# Patient Record
Sex: MALE | Race: WHITE | HISPANIC OR LATINO | Employment: FULL TIME | URBAN - METROPOLITAN AREA
[De-identification: names, ages, dates, MRNs, and addresses within clinical notes are randomized per-mention and may not be internally consistent; named-entity substitution may affect disease eponyms.]

---

## 2018-04-23 ENCOUNTER — LAB VISIT (OUTPATIENT)
Dept: LAB | Facility: HOSPITAL | Age: 58
End: 2018-04-23
Attending: FAMILY MEDICINE
Payer: COMMERCIAL

## 2018-04-23 ENCOUNTER — OFFICE VISIT (OUTPATIENT)
Dept: FAMILY MEDICINE | Facility: CLINIC | Age: 58
End: 2018-04-23
Payer: COMMERCIAL

## 2018-04-23 VITALS
HEIGHT: 67 IN | HEART RATE: 88 BPM | SYSTOLIC BLOOD PRESSURE: 130 MMHG | DIASTOLIC BLOOD PRESSURE: 84 MMHG | WEIGHT: 235.88 LBS | BODY MASS INDEX: 37.02 KG/M2

## 2018-04-23 DIAGNOSIS — Z00.00 ANNUAL PHYSICAL EXAM: ICD-10-CM

## 2018-04-23 DIAGNOSIS — E79.0 HYPERURICEMIA: ICD-10-CM

## 2018-04-23 DIAGNOSIS — E66.01 SEVERE OBESITY (BMI 35.0-39.9) WITH COMORBIDITY: ICD-10-CM

## 2018-04-23 DIAGNOSIS — I10 ESSENTIAL HYPERTENSION: ICD-10-CM

## 2018-04-23 DIAGNOSIS — Z00.00 ANNUAL PHYSICAL EXAM: Primary | ICD-10-CM

## 2018-04-23 DIAGNOSIS — Z11.59 NEED FOR HEPATITIS C SCREENING TEST: ICD-10-CM

## 2018-04-23 DIAGNOSIS — M25.561 CHRONIC PAIN OF RIGHT KNEE: ICD-10-CM

## 2018-04-23 DIAGNOSIS — M25.50 ARTHRALGIA, UNSPECIFIED JOINT: ICD-10-CM

## 2018-04-23 DIAGNOSIS — M25.551 HIP PAIN, CHRONIC, RIGHT: ICD-10-CM

## 2018-04-23 DIAGNOSIS — R20.2 TINGLING OF SKIN: ICD-10-CM

## 2018-04-23 DIAGNOSIS — G89.29 CHRONIC PAIN OF RIGHT KNEE: ICD-10-CM

## 2018-04-23 DIAGNOSIS — G89.29 HIP PAIN, CHRONIC, RIGHT: ICD-10-CM

## 2018-04-23 DIAGNOSIS — G47.33 OSA (OBSTRUCTIVE SLEEP APNEA): ICD-10-CM

## 2018-04-23 LAB
BASOPHILS # BLD AUTO: 0.06 K/UL
BASOPHILS NFR BLD: 0.9 %
CRP SERPL-MCNC: 1.5 MG/L
DIFFERENTIAL METHOD: ABNORMAL
EOSINOPHIL # BLD AUTO: 0.1 K/UL
EOSINOPHIL NFR BLD: 1.9 %
ERYTHROCYTE [DISTWIDTH] IN BLOOD BY AUTOMATED COUNT: 13 %
ERYTHROCYTE [SEDIMENTATION RATE] IN BLOOD BY WESTERGREN METHOD: 8 MM/HR
HCT VFR BLD AUTO: 47 %
HGB BLD-MCNC: 16.9 G/DL
IMM GRANULOCYTES # BLD AUTO: 0.01 K/UL
IMM GRANULOCYTES NFR BLD AUTO: 0.2 %
LYMPHOCYTES # BLD AUTO: 2.1 K/UL
LYMPHOCYTES NFR BLD: 32.3 %
MCH RBC QN AUTO: 31.4 PG
MCHC RBC AUTO-ENTMCNC: 36 G/DL
MCV RBC AUTO: 87 FL
MONOCYTES # BLD AUTO: 0.5 K/UL
MONOCYTES NFR BLD: 8.2 %
NEUTROPHILS # BLD AUTO: 3.6 K/UL
NEUTROPHILS NFR BLD: 56.5 %
NRBC BLD-RTO: 0 /100 WBC
PLATELET # BLD AUTO: 276 K/UL
PMV BLD AUTO: 10.4 FL
RBC # BLD AUTO: 5.39 M/UL
TSH SERPL DL<=0.005 MIU/L-ACNC: 1.27 UIU/ML
URATE SERPL-MCNC: 5.6 MG/DL
WBC # BLD AUTO: 6.38 K/UL

## 2018-04-23 PROCEDURE — 86803 HEPATITIS C AB TEST: CPT

## 2018-04-23 PROCEDURE — 36415 COLL VENOUS BLD VENIPUNCTURE: CPT | Mod: PO

## 2018-04-23 PROCEDURE — 86431 RHEUMATOID FACTOR QUANT: CPT

## 2018-04-23 PROCEDURE — 84550 ASSAY OF BLOOD/URIC ACID: CPT

## 2018-04-23 PROCEDURE — 86038 ANTINUCLEAR ANTIBODIES: CPT

## 2018-04-23 PROCEDURE — 99999 PR PBB SHADOW E&M-EST. PATIENT-LVL III: CPT | Mod: PBBFAC,,, | Performed by: FAMILY MEDICINE

## 2018-04-23 PROCEDURE — 85651 RBC SED RATE NONAUTOMATED: CPT

## 2018-04-23 PROCEDURE — 82746 ASSAY OF FOLIC ACID SERUM: CPT

## 2018-04-23 PROCEDURE — 99386 PREV VISIT NEW AGE 40-64: CPT | Mod: S$GLB,,, | Performed by: FAMILY MEDICINE

## 2018-04-23 PROCEDURE — 82607 VITAMIN B-12: CPT

## 2018-04-23 PROCEDURE — 3075F SYST BP GE 130 - 139MM HG: CPT | Mod: CPTII,S$GLB,, | Performed by: FAMILY MEDICINE

## 2018-04-23 PROCEDURE — 3079F DIAST BP 80-89 MM HG: CPT | Mod: CPTII,S$GLB,, | Performed by: FAMILY MEDICINE

## 2018-04-23 PROCEDURE — 86140 C-REACTIVE PROTEIN: CPT

## 2018-04-23 PROCEDURE — 84443 ASSAY THYROID STIM HORMONE: CPT

## 2018-04-23 PROCEDURE — 85025 COMPLETE CBC W/AUTO DIFF WBC: CPT

## 2018-04-23 RX ORDER — OMEPRAZOLE 20 MG/1
40 CAPSULE, DELAYED RELEASE ORAL DAILY
Status: ON HOLD | COMMUNITY
End: 2018-12-03

## 2018-04-23 RX ORDER — AMITRIPTYLINE HYDROCHLORIDE 10 MG/1
10 TABLET, FILM COATED ORAL NIGHTLY
Qty: 90 TABLET | Refills: 0 | Status: SHIPPED | OUTPATIENT
Start: 2018-04-23 | End: 2018-07-19 | Stop reason: SDUPTHER

## 2018-04-23 RX ORDER — LOSARTAN POTASSIUM AND HYDROCHLOROTHIAZIDE 12.5; 5 MG/1; MG/1
1 TABLET ORAL DAILY
Status: ON HOLD | COMMUNITY
End: 2018-11-30 | Stop reason: HOSPADM

## 2018-04-23 RX ORDER — POTASSIUM CHLORIDE 20 MEQ/1
20 TABLET, EXTENDED RELEASE ORAL 4 TIMES DAILY
Status: ON HOLD | COMMUNITY
End: 2018-12-05 | Stop reason: HOSPADM

## 2018-04-23 RX ORDER — NIFEDIPINE 60 MG/1
30 TABLET, EXTENDED RELEASE ORAL 2 TIMES DAILY
Status: ON HOLD | COMMUNITY
End: 2018-11-30 | Stop reason: HOSPADM

## 2018-04-23 NOTE — PATIENT INSTRUCTIONS
Arthralgia    Arthralgia is the term for pain in or around the joint. It is a symptom, not a disease. This pain may involve one or more joints. In some cases, the pain moves from joint to joint.  There are many causes for joint pain. These include:  · Injury  · Osteoarthritis (wearing out of the joint surface)  · Gout (inflammation of the joint due to crystals in the joint fluid)  · Infection inside the joint    · Bursitis (inflammation of the fluid-filled sacs around the joint)  · Autoimmune disorders such as rheumatoid arthritis or lupus  · Tendonitis (inflammation of chords that attach muscle to bone)  Home care  · Rest the involved joint(s) until your symptoms improve.   · You may be prescribed pain medicine. If none is prescribed, you may use acetaminophen or ibuprofen to control pain and inflammation.  Follow-up care  Follow up with your healthcare provider or as advised.  When to seek medical advice  Contact your healthcare provider right away if any of the following occurs:  · Pain, swelling, or redness of joint increases  · Pain worsens or recurs after a period of improvement  · Pain moves to other joints  · You cannot bear weight on the affected joint   · You cannot move the affected joint  · Joint appears deformed  · New rash appears  · Fever of 100.4ºF (38ºC) or higher, or as directed by your healthcare provider  Date Last Reviewed: 3/1/2017  © 3893-8450 [employer]. [address] [place] [address], [place] [address]. All rights reserved. This information is not intended as a substitute for professional medical care. Always follow your healthcare professional's instructions.

## 2018-04-23 NOTE — PROGRESS NOTES
Subjective:       Patient ID: Roman Hodges is a 58 y.o. male.    Chief Complaint: Establish Care; Annual Exam; and Hypertension    58 years old male came to the clinic for his physical examination.  Patient blood pressure today stable.  No chest pain palpitations orthopnea or PND.  Patient reported tingling sensation all over his body.  He reports some improvement taking B complex supplement.  Patient with chronic right hip and right knee pain for the last year.  The pain is 3 out of 10 of intensity on and off aggravated with activity and better with rest.  Patient with a BMI of 36 currently trying to lose weight.  Patient with sleep apnea currently using oxygen at night.  Patient with elevated uric acid before.  He reports gout episode over the right knee.      Hypertension   Pertinent negatives include no chest pain or palpitations.     Review of Systems   Constitutional: Negative.    HENT: Negative.    Eyes: Negative.    Respiratory: Negative.    Cardiovascular: Negative.  Negative for chest pain, palpitations and leg swelling.   Gastrointestinal: Negative.    Genitourinary: Negative.    Musculoskeletal: Negative.    Skin: Negative.    Neurological: Negative.    Psychiatric/Behavioral: Negative.        Objective:      Physical Exam   Constitutional: He is oriented to person, place, and time. He appears well-developed and well-nourished. No distress.   HENT:   Head: Normocephalic and atraumatic.   Right Ear: External ear normal.   Left Ear: External ear normal.   Nose: Nose normal.   Mouth/Throat: Oropharynx is clear and moist. No oropharyngeal exudate.   Eyes: Conjunctivae and EOM are normal. Pupils are equal, round, and reactive to light. Right eye exhibits no discharge. Left eye exhibits no discharge. No scleral icterus.   Neck: Normal range of motion. Neck supple. No JVD present. No tracheal deviation present. No thyromegaly present.   Cardiovascular: Normal rate, regular rhythm, normal heart sounds and  intact distal pulses.  Exam reveals no gallop and no friction rub.    No murmur heard.  Pulmonary/Chest: Effort normal and breath sounds normal. No stridor. No respiratory distress. He has no wheezes. He has no rales. He exhibits no tenderness.   Abdominal: Soft. Bowel sounds are normal. He exhibits no distension and no mass. There is no tenderness. There is no rebound and no guarding.   Musculoskeletal: Normal range of motion. He exhibits no edema or tenderness.   Lymphadenopathy:     He has no cervical adenopathy.   Neurological: He is alert and oriented to person, place, and time. He has normal reflexes. He displays normal reflexes. No cranial nerve deficit. He exhibits normal muscle tone. Coordination normal.   Skin: Skin is warm and dry. No rash noted. He is not diaphoretic. No erythema. No pallor.   Psychiatric: He has a normal mood and affect. His behavior is normal. Judgment and thought content normal.   Nursing note and vitals reviewed.      Assessment:       1. Annual physical exam    2. Severe obesity (BMI 35.0-39.9) with comorbidity    3. Essential hypertension    4. Need for hepatitis C screening test    5. Tingling of skin    6. Arthralgia, unspecified joint    7. Hip pain, chronic, right    8. Chronic pain of right knee    9. Hyperuricemia        Plan:         Roman was seen today for establish care, annual exam and hypertension.    Diagnoses and all orders for this visit:    Annual physical exam  -     TSH; Future  -     CBC auto differential; Future  -     Sedimentation rate, manual; Future  -     C-reactive protein; Future  -     YESSY; Future  -     Rheumatoid factor; Future  -     Uric acid; Future  -     Vitamin B12; Future  -     Folate; Future    Severe obesity (BMI 35.0-39.9) with comorbidity    Essential hypertension  -     TSH; Future  -     CBC auto differential; Future    Need for hepatitis C screening test  -     Hepatitis C antibody; Future    Tingling of skin  -     Vitamin B12;  Future  -     Folate; Future  -     amitriptyline (ELAVIL) 10 MG tablet; Take 1 tablet (10 mg total) by mouth every evening.    Arthralgia, unspecified joint  -     Sedimentation rate, manual; Future  -     C-reactive protein; Future  -     YESSY; Future  -     Rheumatoid factor; Future  -     Uric acid; Future    Hip pain, chronic, right  -     X-Ray Hip 2 View Right; Future    Chronic pain of right knee  -     X-Ray Knee 1 or 2 View Right; Future    Hyperuricemia  -     Uric acid; Future    MALLIKA (obstructive sleep apnea)     Diet and physical activity to promote weight loss.

## 2018-04-24 LAB
ANA SER QL IF: NORMAL
FOLATE SERPL-MCNC: 9 NG/ML
HCV AB SERPL QL IA: NEGATIVE
RHEUMATOID FACT SERPL-ACNC: <10 IU/ML
VIT B12 SERPL-MCNC: 597 PG/ML

## 2018-05-01 ENCOUNTER — HOSPITAL ENCOUNTER (OUTPATIENT)
Dept: RADIOLOGY | Facility: HOSPITAL | Age: 58
Discharge: HOME | End: 2018-05-01
Attending: FAMILY MEDICINE
Payer: COMMERCIAL

## 2018-05-01 DIAGNOSIS — M25.561 CHRONIC PAIN OF RIGHT KNEE: ICD-10-CM

## 2018-05-01 DIAGNOSIS — G89.29 HIP PAIN, CHRONIC, RIGHT: ICD-10-CM

## 2018-05-01 DIAGNOSIS — G89.29 CHRONIC PAIN OF RIGHT KNEE: ICD-10-CM

## 2018-05-01 DIAGNOSIS — M25.551 HIP PAIN, CHRONIC, RIGHT: ICD-10-CM

## 2018-05-01 PROCEDURE — 73560 X-RAY EXAM OF KNEE 1 OR 2: CPT | Mod: 26,RT,, | Performed by: RADIOLOGY

## 2018-05-01 PROCEDURE — 73502 X-RAY EXAM HIP UNI 2-3 VIEWS: CPT | Mod: 26,RT,, | Performed by: RADIOLOGY

## 2018-05-01 PROCEDURE — 73560 X-RAY EXAM OF KNEE 1 OR 2: CPT | Mod: TC,FY,RT

## 2018-05-01 PROCEDURE — 73502 X-RAY EXAM HIP UNI 2-3 VIEWS: CPT | Mod: TC,FY,RT

## 2018-07-19 DIAGNOSIS — R20.2 TINGLING OF SKIN: ICD-10-CM

## 2018-07-19 RX ORDER — AMITRIPTYLINE HYDROCHLORIDE 10 MG/1
10 TABLET, FILM COATED ORAL NIGHTLY
Qty: 90 TABLET | Refills: 0 | Status: SHIPPED | OUTPATIENT
Start: 2018-07-19 | End: 2018-10-09

## 2018-08-15 ENCOUNTER — OFFICE VISIT (OUTPATIENT)
Dept: FAMILY MEDICINE | Facility: CLINIC | Age: 58
End: 2018-08-15
Payer: COMMERCIAL

## 2018-08-15 VITALS
WEIGHT: 235.88 LBS | SYSTOLIC BLOOD PRESSURE: 130 MMHG | HEART RATE: 80 BPM | HEIGHT: 67 IN | BODY MASS INDEX: 37.02 KG/M2 | DIASTOLIC BLOOD PRESSURE: 80 MMHG

## 2018-08-15 DIAGNOSIS — E66.01 SEVERE OBESITY (BMI 35.0-39.9) WITH COMORBIDITY: ICD-10-CM

## 2018-08-15 DIAGNOSIS — I10 ESSENTIAL HYPERTENSION: Primary | ICD-10-CM

## 2018-08-15 DIAGNOSIS — Z23 NEED FOR DIPHTHERIA-TETANUS-PERTUSSIS (TDAP) VACCINE: ICD-10-CM

## 2018-08-15 DIAGNOSIS — N52.9 ERECTILE DYSFUNCTION, UNSPECIFIED ERECTILE DYSFUNCTION TYPE: ICD-10-CM

## 2018-08-15 DIAGNOSIS — G44.221 CHRONIC TENSION-TYPE HEADACHE, INTRACTABLE: ICD-10-CM

## 2018-08-15 DIAGNOSIS — Z00.00 ANNUAL PHYSICAL EXAM: ICD-10-CM

## 2018-08-15 DIAGNOSIS — Z12.5 SCREENING FOR PROSTATE CANCER: ICD-10-CM

## 2018-08-15 DIAGNOSIS — K21.9 GASTROESOPHAGEAL REFLUX DISEASE WITHOUT ESOPHAGITIS: ICD-10-CM

## 2018-08-15 PROCEDURE — 99999 PR PBB SHADOW E&M-EST. PATIENT-LVL IV: CPT | Mod: PBBFAC,,, | Performed by: FAMILY MEDICINE

## 2018-08-15 PROCEDURE — 90471 IMMUNIZATION ADMIN: CPT | Mod: S$GLB,,, | Performed by: FAMILY MEDICINE

## 2018-08-15 PROCEDURE — 3075F SYST BP GE 130 - 139MM HG: CPT | Mod: CPTII,S$GLB,, | Performed by: FAMILY MEDICINE

## 2018-08-15 PROCEDURE — 90715 TDAP VACCINE 7 YRS/> IM: CPT | Mod: S$GLB,,, | Performed by: FAMILY MEDICINE

## 2018-08-15 PROCEDURE — 99214 OFFICE O/P EST MOD 30 MIN: CPT | Mod: 25,S$GLB,, | Performed by: FAMILY MEDICINE

## 2018-08-15 PROCEDURE — 3079F DIAST BP 80-89 MM HG: CPT | Mod: CPTII,S$GLB,, | Performed by: FAMILY MEDICINE

## 2018-08-15 RX ORDER — IBUPROFEN 600 MG/1
600 TABLET ORAL EVERY 8 HOURS PRN
Qty: 40 TABLET | Refills: 0 | Status: SHIPPED | OUTPATIENT
Start: 2018-08-15 | End: 2018-08-25

## 2018-08-15 RX ORDER — VARDENAFIL HYDROCHLORIDE 10 MG/1
10 TABLET ORAL DAILY PRN
Qty: 12 TABLET | Refills: 6 | Status: SHIPPED | OUTPATIENT
Start: 2018-08-15 | End: 2018-09-20

## 2018-08-15 NOTE — PATIENT INSTRUCTIONS
Consejos para controlar el reflujo de ácido (agruras)    Para controlar el reflujo de ácido es necesario hacer algunos cambios básicos en la alimentación y el estilo de roberto. Los siguientes consejos pueden ser suficientes para aliviar el malestar.  Vigile lo que come  · Evite los alimentos grasosos o demasiado picantes.  · Coma menos alimentos ácidos, jack cítricos y alimentos a base de tomates, ya que pueden agravar los síntomas.  · Limite el consumo de alcohol, cafeína y bebidas gaseosas. Todas estas bebidas aumentan el reflujo.  · Intente limitar el consumo de chocolate y menta. Evette puede empeorar el reflujo de ácido en algunas personas.  Vigile cuándo come  · Evite acostarse althea 3 horas después de maurisio comido.  · No coma nada antes de irse a la cama.  Mantenga la mello levantada  Mantener la mello y el pecho elevados unas 4 a 6 pulgadas, le ayudará a aliviar el reflujo cuando esté acostado. Ponga soportes debajo de la cabecera de la cama para elevarla.  Otros cambios  · Pierda el exceso de peso.  · No keanu ejercicio poco antes de acostarse.  · Evite usar ropas demasiado ajustadas.  · Limite el uso de aspirina e ibuprofeno.  · Deje de fumar.   Date Last Reviewed: 3/14/2014  © 7590-3357 [employer]. [address] [place] [address], [place] [address]. Todos los derechos reservados. Esta información no pretende sustituir la atención médica profesional. Sólo salcido médico puede diagnosticar y tratar un problema de keyla.

## 2018-08-15 NOTE — PROGRESS NOTES
Subjective:       Patient ID: Roman Hodges is a 58 y.o. male.    Chief Complaint: Annual Exam    58 years old male came to the clinic for his physical examination.  Patient with episodic headache for the last year.  Patient was not able to tolerate Topamax because of dizziness.  The headache is 3/10 of intensity on and off no alleviating or aggravating factors.  Patient wants to try a different medicine.  Patient with a BMI of 36 currently trying to lose weight.  Blood pressure today stable.  No chest pain, palpitation, orthopnea or PND.  Patient requests a medicine to help him with the erectile dysfunction.        Review of Systems   Constitutional: Negative.  Negative for activity change and unexpected weight change.   HENT: Positive for hearing loss. Negative for rhinorrhea and trouble swallowing.    Eyes: Positive for discharge. Negative for visual disturbance.   Respiratory: Positive for chest tightness. Negative for wheezing.    Cardiovascular: Positive for chest pain and palpitations.   Gastrointestinal: Negative.  Negative for blood in stool, constipation, diarrhea and vomiting.   Endocrine: Negative for polydipsia and polyuria.   Genitourinary: Negative.  Negative for difficulty urinating, hematuria and urgency.   Musculoskeletal: Positive for arthralgias. Negative for joint swelling and neck pain.   Skin: Negative.    Neurological: Negative.  Negative for weakness and headaches.   Psychiatric/Behavioral: Negative.  Negative for confusion and dysphoric mood.       Objective:      Physical Exam   Constitutional: He is oriented to person, place, and time. He appears well-developed and well-nourished. No distress.   HENT:   Head: Normocephalic and atraumatic.   Right Ear: External ear normal.   Left Ear: External ear normal.   Nose: Nose normal.   Mouth/Throat: Oropharynx is clear and moist. No oropharyngeal exudate.   Eyes: Conjunctivae and EOM are normal. Pupils are equal, round, and reactive to light.  Right eye exhibits no discharge. Left eye exhibits no discharge. No scleral icterus.   Neck: Normal range of motion. Neck supple. No JVD present. No tracheal deviation present. No thyromegaly present.   Cardiovascular: Normal rate, regular rhythm, normal heart sounds and intact distal pulses. Exam reveals no gallop and no friction rub.   No murmur heard.  Pulmonary/Chest: Effort normal and breath sounds normal. No stridor. No respiratory distress. He has no wheezes. He has no rales. He exhibits no tenderness.   Abdominal: Soft. Bowel sounds are normal. He exhibits no distension and no mass. There is no tenderness. There is no rebound and no guarding.   Musculoskeletal: Normal range of motion. He exhibits no edema or tenderness.   Lymphadenopathy:     He has no cervical adenopathy.   Neurological: He is alert and oriented to person, place, and time. He has normal reflexes. He displays normal reflexes. No cranial nerve deficit. He exhibits normal muscle tone. Coordination normal.   Skin: Skin is warm and dry. No rash noted. He is not diaphoretic. No erythema. No pallor.   Psychiatric: He has a normal mood and affect. His behavior is normal. Judgment and thought content normal.   Nursing note and vitals reviewed.      Assessment:       1. Essential hypertension    2. Severe obesity (BMI 35.0-39.9) with comorbidity    3. Gastroesophageal reflux disease without esophagitis    4. Chronic tension-type headache, intractable    5. Erectile dysfunction, unspecified erectile dysfunction type    6. Screening for prostate cancer    7. Annual physical exam    8. Need for diphtheria-tetanus-pertussis (Tdap) vaccine        Plan:         Roman was seen today for annual exam.    Diagnoses and all orders for this visit:    Essential hypertension  -     Comprehensive metabolic panel; Future  -     Lipid panel; Future    Severe obesity (BMI 35.0-39.9) with comorbidity  -     Lipid panel; Future    Gastroesophageal reflux disease without  esophagitis    Chronic tension-type headache, intractable  -     ibuprofen (ADVIL,MOTRIN) 600 MG tablet; Take 1 tablet (600 mg total) by mouth every 8 (eight) hours as needed for Pain.    Erectile dysfunction, unspecified erectile dysfunction type  -     vardenafil (LEVITRA) 10 MG tablet; Take 1 tablet (10 mg total) by mouth daily as needed for Erectile Dysfunction.    Screening for prostate cancer  -     PSA, Screening; Future    Annual physical exam    Need for diphtheria-tetanus-pertussis (Tdap) vaccine  -     Tdap Vaccine    Continue monitoring blood pressure at home, low sodium diet.  Continue with omeprazole.   Diet and physical activity to promote weight loss.

## 2018-08-17 ENCOUNTER — LAB VISIT (OUTPATIENT)
Dept: LAB | Facility: HOSPITAL | Age: 58
End: 2018-08-17
Attending: FAMILY MEDICINE
Payer: COMMERCIAL

## 2018-08-17 DIAGNOSIS — Z12.5 SCREENING FOR PROSTATE CANCER: ICD-10-CM

## 2018-08-17 DIAGNOSIS — I10 ESSENTIAL HYPERTENSION: ICD-10-CM

## 2018-08-17 DIAGNOSIS — E66.01 SEVERE OBESITY (BMI 35.0-39.9) WITH COMORBIDITY: ICD-10-CM

## 2018-08-17 LAB
ALBUMIN SERPL BCP-MCNC: 3.9 G/DL
ALP SERPL-CCNC: 101 U/L
ALT SERPL W/O P-5'-P-CCNC: 143 U/L
ANION GAP SERPL CALC-SCNC: 10 MMOL/L
AST SERPL-CCNC: 76 U/L
BILIRUB SERPL-MCNC: 1 MG/DL
BUN SERPL-MCNC: 15 MG/DL
CALCIUM SERPL-MCNC: 9.1 MG/DL
CHLORIDE SERPL-SCNC: 103 MMOL/L
CHOLEST SERPL-MCNC: 158 MG/DL
CHOLEST/HDLC SERPL: 4.8 {RATIO}
CO2 SERPL-SCNC: 26 MMOL/L
COMPLEXED PSA SERPL-MCNC: 0.48 NG/ML
CREAT SERPL-MCNC: 1.1 MG/DL
EST. GFR  (AFRICAN AMERICAN): >60 ML/MIN/1.73 M^2
EST. GFR  (NON AFRICAN AMERICAN): >60 ML/MIN/1.73 M^2
GLUCOSE SERPL-MCNC: 154 MG/DL
HDLC SERPL-MCNC: 33 MG/DL
HDLC SERPL: 20.9 %
LDLC SERPL CALC-MCNC: 86.6 MG/DL
NONHDLC SERPL-MCNC: 125 MG/DL
POTASSIUM SERPL-SCNC: 3.2 MMOL/L
PROT SERPL-MCNC: 7.7 G/DL
SODIUM SERPL-SCNC: 139 MMOL/L
TRIGL SERPL-MCNC: 192 MG/DL

## 2018-08-17 PROCEDURE — 80061 LIPID PANEL: CPT

## 2018-08-17 PROCEDURE — 84153 ASSAY OF PSA TOTAL: CPT

## 2018-08-17 PROCEDURE — 36415 COLL VENOUS BLD VENIPUNCTURE: CPT | Mod: PO

## 2018-08-17 PROCEDURE — 80053 COMPREHEN METABOLIC PANEL: CPT

## 2018-08-20 ENCOUNTER — TELEPHONE (OUTPATIENT)
Dept: FAMILY MEDICINE | Facility: CLINIC | Age: 58
End: 2018-08-20

## 2018-08-20 DIAGNOSIS — R73.01 IMPAIRED FASTING BLOOD SUGAR: ICD-10-CM

## 2018-08-20 DIAGNOSIS — R74.8 ABNORMAL LIVER ENZYMES: ICD-10-CM

## 2018-08-20 DIAGNOSIS — I10 ESSENTIAL HYPERTENSION: Primary | ICD-10-CM

## 2018-08-20 NOTE — TELEPHONE ENCOUNTER
Low potassium continue with potassium supplementation.    The elevated liver enzymes.  Ultrasound and additional blood work were ordered.    Elevated blood sugar testing to rule out diabetes was ordered.    Please notify the patient with appointments.

## 2018-08-20 NOTE — TELEPHONE ENCOUNTER
Spoke to patients wife and notify that  patient had low potassium and elevated sugar and needed to repeat labs also had ultrasound to be schedule. Patient wife will call back with dates to make the appointment . Patients wife voices understanding.

## 2018-08-24 ENCOUNTER — HOSPITAL ENCOUNTER (OUTPATIENT)
Dept: RADIOLOGY | Facility: HOSPITAL | Age: 58
Discharge: HOME | End: 2018-08-24
Attending: FAMILY MEDICINE
Payer: COMMERCIAL

## 2018-08-24 ENCOUNTER — LAB VISIT (OUTPATIENT)
Dept: LAB | Facility: HOSPITAL | Age: 58
End: 2018-08-24
Attending: FAMILY MEDICINE
Payer: COMMERCIAL

## 2018-08-24 ENCOUNTER — TELEPHONE (OUTPATIENT)
Dept: FAMILY MEDICINE | Facility: CLINIC | Age: 58
End: 2018-08-24

## 2018-08-24 DIAGNOSIS — I10 ESSENTIAL HYPERTENSION: ICD-10-CM

## 2018-08-24 DIAGNOSIS — R16.0 LIVER MASS: Primary | ICD-10-CM

## 2018-08-24 DIAGNOSIS — R74.8 ABNORMAL LIVER ENZYMES: ICD-10-CM

## 2018-08-24 DIAGNOSIS — R73.01 IMPAIRED FASTING BLOOD SUGAR: ICD-10-CM

## 2018-08-24 LAB
ALBUMIN SERPL BCP-MCNC: 4.2 G/DL
ALP SERPL-CCNC: 93 U/L
ALT SERPL W/O P-5'-P-CCNC: 170 U/L
AST SERPL-CCNC: 100 U/L
BILIRUB DIRECT SERPL-MCNC: 0.4 MG/DL
BILIRUB SERPL-MCNC: 0.9 MG/DL
ESTIMATED AVG GLUCOSE: 151 MG/DL
GLUCOSE SERPL-MCNC: 127 MG/DL
GLUCOSE SERPL-MCNC: 176 MG/DL
GLUCOSE SERPL-MCNC: 294 MG/DL
HBA1C MFR BLD HPLC: 6.9 %
PROT SERPL-MCNC: 8 G/DL

## 2018-08-24 PROCEDURE — 76700 US EXAM ABDOM COMPLETE: CPT | Mod: 26,,, | Performed by: RADIOLOGY

## 2018-08-24 PROCEDURE — 83036 HEMOGLOBIN GLYCOSYLATED A1C: CPT

## 2018-08-24 PROCEDURE — 36415 COLL VENOUS BLD VENIPUNCTURE: CPT | Mod: PO

## 2018-08-24 PROCEDURE — 80076 HEPATIC FUNCTION PANEL: CPT

## 2018-08-24 PROCEDURE — 76700 US EXAM ABDOM COMPLETE: CPT | Mod: TC

## 2018-08-24 PROCEDURE — 80074 ACUTE HEPATITIS PANEL: CPT

## 2018-08-24 PROCEDURE — 82951 GLUCOSE TOLERANCE TEST (GTT): CPT

## 2018-08-24 NOTE — TELEPHONE ENCOUNTER
Liver abnormality noted in the ultrasound.     Radiology recommends MRI for better assessment.    Please notify the patient with appointment .

## 2018-08-25 ENCOUNTER — TELEPHONE (OUTPATIENT)
Dept: FAMILY MEDICINE | Facility: CLINIC | Age: 58
End: 2018-08-25

## 2018-08-25 DIAGNOSIS — E11.65 TYPE 2 DIABETES MELLITUS WITH HYPERGLYCEMIA, WITHOUT LONG-TERM CURRENT USE OF INSULIN: Primary | ICD-10-CM

## 2018-08-25 DIAGNOSIS — R74.8 ABNORMAL LIVER ENZYMES: ICD-10-CM

## 2018-08-25 DIAGNOSIS — R16.0 LIVER MASS: ICD-10-CM

## 2018-08-25 RX ORDER — METFORMIN HYDROCHLORIDE 500 MG/1
500 TABLET ORAL 2 TIMES DAILY WITH MEALS
Qty: 180 TABLET | Refills: 3 | Status: SHIPPED | OUTPATIENT
Start: 2018-08-25 | End: 2018-08-30

## 2018-08-25 NOTE — TELEPHONE ENCOUNTER
Test was positive for diabetes.    Diabetes education was ordered.    Metformin was ordered.    Follow-up in 3 months .    Abnormal liver function.    MRI was ordered.    Liver specialist referral was done.      Please notify the patient with appointment.

## 2018-08-27 LAB
HAV IGM SERPL QL IA: NEGATIVE
HBV CORE IGM SERPL QL IA: NEGATIVE
HBV SURFACE AG SERPL QL IA: NEGATIVE
HCV AB SERPL QL IA: NEGATIVE

## 2018-08-27 NOTE — TELEPHONE ENCOUNTER
Spoke to patients wife and instructed that liver ultrasound had some abnormalities, and that md wanted for patient to do an MRI and medication was send to the pharmacy for elevated blood sugar and referral coordinator was going to call for mri appointment. Patients wife voices understanding.

## 2018-08-28 ENCOUNTER — TELEPHONE (OUTPATIENT)
Dept: TRANSPLANT | Facility: CLINIC | Age: 58
End: 2018-08-28

## 2018-08-28 NOTE — TELEPHONE ENCOUNTER
----- Message from Rut Blackmon MA sent at 8/28/2018 10:58 AM CDT -----  Contact: self/ [phone]      ----- Message -----  From: Loren Lemos  Sent: 8/28/2018  10:06 AM  To: [facility] Hepat Clinical Staff    Patient has a referral for Liver mass/ Abnormal liver enzymes from Dr. Jose Angel Angel; Please advise.

## 2018-08-28 NOTE — TELEPHONE ENCOUNTER
Spoke to wife, offered appt this week, she stated pt needs time to request off of work.  Appt accepted  9/10. Pt will view on my [facility].

## 2018-08-29 ENCOUNTER — DOCUMENTATION ONLY (OUTPATIENT)
Dept: TRANSPLANT | Facility: CLINIC | Age: 58
End: 2018-08-29

## 2018-08-29 ENCOUNTER — PATIENT MESSAGE (OUTPATIENT)
Dept: FAMILY MEDICINE | Facility: CLINIC | Age: 58
End: 2018-08-29

## 2018-08-30 ENCOUNTER — TELEPHONE (OUTPATIENT)
Dept: FAMILY MEDICINE | Facility: CLINIC | Age: 58
End: 2018-08-30

## 2018-08-30 DIAGNOSIS — E11.65 TYPE 2 DIABETES MELLITUS WITH HYPERGLYCEMIA, WITHOUT LONG-TERM CURRENT USE OF INSULIN: Primary | ICD-10-CM

## 2018-08-30 RX ORDER — GLIMEPIRIDE 1 MG/1
1 TABLET ORAL
Qty: 90 TABLET | Refills: 3 | Status: SHIPPED | OUTPATIENT
Start: 2018-08-30 | End: 2018-11-29

## 2018-08-31 NOTE — TELEPHONE ENCOUNTER
Spoke to patients wife and notify to discontinue metformin and start taking amaryl. Patients wife voices understanding.

## 2018-09-04 ENCOUNTER — PATIENT MESSAGE (OUTPATIENT)
Dept: HEPATOLOGY | Facility: CLINIC | Age: 58
End: 2018-09-04

## 2018-09-10 ENCOUNTER — HOSPITAL ENCOUNTER (OUTPATIENT)
Dept: RADIOLOGY | Facility: HOSPITAL | Age: 58
Discharge: HOME | End: 2018-09-10
Attending: FAMILY MEDICINE
Payer: COMMERCIAL

## 2018-09-10 DIAGNOSIS — R16.0 LIVER MASS: ICD-10-CM

## 2018-09-10 PROCEDURE — 74183 MRI ABD W/O CNTR FLWD CNTR: CPT | Mod: TC

## 2018-09-10 PROCEDURE — A9585 GADOBUTROL INJECTION: HCPCS | Performed by: FAMILY MEDICINE

## 2018-09-10 PROCEDURE — 74183 MRI ABD W/O CNTR FLWD CNTR: CPT | Mod: 26,,, | Performed by: RADIOLOGY

## 2018-09-10 PROCEDURE — 25500020 PHARM REV CODE 255: Performed by: FAMILY MEDICINE

## 2018-09-10 RX ORDER — GADOBUTROL 604.72 MG/ML
10 INJECTION INTRAVENOUS
Status: COMPLETED | OUTPATIENT
Start: 2018-09-10 | End: 2018-09-10

## 2018-09-10 RX ADMIN — GADOBUTROL 10 ML: 604.72 INJECTION INTRAVENOUS at 07:09

## 2018-09-11 ENCOUNTER — TELEPHONE (OUTPATIENT)
Dept: FAMILY MEDICINE | Facility: CLINIC | Age: 58
End: 2018-09-11

## 2018-09-20 ENCOUNTER — PROCEDURE VISIT (OUTPATIENT)
Dept: HEPATOLOGY | Facility: CLINIC | Age: 58
End: 2018-09-20
Attending: PHYSICIAN ASSISTANT
Payer: COMMERCIAL

## 2018-09-20 ENCOUNTER — OFFICE VISIT (OUTPATIENT)
Dept: HEPATOLOGY | Facility: CLINIC | Age: 58
End: 2018-09-20
Payer: COMMERCIAL

## 2018-09-20 ENCOUNTER — LAB VISIT (OUTPATIENT)
Dept: LAB | Facility: HOSPITAL | Age: 58
End: 2018-09-20
Payer: COMMERCIAL

## 2018-09-20 VITALS
WEIGHT: 228.81 LBS | OXYGEN SATURATION: 96 % | HEART RATE: 70 BPM | HEIGHT: 68 IN | BODY MASS INDEX: 34.68 KG/M2 | DIASTOLIC BLOOD PRESSURE: 85 MMHG | SYSTOLIC BLOOD PRESSURE: 138 MMHG

## 2018-09-20 DIAGNOSIS — R16.0 LIVER MASS: ICD-10-CM

## 2018-09-20 DIAGNOSIS — R74.8 ELEVATED LIVER ENZYMES: ICD-10-CM

## 2018-09-20 DIAGNOSIS — R16.0 LIVER MASS: Primary | ICD-10-CM

## 2018-09-20 LAB
AFP SERPL-MCNC: 4.1 NG/ML
ALBUMIN SERPL BCP-MCNC: 4.2 G/DL
ALP SERPL-CCNC: 95 U/L
ALT SERPL W/O P-5'-P-CCNC: 75 U/L
ANION GAP SERPL CALC-SCNC: 9 MMOL/L
AST SERPL-CCNC: 47 U/L
BILIRUB DIRECT SERPL-MCNC: 0.3 MG/DL
BILIRUB SERPL-MCNC: 0.6 MG/DL
BUN SERPL-MCNC: 28 MG/DL
CALCIUM SERPL-MCNC: 9.2 MG/DL
CANCER AG19-9 SERPL-ACNC: 5 U/ML
CEA SERPL-MCNC: 1.8 NG/ML
CERULOPLASMIN SERPL-MCNC: 21 MG/DL
CHLORIDE SERPL-SCNC: 106 MMOL/L
CO2 SERPL-SCNC: 24 MMOL/L
CREAT SERPL-MCNC: 1.2 MG/DL
EST. GFR  (AFRICAN AMERICAN): >60 ML/MIN/1.73 M^2
EST. GFR  (NON AFRICAN AMERICAN): >60 ML/MIN/1.73 M^2
FERRITIN SERPL-MCNC: 88 NG/ML
GLUCOSE SERPL-MCNC: 83 MG/DL
IGG SERPL-MCNC: 1416 MG/DL
INR PPP: 1
IRON SERPL-MCNC: 83 UG/DL
POTASSIUM SERPL-SCNC: 3.1 MMOL/L
PROT SERPL-MCNC: 7.9 G/DL
PROTHROMBIN TIME: 10.4 SEC
SATURATED IRON: 20 %
SODIUM SERPL-SCNC: 139 MMOL/L
TOTAL IRON BINDING CAPACITY: 419 UG/DL
TRANSFERRIN SERPL-MCNC: 283 MG/DL

## 2018-09-20 PROCEDURE — 82378 CARCINOEMBRYONIC ANTIGEN: CPT

## 2018-09-20 PROCEDURE — 82248 BILIRUBIN DIRECT: CPT

## 2018-09-20 PROCEDURE — 82105 ALPHA-FETOPROTEIN SERUM: CPT

## 2018-09-20 PROCEDURE — 3008F BODY MASS INDEX DOCD: CPT | Mod: CPTII,S$GLB,, | Performed by: PHYSICIAN ASSISTANT

## 2018-09-20 PROCEDURE — 86038 ANTINUCLEAR ANTIBODIES: CPT

## 2018-09-20 PROCEDURE — 80321 ALCOHOLS BIOMARKERS 1OR 2: CPT

## 2018-09-20 PROCEDURE — 86235 NUCLEAR ANTIGEN ANTIBODY: CPT

## 2018-09-20 PROCEDURE — 99999 PR PBB SHADOW E&M-EST. PATIENT-LVL IV: CPT | Mod: PBBFAC,,, | Performed by: PHYSICIAN ASSISTANT

## 2018-09-20 PROCEDURE — 82104 ALPHA-1-ANTITRYPSIN PHENO: CPT

## 2018-09-20 PROCEDURE — 86790 VIRUS ANTIBODY NOS: CPT

## 2018-09-20 PROCEDURE — 80053 COMPREHEN METABOLIC PANEL: CPT

## 2018-09-20 PROCEDURE — 91200 LIVER ELASTOGRAPHY: CPT | Mod: S$GLB,,, | Performed by: PHYSICIAN ASSISTANT

## 2018-09-20 PROCEDURE — 86256 FLUORESCENT ANTIBODY TITER: CPT | Mod: 91

## 2018-09-20 PROCEDURE — 82784 ASSAY IGA/IGD/IGG/IGM EACH: CPT

## 2018-09-20 PROCEDURE — 99204 OFFICE O/P NEW MOD 45 MIN: CPT | Mod: S$GLB,,, | Performed by: PHYSICIAN ASSISTANT

## 2018-09-20 PROCEDURE — 86704 HEP B CORE ANTIBODY TOTAL: CPT

## 2018-09-20 PROCEDURE — 83540 ASSAY OF IRON: CPT

## 2018-09-20 PROCEDURE — 82390 ASSAY OF CERULOPLASMIN: CPT

## 2018-09-20 PROCEDURE — 3079F DIAST BP 80-89 MM HG: CPT | Mod: CPTII,S$GLB,, | Performed by: PHYSICIAN ASSISTANT

## 2018-09-20 PROCEDURE — 3075F SYST BP GE 130 - 139MM HG: CPT | Mod: CPTII,S$GLB,, | Performed by: PHYSICIAN ASSISTANT

## 2018-09-20 PROCEDURE — 85610 PROTHROMBIN TIME: CPT

## 2018-09-20 PROCEDURE — 82728 ASSAY OF FERRITIN: CPT

## 2018-09-20 PROCEDURE — 86301 IMMUNOASSAY TUMOR CA 19-9: CPT

## 2018-09-20 NOTE — PROGRESS NOTES
HEPATOLOGY CLINIC VISIT NOTE     REFERRING PROVIDER: Dr. Jose Angel Craft*    REASON FOR VISIT: liver mass    HISTORY: This is a 58 y.o. White male here for evaluation of a liver mass, referred by PCP. Pt presented for his routine physical with PCP and was noted to have elevated liver enzymes so U/S was ordered. U/S noted 6cm right hepatic lobe mass, possible FHN or adenoma but neoplasm not excluded. He had an MRI w and w/out and again 6cm mass noted, adenoma versus HCC/malignancy not excluded.     He only has two sets of liver enzymes and both are elevated. On most recent labs, , . Tbili WNL, albumin 4.2. His PLTs are well preserved. He had a negative acute hep panel. He has not has serological eval or fibrosis staging     He reports h/o iron overload, followed by hematology. History of therapeutic phlebotomy.    PMH of HTN and DMII.     Pt denies signs of hepatic decompensation including: jaundice, dark urine, abdominal distention, hematemesis, melena, slowed mentation.    Liver staging:  No formal staging    Ref. Range 8/24/2018 08:58   Albumin Latest Ref Range: 3.5 - 5.2 g/dL 4.2   Total Bilirubin Latest Ref Range: 0.1 - 1.0 mg/dL 0.9   Bilirubin, Direct Latest Ref Range: 0.1 - 0.3 mg/dL 0.4 (H)   AST Latest Ref Range: 10 - 40 U/L 100 (H)   ALT Latest Ref Range: 10 - 44 U/L 170 (H)               Past Medical History:   Diagnosis Date    GERD (gastroesophageal reflux disease)     Hypertension        Past Surgical History:   Procedure Laterality Date    COLONOSCOPY      HERNIA REPAIR         FAMILY HISTORY: Negative for liver disease  Mother with cirrhosis of liver,   SOCIAL HISTORY:   Social History     Tobacco Use   Smoking Status Former Smoker   Smokeless Tobacco Never Used       Social History     Substance and Sexual Activity   Alcohol Use No       Social History     Substance and Sexual Activity   Drug Use No     ROS:   No fever, chills  No chest pain, dyspnea, cough  No abdominal pain,  nausea, vomiting   No headaches, visual changes  No lower extremity edema  No depression or anxiety      PHYSICAL EXAM:  Friendly White male, in no acute distress; alert and oriented to person, place and time  VITALS: reviewed  HEENT: Sclerae anicteric.   NECK: Supple  CVS: Regular rate and rhythm. No murmurs  LUNGS: Normal respiratory effort. Clear bilaterally  ABDOMEN: Flat, soft, nontender. No organomegaly or masses. No ascites or hernias.  SKIN: Warm and dry. No jaundice, No obvious rashes.   EXTREMITIES: No lower extremity edema  NEURO/PSYCH: Normal gate. Memory intact. Thought and speech pattern appropriate. Behavior normal. No depression or anxiety noted.    RECENT LABS:  Lab Results   Component Value Date    WBC 6.38 04/23/2018    HGB 16.9 04/23/2018     04/23/2018     No results found for: INR  Lab Results   Component Value Date     (H) 08/24/2018     (H) 08/24/2018    BILITOT 0.9 08/24/2018    ALBUMIN 4.2 08/24/2018    ALKPHOS 93 08/24/2018    CREATININE 1.1 08/17/2018    BUN 15 08/17/2018     08/17/2018    K 3.2 (L) 08/17/2018     RECENT IMAGING:  Details     Reading Physician Reading Date Result Priority   Jose Alegre MD 9/11/2018       Narrative     EXAMINATION:  MRI ABDOMEN W WO CONTRAST    CLINICAL HISTORY:  liver mass;  Hepatomegaly, not elsewhere classified    TECHNIQUE:  Multiplanar multisequence routine MRI abdomen obtained with the administration of 10 mL Gadavist IV contrast.    COMPARISON:  Ultrasound 08/24/2018    FINDINGS:  There is a 6.0 cm mass in the liver, medial segment of the left hepatic lobe (segment 4A).  It demonstrates heterogeneous slightly increased T2 signal.  There is heterogeneous arterial phase enhancement with washout and capsule formation.  No other liver lesions identified.  The main portal veins and hepatic veins are patent.    Gallbladder is unremarkable.  No biliary or pancreatic ductal dilatation.  No pancreatic masses.    The spleen and  right adrenal gland are unremarkable.  There is 1.4 cm left adrenal nodule with signal loss on opposed phase imaging suggestive of an adenoma.    The abdominal aorta tapers normally.  No periaortic or periportal lymphadenopathy.    There is a 4.1 cm left renal cyst.  No renal masses or hydronephrosis.      Impression       Indeterminate left hepatic lobe (segment 4A)  6 cm mass, correlating with the 08/24/2018 ultrasound, possible adenoma.  HCC/malignancy not excluded.    Left adrenal 1.4 cm nodule, suggestive of an adenoma.    Left renal 4.1 cm cyst.    Hepatomegaly.    This report was flagged in Epic as abnormal.       Details     Reading Physician Reading Date Result Priority   Jose Alegre MD 8/24/2018       Narrative     EXAMINATION:  US ABDOMEN COMPLETE    CLINICAL HISTORY:  Abnormal levels of other serum enzymes    TECHNIQUE:  Complete abdominal ultrasound (including pancreas, liver, gallbladder, common bile duct, spleen, aorta, IVC, and kidneys) was performed.    COMPARISON:  None    FINDINGS:  The visualized portions of the pancreas, aorta, and IVC are unremarkable.    Gallbladder: No measurable gallstones, gallbladder wall thickening, or focal tenderness over the gallbladder.    Biliary system: Common bile duct is within normal limits measuring 2 mm. No intrahepatic ductal dilatation.    Liver: The liver measures 16.0 cm in length.  Right lobe mass noted measuring 5.2 x 5.5 x 6.1 cm.    Kidneys: The right kidney is normal in length measuring 10.5 cm.    The left kidney is normal in length measuring 12.3 cm. Left upper pole cyst noted measuring 3.6 cm.  No hydronephrosis or renal masses.    Spleen: The spleen is unremarkable measuring 10.2 cm in length.      Impression       Indeterminate 6 cm right hepatic lobe mass, possible FNH or adenoma.  Neoplasia not excluded.  Further evaluation could be performed with abdominal MRI.    Left renal cyst.    This report was flagged in Epic as abnormal.      ASSESSMENT  58 y.o. White male with:  1. LIVER MASS  -- will have imaging reviewed at IR   -- AFP, CEA, CA 19 9    2. ELEVATED LIVER ENZYMES  -- suspect NAFLD/ROMO  -- fibroscan  -- full serological work up  -- discussed diet, exercise and weight loss    EDUCATION:  We discussed the manifestations of NAFLD. At this time there is no FDA approved therapy for NAFLD.   The patient and I discussed the importance of diet, exercise, and weight loss for management of NAFLD. We discussed a low fat, low carb/low sugar, high fiber diet, and a goal of 30 minutes of exercise 5 times per week.   Pt is aware that fatty liver can progress to steatohepatitis and possibly to cirrhosis, putting one at increased risk for liver cancer, liver failure, and death.        PLAN:  1. Labs today  2. Fibroscan  3. Imaging to be reviewed at IR  4. F/u TBD    Thank you for allowing me to participate in the care of Roman Hodges  Aleksandar Salazar PA-C

## 2018-09-20 NOTE — PROGRESS NOTES
I have personally performed a face to face diagnostic evaluation on this patient. I have reviewed and agree with today's findings and the care plan outlined by Aleksandar Salazar PA-C      My findings are as follows:  Patient presents with indeterminate liver mass. No previous history of liver disease although RFs for ROMO and elevated AST/ALT. Will arrange tumor markers and fibroscan. Will review imaging at IR conference.      he will return to Aleksandar Salazar PA-C/  for follow-up.

## 2018-09-20 NOTE — LETTER
September 21, 2018      Jose Angel Munson MD  [address] [place] [address]           [facility] - Hepatology  [address]  [place] [address]  Phone: [phone]  Fax: [phone]          Patient: Roman Hodges   MR Number: [id number]   YOB: 1960   Date of Visit: 9/20/2018       Dear Dr. Jose Angel Munson:    Thank you for referring Roman Hodges to me for evaluation. Attached you will find relevant portions of my assessment and plan of care.    If you have questions, please do not hesitate to call me. I look forward to following Roman Hodges along with you.    Sincerely,    Aleksandar Salazar PA-C    Enclosure  CC:  No Recipients    If you would like to receive this communication electronically, please contact [email or web address] or [phone] to request more information on [employer] access.    For providers and/or their staff who would like to refer a patient to [facility], please contact us through our one-stop-shop provider referral line, [facility], at [phone].    If you feel you have received this communication in error or would no longer like to receive these types of communications, please e-mail [email or web address]

## 2018-09-21 ENCOUNTER — TELEPHONE (OUTPATIENT)
Dept: HEPATOLOGY | Facility: CLINIC | Age: 58
End: 2018-09-21

## 2018-09-21 LAB
ANA SER QL IF: NORMAL
HBV CORE AB SERPL QL IA: NEGATIVE
HEPATITIS A ANTIBODY, IGG: NEGATIVE
MITOCHONDRIA AB TITR SER IF: NORMAL {TITER}

## 2018-09-21 NOTE — PROCEDURES
Fibroscan Procedure     Name: Roman Hodges  Date of Procedure : 2018   :: Aleksandar Salazar PA-C  Diagnosis: NAFLD    Probe: XL    Fibroscan readin.6 KPa    Fibrosis:F 0-1     CAP readin dB/m    Steatosis: :S3

## 2018-09-21 NOTE — TELEPHONE ENCOUNTER
Patient: Roman Hodges       MRN: [id number]      : 1960     Age: 58 y.o.  [address]  [place] [address]    Provider: DOROTA - AMBER Luna    Urgency of review: non-urgent    Patient Transplant Status: Not a candidate    Reason for presentation: Indeterminate lesion    Clinical Summary: Pt noted to have elevated liver enzymes, U/S noted 6cm right hepatic lobe mass, possible FHN or adenoma but neoplasm not excluded. He had an MRI w and w/out and again 6cm mass noted, adenoma versus HCC/malignacy not excluded.     Pt reports no known h/o liver disease, but also reports h/o iron overload, followed by hematology. History of therapeutic phlebotomy. Does have risk factors for NAFLD/ROMO, fibroscan shows no significant fibrosis.     Imaging to be reviewed: U/S and MRI    HCC Treatment History: n/a    ABO:     Platelets:   Lab Results   Component Value Date/Time     2018 02:06 PM     Creatinine:   Lab Results   Component Value Date/Time    CREATININE 1.2 2018 03:22 PM     Bilirubin:   Lab Results   Component Value Date/Time    BILITOT 0.6 2018 03:22 PM     AFP Last 3 each if available:   Lab Results   Component Value Date/Time    AFP 4.1 2018 03:22 PM       MELD: MELD-Na score: 8 at 2018  3:22 PM  MELD score: 8 at 2018  3:22 PM  Calculated from:  Serum Creatinine: 1.2 mg/dL at 2018  3:22 PM  Serum Sodium: 139 mmol/L (Rounded to 137 mmol/L) at 2018  3:22 PM  Total Bilirubin: 0.6 mg/dL (Rounded to 1 mg/dL) at 2018  3:22 PM  INR(ratio): 1 at 2018  3:22 PM  Age: 58 years    Plan:     Follow-up Provider:

## 2018-09-21 NOTE — TELEPHONE ENCOUNTER
Patient: Roman Hodges       MRN: [id number]      : 1960     Age: 58 y.o.  [address]  [place] [address]    Provider: DOROTA - AMBER Luna    Urgency of review: non-urgent    Patient Transplant Status: Not a candidate    Reason for presentation: Indeterminate lesion    Clinical Summary: Pt noted to have elevated liver enzymes, U/S noted 6cm right hepatic lobe mass, possible FHN or adenoma but neoplasm not excluded. He had an MRI w and w/out and again 6cm mass noted, adenoma versus HCC/malignacy not excluded.     Pt reports no known h/o liver disease, but also reports h/o iron overload, followed by hematology. History of therapeutic phlebotomy. Does have risk factors for NAFLD/ROMO, fibroscan shows no significant fibrosis.     Imaging to be reviewed: U/S and MRI    HCC Treatment History: n/a    ABO:     Platelets:   Lab Results   Component Value Date/Time     2018 02:06 PM     Creatinine:   Lab Results   Component Value Date/Time    CREATININE 1.2 2018 03:22 PM     Bilirubin:   Lab Results   Component Value Date/Time    BILITOT 0.6 2018 03:22 PM     AFP Last 3 each if available:   Lab Results   Component Value Date/Time    AFP 4.1 2018 03:22 PM       MELD: MELD-Na score: 8 at 2018  3:22 PM  MELD score: 8 at 2018  3:22 PM  Calculated from:  Serum Creatinine: 1.2 mg/dL at 2018  3:22 PM  Serum Sodium: 139 mmol/L (Rounded to 137 mmol/L) at 2018  3:22 PM  Total Bilirubin: 0.6 mg/dL (Rounded to 1 mg/dL) at 2018  3:22 PM  INR(ratio): 1 at 2018  3:22 PM  Age: 58 years    Plan: 6.2 cm enhancing lesion with washout and pseudocapsule on a background of hepatic steatosis which meets criteria for HCC. Lesion encompasses segments 4, 5, and 8.    Enhancement pattern concerning for malignancy. Discuss transplant option. Downstage with Y90 segmentectomy.       Follow-up Provider: AMBER Luna

## 2018-09-24 LAB — SMOOTH MUSCLE AB TITR SER IF: ABNORMAL {TITER}

## 2018-09-25 ENCOUNTER — CONFERENCE (OUTPATIENT)
Dept: TRANSPLANT | Facility: CLINIC | Age: 58
End: 2018-09-25

## 2018-09-25 ENCOUNTER — TELEPHONE (OUTPATIENT)
Dept: HEPATOLOGY | Facility: CLINIC | Age: 58
End: 2018-09-25

## 2018-09-25 LAB
A1AT PHENOTYP SERPL-IMP: NORMAL BANDS
A1AT SERPL NEPH-MCNC: 146 MG/DL

## 2018-09-25 NOTE — TELEPHONE ENCOUNTER
IR recs discussed with patient    Liver mass looks concerning for HCC, IR for y90 and downstage for transplant     Plan to consult in IR for y90    Please facilitate scheduling

## 2018-09-28 LAB — PHOSPHATIDYLETHANOL (PETH): NEGATIVE NG/ML

## 2018-10-01 ENCOUNTER — PATIENT MESSAGE (OUTPATIENT)
Dept: HEPATOLOGY | Facility: CLINIC | Age: 58
End: 2018-10-01

## 2018-10-09 ENCOUNTER — INITIAL CONSULT (OUTPATIENT)
Dept: INTERVENTIONAL RADIOLOGY/VASCULAR | Facility: CLINIC | Age: 58
End: 2018-10-09
Payer: COMMERCIAL

## 2018-10-09 VITALS
SYSTOLIC BLOOD PRESSURE: 127 MMHG | HEART RATE: 87 BPM | HEIGHT: 67 IN | BODY MASS INDEX: 34.21 KG/M2 | DIASTOLIC BLOOD PRESSURE: 80 MMHG | WEIGHT: 218 LBS

## 2018-10-09 DIAGNOSIS — C22.0 HCC (HEPATOCELLULAR CARCINOMA): Primary | ICD-10-CM

## 2018-10-09 PROCEDURE — 3008F BODY MASS INDEX DOCD: CPT | Mod: CPTII,S$GLB,, | Performed by: RADIOLOGY

## 2018-10-09 PROCEDURE — 3079F DIAST BP 80-89 MM HG: CPT | Mod: CPTII,S$GLB,, | Performed by: RADIOLOGY

## 2018-10-09 PROCEDURE — 99999 PR PBB SHADOW E&M-EST. PATIENT-LVL III: CPT | Mod: PBBFAC,,,

## 2018-10-09 PROCEDURE — 3074F SYST BP LT 130 MM HG: CPT | Mod: CPTII,S$GLB,, | Performed by: RADIOLOGY

## 2018-10-09 PROCEDURE — 99214 OFFICE O/P EST MOD 30 MIN: CPT | Mod: S$GLB,,, | Performed by: RADIOLOGY

## 2018-10-09 NOTE — LETTER
[facility] - Interventional Rad  [address]  [facility] [address]  Phone: [phone] PRE-PROCEDURE INSTRUCTIONS    Your procedure is scheduled for 10/22/2018. Please arrive by 7:00am.    You must check-in and receive a wristband before going to your procedure. Your check-in location is Laboratory then [facility].    ONLY TAKE losartan-hydrochlorothiazide the morning of your procedure with a sip of water.    **Do not eat or drink anything between midnight and the time of your procedure. This includes gum, mints, and dandy lemon drops.    **Do not smoke or drink alcoholic beverages 24 hours prior to your procedure.    **Bathe the night before AND the morning of your procedure with chlorohexidine wash such as Hibiclens. This helps to keep your skin as bacteria free as possible.    **If you wear contact lenses, dentures, hearing aids, or glasses, bring a container to put them in during the procedure and give them to a family member for safekeeping.    **If you have been diagnosed with sleep apnea please bring your CPAP machine.    **If your doctor has scheduled you for an overnight stay, bring a small overnight bag with any personal items that you may need.    **Make arrangements in advance for transportation home by a responsible adult. It is not safe to drive a vehicle during the 24 hours following the procedure.    **All [facility] facilities and properties are tobacco free. Smoking is NOT allowed.    PLEASE NOTE: The procedure schedule has many variables which affect the time of your procedure. Family members should be available if your surgery time changes.    If you have any questions about these instructions call Interventional Radiology at [phone] or [phone].

## 2018-10-09 NOTE — PROGRESS NOTES
Subjective:       Patient ID: Roman Hodges is a 58 y.o. male.    Chief Complaint: Hepatocellular Carcinoma    Patient here to discuss treatment of hepatocellular carcinoma. He reports he was having a lot of gastritis and reflux, so he went to his PCP. The PCP ordered labs and an endoscopy. The endoscopy did not reveal a cause for his reflux. His labs showed elevated liver enzymes. He was sent for an ultrasound and a mass was noted. An MRI was obtained on 9/10/2018 which confirmed a 6cm mass in his liver with enhancement, washout and pseudocapsule consistent with HCC. He denies any abdominal pain or distention. He is accompanied by his wife and son who help to translate.  offered. Patient declined.       Review of Systems   Constitutional: Positive for fatigue. Negative for activity change, appetite change, chills and fever.   HENT: Negative for congestion, drooling, ear discharge, postnasal drip and sneezing.    Eyes: Negative for pain, discharge, redness and itching.        Wears glasses; residual right eye droop from Bell's Palsy   Respiratory: Negative for cough, shortness of breath, wheezing and stridor.    Cardiovascular: Negative for chest pain, palpitations and leg swelling.   Gastrointestinal: Negative for abdominal distention, abdominal pain, constipation, diarrhea, nausea and vomiting.   Genitourinary: Negative for difficulty urinating, dysuria, frequency and urgency.   Musculoskeletal: Negative for arthralgias, back pain, gait problem, joint swelling, myalgias and neck pain.   Skin: Negative for color change, pallor and rash.   Neurological: Negative for dizziness, weakness and headaches.       Objective:      Physical Exam   Constitutional: He is oriented to person, place, and time. He appears well-developed and well-nourished. No distress.   HENT:   Head: Normocephalic and atraumatic.   Right Ear: External ear normal.   Left Ear: External ear normal.   Nose: Nose normal.   Mouth/Throat:  Oropharynx is clear and moist. No oropharyngeal exudate.   Eyes: Conjunctivae are normal. Pupils are equal, round, and reactive to light. Right eye exhibits no discharge. Left eye exhibits no discharge. No scleral icterus.   Neck: Neck supple. No JVD present. No tracheal deviation present. No thyromegaly present.   Cardiovascular: Normal rate, regular rhythm, normal heart sounds and intact distal pulses. Exam reveals no gallop and no friction rub.   No murmur heard.  Pulmonary/Chest: Effort normal and breath sounds normal. No stridor. No respiratory distress. He has no wheezes. He has no rales.   Abdominal: Soft. Bowel sounds are normal. He exhibits no distension and no mass. There is no hepatosplenomegaly. There is no tenderness. There is no rebound and no guarding.   Musculoskeletal: He exhibits no edema.   Lymphadenopathy:     He has no cervical adenopathy.   Neurological: He is alert and oriented to person, place, and time. Gait normal.   Skin: Skin is warm and dry. He is not diaphoretic. No cyanosis. Nails show no clubbing.   Psychiatric: He has a normal mood and affect.   Vitals reviewed.      Assessment:       1. HCC (hepatocellular carcinoma)        Plan:         Showed patient and family images from MRI. Pointed out lesion. Showed patient and family what 6cm looks like using a ruler. Explained recommendation of liver conference is to treat with radioembolization. Yttrium 90 Radioembolization discussed in detail with the patient including risks, benefits, potential complications, usual pre and post procedure course.  Discussed the need for initial Angiogram mapping study prior to scheduling the actual Radioembolization procedure. Utilized images from patient's MRI, the internet, and illustrations to help explain procedure. Also explained intent is not only try to control growth and spread of cancer, but also to try to downstage so that liver transplant becomes an option. Patient's son asks about surgically  removing tumor. Explained transplant offers better longevity than resection, but also offered to refer patient to surgery for a discussion about resection. Patient and family decline surgery referral, and verbalize understanding and agreement regarding radioembolization. Patient scheduled for mapping on 10/22/2018. Pre-procedure handout with clinic phone number provided.

## 2018-10-17 ENCOUNTER — PATIENT MESSAGE (OUTPATIENT)
Dept: HEPATOLOGY | Facility: CLINIC | Age: 58
End: 2018-10-17

## 2018-10-23 DIAGNOSIS — C22.0 HCC (HEPATOCELLULAR CARCINOMA): Primary | ICD-10-CM

## 2018-10-24 ENCOUNTER — HOSPITAL ENCOUNTER (OUTPATIENT)
Facility: HOSPITAL | Age: 58
Discharge: HOME | End: 2018-10-24
Attending: RADIOLOGY | Admitting: RADIOLOGY
Payer: COMMERCIAL

## 2018-10-24 ENCOUNTER — HOSPITAL ENCOUNTER (OUTPATIENT)
Dept: RADIOLOGY | Facility: HOSPITAL | Age: 58
Discharge: HOME | End: 2018-10-24
Attending: FAMILY MEDICINE | Admitting: RADIOLOGY
Payer: COMMERCIAL

## 2018-10-24 VITALS
DIASTOLIC BLOOD PRESSURE: 84 MMHG | OXYGEN SATURATION: 96 % | WEIGHT: 205 LBS | HEIGHT: 67 IN | BODY MASS INDEX: 32.18 KG/M2 | HEART RATE: 57 BPM | RESPIRATION RATE: 18 BRPM | SYSTOLIC BLOOD PRESSURE: 125 MMHG | TEMPERATURE: 98 F

## 2018-10-24 DIAGNOSIS — C22.0 HCC (HEPATOCELLULAR CARCINOMA): ICD-10-CM

## 2018-10-24 LAB — POCT GLUCOSE: 119 MG/DL (ref 70–110)

## 2018-10-24 PROCEDURE — 63600175 PHARM REV CODE 636 W HCPCS: Performed by: RADIOLOGY

## 2018-10-24 PROCEDURE — 82962 GLUCOSE BLOOD TEST: CPT

## 2018-10-24 PROCEDURE — 78201 LIVER IMAGING STATIC ONLY: CPT | Mod: TC

## 2018-10-24 PROCEDURE — 25500020 PHARM REV CODE 255: Performed by: RADIOLOGY

## 2018-10-24 PROCEDURE — 78201 LIVER IMAGING STATIC ONLY: CPT | Mod: 26,,, | Performed by: RADIOLOGY

## 2018-10-24 RX ORDER — FENTANYL CITRATE 50 UG/ML
50 INJECTION, SOLUTION INTRAMUSCULAR; INTRAVENOUS
Status: DISCONTINUED | OUTPATIENT
Start: 2018-10-24 | End: 2018-10-24 | Stop reason: HOSPADM

## 2018-10-24 RX ORDER — MIDAZOLAM HYDROCHLORIDE 1 MG/ML
1 INJECTION INTRAMUSCULAR; INTRAVENOUS
Status: DISCONTINUED | OUTPATIENT
Start: 2018-10-24 | End: 2018-10-24 | Stop reason: HOSPADM

## 2018-10-24 RX ORDER — SODIUM CHLORIDE 9 MG/ML
INJECTION, SOLUTION INTRAVENOUS CONTINUOUS
Status: DISCONTINUED | OUTPATIENT
Start: 2018-10-24 | End: 2018-10-24 | Stop reason: HOSPADM

## 2018-10-24 RX ORDER — HEPARIN SODIUM 200 [USP'U]/100ML
500 INJECTION, SOLUTION INTRAVENOUS CONTINUOUS
Status: DISCONTINUED | OUTPATIENT
Start: 2018-10-24 | End: 2018-10-24 | Stop reason: HOSPADM

## 2018-10-24 RX ORDER — MIDAZOLAM HYDROCHLORIDE 1 MG/ML
INJECTION INTRAMUSCULAR; INTRAVENOUS CODE/TRAUMA/SEDATION MEDICATION
Status: COMPLETED | OUTPATIENT
Start: 2018-10-24 | End: 2018-10-24

## 2018-10-24 RX ORDER — LIDOCAINE HYDROCHLORIDE 10 MG/ML
1 INJECTION, SOLUTION EPIDURAL; INFILTRATION; INTRACAUDAL; PERINEURAL ONCE AS NEEDED
Status: DISCONTINUED | OUTPATIENT
Start: 2018-10-24 | End: 2018-10-24 | Stop reason: HOSPADM

## 2018-10-24 RX ORDER — FENTANYL CITRATE 50 UG/ML
INJECTION, SOLUTION INTRAMUSCULAR; INTRAVENOUS CODE/TRAUMA/SEDATION MEDICATION
Status: COMPLETED | OUTPATIENT
Start: 2018-10-24 | End: 2018-10-24

## 2018-10-24 RX ADMIN — IOHEXOL 70 ML: 300 INJECTION, SOLUTION INTRAVENOUS at 04:10

## 2018-10-24 RX ADMIN — MIDAZOLAM HYDROCHLORIDE 1 MG: 1 INJECTION, SOLUTION INTRAMUSCULAR; INTRAVENOUS at 04:10

## 2018-10-24 RX ADMIN — FENTANYL CITRATE 50 MCG: 50 INJECTION, SOLUTION INTRAMUSCULAR; INTRAVENOUS at 04:10

## 2018-10-24 NOTE — SEDATION DOCUMENTATION
Exo-seal 5 fr. Closure to right groin, patient to remain flat and keep right leg straight for two hours until 6:40pm

## 2018-10-24 NOTE — H&P
Radiology History & Physical      SUBJECTIVE:     Chief Complaint: liver mass    History of Present Illness:  Roman Hodges is a 58 y.o. male with elevated liver enzymes and liver mass concerning for HCC who presents for Y90 mapping.  Past Medical History:   Diagnosis Date    Diabetes mellitus, type 2     GERD (gastroesophageal reflux disease)     Hypertension      Past Surgical History:   Procedure Laterality Date    COLONOSCOPY      HERNIA REPAIR         Home Meds:   Prior to Admission medications    Medication Sig Start Date End Date Taking? Authorizing Provider   glimepiride (AMARYL) 1 MG tablet Take 1 tablet (1 mg total) by mouth before breakfast. 8/30/18 8/30/19  Jose Angel Munson MD   losartan-hydrochlorothiazide 50-12.5 mg (HYZAAR) 50-12.5 mg per tablet Take 1 tablet by mouth once daily.    Historical Provider, MD   NIFEdipine (ADALAT CC) 60 MG TbSR Take 30 mg by mouth 2 (two) times daily.    Historical Provider, MD   omeprazole (PRILOSEC) 20 MG capsule Take 20 mg by mouth once daily.    Historical Provider, MD   potassium chloride SA (K-DUR,KLOR-CON) 20 MEQ tablet Take 20 mEq by mouth 4 (four) times daily.    Historical Provider, MD     Anticoagulants/Antiplatelets: no anticoagulation    Allergies:   Review of patient's allergies indicates:   Allergen Reactions    Flu vaccine 2011 (36 mos+)(pf)      Patient reports he developed Bell's Palsy 2 days after his last flu shot in 2008     Sedation History:  no adverse reactions    Review of Systems:   Hematological: no known coagulopathies  Respiratory: no shortness of breath  Cardiovascular: no chest pain  Gastrointestinal: no abdominal pain  Genito-Urinary: no dysuria  Musculoskeletal: negative  Neurological: no TIA or stroke symptoms         OBJECTIVE:     Vital Signs (Most Recent)       Physical Exam:  ASA: 3  Mallampati: 3    General: no acute distress  Mental Status: alert and oriented to person, place and time  HEENT: normocephalic,  atraumatic  Chest: unlabored breathing  Heart: regular heart rate  Abdomen: nondistended  Extremity: moves all extremities    Laboratory  Lab Results   Component Value Date    INR 1.0 10/24/2018       Lab Results   Component Value Date    WBC 5.86 10/24/2018    HGB 16.6 10/24/2018    HCT 48.0 10/24/2018    MCV 87 10/24/2018     10/24/2018      Lab Results   Component Value Date     (H) 10/24/2018     10/24/2018    K 3.7 10/24/2018     10/24/2018    CO2 26 10/24/2018    BUN 19 10/24/2018    CREATININE 1.2 10/24/2018    CALCIUM 9.8 10/24/2018    ALT 61 (H) 10/24/2018    AST 37 10/24/2018    ALBUMIN 4.2 10/24/2018    BILITOT 0.9 10/24/2018    BILIDIR 0.4 (H) 10/24/2018       ASSESSMENT/PLAN:     Sedation Plan: moderate  Patient will undergo Y90 mapping.    Levi Cornell MD  Radiology PGY-3  [phone]

## 2018-10-24 NOTE — DISCHARGE SUMMARY
Radiology Discharge Summary      Hospital Course: No complications    Admit Date: 10/24/2018  Discharge Date: 10/24/2018     Instructions Given to Patient: Yes  Diet: Resume prior diet  Activity: activity as tolerated and no driving for today    Description of Condition on Discharge: Stable  Vital Signs (Most Recent): Temp: 97.7 °F (36.5 °C) (10/24/18 1132)  Pulse: 62 (10/24/18 1638)  Resp: (!) 23 (10/24/18 1638)  BP: (!) 138/91 (10/24/18 1638)  SpO2: 97 % (10/24/18 1638)    Discharge Disposition: Home    Discharge Diagnosis: HCC s/p Y90 mapping. Follow up as scheduled.    Loy Ryan M.D.  Diagnostic and Interventional Radiologist  Department of Radiology  Pager: [phone]

## 2018-10-24 NOTE — DISCHARGE INSTRUCTIONS
For scheduling: Call Jenny at [phone]    For questions or concerns call: JUAN MON-FRI 8 AM- 5PM [phone]. Radiology resident on call [phone].    For immediate concerns that are not emergent, you may call our radiology clinic at: [phone]        Para programar: llame a Jenny al [phone]    Si tiene preguntas o inquietudes, llame a: ROCABNER MON-FRI 8 AM- 5PM [phone]. Residente de radiología de [place] [phone].    Para inquietudes inmediatas que no leda de emergencia, puede llamar a nuestra clínica de radiología al: [phone]

## 2018-10-24 NOTE — PROGRESS NOTES
Pt arrived to [facility], bay [facility]. Report received from WANDA Monet. Dressing to groin dry and intact. Pt transported from [place] with RN. Family at bedside.

## 2018-10-24 NOTE — PROGRESS NOTES
Pt and family given/explained discharge instructions.Pt and family verbalize understanding of D/C instructions. Pt aaox3, VSS. Pt to garage parking via wheelchair, escorted by transport and family.

## 2018-10-24 NOTE — PROCEDURES
Radiology Post-Procedure Note    Pre Op Diagnosis: HCC    Post Op Diagnosis: Same    Procedure: Y90 mapping    Procedure performed by: Loy Ryan MD    Written Informed Consent Obtained: Yes  Specimen Removed: NO  Estimated Blood Loss: Minimal    Findings:   VIa rt cfa, celiac, lha, rha and rha branches were imaged. maa administered. No complications. Exoseal to rt cfa.    Patient tolerated procedure well.    Loy Ryan M.D.  Diagnostic and Interventional Radiologist  Department of Radiology  Pager: [phone]

## 2018-10-28 ENCOUNTER — PATIENT MESSAGE (OUTPATIENT)
Dept: HEPATOLOGY | Facility: CLINIC | Age: 58
End: 2018-10-28

## 2018-11-02 ENCOUNTER — OFFICE VISIT (OUTPATIENT)
Dept: FAMILY MEDICINE | Facility: CLINIC | Age: 58
End: 2018-11-02
Payer: COMMERCIAL

## 2018-11-02 ENCOUNTER — LAB VISIT (OUTPATIENT)
Dept: LAB | Facility: HOSPITAL | Age: 58
End: 2018-11-02
Attending: FAMILY MEDICINE
Payer: COMMERCIAL

## 2018-11-02 VITALS
SYSTOLIC BLOOD PRESSURE: 120 MMHG | WEIGHT: 209.44 LBS | HEIGHT: 67 IN | BODY MASS INDEX: 32.87 KG/M2 | HEART RATE: 78 BPM | DIASTOLIC BLOOD PRESSURE: 80 MMHG

## 2018-11-02 DIAGNOSIS — R16.0 LIVER MASS: ICD-10-CM

## 2018-11-02 DIAGNOSIS — E11.65 TYPE 2 DIABETES MELLITUS WITH HYPERGLYCEMIA, WITHOUT LONG-TERM CURRENT USE OF INSULIN: ICD-10-CM

## 2018-11-02 DIAGNOSIS — Z23 NEED FOR VACCINATION AGAINST STREPTOCOCCUS PNEUMONIAE: ICD-10-CM

## 2018-11-02 DIAGNOSIS — R74.8 ABNORMAL LIVER ENZYMES: ICD-10-CM

## 2018-11-02 DIAGNOSIS — I10 ESSENTIAL HYPERTENSION: Primary | ICD-10-CM

## 2018-11-02 LAB
ALBUMIN/CREAT UR: 29.3 UG/MG
CREAT UR-MCNC: 92 MG/DL
ESTIMATED AVG GLUCOSE: 108 MG/DL
HBA1C MFR BLD HPLC: 5.4 %
MICROALBUMIN UR DL<=1MG/L-MCNC: 27 UG/ML

## 2018-11-02 PROCEDURE — 3008F BODY MASS INDEX DOCD: CPT | Mod: CPTII,S$GLB,, | Performed by: FAMILY MEDICINE

## 2018-11-02 PROCEDURE — 36415 COLL VENOUS BLD VENIPUNCTURE: CPT | Mod: PO

## 2018-11-02 PROCEDURE — 83036 HEMOGLOBIN GLYCOSYLATED A1C: CPT

## 2018-11-02 PROCEDURE — 99214 OFFICE O/P EST MOD 30 MIN: CPT | Mod: 25,S$GLB,, | Performed by: FAMILY MEDICINE

## 2018-11-02 PROCEDURE — 90471 IMMUNIZATION ADMIN: CPT | Mod: S$GLB,,, | Performed by: FAMILY MEDICINE

## 2018-11-02 PROCEDURE — 90732 PPSV23 VACC 2 YRS+ SUBQ/IM: CPT | Mod: S$GLB,,, | Performed by: FAMILY MEDICINE

## 2018-11-02 PROCEDURE — 3079F DIAST BP 80-89 MM HG: CPT | Mod: CPTII,S$GLB,, | Performed by: FAMILY MEDICINE

## 2018-11-02 PROCEDURE — 3074F SYST BP LT 130 MM HG: CPT | Mod: CPTII,S$GLB,, | Performed by: FAMILY MEDICINE

## 2018-11-02 PROCEDURE — 99999 PR PBB SHADOW E&M-EST. PATIENT-LVL III: CPT | Mod: PBBFAC,,, | Performed by: FAMILY MEDICINE

## 2018-11-02 PROCEDURE — 3044F HG A1C LEVEL LT 7.0%: CPT | Mod: CPTII,S$GLB,, | Performed by: FAMILY MEDICINE

## 2018-11-02 PROCEDURE — 82043 UR ALBUMIN QUANTITATIVE: CPT

## 2018-11-02 NOTE — PROGRESS NOTES
Subjective:       Patient ID: Roman Hodges is a 58 y.o. male.    Chief Complaint: Diabetes    58 years old male who came to the clinic for blood pressure check.  Blood pressure today stable.  No chest pain, palpitation, orthopnea or PND.  Patient was not able to tolerate  oral medicines for diabetes.  Patient with significant weight loss secondary to the recent discover liver mass.  Patient with elevated liver enzymes but still better than before.      Diabetes   He has type 2 diabetes mellitus. No MedicAlert identification noted. The initial diagnosis of diabetes was made 1 months ago. Hypoglycemia symptoms include nervousness/anxiousness. Pertinent negatives for hypoglycemia include no confusion, dizziness, headaches, hunger, mood changes, pallor, seizures, sleepiness, speech difficulty, sweats or tremors. Associated symptoms include weight loss. Pertinent negatives for diabetes include no blurred vision, no chest pain, no fatigue, no foot paresthesias, no foot ulcerations, no polydipsia, no polyphagia, no polyuria, no visual change and no weakness. Pertinent negatives for hypoglycemia complications include no blackouts, no hospitalization, no required assistance and no required glucagon injection. Symptoms are improving. Pertinent negatives for diabetic complications include no autonomic neuropathy, CVA, heart disease, nephropathy, peripheral neuropathy or retinopathy. Risk factors for coronary artery disease include hypertension, obesity, diabetes mellitus and male sex. Current diabetic treatment includes diet and oral agent (monotherapy). He is compliant with treatment all of the time. He is currently taking insulin pre-breakfast. Insulin injections are given by patient. His weight is decreasing steadily. He is following a diabetic diet. Meal planning includes avoidance of concentrated sweets. He has not had a previous visit with a dietitian. He participates in exercise every other day. There is no  compliance with monitoring of blood glucose. His home blood glucose trend is decreasing steadily. He does not see a podiatrist.Eye exam is current.     Review of Systems   Constitutional: Positive for weight loss. Negative for fatigue.   HENT: Negative.    Eyes: Negative.  Negative for blurred vision.   Respiratory: Negative.    Cardiovascular: Negative.  Negative for chest pain.   Gastrointestinal: Negative.    Endocrine: Negative for polydipsia, polyphagia and polyuria.   Genitourinary: Negative.    Musculoskeletal: Negative.    Skin: Negative.  Negative for pallor.   Neurological: Negative.  Negative for dizziness, tremors, seizures, speech difficulty, weakness and headaches.   Psychiatric/Behavioral: Negative for confusion. The patient is nervous/anxious.        Objective:      Physical Exam   Constitutional: He is oriented to person, place, and time. He appears well-developed and well-nourished. No distress.   HENT:   Head: Normocephalic and atraumatic.   Right Ear: External ear normal.   Left Ear: External ear normal.   Nose: Nose normal.   Mouth/Throat: Oropharynx is clear and moist. No oropharyngeal exudate.   Eyes: Conjunctivae and EOM are normal. Pupils are equal, round, and reactive to light. Right eye exhibits no discharge. Left eye exhibits no discharge. No scleral icterus.   Neck: Normal range of motion. Neck supple. No JVD present. No tracheal deviation present. No thyromegaly present.   Cardiovascular: Normal rate, regular rhythm, normal heart sounds and intact distal pulses. Exam reveals no gallop and no friction rub.   No murmur heard.  Pulmonary/Chest: Effort normal and breath sounds normal. No stridor. No respiratory distress. He has no wheezes. He has no rales. He exhibits no tenderness.   Abdominal: Soft. Bowel sounds are normal. He exhibits no distension and no mass. There is no tenderness. There is no rebound and no guarding.   Musculoskeletal: Normal range of motion. He exhibits no edema or  tenderness.   Feet:   Right Foot:   Protective Sensation: 10 sites tested. 10 sites sensed.   Skin Integrity: Negative for ulcer, blister, skin breakdown, erythema, warmth, callus or dry skin.   Left Foot:   Protective Sensation: 10 sites tested. 10 sites sensed.   Skin Integrity: Negative for ulcer, blister, skin breakdown, erythema, warmth, callus or dry skin.   Lymphadenopathy:     He has no cervical adenopathy.   Neurological: He is alert and oriented to person, place, and time. He has normal reflexes. He displays normal reflexes. No cranial nerve deficit. He exhibits normal muscle tone. Coordination normal.   Skin: Skin is warm and dry. No rash noted. He is not diaphoretic. No erythema. No pallor.   Psychiatric: He has a normal mood and affect. His behavior is normal. Judgment and thought content normal.   Nursing note and vitals reviewed.      Assessment:       1. Essential hypertension    2. Type 2 diabetes mellitus with hyperglycemia, without long-term current use of insulin    3. Liver mass    4. Abnormal liver enzymes    5. Need for vaccination against Streptococcus pneumoniae        Plan:         Roman was seen today for diabetes.    Diagnoses and all orders for this visit:    Essential hypertension    Type 2 diabetes mellitus with hyperglycemia, without long-term current use of insulin  -     Hemoglobin A1c; Future  -     Microalbumin/creatinine urine ratio    Liver mass    Abnormal liver enzymes    Need for vaccination against Streptococcus pneumoniae  -     Pneumococcal Polysaccharide Vaccine (23 Valent) (SQ/IM)    Continue monitoring blood pressure at home, low sodium diet.  Continue monitoring blood sugar at home,ADA diet.

## 2018-11-02 NOTE — MEDICAL/APP STUDENT
Subjective:       Patient ID: Roman Hodges is a 58 y.o. male.    Chief Complaint: Diabetes    Roman Hodges is a 58 y.o. Male presenting to clinic for 3 month followup. He has no acute complaints today.     HCC: Pt has been recently diagnosed with HCC via MRI imaging of the Liver in September. He has a planned procedure for IR embolization of the liver in the next month. Pt is keeping positive about his prognosis and is confident the embolization procedure will work.     HTN: Pt is measuring his BPs at home normally gets 120s over 80s sometimes into the 110s he is on losartan-HCTZ and nifedipine.     DM: Pt has stopped taking the Amaryl after he was feeling dizzy on the medicine. He was previously taking metformin which caused him to have diarrhea and he also stopped taking that medicine. He has been attempting dietary modification and has been exercising more which has led to a 25 pound weight loss in the past 3 months.       Review of Systems   Constitutional: Negative for chills, fatigue and fever.   Respiratory: Negative for cough, shortness of breath and wheezing.    Cardiovascular: Negative for chest pain and leg swelling.   Gastrointestinal: Negative for blood in stool, diarrhea, nausea and vomiting.   Skin: Negative for rash.   Neurological: Negative for dizziness, syncope, numbness and headaches.       Objective:      Physical Exam   Constitutional: He is oriented to person, place, and time. He appears well-developed. No distress.   HENT:   Right Ear: External ear normal.   Left Ear: External ear normal.   Mouth/Throat: Oropharynx is clear and moist.   Neck: No thyromegaly present.   Cardiovascular: Normal rate, regular rhythm and normal heart sounds.   Pulses:       Dorsalis pedis pulses are 2+ on the right side, and 2+ on the left side.        Posterior tibial pulses are 2+ on the right side, and 2+ on the left side.   Pulmonary/Chest: Effort normal and breath sounds normal. No respiratory  distress. He has no wheezes. He has no rales.   Abdominal: Soft. He exhibits no distension and no mass. There is no tenderness.   Musculoskeletal:        Right foot: There is no deformity.        Left foot: There is no deformity.   Feet:   Right Foot:   Protective Sensation: 8 sites tested. 8 sites sensed.   Skin Integrity: Negative for ulcer.   Left Foot:   Protective Sensation: 8 sites tested. 8 sites sensed.   Skin Integrity: Negative for ulcer.   Neurological: He is alert and oriented to person, place, and time.   Skin: Skin is warm and dry. Capillary refill takes less than 2 seconds. Rash noted.       Assessment:   Roman Hodges is a 58 y.o. male for clinic follow-up.     Plan:   1. HCC   - Pt to have IR embolization scheduled.   - Answered Pt questions about the procedure    2. HTN  - Controlled on current regimen    3. DM  - Not on medicine  - Pt has initiated dietary and exercise modification  - A1C to guide management    4. HLD  - Pt with previously elevated liver enzymes likely secondary to HCC  - Defer statin treatment until HCC treated and liver enzymes normalized    5. HM  - Pt declined flu shot, had a Bell's palsy last year on administration which has resolved  - Pt due for pneumococci vaccination   - Screening labs today

## 2018-11-05 DIAGNOSIS — C22.0 HCC (HEPATOCELLULAR CARCINOMA): Primary | ICD-10-CM

## 2018-11-08 DIAGNOSIS — C22.0 HCC (HEPATOCELLULAR CARCINOMA): Primary | ICD-10-CM

## 2018-11-09 ENCOUNTER — RESEARCH ENCOUNTER (OUTPATIENT)
Dept: RESEARCH | Facility: HOSPITAL | Age: 58
End: 2018-11-09

## 2018-11-09 ENCOUNTER — HOSPITAL ENCOUNTER (OUTPATIENT)
Facility: HOSPITAL | Age: 58
Discharge: HOME | End: 2018-11-09
Attending: RADIOLOGY | Admitting: RADIOLOGY
Payer: COMMERCIAL

## 2018-11-09 ENCOUNTER — HOSPITAL ENCOUNTER (OUTPATIENT)
Dept: RADIOLOGY | Facility: HOSPITAL | Age: 58
Discharge: HOME | End: 2018-11-09
Attending: FAMILY MEDICINE | Admitting: RADIOLOGY
Payer: COMMERCIAL

## 2018-11-09 ENCOUNTER — HOSPITAL ENCOUNTER (OUTPATIENT)
Dept: RADIOLOGY | Facility: HOSPITAL | Age: 58
Discharge: HOME | End: 2018-11-09
Attending: FAMILY MEDICINE
Payer: COMMERCIAL

## 2018-11-09 VITALS
RESPIRATION RATE: 18 BRPM | HEIGHT: 67 IN | DIASTOLIC BLOOD PRESSURE: 84 MMHG | TEMPERATURE: 98 F | OXYGEN SATURATION: 96 % | HEART RATE: 76 BPM | SYSTOLIC BLOOD PRESSURE: 126 MMHG | BODY MASS INDEX: 32.18 KG/M2 | WEIGHT: 205 LBS

## 2018-11-09 DIAGNOSIS — C22.0 HCC (HEPATOCELLULAR CARCINOMA): ICD-10-CM

## 2018-11-09 LAB — POCT GLUCOSE: 112 MG/DL (ref 70–110)

## 2018-11-09 PROCEDURE — 25000003 PHARM REV CODE 250: Performed by: FAMILY MEDICINE

## 2018-11-09 PROCEDURE — 63600175 PHARM REV CODE 636 W HCPCS: Performed by: FAMILY MEDICINE

## 2018-11-09 PROCEDURE — 78201 LIVER IMAGING STATIC ONLY: CPT | Mod: TC

## 2018-11-09 PROCEDURE — 63600175 PHARM REV CODE 636 W HCPCS: Performed by: RADIOLOGY

## 2018-11-09 PROCEDURE — 78201 LIVER IMAGING STATIC ONLY: CPT | Mod: 26,,, | Performed by: RADIOLOGY

## 2018-11-09 PROCEDURE — 25500020 PHARM REV CODE 255: Performed by: RADIOLOGY

## 2018-11-09 PROCEDURE — 82962 GLUCOSE BLOOD TEST: CPT

## 2018-11-09 RX ORDER — FENTANYL CITRATE 50 UG/ML
50 INJECTION, SOLUTION INTRAMUSCULAR; INTRAVENOUS
Status: DISCONTINUED | OUTPATIENT
Start: 2018-11-09 | End: 2018-11-09 | Stop reason: HOSPADM

## 2018-11-09 RX ORDER — CIPROFLOXACIN 250 MG/1
250 TABLET, FILM COATED ORAL 2 TIMES DAILY
Qty: 10 TABLET | Refills: 0 | Status: SHIPPED | OUTPATIENT
Start: 2018-11-09 | End: 2018-11-14

## 2018-11-09 RX ORDER — MIDAZOLAM HYDROCHLORIDE 1 MG/ML
INJECTION INTRAMUSCULAR; INTRAVENOUS CODE/TRAUMA/SEDATION MEDICATION
Status: COMPLETED | OUTPATIENT
Start: 2018-11-09 | End: 2018-11-09

## 2018-11-09 RX ORDER — SODIUM CHLORIDE 9 MG/ML
INJECTION, SOLUTION INTRAVENOUS CONTINUOUS
Status: DISCONTINUED | OUTPATIENT
Start: 2018-11-09 | End: 2018-11-09 | Stop reason: HOSPADM

## 2018-11-09 RX ORDER — DEXAMETHASONE SODIUM PHOSPHATE 100 MG/10ML
INJECTION INTRAMUSCULAR; INTRAVENOUS CODE/TRAUMA/SEDATION MEDICATION
Status: COMPLETED | OUTPATIENT
Start: 2018-11-09 | End: 2018-11-09

## 2018-11-09 RX ORDER — MIDAZOLAM HYDROCHLORIDE 1 MG/ML
1 INJECTION INTRAMUSCULAR; INTRAVENOUS
Status: DISCONTINUED | OUTPATIENT
Start: 2018-11-09 | End: 2018-11-09 | Stop reason: HOSPADM

## 2018-11-09 RX ORDER — FENTANYL CITRATE 50 UG/ML
INJECTION, SOLUTION INTRAMUSCULAR; INTRAVENOUS CODE/TRAUMA/SEDATION MEDICATION
Status: COMPLETED | OUTPATIENT
Start: 2018-11-09 | End: 2018-11-09

## 2018-11-09 RX ORDER — ESOMEPRAZOLE MAGNESIUM 40 MG/1
40 CAPSULE, DELAYED RELEASE ORAL DAILY
Qty: 30 CAPSULE | Refills: 0 | Status: SHIPPED | OUTPATIENT
Start: 2018-11-09 | End: 2019-01-07 | Stop reason: SDUPTHER

## 2018-11-09 RX ORDER — HEPARIN SODIUM 200 [USP'U]/100ML
500 INJECTION, SOLUTION INTRAVENOUS CONTINUOUS
Status: DISCONTINUED | OUTPATIENT
Start: 2018-11-09 | End: 2018-11-09 | Stop reason: HOSPADM

## 2018-11-09 RX ORDER — LIDOCAINE HYDROCHLORIDE 10 MG/ML
1 INJECTION, SOLUTION EPIDURAL; INFILTRATION; INTRACAUDAL; PERINEURAL ONCE AS NEEDED
Status: COMPLETED | OUTPATIENT
Start: 2018-11-09 | End: 2018-11-09

## 2018-11-09 RX ORDER — OXYCODONE AND ACETAMINOPHEN 5; 325 MG/1; MG/1
1 TABLET ORAL EVERY 4 HOURS PRN
Qty: 20 TABLET | Refills: 0 | Status: SHIPPED | OUTPATIENT
Start: 2018-11-09 | End: 2018-11-29

## 2018-11-09 RX ORDER — ONDANSETRON HYDROCHLORIDE 8 MG/1
8 TABLET, FILM COATED ORAL EVERY 8 HOURS PRN
Qty: 30 TABLET | Refills: 0 | Status: SHIPPED | OUTPATIENT
Start: 2018-11-09 | End: 2018-12-14

## 2018-11-09 RX ADMIN — LIDOCAINE HYDROCHLORIDE 10 MG: 10 INJECTION, SOLUTION EPIDURAL; INFILTRATION; INTRACAUDAL; PERINEURAL at 07:11

## 2018-11-09 RX ADMIN — MIDAZOLAM HYDROCHLORIDE 1 MG: 1 INJECTION, SOLUTION INTRAMUSCULAR; INTRAVENOUS at 08:11

## 2018-11-09 RX ADMIN — FENTANYL CITRATE 50 MCG: 50 INJECTION, SOLUTION INTRAMUSCULAR; INTRAVENOUS at 08:11

## 2018-11-09 RX ADMIN — FENTANYL CITRATE 50 MCG: 50 INJECTION, SOLUTION INTRAMUSCULAR; INTRAVENOUS at 09:11

## 2018-11-09 RX ADMIN — AMPICILLIN SODIUM AND SULBACTAM SODIUM 3 G: 2; 1 INJECTION, POWDER, FOR SOLUTION INTRAMUSCULAR; INTRAVENOUS at 07:11

## 2018-11-09 RX ADMIN — DEXAMETHASONE SODIUM PHOSPHATE 20 MG: 10 INJECTION INTRAMUSCULAR; INTRAVENOUS at 09:11

## 2018-11-09 RX ADMIN — SODIUM CHLORIDE: 9 INJECTION, SOLUTION INTRAVENOUS at 07:11

## 2018-11-09 RX ADMIN — IOHEXOL 60 ML: 300 INJECTION, SOLUTION INTRAVENOUS at 09:11

## 2018-11-09 NOTE — PROGRESS NOTES
ROLE OF TUMOR-INDUCED IMMUNE TOLERANCE IN THE PATIENT RESPONSE TO LOCOREGIONAL THERAPY: IMPLICATIONS IN ASSESSING RISK OF HEPATOCELLULAR CARCINOMA RECURRENCE FOLLOWING LIVER TRANSPLANTATION [IRB 2016.131.B] The study was explained to patient and informed consent form reviewed with the patient. All risks, benefits, and procedures were discussed. Adequate time was given for the patient to ask questions. The patient confirmed all questions had been answered and voluntarily expressed a desire to participate in the study. Patient denied involvement in any other research study and initialed on the informed consent form. Patient was provided contact information with the  for future questions or concerns. The patient was notified they could withdraw at any time. Privacy issues, including HIPAA, were discussed. Patient verbalized all questions were answered and they understood the protocol.  Patient signed the informed consent form and a signed copy was provided to the patient.

## 2018-11-09 NOTE — SEDATION DOCUMENTATION
Hemostasis achieved via right groin with use of Minzl closure device at 0900. Pt to lay flat for 2 hours 1100

## 2018-11-09 NOTE — PROCEDURES
Radiology Post Procedure Note:     Procedure: y90 administration    (s): Navi    Blood Loss: Minimal    Specimen: None    Findings:   Patient came to IR and under imaging guidance had the right groin accessed and angiography performed. After mapping the hepatic anatomy, y90 was infused via a segmental 4/6 branch of the right hepatic artery.     Right groin access site closed with Mynx Closure Device.     Full dictation to follow.     Giuseppe VAUGHAN M.D. personally reviewed and agree with the above dictated note.

## 2018-11-09 NOTE — DISCHARGE SUMMARY
Radiology Discharge Summary      Hospital Course: No complications    Admit Date: 11/9/2018  Discharge Date: 11/09/2018     Instructions Given to Patient: Yes  Diet: Resume prior diet  Activity: activity as tolerated    Description of Condition on Discharge: Stable  Vital Signs (Most Recent): Temp: 98.9 °F (37.2 °C) (11/09/18 0738)  Pulse: 74 (11/09/18 0855)  Resp: 13 (11/09/18 0855)  BP: 126/82 (11/09/18 0855)  SpO2: (!) 79 % (11/09/18 0905)    Discharge Disposition: Home    Discharge Diagnosis: HCC     Follow-up: with IR with new imaging and a clinic visit in 3 months    .IGiuseppe M.D. personally reviewed and agree with the above dictated note.

## 2018-11-09 NOTE — DISCHARGE INSTRUCTIONS
For scheduling: Call Jenny at [phone]    For questions or concerns call: JUAN MON-FRI 8 AM- 5PM [phone]. Radiology resident on call [phone].    For immediate concerns that are not emergent, you may call our radiology clinic at: [phone]

## 2018-11-09 NOTE — H&P
Radiology History & Physical      SUBJECTIVE:     Chief Complaint: HCC left hepatic lobe mass seg IV-A 6 cm.    History of Present Illness:  Roman Hodges is a 58 y.o. male who presents for y90 radio-embolization.    Past Medical History:   Diagnosis Date    Diabetes mellitus, type 2     GERD (gastroesophageal reflux disease)     Hypertension      Past Surgical History:   Procedure Laterality Date    COLONOSCOPY      EMBOLIZATION, YTTRIUM THERAPY N/A 10/24/2018    [facility]    HERNIA REPAIR         Home Meds:   Prior to Admission medications    Medication Sig Start Date End Date Taking? Authorizing Provider   glimepiride (AMARYL) 1 MG tablet Take 1 tablet (1 mg total) by mouth before breakfast. 8/30/18 8/30/19 Yes Jose Angel Munson MD   losartan-hydrochlorothiazide 50-12.5 mg (HYZAAR) 50-12.5 mg per tablet Take 1 tablet by mouth once daily.   Yes Historical Provider, MD   NIFEdipine (ADALAT CC) 60 MG TbSR Take 30 mg by mouth 2 (two) times daily.   Yes Historical Provider, MD   omeprazole (PRILOSEC) 20 MG capsule Take 20 mg by mouth once daily.   Yes Historical Provider, MD   potassium chloride SA (K-DUR,KLOR-CON) 20 MEQ tablet Take 20 mEq by mouth 4 (four) times daily.   Yes Historical Provider, MD     Anticoagulants/Antiplatelets: no anticoagulation    Allergies:   Review of patient's allergies indicates:   Allergen Reactions    Flu vaccine 2011 (36 mos+)(pf)      Patient reports he developed Bell's Palsy 2 days after his last flu shot in 2008     Sedation History:  no adverse reactions    Review of Systems:   Hematological: no known coagulopathies  Respiratory: no shortness of breath  Cardiovascular: no chest pain  Gastrointestinal: no abdominal pain  Genito-Urinary: no dysuria  Musculoskeletal: negative  Neurological: no TIA or stroke symptoms         OBJECTIVE:     Vital Signs (Most Recent)  Temp: 98.9 °F (37.2 °C) (11/09/18 0738)  Pulse: 91  (11/09/18 0738)  Resp: 18 (11/09/18 0738)  BP: (!) 153/88 (11/09/18 0738)  SpO2: 98 % (11/09/18 0738)    Physical Exam:  ASA: 3  Mallampati: 2    General: no acute distress  Mental Status: alert and oriented to person, place and time  HEENT: normocephalic, atraumatic  Chest: unlabored breathing  Heart: regular heart rate  Abdomen: nondistended  Extremity: moves all extremities    Laboratory  Lab Results   Component Value Date    INR 1.0 11/09/2018       Lab Results   Component Value Date    WBC 6.56 11/09/2018    HGB 16.3 11/09/2018    HCT 47.7 11/09/2018    MCV 87 11/09/2018     11/09/2018      Lab Results   Component Value Date     (H) 10/24/2018     10/24/2018    K 3.7 10/24/2018     10/24/2018    CO2 26 10/24/2018    BUN 19 10/24/2018    CREATININE 1.2 10/24/2018    CALCIUM 9.8 10/24/2018    ALT 61 (H) 10/24/2018    AST 37 10/24/2018    ALBUMIN 4.2 10/24/2018    BILITOT 0.9 10/24/2018    BILIDIR 0.4 (H) 10/24/2018       ASSESSMENT/PLAN:     Sedation Plan: moderate  Patient will undergo left hepatic lobe mass seg Tobias Y-90.    DAYNA BELLA  PGY-IV Radiology Resident  [facility] [facility]  Pager: [phone]

## 2018-11-10 ENCOUNTER — NURSE TRIAGE (OUTPATIENT)
Dept: ADMINISTRATIVE | Facility: CLINIC | Age: 58
End: 2018-11-10

## 2018-11-10 NOTE — TELEPHONE ENCOUNTER
"    Reason for Disposition   Mild constipation (all triage questions negative)   Over-The-Counter (OTC) medicines for mild constipation (all triage questions negative)    Additional Information   Negative: [1] Weight loss > 10 pounds (22 kg) AND [2] not dieting     Has liver cancer    Answer Assessment - Initial Assessment Questions  1. STOOL PATTERN OR FREQUENCY: "How often do you pass bowel movements (BMs)?"  (Normal range: tid to q 3 days)  "When was the last BM passed?"        Every day  2. STRAINING: "Do you have to strain to have a BM?"   no  3. RECTAL PAIN: "Does your rectum hurt when the stool comes out?" If so, ask: "Do you have hemorrhoids? How bad is the pain?"  (Scale 1-10; or mild, moderate, severe)  no  4. STOOL COMPOSITION: "Are the stools hard?"   no  5. BLOOD ON STOOLS: "Has there been any blood on the toilet tissue or on the surface of the BM?" If so, ask: "When was the last time?"   no  6. CHRONIC CONSTIPATION: "Is this a new problem for you?"  If no, ask: How long have you had this problem?" (days, weeks, months)   no  7. CHANGES IN DIET: "Have there been any recent changes in your diet?"    no  8. MEDICATIONS: "Have you been taking any new medications?"  Antibiotics-  9. LAXATIVES: "Have you been using any laxatives or enemas?"  If yes, ask "What, how often, and when was the last time?"  no  10. CAUSE: "What do you think is causing the constipation?"   unsure  11. OTHER SYMPTOMS: "Do you have any other symptoms?" (e.g., abdominal pain, fever, vomiting)     no  12. PREGNANCY: "Is there any chance you are pregnant?" "When was your last menstrual period?"  no    Protocols used:  CONSTIPATION-A-    Wife calling, pt with concerns of constipation.  Last bm yesterday am.  Had radiation yesterday.  Feels like he has to go.  Want to know if can take miralax.  Triage and care advice per protocol.  Ok to take miralax.  encouraged to call back for nay worsening or concerns.   "

## 2018-11-14 ENCOUNTER — PATIENT MESSAGE (OUTPATIENT)
Dept: HEPATOLOGY | Facility: CLINIC | Age: 58
End: 2018-11-14

## 2018-11-29 ENCOUNTER — HOSPITAL ENCOUNTER (OUTPATIENT)
Facility: HOSPITAL | Age: 58
Discharge: HOME | End: 2018-11-30
Attending: EMERGENCY MEDICINE | Admitting: HOSPITALIST
Payer: COMMERCIAL

## 2018-11-29 DIAGNOSIS — I48.91 NEW ONSET ATRIAL FIBRILLATION: ICD-10-CM

## 2018-11-29 DIAGNOSIS — R53.83 FATIGUE: ICD-10-CM

## 2018-11-29 DIAGNOSIS — R07.9 CHEST PAIN, UNSPECIFIED TYPE: ICD-10-CM

## 2018-11-29 DIAGNOSIS — R55 NEAR SYNCOPE: Primary | ICD-10-CM

## 2018-11-29 DIAGNOSIS — I48.91 ATRIAL FIBRILLATION WITH RVR: ICD-10-CM

## 2018-11-29 PROBLEM — I50.21 ACUTE SYSTOLIC HEART FAILURE: Status: ACTIVE | Noted: 2018-11-29

## 2018-11-29 PROBLEM — E11.9 TYPE 2 DIABETES MELLITUS: Status: ACTIVE | Noted: 2018-11-29

## 2018-11-29 PROBLEM — G47.33 OSA (OBSTRUCTIVE SLEEP APNEA): Chronic | Status: ACTIVE | Noted: 2018-04-23

## 2018-11-29 PROBLEM — I10 ESSENTIAL HYPERTENSION: Status: ACTIVE | Noted: 2018-11-29

## 2018-11-29 PROBLEM — E11.9 TYPE 2 DIABETES MELLITUS, WITHOUT LONG-TERM CURRENT USE OF INSULIN: Chronic | Status: ACTIVE | Noted: 2018-11-29

## 2018-11-29 PROBLEM — I10 ESSENTIAL HYPERTENSION: Chronic | Status: ACTIVE | Noted: 2018-11-29

## 2018-11-29 PROBLEM — K21.9 GERD (GASTROESOPHAGEAL REFLUX DISEASE): Status: ACTIVE | Noted: 2018-11-29

## 2018-11-29 LAB
ALBUMIN SERPL BCP-MCNC: 4.4 G/DL
ALP SERPL-CCNC: 106 U/L
ALT SERPL W/O P-5'-P-CCNC: 38 U/L
AMMONIA PLAS-SCNC: 27 UMOL/L
ANION GAP SERPL CALC-SCNC: 13 MMOL/L
AORTIC ROOT ANNULUS: 3.16 CM
AORTIC VALVE CUSP SEPERATION: 1.94 CM
APTT BLDCRRT: 32 SEC
AST SERPL-CCNC: 30 U/L
AV INDEX (PROSTH): 0.76
AV MEAN GRADIENT: 3.48 MMHG
AV PEAK GRADIENT: 6.66 MMHG
AV VALVE AREA: 2.24 CM2
BASOPHILS # BLD AUTO: 0.01 K/UL
BASOPHILS NFR BLD: 0.2 %
BILIRUB SERPL-MCNC: 1 MG/DL
BILIRUB UR QL STRIP: NEGATIVE
BNP SERPL-MCNC: 34 PG/ML
BSA FOR ECHO PROCEDURE: 2.07 M2
BUN SERPL-MCNC: 15 MG/DL
CALCIUM SERPL-MCNC: 10.1 MG/DL
CHLORIDE SERPL-SCNC: 103 MMOL/L
CK SERPL-CCNC: 88 U/L
CLARITY UR: CLEAR
CO2 SERPL-SCNC: 23 MMOL/L
COLOR UR: YELLOW
CREAT SERPL-MCNC: 1.1 MG/DL
CV ECHO LV RWT: 0.56 CM
DIFFERENTIAL METHOD: ABNORMAL
DOP CALC AO PEAK VEL: 1.29 M/S
DOP CALC AO VTI: 20.02 CM
DOP CALC LVOT AREA: 2.95 CM2
DOP CALC LVOT DIAMETER: 1.94 CM
DOP CALC LVOT STROKE VOLUME: 44.76 CM3
DOP CALCLVOT PEAK VEL VTI: 15.15 CM
ECHO LV POSTERIOR WALL: 1.08 CM (ref 0.6–1.1)
EOSINOPHIL # BLD AUTO: 0.1 K/UL
EOSINOPHIL NFR BLD: 1.2 %
ERYTHROCYTE [DISTWIDTH] IN BLOOD BY AUTOMATED COUNT: 12.8 %
EST. GFR  (AFRICAN AMERICAN): >60 ML/MIN/1.73 M^2
EST. GFR  (NON AFRICAN AMERICAN): >60 ML/MIN/1.73 M^2
FRACTIONAL SHORTENING: 35 % (ref 28–44)
GLUCOSE SERPL-MCNC: 115 MG/DL
GLUCOSE UR QL STRIP: NEGATIVE
HCT VFR BLD AUTO: 50.3 %
HGB BLD-MCNC: 17.7 G/DL
HGB UR QL STRIP: NEGATIVE
INR PPP: 1
INTERVENTRICULAR SEPTUM: 1.2 CM (ref 0.6–1.1)
IVRT: 0.08 MSEC
KETONES UR QL STRIP: ABNORMAL
LEFT ATRIUM SIZE: 3.3 CM
LEFT INTERNAL DIMENSION IN SYSTOLE: 2.48 CM (ref 2.1–4)
LEFT VENTRICLE DIASTOLIC VOLUME INDEX: 30.49 ML/M2
LEFT VENTRICLE DIASTOLIC VOLUME: 63.11 ML
LEFT VENTRICLE MASS INDEX: 69.4 G/M2
LEFT VENTRICLE SYSTOLIC VOLUME INDEX: 10.5 ML/M2
LEFT VENTRICLE SYSTOLIC VOLUME: 21.78 ML
LEFT VENTRICULAR INTERNAL DIMENSION IN DIASTOLE: 3.83 CM (ref 3.5–6)
LEFT VENTRICULAR MASS: 143.64 G
LEUKOCYTE ESTERASE UR QL STRIP: NEGATIVE
LIPASE SERPL-CCNC: 23 U/L
LYMPHOCYTES # BLD AUTO: 0.7 K/UL
LYMPHOCYTES NFR BLD: 11.8 %
MCH RBC QN AUTO: 30.2 PG
MCHC RBC AUTO-ENTMCNC: 35.2 G/DL
MCV RBC AUTO: 86 FL
MONOCYTES # BLD AUTO: 0.4 K/UL
MONOCYTES NFR BLD: 7.2 %
NEUTROPHILS # BLD AUTO: 4.5 K/UL
NEUTROPHILS NFR BLD: 79.4 %
NITRITE UR QL STRIP: NEGATIVE
PH UR STRIP: 7 [PH] (ref 5–8)
PISA TR MAX VEL: 1.1 M/S
PLATELET # BLD AUTO: 274 K/UL
PMV BLD AUTO: 10.1 FL
POCT GLUCOSE: 100 MG/DL (ref 70–110)
POCT GLUCOSE: 113 MG/DL (ref 70–110)
POCT GLUCOSE: 92 MG/DL (ref 70–110)
POTASSIUM SERPL-SCNC: 3.9 MMOL/L
PROT SERPL-MCNC: 8.7 G/DL
PROT UR QL STRIP: NEGATIVE
PROTHROMBIN TIME: 10.1 SEC
PV PEAK VELOCITY: 0.9 CM/S
RA PRESSURE: 3 MMHG
RBC # BLD AUTO: 5.87 M/UL
RIGHT VENTRICULAR END-DIASTOLIC DIMENSION: 3.25 CM
SODIUM SERPL-SCNC: 139 MMOL/L
SP GR UR STRIP: 1.01 (ref 1–1.03)
TR MAX PG: 4.84 MMHG
TROPONIN I SERPL DL<=0.01 NG/ML-MCNC: 0.01 NG/ML
TROPONIN I SERPL DL<=0.01 NG/ML-MCNC: 0.02 NG/ML
TSH SERPL DL<=0.005 MIU/L-ACNC: 0.8 UIU/ML
TV REST PULMONARY ARTERY PRESSURE: 7.84 MMHG
URN SPEC COLLECT METH UR: ABNORMAL
UROBILINOGEN UR STRIP-ACNC: NEGATIVE EU/DL
WBC # BLD AUTO: 5.68 K/UL

## 2018-11-29 PROCEDURE — 80053 COMPREHEN METABOLIC PANEL: CPT

## 2018-11-29 PROCEDURE — 85025 COMPLETE CBC W/AUTO DIFF WBC: CPT

## 2018-11-29 PROCEDURE — 99285 EMERGENCY DEPT VISIT HI MDM: CPT | Mod: 25

## 2018-11-29 PROCEDURE — 82962 GLUCOSE BLOOD TEST: CPT

## 2018-11-29 PROCEDURE — 81003 URINALYSIS AUTO W/O SCOPE: CPT

## 2018-11-29 PROCEDURE — 99900035 HC TECH TIME PER 15 MIN (STAT)

## 2018-11-29 PROCEDURE — 84484 ASSAY OF TROPONIN QUANT: CPT

## 2018-11-29 PROCEDURE — 93005 ELECTROCARDIOGRAM TRACING: CPT

## 2018-11-29 PROCEDURE — 94660 CPAP INITIATION&MGMT: CPT

## 2018-11-29 PROCEDURE — 94761 N-INVAS EAR/PLS OXIMETRY MLT: CPT

## 2018-11-29 PROCEDURE — 25000003 PHARM REV CODE 250: Performed by: EMERGENCY MEDICINE

## 2018-11-29 PROCEDURE — 36415 COLL VENOUS BLD VENIPUNCTURE: CPT

## 2018-11-29 PROCEDURE — 85730 THROMBOPLASTIN TIME PARTIAL: CPT

## 2018-11-29 PROCEDURE — 84443 ASSAY THYROID STIM HORMONE: CPT

## 2018-11-29 PROCEDURE — 85610 PROTHROMBIN TIME: CPT

## 2018-11-29 PROCEDURE — 27000190 HC CPAP FULL FACE MASK W/VALVE

## 2018-11-29 PROCEDURE — 25000003 PHARM REV CODE 250: Performed by: NURSE PRACTITIONER

## 2018-11-29 PROCEDURE — 83880 ASSAY OF NATRIURETIC PEPTIDE: CPT

## 2018-11-29 PROCEDURE — G0378 HOSPITAL OBSERVATION PER HR: HCPCS

## 2018-11-29 PROCEDURE — 83690 ASSAY OF LIPASE: CPT

## 2018-11-29 PROCEDURE — 82550 ASSAY OF CK (CPK): CPT

## 2018-11-29 PROCEDURE — 84484 ASSAY OF TROPONIN QUANT: CPT | Mod: 91

## 2018-11-29 PROCEDURE — 82140 ASSAY OF AMMONIA: CPT

## 2018-11-29 PROCEDURE — 96374 THER/PROPH/DIAG INJ IV PUSH: CPT | Mod: 59

## 2018-11-29 RX ORDER — ONDANSETRON 2 MG/ML
4 INJECTION INTRAMUSCULAR; INTRAVENOUS EVERY 8 HOURS PRN
Status: DISCONTINUED | OUTPATIENT
Start: 2018-11-29 | End: 2018-11-30 | Stop reason: HOSPADM

## 2018-11-29 RX ORDER — IBUPROFEN 200 MG
16 TABLET ORAL
Status: DISCONTINUED | OUTPATIENT
Start: 2018-11-29 | End: 2018-11-30 | Stop reason: HOSPADM

## 2018-11-29 RX ORDER — IBUPROFEN 200 MG
24 TABLET ORAL
Status: DISCONTINUED | OUTPATIENT
Start: 2018-11-29 | End: 2018-11-30 | Stop reason: HOSPADM

## 2018-11-29 RX ORDER — DILTIAZEM HYDROCHLORIDE 30 MG/1
30 TABLET, FILM COATED ORAL EVERY 6 HOURS
Status: DISCONTINUED | OUTPATIENT
Start: 2018-11-29 | End: 2018-11-30

## 2018-11-29 RX ORDER — GLUCAGON 1 MG
1 KIT INJECTION
Status: DISCONTINUED | OUTPATIENT
Start: 2018-11-29 | End: 2018-11-30 | Stop reason: HOSPADM

## 2018-11-29 RX ORDER — SODIUM CHLORIDE 0.9 % (FLUSH) 0.9 %
5 SYRINGE (ML) INJECTION
Status: DISCONTINUED | OUTPATIENT
Start: 2018-11-29 | End: 2018-11-30 | Stop reason: HOSPADM

## 2018-11-29 RX ORDER — PANTOPRAZOLE SODIUM 40 MG/1
40 TABLET, DELAYED RELEASE ORAL DAILY
Status: DISCONTINUED | OUTPATIENT
Start: 2018-11-30 | End: 2018-11-30 | Stop reason: HOSPADM

## 2018-11-29 RX ORDER — DILTIAZEM HYDROCHLORIDE 5 MG/ML
20 INJECTION INTRAVENOUS
Status: COMPLETED | OUTPATIENT
Start: 2018-11-29 | End: 2018-11-29

## 2018-11-29 RX ORDER — INSULIN ASPART 100 [IU]/ML
0-5 INJECTION, SOLUTION INTRAVENOUS; SUBCUTANEOUS
Status: DISCONTINUED | OUTPATIENT
Start: 2018-11-29 | End: 2018-11-30 | Stop reason: HOSPADM

## 2018-11-29 RX ADMIN — DILTIAZEM HYDROCHLORIDE 20 MG: 5 INJECTION INTRAVENOUS at 11:11

## 2018-11-29 RX ADMIN — DILTIAZEM HYDROCHLORIDE 30 MG: 30 TABLET, FILM COATED ORAL at 05:11

## 2018-11-29 RX ADMIN — APIXABAN 5 MG: 5 TABLET, FILM COATED ORAL at 10:11

## 2018-11-29 RX ADMIN — SODIUM CHLORIDE 1000 ML: 0.9 INJECTION, SOLUTION INTRAVENOUS at 11:11

## 2018-11-29 NOTE — PROGRESS NOTES
Patient currently does not meet inpatient level of care criteria. Appropriateness for admission to CDU discussed with ED MD.  Dr. Gonzalez will discuss with Dr. Gates.

## 2018-11-29 NOTE — NURSING
VN cued into pt's room for introduction. Wife Kati at bedside. Pt is Georgian speaking, but understands and speaks a little English. VN is able to communicate with pt in Georgian. Admission questions done. Informed pt that VN would be working closely along side floor nurse, PCT, MD, the rest of care team and making rounds throughout the shift. Fall risk and bed alarm protocol education provided. VN informed pt that he would be getting an iPad to utilize and explained features. Pt aware and agreeable. Allowed time for questions. Pt inquiring about being able to use his CPAP machine. VN notified Dr. Gates. Pt denies any pain or discomfort at the present time. NAD noted. Will cont to be available as needed.

## 2018-11-29 NOTE — SUBJECTIVE & OBJECTIVE
Past Medical History:   Diagnosis Date    Diabetes mellitus, type 2     GERD (gastroesophageal reflux disease)     Hepatocellular carcinoma     liver    Hypertension        Past Surgical History:   Procedure Laterality Date    COLONOSCOPY      EMBOLIZATION, YTTRIUM THERAPY N/A 11/9/2018    Performed by [facility] Diagnostic Provider at [facility] OR [facility]    EMBOLIZATION, YTTRIUM THERAPY N/A 10/24/2018    Performed by [facility] Diagnostic Provider at [facility] OR [facility]    HERNIA REPAIR         Review of patient's allergies indicates:   Allergen Reactions    Flu vaccine 2011 (36 mos+)(pf)      Patient reports he developed Bell's Palsy 2 days after his last flu shot in 2008       No current facility-administered medications on file prior to encounter.      Current Outpatient Medications on File Prior to Encounter   Medication Sig    esomeprazole (NEXIUM) 40 MG capsule Take 1 capsule (40 mg total) by mouth once daily.    losartan-hydrochlorothiazide 50-12.5 mg (HYZAAR) 50-12.5 mg per tablet Take 1 tablet by mouth once daily.    NIFEdipine (ADALAT CC) 60 MG TbSR Take 30 mg by mouth 2 (two) times daily.    omeprazole (PRILOSEC) 20 MG capsule Take 20 mg by mouth once daily.    ondansetron (ZOFRAN) 8 MG tablet Take 1 tablet (8 mg total) by mouth every 8 (eight) hours as needed for Nausea.    potassium chloride SA (K-DUR,KLOR-CON) 20 MEQ tablet Take 20 mEq by mouth 4 (four) times daily.    [DISCONTINUED] glimepiride (AMARYL) 1 MG tablet Take 1 tablet (1 mg total) by mouth before breakfast.    [DISCONTINUED] oxyCODONE-acetaminophen (PERCOCET) 5-325 mg per tablet Take 1 tablet by mouth every 4 (four) hours as needed for Pain.     Family History     None        Tobacco Use    Smoking status: Former Smoker    Smokeless tobacco: Never Used   Substance and Sexual Activity    Alcohol use: No    Drug use: No    Sexual activity: Not on file     Review of Systems   Constitutional: Negative for chills and fever.   Eyes: Negative  for photophobia and visual disturbance.   Respiratory: Negative for cough, chest tightness, shortness of breath and wheezing.    Cardiovascular: Negative for chest pain, palpitations and leg swelling.   Gastrointestinal: Negative for abdominal pain, diarrhea, nausea and vomiting.   Genitourinary: Negative for dysuria, flank pain and hematuria.   Musculoskeletal: Negative for back pain, myalgias and neck pain.   Skin: Negative for rash and wound.   Neurological: Positive for dizziness and light-headedness. Negative for syncope, speech difficulty and headaches.        + near syncopal event    Psychiatric/Behavioral: Negative for agitation.     Objective:     Vital Signs (Most Recent):  Temp: 97.6 °F (36.4 °C) (11/29/18 1100)  Pulse: 84 (11/29/18 1248)  Resp: 20 (11/29/18 1248)  BP: 134/86 (11/29/18 1248)  SpO2: 99 % (11/29/18 1248) Vital Signs (24h Range):  Temp:  [97.6 °F (36.4 °C)] 97.6 °F (36.4 °C)  Pulse:  [] 84  Resp:  [15-23] 20  SpO2:  [99 %-100 %] 99 %  BP: (117-135)/(81-96) 134/86     Weight: 90.3 kg (199 lb)  Body mass index is 31.17 kg/m².    Physical Exam   Constitutional: He is oriented to person, place, and time. He appears well-developed and well-nourished. No distress.   HENT:   Head: Normocephalic and atraumatic.   Eyes: Conjunctivae are normal. Pupils are equal, round, and reactive to light.   Neck: Normal range of motion. Neck supple. No JVD present.   Cardiovascular: Normal rate and intact distal pulses.   afib on telemetry  -130    Pulmonary/Chest: Effort normal and breath sounds normal. No respiratory distress. He has no wheezes.   Abdominal: Soft. Bowel sounds are normal. He exhibits no distension. There is no tenderness. There is no guarding.   Musculoskeletal: Normal range of motion. He exhibits no edema or tenderness.   Neurological: He is alert and oriented to person, place, and time. No cranial nerve deficit.   Skin: Skin is warm and dry. Capillary refill takes less than 2  seconds. No erythema.   Psychiatric: He has a normal mood and affect. His behavior is normal.         CRANIAL NERVES     CN III, IV, VI   Pupils are equal, round, and reactive to light.       Significant Labs:   BMP:   Recent Labs   Lab 11/29/18  1122   *      K 3.9      CO2 23   BUN 15   CREATININE 1.1   CALCIUM 10.1     CBC:   Recent Labs   Lab 11/29/18  1122   WBC 5.68   HGB 17.7   HCT 50.3        Troponin:   Recent Labs   Lab 11/29/18  1122   TROPONINI 0.015       Significant Imaging: I have reviewed all pertinent imaging results/findings within the past 24 hours.

## 2018-11-29 NOTE — ASSESSMENT & PLAN NOTE
A1C 5.4 (11/2018) patient reports intolerance to metformin after 50 lb weight loss, no longer on Metformin     --Diabetic diet   --Monitor blood sugar AC&HS   --hypoglycemia protocol

## 2018-11-29 NOTE — H&P
"[facility] Hospital Medicine  History & Physical    Patient Name: Roman Hodges  MRN: [id number]  Admission Date: 11/29/2018  Attending Physician: Gordon Gates MD   Primary Care Provider: Jose Angel Munson MD         Patient information was obtained from patient, spouse/SO and ER records.     Subjective:     Principal Problem:New onset atrial fibrillation    Chief Complaint:   Chief Complaint   Patient presents with    Fatigue     that began last night, + fatigue, + nausea         HPI: 59 yo male with HTN, Type 2 Diabetes Mellitus and HCC presented to [facility] with report of near syncopal event. Per patient he was in his usual state of health until early this morning. He reports waking up at 3 am with "bad" reflux. The patient reports epigastric burning sensation relieved with omeprazole. He was able to return to sleep for 1 hour after which he woke up feeling dizzy and lightheaded. He reports associated nausea, no vomiting. No chest pain, SOB or palpitations. No unilateral weakness, aphasia or gait impairment. Pt denies LOC. Initial EKG in ED shows afib RVR rate 120-130. The patient received diltiazem with improvement in rate, remains afib on telemetry. The patient denies hx of afib. Initial labs unremarkable, CXR negative, BP stable. Pt admitted to [facility] for management of new onset afib.     Past Medical History:   Diagnosis Date    Diabetes mellitus, type 2     GERD (gastroesophageal reflux disease)     Hepatocellular carcinoma     liver    Hypertension        Past Surgical History:   Procedure Laterality Date    COLONOSCOPY      EMBOLIZATION, YTTRIUM THERAPY N/A 11/9/2018    Performed by [facility] Diagnostic Provider at [facility]    EMBOLIZATION, YTTRIUM THERAPY N/A 10/24/2018    Performed by [facility] Diagnostic Provider at [facility] OR 2ND [facility]LR    HERNIA REPAIR         Review of patient's allergies indicates:   Allergen Reactions    Flu vaccine 2011 (36 " mos+)(pf)      Patient reports he developed Bell's Palsy 2 days after his last flu shot in 2008       No current facility-administered medications on file prior to encounter.      Current Outpatient Medications on File Prior to Encounter   Medication Sig    esomeprazole (NEXIUM) 40 MG capsule Take 1 capsule (40 mg total) by mouth once daily.    losartan-hydrochlorothiazide 50-12.5 mg (HYZAAR) 50-12.5 mg per tablet Take 1 tablet by mouth once daily.    NIFEdipine (ADALAT CC) 60 MG TbSR Take 30 mg by mouth 2 (two) times daily.    omeprazole (PRILOSEC) 20 MG capsule Take 20 mg by mouth once daily.    ondansetron (ZOFRAN) 8 MG tablet Take 1 tablet (8 mg total) by mouth every 8 (eight) hours as needed for Nausea.    potassium chloride SA (K-DUR,KLOR-CON) 20 MEQ tablet Take 20 mEq by mouth 4 (four) times daily.    [DISCONTINUED] glimepiride (AMARYL) 1 MG tablet Take 1 tablet (1 mg total) by mouth before breakfast.    [DISCONTINUED] oxyCODONE-acetaminophen (PERCOCET) 5-325 mg per tablet Take 1 tablet by mouth every 4 (four) hours as needed for Pain.     Family History     None        Tobacco Use    Smoking status: Former Smoker    Smokeless tobacco: Never Used   Substance and Sexual Activity    Alcohol use: No    Drug use: No    Sexual activity: Not on file     Review of Systems   Constitutional: Negative for chills and fever.   Eyes: Negative for photophobia and visual disturbance.   Respiratory: Negative for cough, chest tightness, shortness of breath and wheezing.    Cardiovascular: Negative for chest pain, palpitations and leg swelling.   Gastrointestinal: Negative for abdominal pain, diarrhea, nausea and vomiting.   Genitourinary: Negative for dysuria, flank pain and hematuria.   Musculoskeletal: Negative for back pain, myalgias and neck pain.   Skin: Negative for rash and wound.   Neurological: Positive for dizziness and light-headedness. Negative for syncope, speech difficulty and headaches.        +  near syncopal event    Psychiatric/Behavioral: Negative for agitation.     Objective:     Vital Signs (Most Recent):  Temp: 97.6 °F (36.4 °C) (11/29/18 1100)  Pulse: 84 (11/29/18 1248)  Resp: 20 (11/29/18 1248)  BP: 134/86 (11/29/18 1248)  SpO2: 99 % (11/29/18 1248) Vital Signs (24h Range):  Temp:  [97.6 °F (36.4 °C)] 97.6 °F (36.4 °C)  Pulse:  [] 84  Resp:  [15-23] 20  SpO2:  [99 %-100 %] 99 %  BP: (117-135)/(81-96) 134/86     Weight: 90.3 kg (199 lb)  Body mass index is 31.17 kg/m².    Physical Exam   Constitutional: He is oriented to person, place, and time. He appears well-developed and well-nourished. No distress.   HENT:   Head: Normocephalic and atraumatic.   Eyes: Conjunctivae are normal. Pupils are equal, round, and reactive to light.   Neck: Normal range of motion. Neck supple. No JVD present.   Cardiovascular: Normal rate and intact distal pulses.   afib on telemetry  -130    Pulmonary/Chest: Effort normal and breath sounds normal. No respiratory distress. He has no wheezes.   Abdominal: Soft. Bowel sounds are normal. He exhibits no distension. There is no tenderness. There is no guarding.   Musculoskeletal: Normal range of motion. He exhibits no edema or tenderness.   Neurological: He is alert and oriented to person, place, and time. No cranial nerve deficit.   Skin: Skin is warm and dry. Capillary refill takes less than 2 seconds. No erythema.   Psychiatric: He has a normal mood and affect. His behavior is normal.         CRANIAL NERVES     CN III, IV, VI   Pupils are equal, round, and reactive to light.       Significant Labs:   BMP:   Recent Labs   Lab 11/29/18  1122   *      K 3.9      CO2 23   BUN 15   CREATININE 1.1   CALCIUM 10.1     CBC:   Recent Labs   Lab 11/29/18  1122   WBC 5.68   HGB 17.7   HCT 50.3        Troponin:   Recent Labs   Lab 11/29/18  1122   TROPONINI 0.015       Significant Imaging: I have reviewed all pertinent imaging results/findings  within the past 24 hours.    Assessment/Plan:     * New onset atrial fibrillation    with Near syncopal event     57 yo male with HTN, T2DM and HCC presented with report of dizziness/lightheadedness and near syncopal event. Initial EKG shows afib RVR with HR 130s. The patient denies chest pain, palpitations, SOB, +nausea, no vomiting, no LOC. Pt received 20 mg IV diltiazem in ED. -130s on examination. Pt asymptomatic, BP stable. CHADS-VASc 3     --check echo   --check TSH   --continue diltiazem 30 mg q6h   --monitor telemetry        Near syncope           GERD (gastroesophageal reflux disease)    Continue PPI        Type 2 diabetes mellitus, without long-term current use of insulin    A1C 5.4 (11/2018) patient reports intolerance to metformin after 50 lb weight loss, no longer on Metformin     --Diabetic diet   --Monitor blood sugar AC&HS   --hypoglycemia protocol        Essential hypertension    -135. Pt taking losartan-hctz 50-12.5 mg po daily and Nifedipine 60 mg po daily. Hold for now        HCC (hepatocellular carcinoma)    S/p RF ablation 11/2018, LFTs within normal limits, follow outpatient        Obstructive sleep apnea on CPAP    Continue CPAP per home settings          VTE Risk Mitigation (From admission, onward)        Ordered     IP VTE HIGH RISK PATIENT  Once      11/29/18 1608     Place sequential compression device  Until discontinued      11/29/18 1608             Jolie Maya NP  Department of Hospital Medicine   [facility]

## 2018-11-29 NOTE — ED PROVIDER NOTES
"Encounter Date: 11/29/2018    SCRIBE #1 NOTE: I, Laura Garcia, am scribing for, and in the presence of,  Dr. Gonzalez. I have scribed the entire note.       History     Chief Complaint   Patient presents with    Fatigue     that began last night, + fatigue, + nausea      This is a 58 y.o. male with a PMHx of HTN, DM2, and liver cancer who presents to the Emergency Department with a cc of fatigue since this morning. The pt states he woke up and "started feeling bad." States he felt "some indigestion", and while he denies CP, he does indicate mid-epigastric pain. He took his regular dosage of Esomeprazole and checked his BP which was normal. As he continued to feel bad to the point of presyncope an hour later he checked his BP again; his second BP was 99/55.  He reports feeling somewhat short of breath with exertion, but this resolves at rest. Reports his midepigastric pain has resolved. He denies fever, congestion, or vomiting. He reports no worsening or alleviating factors. He reports no prior history of similar symptoms.     Patient speaks English well, his wife is fluent also and used for clarification. Patient declines formal  at this time.       The history is provided by the patient.     Review of patient's allergies indicates:   Allergen Reactions    Flu vaccine 2011 (36 mos+)(pf)      Patient reports he developed Bell's Palsy 2 days after his last flu shot in 2008     Past Medical History:   Diagnosis Date    Cancer     liver    Diabetes mellitus, type 2     GERD (gastroesophageal reflux disease)     Hypertension      Past Surgical History:   Procedure Laterality Date    COLONOSCOPY      EMBOLIZATION, YTTRIUM THERAPY N/A 11/9/2018    Performed by [facility] at [facility] OR 2ND FLR    EMBOLIZATION, YTTRIUM THERAPY N/A 10/24/2018    Performed by [facility] at [facility] OR 2ND FLR    HERNIA REPAIR       History reviewed. No pertinent family history.  Social History " "    Tobacco Use    Smoking status: Former Smoker    Smokeless tobacco: Never Used   Substance Use Topics    Alcohol use: No    Drug use: No     Review of Systems   Constitutional: Positive for fatigue. Negative for chills and fever.   HENT: Negative for congestion, facial swelling, trouble swallowing and voice change.    Eyes: Negative for photophobia, pain and redness.   Respiratory: Positive for shortness of breath. Negative for cough and choking.    Cardiovascular: Negative for chest pain, palpitations and leg swelling.   Gastrointestinal: Positive for abdominal pain ("Indigestion"). Negative for diarrhea, nausea and vomiting.   Genitourinary: Negative for dysuria, frequency and urgency.   Musculoskeletal: Negative for back pain, neck pain and neck stiffness.   Neurological: Negative for seizures, speech difficulty, light-headedness, numbness and headaches.        Positive for presyncope.   All other systems reviewed and are negative.      Physical Exam     Initial Vitals [11/29/18 1100]   BP Pulse Resp Temp SpO2   (!) 135/96 (!) 123 (!) 22 97.6 °F (36.4 °C) 99 %      MAP       --         Physical Exam    Nursing note and vitals reviewed.  Constitutional: He appears well-developed and well-nourished. No distress.   HENT:   Head: Normocephalic and atraumatic.   Oropharynx clear; Dry MM   Eyes: Conjunctivae and EOM are normal. Pupils are equal, round, and reactive to light.   Neck: Normal range of motion. Neck supple. No tracheal deviation present.   Cardiovascular: Normal heart sounds and intact distal pulses. An irregularly irregular rhythm present.  Tachycardia present.    Pulmonary/Chest: Breath sounds normal. No respiratory distress. He has no wheezes. He has no rhonchi. He has no rales.   Abdominal: Soft. Bowel sounds are normal. He exhibits no distension. There is no tenderness.   Musculoskeletal: Normal range of motion. He exhibits no edema or tenderness.   Neurological: He is alert and oriented to " person, place, and time. He has normal strength. No cranial nerve deficit or sensory deficit. GCS score is 15. GCS eye subscore is 4. GCS verbal subscore is 5. GCS motor subscore is 6.   Skin: Skin is warm and dry. Capillary refill takes less than 2 seconds.         ED Course   Procedures  Labs Reviewed   CBC W/ AUTO DIFFERENTIAL - Abnormal; Notable for the following components:       Result Value    Lymph # 0.7 (*)     Gran% 79.4 (*)     Lymph% 11.8 (*)     All other components within normal limits   COMPREHENSIVE METABOLIC PANEL - Abnormal; Notable for the following components:    Glucose 115 (*)     Total Protein 8.7 (*)     All other components within normal limits   URINALYSIS - Abnormal; Notable for the following components:    Ketones, UA Trace (*)     All other components within normal limits   POCT GLUCOSE - Abnormal; Notable for the following components:    POCT Glucose 113 (*)     All other components within normal limits   PROTIME-INR   APTT   B-TYPE NATRIURETIC PEPTIDE   TROPONIN I   LIPASE   AMMONIA   CK   POCT GLUCOSE MONITORING CONTINUOUS     EKG Readings: (Independently Interpreted)   Initial Reading: No STEMI. Previous EKG Date: No prior available for comparison. Rhythm: Atrial Fibrillation. Heart Rate: 138. Ectopy: No Ectopy. Conduction: LVH. ST Segments: Normal ST Segments. Axis: Normal.         X-Rays:   Independently Interpreted Readings:   Other Readings:  Imaging interpreted by radiologist and visualized by me    Imaging Results          X-Ray Chest AP Portable (Final result)  Result time 11/29/18 11:36:09    Final result by Zenia Cardenas MD (11/29/18 11:36:09)                 Impression:      No acute abnormality.      Electronically signed by: Zenia Cardenas MD  Date:    11/29/2018  Time:    11:36             Narrative:    EXAMINATION:  XR CHEST AP PORTABLE    CLINICAL HISTORY:  Fatigue;    TECHNIQUE:  Single frontal view of the chest was  performed.    COMPARISON:  None    FINDINGS:  The lungs are clear, with normal appearance of pulmonary vasculature and no pleural effusion or pneumothorax.    The cardiac silhouette is normal in size. The hilar and mediastinal contours are unremarkable.    Bones are intact.                                Medical Decision Making:   Initial Assessment:   This is a 58 y.o. male with a PMHx of HTN, DM2, and liver cancer who presents to the Emergency Department with a cc of fatigue since this morning.  Differential Diagnosis:   ACS, dissection, PNA, anemia, arrhythmia, GERD, musculoskeletal  Independently Interpreted Test(s):   I have ordered and independently interpreted X-rays - see prior notes.  I have ordered and independently interpreted EKG Reading(s) - see prior notes  Clinical Tests:   Lab Tests: Reviewed       <> Summary of Lab: Benign  ED Management:  Patient given some IV fluid and 1 dose of Cardizem which has resolved his RVR.  Still in AFib.  He reports feeling much better and vital signs otherwise stable. I discussed the case with Dr. Gates, given the near syncope with new onset of AFib with RVR.  He agrees with admission for observation at this time.  Informed patient of plan and he is comfortable with plan at this time.                      Clinical Impression:     1. Near syncope    2. Fatigue    3. New onset atrial fibrillation    4. Atrial fibrillation with RVR    5. Chest pain, unspecified type            Disposition:   Disposition: Placed in Observation  Condition: Stable       I, Dr. Brennan Gonzalez, personally performed the services described in this documentation. All medical record entries made by the scribe were at my direction and in my presence.  I have reviewed the chart and agree that the record reflects my personal performance and is accurate and complete. Brennan Gonzalez MD.  4:07 PM 12/03/2018                     Brennan Gonzalez MD  12/03/18 2105

## 2018-11-29 NOTE — LETTER
November 30, 2018         [address] [place] [address] [facility] [place] [address]  Phone: [phone]  Fax: [phone]       Patient: Roman Hodges   YOB: 1960  Date of Visit: 11/30/2018    To Whom It May Concern:    Colleen Hodges  was at [facility] on 11/29/18. He may return to work/school on 12/3/18 with no restrictions. If you have any questions or concerns, or if I can be of further assistance, please do not hesitate to contact me.    Sincerely,      Jolie Maya NP

## 2018-11-29 NOTE — ASSESSMENT & PLAN NOTE
with Near syncopal event     59 yo male with HTN, T2DM and HCC presented with report of dizziness/lightheadedness and near syncopal event. Initial EKG shows afib RVR with HR 130s. The patient denies chest pain, palpitations, SOB, +nausea, no vomiting, no LOC. Pt received 20 mg IV diltiazem in ED. -130s on examination. Pt asymptomatic, BP stable. CHADS-VASc 3     --check echo   --check TSH   --continue diltiazem 30 mg q6h   --monitor telemetry

## 2018-11-29 NOTE — HPI
"57 yo male with HTN, Type 2 Diabetes Mellitus and HCC presented to [facility] with report of near syncopal event. Per patient he was in his usual state of health until early this morning. He reports waking up at 3 am with "bad" reflux. The patient reports epigastric burning sensation relieved with omeprazole. He was able to return to sleep for 1 hour after which he woke up feeling dizzy and lightheaded. He reports associated nausea, no vomiting. No chest pain, SOB or palpitations. No unilateral weakness, aphasia or gait impairment. Pt denies LOC. Initial EKG in ED shows afib RVR rate 120-130. The patient received diltiazem with improvement in rate, remains afib on telemetry. The patient denies hx of afib. Initial labs unremarkable, CXR negative, BP stable. Pt admitted to [facility] for management of new onset afib.   "

## 2018-11-30 VITALS
OXYGEN SATURATION: 97 % | BODY MASS INDEX: 31.76 KG/M2 | HEIGHT: 67 IN | WEIGHT: 202.38 LBS | SYSTOLIC BLOOD PRESSURE: 129 MMHG | HEART RATE: 73 BPM | RESPIRATION RATE: 16 BRPM | DIASTOLIC BLOOD PRESSURE: 82 MMHG | TEMPERATURE: 98 F

## 2018-11-30 PROBLEM — R55 NEAR SYNCOPE: Status: RESOLVED | Noted: 2018-11-29 | Resolved: 2018-11-30

## 2018-11-30 LAB
POCT GLUCOSE: 89 MG/DL (ref 70–110)
TROPONIN I SERPL DL<=0.01 NG/ML-MCNC: 0.02 NG/ML

## 2018-11-30 PROCEDURE — 25000003 PHARM REV CODE 250: Performed by: NURSE PRACTITIONER

## 2018-11-30 PROCEDURE — 94761 N-INVAS EAR/PLS OXIMETRY MLT: CPT

## 2018-11-30 PROCEDURE — 99244 OFF/OP CNSLTJ NEW/EST MOD 40: CPT | Mod: ,,, | Performed by: INTERNAL MEDICINE

## 2018-11-30 PROCEDURE — G0378 HOSPITAL OBSERVATION PER HR: HCPCS

## 2018-11-30 PROCEDURE — 93005 ELECTROCARDIOGRAM TRACING: CPT

## 2018-11-30 RX ORDER — LISINOPRIL 2.5 MG/1
2.5 TABLET ORAL DAILY
Status: DISCONTINUED | OUTPATIENT
Start: 2018-11-30 | End: 2018-11-30 | Stop reason: HOSPADM

## 2018-11-30 RX ORDER — METOPROLOL SUCCINATE 25 MG/1
25 TABLET, EXTENDED RELEASE ORAL DAILY
Qty: 30 TABLET | Refills: 0 | Status: SHIPPED | OUTPATIENT
Start: 2018-12-01 | End: 2018-11-30

## 2018-11-30 RX ORDER — METOPROLOL SUCCINATE 25 MG/1
25 TABLET, EXTENDED RELEASE ORAL DAILY
Status: DISCONTINUED | OUTPATIENT
Start: 2018-11-30 | End: 2018-11-30 | Stop reason: HOSPADM

## 2018-11-30 RX ORDER — LISINOPRIL 2.5 MG/1
2.5 TABLET ORAL DAILY
Qty: 30 TABLET | Refills: 0 | Status: ON HOLD | OUTPATIENT
Start: 2018-12-01 | End: 2018-12-03

## 2018-11-30 RX ORDER — METOPROLOL SUCCINATE 25 MG/1
25 TABLET, EXTENDED RELEASE ORAL DAILY
Qty: 30 TABLET | Refills: 0 | Status: SHIPPED | OUTPATIENT
Start: 2018-12-01 | End: 2018-12-31 | Stop reason: SDUPTHER

## 2018-11-30 RX ORDER — LISINOPRIL 2.5 MG/1
2.5 TABLET ORAL DAILY
Qty: 30 TABLET | Refills: 0 | Status: SHIPPED | OUTPATIENT
Start: 2018-12-01 | End: 2018-11-30

## 2018-11-30 RX ADMIN — LISINOPRIL 2.5 MG: 2.5 TABLET ORAL at 12:11

## 2018-11-30 RX ADMIN — PANTOPRAZOLE SODIUM 40 MG: 40 TABLET, DELAYED RELEASE ORAL at 10:11

## 2018-11-30 RX ADMIN — APIXABAN 5 MG: 5 TABLET, FILM COATED ORAL at 10:11

## 2018-11-30 RX ADMIN — DILTIAZEM HYDROCHLORIDE 30 MG: 30 TABLET, FILM COATED ORAL at 12:11

## 2018-11-30 RX ADMIN — METOPROLOL SUCCINATE 25 MG: 25 TABLET, EXTENDED RELEASE ORAL at 12:11

## 2018-11-30 NOTE — ASSESSMENT & PLAN NOTE
LVEF 30% with WMA, no chest pain  Feel he will need ischemic workup with stress test vs LHC which can be done as outpatient    Start GDMT with BB, ACEI  No ADHF on exam  Follow up in cards clinic with Dr Johnson

## 2018-11-30 NOTE — CONSULTS
[facility]  Cardiology  Consult Note    Patient Name: Roman Hodges  MRN: [id number]  Admission Date: 11/29/2018  Hospital Length of Stay: 0 days  Code Status: Full Code   Attending Provider: Gordon Gates MD   Consulting Provider: Anuel Bauman NP  Primary Care Physician: Jose Angel Munson MD  Principal Problem:New onset atrial fibrillation    Patient information was obtained from patient, past medical records and ER records.     Inpatient consult to [facility]iology[facility]  Consult performed by: Anuel Bauman NP  Consult ordered by: Gordon Gates MD        Subjective:     Chief Complaint:  Dizziness      HPI:   Roman Hodges is a 57 yo male with HTN, Type 2 Diabetes Mellitus and HCC. Patient presented to the ED with report of dizziness/lightheadedness. He reports waking up at 3 am with reflux relieved with omeprazole. He was able to return to sleep for 1 hour after which he woke up feeling dizzy and lightheaded. He reports associated nausea. Denies ever having chest pain, SOB or palpitations.  EKG in ED shows afib RVR rate 138 bpm. The patient received diltiazem with improvement in rate and has since converted to NSR. Repeat EKG is pending. Labs and imaging unremarkable. CHADSVASC 3 points. Eliquis has been initiated. LVEF 30% with akinetic: mid anteroseptal, apical anterior, apical septal, apical lateral and apex and hypokinetic: mid inferoseptal and apical inferior walls.       Past Medical History:   Diagnosis Date    Diabetes mellitus, type 2     GERD (gastroesophageal reflux disease)     Hepatocellular carcinoma     liver    Hypertension        Past Surgical History:   Procedure Laterality Date    COLONOSCOPY      EMBOLIZATION, YTTRIUM THERAPY N/A 11/9/2018    Performed by [facility] at [facility] OR 2ND FLR    EMBOLIZATION, YTTRIUM THERAPY N/A 10/24/2018    Performed by [facility] Provider at [facility] OR 2ND FLR    HERNIA REPAIR         Review of  patient's allergies indicates:   Allergen Reactions    Flu vaccine 2011 (36 mos+)(pf)      Patient reports he developed Bell's Palsy 2 days after his last flu shot in 2008       No current facility-administered medications on file prior to encounter.      Current Outpatient Medications on File Prior to Encounter   Medication Sig    esomeprazole (NEXIUM) 40 MG capsule Take 1 capsule (40 mg total) by mouth once daily.    losartan-hydrochlorothiazide 50-12.5 mg (HYZAAR) 50-12.5 mg per tablet Take 1 tablet by mouth once daily.    NIFEdipine (ADALAT CC) 60 MG TbSR Take 30 mg by mouth 2 (two) times daily.    omeprazole (PRILOSEC) 20 MG capsule Take 20 mg by mouth once daily.    ondansetron (ZOFRAN) 8 MG tablet Take 1 tablet (8 mg total) by mouth every 8 (eight) hours as needed for Nausea.    potassium chloride SA (K-DUR,KLOR-CON) 20 MEQ tablet Take 20 mEq by mouth 4 (four) times daily.     Family History     None        Tobacco Use    Smoking status: Former Smoker    Smokeless tobacco: Never Used   Substance and Sexual Activity    Alcohol use: No    Drug use: No    Sexual activity: Not on file     Review of Systems   Constitution: Negative for diaphoresis, weakness, malaise/fatigue, weight gain and weight loss.   HENT: Negative.    Eyes: Negative.    Cardiovascular: Negative for chest pain, dyspnea on exertion, irregular heartbeat, leg swelling, near-syncope, orthopnea, palpitations, paroxysmal nocturnal dyspnea and syncope.   Respiratory: Negative for cough, shortness of breath and wheezing.    Endocrine: Negative.    Hematologic/Lymphatic: Negative.    Skin: Negative.    Musculoskeletal: Negative.    Gastrointestinal: Positive for heartburn.   Genitourinary: Negative.    Neurological: Positive for light-headedness.   Psychiatric/Behavioral: Negative.    Allergic/Immunologic: Negative.      Objective:     Vital Signs (Most Recent):  Temp: 98.4 °F (36.9 °C) (11/30/18 1145)  Pulse: 70 (11/30/18 1145)  Resp: 16  (11/30/18 1145)  BP: 129/82 (11/30/18 1145)  SpO2: 96 % (11/30/18 1145) Vital Signs (24h Range):  Temp:  [96.4 °F (35.8 °C)-98.6 °F (37 °C)] 98.4 °F (36.9 °C)  Pulse:  [57-84] 70  Resp:  [16-21] 16  SpO2:  [95 %-99 %] 96 %  BP: (111-134)/(76-89) 129/82     Weight: 91.8 kg (202 lb 6.1 oz)  Body mass index is 31.7 kg/m².    SpO2: 96 %  O2 Device (Oxygen Therapy): room air    No intake or output data in the 24 hours ending 11/30/18 1201    Lines/Drains/Airways     Peripheral Intravenous Line                 Peripheral IV - Single Lumen 11/29/18 1115 Right Antecubital 1 day                Physical Exam   Constitutional: He is oriented to person, place, and time. He appears well-developed and well-nourished. No distress.   HENT:   Head: Normocephalic and atraumatic.   Eyes: Right eye exhibits no discharge. Left eye exhibits no discharge.   Neck: No JVD present.   Cardiovascular: Normal rate, regular rhythm and normal heart sounds. Exam reveals no gallop and no friction rub.   No murmur heard.  Pulmonary/Chest: Effort normal and breath sounds normal.   Abdominal: Soft. Bowel sounds are normal.   Musculoskeletal: He exhibits no edema.   Neurological: He is alert and oriented to person, place, and time.   Skin: Skin is warm and dry. He is not diaphoretic.   Psychiatric: He has a normal mood and affect. His behavior is normal. Judgment and thought content normal.       Significant Labs:   BMP:   Recent Labs   Lab 11/29/18  1122   *      K 3.9      CO2 23   BUN 15   CREATININE 1.1   CALCIUM 10.1   , CMP   Recent Labs   Lab 11/29/18  1122      K 3.9      CO2 23   *   BUN 15   CREATININE 1.1   CALCIUM 10.1   PROT 8.7*   ALBUMIN 4.4   BILITOT 1.0   ALKPHOS 106   AST 30   ALT 38   ANIONGAP 13   ESTGFRAFRICA >60   EGFRNONAA >60   , CBC   Recent Labs   Lab 11/29/18  1122   WBC 5.68   HGB 17.7   HCT 50.3      , INR   Recent Labs   Lab 11/29/18  1122   INR 1.0   , Lipid Panel No results  for input(s): CHOL, HDL, LDLCALC, TRIG, CHOLHDL in the last 48 hours., Troponin   Recent Labs   Lab 11/29/18  1122 11/29/18  1703 11/29/18  2343   TROPONINI 0.015 0.016 0.016    and All pertinent lab results from the last 24 hours have been reviewed.    Significant Imaging: Echocardiogram:   Transthoracic echo (TTE) complete (Cupid Only):   Results for orders placed or performed during the hospital encounter of 11/29/18   Transthoracic echo (TTE) complete (Cupid Only)   Result Value Ref Range    AV mean gradient 3.48 mmHg    Ao peak saw 1.29 m/s    Ao VTI 20.02 cm    IVRT 0.08 msec    IVS 1.20 (A) 0.6 - 1.1 cm    LA size 3.30 cm    LVIDD 3.83 3.5 - 6.0 cm    LVIDS 2.48 2.1 - 4.0 cm    LVOT diameter 1.94 cm    LVOT peak VTI 15.15 cm    PW 1.08 0.6 - 1.1 cm    RVDD 3.25 cm    TR Max Saw 1.10 m/s    Ao root annulus 3.16 cm    AORTIC VALVE CUSP SEPERATION 1.94 cm    PV PEAK VELOCITY 0.90 cm/s    LV Diastolic Volume 63.11 mL    LV Systolic Volume 21.78 mL    FS 35 %    LV mass 143.64 g    Left Ventricle Relative Wall Thickness 0.56 cm    AV valve area 2.24 cm2    AV index (prosthetic) 0.76     LVOT area 2.95 cm2    LVOT stroke volume 44.76 cm3    AV peak gradient 6.66 mmHg    Triscuspid Valve Regurgitation Peak Gradient 4.84 mmHg    BSA 2.07 m2    LV Systolic Volume Index 10.5 mL/m2    LV Diastolic Volume Index 30.49 mL/m2    LV Mass Index 69.4 g/m2    Right Atrial Pressure (from IVC) 3 mmHg    TV rest pulmonary artery pressure 7.84 mmHg     Assessment and Plan:     * New onset atrial fibrillation    Presented with AF  bpm  Converted to SR with Cardizem  DC cardizem given depressed LVEF   CHADSVASC 3 points    Need input from heme/onc regarding OAC vs Lovenox   Patient has an appointment with Dr Sargent scheduled for Monday          Acute systolic heart failure    LVEF 30% with WMA, no chest pain  Feel he will need ischemic workup with stress test vs LHC which can be done as outpatient    Start GDMT with BB,  ACEI  No ADHF on exam  Follow up in cards clinic with Dr Johnson      Essential hypertension    BP at goal  DC Nifedipine, start BB and ACEI      Obstructive sleep apnea on CPAP    Continue CPAP Q HS         VTE Risk Mitigation (From admission, onward)        Ordered     apixaban tablet 5 mg  2 times daily      11/29/18 1713     IP VTE HIGH RISK PATIENT  Once      11/29/18 1608     Place sequential compression device  Until discontinued      11/29/18 1608          Thank you for your consult. I will follow-up with patient. Please contact us if you have any additional questions.    Anuel Bauman, JOE  Cardiology   [facility]

## 2018-11-30 NOTE — ASSESSMENT & PLAN NOTE
with Near syncopal event   Acute systolic HF     59 yo male with HTN, T2DM and HCC presented with report of dizziness/lightheadedness and near syncopal event. Initial EKG shows afib RVR with HR 130s. Pt received 20 mg IV diltiazem in ED with conversion to NSR on telemetry overnight. Pt denies chest pain, SOB and palpitations. BP stable. CHADS-VASc 3-Apixaban 5 mg po BID started. Echo shows LVEF 30% with no wma. Pt evaluated by cardiology with recommendation to start BB, ACE. Recommend outpatient f/u for stress and  LHC.

## 2018-11-30 NOTE — ASSESSMENT & PLAN NOTE
Presented with AF  bpm  Converted to SR with Cardizem  CHADSVASC 3 points    Need input from heme/onc regarding OAC vs Lovenox   Patient has an appointment with Dr Sargent scheduled for Monday

## 2018-11-30 NOTE — DISCHARGE SUMMARY
"[facility] Hospital Medicine  Discharge Summary      Patient Name: Roman Hodges  MRN: [id number]  Admission Date: 11/29/2018  Hospital Length of Stay: 0 days  Discharge Date and Time:  11/30/2018 2:18 PM  Attending Physician: Gordon Gates MD   Discharging Provider: Jolie Maya NP  Primary Care Provider: Jose Angel Munson MD      HPI:   57 yo male with HTN, Type 2 Diabetes Mellitus and HCC presented to [facility] with report of near syncopal event. Per patient he was in his usual state of health until early this morning. He reports waking up at 3 am with "bad" reflux. The patient reports epigastric burning sensation relieved with omeprazole. He was able to return to sleep for 1 hour after which he woke up feeling dizzy and lightheaded. He reports associated nausea, no vomiting. No chest pain, SOB or palpitations. No unilateral weakness, aphasia or gait impairment. Pt denies LOC. Initial EKG in ED shows afib RVR rate 120-130. The patient received diltiazem with improvement in rate, remains afib on telemetry. The patient denies hx of afib. Initial labs unremarkable, CXR negative, BP stable. Pt admitted to [facility] for management of new onset afib.     * No surgery found *      Hospital Course:   Pt converted to NSR with diltiazem. Echo shows acute systolic HF with LVEF 30%, no wall motion abnormalities. No evidence of decompensated HF on exam. Pt VSS. No chest pain, SOB or palpitations. Dizziness/lightheadedness resolved. He was evaluated by cardiology- recommend Toprol XL 25 mg daily and lisinopril  2.5 mg daily. CHADSVASC 3 points- patient started on Apixaban 5 mg po BID. Per heme/onc patient ok to continue OAC as LFTs and platelets within normal limits. Recommend outpatient follow up for stress test and LHC. Pt has scheduled to see Dr. Sargent on Monday, 12/3/18. Pt medically optimized for discharge home at thi time. POC discussed with patient and wife.  "     Consults:   Consults (From admission, onward)        Status Ordering Provider     Inpatient consult to [facility]  Once     Provider:  (Not yet assigned)    Completed SURAJ MILLS          No new Assessment & Plan notes have been filed under this hospital service since the last note was generated.  Service: Hospital Medicine    Final Active Diagnoses:    Diagnosis Date Noted POA    PRINCIPAL PROBLEM:  New onset atrial fibrillation [I48.91] 11/29/2018 Yes    Essential hypertension [I10] 11/29/2018 Yes     Chronic    Type 2 diabetes mellitus, without long-term current use of insulin [E11.9] 11/29/2018 Yes     Chronic    GERD (gastroesophageal reflux disease) [K21.9] 11/29/2018 Yes    Acute systolic heart failure [I50.21] 11/29/2018 Yes    HCC (hepatocellular carcinoma) [C22.0] 10/24/2018 Yes    Obstructive sleep apnea on CPAP [G47.33, Z99.89] 04/23/2018 Not Applicable     Chronic      Problems Resolved During this Admission:    Diagnosis Date Noted Date Resolved POA    Near syncope [R55] 11/29/2018 11/30/2018 Yes       Discharged Condition: good    Disposition: Home or Self Care    Follow Up:    Patient Instructions:      Diet Cardiac     Activity as tolerated       Significant Diagnostic Studies: Labs:   BMP:   Recent Labs   Lab 11/29/18  1122   *      K 3.9      CO2 23   BUN 15   CREATININE 1.1   CALCIUM 10.1    and CBC   Recent Labs   Lab 11/29/18  1122   WBC 5.68   HGB 17.7   HCT 50.3          Pending Diagnostic Studies:     None         Medications:  Reconciled Home Medications:      Medication List      START taking these medications    apixaban 5 mg Tab  Commonly known as:  ELIQUIS  Take 1 tablet (5 mg total) by mouth 2 (two) times daily.     lisinopril 2.5 MG tablet  Commonly known as:  PRINIVIL,ZESTRIL  Take 1 tablet (2.5 mg total) by mouth once daily.  Start taking on:  12/1/2018     metoprolol succinate 25 MG 24 hr tablet  Commonly known as:  TOPROL-XL  Take  1 tablet (25 mg total) by mouth once daily.  Start taking on:  12/1/2018        CONTINUE taking these medications    esomeprazole 40 MG capsule  Commonly known as:  NEXIUM  Take 1 capsule (40 mg total) by mouth once daily.     omeprazole 20 MG capsule  Commonly known as:  PRILOSEC  Take 20 mg by mouth once daily.     ondansetron 8 MG tablet  Commonly known as:  ZOFRAN  Take 1 tablet (8 mg total) by mouth every 8 (eight) hours as needed for Nausea.     potassium chloride SA 20 MEQ tablet  Commonly known as:  K-DUR,KLOR-CON  Take 20 mEq by mouth 4 (four) times daily.        STOP taking these medications    losartan-hydrochlorothiazide 50-12.5 mg 50-12.5 mg per tablet  Commonly known as:  HYZAAR     NIFEdipine 60 MG Tbsr  Commonly known as:  ADALAT CC            Indwelling Lines/Drains at time of discharge:   Lines/Drains/Airways          None          Time spent on the discharge of patient: 30 minutes  Patient was seen and examined on the date of discharge and determined to be suitable for discharge.         Jolie Maya NP  Department of Hospital Medicine  [facility]

## 2018-11-30 NOTE — ASSESSMENT & PLAN NOTE
BP stable  --discontinue losartan-hctz and procardia   --continue Metoprolol XL 25 mg po daily and lisinopril 2.5 mg po daily

## 2018-11-30 NOTE — PLAN OF CARE
Re:  Roman Hodges, WORK EXCUSE    Date: 11/30/2018           [facility]       [place] MD, [facility]       MD Joanna Calzada PA-C Renee Melancon, PA-C Kristin Stein, PA-C Arekeva Selmon, NP Brittany Chatman NP-C   [address] [place] [address]  Office: [phone]  Fax: [phone]     To whom it may concern:    Mr. Roman Hodges has been hospitalized at the [facility] since 11/29/2018.  Please excuse the patient from duties.  Patient may return on 12/3/18.  No restrictions.     Please contact me if you have any questions.          Jolie Maya NP

## 2018-11-30 NOTE — PLAN OF CARE
Discharge order noted,  No DME or HH noted.    Discharge rounds on patient. Discussed followup appointments, blue discharge folder, discharge nurse will go over home medications and reasons for medications and final discharge instructions. All patient/caregiver questions answered. Patient verbalized understanding.  Future Appointments   Date Time Provider Department Center   12/3/2018  8:40 AM Lang Sargent MD [facility]   12/26/2018  9:20 AM Aleksandar Salazar PA-C [facility] [address]   3/1/2019  3:20 PM Jose Angel Munson MD [facility] MED [place]            11/30/18 1401   Final Note   Assessment Type Final Discharge Note   Anticipated Discharge Disposition Home   What phone number can be called within the next 1-3 days to see how you are doing after discharge? [phone]   Hospital Follow Up  Appt(s) scheduled? Yes   Discharge plans and expectations educations in teach back method with documentation complete? Yes   Right Care Referral Info   Post Acute Recommendation No Care     Audrey Bermudez RN-BC  Transitional Navigator  [phone]

## 2018-11-30 NOTE — PROGRESS NOTES
Pt. on CPAP with documented settings. Pt appears to be in no distress and will continue to monitor

## 2018-11-30 NOTE — ASSESSMENT & PLAN NOTE
A1C 5.4 (11/2018) patient reports intolerance to metformin after 50 lb weight loss, no longer on Metformin     --continue dietary compliance

## 2018-11-30 NOTE — NURSING
VN note: VN cued into pt's room for introduction. VN informed pt and wife that VN would be working closely along side bedside nurse, PCT, and the rest of care team and making rounds throughout the shift.VN also provided educated on atrial fibrillation and eliquis. Patient and wife verbalized understanding and teach-back method utilized.. Allowed time for questions. VN will continue to be available to patient and intervene prn.

## 2018-11-30 NOTE — PLAN OF CARE
TN met with pt and pt's wife, pt lives at home with wife and daughter, pt independent with ADLs, no HH or DME noted. Pt admitted with Afib with RVR, Pt already has follow up appointment with Dr. Sargent Monday and would prefer to keep that appointment, pt stated he just seen his PCP and will follow up with him as needed on d/c.    Discharge brochure and blue discharge folder given to pt. TN updated contact information on whiteboard.       11/30/18 1248   Discharge Assessment   Assessment Type Discharge Planning Assessment   Confirmed/corrected address and phone number on facesheet? Yes   Assessment information obtained from? Patient;Caregiver;Medical Record   Prior to hospitilization cognitive status: Alert/Oriented   Prior to hospitalization functional status: Independent   Current cognitive status: Alert/Oriented   Current Functional Status: Independent   Lives With spouse;child(pierce), adult   Able to Return to Prior Arrangements yes   Is patient able to care for self after discharge? Yes   Who are your caregiver(s) and their phone number(s)? Kati Hodges [phone]   Patient's perception of discharge disposition home or selfcare   Readmission Within The Last 30 Days no previous admission in last 30 days   Patient currently being followed by outpatient case management? No   Patient currently receives any other outside agency services? No   Equipment Currently Used at Home none   Do you have any problems affording any of your prescribed medications? No   Is the patient taking medications as prescribed? yes   Does the patient have transportation home? Yes   Transportation Available family or friend will provide;car   Does the patient receive services at the [facility]? No   Discharge Plan A Home;Home with family   Patient/Family In Agreement With Plan yes     Audrey Bermudez RN-BC  Transitional Navigator  [phone]

## 2018-11-30 NOTE — NURSING
VN note: Patient discharge to home. Wife at bedside. Wife preferred discharge printed in English. VN printed discharge in English and [place]er. VN reviewed discharge medications with them. Side effects also reviewed. Teach-back method utilized. Prescriptions delivered at bedside. VN also educated them on atrial fib overview, signs and symptoms, and when to seek medical attention. They verbalized understanding. Refer to clinical references for further education. Time allotted for questions. All questions answered. Transport requested.

## 2018-11-30 NOTE — HOSPITAL COURSE
Pt converted to NSR with diltiazem. Echo shows acute systolic HF with LVEF 30%, no wall motion abnormalities. No evidence of decompensated HF on exam. Pt VSS. No chest pain, SOB or palpitations. Dizziness/lightheadedness resolved. He was evaluated by cardiology- recommend Toprol XL 25 mg daily and lisinopril  2.5 mg daily. CHADSVASC 3 points- patient started on Apixaban 5 mg po BID. Per heme/onc patient ok to continue OAC as LFTs and platelets within normal limits. Recommend outpatient follow up for stress test and LHC. Pt has scheduled to see Dr. Sargent on Monday, 12/3/18. Pt medically optimized for discharge home at thi time. POC discussed with patient and wife.

## 2018-11-30 NOTE — NURSING
IV and tele box removed. Education reinforced from VN. Medications delivered to bedside. Return to work note requested. No acute distress noted. Will continue to monitor.

## 2018-11-30 NOTE — PLAN OF CARE
Problem: Patient Care Overview  Goal: Plan of Care Review   11/30/18 0221   Coping/Psychosocial   Plan Of Care Reviewed With patient;spouse   Pt reported no pain.  Wife and pt are very pleasnt.  Pt  reported that afib felt like bubbles in chest. BP at 2100 119/77 held diltiazem.  BS was 100, no coverage needed.       Tele: NSR,  HR 70 80,  No alarms.     Bed in lowest position, wheels locked, non skid socks, ID band worn, personal items and call bell with in reach, bed alarm set.

## 2018-11-30 NOTE — PLAN OF CARE
Problem: Patient Care Overview  Goal: Plan of Care Review  Outcome: Ongoing (interventions implemented as appropriate)   11/29/18 8399   Coping/Psychosocial   Plan Of Care Reviewed With patient;spouse;family   Patient is awake, alert, and orient. Patient is on tele, no ectopy noted. Patient is monitoring glucose ac/hs and covered per sliding scale. Patient is bed alarm is on, bed in the lowest position and call light within reach.

## 2018-12-01 ENCOUNTER — NURSE TRIAGE (OUTPATIENT)
Dept: ADMINISTRATIVE | Facility: CLINIC | Age: 58
End: 2018-12-01

## 2018-12-01 NOTE — TELEPHONE ENCOUNTER
"    Reason for Disposition   [1] Pain lasts > 10 minutes AND [2] age > 50    Answer Assessment - Initial Assessment Questions  1. LOCATION: "Where does it hurt?"      Entire upper abdomen  2. RADIATION: "Does the pain shoot anywhere else?" (e.g., chest, back)      no  3. ONSET: "When did the pain begin?" (e.g., minutes, hours or days ago)       Started yesterday  4. SUDDEN: "Gradual or sudden onset?"        5. PATTERN "Does the pain come and go, or is it constant?"     - If constant: "Is it getting better, staying the same, or worsening?"       (Note: Constant means the pain never goes away completely; most serious pain is constant and it progresses)      - If intermittent: "How long does it last?" "Do you have pain now?"      (Note: Intermittent means the pain goes away completely between bouts)      Constant since this morning  6. SEVERITY: "How bad is the pain?"  (e.g., Scale 1-10; mild, moderate, or severe)     - MILD (1-3): doesn't interfere with normal activities, abdomen soft and not tender to touch      - MODERATE (4-7): interferes with normal activities or awakens from sleep, tender to touch      - SEVERE (8-10): excruciating pain, doubled over, unable to do any normal activities        5/10  7. RECURRENT SYMPTOM: "Have you ever had this type of abdominal pain before?" If so, ask: "When was the last time?" and "What happened that time?"       Has not had this type of pain before  8. AGGRAVATING FACTORS: "Does anything seem to cause this pain?" (e.g., foods, stress, alcohol)      Does not want to eat; started after eating yesterday before leaving hospital  9. CARDIAC SYMPTOMS: "Do you have any of the following symptoms: chest pain, difficulty breathing, sweating, nausea?"      no  10. OTHER SYMPTOMS: "Do you have any other symptoms?" (e.g., fever, vomiting, diarrhea)        no  11. PREGNANCY: "Is there any chance you are pregnant?" "When was your last menstrual period?"        n/a    Protocols used: ST " ABDOMINAL PAIN - UPPER-A-AH

## 2018-12-03 ENCOUNTER — HOSPITAL ENCOUNTER (INPATIENT)
Facility: HOSPITAL | Age: 58
LOS: 2 days | Discharge: HOME | DRG: 286 | End: 2018-12-05
Attending: EMERGENCY MEDICINE | Admitting: INTERNAL MEDICINE
Payer: COMMERCIAL

## 2018-12-03 ENCOUNTER — OFFICE VISIT (OUTPATIENT)
Dept: CARDIOLOGY | Facility: CLINIC | Age: 58
End: 2018-12-03
Payer: COMMERCIAL

## 2018-12-03 VITALS
HEIGHT: 67 IN | SYSTOLIC BLOOD PRESSURE: 166 MMHG | HEART RATE: 69 BPM | DIASTOLIC BLOOD PRESSURE: 101 MMHG | BODY MASS INDEX: 31.64 KG/M2

## 2018-12-03 DIAGNOSIS — I10 ESSENTIAL HYPERTENSION: ICD-10-CM

## 2018-12-03 DIAGNOSIS — E66.01 SEVERE OBESITY (BMI 35.0-39.9) WITH COMORBIDITY: ICD-10-CM

## 2018-12-03 DIAGNOSIS — G47.33 OBSTRUCTIVE SLEEP APNEA ON CPAP: ICD-10-CM

## 2018-12-03 DIAGNOSIS — I10 ESSENTIAL HYPERTENSION: Primary | Chronic | ICD-10-CM

## 2018-12-03 DIAGNOSIS — I48.91 NEW ONSET ATRIAL FIBRILLATION: ICD-10-CM

## 2018-12-03 DIAGNOSIS — R10.13 EPIGASTRIC PAIN: Primary | ICD-10-CM

## 2018-12-03 DIAGNOSIS — G47.33 OBSTRUCTIVE SLEEP APNEA ON CPAP: Chronic | ICD-10-CM

## 2018-12-03 DIAGNOSIS — I50.21 ACUTE SYSTOLIC HEART FAILURE: ICD-10-CM

## 2018-12-03 DIAGNOSIS — C22.0 HCC (HEPATOCELLULAR CARCINOMA): ICD-10-CM

## 2018-12-03 DIAGNOSIS — I48.91 AF (ATRIAL FIBRILLATION): ICD-10-CM

## 2018-12-03 DIAGNOSIS — K21.9 GASTROESOPHAGEAL REFLUX DISEASE, ESOPHAGITIS PRESENCE NOT SPECIFIED: ICD-10-CM

## 2018-12-03 DIAGNOSIS — I48.0 PAROXYSMAL ATRIAL FIBRILLATION: ICD-10-CM

## 2018-12-03 DIAGNOSIS — E11.65 TYPE 2 DIABETES MELLITUS WITH HYPERGLYCEMIA, WITHOUT LONG-TERM CURRENT USE OF INSULIN: ICD-10-CM

## 2018-12-03 LAB
ABO + RH BLD: NORMAL
ALBUMIN SERPL BCP-MCNC: 3.9 G/DL
ALP SERPL-CCNC: 87 U/L
ALT SERPL W/O P-5'-P-CCNC: 28 U/L
ANION GAP SERPL CALC-SCNC: 9 MMOL/L
AST SERPL-CCNC: 25 U/L
BASOPHILS # BLD AUTO: 0.02 K/UL
BASOPHILS NFR BLD: 0.3 %
BILIRUB SERPL-MCNC: 1 MG/DL
BILIRUB UR QL STRIP: NEGATIVE
BLD GP AB SCN CELLS X3 SERPL QL: NORMAL
BUN SERPL-MCNC: 12 MG/DL
CALCIUM SERPL-MCNC: 9.5 MG/DL
CHLORIDE SERPL-SCNC: 105 MMOL/L
CHOLEST SERPL-MCNC: 142 MG/DL
CHOLEST/HDLC SERPL: 4.6 {RATIO}
CLARITY UR: CLEAR
CO2 SERPL-SCNC: 24 MMOL/L
COLOR UR: YELLOW
CREAT SERPL-MCNC: 1.2 MG/DL
DIFFERENTIAL METHOD: ABNORMAL
EOSINOPHIL # BLD AUTO: 0.1 K/UL
EOSINOPHIL NFR BLD: 1 %
ERYTHROCYTE [DISTWIDTH] IN BLOOD BY AUTOMATED COUNT: 12.7 %
EST. GFR  (AFRICAN AMERICAN): >60 ML/MIN/1.73 M^2
EST. GFR  (NON AFRICAN AMERICAN): >60 ML/MIN/1.73 M^2
GLUCOSE SERPL-MCNC: 111 MG/DL
GLUCOSE UR QL STRIP: NEGATIVE
HCT VFR BLD AUTO: 44 %
HDLC SERPL-MCNC: 31 MG/DL
HDLC SERPL: 21.8 %
HGB BLD-MCNC: 15.6 G/DL
HGB UR QL STRIP: NEGATIVE
KETONES UR QL STRIP: NEGATIVE
LACTATE SERPL-SCNC: 1 MMOL/L
LDLC SERPL CALC-MCNC: 92.6 MG/DL
LEUKOCYTE ESTERASE UR QL STRIP: NEGATIVE
LIPASE SERPL-CCNC: 15 U/L
LYMPHOCYTES # BLD AUTO: 1 K/UL
LYMPHOCYTES NFR BLD: 13.7 %
MCH RBC QN AUTO: 30.6 PG
MCHC RBC AUTO-ENTMCNC: 35.5 G/DL
MCV RBC AUTO: 86 FL
MONOCYTES # BLD AUTO: 0.1 K/UL
MONOCYTES NFR BLD: 1.3 %
NEUTROPHILS # BLD AUTO: 5.9 K/UL
NEUTROPHILS NFR BLD: 83.7 %
NITRITE UR QL STRIP: NEGATIVE
NONHDLC SERPL-MCNC: 111 MG/DL
PH UR STRIP: 6 [PH] (ref 5–8)
PLATELET # BLD AUTO: 239 K/UL
PMV BLD AUTO: 9.8 FL
POCT GLUCOSE: 115 MG/DL (ref 70–110)
POCT GLUCOSE: 83 MG/DL (ref 70–110)
POTASSIUM SERPL-SCNC: 3.5 MMOL/L
PROT SERPL-MCNC: 7.8 G/DL
PROT UR QL STRIP: NEGATIVE
RBC # BLD AUTO: 5.1 M/UL
SODIUM SERPL-SCNC: 138 MMOL/L
SP GR UR STRIP: 1.01 (ref 1–1.03)
TRIGL SERPL-MCNC: 92 MG/DL
TROPONIN I SERPL DL<=0.01 NG/ML-MCNC: 0.02 NG/ML
TROPONIN I SERPL DL<=0.01 NG/ML-MCNC: 0.03 NG/ML
URN SPEC COLLECT METH UR: NORMAL
UROBILINOGEN UR STRIP-ACNC: NEGATIVE EU/DL
WBC # BLD AUTO: 7.07 K/UL

## 2018-12-03 PROCEDURE — 25000003 PHARM REV CODE 250: Performed by: EMERGENCY MEDICINE

## 2018-12-03 PROCEDURE — 27000190 HC CPAP FULL FACE MASK W/VALVE

## 2018-12-03 PROCEDURE — 3080F DIAST BP >= 90 MM HG: CPT | Mod: CPTII,S$GLB,, | Performed by: INTERNAL MEDICINE

## 2018-12-03 PROCEDURE — 83690 ASSAY OF LIPASE: CPT

## 2018-12-03 PROCEDURE — 86850 RBC ANTIBODY SCREEN: CPT

## 2018-12-03 PROCEDURE — 94660 CPAP INITIATION&MGMT: CPT

## 2018-12-03 PROCEDURE — 93010 ELECTROCARDIOGRAM REPORT: CPT | Mod: S$GLB,,, | Performed by: INTERNAL MEDICINE

## 2018-12-03 PROCEDURE — 99285 EMERGENCY DEPT VISIT HI MDM: CPT

## 2018-12-03 PROCEDURE — 11000001 HC ACUTE MED/SURG PRIVATE ROOM

## 2018-12-03 PROCEDURE — 99205 OFFICE O/P NEW HI 60 MIN: CPT | Mod: S$GLB,,, | Performed by: INTERNAL MEDICINE

## 2018-12-03 PROCEDURE — 36415 COLL VENOUS BLD VENIPUNCTURE: CPT

## 2018-12-03 PROCEDURE — 93005 ELECTROCARDIOGRAM TRACING: CPT | Mod: S$GLB,,, | Performed by: INTERNAL MEDICINE

## 2018-12-03 PROCEDURE — 99999 PR PBB SHADOW E&M-EST. PATIENT-LVL III: CPT | Mod: PBBFAC,,, | Performed by: INTERNAL MEDICINE

## 2018-12-03 PROCEDURE — 94761 N-INVAS EAR/PLS OXIMETRY MLT: CPT

## 2018-12-03 PROCEDURE — 3008F BODY MASS INDEX DOCD: CPT | Mod: CPTII,S$GLB,, | Performed by: INTERNAL MEDICINE

## 2018-12-03 PROCEDURE — 80053 COMPREHEN METABOLIC PANEL: CPT

## 2018-12-03 PROCEDURE — 3077F SYST BP >= 140 MM HG: CPT | Mod: CPTII,S$GLB,, | Performed by: INTERNAL MEDICINE

## 2018-12-03 PROCEDURE — 80061 LIPID PANEL: CPT

## 2018-12-03 PROCEDURE — 25000003 PHARM REV CODE 250: Performed by: NURSE PRACTITIONER

## 2018-12-03 PROCEDURE — 83605 ASSAY OF LACTIC ACID: CPT

## 2018-12-03 PROCEDURE — 99219 PR INITIAL OBSERVATION CARE,LEVL II: CPT | Mod: ,,, | Performed by: INTERNAL MEDICINE

## 2018-12-03 PROCEDURE — 85025 COMPLETE CBC W/AUTO DIFF WBC: CPT

## 2018-12-03 PROCEDURE — 81003 URINALYSIS AUTO W/O SCOPE: CPT

## 2018-12-03 PROCEDURE — 63600175 PHARM REV CODE 636 W HCPCS: Performed by: EMERGENCY MEDICINE

## 2018-12-03 PROCEDURE — 84484 ASSAY OF TROPONIN QUANT: CPT | Mod: 91

## 2018-12-03 RX ORDER — ASPIRIN 81 MG/1
81 TABLET ORAL DAILY
Status: DISCONTINUED | OUTPATIENT
Start: 2018-12-03 | End: 2018-12-05 | Stop reason: HOSPADM

## 2018-12-03 RX ORDER — ONDANSETRON 2 MG/ML
4 INJECTION INTRAMUSCULAR; INTRAVENOUS EVERY 8 HOURS PRN
Status: DISCONTINUED | OUTPATIENT
Start: 2018-12-03 | End: 2018-12-05 | Stop reason: HOSPADM

## 2018-12-03 RX ORDER — ASPIRIN 325 MG
325 TABLET ORAL
Status: COMPLETED | OUTPATIENT
Start: 2018-12-03 | End: 2018-12-03

## 2018-12-03 RX ORDER — MORPHINE SULFATE 4 MG/ML
4 INJECTION, SOLUTION INTRAMUSCULAR; INTRAVENOUS EVERY 4 HOURS PRN
Status: DISCONTINUED | OUTPATIENT
Start: 2018-12-03 | End: 2018-12-05 | Stop reason: HOSPADM

## 2018-12-03 RX ORDER — LOSARTAN POTASSIUM 25 MG/1
25 TABLET ORAL DAILY
Status: DISCONTINUED | OUTPATIENT
Start: 2018-12-04 | End: 2018-12-05

## 2018-12-03 RX ORDER — MORPHINE SULFATE 2 MG/ML
2 INJECTION, SOLUTION INTRAMUSCULAR; INTRAVENOUS
Status: DISCONTINUED | OUTPATIENT
Start: 2018-12-03 | End: 2018-12-05 | Stop reason: HOSPADM

## 2018-12-03 RX ORDER — PANTOPRAZOLE SODIUM 40 MG/1
40 TABLET, DELAYED RELEASE ORAL DAILY
Status: DISCONTINUED | OUTPATIENT
Start: 2018-12-03 | End: 2018-12-05 | Stop reason: HOSPADM

## 2018-12-03 RX ORDER — ACETAMINOPHEN 325 MG/1
650 TABLET ORAL EVERY 4 HOURS PRN
Status: DISCONTINUED | OUTPATIENT
Start: 2018-12-03 | End: 2018-12-05 | Stop reason: HOSPADM

## 2018-12-03 RX ORDER — IBUPROFEN 200 MG
16 TABLET ORAL
Status: DISCONTINUED | OUTPATIENT
Start: 2018-12-03 | End: 2018-12-05 | Stop reason: HOSPADM

## 2018-12-03 RX ORDER — METOPROLOL SUCCINATE 25 MG/1
25 TABLET, EXTENDED RELEASE ORAL DAILY
Status: DISCONTINUED | OUTPATIENT
Start: 2018-12-03 | End: 2018-12-05 | Stop reason: HOSPADM

## 2018-12-03 RX ORDER — INSULIN ASPART 100 [IU]/ML
1-10 INJECTION, SOLUTION INTRAVENOUS; SUBCUTANEOUS
Status: DISCONTINUED | OUTPATIENT
Start: 2018-12-03 | End: 2018-12-05 | Stop reason: HOSPADM

## 2018-12-03 RX ORDER — NITROGLYCERIN 0.4 MG/1
0.4 TABLET SUBLINGUAL
Status: COMPLETED | OUTPATIENT
Start: 2018-12-03 | End: 2018-12-03

## 2018-12-03 RX ORDER — GLUCAGON 1 MG
1 KIT INJECTION
Status: DISCONTINUED | OUTPATIENT
Start: 2018-12-03 | End: 2018-12-05 | Stop reason: HOSPADM

## 2018-12-03 RX ORDER — SODIUM CHLORIDE 0.9 % (FLUSH) 0.9 %
3 SYRINGE (ML) INJECTION
Status: DISCONTINUED | OUTPATIENT
Start: 2018-12-03 | End: 2018-12-05 | Stop reason: HOSPADM

## 2018-12-03 RX ORDER — IBUPROFEN 200 MG
24 TABLET ORAL
Status: DISCONTINUED | OUTPATIENT
Start: 2018-12-03 | End: 2018-12-05 | Stop reason: HOSPADM

## 2018-12-03 RX ORDER — ONDANSETRON 4 MG/1
8 TABLET, ORALLY DISINTEGRATING ORAL EVERY 6 HOURS PRN
Status: DISCONTINUED | OUTPATIENT
Start: 2018-12-03 | End: 2018-12-05 | Stop reason: HOSPADM

## 2018-12-03 RX ORDER — LISINOPRIL 5 MG/1
5 TABLET ORAL DAILY
Status: DISCONTINUED | OUTPATIENT
Start: 2018-12-03 | End: 2018-12-03

## 2018-12-03 RX ADMIN — ASPIRIN 325 MG ORAL TABLET 325 MG: 325 PILL ORAL at 10:12

## 2018-12-03 RX ADMIN — NITROGLYCERIN 0.4 MG: 0.4 TABLET, ORALLY DISINTEGRATING SUBLINGUAL at 10:12

## 2018-12-03 RX ADMIN — PANTOPRAZOLE SODIUM 40 MG: 40 TABLET, DELAYED RELEASE ORAL at 02:12

## 2018-12-03 NOTE — SUBJECTIVE & OBJECTIVE
Past Medical History:   Diagnosis Date    Cardiomyopathy     Diabetes mellitus, type 2     GERD (gastroesophageal reflux disease)     Hepatocellular carcinoma     liver    Hyperlipidemia     Hypertension        Past Surgical History:   Procedure Laterality Date    COLONOSCOPY      EMBOLIZATION, YTTRIUM THERAPY N/A 11/9/2018    Performed by [facility] at [facility] OR 2ND FLR    EMBOLIZATION, YTTRIUM THERAPY N/A 10/24/2018    Performed by [facility] Provider at [facility] OR 2ND FLR    HERNIA REPAIR         Review of patient's allergies indicates:   Allergen Reactions    Flu vaccine 2011 (36 mos+)(pf)      Patient reports he developed Bell's Palsy 2 days after his last flu shot in 2008       No current facility-administered medications on file prior to encounter.      Current Outpatient Medications on File Prior to Encounter   Medication Sig    apixaban (ELIQUIS) 5 mg Tab Take 1 tablet (5 mg total) by mouth 2 (two) times daily.    lisinopril (PRINIVIL,ZESTRIL) 2.5 MG tablet Take 1 tablet (2.5 mg total) by mouth once daily.    metoprolol succinate (TOPROL-XL) 25 MG 24 hr tablet Take 1 tablet (25 mg total) by mouth once daily.    esomeprazole (NEXIUM) 40 MG capsule Take 1 capsule (40 mg total) by mouth once daily.    omeprazole (PRILOSEC) 20 MG capsule Take 40 mg by mouth once daily.     ondansetron (ZOFRAN) 8 MG tablet Take 1 tablet (8 mg total) by mouth every 8 (eight) hours as needed for Nausea.    potassium chloride SA (K-DUR,KLOR-CON) 20 MEQ tablet Take 20 mEq by mouth 4 (four) times daily.     Family History     None        Tobacco Use    Smoking status: Former Smoker    Smokeless tobacco: Never Used   Substance and Sexual Activity    Alcohol use: No    Drug use: No    Sexual activity: Not on file     Review of Systems   Constitution: Negative for diaphoresis, weakness and malaise/fatigue.   HENT: Negative.    Eyes: Negative.    Cardiovascular: Negative.  Negative for chest pain,  dyspnea on exertion, irregular heartbeat, leg swelling, near-syncope, orthopnea, palpitations, paroxysmal nocturnal dyspnea and syncope.   Respiratory: Negative.  Negative for cough and shortness of breath.    Endocrine: Negative.    Hematologic/Lymphatic: Negative.    Skin: Negative.    Musculoskeletal: Negative.    Gastrointestinal: Positive for abdominal pain, diarrhea and heartburn.   Genitourinary: Negative.    Neurological: Negative.    Psychiatric/Behavioral: Negative.    Allergic/Immunologic: Negative.      Objective:     Vital Signs (Most Recent):  Temp: 99.3 °F (37.4 °C) (12/03/18 0951)  Pulse: 77 (12/03/18 1120)  Resp: (!) 24 (12/03/18 1120)  BP: (!) 140/89 (12/03/18 1120)  SpO2: 97 % (12/03/18 1120) Vital Signs (24h Range):  Temp:  [99.3 °F (37.4 °C)] 99.3 °F (37.4 °C)  Pulse:  [58-77] 77  Resp:  [20-31] 24  SpO2:  [97 %-99 %] 97 %  BP: (140-175)/() 140/89     Weight: 87.5 kg (193 lb)  Body mass index is 30.23 kg/m².    SpO2: 97 %  O2 Device (Oxygen Therapy): room air    No intake or output data in the 24 hours ending 12/03/18 1255    Lines/Drains/Airways     Peripheral Intravenous Line                 Peripheral IV - Single Lumen 12/03/18 0951 Right Antecubital less than 1 day                Physical Exam   Constitutional: He is oriented to person, place, and time. He appears well-developed and well-nourished. No distress.   HENT:   Head: Normocephalic and atraumatic.   Eyes: Right eye exhibits no discharge. Left eye exhibits no discharge.   Neck: No JVD present.   Cardiovascular: Normal rate, regular rhythm and normal heart sounds. Exam reveals no gallop and no friction rub.   No murmur heard.  Pulmonary/Chest: Effort normal and breath sounds normal.   Abdominal: Soft. Bowel sounds are normal.   Musculoskeletal: He exhibits no edema.   Neurological: He is alert and oriented to person, place, and time.   Skin: Skin is warm and dry. He is not diaphoretic.   Psychiatric: He has a normal mood and  affect. His behavior is normal. Judgment and thought content normal.       Significant Labs:   BMP:   Recent Labs   Lab 12/01/18  1617 12/03/18  1037    111*    138   K 3.8 3.5    105   CO2 26 24   BUN 13 12   CREATININE 1.2 1.2   CALCIUM 9.5 9.5   , CMP   Recent Labs   Lab 12/01/18  1617 12/03/18  1037    138   K 3.8 3.5    105   CO2 26 24    111*   BUN 13 12   CREATININE 1.2 1.2   CALCIUM 9.5 9.5   PROT 8.0 7.8   ALBUMIN 3.9 3.9   BILITOT 0.7 1.0   ALKPHOS 88 87   AST 25 25   ALT 31 28   ANIONGAP 8 9   ESTGFRAFRICA >60 >60   EGFRNONAA >60 >60   , CBC   Recent Labs   Lab 12/01/18  1617 12/03/18  1037   WBC 8.15 7.07   HGB 15.9 15.6   HCT 45.7 44.0    239   , INR   Recent Labs   Lab 12/01/18  1617   INR 1.0   , Lipid Panel   Recent Labs   Lab 12/03/18  1037   CHOL 142   HDL 31*   LDLCALC 92.6   TRIG 92   CHOLHDL 21.8   , Troponin   Recent Labs   Lab 12/03/18  1037   TROPONINI 0.027*  0.027*    and All pertinent lab results from the last 24 hours have been reviewed.    Significant Imaging: Echocardiogram:   Transthoracic echo (TTE) complete (Cupid Only):   Results for orders placed or performed during the hospital encounter of 11/29/18   Transthoracic echo (TTE) complete (Cupid Only)   Result Value Ref Range    AV mean gradient 3.48 mmHg    Ao peak saw 1.29 m/s    Ao VTI 20.02 cm    IVRT 0.08 msec    IVS 1.20 (A) 0.6 - 1.1 cm    LA size 3.30 cm    LVIDD 3.83 3.5 - 6.0 cm    LVIDS 2.48 2.1 - 4.0 cm    LVOT diameter 1.94 cm    LVOT peak VTI 15.15 cm    PW 1.08 0.6 - 1.1 cm    RVDD 3.25 cm    TR Max Saw 1.10 m/s    Ao root annulus 3.16 cm    AORTIC VALVE CUSP SEPERATION 1.94 cm    PV PEAK VELOCITY 0.90 cm/s    LV Diastolic Volume 63.11 mL    LV Systolic Volume 21.78 mL    FS 35 %    LV mass 143.64 g    Left Ventricle Relative Wall Thickness 0.56 cm    AV valve area 2.24 cm2    AV index (prosthetic) 0.76     LVOT area 2.95 cm2    LVOT stroke volume 44.76 cm3    AV peak  gradient 6.66 mmHg    Triscuspid Valve Regurgitation Peak Gradient 4.84 mmHg    BSA 2.07 m2    LV Systolic Volume Index 10.5 mL/m2    LV Diastolic Volume Index 30.49 mL/m2    LV Mass Index 69.4 g/m2    Right Atrial Pressure (from IVC) 3 mmHg    TV rest pulmonary artery pressure 7.84 mmHg

## 2018-12-03 NOTE — HPI
Roman Hodges is a 57 yo male with HTN, Type 2 Diabetes Mellitus, PAF, HFrEF and HCC. Patient was recently admitted with AF RVR. Denies ever having chest pain, SOB or palpitations. Patient was evaluated by Dr Sargent this AM and reported severe epigastric pain, diarrhea. Pain increases with exertion but is also present at rest. Patient was sent to the ED for admission. Recent Echo - LVEF 30% with akinetic: mid anteroseptal, apical anterior, apical septal, apical lateral and apex and hypokinetic: mid inferoseptal and apical inferior walls.

## 2018-12-03 NOTE — NURSING
VN cued into pt's room for introduction. VN informed pt that VN would be working along side bedside nurse and PCT throughout shift.  Patient arrived to  via w/c with escort service and wife at bedside. Tele monitor applied as per orders. At present no distress noted. Fall risk protocol initiated. For further data refer to admission assessment data sheets. Will cont to monitor pt and intervene prn.

## 2018-12-03 NOTE — ED NOTES
Patient identifiers verified and correct for Roman Hodges.    LOC: The patient is awake, alert and aware of environment with an appropriate affect, the patient is oriented x 3 and speaking appropriately.  APPEARANCE: Patient resting comfortably and in no acute distress, patient is clean and well groomed, patient's clothing is properly fastened.  SKIN: The skin is warm and dry, color consistent with ethnicity, patient has normal skin turgor and moist mucus membranes, skin intact, no breakdown or bruising noted.  MUSCULOSKELETAL: Patient moving all extremities spontaneously, no obvious swelling or deformities noted.  RESPIRATORY: Airway is open and patent, respirations are spontaneous, patient has a normal effort and rate, no accessory muscle use noted, bilateral breath sounds clear.  CARDIAC: Patient has irregular rhythm, no periphreal edema noted, capillary refill < 3 seconds.  ABDOMEN: Soft and non tender to palpation, no distention noted, normoactive bowel sounds present in all four quadrants. Epigastric pain with diarrhea.  NEUROLOGIC: PERRL, 4 mm bilaterally, eyes open spontaneously, behavior appropriate to situation, follows commands, facial expression symmetrical, bilateral hand grasp equal and even, purposeful motor response noted, normal sensation in all extremities when touched with a finger.

## 2018-12-03 NOTE — ED NOTES
Patient is awake and alert with family at bedside.  Pain has decreases to 2/10.  Will continue to monitor closely.

## 2018-12-03 NOTE — Clinical Note
Catheter is repositioned to the ostium right coronary artery. The vessel(s) were injected and visualized in multiple views.

## 2018-12-03 NOTE — PLAN OF CARE
Problem: Patient Care Overview  Goal: Plan of Care Review  Outcome: Ongoing (interventions implemented as appropriate)  Pt on RA with documented sats.96. Will continue to monitor.

## 2018-12-03 NOTE — PROGRESS NOTES
Subjective:    Patient ID:  Roman Hodges is a 58 y.o. male who presents for evaluation of Atrial Fibrillation      HPI   58 y.o. M  HTN on meds  DM2 on meds  MALLIKA, on CPAP  Hepatocellular CA, pending embolization    During several recent visits to ER, found in AF with RVR 11/29. Rx diltiazem, with return to NSR.  Also has c/o severe epigastric pain, diarrhea. Pain increases with exertion but is also present at rest.    echo during that eval: 30% LVEF, with RWMAs... on background of LVEF >55% 10/15/17 (Dr. Patterson's office).    My interpretation of today's ECG is NSR 69      Review of Systems   Constitution: Negative. Negative for weakness and malaise/fatigue.   HENT: Negative.  Negative for ear pain and tinnitus.    Eyes: Negative for blurred vision.   Cardiovascular: Positive for chest pain and dyspnea on exertion. Negative for near-syncope, palpitations and syncope.   Respiratory: Negative.  Negative for shortness of breath.    Endocrine: Negative.  Negative for polyuria.   Hematologic/Lymphatic: Does not bruise/bleed easily.   Skin: Negative.  Negative for rash.   Musculoskeletal: Negative.  Negative for joint pain and muscle weakness.   Gastrointestinal: Positive for abdominal pain. Negative for change in bowel habit.   Genitourinary: Negative for frequency.   Neurological: Negative.  Negative for dizziness.   Psychiatric/Behavioral: Negative.  Negative for depression. The patient is not nervous/anxious.    Allergic/Immunologic: Negative for environmental allergies.        Objective:    Physical Exam   Constitutional: He is oriented to person, place, and time. He appears well-developed and well-nourished. He appears distressed.   HENT:   Head: Normocephalic and atraumatic.   Eyes: Conjunctivae, EOM and lids are normal. No scleral icterus.   Neck: Normal range of motion. No JVD present. No tracheal deviation present. No thyromegaly present.   Cardiovascular: Normal rate, regular rhythm, normal heart sounds and  intact distal pulses.  No extrasystoles are present. PMI is not displaced. Exam reveals no gallop and no friction rub.   No murmur heard.  Pulses:       Radial pulses are 2+ on the right side, and 2+ on the left side.   Pulmonary/Chest: Effort normal and breath sounds normal. No accessory muscle usage. No tachypnea. No respiratory distress. He has no wheezes. He has no rales.   Abdominal: Soft. Bowel sounds are normal. He exhibits no distension. There is no hepatosplenomegaly. There is no tenderness.   Musculoskeletal: Normal range of motion. He exhibits no edema.   Neurological: He is alert and oriented to person, place, and time. He has normal reflexes. He exhibits normal muscle tone.   Skin: Skin is warm and dry. No rash noted.   Psychiatric: His behavior is normal. His mood appears anxious.   Nursing note and vitals reviewed.        Assessment:       1. Essential hypertension    2. Severe obesity (BMI 35.0-39.9) with comorbidity    3. Obstructive sleep apnea on CPAP    Epigastric pain, r/o UA     Plan:       1. AF  One episode, resolved post diltiazem.  Continue BB, eliquis.  f/u after upcoming hospitalization    2. CM / CAD  Newly depressed LVEF, with RWMAs, in pt with RFs and ongoing exertional epigastric pain.  To ER today for evaluation and treatment. Will likely require ETT +/- cath / revasc.  Discussed with on-call cardiologist Dr. Gary Johnson.    f/u with me after hospitalization

## 2018-12-03 NOTE — Clinical Note
90 ml injected throughout the case. 60 mL total wasted during the case. 150 mL total used in the case.

## 2018-12-03 NOTE — H&P
[facility]  Cardiology  History and Physical     Patient Name: Roman Hodges  MRN: [id number]  Admission Date: 12/3/2018  Code Status: Full Code   Attending Provider: No att. providers found   Primary Care Physician: Jose Angel Munson MD  Principal Problem:<principal problem not specified>    Patient information was obtained from patient, past medical records and ER records.     Subjective:     Chief Complaint:  Abdominal pain     HPI:  Roman Hodges is a 57 yo male with HTN, Type 2 Diabetes Mellitus, PAF, HFrEF and HCC. Patient was recently admitted with AF RVR. Denies ever having chest pain, SOB or palpitations. Patient was evaluated by Dr Sargent this AM and reported severe epigastric pain, diarrhea. Pain increases with exertion but is also present at rest. Patient was sent to the ED for admission. Recent Echo - LVEF 30% with akinetic: mid anteroseptal, apical anterior, apical septal, apical lateral and apex and hypokinetic: mid inferoseptal and apical inferior walls.         Past Medical History:   Diagnosis Date    Cardiomyopathy     Diabetes mellitus, type 2     GERD (gastroesophageal reflux disease)     Hepatocellular carcinoma     liver    Hyperlipidemia     Hypertension        Past Surgical History:   Procedure Laterality Date    COLONOSCOPY      EMBOLIZATION, YTTRIUM THERAPY N/A 11/9/2018    Performed by [facility] Provider at [facility] OR 2ND FLR    EMBOLIZATION, YTTRIUM THERAPY N/A 10/24/2018    Performed by [facility] Diagnostic Provider at [facility] OR 2ND FLR    HERNIA REPAIR         Review of patient's allergies indicates:   Allergen Reactions    Flu vaccine 2011 (36 mos+)(pf)      Patient reports he developed Bell's Palsy 2 days after his last flu shot in 2008       No current facility-administered medications on file prior to encounter.      Current Outpatient Medications on File Prior to Encounter   Medication Sig    apixaban (ELIQUIS) 5 mg Tab Take 1 tablet (5 mg total)  by mouth 2 (two) times daily.    lisinopril (PRINIVIL,ZESTRIL) 2.5 MG tablet Take 1 tablet (2.5 mg total) by mouth once daily.    metoprolol succinate (TOPROL-XL) 25 MG 24 hr tablet Take 1 tablet (25 mg total) by mouth once daily.    esomeprazole (NEXIUM) 40 MG capsule Take 1 capsule (40 mg total) by mouth once daily.    omeprazole (PRILOSEC) 20 MG capsule Take 40 mg by mouth once daily.     ondansetron (ZOFRAN) 8 MG tablet Take 1 tablet (8 mg total) by mouth every 8 (eight) hours as needed for Nausea.    potassium chloride SA (K-DUR,KLOR-CON) 20 MEQ tablet Take 20 mEq by mouth 4 (four) times daily.     Family History     None        Tobacco Use    Smoking status: Former Smoker    Smokeless tobacco: Never Used   Substance and Sexual Activity    Alcohol use: No    Drug use: No    Sexual activity: Not on file     Review of Systems   Constitution: Negative for diaphoresis, weakness and malaise/fatigue.   HENT: Negative.    Eyes: Negative.    Cardiovascular: Negative.  Negative for chest pain, dyspnea on exertion, irregular heartbeat, leg swelling, near-syncope, orthopnea, palpitations, paroxysmal nocturnal dyspnea and syncope.   Respiratory: Negative.  Negative for cough and shortness of breath.    Endocrine: Negative.    Hematologic/Lymphatic: Negative.    Skin: Negative.    Musculoskeletal: Negative.    Gastrointestinal: Positive for abdominal pain, diarrhea and heartburn.   Genitourinary: Negative.    Neurological: Negative.    Psychiatric/Behavioral: Negative.    Allergic/Immunologic: Negative.      Objective:     Vital Signs (Most Recent):  Temp: 99.3 °F (37.4 °C) (12/03/18 0951)  Pulse: 77 (12/03/18 1120)  Resp: (!) 24 (12/03/18 1120)  BP: (!) 140/89 (12/03/18 1120)  SpO2: 97 % (12/03/18 1120) Vital Signs (24h Range):  Temp:  [99.3 °F (37.4 °C)] 99.3 °F (37.4 °C)  Pulse:  [58-77] 77  Resp:  [20-31] 24  SpO2:  [97 %-99 %] 97 %  BP: (140-175)/() 140/89     Weight: 87.5 kg (193 lb)  Body mass index  is 30.23 kg/m².    SpO2: 97 %  O2 Device (Oxygen Therapy): room air    No intake or output data in the 24 hours ending 12/03/18 1255    Lines/Drains/Airways     Peripheral Intravenous Line                 Peripheral IV - Single Lumen 12/03/18 0951 Right Antecubital less than 1 day                Physical Exam   Constitutional: He is oriented to person, place, and time. He appears well-developed and well-nourished. No distress.   HENT:   Head: Normocephalic and atraumatic.   Eyes: Right eye exhibits no discharge. Left eye exhibits no discharge.   Neck: No JVD present.   Cardiovascular: Normal rate, regular rhythm and normal heart sounds. Exam reveals no gallop and no friction rub.   No murmur heard.  Pulmonary/Chest: Effort normal and breath sounds normal.   Abdominal: Soft. Bowel sounds are normal.   Musculoskeletal: He exhibits no edema.   Neurological: He is alert and oriented to person, place, and time.   Skin: Skin is warm and dry. He is not diaphoretic.   Psychiatric: He has a normal mood and affect. His behavior is normal. Judgment and thought content normal.       Significant Labs:   BMP:   Recent Labs   Lab 12/01/18  1617 12/03/18  1037    111*    138   K 3.8 3.5    105   CO2 26 24   BUN 13 12   CREATININE 1.2 1.2   CALCIUM 9.5 9.5   , CMP   Recent Labs   Lab 12/01/18  1617 12/03/18  1037    138   K 3.8 3.5    105   CO2 26 24    111*   BUN 13 12   CREATININE 1.2 1.2   CALCIUM 9.5 9.5   PROT 8.0 7.8   ALBUMIN 3.9 3.9   BILITOT 0.7 1.0   ALKPHOS 88 87   AST 25 25   ALT 31 28   ANIONGAP 8 9   ESTGFRAFRICA >60 >60   EGFRNONAA >60 >60   , CBC   Recent Labs   Lab 12/01/18  1617 12/03/18  1037   WBC 8.15 7.07   HGB 15.9 15.6   HCT 45.7 44.0    239   , INR   Recent Labs   Lab 12/01/18  1617   INR 1.0   , Lipid Panel   Recent Labs   Lab 12/03/18  1037   CHOL 142   HDL 31*   LDLCALC 92.6   TRIG 92   CHOLHDL 21.8   , Troponin   Recent Labs   Lab 12/03/18  1037   TROPONINI  0.027*  0.027*    and All pertinent lab results from the last 24 hours have been reviewed.    Significant Imaging: Echocardiogram:   Transthoracic echo (TTE) complete (Cupid Only):   Results for orders placed or performed during the hospital encounter of 11/29/18   Transthoracic echo (TTE) complete (Cupid Only)   Result Value Ref Range    AV mean gradient 3.48 mmHg    Ao peak saw 1.29 m/s    Ao VTI 20.02 cm    IVRT 0.08 msec    IVS 1.20 (A) 0.6 - 1.1 cm    LA size 3.30 cm    LVIDD 3.83 3.5 - 6.0 cm    LVIDS 2.48 2.1 - 4.0 cm    LVOT diameter 1.94 cm    LVOT peak VTI 15.15 cm    PW 1.08 0.6 - 1.1 cm    RVDD 3.25 cm    TR Max Saw 1.10 m/s    Ao root annulus 3.16 cm    AORTIC VALVE CUSP SEPERATION 1.94 cm    PV PEAK VELOCITY 0.90 cm/s    LV Diastolic Volume 63.11 mL    LV Systolic Volume 21.78 mL    FS 35 %    LV mass 143.64 g    Left Ventricle Relative Wall Thickness 0.56 cm    AV valve area 2.24 cm2    AV index (prosthetic) 0.76     LVOT area 2.95 cm2    LVOT stroke volume 44.76 cm3    AV peak gradient 6.66 mmHg    Triscuspid Valve Regurgitation Peak Gradient 4.84 mmHg    BSA 2.07 m2    LV Systolic Volume Index 10.5 mL/m2    LV Diastolic Volume Index 30.49 mL/m2    LV Mass Index 69.4 g/m2    Right Atrial Pressure (from IVC) 3 mmHg    TV rest pulmonary artery pressure 7.84 mmHg     Assessment and Plan:     Epigastric pain    ? Anginal equivalent      Acute systolic heart failure    LVEF 30% with akinetic: mid anteroseptal, apical anterior, apical septal, apical lateral and apex and hypokinetic: mid inferoseptal and apical inferior walls.    Euvolemic on exam  Continue BB, ACEI    NPO p MN for LHC      Essential hypertension    Continue BB, ACEI- titrate for goal BP <130/80     New onset atrial fibrillation    Currently in SR  Continue BB, holding Eliquis for [facility] on 12/04/2018     Obstructive sleep apnea on CPAP    CPAP at          VTE Risk Mitigation (From admission, onward)    None          Anuel Bauman,  NP  Cardiology   [facility]

## 2018-12-03 NOTE — HOSPITAL COURSE
12/03/2018 Per HPI. NPO p MN for LHC in AM.   · LVEDP (Pre): 8  · LVEDP (Post): 8  · The ejection fraction is greater than 55% by visual estimate.  · Estimated blood loss: none  · Non-obstructive CAD.              S/p LHC via R radial        Mild plaque in all 3 vessels  Normal EF  LVEDP 8 mmHg        Rec:        Medical therapy for CAD            I certify that I was present for catheter insertion, catheter manipulation, angiography, and angiographic interpretation of this patient.     Procedure Log documented by Documenter: RT Jitendra and verified by Tyler Hinojosa.    Patient recovered without incident and found appropriate for discharge to follow up in 2-3 weeks.

## 2018-12-03 NOTE — ASSESSMENT & PLAN NOTE
Currently in SR  Continue BB, holding Eliquis for [facility] on 12/04/2018

## 2018-12-03 NOTE — ED PROVIDER NOTES
Encounter Date: 12/3/2018    SCRIBE #1 NOTE: I, Tra Blount, am scribing for, and in the presence of,  . I have scribed the entire note.       History     Chief Complaint   Patient presents with    Abdominal Pain     sent from Dr. Carpenter's office for epigastric pain and hypertension. Reports he has had abdominal pain and diarrhea since Friday     This is a 58 y.o. male who has a past medical history of Cardiomyopathy, Diabetes mellitus, type 2, GERD (gastroesophageal reflux disease), Hepatocellular carcinoma, Hyperlipidemia, and Hypertension.     The patient presents to the Emergency Department with abdominal pain.   Pt was inthe ER 2 days pta with abdominal pain and had a benign CT scan.  Symptoms are associated with diarrhea.  Pt denies n/v, fever, chills.  Patient was recently discharged for new onset A-Fib and had 3 medication changes.  Pt thinks the new Lisinopril prescription is causing his symptoms.      The history is provided by the patient. The history is limited by a language barrier. A  was used.     Review of patient's allergies indicates:   Allergen Reactions    Flu vaccine 2011 (36 mos+)(pf)      Patient reports he developed Bell's Palsy 2 days after his last flu shot in 2008     Past Medical History:   Diagnosis Date    Cardiomyopathy     Diabetes mellitus, type 2     GERD (gastroesophageal reflux disease)     Hepatocellular carcinoma     liver    Hyperlipidemia     Hypertension      Past Surgical History:   Procedure Laterality Date    COLONOSCOPY      EMBOLIZATION, YTTRIUM THERAPY N/A 11/9/2018    Performed by [facility] Provider at [facility] OR 2ND FLR    EMBOLIZATION, YTTRIUM THERAPY N/A 10/24/2018    Performed by [facility] Diagnostic Provider at [facility] OR 2ND FLR    HERNIA REPAIR       No family history on file.  Social History     Tobacco Use    Smoking status: Former Smoker    Smokeless tobacco: Never Used   Substance Use Topics    Alcohol use: No    Drug  use: No     Review of Systems   Constitutional: Negative for chills and fever.   HENT: Negative for facial swelling and trouble swallowing.    Eyes: Negative for redness.   Respiratory: Negative for shortness of breath.    Cardiovascular: Negative for chest pain.   Gastrointestinal: Positive for abdominal pain and diarrhea. Negative for nausea and vomiting.   Genitourinary: Negative for dysuria and hematuria.   Musculoskeletal: Negative for gait problem.   Skin: Negative for rash.   Neurological: Negative for facial asymmetry and speech difficulty.       Physical Exam     Initial Vitals [12/03/18 0951]   BP Pulse Resp Temp SpO2   (!) 163/87 66 20 99.3 °F (37.4 °C) 98 %      MAP       --         Physical Exam    Nursing note and vitals reviewed.  Constitutional: He appears well-developed and well-nourished. He is not diaphoretic. No distress.   HENT:   Head: Normocephalic and atraumatic.   Mouth/Throat: Oropharynx is clear and moist.   Eyes: Conjunctivae are normal.   Cardiovascular: Normal rate and regular rhythm.   Pulmonary/Chest: No respiratory distress.   Neurological: He is alert and oriented to person, place, and time.   Skin: Skin is warm and dry. Capillary refill takes less than 2 seconds. No rash noted. No pallor.   Psychiatric: He has a normal mood and affect.         ED Course   Procedures  Labs Reviewed   CBC W/ AUTO DIFFERENTIAL - Abnormal; Notable for the following components:       Result Value    Mono # 0.1 (*)     Gran% 83.7 (*)     Lymph% 13.7 (*)     Mono% 1.3 (*)     All other components within normal limits   COMPREHENSIVE METABOLIC PANEL - Abnormal; Notable for the following components:    Glucose 111 (*)     All other components within normal limits   TROPONIN I - Abnormal; Notable for the following components:    Troponin I 0.027 (*)     All other components within normal limits   TROPONIN I - Abnormal; Notable for the following components:    Troponin I 0.027 (*)     All other components  within normal limits   LIPID PANEL - Abnormal; Notable for the following components:    HDL 31 (*)     All other components within normal limits   LIPASE   URINALYSIS, REFLEX TO URINE CULTURE    Narrative:     Preferred Collection Type->Urine, Clean Catch   LACTIC ACID, PLASMA     EKG Readings: (Independently Interpreted)   Sinus Bradycardia  Rate 53  Normal axis  non specific intraventricular block  no hypertrophy   no ST or T wave abnormalities       Imaging Results    None          Medical Decision Making:   Initial Assessment:   This is an emergent evaluation of a 58 y.o.male patient with presentation of epigastric discomfort.  Hx of recent onset A-Fib diagnosis, neg CTA 12/2/2018     Initial differentials include but are not limited to: gastroenteritis, gastritis, cholecystitis, pancreatitis, ischemic colitis, seems less liekly     Plan: lipase, UA, CBC, basic labs, lactic acid, troponin, diet NPO, ASA, nitroglycerine, consult with     ED Management:  10:20AM  Spoke with , directed me to NP  10:25 AM  Spoke to 's NP. Explained concern is due to pt's drop in EF and abdominal pain he may have atypical ACS and would like pt admitted to their care.                   ED Course as of Dec 03 1135   Mon Dec 03, 2018   0949 Triage Sort Note: Roman Hodges, a nontoxic/well appearing, 58 y.o. male, presented to the ED with c/o abdominal pain and diarrhea since Friday. Pt recently admitted to the hospital for a-fib. He came back to the ED on Saturday and was discharged. Pt denies N/V.     Patient seen and medically screened by Nurse Practitioner in triage. Orders initiated at triage to expedite care. Care will be transferred to an alternate provider when patient was placed in an Exam Room from the [facility] for physical exam, additional orders, and disposition.  9:50 AM Marguerite Gonzalez DNP, JAHAIRA-BC      [AT]      ED Course User Index  [AT] JAHAIRA Young     Clinical Impression:      1. Epigastric pain               I, Dr. John Cee, personally performed the services described in this documentation.   All medical record entries made by the scribe were at my direction and in my presence.   I have reviewed the chart and agree that the record is accurate and complete.   John Cee MD.                      John Cee MD  12/03/18 6010

## 2018-12-03 NOTE — ASSESSMENT & PLAN NOTE
LVEF 30% with akinetic: mid anteroseptal, apical anterior, apical septal, apical lateral and apex and hypokinetic: mid inferoseptal and apical inferior walls.    Euvolemic on exam  Continue BB, ACEI    NPO p MN for LHC

## 2018-12-03 NOTE — ED NOTES
Patient complaints of epigastric pain that started on Saturday.  Was seen in ED and had CT done and discharged.  Patient had some relief with GI cocktail, but on Sunday pain started again and had two episodes of diarrhea.

## 2018-12-04 ENCOUNTER — TELEPHONE (OUTPATIENT)
Dept: CARDIOLOGY | Facility: CLINIC | Age: 58
End: 2018-12-04

## 2018-12-04 DIAGNOSIS — I48.0 PAROXYSMAL ATRIAL FIBRILLATION: Primary | ICD-10-CM

## 2018-12-04 DIAGNOSIS — I48.91 AF (ATRIAL FIBRILLATION): ICD-10-CM

## 2018-12-04 LAB
POCT GLUCOSE: 46 MG/DL (ref 70–110)
POCT GLUCOSE: 82 MG/DL (ref 70–110)
POCT GLUCOSE: 87 MG/DL (ref 70–110)
POCT GLUCOSE: 91 MG/DL (ref 70–110)

## 2018-12-04 PROCEDURE — 25000003 PHARM REV CODE 250: Performed by: EMERGENCY MEDICINE

## 2018-12-04 PROCEDURE — 25500020 PHARM REV CODE 255: Performed by: INTERNAL MEDICINE

## 2018-12-04 PROCEDURE — 11000001 HC ACUTE MED/SURG PRIVATE ROOM

## 2018-12-04 PROCEDURE — 99152 MOD SED SAME PHYS/QHP 5/>YRS: CPT | Performed by: INTERNAL MEDICINE

## 2018-12-04 PROCEDURE — 4A023N7 MEASUREMENT OF CARDIAC SAMPLING AND PRESSURE, LEFT HEART, PERCUTANEOUS APPROACH: ICD-10-PCS | Performed by: INTERNAL MEDICINE

## 2018-12-04 PROCEDURE — B2151ZZ FLUOROSCOPY OF LEFT HEART USING LOW OSMOLAR CONTRAST: ICD-10-PCS | Performed by: INTERNAL MEDICINE

## 2018-12-04 PROCEDURE — C1894 INTRO/SHEATH, NON-LASER: HCPCS | Performed by: INTERNAL MEDICINE

## 2018-12-04 PROCEDURE — 25000003 PHARM REV CODE 250: Performed by: INTERNAL MEDICINE

## 2018-12-04 PROCEDURE — 85025 COMPLETE CBC W/AUTO DIFF WBC: CPT

## 2018-12-04 PROCEDURE — B2111ZZ FLUOROSCOPY OF MULTIPLE CORONARY ARTERIES USING LOW OSMOLAR CONTRAST: ICD-10-PCS | Performed by: INTERNAL MEDICINE

## 2018-12-04 PROCEDURE — 93458 L HRT ARTERY/VENTRICLE ANGIO: CPT | Performed by: INTERNAL MEDICINE

## 2018-12-04 PROCEDURE — C1887 CATHETER, GUIDING: HCPCS | Performed by: INTERNAL MEDICINE

## 2018-12-04 PROCEDURE — 36415 COLL VENOUS BLD VENIPUNCTURE: CPT

## 2018-12-04 PROCEDURE — 99152 MOD SED SAME PHYS/QHP 5/>YRS: CPT | Mod: ,,, | Performed by: INTERNAL MEDICINE

## 2018-12-04 PROCEDURE — 93458 L HRT ARTERY/VENTRICLE ANGIO: CPT | Mod: 26,,, | Performed by: INTERNAL MEDICINE

## 2018-12-04 PROCEDURE — 94761 N-INVAS EAR/PLS OXIMETRY MLT: CPT

## 2018-12-04 PROCEDURE — 25000003 PHARM REV CODE 250: Performed by: NURSE PRACTITIONER

## 2018-12-04 PROCEDURE — 63600175 PHARM REV CODE 636 W HCPCS: Performed by: INTERNAL MEDICINE

## 2018-12-04 PROCEDURE — C1769 GUIDE WIRE: HCPCS | Performed by: INTERNAL MEDICINE

## 2018-12-04 RX ORDER — HYDRALAZINE HYDROCHLORIDE 20 MG/ML
10 INJECTION INTRAMUSCULAR; INTRAVENOUS EVERY 6 HOURS PRN
Status: DISCONTINUED | OUTPATIENT
Start: 2018-12-04 | End: 2018-12-05 | Stop reason: HOSPADM

## 2018-12-04 RX ORDER — HEPARIN SODIUM 1000 [USP'U]/ML
INJECTION, SOLUTION INTRAVENOUS; SUBCUTANEOUS
Status: DISCONTINUED | OUTPATIENT
Start: 2018-12-04 | End: 2018-12-04 | Stop reason: HOSPADM

## 2018-12-04 RX ORDER — ACETAMINOPHEN 325 MG/1
650 TABLET ORAL EVERY 4 HOURS PRN
Status: DISCONTINUED | OUTPATIENT
Start: 2018-12-04 | End: 2018-12-05 | Stop reason: HOSPADM

## 2018-12-04 RX ORDER — HYDROCODONE BITARTRATE AND ACETAMINOPHEN 5; 325 MG/1; MG/1
1 TABLET ORAL EVERY 4 HOURS PRN
Status: DISCONTINUED | OUTPATIENT
Start: 2018-12-04 | End: 2018-12-04 | Stop reason: SDUPTHER

## 2018-12-04 RX ORDER — HYDROCODONE BITARTRATE AND ACETAMINOPHEN 5; 325 MG/1; MG/1
1 TABLET ORAL EVERY 4 HOURS PRN
Status: DISCONTINUED | OUTPATIENT
Start: 2018-12-04 | End: 2018-12-05 | Stop reason: HOSPADM

## 2018-12-04 RX ORDER — VERAPAMIL HYDROCHLORIDE 2.5 MG/ML
INJECTION, SOLUTION INTRAVENOUS
Status: DISCONTINUED | OUTPATIENT
Start: 2018-12-04 | End: 2018-12-04 | Stop reason: HOSPADM

## 2018-12-04 RX ORDER — IODIXANOL 320 MG/ML
INJECTION, SOLUTION INTRAVASCULAR
Status: DISCONTINUED | OUTPATIENT
Start: 2018-12-04 | End: 2018-12-04 | Stop reason: HOSPADM

## 2018-12-04 RX ORDER — LIDOCAINE HYDROCHLORIDE 10 MG/ML
INJECTION, SOLUTION EPIDURAL; INFILTRATION; INTRACAUDAL; PERINEURAL
Status: DISCONTINUED | OUTPATIENT
Start: 2018-12-04 | End: 2018-12-04 | Stop reason: HOSPADM

## 2018-12-04 RX ORDER — SODIUM CHLORIDE 9 MG/ML
INJECTION, SOLUTION INTRAVENOUS
Status: DISCONTINUED | OUTPATIENT
Start: 2018-12-04 | End: 2018-12-05 | Stop reason: HOSPADM

## 2018-12-04 RX ORDER — DIPHENHYDRAMINE HYDROCHLORIDE 50 MG/ML
INJECTION INTRAMUSCULAR; INTRAVENOUS
Status: DISCONTINUED | OUTPATIENT
Start: 2018-12-04 | End: 2018-12-04 | Stop reason: HOSPADM

## 2018-12-04 RX ORDER — FENTANYL CITRATE 50 UG/ML
INJECTION, SOLUTION INTRAMUSCULAR; INTRAVENOUS
Status: DISCONTINUED | OUTPATIENT
Start: 2018-12-04 | End: 2018-12-04 | Stop reason: HOSPADM

## 2018-12-04 RX ORDER — MIDAZOLAM HYDROCHLORIDE 1 MG/ML
INJECTION, SOLUTION INTRAMUSCULAR; INTRAVENOUS
Status: DISCONTINUED | OUTPATIENT
Start: 2018-12-04 | End: 2018-12-04 | Stop reason: HOSPADM

## 2018-12-04 RX ORDER — HEPARIN SODIUM 200 [USP'U]/100ML
INJECTION, SOLUTION INTRAVENOUS
Status: DISCONTINUED | OUTPATIENT
Start: 2018-12-04 | End: 2018-12-05 | Stop reason: HOSPADM

## 2018-12-04 RX ADMIN — METOPROLOL SUCCINATE 25 MG: 25 TABLET, EXTENDED RELEASE ORAL at 08:12

## 2018-12-04 RX ADMIN — ACETAMINOPHEN 650 MG: 325 TABLET, FILM COATED ORAL at 09:12

## 2018-12-04 RX ADMIN — LOSARTAN POTASSIUM 25 MG: 25 TABLET ORAL at 08:12

## 2018-12-04 RX ADMIN — PANTOPRAZOLE SODIUM 40 MG: 40 TABLET, DELAYED RELEASE ORAL at 08:12

## 2018-12-04 RX ADMIN — SODIUM CHLORIDE 1000 ML: 0.9 INJECTION, SOLUTION INTRAVENOUS at 04:12

## 2018-12-04 RX ADMIN — ASPIRIN 81 MG: 81 TABLET, COATED ORAL at 08:12

## 2018-12-04 NOTE — PLAN OF CARE
TN met with pt and wife, pt lives with wife and adult children, pt independent with ADLs, no HH or DME noted, pt admitted with epigastric pain, pt to have LHC today.    Discharge brochure and blue discharge folder given to pt. TN updated contact information on whiteboard.       12/04/18 1126   Discharge Assessment   Assessment Type Discharge Planning Assessment   Confirmed/corrected address and phone number on facesheet? Yes   Assessment information obtained from? Patient;Caregiver   Communicated expected length of stay with patient/caregiver yes   Prior to hospitilization cognitive status: Alert/Oriented   Prior to hospitalization functional status: Independent   Current cognitive status: Alert/Oriented   Current Functional Status: Independent   Lives With spouse;child(pierce), adult   Able to Return to Prior Arrangements yes   Is patient able to care for self after discharge? Yes   Who are your caregiver(s) and their phone number(s)? Kati Hodges [phone]   Patient's perception of discharge disposition admitted as an inpatient   Patient currently being followed by outpatient case management? No   Patient currently receives any other outside agency services? No   Do you have any problems affording any of your prescribed medications? No   Is the patient taking medications as prescribed? yes   Does the patient have transportation home? Yes   Does the patient receive services at the [facility]? No   Discharge Plan A Home;Home with family   Patient/Family In Agreement With Plan yes   Audrey Bermudez RN-BC  Transitional Navigator  [phone]

## 2018-12-04 NOTE — PROGRESS NOTES
S/p LHC via R radial      Mild plaque in all 3 vessels  Normal EF  LVEDP 8 mmHg      Rec:      Medical therapy for CAD  Full report to follow        Thanks        ZN

## 2018-12-04 NOTE — TELEPHONE ENCOUNTER
----- Message from Breann Kwong sent at 12/4/2018 10:25 AM CST -----  Regarding: EKG ORDER  Please place and link EKG order to the EKG or Doctor appointment scheduled on 12/03/18  thanks. Breann [id number]

## 2018-12-04 NOTE — PLAN OF CARE
Problem: Patient Care Overview  Goal: Plan of Care Review  Outcome: Ongoing (interventions implemented as appropriate)  Patient is awake and orientedx4. Care plan explained and verbalized understanding. On heart monitor running Sinus Bradycardia at 50s. Complaints of epigastric pain 2/10. On low salt, low fat diet. Strict intake and output monitored. Instructed on NPO after midnight. On fall precaution. Bed in lowest position, bed alarms on, call light within reach and instructed to call for help when needed. Will clip in Am for C procedure tomorrow. Will continue to monitor.    Skin prep done. Patient ambulated to the bathroom with stand-by assist. Free from falls.

## 2018-12-04 NOTE — PLAN OF CARE
Problem: Patient Care Overview  Goal: Plan of Care Review  Outcome: Ongoing (interventions implemented as appropriate)   12/04/18 1630   Coping/Psychosocial   Plan Of Care Reviewed With patient;spouse   Patient awake, alert and oriented. VSS. Patient currently in Cath lab for L heart cath. No complains of pain. Blood glucose being monitored ac/hs and covered per sliding scale.. Bed alarm remains on, call light in reach, spouse at the beside.

## 2018-12-04 NOTE — NURSING
1630  Patient transferred to recovery cath lab slot 4 via stretcher with side rails up x2 .  Pt drowsy but able to follow commands. Pt is stable when connecting to cardiac monitors.  VSS. Right radial vasc band in place with 15ml of air in band c.d.i. no drainage or noted. No redness, bruising, or hematoma noted around site. +2 marleen radial pulses palpated.  dopplered marleen pedal pulses.  Skin normal in color and warm to touch, <3 sec cap refill.  Fall risk precautions given and patient acknowledges.  AIDET completed to pt.  Will continue to monitor patient.

## 2018-12-05 ENCOUNTER — NURSE TRIAGE (OUTPATIENT)
Dept: ADMINISTRATIVE | Facility: CLINIC | Age: 58
End: 2018-12-05

## 2018-12-05 VITALS
DIASTOLIC BLOOD PRESSURE: 89 MMHG | HEART RATE: 64 BPM | TEMPERATURE: 99 F | SYSTOLIC BLOOD PRESSURE: 154 MMHG | HEIGHT: 67 IN | RESPIRATION RATE: 18 BRPM | BODY MASS INDEX: 30.34 KG/M2 | OXYGEN SATURATION: 96 % | WEIGHT: 193.31 LBS

## 2018-12-05 LAB
BASOPHILS # BLD AUTO: 0.02 K/UL
BASOPHILS NFR BLD: 0.3 %
DIFFERENTIAL METHOD: ABNORMAL
EOSINOPHIL # BLD AUTO: 0.2 K/UL
EOSINOPHIL NFR BLD: 2.5 %
ERYTHROCYTE [DISTWIDTH] IN BLOOD BY AUTOMATED COUNT: 12.9 %
HCT VFR BLD AUTO: 43.3 %
HGB BLD-MCNC: 15 G/DL
LYMPHOCYTES # BLD AUTO: 0.9 K/UL
LYMPHOCYTES NFR BLD: 14.9 %
MCH RBC QN AUTO: 30.1 PG
MCHC RBC AUTO-ENTMCNC: 34.6 G/DL
MCV RBC AUTO: 87 FL
MONOCYTES # BLD AUTO: 0.4 K/UL
MONOCYTES NFR BLD: 5.5 %
NEUTROPHILS # BLD AUTO: 4.8 K/UL
NEUTROPHILS NFR BLD: 76.6 %
PLATELET # BLD AUTO: 244 K/UL
PMV BLD AUTO: 10 FL
POCT GLUCOSE: 97 MG/DL (ref 70–110)
POCT GLUCOSE: 98 MG/DL (ref 70–110)
RBC # BLD AUTO: 4.98 M/UL
WBC # BLD AUTO: 6.31 K/UL

## 2018-12-05 PROCEDURE — 25000003 PHARM REV CODE 250: Performed by: EMERGENCY MEDICINE

## 2018-12-05 PROCEDURE — 25000003 PHARM REV CODE 250: Performed by: NURSE PRACTITIONER

## 2018-12-05 PROCEDURE — 99900035 HC TECH TIME PER 15 MIN (STAT)

## 2018-12-05 PROCEDURE — 94761 N-INVAS EAR/PLS OXIMETRY MLT: CPT

## 2018-12-05 RX ORDER — LOSARTAN POTASSIUM 50 MG/1
50 TABLET ORAL DAILY
Qty: 90 TABLET | Refills: 3 | Status: SHIPPED | OUTPATIENT
Start: 2018-12-06 | End: 2018-12-18

## 2018-12-05 RX ORDER — LOSARTAN POTASSIUM 50 MG/1
50 TABLET ORAL DAILY
Status: DISCONTINUED | OUTPATIENT
Start: 2018-12-06 | End: 2018-12-05 | Stop reason: HOSPADM

## 2018-12-05 RX ADMIN — PANTOPRAZOLE SODIUM 40 MG: 40 TABLET, DELAYED RELEASE ORAL at 08:12

## 2018-12-05 RX ADMIN — ASPIRIN 81 MG: 81 TABLET, COATED ORAL at 08:12

## 2018-12-05 RX ADMIN — LOSARTAN POTASSIUM 25 MG: 25 TABLET ORAL at 08:12

## 2018-12-05 RX ADMIN — METOPROLOL SUCCINATE 25 MG: 25 TABLET, EXTENDED RELEASE ORAL at 08:12

## 2018-12-05 NOTE — PLAN OF CARE
Problem: Patient Care Overview  Goal: Plan of Care Review  Outcome: Outcome(s) achieved Date Met: 12/05/18 12/05/18 1023   Coping/Psychosocial   Plan Of Care Reviewed With patient;spouse   Patient awake, alert and oriented. VSS. Patient on tele, no ectopy on tele. No complaints of pain. Blood glucose monitored ac/hs and covered per sliding scale. Bed alarm remains on, call light in reach, non skid socks when out of bed.

## 2018-12-05 NOTE — PLAN OF CARE
Discharge order noted. No HH or DME noted. Pt's bedside nurse to go over final discharge instructions, medications, and diagnosis signs and symptoms.    Future Appointments   Date Time Provider Department Center   12/18/2018  2:20 PM Elliot Johnson MD [facility] CARDIO [place] [facility]   12/26/2018  9:20 AM Aleksandar Salazar PA-C [facility] HEPAT [address]   1/7/2019  1:00 PM Bony Saavedra MD [facility] GASTRO [place] [facility]   3/1/2019  3:20 PM Jose Angel Munson MD [facility] MED [place]        12/05/18 1133   Final Note   Assessment Type Final Discharge Note   Anticipated Discharge Disposition Home   What phone number can be called within the next 1-3 days to see how you are doing after discharge? [phone]   Hospital Follow Up  Appt(s) scheduled? Yes   Discharge plans and expectations educations in teach back method with documentation complete? Yes   Right Care Referral Info   Post Acute Recommendation No Care   Audrey Bermudez RN-BC  Transitional Navigator  [phone]

## 2018-12-05 NOTE — NURSING
VN cued into pt's room for introduction. Wife at bedside. Informed pt/wife that VN would be working closely along side floor nurse, PCT, MD, the rest of care team and making rounds throughout the shift. Fall risk and bed alarm protocol education provided. Pt aware and agreeable. Allowed time for questions. Wife informed VN of possible D/C today. NAD noted. Will cont to be available as needed.

## 2018-12-05 NOTE — NURSING
Pt to floor by RN on Cardiac monitor. Dressing on arrival checked with floor RN, CDI, no hematoma noted

## 2018-12-05 NOTE — NURSING
VN reviewed discharge instructions with pt and spouse in [place]. Reviewed follow-up appointments, new/current/stopped medications, diet, and importance of medication compliance. Reviewed HF education with pt. Reviewed s/s, risk factors, treatment plan, self-management, and when to seek medical attention. Allowed time for questions. All questions answered. Patient verbalized complete understanding.     Discharge instructions complete. Bedside nurse notified. Transport requested.

## 2018-12-05 NOTE — TELEPHONE ENCOUNTER
Just discharged from the hospital, was admitted for new onset afib, htn, and acute heart failure, just had angiogram yesterday.  his blood pressure is now 146/94 pulse 67, about 30 minutes ago he felt a sudden pain on his left side of his chest and went up into the left side of his head/face, associated with sweating.  Only lasted a few seconds, rtes it a 7-8/10, feels a little better now.    Reason for Disposition   Patient sounds very sick or weak to the triager   Pain also present in shoulder(s) or arm(s) or jaw    Protocols used: ST CHEST PAIN-A-OH

## 2018-12-05 NOTE — PLAN OF CARE
Problem: Patient Care Overview  Goal: Plan of Care Review  Outcome: Ongoing (interventions implemented as appropriate)  I have reviewed the plan of care with the pt and his wife. R radial site dressing is C/D/I. No hematoma noted. Radial pulses +2 and equal. The pt c/o R underarm pain. PRN tylenol was administered. No true red alarms noted on tele. Safety measures are in place and his wife remains at the bedside. The pt verbalizes full understanding of their plan of care.

## 2018-12-06 ENCOUNTER — PATIENT OUTREACH (OUTPATIENT)
Dept: ADMINISTRATIVE | Facility: CLINIC | Age: 58
End: 2018-12-06

## 2018-12-06 NOTE — PATIENT INSTRUCTIONS
Instrucciones de stefanie para la insuficiencia cardíaca  Le gonsalves diagnosticado manolo insuficiencia cardíaca. El término insuficiencia cardíaca puede provocar temor, dado que sugiere que salcido corazón laboy dejado de funcionar. Flo, en realidad, significa que el corazón no está haciendo salcido trabajo [place] radha jack debería. Se produce manolo insuficiencia cardíaca cuando el músculo cardíaco no puede satisfacer la necesidad de flujo sanguíneo que tiene salcido cuerpo. Puede controlar los síntomas de manolo insuficiencia cardíaca haciendo cambios en salcido estilo de roberto y siguiendo las indicaciones que le dé salcido médico.  Cuidados en la casa  Actividad física  Pregunte a salcido médico sobre un programa de ejercicios. Actividades simples, jack caminar o trabajar en el annelisedín, pueden serle de mucho beneficio. Hacer ejercicio físico stephen todos los días de la semana puede hacer que se sienta mejor. No se desaliente si salcido avance es lento al principio. Descanse todo lo necesario y detenga la actividad si siente síntomas tales jack dolor en el pecho, aturdimiento o manolo importante dificultad para respirar.    Dieta  Siga manolo dieta saludable para el corazón y esfuércese por consumir menos sodio (sal) en marek comidas. Intente que el total de sal que consume al día sea de 2,400 mg o menos. Según salcido situación, es probable que salcido proveedor de atención médica le diga que baje todavía más salcido consumo de sal. La sal hace que salcido cuerpo retenga agua, lo que puede dificultar el trabajo de bombeo que hace salcido corazón. Para consumir menos sal, puede hacer lo siguiente:  · Limite el consumo de alimentos enlatados, deshidratados, envasados y las comidas rápidas.  · No agregue tariq a las comidas en el momento de comerlas.  · Sazone las comidas con hierbas en lugar de usar sal al cocinar.  Cuide la cantidad de líquidos que serafin. Si serafin demasiada cantidad de líquidos, eso puede empeorar salcido insuficiencia cardíaca. Se suele aconsejar limitar el total de líquidos a menos de 66  onzas (2 litros) al día.  Reduzca el consumo de bebidas alcohólicas. Demasiado alcohol puede ser perjudicial para salcido corazón. Debería limitar el consumo de bebidas alcohólicas a no más de un vaso al día si es missael y no más de dos vasos al día si es hombre.  Tabaco  Deje de fumar. Fumar aumenta marek probabilidades de tener un ataque al corazón, lo que empeorará la insuficiencia cardíaca. Dejar de fumar es lo más importante que puede hacer para mejorar salcido keyla. Inscríbase en un programa para dejar de fumar y pregunte a salcido médico sobre medicamentos o terapia de sustitución de la nicotina. Estos métodos mejoran marek probabilidades de tener éxito.  Medicamentos  [place] marek medicamentos exactamente según le hayan indicado. Aprenda el nombre y el propósito de cada bartolo de marek medicamentos. Lleve siempre con usted manolo lista precisa de marek medicamentos, incluyendo las dosis que aris actualmente de cada bartolo. No se saltee ninguna dosis. Si se saltea manolo dosis de salcido medicamento, tómela caldwell pronto lo recuerde, a menos que ya sea la hora de salcido dosis siguiente. En felisha viv, espere y tome salcido dosis siguiente a la hora que le corresponde. Nunca tome manolo dosis doble.   Controle salcido peso  Pésese todos los días. Un aumento de peso repentino puede indicar que la insuficiencia cardíaca está empeorando. Pésese a la misma hora cada día y con el mismo tipo de ropa. Lo ideal sería que se pese en cuanto se levante por la mañana, después de maurisio vaciado salcido vejiga, dulce antes de rommel el desayuno. Salcido proveedor de atención médica le mostrará cómo hacer un seguimiento de salcido peso y también le dirá si debe llamar en viv de un aumento repentino e inesperado de peso.  Por lo general, es posible que salcido proveedor de atención médica le pida que informe si salcido peso aumenta más de 2 libras [1 kg] en un día o más de 5 libras [3 kg] en manolo semana, o el aumento de peso que salcido médico le dijo que debía informar. Evette es un signo de que está reteniendo más líquido  del que debería .  Seguimiento  [place] manolo baltazar de control, según le indique nuestro personal médico. Le indicarán específicamente cuándo debe hacer marek citas. Según el tipo y la gravedad de la insuficiencia cardíaca que tenga, puede que necesite hacer manolo visita de control tan pronto jack 7 días después del stefanie del hospital. Cumpla marek citas de chequeos y análisis de laboratorio para julián si marek medicamentos están funcionando radha y para julián cómo está slacido keyla.  Debe entender que salcido keyla e, incluso, salcido supervivencia dependen de que siga las recomendaciones médicas.  Síntomas  La insuficiencia cardíaca puede causar manolo variedad de síntomas, que incluyen:  · Dificultad para respirar  · Problemas para respirar por la noche  · Se le hinchan las piernas y los pies, o el abdomen  · Siente cansancio con facilidad  · Ritmo cardíaco acelerado o irregular  · Debilidad o aturdimiento  Es importante que sepa qué hacer si nota signos de que salcido insuficiencia cardíaca está empeorando. A continuación, tiene algunas pautas sugeridas.  Cuándo llamar a salcido médico  Llame a salcido médico de inmediato si nota alguno de los siguientes signos de que salcido insuficiencia cardíaca está empeorando:  · Aumento repentino de peso (más de 2 libras [1 kg] en un día o más de 5 libras [3 kg] en manolo semana, o el aumento de peso que salcido médico le dijo que debía informar)  · Problema para respirar no relacionado con la actividad física  · Hinchazón nueva o mayor en las piernas o los tobillos  · Hinchazón o dolor en el abdomen  · Problema para respirar por la noche (se despierta sin aire, necesita levantarse con más almohadas para poder respirar)  · Tos frecuente que no cesa  · Siente mucho más cansancio que lo habitual  ¿Cuándo debe buscar atención médica de emergencia?  Llame al 911 de inmediato si presenta los siguientes síntomas:  · Dificultad grave para respirar (no puede respirar radha, ni siquiera estando en reposo)  · Dolor adry en el pecho que no se  gudelia con descanso ni con nitroglicerina  · Falta de aire con mucosidad rosada y con espuma al toser  · Latidos irregulares o rápidos, continuamente  · Desmayo o pérdida del conocimiento  · Síntomas agudos de un ataque cerebral, jack entumecimiento o debilidad repentinos en un lado de salcido tanya, un brazo o manolo pierna; o si de repente siente confusión, tiene problemas para hablar o nota cambios en salcido visión      Date Last Reviewed: 3/21/2016  © 5454-6262 [employer]. [address] [place] [address], [place] [address]. Todos los derechos reservados. Esta información no pretende sustituir la atención médica profesional. Sólo salcido médico puede diagnosticar y tratar un problema de keyla.

## 2018-12-07 LAB
CATH EF ESTIMATED: 60 %
OHS CV CATH LVEDP POST: 8
OHS CV CATH LVEDP PRE: 8

## 2018-12-11 NOTE — DISCHARGE SUMMARY
[facility]  Cardiology  Discharge Summary      Patient Name: Roman Hodges  MRN: [id number]  Admission Date: 12/3/2018  Hospital Length of Stay: 2 days  Discharge Date and Time: 12/5/2018 11:05 AM  Attending Physician: Krystal att. providers found    Discharging Provider: Anuel Bauman NP  Primary Care Physician: Jose Angel Munson MD    HPI:   Roman Hodges is a 57 yo male with HTN, Type 2 Diabetes Mellitus, PAF, HFrEF and HCC. Patient was recently admitted with AF RVR. Denies ever having chest pain, SOB or palpitations. Patient was evaluated by Dr Sargent this AM and reported severe epigastric pain, diarrhea. Pain increases with exertion but is also present at rest. Patient was sent to the ED for admission. Recent Echo - LVEF 30% with akinetic: mid anteroseptal, apical anterior, apical septal, apical lateral and apex and hypokinetic: mid inferoseptal and apical inferior walls.         Procedure(s) (LRB):  Left heart cath (Left)     Indwelling Lines/Drains at time of discharge:  Lines/Drains/Airways          None          Hospital Course:  12/03/2018 Per HPI. NPO p MN for LHC in AM.   · LVEDP (Pre): 8  · LVEDP (Post): 8  · The ejection fraction is greater than 55% by visual estimate.  · Estimated blood loss: none  · Non-obstructive CAD.              S/p LHC via R radial        Mild plaque in all 3 vessels  Normal EF  LVEDP 8 mmHg        Rec:        Medical therapy for CAD            I certify that I was present for catheter insertion, catheter manipulation, angiography, and angiographic interpretation of this patient.     Procedure Log documented by Documenter: RT Jitendra and verified by Tyler Hinojosa.    Patient recovered without incident and found appropriate for discharge to follow up in 2-3 weeks.    Consults:     Significant Diagnostic Studies: Labs:   BMP: No results for input(s): GLU, NA, K, CL, CO2, BUN, CREATININE, CALCIUM, MG in the last 48 hours., CMP No results for  input(s): NA, K, CL, CO2, GLU, BUN, CREATININE, CALCIUM, PROT, ALBUMIN, BILITOT, ALKPHOS, AST, ALT, ANIONGAP, ESTGFRAFRICA, EGFRNONAA in the last 48 hours., CBC No results for input(s): WBC, HGB, HCT, PLT in the last 48 hours., INR   Lab Results   Component Value Date    INR 1.0 12/01/2018    INR 1.0 11/29/2018    INR 1.0 11/09/2018   , Lipid Panel   Lab Results   Component Value Date    CHOL 142 12/03/2018    HDL 31 (L) 12/03/2018    LDLCALC 92.6 12/03/2018    TRIG 92 12/03/2018    CHOLHDL 21.8 12/03/2018   , Troponin No results for input(s): TROPONINI in the last 168 hours., A1C:   Recent Labs   Lab 08/24/18  0858 11/02/18  0940   HGBA1C 6.9* 5.4    and All labs within the past 24 hours have been reviewed  Cardiac Graphics: Echocardiogram:   Transthoracic echo (TTE) complete (Cupid Only):   Results for orders placed or performed during the hospital encounter of 11/29/18   Transthoracic echo (TTE) complete (Cupid Only)   Result Value Ref Range    AV mean gradient 3.48 mmHg    Ao peak saw 1.29 m/s    Ao VTI 20.02 cm    IVRT 0.08 msec    IVS 1.20 (A) 0.6 - 1.1 cm    LA size 3.30 cm    LVIDD 3.83 3.5 - 6.0 cm    LVIDS 2.48 2.1 - 4.0 cm    LVOT diameter 1.94 cm    LVOT peak VTI 15.15 cm    PW 1.08 0.6 - 1.1 cm    RVDD 3.25 cm    TR Max Saw 1.10 m/s    Ao root annulus 3.16 cm    AORTIC VALVE CUSP SEPERATION 1.94 cm    PV PEAK VELOCITY 0.90 cm/s    LV Diastolic Volume 63.11 mL    LV Systolic Volume 21.78 mL    FS 35 %    LV mass 143.64 g    Left Ventricle Relative Wall Thickness 0.56 cm    AV valve area 2.24 cm2    AV index (prosthetic) 0.76     LVOT area 2.95 cm2    LVOT stroke volume 44.76 cm3    AV peak gradient 6.66 mmHg    Triscuspid Valve Regurgitation Peak Gradient 4.84 mmHg    BSA 2.07 m2    LV Systolic Volume Index 10.5 mL/m2    LV Diastolic Volume Index 30.49 mL/m2    LV Mass Index 69.4 g/m2    Right Atrial Pressure (from IVC) 3 mmHg    TV rest pulmonary artery pressure 7.84 mmHg       Pending Diagnostic  Studies:     None          Final Active Diagnoses:    Diagnosis Date Noted POA    PRINCIPAL PROBLEM:  Acute systolic heart failure [I50.21] 11/29/2018 Yes    Epigastric pain [R10.13] 12/03/2018 Yes    Essential hypertension [I10] 11/29/2018 Yes     Chronic    New onset atrial fibrillation [I48.91] 11/29/2018 Yes    Obstructive sleep apnea on CPAP [G47.33, Z99.89] 04/23/2018 Not Applicable     Chronic      Problems Resolved During this Admission:     No new Assessment & Plan notes have been filed under this hospital service since the last note was generated.  Service: Cardiology      Discharged Condition: good    Disposition: Home or Self Care    Follow Up:  Follow-up Information     Elliot Johnson MD. Go on 12/18/2018.    Specialties:  INTERVENTIONAL CARDIOLOGY, Cardiology  Why:  Time: 2:20pm Cardiology follow up  Contact information:  [address]  [address]  [place] [address]  [phone]             Schedule an appointment as soon as possible for a visit with Jose Angel Munson MD.    Specialty:  Family Medicine  Why:  As needed  Contact information:  [address] [facility]  [place] [address]  [phone]                 Patient Instructions:      Diet Cardiac     Lifting restrictions   Order Comments: No lifting over 10 pounds for 1 week     Notify your health care provider if you experience any of the following:  severe uncontrolled pain     Notify your health care provider if you experience any of the following:  difficulty breathing or increased cough     Notify your health care provider if you experience any of the following:  persistent dizziness, light-headedness, or visual disturbances     Activity as tolerated     Medications:  Reconciled Home Medications:      Medication List      START taking these medications    losartan 50 MG tablet  Commonly known as:  COZAAR  Take 1 tablet (50 mg total) by mouth once daily.        CONTINUE taking these medications    ELIQUIS 5 mg Tab  Generic drug:   apixaban  [place] manolo tableta (5 mg en total) por vía oral 2 veces al día.  (Take 1 tablet (5 mg total) by mouth 2 (two) times daily.)     esomeprazole 40 MG capsule  Commonly known as:  NEXIUM  Take 1 capsule (40 mg total) by mouth once daily.     metoprolol succinate 25 MG 24 hr tablet  Commonly known as:  TOPROL-XL  [place] manolo tableta (25 mg en total) por vía oral diariamente.  (Take 1 tablet (25 mg total) by mouth once daily.)     ondansetron 8 MG tablet  Commonly known as:  ZOFRAN  Take 1 tablet (8 mg total) by mouth every 8 (eight) hours as needed for Nausea.        STOP taking these medications    lisinopril 2.5 MG tablet  Commonly known as:  PRINIVIL,ZESTRIL     omeprazole 20 MG capsule  Commonly known as:  PRILOSEC     potassium chloride SA 20 MEQ tablet  Commonly known as:  JESSICA-DUR,KLOR-UMANG            Time spent on the discharge of patient: 45 minutes    Anuel Bauman NP  Cardiology  [facility]

## 2018-12-12 ENCOUNTER — TELEPHONE (OUTPATIENT)
Dept: ADMINISTRATIVE | Facility: HOSPITAL | Age: 58
End: 2018-12-12

## 2018-12-12 DIAGNOSIS — Z13.5 ENCOUNTER FOR SCREENING FOR DIABETIC RETINOPATHY: Primary | ICD-10-CM

## 2018-12-14 ENCOUNTER — CLINICAL SUPPORT (OUTPATIENT)
Dept: FAMILY MEDICINE | Facility: CLINIC | Age: 58
End: 2018-12-14
Attending: FAMILY MEDICINE
Payer: COMMERCIAL

## 2018-12-14 ENCOUNTER — LAB VISIT (OUTPATIENT)
Dept: LAB | Facility: HOSPITAL | Age: 58
End: 2018-12-14
Attending: FAMILY MEDICINE
Payer: COMMERCIAL

## 2018-12-14 ENCOUNTER — OFFICE VISIT (OUTPATIENT)
Dept: FAMILY MEDICINE | Facility: CLINIC | Age: 58
End: 2018-12-14
Payer: COMMERCIAL

## 2018-12-14 VITALS
HEIGHT: 67 IN | HEART RATE: 75 BPM | DIASTOLIC BLOOD PRESSURE: 80 MMHG | BODY MASS INDEX: 31.15 KG/M2 | WEIGHT: 198.44 LBS | SYSTOLIC BLOOD PRESSURE: 120 MMHG

## 2018-12-14 DIAGNOSIS — H40.013 OPEN ANGLE WITH BORDERLINE FINDINGS AND LOW GLAUCOMA RISK IN BOTH EYES: Primary | ICD-10-CM

## 2018-12-14 DIAGNOSIS — Z13.5 ENCOUNTER FOR SCREENING FOR DIABETIC RETINOPATHY: ICD-10-CM

## 2018-12-14 DIAGNOSIS — I10 ESSENTIAL HYPERTENSION: Primary | ICD-10-CM

## 2018-12-14 DIAGNOSIS — E11.65 TYPE 2 DIABETES MELLITUS WITH HYPERGLYCEMIA, WITHOUT LONG-TERM CURRENT USE OF INSULIN: ICD-10-CM

## 2018-12-14 DIAGNOSIS — I10 ESSENTIAL HYPERTENSION: ICD-10-CM

## 2018-12-14 DIAGNOSIS — I48.19 PERSISTENT ATRIAL FIBRILLATION: ICD-10-CM

## 2018-12-14 DIAGNOSIS — F41.9 ANXIETY: ICD-10-CM

## 2018-12-14 LAB
ALBUMIN SERPL BCP-MCNC: 3.9 G/DL
ALP SERPL-CCNC: 100 U/L
ALT SERPL W/O P-5'-P-CCNC: 21 U/L
ANION GAP SERPL CALC-SCNC: 9 MMOL/L
AST SERPL-CCNC: 19 U/L
BASOPHILS # BLD AUTO: 0.05 K/UL
BASOPHILS NFR BLD: 0.8 %
BILIRUB SERPL-MCNC: 0.7 MG/DL
BUN SERPL-MCNC: 14 MG/DL
CALCIUM SERPL-MCNC: 9.7 MG/DL
CHLORIDE SERPL-SCNC: 103 MMOL/L
CHOLEST SERPL-MCNC: 145 MG/DL
CHOLEST/HDLC SERPL: 4.7 {RATIO}
CO2 SERPL-SCNC: 27 MMOL/L
CREAT SERPL-MCNC: 1.3 MG/DL
DIFFERENTIAL METHOD: ABNORMAL
EOSINOPHIL # BLD AUTO: 0.1 K/UL
EOSINOPHIL NFR BLD: 1.4 %
ERYTHROCYTE [DISTWIDTH] IN BLOOD BY AUTOMATED COUNT: 13.3 %
EST. GFR  (AFRICAN AMERICAN): >60 ML/MIN/1.73 M^2
EST. GFR  (NON AFRICAN AMERICAN): 59.7 ML/MIN/1.73 M^2
GLUCOSE SERPL-MCNC: 117 MG/DL
HCT VFR BLD AUTO: 49.3 %
HDLC SERPL-MCNC: 31 MG/DL
HDLC SERPL: 21.4 %
HGB BLD-MCNC: 16.5 G/DL
IMM GRANULOCYTES # BLD AUTO: 0.02 K/UL
IMM GRANULOCYTES NFR BLD AUTO: 0.3 %
LDLC SERPL CALC-MCNC: 94.2 MG/DL
LYMPHOCYTES # BLD AUTO: 0.5 K/UL
LYMPHOCYTES NFR BLD: 7.9 %
MCH RBC QN AUTO: 29.2 PG
MCHC RBC AUTO-ENTMCNC: 33.5 G/DL
MCV RBC AUTO: 87 FL
MONOCYTES # BLD AUTO: 0.6 K/UL
MONOCYTES NFR BLD: 8.9 %
NEUTROPHILS # BLD AUTO: 5.2 K/UL
NEUTROPHILS NFR BLD: 80.7 %
NONHDLC SERPL-MCNC: 114 MG/DL
NRBC BLD-RTO: 0 /100 WBC
PLATELET # BLD AUTO: 294 K/UL
PMV BLD AUTO: 10.4 FL
POTASSIUM SERPL-SCNC: 4.1 MMOL/L
PROT SERPL-MCNC: 8.1 G/DL
RBC # BLD AUTO: 5.66 M/UL
SODIUM SERPL-SCNC: 139 MMOL/L
TRIGL SERPL-MCNC: 99 MG/DL
TSH SERPL DL<=0.005 MIU/L-ACNC: 0.8 UIU/ML
WBC # BLD AUTO: 6.42 K/UL

## 2018-12-14 PROCEDURE — 99214 OFFICE O/P EST MOD 30 MIN: CPT | Mod: S$GLB,,, | Performed by: FAMILY MEDICINE

## 2018-12-14 PROCEDURE — 99999 PR PBB SHADOW E&M-EST. PATIENT-LVL II: CPT | Mod: PBBFAC,,,

## 2018-12-14 PROCEDURE — 3008F BODY MASS INDEX DOCD: CPT | Mod: CPTII,S$GLB,, | Performed by: FAMILY MEDICINE

## 2018-12-14 PROCEDURE — 3074F SYST BP LT 130 MM HG: CPT | Mod: CPTII,S$GLB,, | Performed by: FAMILY MEDICINE

## 2018-12-14 PROCEDURE — 3044F HG A1C LEVEL LT 7.0%: CPT | Mod: CPTII,S$GLB,, | Performed by: FAMILY MEDICINE

## 2018-12-14 PROCEDURE — 3079F DIAST BP 80-89 MM HG: CPT | Mod: CPTII,S$GLB,, | Performed by: FAMILY MEDICINE

## 2018-12-14 PROCEDURE — 84443 ASSAY THYROID STIM HORMONE: CPT

## 2018-12-14 PROCEDURE — 92250 FUNDUS PHOTOGRAPHY W/I&R: CPT | Mod: S$GLB,,, | Performed by: OPHTHALMOLOGY

## 2018-12-14 PROCEDURE — 36415 COLL VENOUS BLD VENIPUNCTURE: CPT | Mod: PO

## 2018-12-14 PROCEDURE — 85025 COMPLETE CBC W/AUTO DIFF WBC: CPT

## 2018-12-14 PROCEDURE — 80053 COMPREHEN METABOLIC PANEL: CPT

## 2018-12-14 PROCEDURE — 80061 LIPID PANEL: CPT

## 2018-12-14 PROCEDURE — 99999 PR PBB SHADOW E&M-EST. PATIENT-LVL III: CPT | Mod: PBBFAC,,, | Performed by: FAMILY MEDICINE

## 2018-12-14 RX ORDER — BUSPIRONE HYDROCHLORIDE 5 MG/1
5 TABLET ORAL 2 TIMES DAILY
Qty: 180 TABLET | Refills: 3 | Status: ON HOLD | OUTPATIENT
Start: 2018-12-14 | End: 2019-07-08 | Stop reason: HOSPADM

## 2018-12-14 NOTE — PATIENT INSTRUCTIONS
¿Qué es la fibrilación auricular?    Manolo arritmia es un problema con la velocidad o el patrón de los latidos de salcido corazón. La fibrilación auricular (FA) es un tipo común de arritmia. Provoca señales eléctricas rápidas y caóticas en las aurículas. Eso ocasiona un mal funcionamiento del corazón. También afecta la cantidad de odilia que salcido corazón puede bombear hacia el cuerpo.  La fibrilación auricular puede ocurrir manolo vez cada tanto e irse por sí jailene. O puede continuar por períodos más largos y requerir tratamiento.  La fibrilación auricular puede causar problemas graves, tales jack un ataque cerebral. Salcido proveedor de atención médica deberá vigilarla y manejarla.  ¿Qué sucede althea la fibrilación auricular?  El corazón posee un sistema eléctrico que envía señales para controlar marek latidos. A medida que las señales atraviesan el corazón, van indicando a las cámaras superiores (las aurículas) y a las cámaras inferiores (los ventrículos) del corazón cuándo contraerse y cuándo relajarse. [place] permite que la odilia se mueva por el corazón y hacia el cuerpo y los pulmones.  Cuando hay fibrilación auricular, las aurículas reciben señales anormales. Eso hace que se contraigan de forma rápida e irregular, y fuera de sincronización con los ventrículos. Cuando esto sucede, las aurículas también tienen más dificultades para hacer que la odilia vaya hacia los ventrículos. Así, la odilia puede estancarse en las aurículas, lo que aumenta el riesgo de coágulos sanguíneos y ataque cerebral. También es posible que los ventrículos se contraigan en forma demasiado rápida e irregular. En consecuencia, pueden no bombear la odilia hacia el cuerpo y los pulmones tan radha jack deberían. Eso puede debilitar el músculo cardíaco a lo mikki del tiempo y causar manolo insuficiencia cardíaca.  ¿Cuáles son las causas de la fibrilación auricular?  La fibrilación auricular es más común en los adultos mayores. Tiene muchas causas posibles, por  ejemplo:  · Enfermedad de las arterias coronarias  · Enfermedad valvular cardíaca  · Ataque cardíaco  · Cirugía cardíaca  · Presión arterial stefanie  · Enfermedad de la tiroides  · Diabetes  · Enfermedad pulmonar  · Apnea del sueño  · Gran consumo de alcohol  En algunos casos de fibrilación auricular, los médicos no saben cuál es la causa.  ¿Cuáles son los síntomas de la fibrilación auricular?  La fibrilación auricular puede causar síntomas o no. Cuando hay síntomas, pueden incluir:  · Latidos irregulares, más megha o más rápidos que lo normal  · Dificultad para respirar  · Cansancio  · Mareo o desmayos  · Dolor en el thad  ¿Cómo se trata la fibrilación auricular?  Los tratamientos para la fibrilación auricular pueden incluir cualquiera de las opciones que se mencionan a continuación.  · Medicamentos. Puede que le receten:  ¨ Medicamentos para ayudar a reducir la frecuencia cardíaca  ¨ Medicamentos para ayudar a que el corazón mihaela con mayor regularidad  ¨ Medicamentos anticoagulantes para ayudar a reducir el riesgo de coágulos sanguíneos y ataque cerebral  · Cardioversión eléctrica. Salcido proveedor de atención médica usa almohadillas o paletas especiales para enviar manolo o más descargas eléctricas breves al corazón. Eso puede ayudar a restablecer los latidos para que tengan un ritmo normal.  · Ablación. Se introducen tubos largos y delgados, llamados catéteres, a través de un vaso sanguíneo hasta salcido corazón. Allí, los catéteres envían energía caliente o fría hacia las zonas que están causando las señales anormales. Esta energía destruye el tejido o las células que están ocasionando el problema. Eso mejora las probabilidades de que salcido corazón permanezca en un ritmo normal sin tener que usar medicamentos. Si no es posible controlar salcido frecuencia y salcido ritmo cardíacos, puede que necesite ablación y un marcapasos. Eso ayudará a controlar la frecuencia cardíaca y la regularidad de marek latidos.  · Cirugía. En la cirugía, salcido  proveedor de atención médica puede usar diferentes métodos para crear tejido cicatricial en las zonas del corazón que están causando señales anormales. Evette tejido cicatricial interrumpe las señales anormales y puede evitar que ocurra la fibrilación auricular.  ¿Cuáles son las complicaciones de manolo fibrilación auricular?  Pueden incluir lo siguiente:  · Coágulos de odilia  · Ataque cerebral  · Insuficiencia cardíaca. Evette problema se da cuando el músculo del corazón se debilita tanto que ya no puede bombear radha la odilia  ¿Cuándo lanie llamar a mi proveedor de atención médica?  Llame a salcido proveedor de atención médica de inmediato si nota alguno de los siguientes síntomas:  · Los síntomas no mejoran con el tratamiento, o empeoran  · Aparecen síntomas nuevos    Date Last Reviewed: 5/1/2016  © 5438-6586 [employer]. [address] [place] [address], [place] [address]. All rights reserved. This information is not intended as a substitute for professional medical care. Always follow your healthcare professional's instructions.

## 2018-12-14 NOTE — PROGRESS NOTES
Subjective:       Patient ID: Roman Hodges is a 58 y.o. male.    Chief Complaint: Follow-up and Hypertension    58 years old male came to the clinic for atrial fibrillation follow-up..  Patient with good compliance with the anticoagulation.  Blood pressure today stable .  No chest pain, palpitation orthopnea or PND.  Patient with mild anxiety sometimes.  Patient requests a medicine to help to control the symptoms .  No suicidal or homicidal ideations .      Review of Systems   Constitutional: Negative.  Negative for activity change and unexpected weight change.   HENT: Negative.  Negative for hearing loss, rhinorrhea and trouble swallowing.    Eyes: Negative.  Negative for discharge and visual disturbance.   Respiratory: Negative.  Negative for chest tightness and wheezing.    Cardiovascular: Negative.  Negative for chest pain and palpitations.   Gastrointestinal: Negative.  Negative for blood in stool, constipation, diarrhea and vomiting.   Endocrine: Negative for polydipsia and polyuria.   Genitourinary: Negative.  Negative for difficulty urinating, hematuria and urgency.   Musculoskeletal: Negative.  Negative for arthralgias, joint swelling and neck pain.   Skin: Negative.    Neurological: Positive for headaches. Negative for weakness.   Psychiatric/Behavioral: Negative.  Negative for confusion and dysphoric mood.       Objective:      Physical Exam   Constitutional: He is oriented to person, place, and time. He appears well-developed and well-nourished. No distress.   HENT:   Head: Normocephalic and atraumatic.   Right Ear: External ear normal.   Left Ear: External ear normal.   Nose: Nose normal.   Mouth/Throat: Oropharynx is clear and moist. No oropharyngeal exudate.   Eyes: Conjunctivae and EOM are normal. Pupils are equal, round, and reactive to light. Right eye exhibits no discharge. Left eye exhibits no discharge. No scleral icterus.   Neck: Normal range of motion. Neck supple. No JVD present. No  tracheal deviation present. No thyromegaly present.   Cardiovascular: Normal rate, normal heart sounds and intact distal pulses. An irregularly irregular rhythm present. Exam reveals no gallop and no friction rub.   No murmur heard.  Pulmonary/Chest: Effort normal and breath sounds normal. No stridor. No respiratory distress. He has no wheezes. He has no rales. He exhibits no tenderness.   Abdominal: Soft. Bowel sounds are normal. He exhibits no distension and no mass. There is no tenderness. There is no rebound and no guarding.   Musculoskeletal: Normal range of motion. He exhibits no edema or tenderness.   Lymphadenopathy:     He has no cervical adenopathy.   Neurological: He is alert and oriented to person, place, and time. He has normal reflexes. He displays normal reflexes. No cranial nerve deficit. He exhibits normal muscle tone. Coordination normal.   Skin: Skin is warm and dry. No rash noted. He is not diaphoretic. No erythema. No pallor.   Psychiatric: His behavior is normal. Judgment and thought content normal. His mood appears anxious. His affect is not angry, not blunt, not labile and not inappropriate. He does not exhibit a depressed mood.   Nursing note and vitals reviewed.      Assessment:       1. Essential hypertension    2. Type 2 diabetes mellitus with hyperglycemia, without long-term current use of insulin    3. Anxiety    4. Persistent atrial fibrillation        Plan:         Roman was seen today for follow-up and hypertension.    Diagnoses and all orders for this visit:    Essential hypertension  -     Comprehensive metabolic panel; Future  -     Lipid panel; Future  -     TSH; Future  -     CBC auto differential; Future  -     Hypertension Digital Medicine (HDMP) Enrollment Order  -     Hypertension Digital Medicine (HDMP): Assign Onboarding Questionnaires    Type 2 diabetes mellitus with hyperglycemia, without long-term current use of insulin  -     Comprehensive metabolic panel;  Future    Anxiety  -     busPIRone (BUSPAR) 5 MG Tab; Take 1 tablet (5 mg total) by mouth 2 (two) times daily.  -     TSH; Future    Persistent atrial fibrillation     Diabetes well controlled after losing weight.  Continue monitoring blood pressure at home, low sodium diet.

## 2018-12-14 NOTE — PROGRESS NOTES
Roman Hodges is a 58 y.o. male here for a diabetic eye screening with non-dilated fundus photos per dr slater    Patient cooperative?: Yes  Small pupils?: Yes  Last eye exam: 2017    For exam results, see Encounter Report.

## 2018-12-18 ENCOUNTER — OFFICE VISIT (OUTPATIENT)
Dept: CARDIOLOGY | Facility: CLINIC | Age: 58
End: 2018-12-18
Payer: COMMERCIAL

## 2018-12-18 VITALS
SYSTOLIC BLOOD PRESSURE: 168 MMHG | HEART RATE: 60 BPM | DIASTOLIC BLOOD PRESSURE: 105 MMHG | WEIGHT: 202 LBS | OXYGEN SATURATION: 98 % | BODY MASS INDEX: 31.64 KG/M2

## 2018-12-18 DIAGNOSIS — E11.65 TYPE 2 DIABETES MELLITUS WITH HYPERGLYCEMIA, WITHOUT LONG-TERM CURRENT USE OF INSULIN: Chronic | ICD-10-CM

## 2018-12-18 DIAGNOSIS — I50.21 ACUTE SYSTOLIC HEART FAILURE: Primary | ICD-10-CM

## 2018-12-18 DIAGNOSIS — C22.0 HCC (HEPATOCELLULAR CARCINOMA): ICD-10-CM

## 2018-12-18 DIAGNOSIS — G47.33 OBSTRUCTIVE SLEEP APNEA ON CPAP: Chronic | ICD-10-CM

## 2018-12-18 DIAGNOSIS — I48.91 NEW ONSET ATRIAL FIBRILLATION: ICD-10-CM

## 2018-12-18 DIAGNOSIS — E66.01 SEVERE OBESITY (BMI 35.0-39.9) WITH COMORBIDITY: ICD-10-CM

## 2018-12-18 DIAGNOSIS — K21.9 GASTROESOPHAGEAL REFLUX DISEASE, ESOPHAGITIS PRESENCE NOT SPECIFIED: ICD-10-CM

## 2018-12-18 DIAGNOSIS — I10 ESSENTIAL HYPERTENSION: Chronic | ICD-10-CM

## 2018-12-18 PROCEDURE — 3044F HG A1C LEVEL LT 7.0%: CPT | Mod: CPTII,S$GLB,, | Performed by: INTERNAL MEDICINE

## 2018-12-18 PROCEDURE — 3008F BODY MASS INDEX DOCD: CPT | Mod: CPTII,S$GLB,, | Performed by: INTERNAL MEDICINE

## 2018-12-18 PROCEDURE — 3080F DIAST BP >= 90 MM HG: CPT | Mod: CPTII,S$GLB,, | Performed by: INTERNAL MEDICINE

## 2018-12-18 PROCEDURE — 99999 PR PBB SHADOW E&M-EST. PATIENT-LVL III: CPT | Mod: PBBFAC,,, | Performed by: INTERNAL MEDICINE

## 2018-12-18 PROCEDURE — 99214 OFFICE O/P EST MOD 30 MIN: CPT | Mod: S$GLB,,, | Performed by: INTERNAL MEDICINE

## 2018-12-18 PROCEDURE — 3077F SYST BP >= 140 MM HG: CPT | Mod: CPTII,S$GLB,, | Performed by: INTERNAL MEDICINE

## 2018-12-18 RX ORDER — LOSARTAN POTASSIUM 100 MG/1
100 TABLET ORAL DAILY
Qty: 90 TABLET | Refills: 3 | Status: SHIPPED | OUTPATIENT
Start: 2018-12-18 | End: 2019-01-11

## 2018-12-18 NOTE — PROGRESS NOTES
Subjective:    Patient ID:  Roman Hodges is a 58 y.o. male who presents for evaluation of Cardiomyopathy      HPI     57 y/o [place] speaking male with hx of HTN, DM, PAFib, HFrEF and HCC. Patient was recently admitted with AF RVR after presenting with near syncopal episode and spontaneously converted. 2DE with EF 30% with akinetic: mid anteroseptal, apical anterior, apical septal, apical lateral and apex and hypokinetic: mid inferoseptal and apical inferior walls. Again presented with similar symptoms, started on BB and DOAC and discharged home. Seen by Dr Sargent and was complaining of exertional  epigastric/substernal pain and was admitted for ACS r/o. Had LHC with nonobstructive CAD and EF on V-gram normal without WMA's. Clinically improved and discharged home. Since D/C has done well and denies recurrent CP, SOB, palps, orthopnea, PND, syncope, palps. Tolerating meds well.     Review of Systems   Constitution: Positive for malaise/fatigue. Negative for weakness.   HENT: Negative for congestion.    Eyes: Negative for blurred vision.   Cardiovascular: Negative for chest pain, claudication, cyanosis, dyspnea on exertion, irregular heartbeat, leg swelling, near-syncope, orthopnea, palpitations, paroxysmal nocturnal dyspnea and syncope.   Respiratory: Negative for shortness of breath.    Endocrine: Negative for polyuria.   Hematologic/Lymphatic: Negative for bleeding problem.   Skin: Negative for itching and rash.   Musculoskeletal: Negative for joint swelling, muscle cramps and muscle weakness.   Gastrointestinal: Negative for abdominal pain, hematemesis, hematochezia, melena, nausea and vomiting.   Genitourinary: Negative for dysuria and hematuria.   Neurological: Negative for dizziness, focal weakness, headaches, light-headedness and loss of balance.   Psychiatric/Behavioral: Negative for depression. The patient is not nervous/anxious.         Objective:    Physical Exam   Constitutional: He is oriented to  person, place, and time. He appears well-developed and well-nourished.   HENT:   Head: Normocephalic and atraumatic.   Neck: Neck supple. No JVD present.   Cardiovascular: Normal rate, regular rhythm and normal heart sounds.   Pulses:       Carotid pulses are 2+ on the right side, and 2+ on the left side.       Radial pulses are 2+ on the right side, and 2+ on the left side.        Femoral pulses are 2+ on the right side, and 2+ on the left side.       Posterior tibial pulses are 1+ on the right side, and 1+ on the left side.   Pulmonary/Chest: Effort normal and breath sounds normal.   Abdominal: Soft. Bowel sounds are normal.   Musculoskeletal: He exhibits no edema.   Neurological: He is alert and oriented to person, place, and time.   Skin: Skin is warm and dry.   Psychiatric: He has a normal mood and affect. His behavior is normal. Thought content normal.         Assessment:       1. Acute systolic heart failure    2. Essential hypertension    3. New onset atrial fibrillation    4. HCC (hepatocellular carcinoma)    5. Type 2 diabetes mellitus with hyperglycemia, without long-term current use of insulin    6. Severe obesity (BMI 35.0-39.9) with comorbidity    7. Gastroesophageal reflux disease, esophagitis presence not specified    8. Obstructive sleep apnea on CPAP      59 y/o pt with hx and presentation as above. Doing well from a cardiac perspective and compensated from a HF perspective. Tolerating meds well and clinically improved. On ARB, BB, for cardiomyopathy which may have been arrhythmia induced. Currently with NYHA FC I symptoms. Repeat 2DE and f/u with Dr Sargetn.          Plan:       -2DE with CFD  -F/u with me in 3 months

## 2018-12-20 ENCOUNTER — TELEPHONE (OUTPATIENT)
Dept: CARDIOLOGY | Facility: CLINIC | Age: 58
End: 2018-12-20

## 2018-12-20 NOTE — PHYSICIAN QUERY
PT Name: Roman Hodges  MR #: [id number]     Physician Query Form - Etiology of Condition Clarification      CDS/: Mojgan Walter               Contact information: [email or web address]    This form is a permanent document in the medical record.     Query Date: December 20, 2018    By submitting this query, we are merely seeking further clarification of documentation.  Please utilize your independent clinical judgment when addressing the question(s) below.     The medical record contains the following:    Findings Supporting Clinical Information Location in Medical Record   Epigastric pain  Patient was evaluated by Dr Sargent this AM and reported severe epigastric pain, diarrhea. Pain increases with exertion but is also present at rest. Patient was sent to the ED for admission. Recent Echo - LVEF 30% with akinetic: mid anteroseptal, apical anterior, apical septal, apical lateral and apex and hypokinetic: mid inferoseptal and apical inferior walls.         Discharge summary      Please document your best medical opinion regarding the etiology of _Epigastric pain _ for which treatment is/was directed.     Provider use only      GERD             [  ] Clinically Undetermined     Please document in your progress notes daily for the duration of treatment, until resolved, and include in your discharge summary.

## 2018-12-20 NOTE — TELEPHONE ENCOUNTER
----- Message from Elliot Johnson MD sent at 12/20/2018  9:32 AM CST -----  Please schedule him for an echo

## 2018-12-22 ENCOUNTER — PATIENT MESSAGE (OUTPATIENT)
Dept: CARDIOLOGY | Facility: CLINIC | Age: 58
End: 2018-12-22

## 2018-12-24 ENCOUNTER — TELEPHONE (OUTPATIENT)
Dept: CARDIOLOGY | Facility: CLINIC | Age: 58
End: 2018-12-24

## 2018-12-25 PROBLEM — H40.013 OPEN ANGLE WITH BORDERLINE FINDINGS AND LOW GLAUCOMA RISK IN BOTH EYES: Status: ACTIVE | Noted: 2018-12-25

## 2018-12-26 ENCOUNTER — OFFICE VISIT (OUTPATIENT)
Dept: HEPATOLOGY | Facility: CLINIC | Age: 58
End: 2018-12-26
Payer: COMMERCIAL

## 2018-12-26 VITALS
TEMPERATURE: 98 F | RESPIRATION RATE: 18 BRPM | SYSTOLIC BLOOD PRESSURE: 175 MMHG | BODY MASS INDEX: 31.97 KG/M2 | OXYGEN SATURATION: 98 % | HEART RATE: 75 BPM | HEIGHT: 67 IN | DIASTOLIC BLOOD PRESSURE: 98 MMHG | WEIGHT: 203.69 LBS

## 2018-12-26 DIAGNOSIS — C22.0 HCC (HEPATOCELLULAR CARCINOMA): Primary | ICD-10-CM

## 2018-12-26 PROCEDURE — 3008F BODY MASS INDEX DOCD: CPT | Mod: CPTII,S$GLB,, | Performed by: PHYSICIAN ASSISTANT

## 2018-12-26 PROCEDURE — 99214 OFFICE O/P EST MOD 30 MIN: CPT | Mod: S$GLB,,, | Performed by: PHYSICIAN ASSISTANT

## 2018-12-26 PROCEDURE — 99999 PR PBB SHADOW E&M-EST. PATIENT-LVL IV: CPT | Mod: PBBFAC,,, | Performed by: PHYSICIAN ASSISTANT

## 2018-12-26 PROCEDURE — 3077F SYST BP >= 140 MM HG: CPT | Mod: CPTII,S$GLB,, | Performed by: PHYSICIAN ASSISTANT

## 2018-12-26 PROCEDURE — 3080F DIAST BP >= 90 MM HG: CPT | Mod: CPTII,S$GLB,, | Performed by: PHYSICIAN ASSISTANT

## 2018-12-26 NOTE — Clinical Note
We saw him together in [place] [facility], liver mass reviewed and turned out to be HCC. Recommendation was for y90 and then TXP eval, Y90 done 11/2018, should I refer for txp eval now, or wait until his 12 week MRI is done?

## 2018-12-26 NOTE — PROGRESS NOTES
HEPATOLOGY CLINIC VISIT NOTE     REFERRING PROVIDER: Dr. Jose Angel Craft*    REASON FOR VISIT: liver mass    HISTORY: This is a 58 y.o. White male here for follow up of HCC, s/p y90 11/2019. He has been doing well since y90. He remains asymptomatic from liver disease standpoint. He will be due for f/u imaging 02/2019.     Likely has ROMO, fibroscan F0-F1; FH of liver disease. Serological work up negative for AIH, viral hepatitis, HH, ETOH, A1AT and wilsons.     Liver enzymes and LFTs currently normal. Recent diagnosis of afib,     Liver staging:  Fibroscan F0-F1    Ref. Range 8/24/2018 08:58   Albumin Latest Ref Range: 3.5 - 5.2 g/dL 4.2   Total Bilirubin Latest Ref Range: 0.1 - 1.0 mg/dL 0.9   Bilirubin, Direct Latest Ref Range: 0.1 - 0.3 mg/dL 0.4 (H)   AST Latest Ref Range: 10 - 40 U/L 100 (H)   ALT Latest Ref Range: 10 - 44 U/L 170 (H)               Past Medical History:   Diagnosis Date    Cardiomyopathy     Diabetes mellitus, type 2     GERD (gastroesophageal reflux disease)     Hepatocellular carcinoma     liver    Hyperlipidemia     Hypertension        Past Surgical History:   Procedure Laterality Date    COLONOSCOPY      EMBOLIZATION, YTTRIUM THERAPY N/A 11/9/2018    Performed by [facility] at [facility] OR 2ND FLR    EMBOLIZATION, YTTRIUM THERAPY N/A 10/24/2018    Performed by [facility] at [facility] OR 2ND FLR    HERNIA REPAIR      Left heart cath Left 12/4/2018    Performed by Tyler Hinojosa MD at [facility] CATH LAB/EP       FAMILY HISTORY: Negative for liver disease  Mother with cirrhosis of liver,   SOCIAL HISTORY:   Social History     Tobacco Use   Smoking Status Former Smoker   Smokeless Tobacco Never Used       Social History     Substance and Sexual Activity   Alcohol Use No       Social History     Substance and Sexual Activity   Drug Use No     ROS:   No fever, chills  No chest pain, dyspnea, cough  No abdominal pain, nausea, vomiting   No headaches, visual changes  No  lower extremity edema  No depression or anxiety      PHYSICAL EXAM:  Friendly White male, in no acute distress; alert and oriented to person, place and time  VITALS: reviewed  HEENT: Sclerae anicteric.   NECK: Supple  LUNGS: Normal respiratory effort.   ABDOMEN: Flat, soft, nontender. No organomegaly or masses.  SKIN: Warm and dry. No jaundice, No obvious rashes.   EXTREMITIES: No lower extremity edema  NEURO/PSYCH: Normal gate. Memory intact. Thought and speech pattern appropriate. Behavior normal. No depression or anxiety noted.    RECENT LABS:  Lab Results   Component Value Date    WBC 6.42 12/14/2018    HGB 16.5 12/14/2018     12/14/2018     Lab Results   Component Value Date    INR 1.0 12/01/2018     Lab Results   Component Value Date    AST 19 12/14/2018    ALT 21 12/14/2018    BILITOT 0.7 12/14/2018    ALBUMIN 3.9 12/14/2018    ALKPHOS 100 12/14/2018    CREATININE 1.3 12/14/2018    BUN 14 12/14/2018     12/14/2018    K 4.1 12/14/2018    AFP 4.1 09/20/2018     RECENT IMAGING:  Details     Reading Physician Reading Date Result Priority   Jose Alegre MD 9/11/2018       Narrative     EXAMINATION:  MRI ABDOMEN W WO CONTRAST    CLINICAL HISTORY:  liver mass;  Hepatomegaly, not elsewhere classified    TECHNIQUE:  Multiplanar multisequence routine MRI abdomen obtained with the administration of 10 mL Gadavist IV contrast.    COMPARISON:  Ultrasound 08/24/2018    FINDINGS:  There is a 6.0 cm mass in the liver, medial segment of the left hepatic lobe (segment 4A).  It demonstrates heterogeneous slightly increased T2 signal.  There is heterogeneous arterial phase enhancement with washout and capsule formation.  No other liver lesions identified.  The main portal veins and hepatic veins are patent.    Gallbladder is unremarkable.  No biliary or pancreatic ductal dilatation.  No pancreatic masses.    The spleen and right adrenal gland are unremarkable.  There is 1.4 cm left adrenal nodule with signal loss  on opposed phase imaging suggestive of an adenoma.    The abdominal aorta tapers normally.  No periaortic or periportal lymphadenopathy.    There is a 4.1 cm left renal cyst.  No renal masses or hydronephrosis.      Impression       Indeterminate left hepatic lobe (segment 4A)  6 cm mass, correlating with the 08/24/2018 ultrasound, possible adenoma.  HCC/malignancy not excluded.    Left adrenal 1.4 cm nodule, suggestive of an adenoma.    Left renal 4.1 cm cyst.    Hepatomegaly.    This report was flagged in Epic as abnormal.       Details     Reading Physician Reading Date Result Priority   Jose Alegre MD 8/24/2018       Narrative     EXAMINATION:  US ABDOMEN COMPLETE    CLINICAL HISTORY:  Abnormal levels of other serum enzymes    TECHNIQUE:  Complete abdominal ultrasound (including pancreas, liver, gallbladder, common bile duct, spleen, aorta, IVC, and kidneys) was performed.    COMPARISON:  None    FINDINGS:  The visualized portions of the pancreas, aorta, and IVC are unremarkable.    Gallbladder: No measurable gallstones, gallbladder wall thickening, or focal tenderness over the gallbladder.    Biliary system: Common bile duct is within normal limits measuring 2 mm. No intrahepatic ductal dilatation.    Liver: The liver measures 16.0 cm in length.  Right lobe mass noted measuring 5.2 x 5.5 x 6.1 cm.    Kidneys: The right kidney is normal in length measuring 10.5 cm.    The left kidney is normal in length measuring 12.3 cm. Left upper pole cyst noted measuring 3.6 cm.  No hydronephrosis or renal masses.    Spleen: The spleen is unremarkable measuring 10.2 cm in length.      Impression       Indeterminate 6 cm right hepatic lobe mass, possible FNH or adenoma.  Neoplasia not excluded.  Further evaluation could be performed with abdominal MRI.    Left renal cyst.    This report was flagged in Epic as abnormal.     ASSESSMENT  58 y.o. White male with:  1. HEPATOCELLULAR CARCINOMA  -- s/p y90, doing well  -- repeat  imaging due 02/2019  -- will discuss with Dr. Damon on timing of TXP eval.     PLAN:  1. Post y90 imaging due 02/2019  2. Discuss txp eval with Dr. Jonas.     Thank you for allowing me to participate in the care of Roman Salazar PA-C

## 2018-12-28 ENCOUNTER — TELEPHONE (OUTPATIENT)
Dept: OPHTHALMOLOGY | Facility: CLINIC | Age: 58
End: 2018-12-28

## 2018-12-28 NOTE — TELEPHONE ENCOUNTER
Called patient regarding diabetic eye screening results ; patient wife stated he saw his eye doctor last week.

## 2018-12-31 ENCOUNTER — HOSPITAL ENCOUNTER (EMERGENCY)
Facility: HOSPITAL | Age: 58
Discharge: HOME | End: 2018-12-31
Attending: EMERGENCY MEDICINE
Payer: COMMERCIAL

## 2018-12-31 ENCOUNTER — HOSPITAL ENCOUNTER (OUTPATIENT)
Dept: CARDIOLOGY | Facility: HOSPITAL | Age: 58
Discharge: HOME | End: 2018-12-31
Attending: INTERNAL MEDICINE
Payer: COMMERCIAL

## 2018-12-31 VITALS
BODY MASS INDEX: 31.23 KG/M2 | SYSTOLIC BLOOD PRESSURE: 146 MMHG | RESPIRATION RATE: 21 BRPM | HEART RATE: 72 BPM | HEIGHT: 67 IN | OXYGEN SATURATION: 97 % | DIASTOLIC BLOOD PRESSURE: 86 MMHG | WEIGHT: 199 LBS | TEMPERATURE: 98 F

## 2018-12-31 DIAGNOSIS — R07.9 CHEST PAIN: ICD-10-CM

## 2018-12-31 DIAGNOSIS — I48.91 NEW ONSET ATRIAL FIBRILLATION: ICD-10-CM

## 2018-12-31 DIAGNOSIS — I10 HYPERTENSION, UNSPECIFIED TYPE: Primary | ICD-10-CM

## 2018-12-31 DIAGNOSIS — I50.21 ACUTE SYSTOLIC HEART FAILURE: ICD-10-CM

## 2018-12-31 DIAGNOSIS — I10 ESSENTIAL HYPERTENSION: ICD-10-CM

## 2018-12-31 LAB
ALBUMIN SERPL BCP-MCNC: 4.1 G/DL
ALP SERPL-CCNC: 104 U/L
ALT SERPL W/O P-5'-P-CCNC: 24 U/L
ANION GAP SERPL CALC-SCNC: 12 MMOL/L
AORTIC ROOT ANNULUS: 3.22 CM
AORTIC VALVE CUSP SEPERATION: 2.28 CM
AST SERPL-CCNC: 19 U/L
AV INDEX (PROSTH): 0.87
AV MEAN GRADIENT: 4.28 MMHG
AV PEAK GRADIENT: 7.73 MMHG
AV VALVE AREA: 3.4 CM2
BASOPHILS # BLD AUTO: 0.02 K/UL
BASOPHILS NFR BLD: 0.3 %
BILIRUB SERPL-MCNC: 0.9 MG/DL
BILIRUB UR QL STRIP: NEGATIVE
BNP SERPL-MCNC: 46 PG/ML
BUN SERPL-MCNC: 15 MG/DL
CALCIUM SERPL-MCNC: 9.6 MG/DL
CHLORIDE SERPL-SCNC: 104 MMOL/L
CLARITY UR: CLEAR
CO2 SERPL-SCNC: 23 MMOL/L
COLOR UR: YELLOW
CREAT SERPL-MCNC: 1.1 MG/DL
CV ECHO LV RWT: 0.41 CM
DIFFERENTIAL METHOD: ABNORMAL
DOP CALC AO PEAK VEL: 1.39 M/S
DOP CALC AO VTI: 30.55 CM
DOP CALC LVOT AREA: 3.9 CM2
DOP CALC LVOT DIAMETER: 2.23 CM
DOP CALC LVOT STROKE VOLUME: 103.72 CM3
DOP CALCLVOT PEAK VEL VTI: 26.57 CM
E WAVE DECELERATION TIME: 250.76 MSEC
E/A RATIO: 1.1
ECHO LV POSTERIOR WALL: 1 CM (ref 0.6–1.1)
EOSINOPHIL # BLD AUTO: 0.1 K/UL
EOSINOPHIL NFR BLD: 1.3 %
ERYTHROCYTE [DISTWIDTH] IN BLOOD BY AUTOMATED COUNT: 13 %
EST. GFR  (AFRICAN AMERICAN): >60 ML/MIN/1.73 M^2
EST. GFR  (NON AFRICAN AMERICAN): >60 ML/MIN/1.73 M^2
FRACTIONAL SHORTENING: 53 % (ref 28–44)
GLUCOSE SERPL-MCNC: 101 MG/DL
GLUCOSE UR QL STRIP: NEGATIVE
HCT VFR BLD AUTO: 48 %
HGB BLD-MCNC: 16.7 G/DL
HGB UR QL STRIP: NEGATIVE
INTERVENTRICULAR SEPTUM: 1 CM (ref 0.6–1.1)
IVRT: 0.09 MSEC
KETONES UR QL STRIP: NEGATIVE
LEFT ATRIUM SIZE: 3.87 CM
LEFT INTERNAL DIMENSION IN SYSTOLE: 2.3 CM (ref 2.1–4)
LEFT VENTRICLE DIASTOLIC VOLUME: 90.38 ML
LEFT VENTRICLE SYSTOLIC VOLUME: 36.72 ML
LEFT VENTRICULAR INTERNAL DIMENSION IN DIASTOLE: 4.9 CM (ref 3.5–6)
LEFT VENTRICULAR MASS: 176.04 G
LEUKOCYTE ESTERASE UR QL STRIP: NEGATIVE
LYMPHOCYTES # BLD AUTO: 0.7 K/UL
LYMPHOCYTES NFR BLD: 12 %
MCH RBC QN AUTO: 29.3 PG
MCHC RBC AUTO-ENTMCNC: 34.8 G/DL
MCV RBC AUTO: 84 FL
MONOCYTES # BLD AUTO: 0.5 K/UL
MONOCYTES NFR BLD: 8.7 %
MV PEAK A VEL: 0.81 M/S
MV PEAK E VEL: 0.89 M/S
NEUTROPHILS # BLD AUTO: 4.7 K/UL
NEUTROPHILS NFR BLD: 77.5 %
NITRITE UR QL STRIP: NEGATIVE
PH UR STRIP: 7 [PH] (ref 5–8)
PISA TR MAX VEL: 1.37 M/S
PLATELET # BLD AUTO: 258 K/UL
PMV BLD AUTO: 9.8 FL
POTASSIUM SERPL-SCNC: 3 MMOL/L
PROT SERPL-MCNC: 7.8 G/DL
PROT UR QL STRIP: NEGATIVE
PULM VEIN S/D RATIO: 1.14
PV PEAK D VEL: 0.28 M/S
PV PEAK S VEL: 0.32 M/S
PV PEAK VELOCITY: 1.36 CM/S
RA PRESSURE: 3 MMHG
RBC # BLD AUTO: 5.7 M/UL
RIGHT VENTRICULAR END-DIASTOLIC DIMENSION: 3.46 CM
SODIUM SERPL-SCNC: 139 MMOL/L
SP GR UR STRIP: <=1.005 (ref 1–1.03)
TR MAX PG: 7.51 MMHG
TROPONIN I SERPL DL<=0.01 NG/ML-MCNC: 0.03 NG/ML
TROPONIN I SERPL DL<=0.01 NG/ML-MCNC: 0.03 NG/ML
TV REST PULMONARY ARTERY PRESSURE: 11 MMHG
URN SPEC COLLECT METH UR: ABNORMAL
UROBILINOGEN UR STRIP-ACNC: NEGATIVE EU/DL
WBC # BLD AUTO: 6 K/UL

## 2018-12-31 PROCEDURE — 93010 ELECTROCARDIOGRAM REPORT: CPT | Mod: ,,, | Performed by: INTERNAL MEDICINE

## 2018-12-31 PROCEDURE — 63600175 PHARM REV CODE 636 W HCPCS: Performed by: PHYSICIAN ASSISTANT

## 2018-12-31 PROCEDURE — 93005 ELECTROCARDIOGRAM TRACING: CPT

## 2018-12-31 PROCEDURE — 25000003 PHARM REV CODE 250: Performed by: EMERGENCY MEDICINE

## 2018-12-31 PROCEDURE — 93306 TTE W/DOPPLER COMPLETE: CPT | Mod: 26,,, | Performed by: INTERNAL MEDICINE

## 2018-12-31 PROCEDURE — 93306 TTE W/DOPPLER COMPLETE: CPT

## 2018-12-31 PROCEDURE — 96374 THER/PROPH/DIAG INJ IV PUSH: CPT

## 2018-12-31 PROCEDURE — 93306 TRANSTHORACIC ECHO (TTE) COMPLETE (CUPID ONLY): ICD-10-PCS | Mod: 26,,, | Performed by: INTERNAL MEDICINE

## 2018-12-31 PROCEDURE — 63600175 PHARM REV CODE 636 W HCPCS: Performed by: EMERGENCY MEDICINE

## 2018-12-31 PROCEDURE — 81003 URINALYSIS AUTO W/O SCOPE: CPT

## 2018-12-31 PROCEDURE — 80053 COMPREHEN METABOLIC PANEL: CPT

## 2018-12-31 PROCEDURE — 25000003 PHARM REV CODE 250: Performed by: PHYSICIAN ASSISTANT

## 2018-12-31 PROCEDURE — 93010 EKG 12-LEAD: ICD-10-PCS | Mod: ,,, | Performed by: INTERNAL MEDICINE

## 2018-12-31 PROCEDURE — 84484 ASSAY OF TROPONIN QUANT: CPT | Mod: 91

## 2018-12-31 PROCEDURE — 99284 EMERGENCY DEPT VISIT MOD MDM: CPT | Mod: 25

## 2018-12-31 PROCEDURE — 85025 COMPLETE CBC W/AUTO DIFF WBC: CPT

## 2018-12-31 PROCEDURE — 83880 ASSAY OF NATRIURETIC PEPTIDE: CPT

## 2018-12-31 RX ORDER — NITROGLYCERIN 0.4 MG/1
0.4 TABLET SUBLINGUAL EVERY 5 MIN PRN
Status: COMPLETED | OUTPATIENT
Start: 2018-12-31 | End: 2018-12-31

## 2018-12-31 RX ORDER — METOPROLOL SUCCINATE 25 MG/1
50 TABLET, EXTENDED RELEASE ORAL DAILY
Qty: 30 TABLET | Refills: 0 | Status: ON HOLD | OUTPATIENT
Start: 2018-12-31 | End: 2019-01-15 | Stop reason: CLARIF

## 2018-12-31 RX ORDER — POTASSIUM CHLORIDE 20 MEQ/1
40 TABLET, EXTENDED RELEASE ORAL
Status: DISCONTINUED | OUTPATIENT
Start: 2018-12-31 | End: 2018-12-31

## 2018-12-31 RX ORDER — HYDROCHLOROTHIAZIDE 25 MG/1
25 TABLET ORAL
Status: DISCONTINUED | OUTPATIENT
Start: 2018-12-31 | End: 2018-12-31

## 2018-12-31 RX ORDER — HYDRALAZINE HYDROCHLORIDE 20 MG/ML
10 INJECTION INTRAMUSCULAR; INTRAVENOUS
Status: COMPLETED | OUTPATIENT
Start: 2018-12-31 | End: 2018-12-31

## 2018-12-31 RX ORDER — POTASSIUM CHLORIDE 20 MEQ/1
40 TABLET, EXTENDED RELEASE ORAL
Status: COMPLETED | OUTPATIENT
Start: 2018-12-31 | End: 2018-12-31

## 2018-12-31 RX ORDER — HYDROCHLOROTHIAZIDE 25 MG/1
12.5 TABLET ORAL DAILY
Qty: 30 TABLET | Refills: 0 | Status: SHIPPED | OUTPATIENT
Start: 2018-12-31 | End: 2019-01-11

## 2018-12-31 RX ORDER — ASPIRIN 325 MG
325 TABLET ORAL
Status: COMPLETED | OUTPATIENT
Start: 2018-12-31 | End: 2018-12-31

## 2018-12-31 RX ADMIN — POTASSIUM CHLORIDE 40 MEQ: 20 TABLET, EXTENDED RELEASE ORAL at 08:12

## 2018-12-31 RX ADMIN — ASPIRIN 325 MG ORAL TABLET 325 MG: 325 PILL ORAL at 06:12

## 2018-12-31 RX ADMIN — NITROGLYCERIN 0.4 MG: 0.4 TABLET, ORALLY DISINTEGRATING SUBLINGUAL at 09:12

## 2018-12-31 RX ADMIN — NITROGLYCERIN 0.4 MG: 0.4 TABLET, ORALLY DISINTEGRATING SUBLINGUAL at 06:12

## 2018-12-31 RX ADMIN — APIXABAN 5 MG: 5 TABLET, FILM COATED ORAL at 08:12

## 2018-12-31 RX ADMIN — HYDRALAZINE HYDROCHLORIDE 10 MG: 20 INJECTION INTRAMUSCULAR; INTRAVENOUS at 09:12

## 2018-12-31 RX ADMIN — NITROGLYCERIN 0.4 MG: 0.4 TABLET, ORALLY DISINTEGRATING SUBLINGUAL at 07:12

## 2019-01-01 ENCOUNTER — PATIENT MESSAGE (OUTPATIENT)
Dept: CARDIOLOGY | Facility: CLINIC | Age: 59
End: 2019-01-01

## 2019-01-01 NOTE — ED PROVIDER NOTES
Encounter Date: 12/31/2018       History     Chief Complaint   Patient presents with    Chest Pain     chest pain and elevated blood pressure since last night     Roman Hodges, a 58 y.o. male  has a past medical history of Cardiomyopathy, Diabetes mellitus, type 2, GERD (gastroesophageal reflux disease), Hepatocellular carcinoma, Hyperlipidemia, and Hypertension.     He presents to the ED for evaluation of elevated BP as well as CP that started yesterday.  Patient states that he had an episode of CP described as stabbing with pressure yesterday while at rest that lasted for about 20-30 mins.  No radiation of pain.  No treatments were tried for this issue.  He states that he subsequently had 2 more episodes today and is currently having an episode of pain.  Patient was recently diagnosied with a-fib and has had changes to his HTN medications, but feels that his BP has not been controlled with these changes.  Incidentally, patient had an ECHO performed today, ordered by Dr. Johnson has f/u testing for his recent admission for A-fib.   No previous smoking history.  No previous MI or CVA.            The history is provided by the patient.     Review of patient's allergies indicates:   Allergen Reactions    Flu vaccine 2011 (36 mos+)(pf)      Patient reports he developed Bell's Palsy 2 days after his last flu shot in 2008     Past Medical History:   Diagnosis Date    Cardiomyopathy     Diabetes mellitus, type 2     GERD (gastroesophageal reflux disease)     Hepatocellular carcinoma     liver    Hyperlipidemia     Hypertension      Past Surgical History:   Procedure Laterality Date    COLONOSCOPY      EMBOLIZATION, YTTRIUM THERAPY N/A 11/9/2018    Performed by [facility] at [facility] OR 2ND FLR    EMBOLIZATION, YTTRIUM THERAPY N/A 10/24/2018    Performed by [facility] Diagnostic Provider at [facility] OR 2ND FLR    HERNIA REPAIR      Left heart cath Left 12/4/2018    Performed by Tyler Hinojosa MD at  [facility] CATH LAB/EP     No family history on file.  Social History     Tobacco Use    Smoking status: Former Smoker    Smokeless tobacco: Never Used   Substance Use Topics    Alcohol use: No    Drug use: No     Review of Systems   Constitutional: Negative for diaphoresis and fever.   Respiratory: Negative for shortness of breath.    Cardiovascular: Positive for chest pain.   Gastrointestinal: Negative for nausea and vomiting.   Skin: Negative for color change.   Neurological: Negative for dizziness and weakness.   Hematological: Negative for adenopathy.   Psychiatric/Behavioral: Negative for agitation and confusion.   All other systems reviewed and are negative.      Physical Exam     Initial Vitals [12/31/18 1809]   BP Pulse Resp Temp SpO2   (!) 212/115 62 18 97.7 °F (36.5 °C) 99 %      MAP       --         Physical Exam    Nursing note and vitals reviewed.  Constitutional: He appears well-developed and well-nourished.   HENT:   Head: Normocephalic and atraumatic.   Right Ear: External ear normal.   Left Ear: External ear normal.   Nose: Nose normal.   Mouth/Throat: Oropharynx is clear and moist.   Eyes: EOM are normal.   Neck: Normal range of motion. Neck supple.   Cardiovascular: Normal rate and regular rhythm.       Pulmonary/Chest: Breath sounds normal. No respiratory distress. He has no wheezes. He has no rhonchi. He has no rales. He exhibits tenderness.   Abdominal: Soft. Bowel sounds are normal. He exhibits no distension. There is no tenderness. There is no rebound and no guarding.   Musculoskeletal: Normal range of motion. He exhibits no edema or tenderness.   Neurological: He is alert and oriented to person, place, and time. No cranial nerve deficit.   Skin: Skin is warm and dry. Capillary refill takes less than 2 seconds.   Psychiatric: He has a normal mood and affect. Thought content normal.         ED Course   Procedures  Labs Reviewed   CBC W/ AUTO DIFFERENTIAL - Abnormal; Notable for the following  components:       Result Value    Lymph # 0.7 (*)     Gran% 77.5 (*)     Lymph% 12.0 (*)     All other components within normal limits   COMPREHENSIVE METABOLIC PANEL - Abnormal; Notable for the following components:    Potassium 3.0 (*)     All other components within normal limits   TROPONIN I - Abnormal; Notable for the following components:    Troponin I 0.032 (*)     All other components within normal limits   URINALYSIS, REFLEX TO URINE CULTURE - Abnormal; Notable for the following components:    Specific Gravity, UA <=1.005 (*)     All other components within normal limits    Narrative:     Preferred Collection Type->Urine, Clean Catch   TROPONIN I   B-TYPE NATRIURETIC PEPTIDE          Imaging Results          X-Ray Chest AP Portable (Final result)  Result time 12/31/18 19:46:46    Final result by Angelia Ratliff MD (12/31/18 19:46:46)                 Impression:      No acute intrathoracic abnormality detected.      Electronically signed by: Angelia Ratliff  Date:    12/31/2018  Time:    19:46             Narrative:    EXAMINATION:  XR CHEST AP PORTABLE    CLINICAL HISTORY:  Chest Pain;    TECHNIQUE:  Single frontal view of the chest was performed.    COMPARISON:  None    FINDINGS:  AP chest radiograph demonstrates a cardiac silhouette within normal limits.  There is some tortuosity of the descending thoracic aorta.  There is decreased inspiratory effort when compared to the previous examination.  There is no definite focal consolidation, pneumothorax, or pleural effusion.                                 Medical Decision Making:   Initial Assessment:   CP   Differential Diagnosis:   ACS, costochondritis, AAA, angina   Clinical Tests:   Lab Tests: Reviewed and Ordered  Radiological Study: Ordered and Reviewed  ED Management:  Patient presents to ED with mid sternal CP and elevated BP. NTG given with improvement.  CBC, CMP, BNP show no concerning abnormalities.  Initial troponin at 0.032 with secondary  trop at 0.025.  Attending spoke with Cardiology on call who instructed to increase does of metoprolol and to add lasix to HTN medications.  Will try to fit in patient to clinic before end of the week.  While waiting for labs, pt's blood pressure increased and CP returned, gave hydralazine and NTG with improvement.  Strict return precautions given and patient verbalized understanding.                  Attending Attestation:     Physician Attestation Statement for NP/PA:   I have conducted a face to face encounter with this patient in addition to the NP/PA, due to NP/PA Request    Other NP/PA Attestation Additions:      Medical Decision Making: Patient is 57 y/o male here with CP that started while at rest.  Patient is hypertensive.  Other VSS.  RRR, lungs CTAB.  ED workup reveals mild elevated troponin but it is consistent with previous troponin from earlier this month.  Patient's BP controlled in ED and CP resolved.    Case discussed with Dr. ESTEBAN Calhoun who is familiar with patient.  Just read patient's ECHO today and states that he had good EF   Patient also just had LHC performed by Dr. Hinojosa on 12/4/18 which showed clean coronaries.  He suspects troponin elevation all secondary to elevated BP    Will increased metoprolol and add  HCTZ.  Dr. Hinojosa will contact patient to arrange urgent follow up.       Upon re-evaluation, the patient's status has improved.  After complete ED evaluation, clinical impression is most consistent with uncontrolled HTN, CP.  PCP follow-up within 1 week was recommended.    After taking into careful account the patient's history, physical exam findings, as well as empirical and objective data obtained throughout ED workup, I feel no emergent medical condition has been identified. No further evaluation or admission was felt to be required, and the patient is stable for discharge from the ED. The patient and any additional family present were updated with test results, overall clinical  impression, and recommended further plan of care, including discharge instructions as provided and outpatient follow-up for continued evaluation and management as needed. All questions were answered. The patient expressed understanding and agreed with current plan for discharge and follow-up plan of care. Strict ED return precautions were provided, including return/worsening of current symptoms, new symptoms, or any other concerns.                   ED Course as of Jan 01 0236   Mon Dec 31, 2018   1841 EKG with sinus bradycardia, rate of 57 bpm.  + LVH, normal conduction, normal intervals, normal ST segments.  No STEMI  [LD]   2012 improved BP: (!) 163/91 [LD]   2040 Case discussed with Dr. ESTEBAN Calhoun who is familiar with patient.  Just read patient's ECHO today and states that he had good EF   Patient also just had LHC performed by Dr. Hinojosa on 12/4/18 which showed clean coronaries.  He suspects troponin elevation all secondary to elevated BP    Will increased metoprolol and add  HCTZ.  Dr. Hinojosa will contact patient to arrange urgent follow up.    [LD]   2151 Troponin I: 0.025 [LD]   2212 BP: 135/79 [LD]      ED Course User Index  [LD] Karolina Justin MD     Clinical Impression:   The primary encounter diagnosis was Hypertension, unspecified type. A diagnosis of Chest pain was also pertinent to this visit.                             Marguerite Pablo PA-C  01/01/19 0238       Karolina Justin MD  01/01/19 2001

## 2019-01-02 ENCOUNTER — OFFICE VISIT (OUTPATIENT)
Dept: ELECTROPHYSIOLOGY | Facility: CLINIC | Age: 59
End: 2019-01-02
Payer: COMMERCIAL

## 2019-01-02 VITALS
HEART RATE: 67 BPM | HEIGHT: 67 IN | DIASTOLIC BLOOD PRESSURE: 88 MMHG | BODY MASS INDEX: 31.45 KG/M2 | SYSTOLIC BLOOD PRESSURE: 130 MMHG | WEIGHT: 200.38 LBS

## 2019-01-02 DIAGNOSIS — I50.21 ACUTE SYSTOLIC HEART FAILURE: ICD-10-CM

## 2019-01-02 DIAGNOSIS — I48.91 ATRIAL FIBRILLATION, UNSPECIFIED TYPE: ICD-10-CM

## 2019-01-02 DIAGNOSIS — I48.91 NEW ONSET ATRIAL FIBRILLATION: Primary | ICD-10-CM

## 2019-01-02 DIAGNOSIS — I10 ESSENTIAL HYPERTENSION: Chronic | ICD-10-CM

## 2019-01-02 DIAGNOSIS — I49.9 CARDIAC ARRHYTHMIA, UNSPECIFIED CARDIAC ARRHYTHMIA TYPE: ICD-10-CM

## 2019-01-02 DIAGNOSIS — E66.01 SEVERE OBESITY (BMI 35.0-39.9) WITH COMORBIDITY: ICD-10-CM

## 2019-01-02 DIAGNOSIS — E11.65 TYPE 2 DIABETES MELLITUS WITH HYPERGLYCEMIA, WITHOUT LONG-TERM CURRENT USE OF INSULIN: Chronic | ICD-10-CM

## 2019-01-02 DIAGNOSIS — G47.33 OBSTRUCTIVE SLEEP APNEA ON CPAP: Chronic | ICD-10-CM

## 2019-01-02 PROCEDURE — 3008F PR BODY MASS INDEX (BMI) DOCUMENTED: ICD-10-PCS | Mod: CPTII,S$GLB,, | Performed by: INTERNAL MEDICINE

## 2019-01-02 PROCEDURE — 3044F PR MOST RECENT HEMOGLOBIN A1C LEVEL <7.0%: ICD-10-PCS | Mod: CPTII,S$GLB,, | Performed by: INTERNAL MEDICINE

## 2019-01-02 PROCEDURE — 99214 OFFICE O/P EST MOD 30 MIN: CPT | Mod: S$GLB,,, | Performed by: INTERNAL MEDICINE

## 2019-01-02 PROCEDURE — 93010 ELECTROCARDIOGRAM REPORT: CPT | Mod: S$GLB,,, | Performed by: INTERNAL MEDICINE

## 2019-01-02 PROCEDURE — 3079F PR MOST RECENT DIASTOLIC BLOOD PRESSURE 80-89 MM HG: ICD-10-PCS | Mod: CPTII,S$GLB,, | Performed by: INTERNAL MEDICINE

## 2019-01-02 PROCEDURE — 3075F PR MOST RECENT SYSTOLIC BLOOD PRESS GE 130-139MM HG: ICD-10-PCS | Mod: CPTII,S$GLB,, | Performed by: INTERNAL MEDICINE

## 2019-01-02 PROCEDURE — 93005 RHYTHM STRIP: ICD-10-PCS | Mod: S$GLB,,, | Performed by: INTERNAL MEDICINE

## 2019-01-02 PROCEDURE — 99999 PR PBB SHADOW E&M-EST. PATIENT-LVL III: ICD-10-PCS | Mod: PBBFAC,,, | Performed by: INTERNAL MEDICINE

## 2019-01-02 PROCEDURE — 3044F HG A1C LEVEL LT 7.0%: CPT | Mod: CPTII,S$GLB,, | Performed by: INTERNAL MEDICINE

## 2019-01-02 PROCEDURE — 99214 PR OFFICE/OUTPT VISIT, EST, LEVL IV, 30-39 MIN: ICD-10-PCS | Mod: S$GLB,,, | Performed by: INTERNAL MEDICINE

## 2019-01-02 PROCEDURE — 93010 RHYTHM STRIP: ICD-10-PCS | Mod: S$GLB,,, | Performed by: INTERNAL MEDICINE

## 2019-01-02 PROCEDURE — 3079F DIAST BP 80-89 MM HG: CPT | Mod: CPTII,S$GLB,, | Performed by: INTERNAL MEDICINE

## 2019-01-02 PROCEDURE — 3008F BODY MASS INDEX DOCD: CPT | Mod: CPTII,S$GLB,, | Performed by: INTERNAL MEDICINE

## 2019-01-02 PROCEDURE — 3075F SYST BP GE 130 - 139MM HG: CPT | Mod: CPTII,S$GLB,, | Performed by: INTERNAL MEDICINE

## 2019-01-02 PROCEDURE — 93005 ELECTROCARDIOGRAM TRACING: CPT | Mod: S$GLB,,, | Performed by: INTERNAL MEDICINE

## 2019-01-02 PROCEDURE — 99999 PR PBB SHADOW E&M-EST. PATIENT-LVL III: CPT | Mod: PBBFAC,,, | Performed by: INTERNAL MEDICINE

## 2019-01-02 RX ORDER — APIXABAN 5 MG/1
5 TABLET, FILM COATED ORAL 2 TIMES DAILY
COMMUNITY
Start: 2018-12-31 | End: 2019-02-01 | Stop reason: SDUPTHER

## 2019-01-02 NOTE — PROGRESS NOTES
Subjective:    Patient ID:  Roman Hodges is a 58 y.o. male who presents for evaluation of No chief complaint on file.      HPI   58 y.o. M  HTN on meds  DM2 on meds  MALLIKA, on CPAP  Hepatocellular CA, pending embolization    During several recent visits to ER, found in AF with RVR 11/29. Rx diltiazem, with return to NSR.  Also has c/o severe epigastric pain, diarrhea. Pain increases with exertion but is also present at rest.  echo during that eval: 30% LVEF, with RWMAs... on background of LVEF >55% 10/15/17 (Dr. Patterson's office).    At first visit, pt c/o ongoing CP; sent to ER. Subsequent cath showed nonobstructive CAD. Echo thereafter showed LVEF back to 55%.  Has struggled with HTN urgencies. Medical regimen improved; now .  No palpitations c/w prior AF episode.    My interpretation of today's ECG is NSR 67      Review of Systems   Constitution: Negative. Negative for weakness and malaise/fatigue.   HENT: Negative.  Negative for ear pain and tinnitus.    Eyes: Negative for blurred vision.   Cardiovascular: Negative for chest pain, dyspnea on exertion, near-syncope, palpitations and syncope.   Respiratory: Negative.  Negative for shortness of breath.    Endocrine: Negative.  Negative for polyuria.   Hematologic/Lymphatic: Does not bruise/bleed easily.   Skin: Negative.  Negative for rash.   Musculoskeletal: Negative.  Negative for joint pain and muscle weakness.   Gastrointestinal: Positive for abdominal pain. Negative for change in bowel habit.   Genitourinary: Negative for frequency.   Neurological: Negative.  Negative for dizziness.   Psychiatric/Behavioral: Negative.  Negative for depression. The patient is not nervous/anxious.    Allergic/Immunologic: Negative for environmental allergies.        Objective:    Physical Exam   Constitutional: He is oriented to person, place, and time. He appears well-developed and well-nourished. He appears distressed.   HENT:   Head: Normocephalic and atraumatic.    Eyes: Conjunctivae, EOM and lids are normal. No scleral icterus.   Neck: Normal range of motion. No JVD present. No tracheal deviation present. No thyromegaly present.   Cardiovascular: Normal rate, regular rhythm, normal heart sounds and intact distal pulses.  No extrasystoles are present. PMI is not displaced. Exam reveals no gallop and no friction rub.   No murmur heard.  Pulses:       Radial pulses are 2+ on the right side, and 2+ on the left side.   Pulmonary/Chest: Effort normal and breath sounds normal. No accessory muscle usage. No tachypnea. No respiratory distress. He has no wheezes. He has no rales.   Abdominal: Soft. Bowel sounds are normal. He exhibits no distension. There is no hepatosplenomegaly. There is no tenderness.   Musculoskeletal: Normal range of motion. He exhibits no edema.   Neurological: He is alert and oriented to person, place, and time. He has normal reflexes. He exhibits normal muscle tone.   Skin: Skin is warm and dry. No rash noted.   Psychiatric: His behavior is normal. His mood appears anxious.   Nursing note and vitals reviewed.        Assessment:       1. New onset atrial fibrillation    2. Essential hypertension    3. Acute systolic heart failure    4. Severe obesity (BMI 35.0-39.9) with comorbidity    5. Type 2 diabetes mellitus with hyperglycemia, without long-term current use of insulin    6. Obstructive sleep apnea on CPAP    Epigastric pain, r/o UA     Plan:       1. AF  One episode, resolved post diltiazem.  Continue BB, eliquis.    2. HTN  Under better control now.  Has f/u planned with Dr Johnson in coming days-weeks.    Return in 1 year with echo, or earlier prn (e.g., if symptomatic AF recurs).

## 2019-01-03 ENCOUNTER — HOSPITAL ENCOUNTER (EMERGENCY)
Facility: HOSPITAL | Age: 59
Discharge: HOME | End: 2019-01-04
Attending: EMERGENCY MEDICINE
Payer: COMMERCIAL

## 2019-01-03 DIAGNOSIS — R07.9 CHEST PAIN: ICD-10-CM

## 2019-01-03 DIAGNOSIS — I49.9 CARDIAC ARRHYTHMIA, UNSPECIFIED CARDIAC ARRHYTHMIA TYPE: Primary | ICD-10-CM

## 2019-01-03 DIAGNOSIS — I10 HYPERTENSION: Primary | ICD-10-CM

## 2019-01-03 DIAGNOSIS — E87.6 HYPOKALEMIA: ICD-10-CM

## 2019-01-03 LAB
ALBUMIN SERPL BCP-MCNC: 3.7 G/DL
ALP SERPL-CCNC: 117 U/L
ALT SERPL W/O P-5'-P-CCNC: 23 U/L
ANION GAP SERPL CALC-SCNC: 12 MMOL/L
AST SERPL-CCNC: 23 U/L
BASOPHILS # BLD AUTO: 0.03 K/UL
BASOPHILS NFR BLD: 0.5 %
BILIRUB SERPL-MCNC: 0.5 MG/DL
BNP SERPL-MCNC: 25 PG/ML
BUN SERPL-MCNC: 19 MG/DL
CALCIUM SERPL-MCNC: 9.4 MG/DL
CHLORIDE SERPL-SCNC: 103 MMOL/L
CO2 SERPL-SCNC: 20 MMOL/L
CREAT SERPL-MCNC: 1.5 MG/DL
DIFFERENTIAL METHOD: ABNORMAL
EOSINOPHIL # BLD AUTO: 0.2 K/UL
EOSINOPHIL NFR BLD: 3.2 %
ERYTHROCYTE [DISTWIDTH] IN BLOOD BY AUTOMATED COUNT: 13 %
EST. GFR  (AFRICAN AMERICAN): 58 ML/MIN/1.73 M^2
EST. GFR  (NON AFRICAN AMERICAN): 50 ML/MIN/1.73 M^2
GLUCOSE SERPL-MCNC: 115 MG/DL
HCT VFR BLD AUTO: 47 %
HGB BLD-MCNC: 16.2 G/DL
LYMPHOCYTES # BLD AUTO: 0.8 K/UL
LYMPHOCYTES NFR BLD: 13.1 %
MCH RBC QN AUTO: 29.5 PG
MCHC RBC AUTO-ENTMCNC: 34.5 G/DL
MCV RBC AUTO: 86 FL
MONOCYTES # BLD AUTO: 0.6 K/UL
MONOCYTES NFR BLD: 9.4 %
NEUTROPHILS # BLD AUTO: 4.4 K/UL
NEUTROPHILS NFR BLD: 73.6 %
PLATELET # BLD AUTO: 251 K/UL
PMV BLD AUTO: 9.8 FL
POTASSIUM SERPL-SCNC: 3.1 MMOL/L
PROT SERPL-MCNC: 7.9 G/DL
RBC # BLD AUTO: 5.49 M/UL
SODIUM SERPL-SCNC: 135 MMOL/L
TROPONIN I SERPL DL<=0.01 NG/ML-MCNC: 0.02 NG/ML
WBC # BLD AUTO: 5.96 K/UL

## 2019-01-03 PROCEDURE — 80053 COMPREHEN METABOLIC PANEL: CPT

## 2019-01-03 PROCEDURE — 63600175 PHARM REV CODE 636 W HCPCS: Performed by: PHYSICIAN ASSISTANT

## 2019-01-03 PROCEDURE — 99285 EMERGENCY DEPT VISIT HI MDM: CPT

## 2019-01-03 PROCEDURE — 85025 COMPLETE CBC W/AUTO DIFF WBC: CPT

## 2019-01-03 PROCEDURE — 93010 RHYTHM STRIP: ICD-10-PCS | Mod: S$GLB,,, | Performed by: INTERNAL MEDICINE

## 2019-01-03 PROCEDURE — 93010 ELECTROCARDIOGRAM REPORT: CPT | Mod: S$GLB,,, | Performed by: INTERNAL MEDICINE

## 2019-01-03 PROCEDURE — 93005 ELECTROCARDIOGRAM TRACING: CPT

## 2019-01-03 PROCEDURE — 84484 ASSAY OF TROPONIN QUANT: CPT

## 2019-01-03 PROCEDURE — 25000003 PHARM REV CODE 250: Performed by: PHYSICIAN ASSISTANT

## 2019-01-03 PROCEDURE — 83880 ASSAY OF NATRIURETIC PEPTIDE: CPT

## 2019-01-03 RX ORDER — NITROGLYCERIN 0.4 MG/1
0.4 TABLET SUBLINGUAL EVERY 5 MIN PRN
Status: DISCONTINUED | OUTPATIENT
Start: 2019-01-03 | End: 2019-01-04 | Stop reason: HOSPADM

## 2019-01-03 RX ORDER — ASPIRIN 325 MG
325 TABLET ORAL
Status: COMPLETED | OUTPATIENT
Start: 2019-01-03 | End: 2019-01-03

## 2019-01-03 RX ADMIN — NITROGLYCERIN 0.4 MG: 0.4 TABLET, ORALLY DISINTEGRATING SUBLINGUAL at 10:01

## 2019-01-03 RX ADMIN — ASPIRIN 325 MG ORAL TABLET 325 MG: 325 PILL ORAL at 10:01

## 2019-01-04 ENCOUNTER — PATIENT MESSAGE (OUTPATIENT)
Dept: HEPATOLOGY | Facility: CLINIC | Age: 59
End: 2019-01-04

## 2019-01-04 ENCOUNTER — TELEPHONE (OUTPATIENT)
Dept: CARDIOLOGY | Facility: CLINIC | Age: 59
End: 2019-01-04

## 2019-01-04 VITALS
HEART RATE: 57 BPM | BODY MASS INDEX: 31.08 KG/M2 | SYSTOLIC BLOOD PRESSURE: 170 MMHG | DIASTOLIC BLOOD PRESSURE: 104 MMHG | WEIGHT: 198 LBS | RESPIRATION RATE: 19 BRPM | TEMPERATURE: 98 F | HEIGHT: 67 IN | OXYGEN SATURATION: 97 %

## 2019-01-04 LAB — TROPONIN I SERPL DL<=0.01 NG/ML-MCNC: 0.03 NG/ML

## 2019-01-04 PROCEDURE — 25000003 PHARM REV CODE 250: Performed by: PHYSICIAN ASSISTANT

## 2019-01-04 PROCEDURE — 84484 ASSAY OF TROPONIN QUANT: CPT

## 2019-01-04 RX ORDER — POTASSIUM CHLORIDE 20 MEQ/1
40 TABLET, EXTENDED RELEASE ORAL
Status: COMPLETED | OUTPATIENT
Start: 2019-01-04 | End: 2019-01-04

## 2019-01-04 RX ORDER — POTASSIUM CHLORIDE 1500 MG/1
20 TABLET, EXTENDED RELEASE ORAL 2 TIMES DAILY
Qty: 10 TABLET | Refills: 0 | Status: SHIPPED | OUTPATIENT
Start: 2019-01-04 | End: 2019-01-09

## 2019-01-04 RX ADMIN — POTASSIUM CHLORIDE 40 MEQ: 20 TABLET, EXTENDED RELEASE ORAL at 02:01

## 2019-01-04 NOTE — ED PROVIDER NOTES
Encounter Date: 1/3/2019       History     Chief Complaint   Patient presents with    Hypertension     58y M ambulatory to ED with c/o HTN x1 day at home. pt reports taking BP medication today     58-year-old male with PMH of CHF, DM to, GERD, HLD, HTN, AFib, long-term anticoagulant use, cardio myopathy presents to the ED with complaints of chest pain and hypertension x3 hours PTA.  Patient states his chest began hurting while he was at rest on the sofa watching a movie, he took his blood pressure and noted that it was elevated.  He states that this morning when he took his blood pressure was in the 120's/80's.  He states the chest pain is midsternal, describes it as a heaviness and rates it at 4-5/10 in severity.  Patient has been seen in the ED multiple times within the last month for similar complaints. During his last visit to the ED on 12/31/2018 his metoprolol was increased and HCTZ was added to his regimen.  He followed up with Cardiology yesterday for results of GABBY and was not having symptoms at that time.  He also admits to minimal diaphoresis at onset of symptoms. He denies radiation of pain, nausea, vomiting, abdominal pain, palpitations, leg swelling, shortness of breath      The history is provided by the patient. No  was used.     Review of patient's allergies indicates:   Allergen Reactions    Lisinopril      Stomach ache    Flu vaccine 2011 (36 mos+)(pf)      Patient reports he developed Bell's Palsy 2 days after his last flu shot in 2008     Past Medical History:   Diagnosis Date    A-fib     Anticoagulant long-term use     Cardiomyopathy     CHF (congestive heart failure)     Diabetes mellitus, type 2     GERD (gastroesophageal reflux disease)     Hepatocellular carcinoma     liver    Hyperlipidemia     Hypertension      Past Surgical History:   Procedure Laterality Date    COLONOSCOPY      EMBOLIZATION, YTTRIUM THERAPY N/A 11/9/2018    Performed by [facility]  Diagnostic Provider at [facility] OR 2ND FLR    EMBOLIZATION, YTTRIUM THERAPY N/A 10/24/2018    Performed by [facility] Provider at [facility] OR 2ND FLR    HERNIA REPAIR      Left heart cath Left 2018    Performed by Tylre H[facility]josa MD at [facility] CATH LAB/EP     Family History   Problem Relation Age of Onset    Hypertension Mother     Cancer Mother     Hypertension Father     Stroke Father     Cancer Father      Social History     Tobacco Use    Smoking status: Former Smoker     Packs/day: 1.00     Years: 10.00     Pack years: 10.00     Types: Cigarettes     Last attempt to quit: 1980     Years since quittin.0    Smokeless tobacco: Never Used   Substance Use Topics    Alcohol use: No    Drug use: No     Review of Systems   Constitutional: Positive for diaphoresis.   Respiratory: Negative for cough, chest tightness and shortness of breath.    Cardiovascular: Positive for chest pain. Negative for palpitations and leg swelling.   Gastrointestinal: Negative for abdominal pain, nausea and vomiting.   Neurological: Negative for dizziness and headaches.   All other systems reviewed and are negative.      Physical Exam     Initial Vitals [19 2135]   BP Pulse Resp Temp SpO2   (!) 221/110 (!) 56 16 98.8 °F (37.1 °C) 99 %      MAP       --         Physical Exam    Nursing note and vitals reviewed.  Constitutional: He appears well-developed and well-nourished. No distress.   Patient resting comfortably in bed   HENT:   Head: Normocephalic and atraumatic.   Nose: Nose normal.   Eyes: Conjunctivae are normal.   Neck: Normal range of motion.   Cardiovascular: Normal rate, regular rhythm and normal heart sounds.   Pulmonary/Chest: Effort normal and breath sounds normal. He has no decreased breath sounds. He has no wheezes. He has no rales. He exhibits no tenderness and no bony tenderness.       Abdominal: Soft. Bowel sounds are normal. There is no tenderness.   Musculoskeletal: Normal range of motion.    Neurological: He is alert and oriented to person, place, and time.   Skin: Skin is warm and dry.   Psychiatric: He has a normal mood and affect. His speech is normal and behavior is normal. Judgment and thought content normal. Cognition and memory are normal.         ED Course   Procedures  Labs Reviewed   CBC W/ AUTO DIFFERENTIAL - Abnormal; Notable for the following components:       Result Value    Lymph # 0.8 (*)     Gran% 73.6 (*)     Lymph% 13.1 (*)     All other components within normal limits   COMPREHENSIVE METABOLIC PANEL - Abnormal; Notable for the following components:    Sodium 135 (*)     Potassium 3.1 (*)     CO2 20 (*)     Glucose 115 (*)     Creatinine 1.5 (*)     eGFR if  58 (*)     eGFR if non  50 (*)     All other components within normal limits   TROPONIN I   B-TYPE NATRIURETIC PEPTIDE   TROPONIN I          Imaging Results          X-Ray Chest AP Portable (Final result)  Result time 01/03/19 22:53:23    Final result by Karmen Davis MD (01/03/19 22:53:23)                 Impression:      No acute intrathoracic abnormality identified on this single radiographic view of the chest.      Electronically signed by: Karmen Davis MD  Date:    01/03/2019  Time:    22:53             Narrative:    EXAMINATION:  XR CHEST AP PORTABLE    CLINICAL HISTORY:  Chest Pain;    TECHNIQUE:  Single frontal view of the chest was performed.    COMPARISON:  12/31/2018    FINDINGS:  Monitoring leads overlie the chest.  The cardiomediastinal silhouette is within normal limits.  The visualized airway is unremarkable.  The lungs appear symmetrically aerated without definite focal alveolar consolidation. No large pleural effusion or pneumothorax is appreciated.Visualized osseous structures demonstrate no acute abnormality.                                 Medical Decision Making:   Initial Assessment:   58-year-old male with chest pain and hypertension x3 hours.  Patient is resting  comfortably in bed in no acute distress. Blood pressure elevated at 221/110.  Exam benign.  Differential Diagnosis:   Differential Diagnosis includes, but is not limited to:  ACS/MI, PE, aortic dissection, pneumothorax, cardiac tamponade, pericarditis/myocarditis, pneumonia, infection/abscess, lung mass, trauma/fracture, costochondritis/pleurisy, MSK pain/contusion, GERD, biliary disease, pancreatitis, anemia  Clinical Tests:   Lab Tests: Ordered and Reviewed  Radiological Study: Ordered and Reviewed  ED Management:  Labs, imaging, meds ordered.  Hypokalemic at 3.1, hyponatremic at 135. Creatinine slightly elevated at 1.5.  40 mEq of oral potassium given.  Patient states his chest pain improved slightly after nitroglycerin but then returned to 5/10.  Blood pressure improved slightly to 161/100.   Patient seen and evaluated by attending.  Patient will be discharged with prescription for potassium 20 mEq b.i.d. x5 days and instructed to follow up with his PCP and cardiologist as soon as possible.  Strict return precautions given.  Patient states understanding and agreement with treatment plan                   ED Course as of Jan 04 0201   u Jan 03, 2019   2343 BP: (!) 155/97 [MP]      ED Course User Index  [MP] Renu Espana PA-C     Clinical Impression:   The primary encounter diagnosis was Hypertension. Diagnoses of Chest pain and Hypokalemia were also pertinent to this visit.                             Renu Espana PA-C  01/04/19 0214

## 2019-01-04 NOTE — TELEPHONE ENCOUNTER
----- Message from Tiny Jefferson sent at 1/4/2019  8:07 AM CST -----  Contact: Self [phone]  Patient is requesting to be seen today due to his pressure is elevated and has been in and out from the ER.

## 2019-01-04 NOTE — ED NOTES
Patient reports chest pain is now 4 after received 1 nitro SL. Pain was 6/10 before intervention. Patient does not want another nitro SL at this time.

## 2019-01-04 NOTE — DISCHARGE INSTRUCTIONS
Go to scheduled appointment with Dr Johnson. Return to the ER with any new or worsening symptoms.

## 2019-01-05 ENCOUNTER — PATIENT MESSAGE (OUTPATIENT)
Dept: CARDIOLOGY | Facility: CLINIC | Age: 59
End: 2019-01-05

## 2019-01-07 ENCOUNTER — TELEPHONE (OUTPATIENT)
Dept: HEPATOLOGY | Facility: CLINIC | Age: 59
End: 2019-01-07

## 2019-01-07 ENCOUNTER — OFFICE VISIT (OUTPATIENT)
Dept: GASTROENTEROLOGY | Facility: CLINIC | Age: 59
End: 2019-01-07
Payer: COMMERCIAL

## 2019-01-07 ENCOUNTER — TELEPHONE (OUTPATIENT)
Dept: TRANSPLANT | Facility: CLINIC | Age: 59
End: 2019-01-07

## 2019-01-07 ENCOUNTER — TELEPHONE (OUTPATIENT)
Dept: GASTROENTEROLOGY | Facility: CLINIC | Age: 59
End: 2019-01-07

## 2019-01-07 VITALS
BODY MASS INDEX: 31.35 KG/M2 | WEIGHT: 200.19 LBS | HEART RATE: 61 BPM | DIASTOLIC BLOOD PRESSURE: 90 MMHG | SYSTOLIC BLOOD PRESSURE: 156 MMHG

## 2019-01-07 DIAGNOSIS — I48.91 ATRIAL FIBRILLATION, UNSPECIFIED TYPE: Primary | ICD-10-CM

## 2019-01-07 DIAGNOSIS — K21.9 GASTROESOPHAGEAL REFLUX DISEASE, ESOPHAGITIS PRESENCE NOT SPECIFIED: ICD-10-CM

## 2019-01-07 DIAGNOSIS — R10.13 EPIGASTRIC PAIN: Primary | ICD-10-CM

## 2019-01-07 DIAGNOSIS — C22.0 HCC (HEPATOCELLULAR CARCINOMA): Primary | ICD-10-CM

## 2019-01-07 PROCEDURE — 99999 PR PBB SHADOW E&M-EST. PATIENT-LVL III: ICD-10-PCS | Mod: PBBFAC,,, | Performed by: INTERNAL MEDICINE

## 2019-01-07 PROCEDURE — 99204 PR OFFICE/OUTPT VISIT, NEW, LEVL IV, 45-59 MIN: ICD-10-PCS | Mod: S$GLB,,, | Performed by: INTERNAL MEDICINE

## 2019-01-07 PROCEDURE — 3008F BODY MASS INDEX DOCD: CPT | Mod: CPTII,S$GLB,, | Performed by: INTERNAL MEDICINE

## 2019-01-07 PROCEDURE — 3080F PR MOST RECENT DIASTOLIC BLOOD PRESSURE >= 90 MM HG: ICD-10-PCS | Mod: CPTII,S$GLB,, | Performed by: INTERNAL MEDICINE

## 2019-01-07 PROCEDURE — 99999 PR PBB SHADOW E&M-EST. PATIENT-LVL III: CPT | Mod: PBBFAC,,, | Performed by: INTERNAL MEDICINE

## 2019-01-07 PROCEDURE — 3008F PR BODY MASS INDEX (BMI) DOCUMENTED: ICD-10-PCS | Mod: CPTII,S$GLB,, | Performed by: INTERNAL MEDICINE

## 2019-01-07 PROCEDURE — 3077F PR MOST RECENT SYSTOLIC BLOOD PRESSURE >= 140 MM HG: ICD-10-PCS | Mod: CPTII,S$GLB,, | Performed by: INTERNAL MEDICINE

## 2019-01-07 PROCEDURE — 99204 OFFICE O/P NEW MOD 45 MIN: CPT | Mod: S$GLB,,, | Performed by: INTERNAL MEDICINE

## 2019-01-07 PROCEDURE — 3080F DIAST BP >= 90 MM HG: CPT | Mod: CPTII,S$GLB,, | Performed by: INTERNAL MEDICINE

## 2019-01-07 PROCEDURE — 3077F SYST BP >= 140 MM HG: CPT | Mod: CPTII,S$GLB,, | Performed by: INTERNAL MEDICINE

## 2019-01-07 RX ORDER — ESOMEPRAZOLE MAGNESIUM 40 MG/1
40 CAPSULE, DELAYED RELEASE ORAL DAILY
Qty: 30 CAPSULE | Refills: 0 | Status: SHIPPED | OUTPATIENT
Start: 2019-01-07 | End: 2019-02-03 | Stop reason: SDUPTHER

## 2019-01-07 RX ORDER — ESOMEPRAZOLE MAGNESIUM 40 MG/1
40 CAPSULE, DELAYED RELEASE ORAL DAILY
Qty: 30 CAPSULE | Refills: 0 | Status: SHIPPED | OUTPATIENT
Start: 2019-01-07 | End: 2019-01-07 | Stop reason: SDUPTHER

## 2019-01-07 NOTE — PROGRESS NOTES
Subjective:       Patient ID: Roman Hodges is a 58 y.o. male.    Chief Complaint: Gastroesophageal Reflux    Gastroesophageal Reflux   He complains of belching, heartburn and water brash. He reports no abdominal pain, no chest pain, no choking, no coughing, no nausea or no tooth decay. This is a recurrent problem. The current episode started more than 1 month ago. The problem occurs frequently. The problem has been unchanged. The heartburn duration is an hour. The heartburn is located in the substernum. The heartburn is of moderate intensity. The heartburn does not limit his activity. The heartburn doesn't change with position. The symptoms are aggravated by certain foods. Pertinent negatives include no anemia or muscle weakness. There are no known risk factors. He has tried a PPI and an antacid for the symptoms. The treatment provided mild relief. Past procedures do not include an EGD or esophageal manometry. Past invasive treatments do not include gastroplication.     Review of Systems   Constitutional: Negative for chills and fever.   HENT: Negative for postnasal drip and trouble swallowing.    Eyes: Negative for pain and visual disturbance.   Respiratory: Negative for cough, choking and shortness of breath.    Cardiovascular: Negative for chest pain and leg swelling.   Gastrointestinal: Positive for heartburn. Negative for abdominal distention, abdominal pain, anal bleeding, blood in stool, constipation, diarrhea, nausea, rectal pain and vomiting.   Endocrine: Negative for cold intolerance and heat intolerance.   Genitourinary: Negative for difficulty urinating and hematuria.   Musculoskeletal: Negative for muscle weakness.   Neurological: Negative for dizziness and numbness.   Hematological: Negative for adenopathy. Does not bruise/bleed easily.   Psychiatric/Behavioral: Negative for agitation and confusion.       Objective:      Physical Exam   Constitutional: He is oriented to person, place, and time.  He appears well-developed and well-nourished. No distress.   HENT:   Head: Normocephalic and atraumatic.   Eyes: Conjunctivae are normal. No scleral icterus.   Neck: No tracheal deviation present. No thyromegaly present.   Cardiovascular: Normal rate and regular rhythm. Exam reveals no gallop and no friction rub.   Pulmonary/Chest: Effort normal and breath sounds normal. He has no wheezes. He has no rales.   Abdominal: Soft. Bowel sounds are normal. He exhibits no distension. There is no tenderness. There is no rebound and no guarding.   Musculoskeletal: Normal range of motion. He exhibits no edema or tenderness.   Neurological: He is alert and oriented to person, place, and time.   Skin: He is not diaphoretic.   Psychiatric: He has a normal mood and affect. His behavior is normal.   Nursing note and vitals reviewed.      Labs; reviewed  Assessment:       1. Epigastric pain    2. Gastroesophageal reflux disease, esophagitis presence not specified        Plan:   1. Refill nexium  2. EGD

## 2019-01-07 NOTE — TELEPHONE ENCOUNTER
----- Message from Fab Damon MD sent at 1/7/2019  1:45 PM CST -----  I would refer for transplant eval now. Thanks!  ----- Message -----  From: Aleksandar Salazar PA-C  Sent: 1/2/2019  12:12 PM  To: Fab Damon MD    I routed this to Jeyson by accident    We saw him together in [facility] clinic, liver mass reviewed and turned out to be HCC. Recommendation was for y90 and then TXP eval, Y90 done 11/2018, should I refer for txp eval now, or wait until his 12 week MRI is done?   ----- Message -----  From: Jeyson Castro MD  Sent: 12/28/2018   5:15 AM  To: Aleksandar Salazar PA-C    I think this should have gone to [facility]?      ----- Message -----  From: Aleksandar Salazar PA-C  Sent: 12/26/2018  12:40 PM  To: Jeyson Castro MD    We saw him together in [facility] clinic, liver mass reviewed and turned out to be HCC. Recommendation was for y90 and then TXP eval, Y90 done 11/2018, should I refer for txp eval now, or wait until his 12 week MRI is done?

## 2019-01-07 NOTE — PATIENT INSTRUCTIONS
Endoscopy has been scheduled for 1/15/19 with Dr. Saavedra.  The lab will call two days before with arrival time.  The test will take place at [facility] [address]. Check in at the first floor at Hospital Admit Desk.  Nothing to eat or drink after midnight the night before.  Only take heart, blood pressure or seizure medications the morning of the procedure.  Do NOT take medications that contain aspirin or ibuprofen.  It is ok to take 81mg baby aspirin.  Please contact the office with any questions.  
General

## 2019-01-07 NOTE — TELEPHONE ENCOUNTER
Referral received from Dr Damon .  Initial  referral from Dr. Jose Angel Angel      Patient with HCC  MELD 8  Referred for liver transplant for  EVALUATION.    Referral completed and forwarded to [facility].          Insurance: Epic   PRIMARY:   ID:  Contact #     SECONDARY:   ID:  Contact #

## 2019-01-08 ENCOUNTER — TELEPHONE (OUTPATIENT)
Dept: CARDIOLOGY | Facility: CLINIC | Age: 59
End: 2019-01-08

## 2019-01-08 ENCOUNTER — PATIENT MESSAGE (OUTPATIENT)
Dept: HEPATOLOGY | Facility: CLINIC | Age: 59
End: 2019-01-08

## 2019-01-08 ENCOUNTER — TELEPHONE (OUTPATIENT)
Dept: GASTROENTEROLOGY | Facility: CLINIC | Age: 59
End: 2019-01-08

## 2019-01-08 NOTE — TELEPHONE ENCOUNTER
Endoscopy has been scheduled for 1/15/19 with Dr. Saavedra.  The lab will call two days before with arrival time.  The test will take place at [facility] [address]. Check in at the first floor at Hospital Admit Desk.  Nothing to eat or drink after midnight the night before.  Only take heart, blood pressure or seizure medications the morning of the procedure.  Do NOT take medications that contain aspirin or ibuprofen.  It is ok to take 81mg baby aspirin.  Please contact the office with any questions.

## 2019-01-08 NOTE — TELEPHONE ENCOUNTER
----- Message from Christiana Redd sent at 1/8/2019  3:30 PM CST -----  Contact: [phone]  Pt's wife wants to know if pt's disability paper work its been fill out , states pt's employer fax it over to the office . Please advise

## 2019-01-08 NOTE — TELEPHONE ENCOUNTER
Returned pt wife phone call     Pt wife requested to know if sooner clinical jennifer was available in clinic     Pt scheduled for sooner clinical jennifer     Pt wife also requested to know about disability form that was faxed over to NP Anuel Bauman    Pt wife was advised that disability forms are typically filled out by the PCP but we would look out for the forms

## 2019-01-10 ENCOUNTER — PATIENT MESSAGE (OUTPATIENT)
Dept: ENDOSCOPY | Facility: HOSPITAL | Age: 59
End: 2019-01-10

## 2019-01-10 ENCOUNTER — TELEPHONE (OUTPATIENT)
Dept: GASTROENTEROLOGY | Facility: CLINIC | Age: 59
End: 2019-01-10

## 2019-01-10 NOTE — TELEPHONE ENCOUNTER
----- Message from Nila Radha sent at 1/10/2019  3:04 PM CST -----  Contact: spouse, [phone]  Requests a call back with the time of patient's procedure scheduled on 1/15. States they have to arrange transportation. Please advise.    
no

## 2019-01-11 ENCOUNTER — TELEPHONE (OUTPATIENT)
Dept: ENDOSCOPY | Facility: HOSPITAL | Age: 59
End: 2019-01-11

## 2019-01-11 ENCOUNTER — OFFICE VISIT (OUTPATIENT)
Dept: CARDIOLOGY | Facility: CLINIC | Age: 59
End: 2019-01-11
Payer: COMMERCIAL

## 2019-01-11 ENCOUNTER — PATIENT MESSAGE (OUTPATIENT)
Dept: CARDIOLOGY | Facility: CLINIC | Age: 59
End: 2019-01-11

## 2019-01-11 VITALS
HEIGHT: 68 IN | HEART RATE: 63 BPM | BODY MASS INDEX: 30.01 KG/M2 | DIASTOLIC BLOOD PRESSURE: 90 MMHG | WEIGHT: 198 LBS | SYSTOLIC BLOOD PRESSURE: 140 MMHG

## 2019-01-11 DIAGNOSIS — G47.33 OBSTRUCTIVE SLEEP APNEA ON CPAP: Chronic | ICD-10-CM

## 2019-01-11 DIAGNOSIS — E11.65 TYPE 2 DIABETES MELLITUS WITH HYPERGLYCEMIA, WITHOUT LONG-TERM CURRENT USE OF INSULIN: Chronic | ICD-10-CM

## 2019-01-11 DIAGNOSIS — E87.6 HYPOKALEMIA: Primary | ICD-10-CM

## 2019-01-11 DIAGNOSIS — E66.01 SEVERE OBESITY (BMI 35.0-39.9) WITH COMORBIDITY: ICD-10-CM

## 2019-01-11 DIAGNOSIS — I50.21 ACUTE SYSTOLIC HEART FAILURE: ICD-10-CM

## 2019-01-11 DIAGNOSIS — K21.9 GASTROESOPHAGEAL REFLUX DISEASE, ESOPHAGITIS PRESENCE NOT SPECIFIED: ICD-10-CM

## 2019-01-11 DIAGNOSIS — I10 ESSENTIAL HYPERTENSION: Primary | Chronic | ICD-10-CM

## 2019-01-11 DIAGNOSIS — I48.91 NEW ONSET ATRIAL FIBRILLATION: ICD-10-CM

## 2019-01-11 PROCEDURE — 3080F PR MOST RECENT DIASTOLIC BLOOD PRESSURE >= 90 MM HG: ICD-10-PCS | Mod: CPTII,NTX,S$GLB, | Performed by: INTERNAL MEDICINE

## 2019-01-11 PROCEDURE — 3008F PR BODY MASS INDEX (BMI) DOCUMENTED: ICD-10-PCS | Mod: CPTII,NTX,S$GLB, | Performed by: INTERNAL MEDICINE

## 2019-01-11 PROCEDURE — 3044F PR MOST RECENT HEMOGLOBIN A1C LEVEL <7.0%: ICD-10-PCS | Mod: CPTII,NTX,S$GLB, | Performed by: INTERNAL MEDICINE

## 2019-01-11 PROCEDURE — 3080F DIAST BP >= 90 MM HG: CPT | Mod: CPTII,NTX,S$GLB, | Performed by: INTERNAL MEDICINE

## 2019-01-11 PROCEDURE — 3077F SYST BP >= 140 MM HG: CPT | Mod: CPTII,NTX,S$GLB, | Performed by: INTERNAL MEDICINE

## 2019-01-11 PROCEDURE — 3008F BODY MASS INDEX DOCD: CPT | Mod: CPTII,NTX,S$GLB, | Performed by: INTERNAL MEDICINE

## 2019-01-11 PROCEDURE — 99999 PR PBB SHADOW E&M-EST. PATIENT-LVL III: ICD-10-PCS | Mod: PBBFAC,TXP,, | Performed by: INTERNAL MEDICINE

## 2019-01-11 PROCEDURE — 99999 PR PBB SHADOW E&M-EST. PATIENT-LVL III: CPT | Mod: PBBFAC,TXP,, | Performed by: INTERNAL MEDICINE

## 2019-01-11 PROCEDURE — 99214 OFFICE O/P EST MOD 30 MIN: CPT | Mod: NTX,S$GLB,, | Performed by: INTERNAL MEDICINE

## 2019-01-11 PROCEDURE — 3077F PR MOST RECENT SYSTOLIC BLOOD PRESSURE >= 140 MM HG: ICD-10-PCS | Mod: CPTII,NTX,S$GLB, | Performed by: INTERNAL MEDICINE

## 2019-01-11 PROCEDURE — 3044F HG A1C LEVEL LT 7.0%: CPT | Mod: CPTII,NTX,S$GLB, | Performed by: INTERNAL MEDICINE

## 2019-01-11 PROCEDURE — 99214 PR OFFICE/OUTPT VISIT, EST, LEVL IV, 30-39 MIN: ICD-10-PCS | Mod: NTX,S$GLB,, | Performed by: INTERNAL MEDICINE

## 2019-01-11 RX ORDER — AMLODIPINE BESYLATE 5 MG/1
5 TABLET ORAL DAILY
Qty: 30 TABLET | Refills: 11 | Status: SHIPPED | OUTPATIENT
Start: 2019-01-11 | End: 2019-04-03 | Stop reason: SDUPTHER

## 2019-01-11 RX ORDER — LOSARTAN POTASSIUM AND HYDROCHLOROTHIAZIDE 25; 100 MG/1; MG/1
1 TABLET ORAL DAILY
Qty: 90 TABLET | Refills: 3 | Status: ON HOLD | OUTPATIENT
Start: 2019-01-11 | End: 2019-07-08 | Stop reason: HOSPADM

## 2019-01-11 NOTE — TELEPHONE ENCOUNTER
Spoke with patient about arrival time @  1200 noon    NPO status reviewed: Patient must have nothing to eat after midnight.  Pt may have CLEAR liquids ONLY until completely NPO 3 hrs @ 0800    Medications: Do not take Insulin or oral diabetic medications the day of the procedure.  Take as prescribed: heart, seizure and blood pressure medication in the morning with a sip of water (less than an ounce).  Take any breathing medications and bring inhalers to hospital with you Leave all valuables and jewelry at home.     Wear comfortable clothes to procedure to change into hospital gown You cannot drive for 24 hours after your procedure because you will receive sedation for your procedure to make you comfortable.  A ride must be provided at discharge.

## 2019-01-11 NOTE — PATIENT INSTRUCTIONS
Manasa la presion dos veces al shandra a las 11 AM y a las 4 PM  Ciat de seguimiento en un mes  Manasa amlodipine 5 mg cada shandra  Manasa losartan/HCTZ 100/25 cada shandra

## 2019-01-11 NOTE — PROGRESS NOTES
Subjective:    Patient ID:  Roman Hodges is a 58 y.o. male who presents for follow-up of Hypertension      HPI  59 y/o Chilean speaking male with hx of HTN, DM, PAFib, HFrEF with return to normal function (previously 30%, last 2DE with 55%) and HCC. Patient was initially admitted with AF RVR after presenting with near syncopal episode and spontaneously converted. At that time, 2DE with EF 30% with akinetic: mid anteroseptal, apical anterior, apical septal, apical lateral and apex and hypokinetic: mid inferoseptal and apical inferior walls. Again presented with similar symptoms, started on BB and DOAC and discharged home. Seen by Dr Sargent and was complaining of exertional  epigastric/substernal pain and was admitted for ACS r/o. Had LHC with nonobstructive CAD and EF on V-gram normal without WMA's. Clinically improved and discharged home. Repeat 2DE with normalization of function to 55%.  Did well post discharge, but then began having episodes of HTN urgency and has presented multiple times to ED with SBP >200. Has CP with HTN episodes but otherwise denies regular CP, SOB, palps, orthopnea, PND, syncope, palps. Tolerating meds well and compliant.    Review of Systems   Constitution: Negative for weakness and malaise/fatigue.   HENT: Negative for congestion.    Eyes: Negative for blurred vision.   Cardiovascular: Negative for chest pain, claudication, cyanosis, dyspnea on exertion, irregular heartbeat, leg swelling, near-syncope, orthopnea, palpitations, paroxysmal nocturnal dyspnea and syncope.   Respiratory: Negative for shortness of breath.    Endocrine: Negative for polyuria.   Hematologic/Lymphatic: Negative for bleeding problem.   Skin: Negative for itching and rash.   Musculoskeletal: Negative for joint swelling, muscle cramps and muscle weakness.   Gastrointestinal: Negative for abdominal pain, hematemesis, hematochezia, melena, nausea and vomiting.   Genitourinary: Negative for dysuria and hematuria.    Neurological: Negative for dizziness, focal weakness, headaches, light-headedness and loss of balance.   Psychiatric/Behavioral: Negative for depression. The patient is not nervous/anxious.         Objective:    Physical Exam   Constitutional: He is oriented to person, place, and time. He appears well-developed and well-nourished.   HENT:   Head: Normocephalic and atraumatic.   Neck: Neck supple. No JVD present.   Cardiovascular: Normal rate, regular rhythm and normal heart sounds.   Pulses:       Carotid pulses are 2+ on the right side, and 2+ on the left side.       Radial pulses are 2+ on the right side, and 2+ on the left side.        Femoral pulses are 2+ on the right side, and 2+ on the left side.       Dorsalis pedis pulses are 2+ on the right side, and 2+ on the left side.        Posterior tibial pulses are 2+ on the right side, and 2+ on the left side.   Pulmonary/Chest: Effort normal and breath sounds normal.   Abdominal: Soft. Bowel sounds are normal.   Musculoskeletal: He exhibits no edema.   Neurological: He is alert and oriented to person, place, and time.   Skin: Skin is warm and dry.   Psychiatric: He has a normal mood and affect. His behavior is normal. Thought content normal.         Assessment:       1. Essential hypertension    2. Acute systolic heart failure    3. New onset atrial fibrillation    4. Severe obesity (BMI 35.0-39.9) with comorbidity    5. Type 2 diabetes mellitus with hyperglycemia, without long-term current use of insulin    6. Gastroesophageal reflux disease, esophagitis presence not specified    7. Obstructive sleep apnea on CPAP      57 y/o pt with hx and presentation as above. Doing well from a cardiac perspective and compensated from a HF perspective. Will change HTN regimen by combining losartan and HCTZ (will increase dose of HCTZ) and will add amlodipine. BP log BID.        Plan:       -Start losartan/HCTZ 100/25 mg  -Start amlodipine 5 mg daily  -BP log  -F/u in 1  month

## 2019-01-14 RX ORDER — METOPROLOL SUCCINATE 50 MG/1
50 TABLET, EXTENDED RELEASE ORAL DAILY
Qty: 90 TABLET | Refills: 3 | Status: ON HOLD | OUTPATIENT
Start: 2019-01-14 | End: 2019-07-09 | Stop reason: HOSPADM

## 2019-01-15 ENCOUNTER — ANESTHESIA EVENT (OUTPATIENT)
Dept: ENDOSCOPY | Facility: HOSPITAL | Age: 59
End: 2019-01-15
Payer: COMMERCIAL

## 2019-01-15 ENCOUNTER — ANESTHESIA (OUTPATIENT)
Dept: ENDOSCOPY | Facility: HOSPITAL | Age: 59
End: 2019-01-15
Payer: COMMERCIAL

## 2019-01-15 ENCOUNTER — HOSPITAL ENCOUNTER (OUTPATIENT)
Facility: HOSPITAL | Age: 59
Discharge: HOME | End: 2019-01-15
Attending: INTERNAL MEDICINE | Admitting: INTERNAL MEDICINE
Payer: COMMERCIAL

## 2019-01-15 VITALS
TEMPERATURE: 98 F | OXYGEN SATURATION: 98 % | SYSTOLIC BLOOD PRESSURE: 123 MMHG | RESPIRATION RATE: 15 BRPM | DIASTOLIC BLOOD PRESSURE: 91 MMHG | HEART RATE: 55 BPM

## 2019-01-15 DIAGNOSIS — K21.9 GERD (GASTROESOPHAGEAL REFLUX DISEASE): ICD-10-CM

## 2019-01-15 LAB — GLUCOSE SERPL-MCNC: 109 MG/DL (ref 70–110)

## 2019-01-15 PROCEDURE — 88342 TISSUE SPECIMEN TO PATHOLOGY - SURGERY: ICD-10-PCS | Mod: 26,NTX,, | Performed by: PATHOLOGY

## 2019-01-15 PROCEDURE — 43239 EGD BIOPSY SINGLE/MULTIPLE: CPT | Mod: TXP | Performed by: INTERNAL MEDICINE

## 2019-01-15 PROCEDURE — 63600175 PHARM REV CODE 636 W HCPCS: Mod: TXP | Performed by: NURSE ANESTHETIST, CERTIFIED REGISTERED

## 2019-01-15 PROCEDURE — 88305 TISSUE EXAM BY PATHOLOGIST: CPT | Mod: 26,NTX,, | Performed by: PATHOLOGY

## 2019-01-15 PROCEDURE — 25000003 PHARM REV CODE 250: Mod: TXP | Performed by: NURSE ANESTHETIST, CERTIFIED REGISTERED

## 2019-01-15 PROCEDURE — 43239 PR EGD, FLEX, W/BIOPSY, SGL/MULTI: ICD-10-PCS | Mod: NTX,,, | Performed by: INTERNAL MEDICINE

## 2019-01-15 PROCEDURE — 88342 IMHCHEM/IMCYTCHM 1ST ANTB: CPT | Mod: 26,NTX,, | Performed by: PATHOLOGY

## 2019-01-15 PROCEDURE — 37000009 HC ANESTHESIA EA ADD 15 MINS: Mod: NTX | Performed by: INTERNAL MEDICINE

## 2019-01-15 PROCEDURE — 88305 TISSUE SPECIMEN TO PATHOLOGY - SURGERY: ICD-10-PCS | Mod: 26,NTX,, | Performed by: PATHOLOGY

## 2019-01-15 PROCEDURE — 37000008 HC ANESTHESIA 1ST 15 MINUTES: Mod: NTX | Performed by: INTERNAL MEDICINE

## 2019-01-15 PROCEDURE — 82962 GLUCOSE BLOOD TEST: CPT | Mod: NTX | Performed by: INTERNAL MEDICINE

## 2019-01-15 PROCEDURE — 88305 TISSUE EXAM BY PATHOLOGIST: CPT | Mod: TXP | Performed by: PATHOLOGY

## 2019-01-15 PROCEDURE — 27201012 HC FORCEPS, HOT/COLD, DISP: Mod: NTX | Performed by: INTERNAL MEDICINE

## 2019-01-15 PROCEDURE — 25000003 PHARM REV CODE 250: Mod: NTX | Performed by: INTERNAL MEDICINE

## 2019-01-15 PROCEDURE — 43239 EGD BIOPSY SINGLE/MULTIPLE: CPT | Mod: NTX,,, | Performed by: INTERNAL MEDICINE

## 2019-01-15 RX ORDER — LIDOCAINE HCL/PF 100 MG/5ML
SYRINGE (ML) INTRAVENOUS
Status: DISCONTINUED | OUTPATIENT
Start: 2019-01-15 | End: 2019-01-15

## 2019-01-15 RX ORDER — SODIUM CHLORIDE 0.9 % (FLUSH) 0.9 %
3 SYRINGE (ML) INJECTION
Status: DISCONTINUED | OUTPATIENT
Start: 2019-01-15 | End: 2021-12-02

## 2019-01-15 RX ORDER — SODIUM CHLORIDE 9 MG/ML
INJECTION, SOLUTION INTRAVENOUS CONTINUOUS
Status: DISCONTINUED | OUTPATIENT
Start: 2019-01-15 | End: 2021-12-02

## 2019-01-15 RX ORDER — PROPOFOL 10 MG/ML
VIAL (ML) INTRAVENOUS CONTINUOUS PRN
Status: DISCONTINUED | OUTPATIENT
Start: 2019-01-15 | End: 2019-01-15

## 2019-01-15 RX ORDER — PROPOFOL 10 MG/ML
VIAL (ML) INTRAVENOUS
Status: DISCONTINUED | OUTPATIENT
Start: 2019-01-15 | End: 2019-01-15

## 2019-01-15 RX ADMIN — PROPOFOL 30 MG: 10 INJECTION, EMULSION INTRAVENOUS at 01:01

## 2019-01-15 RX ADMIN — SODIUM CHLORIDE: 0.9 INJECTION, SOLUTION INTRAVENOUS at 12:01

## 2019-01-15 RX ADMIN — LIDOCAINE HYDROCHLORIDE 50 MG: 20 INJECTION, SOLUTION INTRAVENOUS at 01:01

## 2019-01-15 RX ADMIN — TOPICAL ANESTHETIC 1 EACH: 200 SPRAY DENTAL; PERIODONTAL at 01:01

## 2019-01-15 RX ADMIN — PROPOFOL 150 MCG/KG/MIN: 10 INJECTION, EMULSION INTRAVENOUS at 01:01

## 2019-01-15 RX ADMIN — PROPOFOL 50 MG: 10 INJECTION, EMULSION INTRAVENOUS at 01:01

## 2019-01-15 NOTE — TRANSFER OF CARE
Anesthesia Transfer of Care Note    Patient: Roman Hodges    Procedure(s) Performed: Procedure(s) (LRB):  ESOPHAGOGASTRODUODENOSCOPY (EGD) (N/A)    Patient location: GI    Anesthesia Type: MAC    Transport from OR: Transported from OR on room air with adequate spontaneous ventilation    Post pain: adequate analgesia    Post assessment: no apparent anesthetic complications and tolerated procedure well    Post vital signs: stable    Level of consciousness: awake, alert and oriented    Nausea/Vomiting: no nausea/vomiting    Complications: none    Transfer of care protocol was followed      Last vitals:   Visit Vitals  BP (!) 142/93 (BP Location: Left arm, Patient Position: Lying)   Pulse 64   Temp 36.6 °C (97.9 °F) (Skin)   Resp 18   SpO2 99%

## 2019-01-15 NOTE — ANESTHESIA PREPROCEDURE EVALUATION
01/15/2019  Roman Hodges is a 58 y.o., male for EGD under MAC.     Past Medical History:   Diagnosis Date    A-fib     Anticoagulant long-term use     Cardiomyopathy     CHF (congestive heart failure)     Diabetes mellitus, type 2     GERD (gastroesophageal reflux disease)     Hepatocellular carcinoma     liver    Hyperlipidemia     Hypertension      Anesthesia Evaluation    I have reviewed the Patient Summary Reports.    I have reviewed the Nursing Notes.   I have reviewed the Medications.     Review of Systems  Social:  Former Smoker        Physical Exam  General:  Obesity    Airway/Jaw/Neck:  Airway Findings: Mallampati: II      Chest/Lungs:  Chest/Lungs Clear    Heart/Vascular:  Heart Findings: Normal        ECHO 12/2018  · Eccentric left ventricular hypertrophy.  · Normal left ventricular systolic function. The estimated ejection fraction is 55%  · Normal LV diastolic function.  · Normal right ventricular systolic function.  · Normal central venous pressure (3 mm Hg).  · The estimated PA systolic pressure is 11 mm Hg  · Normal left atrial pressure.      Anesthesia Plan  Type of Anesthesia, risks & benefits discussed:  Anesthesia Type:  MAC  Patient's Preference:   Intra-op Monitoring Plan:   Intra-op Monitoring Plan Comments:   Post Op Pain Control Plan:   Post Op Pain Control Plan Comments:   Induction:    Beta Blocker:  Patient is on a Beta-Blocker and has received one dose within the past 24 hours (No further documentation required).       Informed Consent: Patient understands risks and agrees with Anesthesia plan.  Questions answered. Anesthesia consent signed with patient.  ASA Score: 3     Day of Surgery Review of History & Physical:            Ready For Surgery From Anesthesia Perspective.

## 2019-01-15 NOTE — [employer] PATIENT INSTRUCTIONS
Discharge Summary/Instructions after an Endoscopic Procedure  Patient Name: Roman Hodges  Patient MRN: [id number]  Patient YOB: 1960  Tuesday, January 15, 2019  Bony Saavedra MD  RESTRICTIONS:  During your procedure today, you received medications for sedation.  These   medications may affect your judgment, balance and coordination.  Therefore,   for 24 hours, you have the following restrictions:   - DO NOT drive a car, operate machinery, make legal/financial decisions,   sign important papers or drink alcohol.    ACTIVITY:  Today: no heavy lifting, straining or running due to procedural   sedation/anesthesia.  The following day: return to full activity including work.  DIET:  Eat and drink normally unless instructed otherwise.     TREATMENT FOR COMMON SIDE EFFECTS:  - Mild abdominal pain, nausea, belching, bloating or excessive gas:  rest,   eat lightly and use a heating pad.  - Sore Throat: treat with throat lozenges and/or gargle with warm salt   water.  - Because air was used during the procedure, expelling large amounts of air   from your rectum or belching is normal.  - If a bowel prep was taken, you may not have a bowel movement for 1-3 days.    This is normal.  SYMPTOMS TO WATCH FOR AND REPORT TO YOUR PHYSICIAN:  1. Abdominal pain or bloating, other than gas cramps.  2. Chest pain.  3. Back pain.  4. Signs of infection such as: chills or fever occurring within 24 hours   after the procedure.  5. Rectal bleeding, which would show as bright red, maroon, or black stools.   (A tablespoon of blood from the rectum is not serious, especially if   hemorrhoids are present.)  6. Vomiting.  7. Weakness or dizziness.  GO DIRECTLY TO THE NEAREST EMERGENCY ROOM IF YOU HAVE ANY OF THE FOLLOWING:      Difficulty breathing              Chills and/or fever over 101 F   Persistent vomiting and/or vomiting blood   Severe abdominal pain   Severe chest pain   Black, tarry stools   Bleeding- more than one  tablespoon   Any other symptom or condition that you feel may need urgent attention  Your doctor recommends these additional instructions:  If any biopsies were taken, your doctors clinic will contact you in 1 to 2   weeks with any results.  - Discharge patient to home (via wheelchair).   - Patient has a contact number available for emergencies.  The signs and   symptoms of potential delayed complications were discussed with the   patient.  Return to normal activities tomorrow.  Written discharge   instructions were provided to the patient.   - Resume previous diet.   - Continue present medications.   - Await pathology results.  For questions, problems or results please call your physician - Bony Saavedra MD at Work:  ( [phone].  EMERGENCY PHONE NUMBER: [phone],  LAB RESULTS: [phone]  IF A COMPLICATION OR EMERGENCY SITUATION ARISES AND YOU ARE UNABLE TO REACH   YOUR PHYSICIAN - GO DIRECTLY TO THE EMERGENCY ROOM.  Bony Saavedra MD  1/15/2019 2:19:55 PM  This report has been verified and signed electronically.  [employer]

## 2019-01-15 NOTE — ANESTHESIA POSTPROCEDURE EVALUATION
Anesthesia Post Evaluation    Patient: Roman Hodges    Procedure(s) Performed: Procedure(s) (LRB):  ESOPHAGOGASTRODUODENOSCOPY (EGD) (N/A)    Final Anesthesia Type: MAC  Patient location during evaluation: GI PACU  Patient participation: Yes- Able to Participate  Level of consciousness: awake and alert and oriented  Post-procedure vital signs: reviewed and stable  Pain management: adequate  Airway patency: patent  PONV status at discharge: No PONV  Anesthetic complications: no      Cardiovascular status: blood pressure returned to baseline and hemodynamically stable  Respiratory status: unassisted, spontaneous ventilation and room air  Hydration status: euvolemic  Follow-up not needed.        Visit Vitals  BP (!) 142/93 (BP Location: Left arm, Patient Position: Lying)   Pulse 64   Temp 36.6 °C (97.9 °F) (Skin)   Resp 18   SpO2 99%       Pain/Zelalem Score: No Data Recorded

## 2019-01-15 NOTE — PLAN OF CARE
Admission process complete. Patient ready for procedure. Plan of care reviewed with patient. Preoperative fasting appropriate for procedure and sedation. Call light in reach and bed in lowest position.

## 2019-01-16 ENCOUNTER — TELEPHONE (OUTPATIENT)
Dept: CARDIOLOGY | Facility: CLINIC | Age: 59
End: 2019-01-16

## 2019-01-16 ENCOUNTER — PATIENT MESSAGE (OUTPATIENT)
Dept: GASTROENTEROLOGY | Facility: CLINIC | Age: 59
End: 2019-01-16

## 2019-01-16 ENCOUNTER — TELEPHONE (OUTPATIENT)
Dept: GASTROENTEROLOGY | Facility: HOSPITAL | Age: 59
End: 2019-01-16

## 2019-01-16 LAB — POCT GLUCOSE: 109 MG/DL (ref 70–110)

## 2019-01-16 RX ORDER — SUCRALFATE 1 G/1
1 TABLET ORAL 4 TIMES DAILY
Qty: 120 TABLET | Refills: 0 | Status: SHIPPED | OUTPATIENT
Start: 2019-01-16 | End: 2019-02-15

## 2019-01-16 NOTE — TELEPHONE ENCOUNTER
----- Message from Elliot Johnson MD sent at 1/15/2019  4:16 PM CST -----  Potassium is better. Continue taking potassium supplements

## 2019-01-23 ENCOUNTER — TELEPHONE (OUTPATIENT)
Dept: TRANSPLANT | Facility: CLINIC | Age: 59
End: 2019-01-23

## 2019-01-24 ENCOUNTER — PATIENT MESSAGE (OUTPATIENT)
Dept: FAMILY MEDICINE | Facility: CLINIC | Age: 59
End: 2019-01-24

## 2019-01-28 ENCOUNTER — TELEPHONE (OUTPATIENT)
Dept: GASTROENTEROLOGY | Facility: CLINIC | Age: 59
End: 2019-01-28

## 2019-01-28 NOTE — TELEPHONE ENCOUNTER
----- Message from Bony Saavedra MD sent at 1/26/2019  3:59 AM CST -----  Esophageal bx c/w reflux esophagitis. No evidence of barretts esophagus. Gastric polyp benign. Suspect some symptoms related to the hiatal hernia. Continue acid suppression, can f/u in clinic if persistent symptoms. Can we ensure he is up to date on colon cancer screening, we can schedule a colonoscopy if needed

## 2019-01-29 DIAGNOSIS — C22.0 HCC (HEPATOCELLULAR CARCINOMA): Primary | ICD-10-CM

## 2019-01-30 ENCOUNTER — PATIENT MESSAGE (OUTPATIENT)
Dept: TRANSPLANT | Facility: CLINIC | Age: 59
End: 2019-01-30

## 2019-02-01 RX ORDER — APIXABAN 5 MG/1
5 TABLET, FILM COATED ORAL 2 TIMES DAILY
Qty: 60 TABLET | Refills: 11 | Status: SHIPPED | OUTPATIENT
Start: 2019-02-01 | End: 2019-06-13

## 2019-02-01 NOTE — TELEPHONE ENCOUNTER
----- Message from Tiny Jefferson sent at 2/1/2019  8:33 AM CST -----  Contact: Self [phone]  Patient is calling to get refills on his medication sent to [facility]/pharmacy #[id number] - [place], [place] - [address] [phone] (Phone)  [phone] (Fax)      1. ELIQUIS 5 mg Tab 60 tablets

## 2019-02-04 RX ORDER — ESOMEPRAZOLE MAGNESIUM 40 MG/1
CAPSULE, DELAYED RELEASE ORAL
Qty: 30 CAPSULE | Refills: 0 | Status: SHIPPED | OUTPATIENT
Start: 2019-02-04 | End: 2019-03-01 | Stop reason: SDUPTHER

## 2019-02-06 ENCOUNTER — TELEPHONE (OUTPATIENT)
Dept: TRANSPLANT | Facility: CLINIC | Age: 59
End: 2019-02-06

## 2019-02-06 DIAGNOSIS — C22.0 HCC (HEPATOCELLULAR CARCINOMA): ICD-10-CM

## 2019-02-06 DIAGNOSIS — Z76.82 LIVER TRANSPLANT CANDIDATE: Primary | ICD-10-CM

## 2019-02-06 DIAGNOSIS — C76.8 MALIGNANT NEOPLASM OF OTHER SPECIFIED ILL-DEFINED SITES: ICD-10-CM

## 2019-02-06 DIAGNOSIS — I48.91 NEW ONSET ATRIAL FIBRILLATION: ICD-10-CM

## 2019-02-06 DIAGNOSIS — I10 ESSENTIAL HYPERTENSION: Chronic | ICD-10-CM

## 2019-02-06 DIAGNOSIS — E11.00 TYPE 2 DIABETES MELLITUS WITH HYPEROSMOLARITY WITHOUT COMA, WITHOUT LONG-TERM CURRENT USE OF INSULIN: Chronic | ICD-10-CM

## 2019-02-06 NOTE — TELEPHONE ENCOUNTER
Spoke with Mrs Hodges in reference to coordinating dates for liver transplant evaluation.  Standard and Fast Pass evaluation procedures reviewed, as well as tentative start dates.  Mrs Hodges expressed understanding and agreed to Standard evaluation, with request to start testing Feb 18th when he returns for post Y90 MR and IR clinic f/u.  States Mr. Hodges had a recent colonoscopy with Dr. Becerra and agreed to contact his office to have results faxed.  Tentative evaluation schedule reviewed.  Advised of the need to fast after midnight prior to US and need to have caregiver attend.  All questions and concerns addressed.   Caregiver and Fast Pass appointment letters mailed, along with Informed Consent for evaluation and  demographic form.    Orders entered and evaluation given to  to schedule

## 2019-02-12 ENCOUNTER — OFFICE VISIT (OUTPATIENT)
Dept: CARDIOLOGY | Facility: CLINIC | Age: 59
End: 2019-02-12
Payer: COMMERCIAL

## 2019-02-12 VITALS
SYSTOLIC BLOOD PRESSURE: 128 MMHG | BODY MASS INDEX: 30.31 KG/M2 | WEIGHT: 200 LBS | HEIGHT: 68 IN | OXYGEN SATURATION: 97 % | DIASTOLIC BLOOD PRESSURE: 82 MMHG | HEART RATE: 63 BPM

## 2019-02-12 DIAGNOSIS — I48.91 NEW ONSET ATRIAL FIBRILLATION: ICD-10-CM

## 2019-02-12 DIAGNOSIS — I50.21 ACUTE SYSTOLIC HEART FAILURE: ICD-10-CM

## 2019-02-12 DIAGNOSIS — K21.9 GASTROESOPHAGEAL REFLUX DISEASE, ESOPHAGITIS PRESENCE NOT SPECIFIED: ICD-10-CM

## 2019-02-12 DIAGNOSIS — C22.0 HCC (HEPATOCELLULAR CARCINOMA): ICD-10-CM

## 2019-02-12 DIAGNOSIS — E11.00 TYPE 2 DIABETES MELLITUS WITH HYPEROSMOLARITY WITHOUT COMA, WITHOUT LONG-TERM CURRENT USE OF INSULIN: Chronic | ICD-10-CM

## 2019-02-12 DIAGNOSIS — I10 ESSENTIAL HYPERTENSION: Primary | Chronic | ICD-10-CM

## 2019-02-12 DIAGNOSIS — G47.33 OBSTRUCTIVE SLEEP APNEA ON CPAP: Chronic | ICD-10-CM

## 2019-02-12 DIAGNOSIS — E66.01 SEVERE OBESITY (BMI 35.0-39.9) WITH COMORBIDITY: ICD-10-CM

## 2019-02-12 DIAGNOSIS — Z76.82 LIVER TRANSPLANT CANDIDATE: ICD-10-CM

## 2019-02-12 PROCEDURE — 3074F PR MOST RECENT SYSTOLIC BLOOD PRESSURE < 130 MM HG: ICD-10-PCS | Mod: CPTII,S$GLB,TXP, | Performed by: INTERNAL MEDICINE

## 2019-02-12 PROCEDURE — 3074F SYST BP LT 130 MM HG: CPT | Mod: CPTII,S$GLB,TXP, | Performed by: INTERNAL MEDICINE

## 2019-02-12 PROCEDURE — 99214 PR OFFICE/OUTPT VISIT, EST, LEVL IV, 30-39 MIN: ICD-10-PCS | Mod: S$GLB,TXP,, | Performed by: INTERNAL MEDICINE

## 2019-02-12 PROCEDURE — 3079F DIAST BP 80-89 MM HG: CPT | Mod: CPTII,S$GLB,TXP, | Performed by: INTERNAL MEDICINE

## 2019-02-12 PROCEDURE — 3044F PR MOST RECENT HEMOGLOBIN A1C LEVEL <7.0%: ICD-10-PCS | Mod: CPTII,S$GLB,TXP, | Performed by: INTERNAL MEDICINE

## 2019-02-12 PROCEDURE — 3079F PR MOST RECENT DIASTOLIC BLOOD PRESSURE 80-89 MM HG: ICD-10-PCS | Mod: CPTII,S$GLB,TXP, | Performed by: INTERNAL MEDICINE

## 2019-02-12 PROCEDURE — 3008F PR BODY MASS INDEX (BMI) DOCUMENTED: ICD-10-PCS | Mod: CPTII,S$GLB,TXP, | Performed by: INTERNAL MEDICINE

## 2019-02-12 PROCEDURE — 99999 PR PBB SHADOW E&M-EST. PATIENT-LVL III: ICD-10-PCS | Mod: PBBFAC,TXP,, | Performed by: INTERNAL MEDICINE

## 2019-02-12 PROCEDURE — 3044F HG A1C LEVEL LT 7.0%: CPT | Mod: CPTII,S$GLB,TXP, | Performed by: INTERNAL MEDICINE

## 2019-02-12 PROCEDURE — 99214 OFFICE O/P EST MOD 30 MIN: CPT | Mod: S$GLB,TXP,, | Performed by: INTERNAL MEDICINE

## 2019-02-12 PROCEDURE — 3008F BODY MASS INDEX DOCD: CPT | Mod: CPTII,S$GLB,TXP, | Performed by: INTERNAL MEDICINE

## 2019-02-12 PROCEDURE — 99999 PR PBB SHADOW E&M-EST. PATIENT-LVL III: CPT | Mod: PBBFAC,TXP,, | Performed by: INTERNAL MEDICINE

## 2019-02-12 RX ORDER — POTASSIUM CHLORIDE 20 MEQ/1
20 TABLET, EXTENDED RELEASE ORAL 2 TIMES DAILY
COMMUNITY
Start: 2019-02-01 | End: 2019-07-01 | Stop reason: SDUPTHER

## 2019-02-12 NOTE — PROGRESS NOTES
Subjective:    Patient ID:  Roman Hodges is a 59 y.o. male who presents for follow-up of Hypertension      HPI     60 y/o Marshallese speaking male with hx of HTN, DM, PAFib, HFrEF with return to normal function (previously 30%, last 2DE with 55%) and HCC. Patient was initially admitted with AF RVR after presenting with near syncopal episode and spontaneously converted. At that time, 2DE with EF 30% with akinetic: mid anteroseptal, apical anterior, apical septal, apical lateral and apex and hypokinetic: mid inferoseptal and apical inferior walls. Again presented with similar symptoms, started on BB and DOAC and discharged home. Seen by Dr Sargent and was complaining of exertional epigastric/substernal pain and was admitted for ACS r/o. Had LHC with nonobstructive CAD and EF on V-gram normal without WMA's. Clinically improved and discharged home. Repeat 2DE with normalization of function to 55%. Did well post discharge, but then began having episodes of HTN urgency and has presented multiple times to ED with SBP >200.   Last clinic visit added amlodipine and comb losartan/HCTZ. BP has significantly improved. SBP at home has been 120-130's. He feels better. Denies regular CP, SOB, palps, orthopnea, PND, syncope, palps. Tolerating meds well and compliant.    Review of Systems   Constitution: Negative for weakness and malaise/fatigue.   HENT: Negative for congestion.    Eyes: Negative for blurred vision.   Cardiovascular: Negative for chest pain, claudication, cyanosis, dyspnea on exertion, irregular heartbeat, leg swelling, near-syncope, orthopnea, palpitations, paroxysmal nocturnal dyspnea and syncope.   Respiratory: Negative for shortness of breath.    Endocrine: Negative for polyuria.   Hematologic/Lymphatic: Negative for bleeding problem.   Skin: Negative for itching and rash.   Musculoskeletal: Negative for joint swelling, muscle cramps and muscle weakness.   Gastrointestinal: Negative for abdominal pain,  hematemesis, hematochezia, melena, nausea and vomiting.   Genitourinary: Negative for dysuria and hematuria.   Neurological: Negative for dizziness, focal weakness, headaches, light-headedness and loss of balance.   Psychiatric/Behavioral: Negative for depression. The patient is not nervous/anxious.         Objective:    Physical Exam   Constitutional: He is oriented to person, place, and time. He appears well-developed and well-nourished.   HENT:   Head: Normocephalic and atraumatic.   Neck: Neck supple. No JVD present.   Cardiovascular: Normal rate, regular rhythm and normal heart sounds.   Pulses:       Carotid pulses are 2+ on the right side, and 2+ on the left side.       Radial pulses are 2+ on the right side, and 2+ on the left side.        Femoral pulses are 2+ on the right side, and 2+ on the left side.       Dorsalis pedis pulses are 2+ on the right side, and 2+ on the left side.        Posterior tibial pulses are 2+ on the right side, and 2+ on the left side.   Pulmonary/Chest: Effort normal and breath sounds normal.   Abdominal: Soft. Bowel sounds are normal.   Musculoskeletal: He exhibits no edema.   Neurological: He is alert and oriented to person, place, and time.   Skin: Skin is warm and dry.   Psychiatric: He has a normal mood and affect. His behavior is normal. Thought content normal.         Assessment:       1. Essential hypertension    2. New onset atrial fibrillation    3. Acute systolic heart failure    4. HCC (hepatocellular carcinoma)    5. Type 2 diabetes mellitus with hyperosmolarity without coma, without long-term current use of insulin    6. Gastroesophageal reflux disease, esophagitis presence not specified    7. Liver transplant candidate    8. Obstructive sleep apnea on CPAP    9. Severe obesity (BMI 35.0-39.9) with comorbidity      60 y/o pt with hx and presentation as above. Doing well from a cardiac perspective and compensated from a HF perspective. Clinically improved and BP now  controlled. Asymptomatic from a cardiac perspective, able to accomplish ADL's (>4 Mets), at acceptable cardiac risk for liver evaluation and transplant evaluation. OK to hold Eliquis 48 hours prior to invasive procedure and restart after.       Plan:       -Continue current medical management  -As above  -f/u in 6 months

## 2019-02-18 ENCOUNTER — HOSPITAL ENCOUNTER (OUTPATIENT)
Dept: RADIOLOGY | Facility: HOSPITAL | Age: 59
Discharge: HOME | End: 2019-02-18
Attending: INTERNAL MEDICINE
Payer: COMMERCIAL

## 2019-02-18 ENCOUNTER — SOCIAL WORK (OUTPATIENT)
Dept: TRANSPLANT | Facility: CLINIC | Age: 59
End: 2019-02-18
Attending: INTERNAL MEDICINE
Payer: COMMERCIAL

## 2019-02-18 ENCOUNTER — HOSPITAL ENCOUNTER (OUTPATIENT)
Dept: RADIOLOGY | Facility: HOSPITAL | Age: 59
Discharge: HOME | End: 2019-02-18
Attending: FAMILY MEDICINE
Payer: COMMERCIAL

## 2019-02-18 ENCOUNTER — OFFICE VISIT (OUTPATIENT)
Dept: INTERVENTIONAL RADIOLOGY/VASCULAR | Facility: CLINIC | Age: 59
End: 2019-02-18
Payer: COMMERCIAL

## 2019-02-18 VITALS
BODY MASS INDEX: 30.99 KG/M2 | SYSTOLIC BLOOD PRESSURE: 135 MMHG | HEART RATE: 71 BPM | DIASTOLIC BLOOD PRESSURE: 82 MMHG | WEIGHT: 204.5 LBS | HEIGHT: 68 IN

## 2019-02-18 DIAGNOSIS — C22.0 HCC (HEPATOCELLULAR CARCINOMA): ICD-10-CM

## 2019-02-18 DIAGNOSIS — Z00.6 RESEARCH STUDY PATIENT: Primary | ICD-10-CM

## 2019-02-18 DIAGNOSIS — I10 ESSENTIAL HYPERTENSION: Chronic | ICD-10-CM

## 2019-02-18 DIAGNOSIS — C76.8 MALIGNANT NEOPLASM OF OTHER SPECIFIED ILL-DEFINED SITES: ICD-10-CM

## 2019-02-18 DIAGNOSIS — E11.00 TYPE 2 DIABETES MELLITUS WITH HYPEROSMOLARITY WITHOUT COMA, WITHOUT LONG-TERM CURRENT USE OF INSULIN: Chronic | ICD-10-CM

## 2019-02-18 DIAGNOSIS — I48.91 NEW ONSET ATRIAL FIBRILLATION: ICD-10-CM

## 2019-02-18 DIAGNOSIS — C22.0 HCC (HEPATOCELLULAR CARCINOMA): Primary | ICD-10-CM

## 2019-02-18 DIAGNOSIS — Z76.82 LIVER TRANSPLANT CANDIDATE: ICD-10-CM

## 2019-02-18 PROCEDURE — 99213 PR OFFICE/OUTPT VISIT, EST, LEVL III, 20-29 MIN: ICD-10-PCS | Mod: NTX,S$GLB,, | Performed by: FAMILY MEDICINE

## 2019-02-18 PROCEDURE — 3008F BODY MASS INDEX DOCD: CPT | Mod: CPTII,NTX,S$GLB, | Performed by: FAMILY MEDICINE

## 2019-02-18 PROCEDURE — 99999 PR PBB SHADOW E&M-EST. PATIENT-LVL III: CPT | Mod: PBBFAC,TXP,, | Performed by: FAMILY MEDICINE

## 2019-02-18 PROCEDURE — 99999 PR PBB SHADOW E&M-EST. PATIENT-LVL III: ICD-10-PCS | Mod: PBBFAC,TXP,, | Performed by: FAMILY MEDICINE

## 2019-02-18 PROCEDURE — 99213 OFFICE O/P EST LOW 20 MIN: CPT | Mod: NTX,S$GLB,, | Performed by: FAMILY MEDICINE

## 2019-02-18 PROCEDURE — 71046 X-RAY EXAM CHEST 2 VIEWS: CPT | Mod: 26,TXP,, | Performed by: RADIOLOGY

## 2019-02-18 PROCEDURE — 71046 X-RAY EXAM CHEST 2 VIEWS: CPT | Mod: TC,TXP

## 2019-02-18 PROCEDURE — 25500020 PHARM REV CODE 255: Mod: TXP | Performed by: FAMILY MEDICINE

## 2019-02-18 PROCEDURE — 3079F PR MOST RECENT DIASTOLIC BLOOD PRESSURE 80-89 MM HG: ICD-10-PCS | Mod: CPTII,NTX,S$GLB, | Performed by: FAMILY MEDICINE

## 2019-02-18 PROCEDURE — 71046 XR CHEST PA AND LATERAL: ICD-10-PCS | Mod: 26,TXP,, | Performed by: RADIOLOGY

## 2019-02-18 PROCEDURE — 3079F DIAST BP 80-89 MM HG: CPT | Mod: CPTII,NTX,S$GLB, | Performed by: FAMILY MEDICINE

## 2019-02-18 PROCEDURE — 74183 MRI ABD W/O CNTR FLWD CNTR: CPT | Mod: 26,TXP,, | Performed by: RADIOLOGY

## 2019-02-18 PROCEDURE — 74183 MRI ABDOMEN W WO CONTRAST: ICD-10-PCS | Mod: 26,TXP,, | Performed by: RADIOLOGY

## 2019-02-18 PROCEDURE — A9585 GADOBUTROL INJECTION: HCPCS | Mod: TXP | Performed by: FAMILY MEDICINE

## 2019-02-18 PROCEDURE — 99999 PR PBB SHADOW E&M-EST. PATIENT-LVL I: ICD-10-PCS | Mod: PBBFAC,TXP,,

## 2019-02-18 PROCEDURE — 99999 PR PBB SHADOW E&M-EST. PATIENT-LVL I: CPT | Mod: PBBFAC,TXP,,

## 2019-02-18 PROCEDURE — 3008F PR BODY MASS INDEX (BMI) DOCUMENTED: ICD-10-PCS | Mod: CPTII,NTX,S$GLB, | Performed by: FAMILY MEDICINE

## 2019-02-18 PROCEDURE — 3075F SYST BP GE 130 - 139MM HG: CPT | Mod: CPTII,NTX,S$GLB, | Performed by: FAMILY MEDICINE

## 2019-02-18 PROCEDURE — 3075F PR MOST RECENT SYSTOLIC BLOOD PRESS GE 130-139MM HG: ICD-10-PCS | Mod: CPTII,NTX,S$GLB, | Performed by: FAMILY MEDICINE

## 2019-02-18 PROCEDURE — 74183 MRI ABD W/O CNTR FLWD CNTR: CPT | Mod: TC,TXP

## 2019-02-18 RX ORDER — GADOBUTROL 604.72 MG/ML
10 INJECTION INTRAVENOUS
Status: COMPLETED | OUTPATIENT
Start: 2019-02-18 | End: 2019-02-18

## 2019-02-18 RX ADMIN — GADOBUTROL 10 ML: 604.72 INJECTION INTRAVENOUS at 10:02

## 2019-02-18 NOTE — PROGRESS NOTES
Transplant Recipient Adult Psychosocial Assessment-Patient present with daughter, Kati Hodges and pt's wife, Kati Hodges.  Pt does speak/understand limited English and elected to have his daughter assist with Estonian translation.     Roman Hodges  [address]  [facility] [address]  Telephone Information:   Mobile [phone]   Home  [phone] (home)  Work  There is no work phone number on file.  E-mail  [email or web address]    Sex: male  YOB: 1960  Age: 59 y.o.    Encounter Date: 2019  U[place] Citizen: yes  Primary Language: Estonian   Needed: no (pt advised one of his children will always be present to assist with translation appropriately)    Emergency Contact:  Name: Kati Hodges (limited English)  Relationship: wife  Address: same address as patient   Phone Numbers:  [phone] (mobile)    Family/Social Support:   Number of dependents/: None  Marital history: The patient has been  once for 39 years.   Other family dynamics: The patient's parents are  and does have siblings who live in [facility].  The patient has three children:  Roman (38) lives in [place], Dang (34) and Kati (30) who both reside in [place], [place].  The patient also has six grandchildren.     Household Composition:  Name: Roman Hodges  Age: 59  Relationship: patient  Does person drive? yes    Name: Kati Hodges  Age: 59  Relationship: wife  Does person drive? yes    Name: Dang Chilel  Age: 34  Relationship: daughter  Does person drive? no     Name: Clem Chilel  Age: 38  Relationship: son in law  Does person drive? yes    Dang and Clem's four minor children also live in the home.    Do you and your caregivers have access to reliable transportation? yes  PRIMARY CAREGIVER: Kati Hodges will be primary caregiver, phone number [phone].      provided in-depth information to patient and caregiver regarding pre- and post-transplant caregiver role.    strongly encourages patient and caregiver to have concrete plan regarding post-transplant care giving, including back-up caregiver(s) to ensure care giving needs are met as needed.    Patient and Caregiver states understanding all aspects of caregiver role/commitment and is able/willing/committed to being caregiver to the fullest extent necessary.    Patient and Caregiver verbalizes understanding of the education provided today and caregiver responsibilities.         remains available. Patient and Caregiver agree to contact  in a timely manner if concerns arise.      Able to take time off work without financial concerns: yes.     Additional Significant Others who will Assist with Transplant:  Name: Dang Chilel ([phone])  Age: 34  City: [place] State: [place]  Relationship: daughter  Does person drive? no (siblings will provide transportation accordingly)    Name: Kati Hodges ([phone])  Age: 30  City: [place] State: [place]  Relationship: daughter  Does person drive? yes    Living Will: no  Healthcare Power of : no  Advance Directives on file: <<no information> per medical record.  Verbally reviewed LW/HCPA information.   provided patient with copy of LW/HCPA documents and provided education on completion of forms.    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 Pt has worked for the [employer] for 32 years. Pt will utilize vacation time and/or  STD/LTD.    Spouse/Significant Other Employment: Patient's wife is employed as a  for [employer] and has a flexible schedule.      Disabled: no    Monthly Income:  Salary/Wages: $45,000  Able to afford all costs now and if transplanted, including medications: yes  Patient and Caregiver verbalizes understanding of personal responsibilities related to transplant costs and the importance of having a financial plan to ensure that patients transplant costs are fully covered.      provided fundraising information/education.  Patient and Caregiver verbalizes understanding.   remains available.    Insurance:   Payor/Plan Subscr  Sex Relation Sub. Ins. ID Effective Group Num   1. CIGNA - CIGNA* KENDRA BREAUX IT* 1960 Male Self [id number]                                       2. CIGNA - CIGNA* KENDRA BREAUX IT* 1960 Male  [id number]                                   [id number]     Primary Insurance (for UNOS reporting): Private Insurance-Cigna  Secondary Insurance (for UNOS reporting): None  Patient and Caregiver verbalizes clear understanding that patient may experience difficulty obtaining and/or be denied insurance coverage post-surgery. This includes and is not limited to disability insurance, life insurance, health insurance, burial insurance, long term care insurance, and other insurances.    Patient and Caregiver also reports understanding that future health concerns related to or unrelated to transplantation may not be covered by patient's insurance.  Resources and information provided and reviewed.      Patient and Caregiver provides verbal permission to release any necessary information to outside resources for patient care and discharge planning.  Resources and information provided are reviewed.      Dialysis Adherence:  Patient reports he has never received dialysis.      Infusion Service: patient utilizing? no  Home Health:  patient utilizing? no  DME: no  Pulmonary/Cardiac Rehab: Afib (diagnosed 2018 managed with medication)   ADLS:  Patient is solely independent and does currently drive    Adherence:   Patient reports positive adherence with all medical instructions/appointment and daily medication regimens.  Adherence education and counseling provided.     Per History Section:  Past Medical History:   Diagnosis Date    A-fib     Anticoagulant long-term use     Cardiomyopathy     CHF (congestive heart failure)     Diabetes mellitus, type 2     GERD (gastroesophageal reflux disease)     Hepatocellular carcinoma     liver    Hyperlipidemia     Hypertension      Social History     Tobacco Use    Smoking status: Former Smoker     Packs/day: 1.00     Years: 10.00     Pack years: 10.00     Types: Cigarettes     Last attempt to quit: 1980     Years since quittin.1    Smokeless tobacco: Never Used   Substance Use Topics    Alcohol use: No     Social History     Substance and Sexual Activity   Drug Use No     Social History     Substance and Sexual Activity   Sexual Activity Yes    Partners: Female       Per Today's Psychosocial:  Tobacco: none, patient denies any use. Patient last smoked cigarettes 40 years ago  Alcohol: none, patient denies any use. Patient denies any use of ETOH.   Illicit Drugs/Non-prescribed Medications: none, patient denies any use.    Patient and Caregiver states clear understanding of the potential impact of substance use as it relates to transplant candidacy and is aware of possible random substance screening.  Substance abstinence/cessation counseling, education and resources provided and reviewed.     Arrests/DWI/Treatment/Rehab: patient denies    Psychiatric History:    Mental Health: anxiety (some anxious symptoms in the past as a result of receiving Afib/liver diagnosis)  Psychiatrist/Counselor: patient denies   Medications:  Busbar 5mg (pt reports he has been doing well without  medication.  Pt reports last utilization the medication Dec 2018) prescribed by Dr. Muhammad (PCP).  Suicide/Homicide Issues: Patient denies any SI/HI past or present   Safety at home: Patient denies any safety issues within his home past or present    Knowledge: Patient and Caregiver states having clear understanding and realistic expectations regarding the potential risks and potential benefits of organ transplantation and organ donation, agrees to discuss with health care team members and support system members and to utilize available resources and express questions and/or concerns in order to further facilitate the pt informed decision-making.  Resources and information provided and reviewed.     Patient and Caregiver is aware of [facility]'s affiliation and/or partnership with agencies in home health care, LTAC, SNF, [facility], and other hospitals and clinics.    Understanding: Patient and Caregiver reports having a clear understanding of the many lifetime commitments involved with being a transplant recipient, including costs, compliance, medications, lab work, procedures, appointments, concrete and financial planning, preparedness, timely and appropriate communication of concerns, abstinence (ETOH, tobacco, illicit non-prescribed drugs), adherence to all health care team recommendations, support system and caregiver involvement, appropriate and timely resource utilization and follow-through, mental health counseling as needed/recommended, and patient and caregiver responsibilities.  Social Service Handbook, resources and detailed educational information provided and reviewed.  Educational information provided.    Patient and Caregiver also reports current and expected compliance with health care regime and states having a clear understanding of the importance of compliance.      Patient and Caregiver reports a clear understanding that risks and benefits may be involved with organ transplantation and with organ  donation.      Patient and Caregiver also reports clear understanding that psychosocial risk factors may affect patient, and include but are not limited to feelings of depression, generalized anxiety, anxiety regarding dependence on others, post traumatic stress disorder, feelings of guilt and other emotional and/or mental concerns, and/or exacerbation of existing mental health concerns.  Detailed resources provided and discussed.     Patient and Caregiver agrees to access appropriate resources in a timely manner as needed and/or as recommended, and to communicate concerns appropriately.  Patient and Caregiver also reports a clear understanding of treatment options available.      reviewed education, provided additional information, and answered questions.    Feelings or Concerns: Patient reports he does not worry, has his trust in God and is peaceful.    Coping: Patient reports being prayerful and having family/friends to assist with coping.    Goals: Patient would like to travel and visit with his brother who he has not seen in 33 years.  Patient referred to Vocational Rehabilitation.    Interview Behavior: Patient and Caregiver presents as alert and oriented x 4, pleasant, good eye contact, well groomed, recall good, concentration/judgement good, average intelligence, calm, communicative and cooperative.          Transplant Social Work - Candidacy  Assessment/Plan:     Psychosocial Suitability: Patient presents as an excellent candidate for liver transplant at this time. Based on psychosocial risk factors, patient presents as low risk, due to adequate medical insurance/finances; lack of substance abuse; managed anxious symptoms and solid caregiver plan.    Recommendations/Additional Comments: ezekiel Watkins LMSW

## 2019-02-18 NOTE — PROGRESS NOTES
Subjective:       Patient ID: Roman Hodges is a 59 y.o. male.    Chief Complaint: Hepatocellular Carcinoma    Patient here for follow up of hepatocellular carcinoma recently treated with radioembolization on 2019. He reports feeling well. He denies any abdominal pain or distention. He is accompanied by his family. He had an MRI this morning.       Review of Systems   Constitutional: Negative for activity change, appetite change, chills, fatigue and fever.   Respiratory: Negative for cough, shortness of breath, wheezing and stridor.    Cardiovascular: Negative for chest pain, palpitations and leg swelling.   Gastrointestinal: Negative for abdominal distention, abdominal pain, constipation, diarrhea, nausea and vomiting.       Objective:      Physical Exam   Constitutional: He is oriented to person, place, and time. He appears well-developed and well-nourished. No distress.   Cardiovascular: Normal rate, regular rhythm and normal heart sounds. Exam reveals no gallop and no friction rub.   No murmur heard.  Pulmonary/Chest: Effort normal and breath sounds normal. No stridor. No respiratory distress. He has no wheezes. He has no rales.   Abdominal: Soft. Bowel sounds are normal. He exhibits no distension and no mass. There is no tenderness. There is no guarding.   Neurological: He is alert and oriented to person, place, and time.   Skin: Skin is warm and dry. He is not diaphoretic.   Psychiatric: He has a normal mood and affect.   Vitals reviewed.      ECO  MELD-Na score: 8 at 2019 10:51 AM  MELD score: 8 at 2019 10:51 AM  Calculated from:  Serum Creatinine: 1.2 mg/dL at 2019 10:51 AM  Serum Sodium: 139 mmol/L (Rounded to 137 mmol/L) at 2019 10:51 AM  Total Bilirubin: 0.8 mg/dL (Rounded to 1 mg/dL) at 2019 10:51 AM  INR(ratio): 1 at 2019 10:51 AM  Age: 59 years  Child Mckay: Class A  Transplant Status: in evaluation    Imaging:  MRI 2019  Impression       Post treatment  change of right hepatic segment IV lesion with marginal nodular enhancement concerning for residual disease.  No new lesion.     Assessment:       1. HCC (hepatocellular carcinoma)        Plan:         Reviewed MRI with Dr. Mcelroy. Discussed MRI impression with patient. Explained some residual noted. Recommendation is to treat with microwave ablation. Discussed how the procedure will be performed, risk/benefits, possible complications, pre-post procedure expectations, and alternatives, including the need for general anesthesia. The patient voices understanding and all questions have been answered.  The patient agrees to proceed as planned. We will call to schedule once we have coordinated with anesthesia. Clinic phone number provided.    I spent a total of 20 minutes face-to-face with the patient and more than half the time spent was discussing hepatocellular carcinoma diagnosis and microwave ablation treatment/procedure was ordered.

## 2019-02-26 ENCOUNTER — HOSPITAL ENCOUNTER (OUTPATIENT)
Dept: RADIOLOGY | Facility: CLINIC | Age: 59
Discharge: HOME | End: 2019-02-26
Attending: INTERNAL MEDICINE
Payer: COMMERCIAL

## 2019-02-26 ENCOUNTER — HOSPITAL ENCOUNTER (OUTPATIENT)
Dept: RADIOLOGY | Facility: HOSPITAL | Age: 59
Discharge: HOME | End: 2019-02-26
Attending: INTERNAL MEDICINE
Payer: COMMERCIAL

## 2019-02-26 ENCOUNTER — OFFICE VISIT (OUTPATIENT)
Dept: TRANSPLANT | Facility: CLINIC | Age: 59
End: 2019-02-26
Attending: INTERNAL MEDICINE
Payer: COMMERCIAL

## 2019-02-26 VITALS
BODY MASS INDEX: 31.63 KG/M2 | TEMPERATURE: 98 F | BODY MASS INDEX: 31.63 KG/M2 | DIASTOLIC BLOOD PRESSURE: 99 MMHG | HEART RATE: 58 BPM | WEIGHT: 201.5 LBS | RESPIRATION RATE: 18 BRPM | TEMPERATURE: 98 F | WEIGHT: 201.5 LBS | HEIGHT: 67 IN | HEART RATE: 58 BPM | OXYGEN SATURATION: 97 % | SYSTOLIC BLOOD PRESSURE: 150 MMHG | HEIGHT: 67 IN | SYSTOLIC BLOOD PRESSURE: 150 MMHG | RESPIRATION RATE: 18 BRPM | HEIGHT: 67 IN | WEIGHT: 201.5 LBS | BODY MASS INDEX: 31.63 KG/M2 | OXYGEN SATURATION: 97 % | DIASTOLIC BLOOD PRESSURE: 99 MMHG

## 2019-02-26 DIAGNOSIS — Z76.82 LIVER TRANSPLANT CANDIDATE: ICD-10-CM

## 2019-02-26 DIAGNOSIS — C22.0 HCC (HEPATOCELLULAR CARCINOMA): Primary | ICD-10-CM

## 2019-02-26 DIAGNOSIS — E11.00 TYPE 2 DIABETES MELLITUS WITH HYPEROSMOLARITY WITHOUT COMA, WITHOUT LONG-TERM CURRENT USE OF INSULIN: Chronic | ICD-10-CM

## 2019-02-26 DIAGNOSIS — K75.81 NASH (NONALCOHOLIC STEATOHEPATITIS): Primary | ICD-10-CM

## 2019-02-26 DIAGNOSIS — Z01.818 PRE-TRANSPLANT EVALUATION FOR LIVER TRANSPLANT: ICD-10-CM

## 2019-02-26 DIAGNOSIS — I10 ESSENTIAL HYPERTENSION: ICD-10-CM

## 2019-02-26 DIAGNOSIS — C22.0 HCC (HEPATOCELLULAR CARCINOMA): ICD-10-CM

## 2019-02-26 DIAGNOSIS — I10 ESSENTIAL HYPERTENSION: Chronic | ICD-10-CM

## 2019-02-26 DIAGNOSIS — C76.8 MALIGNANT NEOPLASM OF OTHER SPECIFIED ILL-DEFINED SITES: ICD-10-CM

## 2019-02-26 DIAGNOSIS — I48.91 NEW ONSET ATRIAL FIBRILLATION: ICD-10-CM

## 2019-02-26 DIAGNOSIS — K75.81 NASH (NONALCOHOLIC STEATOHEPATITIS): Chronic | ICD-10-CM

## 2019-02-26 DIAGNOSIS — E66.01 SEVERE OBESITY (BMI 35.0-39.9) WITH COMORBIDITY: ICD-10-CM

## 2019-02-26 PROCEDURE — 99999 PR PBB SHADOW E&M-EST. PATIENT-LVL II: ICD-10-PCS | Mod: PBBFAC,TXP,, | Performed by: DIETITIAN, REGISTERED

## 2019-02-26 PROCEDURE — 93975 US ABDOMEN COMP WITH DOPPLER (XPD): ICD-10-PCS | Mod: 26,TXP,, | Performed by: RADIOLOGY

## 2019-02-26 PROCEDURE — 99999 PR PBB SHADOW E&M-EST. PATIENT-LVL II: CPT | Mod: PBBFAC,TXP,, | Performed by: DIETITIAN, REGISTERED

## 2019-02-26 PROCEDURE — 3008F BODY MASS INDEX DOCD: CPT | Mod: CPTII,S$GLB,TXP, | Performed by: INTERNAL MEDICINE

## 2019-02-26 PROCEDURE — 99999 PR PBB SHADOW E&M-EST. PATIENT-LVL III: CPT | Mod: PBBFAC,TXP,, | Performed by: INTERNAL MEDICINE

## 2019-02-26 PROCEDURE — 97802 MEDICAL NUTRITION INDIV IN: CPT | Mod: S$GLB,TXP,, | Performed by: DIETITIAN, REGISTERED

## 2019-02-26 PROCEDURE — 3080F DIAST BP >= 90 MM HG: CPT | Mod: CPTII,S$GLB,TXP, | Performed by: INTERNAL MEDICINE

## 2019-02-26 PROCEDURE — 97802 PR MED NUTR THER, 1ST, INDIV, EA 15 MIN: ICD-10-PCS | Mod: S$GLB,TXP,, | Performed by: DIETITIAN, REGISTERED

## 2019-02-26 PROCEDURE — 99999 PR PBB SHADOW E&M-EST. PATIENT-LVL III: ICD-10-PCS | Mod: PBBFAC,TXP,, | Performed by: INTERNAL MEDICINE

## 2019-02-26 PROCEDURE — 99999 PR PBB SHADOW E&M-EST. PATIENT-LVL III: CPT | Mod: PBBFAC,TXP,,

## 2019-02-26 PROCEDURE — 99214 OFFICE O/P EST MOD 30 MIN: CPT | Mod: S$GLB,TXP,, | Performed by: INTERNAL MEDICINE

## 2019-02-26 PROCEDURE — 77080 DXA BONE DENSITY AXIAL: CPT | Mod: TC,TXP

## 2019-02-26 PROCEDURE — 77080 DXA BONE DENSITY AXIAL: CPT | Mod: 26,TXP,, | Performed by: INTERNAL MEDICINE

## 2019-02-26 PROCEDURE — 93975 VASCULAR STUDY: CPT | Mod: TC,TXP

## 2019-02-26 PROCEDURE — 3008F PR BODY MASS INDEX (BMI) DOCUMENTED: ICD-10-PCS | Mod: CPTII,S$GLB,TXP, | Performed by: INTERNAL MEDICINE

## 2019-02-26 PROCEDURE — 93975 VASCULAR STUDY: CPT | Mod: 26,TXP,, | Performed by: RADIOLOGY

## 2019-02-26 PROCEDURE — 3077F SYST BP >= 140 MM HG: CPT | Mod: CPTII,S$GLB,TXP, | Performed by: INTERNAL MEDICINE

## 2019-02-26 PROCEDURE — 3077F PR MOST RECENT SYSTOLIC BLOOD PRESSURE >= 140 MM HG: ICD-10-PCS | Mod: CPTII,S$GLB,TXP, | Performed by: INTERNAL MEDICINE

## 2019-02-26 PROCEDURE — 99999 PR PBB SHADOW E&M-EST. PATIENT-LVL III: ICD-10-PCS | Mod: PBBFAC,TXP,,

## 2019-02-26 PROCEDURE — 3080F PR MOST RECENT DIASTOLIC BLOOD PRESSURE >= 90 MM HG: ICD-10-PCS | Mod: CPTII,S$GLB,TXP, | Performed by: INTERNAL MEDICINE

## 2019-02-26 PROCEDURE — 77080 DEXA BONE DENSITY SPINE HIP: ICD-10-PCS | Mod: 26,TXP,, | Performed by: INTERNAL MEDICINE

## 2019-02-26 PROCEDURE — 99214 PR OFFICE/OUTPT VISIT, EST, LEVL IV, 30-39 MIN: ICD-10-PCS | Mod: S$GLB,TXP,, | Performed by: INTERNAL MEDICINE

## 2019-02-26 NOTE — PROGRESS NOTES
TRANSPLANT NUTRITIONAL ASSESSMENT    Referring Provider: Fab Damon MD    Reason for Visit: Pre-liver transplant work-up    Age: 59 y.o.  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    Liver transplant candidate    ROMO (nonalcoholic steatohepatitis)     Past Medical History:   Diagnosis Date    A-fib     Anticoagulant long-term use     Cardiomyopathy     CHF (congestive heart failure)     Diabetes mellitus, type 2     GERD (gastroesophageal reflux disease)     Hepatocellular carcinoma     liver    Hyperlipidemia     Hypertension      Lab Results   Component Value Date     02/18/2019    K 3.3 (L) 02/18/2019    CHOL 145 12/14/2018    HDL 31 (L) 12/14/2018    TRIG 99 12/14/2018    ALBUMIN 4.0 02/18/2019    AMMONIA 27 11/29/2018    HGBA1C 5.6 02/18/2019    CALCIUM 9.7 02/18/2019     Other Pertinent Labs: none    Current Outpatient Medications   Medication Sig    amLODIPine (NORVASC) 5 MG tablet Take 1 tablet (5 mg total) by mouth once daily.    busPIRone (BUSPAR) 5 MG Tab Take 1 tablet (5 mg total) by mouth 2 (two) times daily. (Patient taking differently: Take 5 mg by mouth 2 (two) times daily as needed. )    ELIQUIS 5 mg Tab Take 1 tablet (5 mg total) by mouth 2 (two) times daily.    esomeprazole (NEXIUM) 40 MG capsule TAKE 1 CAPSULE BY MOUTH ONCE DAILY.    losartan-hydrochlorothiazide 100-25 mg (HYZAAR) 100-25 mg per tablet Take 1 tablet by mouth once daily.    metoprolol succinate (TOPROL-XL) 50 MG 24 hr tablet Take 1 tablet (50 mg total) by mouth once daily.  "   potassium chloride SA (K-DUR,KLOR-CON) 20 MEQ tablet Take 20 mEq by mouth 2 (two) times daily.      No current facility-administered medications for this visit.      Facility-Administered Medications Ordered in Other Visits   Medication    0.9%  NaCl infusion    sodium chloride 0.9% flush 3 mL     Allergies: Lisinopril and Flu vaccine 2011 (36 mos+)(pf)    Ht Readings from Last 1 Encounters:   02/26/19 5' 7" (1.702 m)     Wt Readings from Last 1 Encounters:   02/26/19 91.4 kg (201 lb 8 oz)      BMI: Body mass index is 31.56 kg/m².    Usual Weight: 190-200 lb   Weight Change/Time: intentional 50 lb weight loss (since September 2018-now)  Current Diet: lower carbohydrate, reduced sodium  Appetite/Current Intake: good   Exercise/Physical Activity: work requires being on feet/walking all day; no physical limitations  Nutritional/Herbal Supplements: none  Potential Food/Medication Interactions:    Amlodipine: grapefruit/grapefruit juice, alcohol    Buspirone: grapefruit   Esomeprazole: interrupt TF for 1 hour to better absorb medication   Losartan-hydrochlorothiazide: grapefruit   Metoprolol succinate: alcohol, multivitamins (separate taking this medicine and MVI by at least 2 hours), take with food   Potassium chloride: avoid salt substitute  Chewing/Swallowing Problems: none  Symptoms: none  Assessment of Lab Values: K 3.3, HDL 31  Support System:  Wife and son present, involved in interview and diet at home.  Daughter cooks for the family.    Estimated Kcal Need: 2688-3562 kcal/day  Estimated Protein Need: 86-130g/day    Nutritional History:     Breakfast: 2 scrambled eggs with ketchup - no salt or pepper           Banana           Fruit    Lunch:     When at home:      Chicken (baked, often with some bouillon)      Salad (tomatoes, green peppers, onions, black olives, home-made honey mustard dressing)     Brown rice    When at work (receives free lunch):      Salad (tomatoes, green peppers, onions, black " olives, ranch dressing)- doesn't have chicken/meat on his salad due to what is provided being either     fried/high in salt     Mixed nuts    Dinner: Oatmeal (with almond milk) or soup    Beverages: Water or la croix (no soda or coffee); usually has 6-8 bottles worth of water/day   Snack(s): Tries not to eat after 8 pm  Pt will sometimes have one pear a/day in addition  Pt does not like seafood or beef, tries to avoid pork.  Does like yogurt and almond milk.   Eats out about 1x/wk: Chinese/sushi- chooses steamed/lower sodium options with veggies, uses low sodium soy sauce)    Nutritional Diagnoses  Problem: increased nutrient needs: protein  Etiology: r/t goal of maintaining muscle mass and functionality pre transplant sugery  Symptoms: Pre-liver transplant status, HCC diagnosis    Educational Need? yes  Barriers: none identified  Discussed with: patient, spouse and son  Interventions: Patient taught nutrition information regarding Pre-liver transplant work-up   Reviewed Low Sodium packet (low Na diet, foods recommended/not recommended, food label strategies, flavoring tips, & sample menu).   Provided education on protein content in foods, goal intake per day, suggestions for ways to reach protein intake goal; nutrition supplements, snacks.     Goals/Recommendations: increase protein intake and limit sodium intake  Actions Taken: instruct/provide written information  Strategies Used: problem solving, goal setting, motivational interviewing  Patient and/or family comprehend instructions: yes , adherence expected  Outcome: Verbalizes understanding  Monitoring: Contact information provided, will f/u in clinic and communicate with the care team as needed.     Counseling Time: 30 minutes    Bety Estrada Dietetic Intern  I certify that I directed the dietetic intern in service delivery and guided them using my skilled judgment. As the cosigning dietitian, I have reviewed the dietetic interns documentation and am  responsible for the treatment, assessment, and plan.

## 2019-02-26 NOTE — PROGRESS NOTES
Transplant Hepatology  Liver Transplant Recipient Evaluation      Consultation started: 2/26/2019 at 9:49 AM     Original Referring Provider: Jose Angel Munson  Current Corresponding Physician: Jose Angel CASILLAS Native Liver Diagnosis: Primary Liver Malignancy: Hepatoma (HCC) and Cirrhosis    Reason for Visit: evaluation for liver transplant     Subjective:     Roman Hodges is a 59 y.o. male with ESLD secondary to hepatocellular carcinoma and ROMO (non-alcoholic steatohepatitis).  Roman Hodges was seen today in the Pretransplant Clinic.  This 59-year-old   gentleman originally from [facility] presented with a liver mass.  He appears   to have underlying nonalcoholic steatohepatitis without significant fibrosis.    His cross-sectional imaging were studied and it was consistent with   hepatocellular cancer just outside [place] criteria.  He has undergone   radioembolization and is to undergo radiofrequency ablation.  His performance   status is excellent.  He has no symptoms of hepatic decompensation.  He has no   history of heart disease or chronic lung disease.      NG/IN  dd: 02/26/2019 09:53:15 (CST)  td: 02/27/2019 02:55:14 (CST)  Doc ID   #[id number]  Job ID #[id number]    CC:       Review of Systems   Constitutional: Negative for activity change, appetite change, chills, fatigue and unexpected weight change.   HENT: Negative for congestion, facial swelling and tinnitus.    Eyes: Negative for visual disturbance.   Respiratory: Negative for cough, shortness of breath and wheezing.    Cardiovascular: Negative for chest pain and palpitations.   Gastrointestinal: Negative for abdominal distention.   Genitourinary: Negative for dysuria.   Musculoskeletal: Negative for arthralgias, joint swelling and myalgias.   Neurological: Negative for syncope and headaches.   Hematological: Does not bruise/bleed easily.   Psychiatric/Behavioral: Negative for confusion.       Objective:   Physical Exam    Constitutional: He is oriented to person, place, and time. He appears well-developed and well-nourished.   Eyes: No scleral icterus.   Cardiovascular: Normal rate, regular rhythm and normal heart sounds.   Pulmonary/Chest: Effort normal and breath sounds normal. No respiratory distress. He has no wheezes.   Abdominal: Soft. Bowel sounds are normal. He exhibits no distension and no mass. There is no tenderness. There is no rebound.   Musculoskeletal: Normal range of motion.   Lymphadenopathy:     He has no cervical adenopathy.   Neurological: He is alert and oriented to person, place, and time.   Skin: Skin is warm and dry.     MELD-Na score: 8 at 2/18/2019 10:51 AM  MELD score: 8 at 2/18/2019 10:51 AM  Calculated from:  Serum Creatinine: 1.2 mg/dL at 2/18/2019 10:51 AM  Serum Sodium: 139 mmol/L (Rounded to 137 mmol/L) at 2/18/2019 10:51 AM  Total Bilirubin: 0.8 mg/dL (Rounded to 1 mg/dL) at 2/18/2019 10:51 AM  INR(ratio): 1 at 2/18/2019 10:51 AM  Age: 59 years  Lab Results   Component Value Date     02/18/2019    BUN 16 02/18/2019    CREATININE 1.2 02/18/2019    CALCIUM 9.7 02/18/2019     02/18/2019    K 3.3 (L) 02/18/2019     02/18/2019    PROT 7.9 02/18/2019    CO2 28 02/18/2019    WBC 6.36 02/18/2019    RBC 6.19 02/18/2019    HGB 17.4 02/18/2019    HCT 51.5 02/18/2019     02/18/2019     Lab Results   Component Value Date    BNP 25 01/03/2019    CHOL 145 12/14/2018    TRIG 99 12/14/2018    HDL 31 (L) 12/14/2018    CHOLHDL 21.4 12/14/2018    TOTALCHOLEST 4.7 12/14/2018    ALBUMIN 4.0 02/18/2019    BILITOT 0.8 02/18/2019    AST 24 02/18/2019    ALT 32 02/18/2019    ALKPHOS 130 02/18/2019    LABPROT 10.8 02/18/2019    INR 1.0 02/18/2019    PSA 0.48 08/17/2018       Diagnostics:     1. HCC (hepatocellular carcinoma)    2. Liver transplant candidate    3. ROMO (nonalcoholic steatohepatitis)        Transplant Hepatology - Candidacy   Assessment/Plan:     Transplant Candidacy: Roman Lopez  Carroll is a 59 y.o. male with ESLD secondary to nonalcoholic steatohepatitis complicated by HCC outside [place] criteria here for evaluation for possible OLT.  In my opinion, he maybe a good candidate for liver transplant. He will need to be successfully downstaged with locoregional therapy. He has had radioembolization and is to undergo RFA.  He will be presented to selection committee after all tests and evaluations are complete.    Fab Damon MD         [facility] Patient Status  Functional Status: 100% - Normal, no complaints, no evidence of disease  Physical Capacity: No Limitations    Outside Records Request:

## 2019-02-26 NOTE — LETTER
February 26, 2019        Jose Angel Munson  [address]  [place] [address]  Phone: [phone]  Fax: [phone]             [facility] - Liver Transplant  [address]  [facility] [address]  Phone: [phone]   Patient: Roman Hodges   MR Number: [id number]   YOB: 1960   Date of Visit: 2/26/2019       Dear Dr. Jose Angel Munson    Thank you for referring Roman Hodges to me for evaluation. Attached you will find relevant portions of my assessment and plan of care.    If you have questions, please do not hesitate to call me. I look forward to following Roman Hodges along with you.    Sincerely,    Fab Damon MD    Enclosure    If you would like to receive this communication electronically, please contact [email or web address] or [phone] to request Let's Gift It Link access.    Let[employer] Gift It Link is a tool which provides read-only access to select patient information with whom you have a relationship. Its easy to use and provides real time access to review your patients record including encounter summaries, notes, results, and demographic information.    If you feel you have received this communication in error or would no longer like to receive these types of communications, please e-mail [email or web address]

## 2019-02-26 NOTE — PROGRESS NOTES
PHARM.D. PRE-TRANSPLANT NOTE:    This patient's medication therapy was evaluated as part of his pre-transplant evaluation.    The following pharmacologic concerns were noted: Eliquis due to Afib; Buspar - patient only takes PRN instead of scheduled    This patient's medication profile was reviewed for contraindications for DAA Hepatitis C therapy:    [X]  No current inducers of CYP 3A4 or PGP  [X]  No amiodarone on this patient's EMR profile in the last 24 months  [Y - afib currently on eliquis]  No past or current atrial fibrillation on this patient's EMR profile       Current Outpatient Medications   Medication Sig Dispense Refill    amLODIPine (NORVASC) 5 MG tablet Take 1 tablet (5 mg total) by mouth once daily. 30 tablet 11    busPIRone (BUSPAR) 5 MG Tab Take 1 tablet (5 mg total) by mouth 2 (two) times daily. (Patient taking differently: Take 5 mg by mouth 2 (two) times daily as needed. ) 180 tablet 3    ELIQUIS 5 mg Tab Take 1 tablet (5 mg total) by mouth 2 (two) times daily. 60 tablet 11    esomeprazole (NEXIUM) 40 MG capsule TAKE 1 CAPSULE BY MOUTH ONCE DAILY. 30 capsule 0    losartan-hydrochlorothiazide 100-25 mg (HYZAAR) 100-25 mg per tablet Take 1 tablet by mouth once daily. 90 tablet 3    metoprolol succinate (TOPROL-XL) 50 MG 24 hr tablet Take 1 tablet (50 mg total) by mouth once daily. 90 tablet 3    potassium chloride SA (K-DUR,KLOR-CON) 20 MEQ tablet Take 20 mEq by mouth 2 (two) times daily.        No current facility-administered medications for this visit.      Facility-Administered Medications Ordered in Other Visits   Medication Dose Route Frequency Provider Last Rate Last Dose    0.9%  NaCl infusion   Intravenous Continuous Bony Saavedra MD   Stopped at 01/15/19 1332    sodium chloride 0.9% flush 3 mL  3 mL Intravenous PRN Bony Saavedra MD             Currently Mr. Hodges and his wife is responsible for preparing / administering this patient's medications on a daily basis.  I am  available for consultation and can be contacted, as needed by the other members of the Liver Transplant team.

## 2019-02-27 DIAGNOSIS — C22.0 HCC (HEPATOCELLULAR CARCINOMA): Primary | ICD-10-CM

## 2019-02-27 DIAGNOSIS — Z76.82 LIVER TRANSPLANT CANDIDATE: ICD-10-CM

## 2019-02-28 ENCOUNTER — TELEPHONE (OUTPATIENT)
Dept: TRANSPLANT | Facility: CLINIC | Age: 59
End: 2019-02-28

## 2019-02-28 NOTE — TELEPHONE ENCOUNTER
----- Message from Nallely Lainez sent at 2/28/2019  8:19 AM CST -----  Contact: PTs Wife    Called pt back, but there was no answer. LVM for him to call back  ----- Message -----  From: Leyla Dalyjacque  Sent: 2/28/2019   5:47 AM  To: Nallely Lainez        ----- Message -----  From: Pippa Eason RN  Sent: 2/27/2019   6:14 PM  To: [facility] Pre-Liver Transplant Non-Clinical        ----- Message -----  From: Pippa Ravi RN  Sent: 2/27/2019   4:56 PM  To: [facility] Pre-Liver Transplant Clinical        ----- Message -----  From: Pallavi Sweeney  Sent: 2/27/2019  11:16 AM  To: [facility] Staff    Type:  Sooner Apoointment Request    Caller is requesting a sooner appointment.  Caller declined first available appointment listed below.  Caller will not accept being placed on the waitlist and is requesting a message be sent to doctor.  Name of Caller: aKti (wife)  When is the first available appointment? I don't schedule for this department.  Symptoms: N/A  Would the patient rather a call back or a response via [facility]? Callback  Best Call Back Number: [phone]  Additional Information: requested a time change

## 2019-03-04 RX ORDER — ESOMEPRAZOLE MAGNESIUM 40 MG/1
40 CAPSULE, DELAYED RELEASE ORAL DAILY
Qty: 30 CAPSULE | Refills: 0 | Status: SHIPPED | OUTPATIENT
Start: 2019-03-04 | End: 2019-03-11 | Stop reason: SDUPTHER

## 2019-03-08 ENCOUNTER — PATIENT MESSAGE (OUTPATIENT)
Dept: FAMILY MEDICINE | Facility: CLINIC | Age: 59
End: 2019-03-08

## 2019-03-11 ENCOUNTER — PATIENT MESSAGE (OUTPATIENT)
Dept: FAMILY MEDICINE | Facility: CLINIC | Age: 59
End: 2019-03-11

## 2019-03-11 RX ORDER — ESOMEPRAZOLE MAGNESIUM 40 MG/1
40 CAPSULE, DELAYED RELEASE ORAL DAILY
Qty: 90 CAPSULE | Refills: 1 | Status: SHIPPED | OUTPATIENT
Start: 2019-03-11 | End: 2019-08-15 | Stop reason: SDUPTHER

## 2019-03-14 ENCOUNTER — EVALUATION (OUTPATIENT)
Dept: TRANSPLANT | Facility: CLINIC | Age: 59
End: 2019-03-14
Attending: INTERNAL MEDICINE
Payer: COMMERCIAL

## 2019-03-14 ENCOUNTER — HOSPITAL ENCOUNTER (OUTPATIENT)
Dept: RADIOLOGY | Facility: HOSPITAL | Age: 59
Discharge: HOME | End: 2019-03-14
Attending: INTERNAL MEDICINE
Payer: COMMERCIAL

## 2019-03-14 VITALS
RESPIRATION RATE: 18 BRPM | HEIGHT: 66 IN | BODY MASS INDEX: 32.59 KG/M2 | OXYGEN SATURATION: 98 % | WEIGHT: 202.81 LBS | TEMPERATURE: 98 F | DIASTOLIC BLOOD PRESSURE: 99 MMHG | HEART RATE: 64 BPM | SYSTOLIC BLOOD PRESSURE: 150 MMHG

## 2019-03-14 VITALS
TEMPERATURE: 98 F | WEIGHT: 202.81 LBS | HEART RATE: 64 BPM | DIASTOLIC BLOOD PRESSURE: 99 MMHG | SYSTOLIC BLOOD PRESSURE: 150 MMHG | RESPIRATION RATE: 18 BRPM | OXYGEN SATURATION: 98 % | HEIGHT: 66 IN | BODY MASS INDEX: 32.59 KG/M2

## 2019-03-14 DIAGNOSIS — C76.8 MALIGNANT NEOPLASM OF OTHER SPECIFIED ILL-DEFINED SITES: ICD-10-CM

## 2019-03-14 DIAGNOSIS — Z76.82 LIVER TRANSPLANT CANDIDATE: ICD-10-CM

## 2019-03-14 DIAGNOSIS — C22.0 HCC (HEPATOCELLULAR CARCINOMA): ICD-10-CM

## 2019-03-14 DIAGNOSIS — E11.00 TYPE 2 DIABETES MELLITUS WITH HYPEROSMOLARITY WITHOUT COMA, WITHOUT LONG-TERM CURRENT USE OF INSULIN: Chronic | ICD-10-CM

## 2019-03-14 DIAGNOSIS — I10 ESSENTIAL HYPERTENSION: Chronic | ICD-10-CM

## 2019-03-14 DIAGNOSIS — I48.91 NEW ONSET ATRIAL FIBRILLATION: ICD-10-CM

## 2019-03-14 DIAGNOSIS — K75.81 NASH (NONALCOHOLIC STEATOHEPATITIS): Chronic | ICD-10-CM

## 2019-03-14 DIAGNOSIS — C22.0 HCC (HEPATOCELLULAR CARCINOMA): Primary | ICD-10-CM

## 2019-03-14 LAB
AMPHET+METHAMPHET UR QL: NEGATIVE
BARBITURATES UR QL SCN>200 NG/ML: NEGATIVE
BENZODIAZ UR QL SCN>200 NG/ML: NEGATIVE
BZE UR QL SCN: NEGATIVE
CANNABINOIDS UR QL SCN: NEGATIVE
CREAT UR-MCNC: 60 MG/DL
ETHANOL UR-MCNC: <10 MG/DL
METHADONE UR QL SCN>300 NG/ML: NEGATIVE
OPIATES UR QL SCN: NEGATIVE
PCP UR QL SCN>25 NG/ML: NEGATIVE
TOXICOLOGY INFORMATION: NORMAL

## 2019-03-14 PROCEDURE — 72197 MRI PELVIS W/O & W/DYE: CPT | Mod: 26,TXP,, | Performed by: RADIOLOGY

## 2019-03-14 PROCEDURE — 72197 MRI PELVIS W/O & W/DYE: CPT | Mod: TC,TXP

## 2019-03-14 PROCEDURE — 3008F BODY MASS INDEX DOCD: CPT | Mod: CPTII,S$GLB,TXP, | Performed by: TRANSPLANT SURGERY

## 2019-03-14 PROCEDURE — A9585 GADOBUTROL INJECTION: HCPCS | Mod: TXP | Performed by: INTERNAL MEDICINE

## 2019-03-14 PROCEDURE — 99999 PR PBB SHADOW E&M-EST. PATIENT-LVL III: ICD-10-PCS | Mod: PBBFAC,TXP,,

## 2019-03-14 PROCEDURE — 71250 CT THORAX DX C-: CPT | Mod: TC,TXP

## 2019-03-14 PROCEDURE — 99204 PR OFFICE/OUTPT VISIT, NEW, LEVL IV, 45-59 MIN: ICD-10-PCS | Mod: S$GLB,TXP,, | Performed by: TRANSPLANT SURGERY

## 2019-03-14 PROCEDURE — 99999 PR PBB SHADOW E&M-EST. PATIENT-LVL III: CPT | Mod: PBBFAC,TXP,,

## 2019-03-14 PROCEDURE — 3077F PR MOST RECENT SYSTOLIC BLOOD PRESSURE >= 140 MM HG: ICD-10-PCS | Mod: CPTII,S$GLB,TXP, | Performed by: TRANSPLANT SURGERY

## 2019-03-14 PROCEDURE — 3008F PR BODY MASS INDEX (BMI) DOCUMENTED: ICD-10-PCS | Mod: CPTII,S$GLB,TXP, | Performed by: TRANSPLANT SURGERY

## 2019-03-14 PROCEDURE — 72197 MRI PELVIS W WO CONTRAST: ICD-10-PCS | Mod: 26,TXP,, | Performed by: RADIOLOGY

## 2019-03-14 PROCEDURE — 25500020 PHARM REV CODE 255: Mod: TXP | Performed by: INTERNAL MEDICINE

## 2019-03-14 PROCEDURE — 99204 OFFICE O/P NEW MOD 45 MIN: CPT | Mod: S$GLB,TXP,, | Performed by: TRANSPLANT SURGERY

## 2019-03-14 PROCEDURE — 3080F PR MOST RECENT DIASTOLIC BLOOD PRESSURE >= 90 MM HG: ICD-10-PCS | Mod: CPTII,S$GLB,TXP, | Performed by: TRANSPLANT SURGERY

## 2019-03-14 PROCEDURE — 71250 CT CHEST WITHOUT CONTRAST: ICD-10-PCS | Mod: 26,TXP,, | Performed by: RADIOLOGY

## 2019-03-14 PROCEDURE — 3077F SYST BP >= 140 MM HG: CPT | Mod: CPTII,S$GLB,TXP, | Performed by: TRANSPLANT SURGERY

## 2019-03-14 PROCEDURE — 80307 DRUG TEST PRSMV CHEM ANLYZR: CPT | Mod: TXP

## 2019-03-14 PROCEDURE — 71250 CT THORAX DX C-: CPT | Mod: 26,TXP,, | Performed by: RADIOLOGY

## 2019-03-14 PROCEDURE — 3080F DIAST BP >= 90 MM HG: CPT | Mod: CPTII,S$GLB,TXP, | Performed by: TRANSPLANT SURGERY

## 2019-03-14 RX ORDER — GADOBUTROL 604.72 MG/ML
9 INJECTION INTRAVENOUS
Status: COMPLETED | OUTPATIENT
Start: 2019-03-14 | End: 2019-03-14

## 2019-03-14 RX ADMIN — GADOBUTROL 9 ML: 604.72 INJECTION INTRAVENOUS at 11:03

## 2019-03-14 NOTE — PROGRESS NOTES
"  Transplant Surgery Liver Transplant Recipient Evaluation    Referring Physician: Jose Angel Munson  Corresponding Physician: Jose Angel Munson    Subjective:     Reason for Visit: evaluate liver transplant candidacy    History of Present Illness: Roman Hodges is a 59 y.o. year old male who is being evaluated for liver transplantation.    Transplant History: N/A    Native Liver Disease (UNOS terminology): Primary Liver Malignancy: Hepatoma (HCC) and Cirrhosis (based on medical records from referral).    Manifestations of liver disease: none  MELD-Na score: 8 at 2/18/2019 10:51 AM  MELD score: 8 at 2/18/2019 10:51 AM  Calculated from:  Serum Creatinine: 1.2 mg/dL at 2/18/2019 10:51 AM  Serum Sodium: 139 mmol/L (Rounded to 137 mmol/L) at 2/18/2019 10:51 AM  Total Bilirubin: 0.8 mg/dL (Rounded to 1 mg/dL) at 2/18/2019 10:51 AM  INR(ratio): 1 at 2/18/2019 10:51 AM  Age: 59 years     Estimated body mass index is 32.29 kg/m² as calculated from the following:    Height as of this encounter: 5' 6.46" (1.688 m).    Weight as of this encounter: 92 kg (202 lb 13.2 oz).     O POS    PMH: A Fib on Eliquis  PSH: reviewed    Review of Systems      Objective:     Physical Exam:  Constitutional:   Vitals reviewed: yes   Well-nourished and well-groomed: yes  Eyes:   Sclerae icteric: no   Extraocular movements intact: yes  GI:    Bowel sounds normal: yes   Tenderness: no    If yes, quadrant/location: not applicable   Palpable masses: no    If yes, quadrant/location: not applicable   Hepatosplenomegaly: no   Ascites: no   Hernia: yes. Location: umbilical small    If yes, type/location: umbilical hernia, not applicable   Surgical scars: no    If yes, type/location: not applicable  Resp:   Effort normal: yes   Breath sounds normal: yes    CV:   Regular rate and rhythm: yes   Heart sounds normal: yes   Femoral pulses normal: yes   Extremities edematous: no  Skin:   Rashes or lesions present: no    If yes, describe:not " applicable   Jaundice:: no    Musculoskeletal:   Gait normal: yes   Strength normal: yes  Psych:   Oriented to person, place, and time: yes   Affect and mood normal: yes    Additional comments: not applicable         Transplant Surgery - Candidacy   Assessment/Plan:   I see no surgical contraindication to liver transplantation. Based on available information, Roman Hodges is a suitable liver transplant candidate. Final determination of transplant candidacy will be made once evaluation is complete and reviewed by the Liver Transplant Selection Committee.    Bradford Higgins MD             Counseling: We provided Roman Hodges with a group education session today.  We discussed liver transplantation at length with him, including risks, potential complications, and alternatives in the management of his liver disease.  The discussion included complications related to anesthesia, bleeding, infection, primary nonfunction, and vascular thrombosis.  I discussed the typical postoperative course, length of hospitalization, the need for long-term immunosuppression, and the need for long-term routine follow-up.  I discussed living-donor and -donor transplantation and the relative advantages and disadvantages of each.  I also discussed average waiting times for both living donation and  donation.  I discussed national and center-specific survival rates.  I also mentioned the potential benefit of multicenter listing to candidates listed with centers within more than one organ procurement organization.  All questions were answered.    PHS: I discussed the use of organs from donors with PHS increased-risk behavior, including the testing protocols utilized, as well as data from the literature regarding the likelihood of transmission of hepatitis or HIV.  The patient that he is willing to consider such grafts.  DCD: I discussed the use of organs recovered by donation after cardiac death (DCD), including  slightly decreased graft survival and greater risk of arterial and biliary complications. The potential advantage to the recipient is possibly receiving a transplant sooner by accepting such an organ. The patient that he is willing to consider such grafts.  HBcAb: I discussed the use of organs from donors with HBcAb in conjunction with long term use of HBV antiviral drugs, such as lamivudine. The small but measurable risk of hepatitis B seroconversion was discussed as well as the potentially life long need to continue antiviral drugs. The patient that he is willing to consider such grafts.  HCV Non-viremic recipient: I discussed the use of HCV-positive organs in naive recipients, including the risk of viral transmission to the patients or others, potential insurance barriers for antiviral medication coverage, risk for fibrosing cholestatic hepatitis, death or graft loss. The potential advantage to the recipient is the possibility of receiving a transplant sooner with decreased mortality risk by accepting such an organ. The patient that he is willing to consider such grafts.  LDLT: I discussed the nature of living donor liver transplant, including donor risks and more frequent recipient complications. The patient that he is willing to consider such grafts.       I have reviewed and concur with the resident's history, physical, assessment, and plan.  I have personally interviewed and examined the patient at bedside.      Stefania Chau MD, PhD

## 2019-03-14 NOTE — PROGRESS NOTES
I have reviewed the notes, assessments, and/or procedures performed by Dr Higgins, I concur with her/his documentation of Roman Hodges.

## 2019-03-14 NOTE — PROGRESS NOTES
PRE EDUCATION TEACHING NOTE    Roman Hodges was seen in clinic today.  Handbook on pre-liver transplant information (see outline below) was given to the patient and time was allowed for questions.  Patient's wife and daughter, Radha and Radha accompanied him.  Informed consent signed and written information given on selection criteria.    LIVER TRANSPLANT WORK-UP EDUCATION   I. UNDERSTANDING THE TRANSPLANT PROCESS     A. Transplant team      B. MELD score      C. Balancing urgency and outcome     D. Liver Transplant Options         1.  Donor         2. Living Donor--rationale, benefits     E. Transplant Work-up         1. Medical         2. Psychological and Social--lifetime commitment, life changes, personal plan/ goal         3. Financial--fundraising     F.  Completed work-up and Next Steps    G. Wait Time         1.  Can be listed at more than one center         2.  Can transfer wait time     H. The Call       I. Possible donor options         1. DCD         2. Hep B Core and Hep C Positive         3. Increased Risk     J.  Liver Transplant Surgery         1. Length         2. Transfusions, cell saver         3. Surgical risks         4. What to expect after sugery--Central lines, drains, Meier catheter, incision, endotracheal              tube, NG tube, length of stay in ICU/ TSU  II.  HOW TO BEST CARE FOR YOURSELF (Take Five To Thrive)  III. UNDERSTANDING LIFE AFTER TRANSPLANT  A. Medicines after transplant      1. Immunosuppression--infection and rejection  B. Labs   IV. ADULT LIVER SURVIVAL RATES

## 2019-03-15 ENCOUNTER — LAB VISIT (OUTPATIENT)
Dept: LAB | Facility: HOSPITAL | Age: 59
End: 2019-03-15
Attending: INTERNAL MEDICINE
Payer: COMMERCIAL

## 2019-03-15 DIAGNOSIS — C76.8 MALIGNANT NEOPLASM OF OTHER SPECIFIED ILL-DEFINED SITES: ICD-10-CM

## 2019-03-15 DIAGNOSIS — I48.91 NEW ONSET ATRIAL FIBRILLATION: ICD-10-CM

## 2019-03-15 DIAGNOSIS — Z76.82 LIVER TRANSPLANT CANDIDATE: ICD-10-CM

## 2019-03-15 DIAGNOSIS — I10 ESSENTIAL HYPERTENSION: Chronic | ICD-10-CM

## 2019-03-15 DIAGNOSIS — C22.0 HCC (HEPATOCELLULAR CARCINOMA): ICD-10-CM

## 2019-03-15 DIAGNOSIS — E11.00 TYPE 2 DIABETES MELLITUS WITH HYPEROSMOLARITY WITHOUT COMA, WITHOUT LONG-TERM CURRENT USE OF INSULIN: Chronic | ICD-10-CM

## 2019-03-15 PROCEDURE — 82575 CREATININE CLEARANCE TEST: CPT | Mod: TXP

## 2019-03-15 PROCEDURE — 84156 ASSAY OF PROTEIN URINE: CPT | Mod: TXP

## 2019-03-16 LAB
PROT 24H UR-MRATE: NORMAL MG/SPEC
PROT UR-MCNC: <7 MG/DL
URINE COLLECTION DURATION: 24 HR
URINE VOLUME: 2500 ML

## 2019-03-18 ENCOUNTER — TELEPHONE (OUTPATIENT)
Dept: TRANSPLANT | Facility: CLINIC | Age: 59
End: 2019-03-18

## 2019-03-18 ENCOUNTER — LAB VISIT (OUTPATIENT)
Dept: LAB | Facility: HOSPITAL | Age: 59
End: 2019-03-18
Attending: FAMILY MEDICINE
Payer: COMMERCIAL

## 2019-03-18 DIAGNOSIS — Z76.82 LIVER TRANSPLANT CANDIDATE: ICD-10-CM

## 2019-03-18 DIAGNOSIS — C22.0 HCC (HEPATOCELLULAR CARCINOMA): ICD-10-CM

## 2019-03-18 DIAGNOSIS — Z76.82 LIVER TRANSPLANT CANDIDATE: Primary | ICD-10-CM

## 2019-03-18 LAB
CREAT SERPL-MCNC: 1.1 MG/DL
EST. GFR  (AFRICAN AMERICAN): >60 ML/MIN/1.73 M^2
EST. GFR  (NON AFRICAN AMERICAN): >60 ML/MIN/1.73 M^2

## 2019-03-18 PROCEDURE — 82565 ASSAY OF CREATININE: CPT | Mod: TXP

## 2019-03-18 PROCEDURE — 36415 COLL VENOUS BLD VENIPUNCTURE: CPT | Mod: PO,TXP

## 2019-03-18 NOTE — TELEPHONE ENCOUNTER
Call received from Joyce Mosley, chemistry lab.  States patient submitted a 24 hour urine for creatinine clearance on 3/15 but serum creatinine was not collected.  Lab needs to be drawn today or specimen will have to be recollected.     Mrs Hodges notified and ask to have lab scheduled today at [facility], [place], today at 1300.        Order entered and MA messaged to schedule

## 2019-03-19 LAB
CREAT CL/1.73 SQ M 12H UR+SERPL-ARVRAT: 109 ML/MIN
CREAT SERPL-MCNC: 1.1 MG/DL
CREAT UR-MCNC: 69 MG/DL
CREATININE, URINE (MG/SPEC): 1725 MG/SPEC
URINE COLLECTION DURATION: 24 HR
URINE VOLUME: 2500 ML

## 2019-03-22 ENCOUNTER — LAB VISIT (OUTPATIENT)
Dept: LAB | Facility: HOSPITAL | Age: 59
End: 2019-03-22
Payer: COMMERCIAL

## 2019-03-22 ENCOUNTER — OFFICE VISIT (OUTPATIENT)
Dept: INFECTIOUS DISEASES | Facility: CLINIC | Age: 59
End: 2019-03-22
Attending: INTERNAL MEDICINE
Payer: COMMERCIAL

## 2019-03-22 ENCOUNTER — CLINICAL SUPPORT (OUTPATIENT)
Dept: INFECTIOUS DISEASES | Facility: CLINIC | Age: 59
End: 2019-03-22
Payer: COMMERCIAL

## 2019-03-22 VITALS
HEIGHT: 66 IN | SYSTOLIC BLOOD PRESSURE: 129 MMHG | BODY MASS INDEX: 33.34 KG/M2 | DIASTOLIC BLOOD PRESSURE: 82 MMHG | TEMPERATURE: 98 F | WEIGHT: 207.44 LBS | HEART RATE: 58 BPM

## 2019-03-22 DIAGNOSIS — C22.0 HCC (HEPATOCELLULAR CARCINOMA): Primary | ICD-10-CM

## 2019-03-22 DIAGNOSIS — Z76.82 LIVER TRANSPLANT CANDIDATE: ICD-10-CM

## 2019-03-22 DIAGNOSIS — K75.81 NASH (NONALCOHOLIC STEATOHEPATITIS): Chronic | ICD-10-CM

## 2019-03-22 DIAGNOSIS — C22.0 HCC (HEPATOCELLULAR CARCINOMA): ICD-10-CM

## 2019-03-22 PROCEDURE — 86765 RUBEOLA ANTIBODY: CPT | Mod: TXP

## 2019-03-22 PROCEDURE — 3074F SYST BP LT 130 MM HG: CPT | Mod: CPTII,S$GLB,TXP, | Performed by: PHYSICIAN ASSISTANT

## 2019-03-22 PROCEDURE — 3079F DIAST BP 80-89 MM HG: CPT | Mod: CPTII,S$GLB,TXP, | Performed by: PHYSICIAN ASSISTANT

## 2019-03-22 PROCEDURE — 3008F PR BODY MASS INDEX (BMI) DOCUMENTED: ICD-10-PCS | Mod: CPTII,S$GLB,TXP, | Performed by: PHYSICIAN ASSISTANT

## 2019-03-22 PROCEDURE — 90739 HEPATITIS B (RECOMBINANT) ADJUVANTED, 2 DOSE: ICD-10-PCS | Mod: S$GLB,TXP,, | Performed by: INTERNAL MEDICINE

## 2019-03-22 PROCEDURE — 99999 PR PBB SHADOW E&M-EST. PATIENT-LVL III: CPT | Mod: PBBFAC,TXP,, | Performed by: PHYSICIAN ASSISTANT

## 2019-03-22 PROCEDURE — 3074F PR MOST RECENT SYSTOLIC BLOOD PRESSURE < 130 MM HG: ICD-10-PCS | Mod: CPTII,S$GLB,TXP, | Performed by: PHYSICIAN ASSISTANT

## 2019-03-22 PROCEDURE — 90471 HEPATITIS B (RECOMBINANT) ADJUVANTED, 2 DOSE: ICD-10-PCS | Mod: S$GLB,TXP,, | Performed by: INTERNAL MEDICINE

## 2019-03-22 PROCEDURE — 99204 OFFICE O/P NEW MOD 45 MIN: CPT | Mod: S$GLB,TXP,, | Performed by: PHYSICIAN ASSISTANT

## 2019-03-22 PROCEDURE — 90471 IMMUNIZATION ADMIN: CPT | Mod: S$GLB,TXP,, | Performed by: INTERNAL MEDICINE

## 2019-03-22 PROCEDURE — 3008F BODY MASS INDEX DOCD: CPT | Mod: CPTII,S$GLB,TXP, | Performed by: PHYSICIAN ASSISTANT

## 2019-03-22 PROCEDURE — 86735 MUMPS ANTIBODY: CPT | Mod: TXP

## 2019-03-22 PROCEDURE — 3079F PR MOST RECENT DIASTOLIC BLOOD PRESSURE 80-89 MM HG: ICD-10-PCS | Mod: CPTII,S$GLB,TXP, | Performed by: PHYSICIAN ASSISTANT

## 2019-03-22 PROCEDURE — 99999 PR PBB SHADOW E&M-EST. PATIENT-LVL I: ICD-10-PCS | Mod: PBBFAC,TXP,,

## 2019-03-22 PROCEDURE — 99999 PR PBB SHADOW E&M-EST. PATIENT-LVL I: CPT | Mod: PBBFAC,TXP,,

## 2019-03-22 PROCEDURE — 36415 COLL VENOUS BLD VENIPUNCTURE: CPT | Mod: TXP

## 2019-03-22 PROCEDURE — 90739 HEPB VACC 2/4 DOSE ADULT IM: CPT | Mod: S$GLB,TXP,, | Performed by: INTERNAL MEDICINE

## 2019-03-22 PROCEDURE — 99204 PR OFFICE/OUTPT VISIT, NEW, LEVL IV, 45-59 MIN: ICD-10-PCS | Mod: S$GLB,TXP,, | Performed by: PHYSICIAN ASSISTANT

## 2019-03-22 PROCEDURE — 86762 RUBELLA ANTIBODY: CPT | Mod: TXP

## 2019-03-22 PROCEDURE — 99999 PR PBB SHADOW E&M-EST. PATIENT-LVL III: ICD-10-PCS | Mod: PBBFAC,TXP,, | Performed by: PHYSICIAN ASSISTANT

## 2019-03-22 NOTE — PROGRESS NOTES
Pre Transplant Infectious Diseases Consult  Liver Transplant Recipient Evaluation    Requesting Physician: Fab Jonas MD    Reason for Visit:    Chief Complaint   Patient presents with    Liver Transplant Pre-evaluation     History of Present Illness  Roman Hodges is a 59 y.o. year old White male with advanced Liver disease currently being evaluated for Liver transplant. He denies a history of ascites, paracentesis or perotonitis. He emigrated from [facility] [place] 30 years ago. Patient denies any recent fever, chills, or infective illnesses.      1) Do you have a history of:   YES NO   Diabetes      [x] []     Diabetic Foot Infection/Bone Infection  []        [x]     Surgical Removal of Spleen   []  [x]                  2) Have you had recurrent infections involving:             YES NO  Sinus infections  []         [x]   Sore Throat   []         [x]                 Prostate Infections  []         [x]              Bladder Infections  []         [x]                     Kidney Infections  []         [x]                               Intestinal Infections  []         [x]      Skin Infections   []         [x]       Reproductive Infections          []  [x]   Periodontal Disease  []         [x]        3)Have you ever had: YES     NO UNKNOWN      Chicken Pox   [x]         []  []   Shingles   []         [x]  []   Orolabial Herpes             []  [x]  []   Genital Herpes  []         [x]  []   Cytomegalovirus  []         [x]  []   Kelly-Barr Virus  []         [x]  []   Genital Warts   []         [x]  []   Hepatitis A   []         [x]  []   Hepatitis B   []         [x]  []   Hepatitis C   []         [x]  []   Syphilis   []         [x]  []   Gonorrhea   []         [x]  []   Pelvic Inflammatory   Disease   []         []  []   Chlamydia Infection  []         [x]  []   Intestinal parasites   or worms   []         [x]  []   Fungal Infections  []         [x]  []   Blood Infections  []         [x]  []       Comment:       4) Have  you ever been exposed   YES NO  To someone with tuberculosis?  []   [x]   If yes, what treatment did you receive:     5) What states have you lived in? [place]    6) What countries have you visited for more than 2 weeks? [place], [place]                        YES NO  7) Did you have any associated infections?  []  [x]       8) Are you planning to travel outside the    []  [x]   [place] after your transplant?    9) Household                   YES NO  Do you have pets living in your house?    []         [x]   If yes, describe:     Do you spend time or live on a farm or     []         [x]   have livestock or other farm animals?  If yes, which ones:    Do you have a fish tank?          []  [x]       Do you have a litter box?      []         [x]     Do you fish or hunt?       []         [x]     Do you clean or skin fish or animals?    []         [x]     Do you consume raw or undercooked    []         [x]   meat, fish, or shellfish?      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          12)Do you garden or otherwise  YES NO   work in the soil?    []         []   13)Do you hike, camp, or spend     time in wooded areas?   []         []        14) The patient's immunization history was reviewed.    Have you ever received:  YES NO UNKNOWN DATES   Routine Childhood vaccines  [x]         []  []      Influenza vaccine   []  [x]  [] Had Bell's Palsy in 2009 after vaccine   Pneumovax    [x]  []  [] 11/2018    Tetanus-diptheria   [x]         []  [] 8/5/2018   Hepatitis A vaccine series       []  [x]  []    Hepatitis B vaccine series         []  [x]  []    Meningitis vaccine   []         [x]  []    Varicella vaccine   []         [x]  []      <=">  Immunizations                Pneumococcal Polysaccharide - 23 Valent 11/2/2018          Tdap 8/15/2018            Based on the patients immunization history and serologies, immunizations were ordered:         Ordered  Not  Ordered  Influenza Vaccine     []    [x]   Hepatitis A series at 0,  6 months   []    [x] Immune  Hepatitis B seriesat 0, 1, and 6 months  []    [x]   Hepatitis B High Dose 0,1, and 6 months  [x]    []   Prevnar      [x]    []   Pneumovax      []    [x]    TDap       []    [x]    Zoster       [x]    []   Menactra      []    [x]            The patient was encouraged to contact us about any problems that may develop after immunization and possible side effects were reviewed.      Previous Transplant: no    Etiology of Liver Disease: unknown etiology     Allergies: Lisinopril and Flu vaccine 2011 (36 mos+)(pf)  Immunization History   Administered Date(s) Administered    Pneumococcal Polysaccharide - 23 Valent 11/02/2018    Tdap 08/15/2018     Past Medical History:   Diagnosis Date    A-fib     Anticoagulant long-term use     Cardiomyopathy     CHF (congestive heart failure)     Diabetes mellitus, type 2     GERD (gastroesophageal reflux disease)     Hepatocellular carcinoma     liver    Hyperlipidemia     Hypertension      Past Surgical History:   Procedure Laterality Date    COLONOSCOPY      EMBOLIZATION, YTTRIUM THERAPY N/A 11/9/2018    [facility] 2ND FLR    EMBOLIZATION, YTTRIUM THERAPY N/A 10/24/2018    [facility] 2ND FLR    ESOPHAGOGASTRODUODENOSCOPY (EGD) N/A 1/15/2019    [facility] at [facility] ENDO    HERNIA REPAIR      Left heart cath Left 12/4/2018    Performed by [facility]osa [facility] at [facility] CATH LAB/EP      Social History     Socioeconomic History    Marital status:      Spouse name: Not on file    Number of children: Not on file    Years of education: Not on file    Highest education level: Not on file   Social Needs    Financial resource strain: Not on file    Food insecurity - worry: Not on file    Food insecurity - inability: Not on file    Transportation needs - medical: Not on  file    Transportation needs - non-medical: Not on file   Occupational History    Not on file   Tobacco Use    Smoking status: Former Smoker     Packs/day: 1.00     Years: 10.00     Pack years: 10.00     Types: Cigarettes     Last attempt to quit: 1980     Years since quittin.2    Smokeless tobacco: Never Used   Substance and Sexual Activity    Alcohol use: No    Drug use: No    Sexual activity: Yes     Partners: Female   Other Topics Concern    Not on file   Social History Narrative    Not on file       Review of Systems   Constitution: Positive for weight loss (intentional). Negative for chills, decreased appetite, fever, weakness, malaise/fatigue, night sweats and weight gain.   HENT: Negative for congestion, ear pain, hearing loss, hoarse voice, sore throat and tinnitus.    Eyes: Negative for blurred vision, redness and visual disturbance.   Cardiovascular: Negative for chest pain, leg swelling and palpitations.   Respiratory: Negative for cough, hemoptysis, shortness of breath, sputum production and wheezing.    Endocrine: Negative for cold intolerance and heat intolerance.   Hematologic/Lymphatic: Negative for adenopathy. Does not bruise/bleed easily.   Skin: Negative for dry skin, itching, rash and suspicious lesions.   Musculoskeletal: Negative for back pain, joint pain, myalgias and neck pain.   Gastrointestinal: Positive for heartburn. Negative for abdominal pain, constipation, diarrhea, nausea and vomiting.   Genitourinary: Negative for dysuria, flank pain, frequency, hematuria, hesitancy and urgency.   Neurological: Negative for dizziness, headaches, numbness and paresthesias.   Psychiatric/Behavioral: Negative for depression and memory loss. The patient does not have insomnia and is not nervous/anxious.    Allergic/Immunologic: Negative for environmental allergies, HIV exposure, hives and persistent infections.     Physical Exam   Constitutional: He is oriented to person, place, and  time. He appears well-developed and well-nourished.       HENT:   Head: Normocephalic and atraumatic.   Mouth/Throat: Uvula is midline, oropharynx is clear and moist and mucous membranes are normal. He does not have dentures. No oral lesions. Normal dentition. No dental abscesses, lacerations or dental caries.   Eyes: Conjunctivae and lids are normal. Pupils are equal, round, and reactive to light. No scleral icterus.   Neck: Neck supple.   Cardiovascular: Normal rate and regular rhythm. Exam reveals no gallop and no friction rub.   No murmur heard.  Pulmonary/Chest: Effort normal and breath sounds normal. No respiratory distress. He has no decreased breath sounds. He has no wheezes. He has no rhonchi. He has no rales.   Abdominal: Soft. Normal appearance, normal aorta and bowel sounds are normal. He exhibits no distension and no mass. There is no hepatosplenomegaly. There is no tenderness. There is no rebound and no guarding.   Musculoskeletal: He exhibits no edema.   Lymphadenopathy:        Head (right side): No submental, no submandibular, no tonsillar, no preauricular, no posterior auricular and no occipital adenopathy present.        Head (left side): No submental, no submandibular, no tonsillar, no preauricular, no posterior auricular and no occipital adenopathy present.     He has no cervical adenopathy.     He has no axillary adenopathy.        Right: No inguinal, no supraclavicular and no epitrochlear adenopathy present.        Left: No inguinal, no supraclavicular and no epitrochlear adenopathy present.   Neurological: He is alert and oriented to person, place, and time. No cranial nerve deficit.   Skin: Skin is warm, dry and intact. No lesion and no rash noted. He is not diaphoretic. No erythema. No pallor.   Psychiatric: He has a normal mood and affect. His behavior is normal.     Diagnostics:   RPR   Date Value Ref Range Status   02/18/2019 Non-reactive Non-reactive Final     No results found for:  CMVANTIBODIE  No results found for: HIV1X2  No results found for: HTLVIIIANTIB  Hepatitis B Surface Ag   Date Value Ref Range Status   08/24/2018 Negative  Final     Hep B Core Total Ab   Date Value Ref Range Status   09/20/2018 Negative  Final     Hepatitis C Ab   Date Value Ref Range Status   08/24/2018 Negative  Final     No results found for: TOXOIGG  No components found for: TOXOIGGINTER  No results found for: NIX7IGL  No results found for: GKS9SOP  Varicella IgG   Date Value Ref Range Status   02/18/2019 2.25 (H) 0.00 - 0.90 ISR Final     Varicella Interpretation   Date Value Ref Range Status   02/18/2019 Positive (A) Negative Final     Comment:     <or=0.90     Negative        No detectable IgG antibody to Varicella zoster  by the LORENZA test. Such individuals are presumed to be   uninfected with Varicella zoster and to be susceptible to   primary infection.  0.91-1.09    Equivocal  >or=1.10     Positive        Indicates presence of detectable IgG antibody to Varicella   zoster by the LORENZA test. Indicative of previous or current   infection.       Strongyloides Ab IgG   Date Value Ref Range Status   02/18/2019 Negative Negative Final     Comment:     No detectable levels of IgG antibodies to Strongyloides.  Repeat testing in 1-2 weeks if clinically indicated.  Test Performed by:  [facility]  [address] [id number]       No results found for: EPSTEINBARRV  Hep B S Ab   Date Value Ref Range Status   02/18/2019 Negative  Final     No results found for: QUANTIFERON  Hep A IgM   Date Value Ref Range Status   08/24/2018 Negative  Final      Ref. Range 9/20/2018 15:22 2/18/2019 10:51   Hepatitis A Antibody IgG Unknown Negative Positive (A)      Ref. Range 8/24/2018 08:58 9/20/2018 15:22 2/18/2019 10:51   Hep A IgM Unknown Negative     Hep B C IgM Unknown Negative     Hep B Core Total Ab Unknown  Negative    Hep B S Ab Unknown   Negative   Hepatitis B  Surface Ag Unknown Negative     GGT Latest Ref Range: 8 - 55 U/L   274 (H)   Hepatitis C Ab Unknown Negative     Mitogen - Nil Latest Ref Range: See text IU/mL   6.170   NIL Latest Ref Range: See text IU/mL   0.040   TB Gold Plus Unknown   Negative   TB1 - Nil Latest Ref Range: See text IU/mL   -0.010   TB2 - Nil Latest Ref Range: See text IU/mL   -0.010   HIV 1/2 Ag/Ab Latest Ref Range: Negative    Negative   RPR Latest Ref Range: Non-reactive    Non-reactive   CMV IgG Interpretation Unknown   Reactive (A)   Strongyloides Ab IgG Latest Ref Range: Negative    Negative   Varicella IgG Latest Ref Range: 0.00 - 0.90 ISR   2.25 (H)   Varicella Interpretation Latest Ref Range: Negative    Positive (A)     Imaging:  CT Chest Without Contrast   Order: [id number]   Status:  Final result   Visible to patient:  Yes (Patient Portal) Next appt:  03/29/2019 at 10:30 AM in Infectious Diseases (INJECTION, INFECTIOUS DISEASES) Dx:  Malignant neoplasm of other specified...   Details     Reading Physician Reading Date Result Priority   Perlita Woodruff MD 3/14/2019       Narrative     EXAMINATION:  CT CHEST WITHOUT CONTRAST    CLINICAL HISTORY:  Malignant neoplasm of other and ill-defined sites;HCC r/o Metastasis; Malignant neoplasm of other specified ill-defined sites    TECHNIQUE:  Using low dose technique the chest was surveyed from above the pulmonary apices through the posterior costophrenic angles.  Data was reconstructed for multiplanar images in axial, sagittal and coronal planes and for maximal intensity projection images in the axial plane.    All CT scans at this facility use dose modulation, iterative reconstruction and/or weight based dosing when appropriate to reduce radiation dose to as low as reasonably achievable.    Xray dose: DLP = 379.52 mGy-cm.    COMPARISON:  None.    FINDINGS:  Base of Neck: No significant abnormality.    Aorta: Left-sided aortic arch with 3 arterial branches. The aorta maintains normal  caliber, contour and course. There is scattered calcification of the thoracic aorta.  There is  abundant coronary artery calcification in this 59-year-old man; consider cardiology consultation if not already obtained..    Heart/pericardium: Normal size.  No pericardial effusion or calcification.  Left atrial appendage has chicken wing configuration.    Pulmonary vasculature: Pulmonary arteries distribute normally.  There are four pulmonary veins.    Marie/Mediastinum: No pathologic sil enlargement.    Pleura: No pleural fluid or thickening.No pleural calcification.    Esophagus: Normal.    Upper Abdomen: Radiopaque material in the right renal pelvis reflects pelvic MRI performed earlier today.  Please see the report of that MRI as well as the MRI of the abdomen performed 02/18/2019 for information regarding intra-abdominal structures in this patient with a history of HCC.    Thoracic soft tissues: Normal.    Bones: No acute fracture. No suspicious lytic or sclerotic lesion.    Airways: Patent.    Lungs: Clear lungs.  No metastatic disease identified in this patient with a history of HCC      Impression       1.  No metastatic disease identified in this patient with a history of HCC.    2.  Abundant coronary artery calcification in this 59-year-old man.  Consider cardiology consultation if not already obtained.      Electronically signed by: Perlita Woodruff MD  Date: 03/14/2019  Time: 12:16         X-Ray Chest PA And Lateral   Order: [id number]   Status:  Final result   Visible to patient:  Yes (Patient Portal) Next appt:  03/29/2019 at 10:30 AM in Infectious Diseases (INJECTION, INFECTIOUS DISEASES) Dx:  HCC (hepatocellular carcinoma); New o...   Details     Reading Physician Reading Date Result Priority   Chuckie Roth MD 2/18/2019    Roshan Hamlin MD 2/18/2019       Narrative     EXAMINATION:  XR CHEST PA AND LATERAL    CLINICAL HISTORY:  Type 2 diabetes mellitus with hyperosmolarity without nonketotic  "hyperglycemic-hyperosmolar coma (NKHHC)    TECHNIQUE:  PA and lateral views of the chest were performed.    COMPARISON:  Portable chest radiograph 01/03/2019, 12/31/2018    FINDINGS:  Lungs are clear without consolidation.  No pneumothorax or pleural effusion.  Cardiac silhouette and pulmonary vasculature appear within normal limits.  Hilar contours are normal.  Osseous structures are intact.      Impression       No acute cardiopulmonary process.    Electronically signed by resident: Roshan Hamlin  Date: 02/18/2019  Time: 09:13    Electronically signed by: Chuckie Roth MD  Date: 02/18/2019  Time: 10:15                TRANSTHORACIC ECHO (TTE) COMPLETE   Conclusion     · Eccentric left ventricular hypertrophy.  · Normal left ventricular systolic function. The estimated ejection fraction is 55%  · Normal LV diastolic function.  · Normal right ventricular systolic function.  · Normal central venous pressure (3 mm Hg).  · The estimated PA systolic pressure is 11 mm Hg  · Normal left atrial pressure.      Performing Sonographer     Diaz Spear    Reason For Exam   Priority: Routine   Dx: Acute systolic heart failure [I50.21 (ICD-10-CM)]; Essential hypertension [I10 (ICD-10-CM)]; New onset atrial fibrillation [I48.91 (ICD-10-CM)]         Vitals     Height Weight BMI (Calculated) BSA (Calculated - sq m) BP   5' 7" (1.702 m) 90.3 kg (199 lb) 31.2 2.07 sq meters 146/86   PACS Images for Declara Viewer      Show images for Transthoracic echo (TTE) complete (Cupid Only)   Result Image Hyperlink      Show images for Transthoracic echo (TTE) complete (Cupid Only)    Reviewed By     Debo Méndez MA on 1/2/2019 08:58   Elliot Johnson MD on 1/1/2019 15:56         Study Details     A complete echo was performed using complete 2D. Overall the study quality was good.   Echocardiography Findings     Left Ventricle Normal ejection fraction at 55%. Eccentric observed. Normal left ventricular diastolic function. Normal left " atrial pressure.   Right Ventricle Normal cavity size, wall thickness and ejection fraction. Wall motion normal.   Left Atrium Normal atrial size. . Normal pulmonary vein connection. Normal pulmonary venous flow.   Right Atrium Normal atrial size.   Aortic Valve No stenosis. No regurgitation.   Tricuspid Valve The tricuspid valve is normal. No stenosis. No regurgitation. The estimated PA systolic pressure is 11 mmHg.   IVC/SVC Normal central venous pressure (3 mm Hg).          Transplant Infectious Diseases - Candidacy    Assessment/Plan:     Transplant Candidacy: Based on available information, there are no identified significant barriers to transplantation from an infectious disease standpoint pending positive MMR titers drawn today .  Final determination of transplant candidacy will be made once evaluation is complete and reviewed by the Transplant Selection Committee.    AMBER Harden  Vaccine Needs:  1. Shingrix series  2. Heplisav B series  3. Prevnar-13 in 11/1/19       Counseling:I discussed with Roman the risk for increased susceptibility to infections following transplantation including increased risk for infection right after transplant and if rejection should occur.  The patients has been counseled on the importance of vaccinations including but not limited to a yearly flu vaccine.  Specific guidance has been provided to the patient regarding the patients occupation, hobbies and activities to avoid future infectious complications including but not limited to avoiding undercooked meats and seafood, proper hygiene, and contact with animals.

## 2019-03-22 NOTE — PROGRESS NOTES
Mr. Hodges received Heplisav-B(first dose) vaccine. Tolerated well. Return appointment scheduled. Left unit in NAD.

## 2019-03-22 NOTE — LETTER
March 22, 2019      Fab Damon MD  [address]  [facility] [address]           [facility] - Infectious Diseases  [address]  [facility] [address]  Phone: [phone]  Fax: [phone]          Patient: Roman Hodges   MR Number: [id number]   YOB: 1960   Date of Visit: 3/22/2019       Dear Dr. Fab Damon:    Thank you for referring Roman Hodges to me for evaluation. Attached you will find relevant portions of my assessment and plan of care.    If you have questions, please do not hesitate to call me. I look forward to following Roman Hodges along with you.    Sincerely,    Roshan Yin Jr., PA    Enclosure  CC:  No Recipients    If you would like to receive this communication electronically, please contact [email or web address] or [phone] to request more information on [facility] access.    For providers and/or their staff who would like to refer a patient to [facility], please contact us through our one-stop-shop provider referral line, [facility], at [phone].    If you feel you have received this communication in error or would no longer like to receive these types of communications, please e-mail [email or web address]

## 2019-03-25 LAB
RUBV IGG SER-ACNC: >400 IU/ML
RUBV IGG SER-IMP: REACTIVE

## 2019-03-26 DIAGNOSIS — C22.0 HEPATOCELLULAR CARCINOMA: Primary | ICD-10-CM

## 2019-03-27 LAB
MUMPS IGG INTERPRETATION: POSITIVE
MUMPS IGG SCREEN: 1.71 ISR (ref 0–0.9)
RUBEOLA IGG ANTIBODY: 2.1 ISR (ref 0–0.9)
RUBEOLA INTERPRETATION: POSITIVE

## 2019-03-29 ENCOUNTER — PATIENT MESSAGE (OUTPATIENT)
Dept: HEPATOLOGY | Facility: CLINIC | Age: 59
End: 2019-03-29

## 2019-03-29 ENCOUNTER — CLINICAL SUPPORT (OUTPATIENT)
Dept: INFECTIOUS DISEASES | Facility: CLINIC | Age: 59
End: 2019-03-29
Payer: COMMERCIAL

## 2019-03-29 PROCEDURE — 99999 PR PBB SHADOW E&M-EST. PATIENT-LVL I: CPT | Mod: PBBFAC,TXP,,

## 2019-03-29 PROCEDURE — 90750 ZOSTER RECOMBINANT VACCINE: ICD-10-PCS | Mod: S$GLB,TXP,, | Performed by: INTERNAL MEDICINE

## 2019-03-29 PROCEDURE — 90471 IMMUNIZATION ADMIN: CPT | Mod: S$GLB,TXP,, | Performed by: INTERNAL MEDICINE

## 2019-03-29 PROCEDURE — 99999 PR PBB SHADOW E&M-EST. PATIENT-LVL I: ICD-10-PCS | Mod: PBBFAC,TXP,,

## 2019-03-29 PROCEDURE — 90471 ZOSTER RECOMBINANT VACCINE: ICD-10-PCS | Mod: S$GLB,TXP,, | Performed by: INTERNAL MEDICINE

## 2019-03-29 PROCEDURE — 90750 HZV VACC RECOMBINANT IM: CPT | Mod: S$GLB,TXP,, | Performed by: INTERNAL MEDICINE

## 2019-03-29 NOTE — TELEPHONE ENCOUNTER
Patient: Roman Hodges       MRN: [id number]      : 1960     Age: 59 y.o.  [address]  [place] [address]    Provider: Hepatologist - Marisol    Urgency of review: non-urgent    Patient Transplant Status: In Evaluation    Reason for presentation: Reassessment    Clinical Summary: 59 y.o. male with ESLD secondary to nonalcoholic steatohepatitis complicated by HCC outside [place] criteria (6.2 cm)     Imaging to be reviewed: MRI 3/14/19    HCC Treatment History: Yttrium 18; Ablation (scheduled 19)    ABO: O POS    Platelets:   Lab Results   Component Value Date/Time     2019 10:51 AM     Creatinine:   Lab Results   Component Value Date/Time    CREATININE 1.1 2019 04:45 PM     Bilirubin:   Lab Results   Component Value Date/Time    BILITOT 0.8 2019 10:51 AM     AFP Last 3 each if available:   Lab Results   Component Value Date/Time    AFP 6.4 2019 10:51 AM    AFP 4.1 2018 03:22 PM       MELD: MELD-Na score: 8 at 2019 10:51 AM  MELD score: 8 at 2019 10:51 AM  Calculated from:  Serum Creatinine: 1.2 mg/dL at 2019 10:51 AM  Serum Sodium: 139 mmol/L (Rounded to 137 mmol/L) at 2019 10:51 AM  Total Bilirubin: 0.8 mg/dL (Rounded to 1 mg/dL) at 2019 10:51 AM  INR(ratio): 1.0 at 2019 10:51 AM  Age: 59 years    Plan: Significant post treatment change; Some nodular residual (under 5 cm) with no new lesion. Plan for ablation of residual  Committee discussion--residual disease far less than 5 cm; within [place].  Proceed with ablation as scheduled    Patient notified by IR and ablation scheduled 19    Follow-up Provider: Fab Damon MD

## 2019-04-01 ENCOUNTER — PATIENT MESSAGE (OUTPATIENT)
Dept: CARDIOLOGY | Facility: CLINIC | Age: 59
End: 2019-04-01

## 2019-04-01 ENCOUNTER — PATIENT MESSAGE (OUTPATIENT)
Dept: FAMILY MEDICINE | Facility: CLINIC | Age: 59
End: 2019-04-01

## 2019-04-02 ENCOUNTER — CONFERENCE (OUTPATIENT)
Dept: TRANSPLANT | Facility: CLINIC | Age: 59
End: 2019-04-02

## 2019-04-03 ENCOUNTER — TELEPHONE (OUTPATIENT)
Dept: TRANSPLANT | Facility: CLINIC | Age: 59
End: 2019-04-03

## 2019-04-03 DIAGNOSIS — I48.91 NEW ONSET ATRIAL FIBRILLATION: Primary | ICD-10-CM

## 2019-04-03 RX ORDER — AMLODIPINE BESYLATE 5 MG/1
5 TABLET ORAL DAILY
Qty: 90 TABLET | Refills: 3 | Status: SHIPPED | OUTPATIENT
Start: 2019-04-03 | End: 2019-06-13

## 2019-04-03 NOTE — PROGRESS NOTES
Mr Hodges is a 59 yom who is being considered for liver transplant with an indication of HCC associated with ROMO cirrhosis.  He has a history of atrial fibrillation and is anticoagulated with apixiban.    Patient was discussed in the Liver Selection Committee.  In order to reduce risk of bleeding associated with the transplant procedure, the recommendation was made to transition him to warfarin from apixiban for thrombosis prophylaxis associated with atrial fibrillation.  Warfarin is preferable in this patient given it's reversibility for surgery.  His CHADS2-VASc Score score is a 2 (HTN, DM) which places him at moderate to high risk for thrombosis and is an indication for systemic anticoagulation.      Will plan to transition to warfarin (goal INR 2-3) and refer to the [facility] for management.   Post-transplant he is a candidate for transitioning back to apixaban.      Spoke with Mrs Hodges regarding the rationale for conversion.  Also discussed the risks and benefits of conversion.  She understands that the primary adverse effect of bleeding is similar to his current therapy.  Discussed not starting any new medications in the meantime, as well as, avoiding NSAIDS (ibuprofen, naproxen, etc) due to increased risk of bleeding.      She did reveal that Mr Hodges is having a dental procedure on the 10th and was instructed to hold his Eliquis 48 hours before.  In light of this, he will be transitioned starting on the 10th.   She understands to start warfarin 5 mg PO QPM on Wednesday evening .  She understands that the warfarin clinic will follow-up with her and solidify his transition plan and will provide more extensive education than was provided on the phone.

## 2019-04-03 NOTE — TELEPHONE ENCOUNTER
Patient notified his case was presented to the Liver Selection Committee and he has been approved for listing pending financial clearance.  Understanding expressed.      Requested clinicals faxed to Yannick Olsen

## 2019-04-04 RX ORDER — WARFARIN SODIUM 5 MG/1
5 TABLET ORAL DAILY
Qty: 30 TABLET | Refills: 5 | Status: ON HOLD | OUTPATIENT
Start: 2019-04-04 | End: 2019-07-08 | Stop reason: HOSPADM

## 2019-04-05 ENCOUNTER — ANTI-COAG VISIT (OUTPATIENT)
Dept: CARDIOLOGY | Facility: CLINIC | Age: 59
End: 2019-04-05

## 2019-04-05 DIAGNOSIS — Z79.01 LONG TERM (CURRENT) USE OF ANTICOAGULANTS: Primary | ICD-10-CM

## 2019-04-05 DIAGNOSIS — I48.91 NEW ONSET ATRIAL FIBRILLATION: ICD-10-CM

## 2019-04-05 NOTE — PROGRESS NOTES
59 year old male, transitioning from Eliquis to Coumadin related to anticipate liver transplant.  Patient has dental cleaning on 4/10/19 and was instructed by Dr Tao Carpenter to hold Eliquis 48 hours before then to start Coumadin on 4/10/19 per his referral note but no mention of liver ablation on 4/12/19.  He is scheduled liver ablation on 4/12/19.        I spoke to the patient's wife because he is at work, she states she cancelled his 4/10/19 dental cleaning appointment and will reschedule at the end of April.  He is still scheduled for liver ablation on 4/12/19.  JB Slater, ArashD to review so we can do new plan of care.    I contacted Dr Damon and he wants Coumadin started on 4/15/19.  His office will contact the patient to instruct when to stop taking Elliquis prior to liver ablation.  Chart signed to close encounter but name will remain on enrollment board to complete.    UPDATE:  4/12/19  Per PharmD instructions, I spoke to the patient's wife and she is at his procedure with him today as she will ask the physician performing the procedure it he is allowed to start taking Eliquis on 4/13/19 and she will follow that physician's order as well as call the [facility] to let us know if he does and she verbalized understanding.    4/10/19  I spoke to patient's wife and she verified his strength Coumadin 5 mg tablet.  I instructed her he is to start taking Coumadin on 4/15/19 Monday, 1 tab daily after 5 pm, INR schedule for 4/18/19 at [facility] lab and education appointment scheduled for [facility] on 4/22/19 which she verbalized understanding .  She has a question about he Eliquis because Dr Damon office instructed patient to stop taking for 4/12/19 liver ablation but they did not give instructions if or when Eliquis should be restarted.  I called and left a message for nurse Verna Millan at Dr Damon's office because we need this information as well and Dr Damon's office was suppose  to handle this instruction per the 4/5/19 discussion I had with the doctor.

## 2019-04-09 ENCOUNTER — PATIENT MESSAGE (OUTPATIENT)
Dept: HEPATOLOGY | Facility: CLINIC | Age: 59
End: 2019-04-09

## 2019-04-10 ENCOUNTER — PATIENT MESSAGE (OUTPATIENT)
Dept: HEPATOLOGY | Facility: CLINIC | Age: 59
End: 2019-04-10

## 2019-04-10 NOTE — PRE-PROCEDURE INSTRUCTIONS
Preop instructions given to pt's wife - Kati: NPO solids/ milk products after midnight or clears up to 2 hours before arrival (clear liquids are: water, apple juice, Gatorade & Jell-O, black coffee/no milk, cream or creamer), shower instructions, directions, leave all valuables at home, medication instructions for PM prior & am of procedure explained. Patient's wife stated an understanding.      Patient's wife denies any side effects or issues with anesthesia or sedation.

## 2019-04-11 ENCOUNTER — TELEPHONE (OUTPATIENT)
Dept: TRANSPLANT | Facility: CLINIC | Age: 59
End: 2019-04-11

## 2019-04-11 DIAGNOSIS — Z00.6 RESEARCH STUDY PATIENT: Primary | ICD-10-CM

## 2019-04-11 DIAGNOSIS — C22.0 HCC (HEPATOCELLULAR CARCINOMA): Primary | ICD-10-CM

## 2019-04-11 DIAGNOSIS — C22.0 HCC (HEPATOCELLULAR CARCINOMA): ICD-10-CM

## 2019-04-11 NOTE — TELEPHONE ENCOUNTER
Call returned.  Patient with questions in regards to when he should resume anticoagulation therapy after the embolization procedure and if he should take eliquis and coumadin.  Message forwarded to the coumadin clinic staff and .  Patient notified and advised writer is awaiting response for provider.  Understanding expressed.       ----- Message from Priscila Aden sent at 4/11/2019  3:23 PM CDT -----  Contact: Patient      ----- Message -----  From: Linette Aden  Sent: 4/11/2019   1:15 PM  To: [facility]    Needs Advice    Reason for call: Called in regards to medication for 4/12 procedure.        Communication Preference: [phone]    Additional Information: n/a

## 2019-04-12 ENCOUNTER — ANESTHESIA (OUTPATIENT)
Dept: ENDOSCOPY | Facility: HOSPITAL | Age: 59
End: 2019-04-12
Payer: COMMERCIAL

## 2019-04-12 ENCOUNTER — ANESTHESIA EVENT (OUTPATIENT)
Dept: ENDOSCOPY | Facility: HOSPITAL | Age: 59
End: 2019-04-12
Payer: COMMERCIAL

## 2019-04-12 ENCOUNTER — HOSPITAL ENCOUNTER (OUTPATIENT)
Facility: HOSPITAL | Age: 59
Discharge: HOME | End: 2019-04-12
Attending: RADIOLOGY | Admitting: RADIOLOGY
Payer: COMMERCIAL

## 2019-04-12 VITALS
WEIGHT: 200 LBS | HEIGHT: 67 IN | RESPIRATION RATE: 16 BRPM | TEMPERATURE: 98 F | BODY MASS INDEX: 31.39 KG/M2 | HEART RATE: 58 BPM | OXYGEN SATURATION: 96 % | SYSTOLIC BLOOD PRESSURE: 146 MMHG | DIASTOLIC BLOOD PRESSURE: 86 MMHG

## 2019-04-12 DIAGNOSIS — C22.0 HCC (HEPATOCELLULAR CARCINOMA): ICD-10-CM

## 2019-04-12 DIAGNOSIS — Z00.6 RESEARCH STUDY PATIENT: Primary | ICD-10-CM

## 2019-04-12 DIAGNOSIS — R16.0 LIVER MASS: ICD-10-CM

## 2019-04-12 LAB — POCT GLUCOSE: 97 MG/DL (ref 70–110)

## 2019-04-12 PROCEDURE — 25000003 PHARM REV CODE 250: Mod: NTX | Performed by: NURSE ANESTHETIST, CERTIFIED REGISTERED

## 2019-04-12 PROCEDURE — 63600175 PHARM REV CODE 636 W HCPCS: Mod: TXP | Performed by: NURSE PRACTITIONER

## 2019-04-12 PROCEDURE — 71000033 HC RECOVERY, INTIAL HOUR: Mod: NTX

## 2019-04-12 PROCEDURE — 27000221 HC OXYGEN, UP TO 24 HOURS: Mod: NTX

## 2019-04-12 PROCEDURE — D9220A PRA ANESTHESIA: ICD-10-PCS | Mod: CRNA,NTX,, | Performed by: NURSE ANESTHETIST, CERTIFIED REGISTERED

## 2019-04-12 PROCEDURE — 37000008 HC ANESTHESIA 1ST 15 MINUTES: Mod: TXP

## 2019-04-12 PROCEDURE — 25000003 PHARM REV CODE 250: Mod: NTX | Performed by: NURSE PRACTITIONER

## 2019-04-12 PROCEDURE — D9220A PRA ANESTHESIA: ICD-10-PCS | Mod: ANES,NTX,, | Performed by: ANESTHESIOLOGY

## 2019-04-12 PROCEDURE — 37000009 HC ANESTHESIA EA ADD 15 MINS: Mod: TXP

## 2019-04-12 PROCEDURE — 94761 N-INVAS EAR/PLS OXIMETRY MLT: CPT | Mod: TXP

## 2019-04-12 PROCEDURE — D9220A PRA ANESTHESIA: Mod: ANES,NTX,, | Performed by: ANESTHESIOLOGY

## 2019-04-12 PROCEDURE — 71000039 HC RECOVERY, EACH ADD'L HOUR: Mod: TXP

## 2019-04-12 PROCEDURE — S0030 INJECTION, METRONIDAZOLE: HCPCS | Mod: NTX | Performed by: NURSE PRACTITIONER

## 2019-04-12 PROCEDURE — 25000003 PHARM REV CODE 250: Mod: NTX | Performed by: RADIOLOGY

## 2019-04-12 PROCEDURE — 63600175 PHARM REV CODE 636 W HCPCS: Mod: NTX | Performed by: NURSE ANESTHETIST, CERTIFIED REGISTERED

## 2019-04-12 PROCEDURE — 82962 GLUCOSE BLOOD TEST: CPT | Mod: NTX

## 2019-04-12 PROCEDURE — 63600175 PHARM REV CODE 636 W HCPCS: Mod: NTX | Performed by: NURSE PRACTITIONER

## 2019-04-12 PROCEDURE — D9220A PRA ANESTHESIA: Mod: CRNA,NTX,, | Performed by: NURSE ANESTHETIST, CERTIFIED REGISTERED

## 2019-04-12 RX ORDER — OXYCODONE HYDROCHLORIDE 5 MG/1
5 TABLET ORAL EVERY 6 HOURS PRN
Status: DISCONTINUED | OUTPATIENT
Start: 2019-04-12 | End: 2019-04-12 | Stop reason: HOSPADM

## 2019-04-12 RX ORDER — METRONIDAZOLE 500 MG/100ML
500 INJECTION, SOLUTION INTRAVENOUS
Status: COMPLETED | OUTPATIENT
Start: 2019-04-12 | End: 2019-04-12

## 2019-04-12 RX ORDER — ONDANSETRON 2 MG/ML
INJECTION INTRAMUSCULAR; INTRAVENOUS
Status: DISCONTINUED | OUTPATIENT
Start: 2019-04-12 | End: 2019-04-12

## 2019-04-12 RX ORDER — ROCURONIUM BROMIDE 10 MG/ML
INJECTION, SOLUTION INTRAVENOUS
Status: DISCONTINUED | OUTPATIENT
Start: 2019-04-12 | End: 2019-04-12

## 2019-04-12 RX ORDER — AMOXICILLIN AND CLAVULANATE POTASSIUM 500; 125 MG/1; MG/1
1 TABLET, FILM COATED ORAL 2 TIMES DAILY
Qty: 14 TABLET | Refills: 0 | Status: SHIPPED | OUTPATIENT
Start: 2019-04-12 | End: 2019-06-13

## 2019-04-12 RX ORDER — GLYCOPYRROLATE 0.2 MG/ML
INJECTION INTRAMUSCULAR; INTRAVENOUS
Status: DISCONTINUED | OUTPATIENT
Start: 2019-04-12 | End: 2019-04-12

## 2019-04-12 RX ORDER — PHENYLEPHRINE HYDROCHLORIDE 10 MG/ML
INJECTION INTRAVENOUS
Status: DISCONTINUED | OUTPATIENT
Start: 2019-04-12 | End: 2019-04-12

## 2019-04-12 RX ORDER — SODIUM CHLORIDE 9 MG/ML
500 INJECTION, SOLUTION INTRAVENOUS ONCE
Status: DISCONTINUED | OUTPATIENT
Start: 2019-04-12 | End: 2019-04-12 | Stop reason: HOSPADM

## 2019-04-12 RX ORDER — LIDOCAINE HCL/PF 100 MG/5ML
SYRINGE (ML) INTRAVENOUS
Status: DISCONTINUED | OUTPATIENT
Start: 2019-04-12 | End: 2019-04-12

## 2019-04-12 RX ORDER — CIPROFLOXACIN 2 MG/ML
400 INJECTION, SOLUTION INTRAVENOUS
Status: COMPLETED | OUTPATIENT
Start: 2019-04-12 | End: 2019-04-12

## 2019-04-12 RX ORDER — ONDANSETRON 4 MG/1
4 TABLET, ORALLY DISINTEGRATING ORAL EVERY 6 HOURS PRN
Qty: 28 TABLET | Refills: 0 | Status: SHIPPED | OUTPATIENT
Start: 2019-04-12 | End: 2019-06-13

## 2019-04-12 RX ORDER — OXYCODONE HYDROCHLORIDE 5 MG/1
5 TABLET ORAL EVERY 4 HOURS PRN
Qty: 20 TABLET | Refills: 0 | Status: SHIPPED | OUTPATIENT
Start: 2019-04-12 | End: 2019-06-13

## 2019-04-12 RX ORDER — NEOSTIGMINE METHYLSULFATE 1 MG/ML
INJECTION, SOLUTION INTRAVENOUS
Status: DISCONTINUED | OUTPATIENT
Start: 2019-04-12 | End: 2019-04-12

## 2019-04-12 RX ORDER — PROPOFOL 10 MG/ML
VIAL (ML) INTRAVENOUS
Status: DISCONTINUED | OUTPATIENT
Start: 2019-04-12 | End: 2019-04-12

## 2019-04-12 RX ORDER — MIDAZOLAM HYDROCHLORIDE 1 MG/ML
INJECTION, SOLUTION INTRAMUSCULAR; INTRAVENOUS
Status: DISCONTINUED | OUTPATIENT
Start: 2019-04-12 | End: 2019-04-12

## 2019-04-12 RX ORDER — FENTANYL CITRATE 50 UG/ML
INJECTION, SOLUTION INTRAMUSCULAR; INTRAVENOUS
Status: DISCONTINUED | OUTPATIENT
Start: 2019-04-12 | End: 2019-04-12

## 2019-04-12 RX ADMIN — PHENYLEPHRINE HYDROCHLORIDE 100 MCG: 10 INJECTION INTRAVENOUS at 11:04

## 2019-04-12 RX ADMIN — SUGAMMADEX 150 MG: 100 INJECTION, SOLUTION INTRAVENOUS at 12:04

## 2019-04-12 RX ADMIN — LIDOCAINE HYDROCHLORIDE 100 MG: 20 INJECTION, SOLUTION INTRAVENOUS at 10:04

## 2019-04-12 RX ADMIN — CIPROFLOXACIN 400 MG: 2 INJECTION, SOLUTION INTRAVENOUS at 10:04

## 2019-04-12 RX ADMIN — PHENYLEPHRINE HYDROCHLORIDE 200 MCG: 10 INJECTION INTRAVENOUS at 11:04

## 2019-04-12 RX ADMIN — CIPROFLOXACIN 400 MG: 2 INJECTION, SOLUTION INTRAVENOUS at 09:04

## 2019-04-12 RX ADMIN — MIDAZOLAM HYDROCHLORIDE 2 MG: 1 INJECTION, SOLUTION INTRAMUSCULAR; INTRAVENOUS at 10:04

## 2019-04-12 RX ADMIN — OXYCODONE HYDROCHLORIDE 5 MG: 5 TABLET ORAL at 01:04

## 2019-04-12 RX ADMIN — GLYCOPYRROLATE 0.6 MG: 0.2 INJECTION, SOLUTION INTRAMUSCULAR; INTRAVENOUS at 12:04

## 2019-04-12 RX ADMIN — PROPOFOL 200 MG: 10 INJECTION, EMULSION INTRAVENOUS at 10:04

## 2019-04-12 RX ADMIN — ROCURONIUM BROMIDE 30 MG: 10 INJECTION, SOLUTION INTRAVENOUS at 10:04

## 2019-04-12 RX ADMIN — ONDANSETRON 4 MG: 2 INJECTION INTRAMUSCULAR; INTRAVENOUS at 12:04

## 2019-04-12 RX ADMIN — GLYCOPYRROLATE 0.2 MG: 0.2 INJECTION, SOLUTION INTRAMUSCULAR; INTRAVENOUS at 10:04

## 2019-04-12 RX ADMIN — FENTANYL CITRATE 100 MCG: 50 INJECTION, SOLUTION INTRAMUSCULAR; INTRAVENOUS at 10:04

## 2019-04-12 RX ADMIN — METRONIDAZOLE 500 MG: 500 SOLUTION INTRAVENOUS at 11:04

## 2019-04-12 RX ADMIN — NEOSTIGMINE METHYLSULFATE 5 MG: 1 INJECTION INTRAVENOUS at 12:04

## 2019-04-12 RX ADMIN — ROCURONIUM BROMIDE 20 MG: 10 INJECTION, SOLUTION INTRAVENOUS at 11:04

## 2019-04-12 NOTE — ANESTHESIA POSTPROCEDURE EVALUATION
Anesthesia Post Evaluation    Patient: Roman Hodges    Procedure(s) Performed: Procedure(s) (LRB):  Radiofrequency Ablation (N/A)    Final Anesthesia Type: general  Patient location during evaluation: PACU  Patient participation: Yes- Able to Participate  Level of consciousness: awake and alert and awake  Post-procedure vital signs: reviewed and stable  Pain management: adequate  Airway patency: patent  PONV status at discharge: No PONV  Anesthetic complications: no      Cardiovascular status: blood pressure returned to baseline  Respiratory status: unassisted and spontaneous ventilation  Hydration status: euvolemic  Follow-up not needed.          Vitals Value Taken Time   /89 4/12/2019  2:01 PM   Temp 34.7 °C (94.5 °F) 4/12/2019 12:38 PM   Pulse 54 4/12/2019  2:07 PM   Resp 32 4/12/2019  2:07 PM   SpO2 97 % 4/12/2019  2:07 PM   Vitals shown include unvalidated device data.      No case tracking events are documented in the log.      Pain/Zelalem Score: Pain Rating Prior to Med Admin: 4 (4/12/2019  1:48 PM)  Zelalem Score: 6 (4/12/2019 12:38 PM)

## 2019-04-12 NOTE — PROGRESS NOTES
4/11/19 received following message:  Mr. Hodges has ROMO/HCC and is being listed for transplant.  He has a hx of a-fib and is on eliquis.  The eliquis has been on hold for a dental procedure on 4/10 and tumor treatment on 4/12. He's being transitioned to coumadin and is scheduled to start coumadin 5mg daily on 4/15 with repeat INR on 4/18. The patient would like to know if he should resume eliquis after his tumor treatment on 4/12, and if yes, when.  If he is to resume eliquis, how long will he need duel coverage.     Would think patient should be able to restart apixaban 4/13. He should confirm this is acceptable with MD performing procedure 4/12. If so, restart apixaban 4/13, start warfarin 4/15 and continue apixaban, INR 4/18 and will likely stop apixaban at that time.

## 2019-04-12 NOTE — PROCEDURES
Radiology Post-Procedure Note    Pre Op Diagnosis: HCC  Post Op Diagnosis: Same    Procedure: MWA    Procedure performed by: David Milligan MD    Written Informed Consent Obtained: Yes  Specimen Removed: NO  Estimated Blood Loss: Minimal    Findings:   US and CT guidance to place 2 Neuwave PRXT probes in the hepatic tumor.  Ablation performed at 65W for 8 minutes.  Tract cauterization performed.  No complications.    Patient tolerated procedure well.    David Milligan MD  Diagnostic and Interventional Radiologist  Department of Radiology  Pager: [phone]

## 2019-04-12 NOTE — TRANSFER OF CARE
"Anesthesia Transfer of Care Note    Patient: Roman Hodges    Procedure(s) Performed: Procedure(s) (LRB):  Radiofrequency Ablation (N/A)    Patient location: PACU    Anesthesia Type: general    Transport from OR: Transported from OR on 6-10 L/min O2 by face mask with adequate spontaneous ventilation    Post pain: adequate analgesia    Post assessment: no apparent anesthetic complications and tolerated procedure well    Post vital signs: stable    Level of consciousness: sedated    Nausea/Vomiting: no nausea/vomiting    Complications: none    Transfer of care protocol was followed      Last vitals:   Visit Vitals  BP (!) 143/87 (BP Location: Left arm, Patient Position: Lying)   Pulse (!) 58   Temp 36.7 °C (98.1 °F) (Oral)   Resp 18   Ht 5' 7" (1.702 m)   Wt 90.7 kg (200 lb)   SpO2 97%   BMI 31.32 kg/m²     "

## 2019-04-12 NOTE — ANESTHESIA RELEASE NOTE
"Anesthesia Release from PACU Note    Patient: Roman Hodges    Procedure(s) Performed: Procedure(s) (LRB):  Radiofrequency Ablation (N/A)    Anesthesia type: general    Post pain: Adequate analgesia    Post assessment: no apparent anesthetic complications, tolerated procedure well and no evidence of recall    Last Vitals:   Visit Vitals  BP (!) 153/86   Pulse (!) 53   Temp (!) 34.7 °C (94.5 °F) (Axillary)   Resp 18   Ht 5' 7" (1.702 m)   Wt 90.7 kg (200 lb)   SpO2 96%   BMI 31.32 kg/m²       Post vital signs: stable    Level of consciousness: awake, alert  and oriented    Nausea/Vomiting: no nausea/no vomiting    Complications: none    Airway Patency: patent    Respiratory: unassisted, spontaneous ventilation, room air    Cardiovascular: stable and blood pressure at baseline    Hydration: euvolemic  "

## 2019-04-12 NOTE — H&P
Radiology History & Physical      SUBJECTIVE:     Chief Complaint: HCC    History of Present Illness:  Roman Hodges is a 59 y.o. male with HCC s/p Y-90 radioembolization with residual tumor who presents for microwave ablation.  Past Medical History:   Diagnosis Date    A-fib     Anticoagulant long-term use     Diabetes mellitus, type 2     GERD (gastroesophageal reflux disease)     Hepatocellular carcinoma     liver    Hyperlipidemia     Hypertension      Past Surgical History:   Procedure Laterality Date    COLONOSCOPY      EMBOLIZATION, YTTRIUM THERAPY N/A 11/9/2018    Performed by [facility] OR 2ND FLR    EMBOLIZATION, YTTRIUM THERAPY N/A 10/24/2018    Performed by [facility] at [facility] OR 2ND FLR    ESOPHAGOGASTRODUODENOSCOPY (EGD) N/A 1/15/2019    Performed by Bony Saavedra MD at [facility] ENDO    HERNIA REPAIR      Left heart cath Left 12/4/2018    Performed by Tyler Hinojosa MD at [facility] CATH LAB/EP       Home Meds:   Prior to Admission medications    Medication Sig Start Date End Date Taking? Authorizing Provider   amLODIPine (NORVASC) 5 MG tablet Take 1 tablet (5 mg total) by mouth once daily. 4/3/19 4/2/20 Yes Elliot Johnson MD   ELIQUIS 5 mg Tab Take 1 tablet (5 mg total) by mouth 2 (two) times daily. 2/1/19  Yes Jose Angel Munson MD   esomeprazole (NEXIUM) 40 MG capsule Take 1 capsule (40 mg total) by mouth once daily. 3/11/19  Yes Jose Angel Munson MD   losartan-hydrochlorothiazide 100-25 mg (HYZAAR) 100-25 mg per tablet Take 1 tablet by mouth once daily. 1/11/19 1/11/20 Yes Elliot Johnson MD   metoprolol succinate (TOPROL-XL) 50 MG 24 hr tablet Take 1 tablet (50 mg total) by mouth once daily. 1/14/19 1/14/20 Yes Elliot Johnson MD   potassium chloride SA (K-DUR,KLOR-CON) 20 MEQ tablet Take 20 mEq by mouth 2 (two) times daily.  2/1/19  Yes Historical Provider, MD   busPIRone (BUSPAR) 5 MG Tab Take 1 tablet (5 mg total) by mouth 2  (two) times daily.  Patient taking differently: Take 5 mg by mouth 2 (two) times daily as needed.  12/14/18 12/14/19  Jose Angel Munson MD   warfarin (COUMADIN) 5 MG tablet Take 1 tablet (5 mg total) by mouth Daily. Start after dentist appt on 4/10 4/4/19   Fab Damon MD     Anticoagulants/Antiplatelets: Eliquis. Last dose 4/9/19.    Allergies:   Review of patient's allergies indicates:   Allergen Reactions    Lisinopril      Stomach ache    Flu vaccine 2011 (36 mos+)(pf)      Patient reports he developed Bell's Palsy 2 days after his last flu shot in 2008     Sedation History:  no adverse reactions    Review of Systems:   Hematological: no known coagulopathies  Respiratory: no shortness of breath  Cardiovascular: no chest pain  Gastrointestinal: no abdominal pain  Genito-Urinary: no dysuria  Musculoskeletal: negative  Neurological: no TIA or stroke symptoms         OBJECTIVE:     Vital Signs (Most Recent)  Temp: 98.1 °F (36.7 °C) (04/12/19 0942)  Pulse: (!) 58 (04/12/19 0942)  Resp: 18 (04/12/19 0942)  BP: (!) 143/87 (04/12/19 0942)  SpO2: 97 % (04/12/19 0942)    Physical Exam:  ASA: 3  Mallampati: 2    General: no acute distress  Mental Status: alert and oriented to person, place and time  HEENT: normocephalic, atraumatic  Chest: unlabored breathing  Heart: regular heart rate  Abdomen: nondistended  Extremity: moves all extremities    Laboratory  Lab Results   Component Value Date    INR 1.0 04/12/2019       Lab Results   Component Value Date    WBC 6.31 04/12/2019    HGB 16.0 04/12/2019    HCT 47.4 04/12/2019    MCV 85 04/12/2019     04/12/2019      Lab Results   Component Value Date     04/12/2019     04/12/2019    K 4.1 04/12/2019     04/12/2019    CO2 30 (H) 04/12/2019    BUN 21 (H) 04/12/2019    CREATININE 1.2 04/12/2019    CALCIUM 9.6 04/12/2019     (H) 04/12/2019    AST 85 (H) 04/12/2019    ALBUMIN 3.7 04/12/2019    BILITOT 1.2 (H) 04/12/2019    BILIDIR 0.5 (H)  04/12/2019       ASSESSMENT/PLAN:     Sedation Plan: general per anesthesia  Patient will undergo microwave ablation.    Jerri Delatorre MD  PGY-5  Department of Radiology  Pager: [phone]

## 2019-04-12 NOTE — PLAN OF CARE
Discharge instructions discussed with pt and pt's spouse. Pt verbalizes understanding. Consents in chart, vital signs stable, no complaints.

## 2019-04-12 NOTE — ANESTHESIA PREPROCEDURE EVALUATION
04/12/2019  Roman Hodges is a 59 y.o., male for Liver RFA.     Past Medical History:   Diagnosis Date    A-fib     Anticoagulant long-term use     Diabetes mellitus, type 2     GERD (gastroesophageal reflux disease)     Hepatocellular carcinoma     liver    Hyperlipidemia     Hypertension      Pre-op Assessment    I have reviewed the Patient Summary Reports.     I have reviewed the Nursing Notes.   I have reviewed the Medications.     Review of Systems  Social:  Former Smoker    Hematology/Oncology:  Hematology Normal   Oncology Normal     EENT/Dental:EENT/Dental Normal   Cardiovascular:   Hypertension    Pulmonary:   Sleep Apnea    Hepatic/GI:   GERD Liver Disease, Hepatitis    Musculoskeletal:  Musculoskeletal Normal    Neurological:  Neurology Normal    Endocrine:   Diabetes, well controlled    Dermatological:  Skin Normal    Psych:  Psychiatric Normal           Physical Exam  General:  Obesity    Airway/Jaw/Neck:  Airway Findings: Mouth Opening: Normal Tongue: Normal  General Airway Assessment: Adult  Mallampati: II  Improves to I, II with phonation.  TM Distance: Normal, at least 6 cm        Eyes/Ears/Nose:  EYES/EARS/NOSE FINDINGS: Normal   Dental:  DENTAL FINDINGS: Normal   Chest/Lungs:  Chest/Lungs Clear    Heart/Vascular:  Heart Findings: Normal    Abdomen:  Abdomen Findings: Normal    Musculoskeletal:  Musculoskeletal Findings: Normal   Skin:  Skin Findings: Normal    Mental Status:  Mental Status Findings:  Cooperative, Alert and Oriented     ECHO 12/2018  · Eccentric left ventricular hypertrophy.  · Normal left ventricular systolic function. The estimated ejection fraction is 55%  · Normal LV diastolic function.  · Normal right ventricular systolic function.  · Normal central venous pressure (3 mm Hg).  · The estimated PA systolic pressure is 11 mm Hg  · Normal left atrial  pressure.      Anesthesia Plan  Type of Anesthesia, risks & benefits discussed:  Anesthesia Type:  general  Patient's Preference:   Intra-op Monitoring Plan: standard ASA monitors  Intra-op Monitoring Plan Comments:   Post Op Pain Control Plan: per primary service following discharge from PACU  Post Op Pain Control Plan Comments:   Induction:   IV  Beta Blocker:  Patient is on a Beta-Blocker and has received one dose within the past 24 hours (No further documentation required).       Informed Consent: Patient understands risks and agrees with Anesthesia plan.  Questions answered. Anesthesia consent signed with patient.  ASA Score: 3     Day of Surgery Review of History & Physical:        Anesthesia Plan Notes: LMA        Ready For Surgery From Anesthesia Perspective.

## 2019-04-12 NOTE — DISCHARGE SUMMARY
Radiology Discharge Summary      Hospital Course: No complications    Admit Date: 4/12/2019  Discharge Date: 04/12/2019     Instructions Given to Patient: Yes  Diet: Resume prior diet  Activity: activity as tolerated    Description of Condition on Discharge: Stable  Vital Signs (Most Recent): Temp: 97.6 °F (36.4 °C) (04/12/19 1530)  Pulse: 62 (04/12/19 1538)  Resp: 15 (04/12/19 1538)  BP: (!) 155/85 (04/12/19 1538)  SpO2: 96 % (04/12/19 1538)    Discharge Disposition: Home    Discharge Diagnosis: HCC s/p MWA     Follow-up: Will plan for f/u imaging in 1 month to assess treatment response    David Milligan MD  Diagnostic and Interventional Radiologist  Department of Radiology  Pager: [phone]

## 2019-04-12 NOTE — PROGRESS NOTES
RFA procedure completed. Patient being awakened and extubated per anesthesia. Awaiting PACU bed assignment.

## 2019-04-18 ENCOUNTER — LAB VISIT (OUTPATIENT)
Dept: LAB | Facility: HOSPITAL | Age: 59
End: 2019-04-18
Attending: FAMILY MEDICINE
Payer: COMMERCIAL

## 2019-04-18 ENCOUNTER — ANTI-COAG VISIT (OUTPATIENT)
Dept: CARDIOLOGY | Facility: CLINIC | Age: 59
End: 2019-04-18
Payer: COMMERCIAL

## 2019-04-18 ENCOUNTER — TELEPHONE (OUTPATIENT)
Dept: TRANSPLANT | Facility: CLINIC | Age: 59
End: 2019-04-18

## 2019-04-18 DIAGNOSIS — I48.91 NEW ONSET ATRIAL FIBRILLATION: ICD-10-CM

## 2019-04-18 DIAGNOSIS — Z79.01 LONG TERM (CURRENT) USE OF ANTICOAGULANTS: ICD-10-CM

## 2019-04-18 LAB
INR PPP: 1.2 (ref 0.8–1.2)
PROTHROMBIN TIME: 12 SEC (ref 9–12.5)

## 2019-04-18 PROCEDURE — 93793 PR ANTICOAGULANT MGMT FOR PT TAKING WARFARIN: ICD-10-PCS | Mod: S$GLB,,,

## 2019-04-18 PROCEDURE — 93793 ANTICOAG MGMT PT WARFARIN: CPT | Mod: S$GLB,,,

## 2019-04-18 PROCEDURE — 36415 COLL VENOUS BLD VENIPUNCTURE: CPT | Mod: PO,TXP

## 2019-04-18 PROCEDURE — 85610 PROTHROMBIN TIME: CPT | Mod: TXP

## 2019-04-18 NOTE — TELEPHONE ENCOUNTER
Follow-up call placed to patient to check status of dental clearance.  Patient states he rescheduled his dental follow-up to May 26th due to transitioning from eliquis to coumadin.  Will follow-up accordingly.     notified    ----- Message from Yannick Olsen sent at 4/18/2019  9:02 AM CDT -----  Morning Verna,     Can you please advise on status of additional clinicals requested by insurance for listing approval?     Thanks

## 2019-04-22 ENCOUNTER — ANTI-COAG VISIT (OUTPATIENT)
Dept: CARDIOLOGY | Facility: CLINIC | Age: 59
End: 2019-04-22
Payer: COMMERCIAL

## 2019-04-22 ENCOUNTER — CLINICAL SUPPORT (OUTPATIENT)
Dept: INFECTIOUS DISEASES | Facility: CLINIC | Age: 59
End: 2019-04-22
Payer: COMMERCIAL

## 2019-04-22 DIAGNOSIS — Z79.01 LONG TERM (CURRENT) USE OF ANTICOAGULANTS: Primary | ICD-10-CM

## 2019-04-22 DIAGNOSIS — C22.0 HCC (HEPATOCELLULAR CARCINOMA): ICD-10-CM

## 2019-04-22 DIAGNOSIS — C22.0 HCC (HEPATOCELLULAR CARCINOMA): Primary | ICD-10-CM

## 2019-04-22 DIAGNOSIS — Z76.82 LIVER TRANSPLANT CANDIDATE: ICD-10-CM

## 2019-04-22 DIAGNOSIS — K75.81 NASH (NONALCOHOLIC STEATOHEPATITIS): Chronic | ICD-10-CM

## 2019-04-22 DIAGNOSIS — I48.91 NEW ONSET ATRIAL FIBRILLATION: ICD-10-CM

## 2019-04-22 LAB — INR PPP: 3.5 (ref 2–3)

## 2019-04-22 PROCEDURE — 90739 HEPATITIS B (RECOMBINANT) ADJUVANTED, 2 DOSE: ICD-10-PCS | Mod: S$GLB,TXP,, | Performed by: PHYSICIAN ASSISTANT

## 2019-04-22 PROCEDURE — 85610 POCT INR: ICD-10-PCS | Mod: QW,S$GLB,, | Performed by: INTERNAL MEDICINE

## 2019-04-22 PROCEDURE — 85610 PROTHROMBIN TIME: CPT | Mod: QW,S$GLB,, | Performed by: INTERNAL MEDICINE

## 2019-04-22 PROCEDURE — 90471 HEPATITIS B (RECOMBINANT) ADJUVANTED, 2 DOSE: ICD-10-PCS | Mod: S$GLB,TXP,, | Performed by: INTERNAL MEDICINE

## 2019-04-22 PROCEDURE — 99999 PR PBB SHADOW E&M-EST. PATIENT-LVL I: ICD-10-PCS | Mod: PBBFAC,TXP,,

## 2019-04-22 PROCEDURE — 93793 ANTICOAG MGMT PT WARFARIN: CPT | Mod: S$GLB,,,

## 2019-04-22 PROCEDURE — 90471 IMMUNIZATION ADMIN: CPT | Mod: S$GLB,TXP,, | Performed by: INTERNAL MEDICINE

## 2019-04-22 PROCEDURE — 99999 PR PBB SHADOW E&M-EST. PATIENT-LVL I: CPT | Mod: PBBFAC,TXP,,

## 2019-04-22 PROCEDURE — 90739 HEPB VACC 2/4 DOSE ADULT IM: CPT | Mod: S$GLB,TXP,, | Performed by: PHYSICIAN ASSISTANT

## 2019-04-22 PROCEDURE — 93793 PR ANTICOAGULANT MGMT FOR PT TAKING WARFARIN: ICD-10-PCS | Mod: S$GLB,,,

## 2019-04-22 NOTE — PROGRESS NOTES
Patient and family here for initial clinic visit and patient education. Written and verbal instructions given to the patient on the importance of follow-up monitoring, compliance issues, dietary restrictions, and potential for adverse drug reactions and interactions. All questions were answered to his/her satisfaction and understanding.    INR above goal after recent boost and increase. Patient was recently on amox/clav but do not expect any DDI. Will hold tonight's dose and f/u INR in 3 days. Could still be seeing apixaban reflected in this INR but there has been a large increase in a short period.

## 2019-04-26 ENCOUNTER — ANTI-COAG VISIT (OUTPATIENT)
Dept: CARDIOLOGY | Facility: CLINIC | Age: 59
End: 2019-04-26
Payer: COMMERCIAL

## 2019-04-26 DIAGNOSIS — Z79.01 LONG TERM (CURRENT) USE OF ANTICOAGULANTS: Primary | ICD-10-CM

## 2019-04-26 DIAGNOSIS — I48.91 NEW ONSET ATRIAL FIBRILLATION: ICD-10-CM

## 2019-04-26 LAB — INR PPP: 2.8 (ref 2–3)

## 2019-04-26 PROCEDURE — 85610 PROTHROMBIN TIME: CPT | Mod: QW,S$GLB,, | Performed by: INTERNAL MEDICINE

## 2019-04-26 PROCEDURE — 93793 ANTICOAG MGMT PT WARFARIN: CPT | Mod: S$GLB,,,

## 2019-04-26 PROCEDURE — 85610 POCT INR: ICD-10-PCS | Mod: QW,S$GLB,, | Performed by: INTERNAL MEDICINE

## 2019-04-26 PROCEDURE — 93793 PR ANTICOAGULANT MGMT FOR PT TAKING WARFARIN: ICD-10-PCS | Mod: S$GLB,,,

## 2019-04-26 NOTE — PROGRESS NOTES
INR good today. No new changes. Confirmed stopping eliquis 4/22 which may have been the reason INR jumped so quickly. No signs or symptoms of bleeding. We will maintain plan of 5mg daily and watch closely. Next week will go to lab but patient seems to want to have CC visits for preference.

## 2019-04-30 NOTE — PROGRESS NOTES
Orders entered and appointment card completed for clinic follow-up in 3 months (May 2019) with labs

## 2019-05-01 ENCOUNTER — LAB VISIT (OUTPATIENT)
Dept: LAB | Facility: HOSPITAL | Age: 59
End: 2019-05-01
Attending: FAMILY MEDICINE
Payer: COMMERCIAL

## 2019-05-01 DIAGNOSIS — Z79.01 LONG TERM (CURRENT) USE OF ANTICOAGULANTS: ICD-10-CM

## 2019-05-01 LAB
INR PPP: 2.7 (ref 0.8–1.2)
PROTHROMBIN TIME: 25.7 SEC (ref 9–12.5)

## 2019-05-01 PROCEDURE — 36415 COLL VENOUS BLD VENIPUNCTURE: CPT | Mod: PO,TXP

## 2019-05-01 PROCEDURE — 85610 PROTHROMBIN TIME: CPT | Mod: NTX

## 2019-05-02 ENCOUNTER — ANTI-COAG VISIT (OUTPATIENT)
Dept: CARDIOLOGY | Facility: CLINIC | Age: 59
End: 2019-05-02
Payer: COMMERCIAL

## 2019-05-02 DIAGNOSIS — I48.91 NEW ONSET ATRIAL FIBRILLATION: ICD-10-CM

## 2019-05-02 DIAGNOSIS — C22.0 HCC (HEPATOCELLULAR CARCINOMA): Primary | ICD-10-CM

## 2019-05-02 DIAGNOSIS — Z00.6 RESEARCH STUDY PATIENT: ICD-10-CM

## 2019-05-02 DIAGNOSIS — Z79.01 LONG TERM (CURRENT) USE OF ANTICOAGULANTS: ICD-10-CM

## 2019-05-02 PROCEDURE — 93793 PR ANTICOAGULANT MGMT FOR PT TAKING WARFARIN: ICD-10-PCS | Mod: S$GLB,,,

## 2019-05-02 PROCEDURE — 93793 ANTICOAG MGMT PT WARFARIN: CPT | Mod: S$GLB,,,

## 2019-05-07 ENCOUNTER — PATIENT MESSAGE (OUTPATIENT)
Dept: HEPATOLOGY | Facility: CLINIC | Age: 59
End: 2019-05-07

## 2019-05-08 ENCOUNTER — TELEPHONE (OUTPATIENT)
Dept: TRANSPLANT | Facility: CLINIC | Age: 59
End: 2019-05-08

## 2019-05-08 NOTE — TELEPHONE ENCOUNTER
Call returned with no answer.  Voice message left, informing dental clearance has not been received.  Request made for patient to contact dentist and have clearance faxed to [phone].  Contact number provided for a return call as needed.     ----- Message from Ember Kramer sent at 5/8/2019 12:22 PM CDT -----  Calling to confirm rec't of pt's dental clearance     Pt contact [phone]

## 2019-05-09 ENCOUNTER — OFFICE VISIT (OUTPATIENT)
Dept: FAMILY MEDICINE | Facility: CLINIC | Age: 59
End: 2019-05-09
Payer: COMMERCIAL

## 2019-05-09 ENCOUNTER — LAB VISIT (OUTPATIENT)
Dept: LAB | Facility: HOSPITAL | Age: 59
End: 2019-05-09
Attending: INTERNAL MEDICINE
Payer: COMMERCIAL

## 2019-05-09 ENCOUNTER — TELEPHONE (OUTPATIENT)
Dept: TRANSPLANT | Facility: CLINIC | Age: 59
End: 2019-05-09

## 2019-05-09 VITALS
WEIGHT: 205.69 LBS | SYSTOLIC BLOOD PRESSURE: 136 MMHG | HEART RATE: 66 BPM | OXYGEN SATURATION: 97 % | BODY MASS INDEX: 32.28 KG/M2 | HEIGHT: 67 IN | DIASTOLIC BLOOD PRESSURE: 72 MMHG

## 2019-05-09 DIAGNOSIS — N44.2 TESTICULAR CYST: ICD-10-CM

## 2019-05-09 DIAGNOSIS — I10 ESSENTIAL HYPERTENSION: ICD-10-CM

## 2019-05-09 DIAGNOSIS — Z79.01 LONG TERM (CURRENT) USE OF ANTICOAGULANTS: ICD-10-CM

## 2019-05-09 DIAGNOSIS — Z12.5 SCREENING FOR PROSTATE CANCER: ICD-10-CM

## 2019-05-09 DIAGNOSIS — R36.1 BLOOD IN SEMEN: Primary | ICD-10-CM

## 2019-05-09 LAB
BILIRUB UR QL STRIP: NEGATIVE
CLARITY UR REFRACT.AUTO: CLEAR
COLOR UR AUTO: YELLOW
GLUCOSE UR QL STRIP: NEGATIVE
HGB UR QL STRIP: NEGATIVE
INR PPP: 3.6 (ref 0.8–1.2)
KETONES UR QL STRIP: NEGATIVE
LEUKOCYTE ESTERASE UR QL STRIP: NEGATIVE
NITRITE UR QL STRIP: NEGATIVE
PH UR STRIP: 6 [PH] (ref 5–8)
PROT UR QL STRIP: NEGATIVE
PROTHROMBIN TIME: 34.7 SEC (ref 9–12.5)
SP GR UR STRIP: 1.01 (ref 1–1.03)
URN SPEC COLLECT METH UR: NORMAL

## 2019-05-09 PROCEDURE — 99214 PR OFFICE/OUTPT VISIT, EST, LEVL IV, 30-39 MIN: ICD-10-PCS | Mod: S$GLB,,, | Performed by: FAMILY MEDICINE

## 2019-05-09 PROCEDURE — 3078F PR MOST RECENT DIASTOLIC BLOOD PRESSURE < 80 MM HG: ICD-10-PCS | Mod: CPTII,S$GLB,, | Performed by: FAMILY MEDICINE

## 2019-05-09 PROCEDURE — 3075F SYST BP GE 130 - 139MM HG: CPT | Mod: CPTII,S$GLB,, | Performed by: FAMILY MEDICINE

## 2019-05-09 PROCEDURE — 3075F PR MOST RECENT SYSTOLIC BLOOD PRESS GE 130-139MM HG: ICD-10-PCS | Mod: CPTII,S$GLB,, | Performed by: FAMILY MEDICINE

## 2019-05-09 PROCEDURE — 87086 URINE CULTURE/COLONY COUNT: CPT | Mod: NTX

## 2019-05-09 PROCEDURE — 36415 COLL VENOUS BLD VENIPUNCTURE: CPT | Mod: PO,TXP

## 2019-05-09 PROCEDURE — 3078F DIAST BP <80 MM HG: CPT | Mod: CPTII,S$GLB,, | Performed by: FAMILY MEDICINE

## 2019-05-09 PROCEDURE — 3008F PR BODY MASS INDEX (BMI) DOCUMENTED: ICD-10-PCS | Mod: CPTII,S$GLB,, | Performed by: FAMILY MEDICINE

## 2019-05-09 PROCEDURE — 85610 PROTHROMBIN TIME: CPT | Mod: TXP

## 2019-05-09 PROCEDURE — 3008F BODY MASS INDEX DOCD: CPT | Mod: CPTII,S$GLB,, | Performed by: FAMILY MEDICINE

## 2019-05-09 PROCEDURE — 99999 PR PBB SHADOW E&M-EST. PATIENT-LVL III: CPT | Mod: PBBFAC,,, | Performed by: FAMILY MEDICINE

## 2019-05-09 PROCEDURE — 99214 OFFICE O/P EST MOD 30 MIN: CPT | Mod: S$GLB,,, | Performed by: FAMILY MEDICINE

## 2019-05-09 PROCEDURE — 81003 URINALYSIS AUTO W/O SCOPE: CPT

## 2019-05-09 PROCEDURE — 99999 PR PBB SHADOW E&M-EST. PATIENT-LVL III: ICD-10-PCS | Mod: PBBFAC,,, | Performed by: FAMILY MEDICINE

## 2019-05-09 NOTE — TELEPHONE ENCOUNTER
Pt explain she would like the mri done on may 14 at 6:15 she will be available after that time to get her  to his appointment she also explain she called for dental clearance from the facility she will try again if not she will come with an copy of dental clearance on may 16

## 2019-05-09 NOTE — PROGRESS NOTES
Subjective:       Patient ID: Roman Hodges is a 59 y.o. male.    Chief Complaint: Penile Discharge (semen abnormal color Pink x 1 )    59 years old male came to the clinic with blood during ejaculation last week.  Patient currently asymptomatic today.  He is taking also warfarin.  No urinary symptoms.  Patient previously diagnosed with testicular cyst .    Review of Systems   Constitutional: Negative.  Negative for activity change and unexpected weight change.   HENT: Negative.  Negative for hearing loss, rhinorrhea and trouble swallowing.    Eyes: Negative.  Negative for discharge and visual disturbance.   Respiratory: Negative.  Negative for chest tightness and wheezing.    Cardiovascular: Negative.  Negative for chest pain and palpitations.   Gastrointestinal: Negative.  Negative for blood in stool, constipation, diarrhea and vomiting.   Endocrine: Negative for polydipsia and polyuria.   Genitourinary: Negative.  Negative for difficulty urinating, dysuria, frequency, hematuria, testicular pain and urgency.   Musculoskeletal: Negative.  Negative for arthralgias, joint swelling and neck pain.   Skin: Negative.    Neurological: Negative.  Negative for weakness and headaches.   Psychiatric/Behavioral: Negative.  Negative for confusion and dysphoric mood.       Objective:      Physical Exam   Constitutional: He is oriented to person, place, and time. He appears well-developed and well-nourished. No distress.   HENT:   Head: Normocephalic and atraumatic.   Right Ear: External ear normal.   Left Ear: External ear normal.   Nose: Nose normal.   Mouth/Throat: Oropharynx is clear and moist. No oropharyngeal exudate.   Eyes: Pupils are equal, round, and reactive to light. Conjunctivae and EOM are normal. Right eye exhibits no discharge. Left eye exhibits no discharge. No scleral icterus.   Neck: Normal range of motion. Neck supple. No JVD present. No tracheal deviation present. No thyromegaly present.    Cardiovascular: Normal rate, regular rhythm, normal heart sounds and intact distal pulses. Exam reveals no gallop and no friction rub.   No murmur heard.  Pulmonary/Chest: Effort normal and breath sounds normal. No stridor. No respiratory distress. He has no wheezes. He has no rales. He exhibits no tenderness.   Abdominal: Soft. Bowel sounds are normal. He exhibits no distension and no mass. There is no tenderness. There is no rebound and no guarding. Hernia confirmed negative in the right inguinal area and confirmed negative in the left inguinal area.   Genitourinary: Rectal exam shows no external hemorrhoid, no internal hemorrhoid, no mass, no tenderness, anal tone normal and guaiac negative stool. Prostate is not enlarged and not tender. Right testis shows mass. Right testis shows no swelling and no tenderness. Right testis is descended. Cremasteric reflex is not absent on the right side. Left testis shows no mass, no swelling and no tenderness. Left testis is descended. Cremasteric reflex is not absent on the left side. Uncircumcised. No phimosis, paraphimosis, hypospadias, penile erythema or penile tenderness. No discharge found.   Musculoskeletal: Normal range of motion. He exhibits no edema or tenderness.   Lymphadenopathy:     He has no cervical adenopathy. No inguinal adenopathy noted on the right or left side.   Neurological: He is alert and oriented to person, place, and time. He has normal reflexes. He displays normal reflexes. No cranial nerve deficit. He exhibits normal muscle tone. Coordination normal.   Skin: Skin is warm and dry. No rash noted. He is not diaphoretic. No erythema. No pallor.   Psychiatric: He has a normal mood and affect. His behavior is normal. Judgment and thought content normal.   Nursing note and vitals reviewed.      Assessment:       1. Blood in semen    2. Essential hypertension    3. Screening for prostate cancer    4. Testicular cyst        Plan:         Roman was seen  today for penile discharge.    Diagnoses and all orders for this visit:    Blood in semen  -     CBC auto differential; Future  -     Basic metabolic panel; Future  -     Urinalysis  -     Urine culture  -     US Scrotum And Testicles; Future    Essential hypertension  -     CBC auto differential; Future  -     Basic metabolic panel; Future    Screening for prostate cancer  -     PSA, Screening; Future    Testicular cyst  -     US Scrotum And Testicles; Future    Continue monitoring blood pressure at home, low sodium diet.

## 2019-05-10 ENCOUNTER — ANTI-COAG VISIT (OUTPATIENT)
Dept: CARDIOLOGY | Facility: CLINIC | Age: 59
End: 2019-05-10
Payer: COMMERCIAL

## 2019-05-10 ENCOUNTER — TELEPHONE (OUTPATIENT)
Dept: FAMILY MEDICINE | Facility: CLINIC | Age: 59
End: 2019-05-10

## 2019-05-10 DIAGNOSIS — Z79.01 LONG TERM (CURRENT) USE OF ANTICOAGULANTS: ICD-10-CM

## 2019-05-10 DIAGNOSIS — I48.91 NEW ONSET ATRIAL FIBRILLATION: ICD-10-CM

## 2019-05-10 PROCEDURE — 93793 ANTICOAG MGMT PT WARFARIN: CPT | Mod: S$GLB,,,

## 2019-05-10 PROCEDURE — 93793 PR ANTICOAGULANT MGMT FOR PT TAKING WARFARIN: ICD-10-PCS | Mod: S$GLB,,,

## 2019-05-10 NOTE — PROGRESS NOTES
"INR not at goal - no changes reported to account for high level. Wife reports "bleeding issues with intercourse". Patient/wife will be advised to notify us if this continues. They will be advised to seek emergent care for any severe bleeding. Medications, chart, and patient findings reviewed. See calendar for adjustments to dose and follow up plan.      "

## 2019-05-11 LAB — BACTERIA UR CULT: NO GROWTH

## 2019-05-13 NOTE — TELEPHONE ENCOUNTER
Low sodium probably secondary to diuretic treatment.    Please advise the patient to continue with potassium supplementation and foods rich in potassium.

## 2019-05-16 ENCOUNTER — ANTI-COAG VISIT (OUTPATIENT)
Dept: CARDIOLOGY | Facility: CLINIC | Age: 59
End: 2019-05-16
Payer: COMMERCIAL

## 2019-05-16 ENCOUNTER — HOSPITAL ENCOUNTER (OUTPATIENT)
Dept: RADIOLOGY | Facility: HOSPITAL | Age: 59
Discharge: HOME | End: 2019-05-16
Attending: FAMILY MEDICINE
Payer: COMMERCIAL

## 2019-05-16 ENCOUNTER — OFFICE VISIT (OUTPATIENT)
Dept: TRANSPLANT | Facility: CLINIC | Age: 59
End: 2019-05-16
Attending: INTERNAL MEDICINE
Payer: COMMERCIAL

## 2019-05-16 VITALS
HEART RATE: 62 BPM | BODY MASS INDEX: 32.49 KG/M2 | DIASTOLIC BLOOD PRESSURE: 89 MMHG | WEIGHT: 207 LBS | SYSTOLIC BLOOD PRESSURE: 132 MMHG | HEIGHT: 67 IN | OXYGEN SATURATION: 98 % | TEMPERATURE: 98 F | RESPIRATION RATE: 17 BRPM

## 2019-05-16 DIAGNOSIS — R36.1 BLOOD IN SEMEN: ICD-10-CM

## 2019-05-16 DIAGNOSIS — Z79.01 LONG TERM (CURRENT) USE OF ANTICOAGULANTS: ICD-10-CM

## 2019-05-16 DIAGNOSIS — Z76.82 LIVER TRANSPLANT CANDIDATE: ICD-10-CM

## 2019-05-16 DIAGNOSIS — C22.0 HCC (HEPATOCELLULAR CARCINOMA): ICD-10-CM

## 2019-05-16 DIAGNOSIS — N44.2 TESTICULAR CYST: ICD-10-CM

## 2019-05-16 DIAGNOSIS — I48.91 NEW ONSET ATRIAL FIBRILLATION: ICD-10-CM

## 2019-05-16 DIAGNOSIS — K75.81 NASH (NONALCOHOLIC STEATOHEPATITIS): Chronic | ICD-10-CM

## 2019-05-16 LAB
AMPHET+METHAMPHET UR QL: NEGATIVE
BARBITURATES UR QL SCN>200 NG/ML: NEGATIVE
BENZODIAZ UR QL SCN>200 NG/ML: NEGATIVE
BZE UR QL SCN: NEGATIVE
CANNABINOIDS UR QL SCN: NEGATIVE
CREAT UR-MCNC: 220 MG/DL (ref 23–375)
ETHANOL UR-MCNC: <10 MG/DL
METHADONE UR QL SCN>300 NG/ML: NEGATIVE
OPIATES UR QL SCN: NEGATIVE
PCP UR QL SCN>25 NG/ML: NEGATIVE
TOXICOLOGY INFORMATION: NORMAL

## 2019-05-16 PROCEDURE — 3075F SYST BP GE 130 - 139MM HG: CPT | Mod: CPTII,S$GLB,TXP, | Performed by: INTERNAL MEDICINE

## 2019-05-16 PROCEDURE — 3075F PR MOST RECENT SYSTOLIC BLOOD PRESS GE 130-139MM HG: ICD-10-PCS | Mod: CPTII,S$GLB,TXP, | Performed by: INTERNAL MEDICINE

## 2019-05-16 PROCEDURE — 3079F PR MOST RECENT DIASTOLIC BLOOD PRESSURE 80-89 MM HG: ICD-10-PCS | Mod: CPTII,S$GLB,TXP, | Performed by: INTERNAL MEDICINE

## 2019-05-16 PROCEDURE — 99213 PR OFFICE/OUTPT VISIT, EST, LEVL III, 20-29 MIN: ICD-10-PCS | Mod: S$GLB,TXP,, | Performed by: INTERNAL MEDICINE

## 2019-05-16 PROCEDURE — 3079F DIAST BP 80-89 MM HG: CPT | Mod: CPTII,S$GLB,TXP, | Performed by: INTERNAL MEDICINE

## 2019-05-16 PROCEDURE — 76870 US SCROTUM AND TESTICLES: ICD-10-PCS | Mod: 26,NTX,, | Performed by: RADIOLOGY

## 2019-05-16 PROCEDURE — 80307 DRUG TEST PRSMV CHEM ANLYZR: CPT | Mod: TXP

## 2019-05-16 PROCEDURE — 99213 OFFICE O/P EST LOW 20 MIN: CPT | Mod: S$GLB,TXP,, | Performed by: INTERNAL MEDICINE

## 2019-05-16 PROCEDURE — 76870 US EXAM SCROTUM: CPT | Mod: TC,NTX

## 2019-05-16 PROCEDURE — 99999 PR PBB SHADOW E&M-EST. PATIENT-LVL IV: ICD-10-PCS | Mod: PBBFAC,TXP,, | Performed by: INTERNAL MEDICINE

## 2019-05-16 PROCEDURE — 76870 US EXAM SCROTUM: CPT | Mod: 26,NTX,, | Performed by: RADIOLOGY

## 2019-05-16 PROCEDURE — 3008F PR BODY MASS INDEX (BMI) DOCUMENTED: ICD-10-PCS | Mod: CPTII,S$GLB,TXP, | Performed by: INTERNAL MEDICINE

## 2019-05-16 PROCEDURE — 99999 PR PBB SHADOW E&M-EST. PATIENT-LVL IV: CPT | Mod: PBBFAC,TXP,, | Performed by: INTERNAL MEDICINE

## 2019-05-16 PROCEDURE — 3008F BODY MASS INDEX DOCD: CPT | Mod: CPTII,S$GLB,TXP, | Performed by: INTERNAL MEDICINE

## 2019-05-16 NOTE — PROGRESS NOTES
Transplant Hepatology  Liver Transplant Recipient Evaluation      Consultation started: 5/16/2019 at 9:49 AM     Original Referring Provider: Jose Angel Munson  Current Corresponding Physician: Jose Angel CASILLAS Native Liver Diagnosis: Primary Liver Malignancy: Hepatoma (HCC) and Cirrhosis    Reason for Visit: evaluation for liver transplant     Subjective:     Roman Hodges is a 59 y.o. male with ESLD secondary to hepatocellular carcinoma and ROMO (non-alcoholic steatohepatitis).  Roman Hodges was seen today in the Pretransplant Clinic.  This 59-year-old   gentleman originally from [facility] presented with a liver mass.  He appears   to have underlying nonalcoholic steatohepatitis without significant fibrosis.    His cross-sectional imaging were studied and it was consistent with   hepatocellular cancer just outside [place] criteria.  He has undergone   radioembolization and radiofrequency ablation. He has been medically approved for liver transplant. He is on coumadin for anti-coagulation. Weight and appetite stable. No new medical issues.      Review of Systems   Constitutional: Negative for activity change, appetite change, chills, fatigue and unexpected weight change.   HENT: Negative for congestion, facial swelling and tinnitus.    Eyes: Negative for visual disturbance.   Respiratory: Negative for cough, shortness of breath and wheezing.    Cardiovascular: Negative for chest pain and palpitations.   Gastrointestinal: Negative for abdominal distention.   Genitourinary: Negative for dysuria.   Musculoskeletal: Negative for arthralgias, joint swelling and myalgias.   Neurological: Negative for syncope and headaches.   Hematological: Does not bruise/bleed easily.   Psychiatric/Behavioral: Negative for confusion.       Objective:   Physical Exam   Constitutional: He is oriented to person, place, and time. He appears well-developed and well-nourished.   Eyes: No scleral icterus.    Cardiovascular: Normal rate, regular rhythm and normal heart sounds.   Pulmonary/Chest: Effort normal and breath sounds normal. No respiratory distress. He has no wheezes.   Abdominal: Soft. Bowel sounds are normal. He exhibits no distension and no mass. There is no tenderness. There is no rebound.   Musculoskeletal: Normal range of motion.   Lymphadenopathy:     He has no cervical adenopathy.   Neurological: He is alert and oriented to person, place, and time.   Skin: Skin is warm and dry.     MELD-Na score: 9 at 4/12/2019  7:58 AM  MELD score: 9 at 4/12/2019  7:58 AM  Calculated from:  Serum Creatinine: 1.2 mg/dL at 4/12/2019  7:58 AM  Serum Sodium: 141 mmol/L (Rounded to 137 mmol/L) at 4/12/2019  7:58 AM  Total Bilirubin: 1.2 mg/dL at 4/12/2019  7:58 AM  INR(ratio): 1.0 at 4/12/2019  7:58 AM  Age: 59 years  Lab Results   Component Value Date    GLU 79 05/09/2019    BUN 23 (H) 05/09/2019    CREATININE 1.2 05/09/2019    CALCIUM 9.5 05/09/2019     05/09/2019    K 3.3 (L) 05/09/2019     05/09/2019    PROT 7.6 04/12/2019    CO2 27 05/09/2019    WBC 6.65 05/09/2019    RBC 5.48 05/09/2019    HGB 16.0 05/09/2019    HCT 46.4 05/09/2019     05/09/2019     Lab Results   Component Value Date    BNP 25 01/03/2019    CHOL 145 12/14/2018    TRIG 99 12/14/2018    HDL 31 (L) 12/14/2018    CHOLHDL 21.4 12/14/2018    TOTALCHOLEST 4.7 12/14/2018    ALBUMIN 3.7 04/12/2019    BILITOT 1.2 (H) 04/12/2019    AST 85 (H) 04/12/2019     (H) 04/12/2019    ALKPHOS 175 (H) 04/12/2019    LABPROT 23.4 (H) 05/16/2019    INR 2.4 (H) 05/16/2019    PSA 0.54 05/09/2019       Diagnostics:     1. HCC (hepatocellular carcinoma)    2. Liver transplant candidate    3. ROMO (nonalcoholic steatohepatitis)        Transplant Hepatology - Candidacy   Assessment/Plan:     Transplant Candidacy: Roman Hodges is a 59 y.o. male with ESLD secondary to nonalcoholic steatohepatitis complicated by HCC outside [place] criteria here for  evaluation for possible OLT.  Status post- radioembolization and RFA.  Medically approved for OLTx. RTC in 3 months.    Fab Damon MD         OS Patient Status  Functional Status: 100% - Normal, no complaints, no evidence of disease  Physical Capacity: No Limitations    Outside Records Request:

## 2019-05-16 NOTE — LETTER
May 16, 2019        Jose Angel Munson  [address] [facility]  [place] [address]  Phone: [phone]  Fax: [phone]             [facility]  [address]  [facility] [address]  Phone: [phone]   Patient: Roman Hodges   MR Number: [id number]   YOB: 1960   Date of Visit: 5/16/2019       Dear Dr. Jose Angel Munson    Thank you for referring Roman Hodges to me for evaluation. Attached you will find relevant portions of my assessment and plan of care.    If you have questions, please do not hesitate to call me. I look forward to following Roman Hodges along with you.    Sincerely,    Fab Damon MD    Enclosure    If you would like to receive this communication electronically, please contact [email or web address] or [phone] to request [employer] Link access.    [employer] Link is a tool which provides read-only access to select patient information with whom you have a relationship. Its easy to use and provides real time access to review your patients record including encounter summaries, notes, results, and demographic information.    If you feel you have received this communication in error or would no longer like to receive these types of communications, please e-mail [email or web address]

## 2019-05-17 ENCOUNTER — ANTI-COAG VISIT (OUTPATIENT)
Dept: CARDIOLOGY | Facility: CLINIC | Age: 59
End: 2019-05-17
Payer: COMMERCIAL

## 2019-05-17 DIAGNOSIS — I48.91 NEW ONSET ATRIAL FIBRILLATION: ICD-10-CM

## 2019-05-17 DIAGNOSIS — Z79.01 LONG TERM (CURRENT) USE OF ANTICOAGULANTS: Primary | ICD-10-CM

## 2019-05-17 NOTE — PROGRESS NOTES
INR at goal. Medications and chart reviewed. No changes noted to necessitate adjustment of warfarin or follow-up plan. See calendar.  Pt findings: Reported 5/16 that pt semen is pinkish color.  Pt has already spoke to PCP & they are currently doing a workup on patient.

## 2019-05-18 ENCOUNTER — HOSPITAL ENCOUNTER (OUTPATIENT)
Dept: RADIOLOGY | Facility: HOSPITAL | Age: 59
Discharge: HOME | End: 2019-05-18
Attending: FAMILY MEDICINE
Payer: COMMERCIAL

## 2019-05-18 DIAGNOSIS — C22.0 HCC (HEPATOCELLULAR CARCINOMA): ICD-10-CM

## 2019-05-18 PROCEDURE — 25500020 PHARM REV CODE 255: Mod: TXP | Performed by: FAMILY MEDICINE

## 2019-05-18 PROCEDURE — 74183 MRI ABD W/O CNTR FLWD CNTR: CPT | Mod: TC,TXP

## 2019-05-18 PROCEDURE — 74183 MRI ABDOMEN W WO CONTRAST: ICD-10-PCS | Mod: 26,,, | Performed by: RADIOLOGY

## 2019-05-18 PROCEDURE — 74183 MRI ABD W/O CNTR FLWD CNTR: CPT | Mod: 26,,, | Performed by: RADIOLOGY

## 2019-05-18 PROCEDURE — A9585 GADOBUTROL INJECTION: HCPCS | Mod: TXP | Performed by: FAMILY MEDICINE

## 2019-05-18 RX ORDER — GADOBUTROL 604.72 MG/ML
10 INJECTION INTRAVENOUS
Status: COMPLETED | OUTPATIENT
Start: 2019-05-18 | End: 2019-05-18

## 2019-05-18 RX ADMIN — GADOBUTROL 10 ML: 604.72 INJECTION INTRAVENOUS at 05:05

## 2019-05-19 ENCOUNTER — TELEPHONE (OUTPATIENT)
Dept: FAMILY MEDICINE | Facility: CLINIC | Age: 59
End: 2019-05-19

## 2019-05-19 DIAGNOSIS — I86.1 VARICOCELE: Primary | ICD-10-CM

## 2019-05-19 DIAGNOSIS — N43.3 HYDROCELE, UNSPECIFIED HYDROCELE TYPE: ICD-10-CM

## 2019-05-19 NOTE — TELEPHONE ENCOUNTER
Pocket of fluid in the testicles.      Urology evaluation was ordered to consider observation or a surgical procedure.    Please contact the patient with appointment.

## 2019-05-20 NOTE — TELEPHONE ENCOUNTER
Spoke to patient and inform of his results and schedule an appointment with urology. Patient voices understanding.

## 2019-05-21 ENCOUNTER — TELEPHONE (OUTPATIENT)
Dept: TRANSPLANT | Facility: CLINIC | Age: 59
End: 2019-05-21

## 2019-05-21 NOTE — PROGRESS NOTES
Orders entered and appointment card completed for clinic follow-up in 3 months (Aug 2019) with labs

## 2019-05-21 NOTE — TELEPHONE ENCOUNTER
"  LIVER WAIT LISTING NOTE    **NOTE:   IF ANY EXTERNAL LABS ARE USED FOR LISTING THE VALUES AND DATES MUST BE ENTERED IN EPIC TO GENERATE THIS NOTE**    Date of Financial clearance to list: 2019    [facility]:        Organ: Liver  Name:       Roman Hodges    : 1960          Gender:     male      MRN#: [id number]                                 State of Permanent Residence:  [address] [place] [address]  Ethnicity: //   Race:      White    CLINICAL INFORMATION       ABO  ABO Blood Group:   O POS     ABO Confirmation: (THESE DATES MUST BE PRIOR TO THE LIST DATE AND SUPPORTED BY SEPARATE LAB REPORTS)    Internal Results    Lab Results   Component Value Date    GROUPTRH O POS 2019    GROUPTRH O POS 2018     No results found for: ABO    External Results    ABO Date 1:  ABO Result 1:  ABO Date 2:  ABO Result 2:     Are either of these ABO results based on External Labs? no  (If Yes, STOP and go to source document in Media Tab for verification).    VITALS  Height:    Ht Readings from Last 1 Encounters:   19 5' 7" (1.702 m)     Weight:    Wt Readings from Last 1 Encounters:   19 93.9 kg (207 lb 0.2 oz)       LIVER ORGAN INFORMATION  Candidate Medical Urgency Status: Active    Number of Previous Transplants: 0    MELD/PELD Data Collection:  Had dialysis twice, or 24 hours of CVVHD, within 1 week prior to the serum creatinine test: No  Encephalopathy: none Date: 2019  Ascites: none Date: 2019          MELD Score:  MELD-Na score: 20 at 2019 10:20 AM  MELD score: 20 at 2019 10:20 AM  Calculated from:  Serum Creatinine: 1.4 mg/dL at 2019 10:20 AM  Serum Sodium: 138 mmol/L (Rounded to 137 mmol/L) at 2019 10:20 AM  Total Bilirubin: 0.8 mg/dL (Rounded to 1 mg/dL) at 2019 10:20 AM  INR(ratio): 2.5 at 2019 10:20 AM  Age: 59 years  Lab Results   Component Value Date    ALBUMIN 3.9 2019       Additional Organs: none  Kidney: " "No    If Kidney is "Yes" above, check Diagnosis and enter the medical eligibility below for a simultaneous liver/kidney:    Diagnosis: Chronic kidney disease (CKD) with measured or calculated GFR less than or equal to 60 ml/min for greater than 90 consecutive days. At least one of the following must qualify for CKD:  Date Begun Dialysis CrCl (ml/min)  Must be < or = 30 eGFR (ml/min) Must be < or = 30    Yes/no:                    Diagnosis: Sustained acute kidney injury (must be confirmed at least once every 7 days). Please select at least one of the following criteria:  Date of test or treatment Dialysis received CrCl (ml/min) Must be < or = 30 eGFR (ml/min) Must be < or = 25 Number of days since previous test or treatment (must be less than or equal to 7 days)    Yes/No:                  Diagnosis: Metabolic disease, Check all diagnosis that apply:     Hyperoxaluria    Atypical hemolytic uremic syndrome (HUS) from mutations in factor H or factor I    Familial non-neuropathic systemic amyloidosis    Methylmalonic aciduria     Transplant nephrologist confirming candidate's most recent diagnosis for SLK: n/a               ## Please submit a separate Kidney Listing note for combined Liver/Kidney patients. ##    Will Recipient Accept?   Accept HBcAB Positive Organ:  yes  Accept HBV MARIA TERESA Positive Organ:  yes  Accept HCV Antibody Positive Organ: yes   Accept HCV MARIA TERESA Positive Organ:  yes                        Local: No                           Import: No  Accept DCD Organ:    yes  Minimum acceptable donor age:  5 years  Maximum acceptable donor age:  99 years  Minimum acceptable donor weight:  40 lbs    Maximum acceptable donor weight:  440 lb  Maximum miles the organ or  Recovery team will travel:   5000 miles    TCR Information    Citizenship: [place] Citizen   Country of permanent residence:   Year of entry to the [place]:   Highest education level: High School (9-12) or GED    Patient on Life Support: No  Functional Status: " 100% - normal, no complaints, no evidence of disease  Working for income: yes  If yes, working activity level: Working Full Time  Previous Pancreas [facility] - No  Source of payment: Private Insurance  Diabetes: Type II  Any previous malignancy: Yes, Liver  Neoadjuvant Therapy: No  Has candidate ever had a diagnosis of HCC: Yes    Liver Medical Factors  Previous abdominal surgery: Yes  Spontaneous Bacterial Peritonitis: No  History of Portal Vein Thrombosis: No  Transjugular Intrahepatic Portosystemic Shunt: No

## 2019-05-23 ENCOUNTER — LAB VISIT (OUTPATIENT)
Dept: LAB | Facility: HOSPITAL | Age: 59
End: 2019-05-23
Attending: INTERNAL MEDICINE
Payer: COMMERCIAL

## 2019-05-23 DIAGNOSIS — Z79.01 LONG TERM (CURRENT) USE OF ANTICOAGULANTS: ICD-10-CM

## 2019-05-23 LAB
INR PPP: 2.4 (ref 0.8–1.2)
PROTHROMBIN TIME: 23.3 SEC (ref 9–12.5)

## 2019-05-23 PROCEDURE — 85610 PROTHROMBIN TIME: CPT | Mod: TXP

## 2019-05-23 PROCEDURE — 36415 COLL VENOUS BLD VENIPUNCTURE: CPT | Mod: PO,TXP

## 2019-05-24 ENCOUNTER — ANTI-COAG VISIT (OUTPATIENT)
Dept: CARDIOLOGY | Facility: CLINIC | Age: 59
End: 2019-05-24
Payer: COMMERCIAL

## 2019-05-24 DIAGNOSIS — I48.91 NEW ONSET ATRIAL FIBRILLATION: ICD-10-CM

## 2019-05-24 DIAGNOSIS — Z79.01 LONG TERM (CURRENT) USE OF ANTICOAGULANTS: ICD-10-CM

## 2019-05-24 PROCEDURE — 93793 PR ANTICOAGULANT MGMT FOR PT TAKING WARFARIN: ICD-10-PCS | Mod: S$GLB,,,

## 2019-05-24 PROCEDURE — 93793 ANTICOAG MGMT PT WARFARIN: CPT | Mod: S$GLB,,,

## 2019-05-29 ENCOUNTER — PATIENT MESSAGE (OUTPATIENT)
Dept: HEPATOLOGY | Facility: CLINIC | Age: 59
End: 2019-05-29

## 2019-05-29 ENCOUNTER — PATIENT MESSAGE (OUTPATIENT)
Dept: FAMILY MEDICINE | Facility: CLINIC | Age: 59
End: 2019-05-29

## 2019-05-29 ENCOUNTER — TELEPHONE (OUTPATIENT)
Dept: FAMILY MEDICINE | Facility: CLINIC | Age: 59
End: 2019-05-29

## 2019-05-29 DIAGNOSIS — G47.33 OSA (OBSTRUCTIVE SLEEP APNEA): Primary | ICD-10-CM

## 2019-06-05 ENCOUNTER — CLINICAL SUPPORT (OUTPATIENT)
Dept: INFECTIOUS DISEASES | Facility: CLINIC | Age: 59
End: 2019-06-05
Payer: COMMERCIAL

## 2019-06-05 ENCOUNTER — LAB VISIT (OUTPATIENT)
Dept: LAB | Facility: HOSPITAL | Age: 59
End: 2019-06-05
Attending: INTERNAL MEDICINE
Payer: COMMERCIAL

## 2019-06-05 ENCOUNTER — ANTI-COAG VISIT (OUTPATIENT)
Dept: CARDIOLOGY | Facility: CLINIC | Age: 59
End: 2019-06-05
Payer: COMMERCIAL

## 2019-06-05 ENCOUNTER — OFFICE VISIT (OUTPATIENT)
Dept: INTERVENTIONAL RADIOLOGY/VASCULAR | Facility: CLINIC | Age: 59
End: 2019-06-05
Payer: COMMERCIAL

## 2019-06-05 VITALS
DIASTOLIC BLOOD PRESSURE: 85 MMHG | HEART RATE: 69 BPM | WEIGHT: 207.69 LBS | HEIGHT: 67 IN | BODY MASS INDEX: 32.6 KG/M2 | SYSTOLIC BLOOD PRESSURE: 126 MMHG

## 2019-06-05 DIAGNOSIS — Z79.01 LONG TERM (CURRENT) USE OF ANTICOAGULANTS: ICD-10-CM

## 2019-06-05 DIAGNOSIS — I48.91 NEW ONSET ATRIAL FIBRILLATION: ICD-10-CM

## 2019-06-05 DIAGNOSIS — K75.81 NASH (NONALCOHOLIC STEATOHEPATITIS): Chronic | ICD-10-CM

## 2019-06-05 DIAGNOSIS — Z76.82 LIVER TRANSPLANT CANDIDATE: ICD-10-CM

## 2019-06-05 DIAGNOSIS — C22.0 HCC (HEPATOCELLULAR CARCINOMA): Primary | ICD-10-CM

## 2019-06-05 DIAGNOSIS — C22.0 HCC (HEPATOCELLULAR CARCINOMA): ICD-10-CM

## 2019-06-05 DIAGNOSIS — D49.0 LIVER NEOPLASM: ICD-10-CM

## 2019-06-05 LAB
INR PPP: 2.5 (ref 0.8–1.2)
PROTHROMBIN TIME: 24.2 SEC (ref 9–12.5)

## 2019-06-05 PROCEDURE — 3074F SYST BP LT 130 MM HG: CPT | Mod: CPTII,NTX,S$GLB, | Performed by: FAMILY MEDICINE

## 2019-06-05 PROCEDURE — 3008F BODY MASS INDEX DOCD: CPT | Mod: CPTII,NTX,S$GLB, | Performed by: FAMILY MEDICINE

## 2019-06-05 PROCEDURE — 90750 HZV VACC RECOMBINANT IM: CPT | Mod: S$GLB,TXP,, | Performed by: INTERNAL MEDICINE

## 2019-06-05 PROCEDURE — 99999 PR PBB SHADOW E&M-EST. PATIENT-LVL I: ICD-10-PCS | Mod: PBBFAC,TXP,,

## 2019-06-05 PROCEDURE — 99212 OFFICE O/P EST SF 10 MIN: CPT | Mod: NTX,S$GLB,, | Performed by: FAMILY MEDICINE

## 2019-06-05 PROCEDURE — 36415 COLL VENOUS BLD VENIPUNCTURE: CPT | Mod: PO,NTX

## 2019-06-05 PROCEDURE — 85610 PROTHROMBIN TIME: CPT | Mod: NTX

## 2019-06-05 PROCEDURE — 3008F PR BODY MASS INDEX (BMI) DOCUMENTED: ICD-10-PCS | Mod: CPTII,NTX,S$GLB, | Performed by: FAMILY MEDICINE

## 2019-06-05 PROCEDURE — 93793 ANTICOAG MGMT PT WARFARIN: CPT | Mod: S$GLB,,,

## 2019-06-05 PROCEDURE — 99999 PR PBB SHADOW E&M-EST. PATIENT-LVL III: CPT | Mod: PBBFAC,TXP,, | Performed by: FAMILY MEDICINE

## 2019-06-05 PROCEDURE — 90750 ZOSTER RECOMBINANT VACCINE: ICD-10-PCS | Mod: S$GLB,TXP,, | Performed by: INTERNAL MEDICINE

## 2019-06-05 PROCEDURE — 99999 PR PBB SHADOW E&M-EST. PATIENT-LVL I: CPT | Mod: PBBFAC,TXP,,

## 2019-06-05 PROCEDURE — 3079F DIAST BP 80-89 MM HG: CPT | Mod: CPTII,NTX,S$GLB, | Performed by: FAMILY MEDICINE

## 2019-06-05 PROCEDURE — 90471 IMMUNIZATION ADMIN: CPT | Mod: S$GLB,TXP,, | Performed by: INTERNAL MEDICINE

## 2019-06-05 PROCEDURE — 90471 PR IMMUNIZ ADMIN,1 SINGLE/COMB VAC/TOXOID: ICD-10-PCS | Mod: S$GLB,TXP,, | Performed by: INTERNAL MEDICINE

## 2019-06-05 PROCEDURE — 99212 PR OFFICE/OUTPT VISIT, EST, LEVL II, 10-19 MIN: ICD-10-PCS | Mod: NTX,S$GLB,, | Performed by: FAMILY MEDICINE

## 2019-06-05 PROCEDURE — 3079F PR MOST RECENT DIASTOLIC BLOOD PRESSURE 80-89 MM HG: ICD-10-PCS | Mod: CPTII,NTX,S$GLB, | Performed by: FAMILY MEDICINE

## 2019-06-05 PROCEDURE — 99999 PR PBB SHADOW E&M-EST. PATIENT-LVL III: ICD-10-PCS | Mod: PBBFAC,TXP,, | Performed by: FAMILY MEDICINE

## 2019-06-05 PROCEDURE — 93793 PR ANTICOAGULANT MGMT FOR PT TAKING WARFARIN: ICD-10-PCS | Mod: S$GLB,,,

## 2019-06-05 PROCEDURE — 3074F PR MOST RECENT SYSTOLIC BLOOD PRESSURE < 130 MM HG: ICD-10-PCS | Mod: CPTII,NTX,S$GLB, | Performed by: FAMILY MEDICINE

## 2019-06-05 NOTE — PROGRESS NOTES
Subjective:       Patient ID: Roman Hodges is a 59 y.o. male.    Chief Complaint: Cancer    HPI  Review of Systems   Constitutional: Negative for activity change, appetite change, chills, fatigue and fever.   Respiratory: Negative for cough, shortness of breath, wheezing and stridor.    Cardiovascular: Negative for chest pain, palpitations and leg swelling.   Gastrointestinal: Negative for abdominal distention, abdominal pain, constipation, diarrhea, nausea and vomiting.       Objective:      Physical Exam   Constitutional: He is oriented to person, place, and time. He appears well-developed and well-nourished. No distress.   Cardiovascular: Normal rate, regular rhythm and normal heart sounds. Exam reveals no gallop and no friction rub.   No murmur heard.  Pulmonary/Chest: Effort normal and breath sounds normal. No stridor. No respiratory distress. He has no wheezes. He has no rales.   Abdominal: Soft. Bowel sounds are normal. He exhibits no distension and no mass. There is no tenderness. There is no guarding.   Neurological: He is alert and oriented to person, place, and time.   Skin: Skin is warm and dry. He is not diaphoretic.   Psychiatric: He has a normal mood and affect.   Vitals reviewed.      Imaging:  MRI 2019  Impression       Interval re-ablation of left lobe lesion with significant improvement of previously noted marginal nodular enhancement.  Region of asymmetric rim enhancement along the posterior treatment cavity appears more circumferential on delayed sequence, felt likely related to post treatment change though continued attention at follow-up recommended.     ECO  MELD-Na score: 20 at 2019 10:20 AM  MELD score: 20 at 2019 10:20 AM  Calculated from:  Serum Creatinine: 1.4 mg/dL at 2019 10:20 AM  Serum Sodium: 138 mmol/L (Rounded to 137 mmol/L) at 2019 10:20 AM  Total Bilirubin: 0.8 mg/dL (Rounded to 1 mg/dL) at 2019 10:20 AM  INR(ratio): 2.5 at 2019  10:20 AM  Age: 59 years  Child Mckay: Class B  Transplant Status: in evaluation    Assessment:       1. HCC (hepatocellular carcinoma)        Plan:         Reviewed images with Dr. Milligan, Dr. Ryan, and Dr. Mcelroy. Explained to patient no residual or new lesions noted. Recommendation is to repeat MRI 3 months from last MRI. Patient verbalized understanding and agreement.

## 2019-06-09 ENCOUNTER — TELEPHONE (OUTPATIENT)
Dept: TRANSPLANT | Facility: CLINIC | Age: 59
End: 2019-06-09

## 2019-06-09 DIAGNOSIS — C22.0 HCC (HEPATOCELLULAR CARCINOMA): ICD-10-CM

## 2019-06-09 DIAGNOSIS — Z76.82 LIVER TRANSPLANT CANDIDATE: Primary | ICD-10-CM

## 2019-06-13 ENCOUNTER — OFFICE VISIT (OUTPATIENT)
Dept: UROLOGY | Facility: CLINIC | Age: 59
End: 2019-06-13
Payer: COMMERCIAL

## 2019-06-13 ENCOUNTER — TELEPHONE (OUTPATIENT)
Dept: UROLOGY | Facility: CLINIC | Age: 59
End: 2019-06-13

## 2019-06-13 VITALS
WEIGHT: 207 LBS | BODY MASS INDEX: 32.49 KG/M2 | HEIGHT: 67 IN | TEMPERATURE: 98 F | DIASTOLIC BLOOD PRESSURE: 81 MMHG | HEART RATE: 60 BPM | SYSTOLIC BLOOD PRESSURE: 133 MMHG

## 2019-06-13 DIAGNOSIS — N50.3 EPIDIDYMAL CYST: ICD-10-CM

## 2019-06-13 DIAGNOSIS — R36.1 HEMATOSPERMIA: ICD-10-CM

## 2019-06-13 DIAGNOSIS — Z12.5 ENCOUNTER FOR PROSTATE CANCER SCREENING: ICD-10-CM

## 2019-06-13 DIAGNOSIS — N41.9 PROSTATITIS, UNSPECIFIED PROSTATITIS TYPE: Primary | ICD-10-CM

## 2019-06-13 DIAGNOSIS — N43.3 HYDROCELE, UNSPECIFIED HYDROCELE TYPE: ICD-10-CM

## 2019-06-13 PROCEDURE — 3008F BODY MASS INDEX DOCD: CPT | Mod: CPTII,NTX,S$GLB, | Performed by: STUDENT IN AN ORGANIZED HEALTH CARE EDUCATION/TRAINING PROGRAM

## 2019-06-13 PROCEDURE — 3075F PR MOST RECENT SYSTOLIC BLOOD PRESS GE 130-139MM HG: ICD-10-PCS | Mod: CPTII,NTX,S$GLB, | Performed by: STUDENT IN AN ORGANIZED HEALTH CARE EDUCATION/TRAINING PROGRAM

## 2019-06-13 PROCEDURE — 99999 PR PBB SHADOW E&M-EST. PATIENT-LVL IV: ICD-10-PCS | Mod: PBBFAC,TXP,, | Performed by: STUDENT IN AN ORGANIZED HEALTH CARE EDUCATION/TRAINING PROGRAM

## 2019-06-13 PROCEDURE — 99204 PR OFFICE/OUTPT VISIT, NEW, LEVL IV, 45-59 MIN: ICD-10-PCS | Mod: NTX,S$GLB,, | Performed by: STUDENT IN AN ORGANIZED HEALTH CARE EDUCATION/TRAINING PROGRAM

## 2019-06-13 PROCEDURE — 99999 PR PBB SHADOW E&M-EST. PATIENT-LVL IV: CPT | Mod: PBBFAC,TXP,, | Performed by: STUDENT IN AN ORGANIZED HEALTH CARE EDUCATION/TRAINING PROGRAM

## 2019-06-13 PROCEDURE — 99204 OFFICE O/P NEW MOD 45 MIN: CPT | Mod: NTX,S$GLB,, | Performed by: STUDENT IN AN ORGANIZED HEALTH CARE EDUCATION/TRAINING PROGRAM

## 2019-06-13 PROCEDURE — 3079F PR MOST RECENT DIASTOLIC BLOOD PRESSURE 80-89 MM HG: ICD-10-PCS | Mod: CPTII,NTX,S$GLB, | Performed by: STUDENT IN AN ORGANIZED HEALTH CARE EDUCATION/TRAINING PROGRAM

## 2019-06-13 PROCEDURE — 3079F DIAST BP 80-89 MM HG: CPT | Mod: CPTII,NTX,S$GLB, | Performed by: STUDENT IN AN ORGANIZED HEALTH CARE EDUCATION/TRAINING PROGRAM

## 2019-06-13 PROCEDURE — 3075F SYST BP GE 130 - 139MM HG: CPT | Mod: CPTII,NTX,S$GLB, | Performed by: STUDENT IN AN ORGANIZED HEALTH CARE EDUCATION/TRAINING PROGRAM

## 2019-06-13 PROCEDURE — 3008F PR BODY MASS INDEX (BMI) DOCUMENTED: ICD-10-PCS | Mod: CPTII,NTX,S$GLB, | Performed by: STUDENT IN AN ORGANIZED HEALTH CARE EDUCATION/TRAINING PROGRAM

## 2019-06-13 RX ORDER — AMLODIPINE BESYLATE 5 MG/1
5 TABLET ORAL DAILY
Status: ON HOLD | COMMUNITY
End: 2019-07-08 | Stop reason: HOSPADM

## 2019-06-13 RX ORDER — SULFAMETHOXAZOLE AND TRIMETHOPRIM 800; 160 MG/1; MG/1
1 TABLET ORAL 2 TIMES DAILY
Qty: 60 TABLET | Refills: 0 | Status: SHIPPED | OUTPATIENT
Start: 2019-06-13 | End: 2019-06-20 | Stop reason: ALTCHOICE

## 2019-06-13 NOTE — PROGRESS NOTES
"Subjective:       Patient ID: Roman Hodges is a 59 y.o. male.    Chief Complaint: Hydrocele  This is a 59 y.o.  male patient that is new to me.  The patient is referred to me by his PCP Dr. Angel for a finding of a hydrocele and epididymal cyst on scrotal US.  He also saw blood in his semen twice in the past month. He states that has never happened before. He does take coumadin/warfarin. He notes that the blood in the semen has cleared up. It was not associated with dysuria, hematuria. He denies any pain. No urinary voiding complaints.   He also had a history of a right inguinal hernia pain about 25-28 years ago. He notes sometimes when he has an erection it does hurt near the scar when the skin "pulls."    LAST PSA  Lab Results   Component Value Date    PSA 0.54 05/09/2019    PSA 0.48 08/17/2018       Lab Results   Component Value Date    CREATININE 1.4 05/16/2019     I personally reviewed the images: 5/16/19 scrotal US -Small right complex hydrocele. Right epididymal head cyst with few thin septations. Left varicocele.  ---  Past Medical History:   Diagnosis Date    A-fib     Allergy     Anticoagulant long-term use     Diabetes mellitus, type 2     GERD (gastroesophageal reflux disease)     Hepatocellular carcinoma     liver    Hyperlipidemia     Hypertension        Past Surgical History:   Procedure Laterality Date    COLONOSCOPY      EMBOLIZATION, YTTRIUM THERAPY N/A 11/9/2018    Performed by [facility] Provider [facility] OR 2ND FLR    EMBOLIZATION, YTTRIUM THERAPY N/A 10/24/2018    Performed by [facility] Provider [facility] OR 2ND FLR    ESOPHAGOGASTRODUODENOSCOPY (EGD) N/A 1/15/2019    Performed by [facility] at [facility] ENDO    HERNIA REPAIR      Left heart cath Left 12/4/2018    Performed by Tyler Hinojosa MD at [facility] CATH LAB/EP    Radiofrequency Ablation N/A 4/12/2019    Performed by Rose Surgeon at [facility] ROSE       Family History   Problem Relation Age of Onset    " Hypertension Mother     Cancer Mother     Hypertension Father     Stroke Father     Cancer Father        Social History     Tobacco Use    Smoking status: Former Smoker     Packs/day: 1.00     Years: 10.00     Pack years: 10.00     Types: Cigarettes     Last attempt to quit: 1980     Years since quittin.4    Smokeless tobacco: Never Used   Substance Use Topics    Alcohol use: No     Frequency: Never     Binge frequency: Never    Drug use: No       Current Outpatient Medications on File Prior to Visit   Medication Sig Dispense Refill    amLODIPine (NORVASC) 5 MG tablet Take 5 mg by mouth once daily.      busPIRone (BUSPAR) 5 MG Tab Take 1 tablet (5 mg total) by mouth 2 (two) times daily. (Patient taking differently: Take 5 mg by mouth 2 (two) times daily as needed. ) 180 tablet 3    esomeprazole (NEXIUM) 40 MG capsule Take 1 capsule (40 mg total) by mouth once daily. 90 capsule 1    losartan-hydrochlorothiazide 100-25 mg (HYZAAR) 100-25 mg per tablet Take 1 tablet by mouth once daily. 90 tablet 3    metoprolol succinate (TOPROL-XL) 50 MG 24 hr tablet Take 1 tablet (50 mg total) by mouth once daily. 90 tablet 3    potassium chloride SA (K-DUR,KLOR-CON) 20 MEQ tablet Take 20 mEq by mouth 2 (two) times daily.       warfarin (COUMADIN) 5 MG tablet Take 1 tablet (5 mg total) by mouth Daily. Start after dentist appt on 4/10 30 tablet 5    [DISCONTINUED] amLODIPine (NORVASC) 5 MG tablet Take 1 tablet (5 mg total) by mouth once daily. 90 tablet 3    [DISCONTINUED] amoxicillin-clavulanate 500-125mg (AUGMENTIN) 500-125 mg Tab Take 1 tablet (500 mg total) by mouth 2 (two) times daily. 14 tablet 0    [DISCONTINUED] ELIQUIS 5 mg Tab Take 1 tablet (5 mg total) by mouth 2 (two) times daily. 60 tablet 11    [DISCONTINUED] ondansetron (ZOFRAN-ODT) 4 MG TbDL Take 1 tablet (4 mg total) by mouth every 6 (six) hours as needed. 28 tablet 0    [DISCONTINUED] oxyCODONE (ROXICODONE) 5 MG immediate release  tablet Take 1 tablet (5 mg total) by mouth every 4 (four) hours as needed. 20 tablet 0     Current Facility-Administered Medications on File Prior to Visit   Medication Dose Route Frequency Provider Last Rate Last Dose    0.9%  NaCl infusion   Intravenous Continuous Bony Saavedra MD   Stopped at 01/15/19 1332    sodium chloride 0.9% flush 3 mL  3 mL Intravenous PRN Bony Saavedra MD           Review of patient's allergies indicates:   Allergen Reactions    Lisinopril      Stomach ache    Flu vaccine 2011 (36 mos+)(pf)      Patient reports he developed Bell's Palsy 2 days after his last flu shot in 2008       Review of Systems   Constitutional: Negative for chills.   HENT: Negative for congestion.    Eyes: Negative for visual disturbance.   Respiratory: Negative for shortness of breath.    Cardiovascular: Negative for chest pain.   Gastrointestinal: Negative for abdominal distention.   Musculoskeletal: Negative for gait problem.   Skin: Negative for color change.   Neurological: Negative for dizziness.   Psychiatric/Behavioral: Negative for agitation.       Objective:      Physical Exam   Constitutional: He appears well-developed and well-nourished.   HENT:   Head: Normocephalic.   Eyes: Pupils are equal, round, and reactive to light.   Neck: Normal range of motion.   Cardiovascular: Intact distal pulses.   Pulmonary/Chest: Effort normal.   Abdominal: Soft.   Genitourinary:   Genitourinary Comments: Right epididymal head cyst, right epididymis nontender. Right testicle descended, nontender, no masses. Small right hydrocele appreciated.  Left testicle descended, nontender, no m asses. Left epididymis palpable, nontender, no masses or cysts appreciated. No hydrocele appreciated on the left  Uncircumcised phallus, normal foreskin and glans visualized after foreskin retracted. No penile rashes/lesions.   MACEY deferred - patient notes Dr. Angel recently performed   Musculoskeletal: Normal range of motion.    Neurological: He is alert.   Skin: Skin is warm and dry.   Psychiatric: He has a normal mood and affect.       Assessment:       1. Prostatitis, unspecified prostatitis type    2. Hematospermia    3. Hydrocele, unspecified hydrocele type    4. Epididymal cyst    5. Encounter for prostate cancer screening        Plan:       1. Hematospermia - reviewed PSA - stable. Scrotal US no adverse finding. Counseled patient that most commonly this is a self limiting process either due to prostatitis (inflammation of the prostate or possible bacterial infection of the prostate). I offered an antibiotic course of bactrim x 30 days versus observation, the patient would like antibiotics.   2. PSA stable, can continue yearly checks.  3. Scrotal US reviewed, showed images to patient and his wife today. Hydrocele and epididymal cyst not bothersome to patient. No additional imaging needed, no intervention needed.    Prostatitis, unspecified prostatitis type  -     sulfamethoxazole-trimethoprim 800-160mg (BACTRIM DS) 800-160 mg Tab; Take 1 tablet by mouth 2 (two) times daily.  Dispense: 60 tablet; Refill: 0    Hematospermia    Hydrocele, unspecified hydrocele type    Epididymal cyst    Encounter for prostate cancer screening

## 2019-06-13 NOTE — LETTER
June 13, 2019      Jose Angel Munson MD  [address] [facility]  [place] [address]           [facility]  [address] [place] [address]  [place] [address]  Phone: [phone]          Patient: Rmoan Hodges   MR Number: [id number]   YOB: 1960   Date of Visit: 6/13/2019       Dear Dr. Jose Angel Munson:    Thank you for referring Roman Hodges to me for evaluation. Attached you will find relevant portions of my assessment and plan of care.    If you have questions, please do not hesitate to call me. I look forward to following Roman Hodges along with you.    Sincerely,    Joyce Nunes MD    Enclosure  CC:  No Recipients    If you would like to receive this communication electronically, please contact [email or web address] or [phone] to request more information on [employer] Link access.    For providers and/or their staff who would like to refer a patient to [facility], please contact us through our one-stop-shop provider referral line, [facility], at [phone].    If you feel you have received this communication in error or would no longer like to receive these types of communications, please e-mail [email or web address]

## 2019-06-14 ENCOUNTER — PATIENT MESSAGE (OUTPATIENT)
Dept: UROLOGY | Facility: CLINIC | Age: 59
End: 2019-06-14

## 2019-06-14 DIAGNOSIS — N41.9 PROSTATITIS, UNSPECIFIED PROSTATITIS TYPE: Primary | ICD-10-CM

## 2019-06-14 RX ORDER — DOXYCYCLINE HYCLATE 100 MG/1
100 TABLET, DELAYED RELEASE ORAL EVERY 12 HOURS
Qty: 60 TABLET | Refills: 0 | Status: SHIPPED | OUTPATIENT
Start: 2019-06-14 | End: 2019-06-17

## 2019-06-14 NOTE — TELEPHONE ENCOUNTER
Roman Hodges [id number]  # [id number]    This is an initial request for an exception that does not meet criteria for automatic approval.  Our patient is a 59 year old male with a diagnosis of ROMO Cirrhosis who was found to have a 6.2 cm enhancing lesion with washout and pseudocapsule on MR 9/10/18.  An AFP on 9/20/18 was 4.1.  Yttrium was performed 11/9/18.  A post treatment MR on 2/18/19 demonstrated a partially treated 5.2 cm lesion.  An AFP on 2/18/19 was 6.4.  Ablation was performed 4/12/19.  A post ablation MR on 5/18/19 demonstrated a 4.5 cm treatment cavity with no evidence of residual disease or new lesions.  A chest CT on 3/14/19 demonstrated no evidence of extrahepatic disease.  These findings are consistent with a successfully downstaged tumor.  We respectfully request an HCC MELD exception score of 6.

## 2019-06-14 NOTE — TELEPHONE ENCOUNTER
Spoke to patient's wife, we will avoid the bactrim and he will  doxycycline instead. I spoke to the pharmacist and of the antibiotics usually utilized to treat prostatitis, doxycycline is the better option in terms of possible interaction with coumadin. Bactrim may have more effects, and cipro certainly has an adverse effect on coumadin INR levels.   I will message Dr. Damon to alert him that if his INR responds adversely to let me know and we can stop the doxycycline empiric treatment for prostatitis.

## 2019-06-17 ENCOUNTER — OFFICE VISIT (OUTPATIENT)
Dept: FAMILY MEDICINE | Facility: CLINIC | Age: 59
End: 2019-06-17
Payer: COMMERCIAL

## 2019-06-17 VITALS
HEART RATE: 64 BPM | DIASTOLIC BLOOD PRESSURE: 74 MMHG | HEIGHT: 67 IN | OXYGEN SATURATION: 97 % | SYSTOLIC BLOOD PRESSURE: 120 MMHG | BODY MASS INDEX: 32.73 KG/M2 | WEIGHT: 208.56 LBS

## 2019-06-17 DIAGNOSIS — H61.22 IMPACTED CERUMEN OF LEFT EAR: Primary | ICD-10-CM

## 2019-06-17 DIAGNOSIS — I10 ESSENTIAL HYPERTENSION: ICD-10-CM

## 2019-06-17 PROCEDURE — 3078F PR MOST RECENT DIASTOLIC BLOOD PRESSURE < 80 MM HG: ICD-10-PCS | Mod: CPTII,S$GLB,, | Performed by: FAMILY MEDICINE

## 2019-06-17 PROCEDURE — 99214 PR OFFICE/OUTPT VISIT, EST, LEVL IV, 30-39 MIN: ICD-10-PCS | Mod: S$GLB,,, | Performed by: FAMILY MEDICINE

## 2019-06-17 PROCEDURE — 3008F PR BODY MASS INDEX (BMI) DOCUMENTED: ICD-10-PCS | Mod: CPTII,S$GLB,, | Performed by: FAMILY MEDICINE

## 2019-06-17 PROCEDURE — 99214 OFFICE O/P EST MOD 30 MIN: CPT | Mod: S$GLB,,, | Performed by: FAMILY MEDICINE

## 2019-06-17 PROCEDURE — 3008F BODY MASS INDEX DOCD: CPT | Mod: CPTII,S$GLB,, | Performed by: FAMILY MEDICINE

## 2019-06-17 PROCEDURE — 3078F DIAST BP <80 MM HG: CPT | Mod: CPTII,S$GLB,, | Performed by: FAMILY MEDICINE

## 2019-06-17 PROCEDURE — 99999 PR PBB SHADOW E&M-EST. PATIENT-LVL III: CPT | Mod: PBBFAC,,, | Performed by: FAMILY MEDICINE

## 2019-06-17 PROCEDURE — 3074F SYST BP LT 130 MM HG: CPT | Mod: CPTII,S$GLB,, | Performed by: FAMILY MEDICINE

## 2019-06-17 PROCEDURE — 99999 PR PBB SHADOW E&M-EST. PATIENT-LVL III: ICD-10-PCS | Mod: PBBFAC,,, | Performed by: FAMILY MEDICINE

## 2019-06-17 PROCEDURE — 3074F PR MOST RECENT SYSTOLIC BLOOD PRESSURE < 130 MM HG: ICD-10-PCS | Mod: CPTII,S$GLB,, | Performed by: FAMILY MEDICINE

## 2019-06-17 NOTE — PROGRESS NOTES
Subjective:       Patient ID: Roman Hodges is a 59 y.o. male.    Chief Complaint: Follow-up (x 3 months) and Otalgia (left side)    59 years old male came to the clinic with left ear pain for the last couple of days.  Blood pressure today stable .  No chest pain, palpitation, orthopnea or PND.    Review of Systems   Constitutional: Negative.  Negative for activity change and unexpected weight change.   HENT: Positive for hearing loss. Negative for rhinorrhea and trouble swallowing.    Eyes: Negative.  Negative for discharge and visual disturbance.   Respiratory: Negative.  Negative for chest tightness and wheezing.    Cardiovascular: Positive for chest pain. Negative for palpitations.   Gastrointestinal: Negative.  Negative for blood in stool, constipation, diarrhea and vomiting.   Endocrine: Negative for polydipsia and polyuria.   Genitourinary: Negative.  Negative for difficulty urinating, hematuria and urgency.   Musculoskeletal: Negative.  Negative for arthralgias, joint swelling and neck pain.   Skin: Negative.    Neurological: Negative.  Negative for weakness and headaches.   Psychiatric/Behavioral: Negative.  Negative for confusion and dysphoric mood.       Objective:      Physical Exam   Constitutional: He is oriented to person, place, and time. He appears well-developed and well-nourished. No distress.   HENT:   Head: Normocephalic and atraumatic.   Right Ear: External ear normal.   Left Ear: External ear normal.   Ears:    Nose: Nose normal.   Mouth/Throat: Oropharynx is clear and moist. No oropharyngeal exudate.   Eyes: Pupils are equal, round, and reactive to light. Conjunctivae and EOM are normal. Right eye exhibits no discharge. Left eye exhibits no discharge. No scleral icterus.   Neck: Normal range of motion. Neck supple. No JVD present. No tracheal deviation present. No thyromegaly present.   Cardiovascular: Normal rate, regular rhythm, normal heart sounds and intact distal pulses. Exam  reveals no gallop and no friction rub.   No murmur heard.  Pulmonary/Chest: Effort normal and breath sounds normal. No stridor. No respiratory distress. He has no wheezes. He has no rales. He exhibits no tenderness.   Abdominal: Soft. Bowel sounds are normal. He exhibits no distension and no mass. There is no tenderness. There is no rebound and no guarding.   Musculoskeletal: Normal range of motion. He exhibits no edema or tenderness.   Lymphadenopathy:     He has no cervical adenopathy.   Neurological: He is alert and oriented to person, place, and time. He has normal reflexes. He displays normal reflexes. No cranial nerve deficit. He exhibits normal muscle tone. Coordination normal.   Skin: Skin is warm and dry. No rash noted. He is not diaphoretic. No erythema. No pallor.   Psychiatric: He has a normal mood and affect. His behavior is normal. Judgment and thought content normal.   Nursing note and vitals reviewed.      Assessment:       1. Impacted cerumen of left ear    2. Essential hypertension        Plan:         Roman was seen today for follow-up and otalgia.    Diagnoses and all orders for this visit:    Impacted cerumen of left ear  -     Ear wax removal    Essential hypertension    Continue monitoring blood pressure at home, low sodium diet.

## 2019-06-19 ENCOUNTER — LAB VISIT (OUTPATIENT)
Dept: LAB | Facility: HOSPITAL | Age: 59
End: 2019-06-19
Attending: INTERNAL MEDICINE
Payer: COMMERCIAL

## 2019-06-19 ENCOUNTER — PATIENT MESSAGE (OUTPATIENT)
Dept: UROLOGY | Facility: CLINIC | Age: 59
End: 2019-06-19

## 2019-06-19 DIAGNOSIS — C22.0 HCC (HEPATOCELLULAR CARCINOMA): ICD-10-CM

## 2019-06-19 DIAGNOSIS — Z76.82 LIVER TRANSPLANT CANDIDATE: ICD-10-CM

## 2019-06-19 DIAGNOSIS — Z79.01 LONG TERM (CURRENT) USE OF ANTICOAGULANTS: ICD-10-CM

## 2019-06-19 DIAGNOSIS — N41.9 PROSTATITIS, UNSPECIFIED PROSTATITIS TYPE: Primary | ICD-10-CM

## 2019-06-19 DIAGNOSIS — K75.81 NASH (NONALCOHOLIC STEATOHEPATITIS): Chronic | ICD-10-CM

## 2019-06-19 LAB
AFP SERPL-MCNC: 3.7 NG/ML (ref 0–8.4)
ALBUMIN SERPL BCP-MCNC: 4 G/DL (ref 3.5–5.2)
ALP SERPL-CCNC: 167 U/L (ref 55–135)
ALT SERPL W/O P-5'-P-CCNC: 70 U/L (ref 10–44)
ANION GAP SERPL CALC-SCNC: 12 MMOL/L (ref 8–16)
AST SERPL-CCNC: 45 U/L (ref 10–40)
BILIRUB SERPL-MCNC: 0.5 MG/DL (ref 0.1–1)
BUN SERPL-MCNC: 27 MG/DL (ref 6–20)
CALCIUM SERPL-MCNC: 9.4 MG/DL (ref 8.7–10.5)
CHLORIDE SERPL-SCNC: 105 MMOL/L (ref 95–110)
CO2 SERPL-SCNC: 21 MMOL/L (ref 23–29)
CREAT SERPL-MCNC: 1.7 MG/DL (ref 0.5–1.4)
EST. GFR  (AFRICAN AMERICAN): 49.6 ML/MIN/1.73 M^2
EST. GFR  (NON AFRICAN AMERICAN): 42.9 ML/MIN/1.73 M^2
GLUCOSE SERPL-MCNC: 97 MG/DL (ref 70–110)
INR PPP: 2.8 (ref 0.8–1.2)
POTASSIUM SERPL-SCNC: 3.7 MMOL/L (ref 3.5–5.1)
PROT SERPL-MCNC: 7.7 G/DL (ref 6–8.4)
PROTHROMBIN TIME: 26.9 SEC (ref 9–12.5)
SODIUM SERPL-SCNC: 138 MMOL/L (ref 136–145)

## 2019-06-19 PROCEDURE — 85610 PROTHROMBIN TIME: CPT | Mod: TXP

## 2019-06-19 PROCEDURE — 80053 COMPREHEN METABOLIC PANEL: CPT | Mod: TXP

## 2019-06-19 PROCEDURE — 36415 COLL VENOUS BLD VENIPUNCTURE: CPT | Mod: PO,TXP

## 2019-06-19 PROCEDURE — 82105 ALPHA-FETOPROTEIN SERUM: CPT | Mod: TXP

## 2019-06-19 RX ORDER — DOXYCYCLINE HYCLATE 100 MG
100 TABLET ORAL EVERY 12 HOURS
Qty: 60 TABLET | Refills: 0 | Status: ON HOLD | OUTPATIENT
Start: 2019-06-19 | End: 2019-07-08 | Stop reason: HOSPADM

## 2019-06-20 ENCOUNTER — TELEPHONE (OUTPATIENT)
Dept: TRANSPLANT | Facility: CLINIC | Age: 59
End: 2019-06-20

## 2019-06-20 ENCOUNTER — ANTI-COAG VISIT (OUTPATIENT)
Dept: CARDIOLOGY | Facility: CLINIC | Age: 59
End: 2019-06-20
Payer: COMMERCIAL

## 2019-06-20 ENCOUNTER — PATIENT MESSAGE (OUTPATIENT)
Dept: HEPATOLOGY | Facility: CLINIC | Age: 59
End: 2019-06-20

## 2019-06-20 DIAGNOSIS — Z79.01 LONG TERM (CURRENT) USE OF ANTICOAGULANTS: ICD-10-CM

## 2019-06-20 DIAGNOSIS — I48.91 NEW ONSET ATRIAL FIBRILLATION: ICD-10-CM

## 2019-06-20 PROCEDURE — 93793 ANTICOAG MGMT PT WARFARIN: CPT | Mod: S$GLB,,,

## 2019-06-20 PROCEDURE — 93793 PR ANTICOAGULANT MGMT FOR PT TAKING WARFARIN: ICD-10-PCS | Mod: S$GLB,,,

## 2019-06-20 NOTE — TELEPHONE ENCOUNTER
[id number]    Lab Results   Component Value Date    CREATININE 1.7 (H) 06/19/2019     06/19/2019    BILITOT 0.5 06/19/2019    ALBUMIN 4.0 06/19/2019    INR 2.8 (H) 06/19/2019     MELD 23  Encephalopathy: none  Ascites: none  Dialysis: yes     Recertification requestor: Lizz Swenson

## 2019-06-20 NOTE — PROGRESS NOTES
INR good. Patient to start doxycycline today for 30 day treatment. He was prescribed bactrim last week but did not take it. Will adjust dose slightly for potential DDI with doxy. Recheck INR in 1 week

## 2019-06-20 NOTE — TELEPHONE ENCOUNTER
Patient called spoke with his wife that his MELD score was 23.   Patient will be scheduled for monthly labs.

## 2019-06-23 ENCOUNTER — PATIENT MESSAGE (OUTPATIENT)
Dept: UROLOGY | Facility: CLINIC | Age: 59
End: 2019-06-23

## 2019-06-26 ENCOUNTER — ANTI-COAG VISIT (OUTPATIENT)
Dept: CARDIOLOGY | Facility: CLINIC | Age: 59
End: 2019-06-26
Payer: COMMERCIAL

## 2019-06-26 ENCOUNTER — LAB VISIT (OUTPATIENT)
Dept: LAB | Facility: HOSPITAL | Age: 59
End: 2019-06-26
Attending: INTERNAL MEDICINE
Payer: COMMERCIAL

## 2019-06-26 DIAGNOSIS — Z79.01 LONG TERM (CURRENT) USE OF ANTICOAGULANTS: ICD-10-CM

## 2019-06-26 DIAGNOSIS — I48.91 NEW ONSET ATRIAL FIBRILLATION: ICD-10-CM

## 2019-06-26 LAB
INR PPP: 2 (ref 0.8–1.2)
PROTHROMBIN TIME: 19 SEC (ref 9–12.5)

## 2019-06-26 PROCEDURE — 93793 ANTICOAG MGMT PT WARFARIN: CPT | Mod: S$GLB,,,

## 2019-06-26 PROCEDURE — 85610 PROTHROMBIN TIME: CPT | Mod: TXP

## 2019-06-26 PROCEDURE — 93793 PR ANTICOAGULANT MGMT FOR PT TAKING WARFARIN: ICD-10-PCS | Mod: S$GLB,,,

## 2019-06-26 PROCEDURE — 36415 COLL VENOUS BLD VENIPUNCTURE: CPT | Mod: PO,TXP

## 2019-07-01 ENCOUNTER — PATIENT MESSAGE (OUTPATIENT)
Dept: FAMILY MEDICINE | Facility: CLINIC | Age: 59
End: 2019-07-01

## 2019-07-01 RX ORDER — POTASSIUM CHLORIDE 20 MEQ/1
20 TABLET, EXTENDED RELEASE ORAL 2 TIMES DAILY
Qty: 180 TABLET | Refills: 3 | Status: ON HOLD | OUTPATIENT
Start: 2019-07-01 | End: 2019-07-09 | Stop reason: HOSPADM

## 2019-07-03 ENCOUNTER — HOSPITAL ENCOUNTER (INPATIENT)
Facility: HOSPITAL | Age: 59
LOS: 6 days | Discharge: HOME | DRG: 006 | End: 2019-07-09
Attending: TRANSPLANT SURGERY | Admitting: TRANSPLANT SURGERY
Payer: COMMERCIAL

## 2019-07-03 ENCOUNTER — ANESTHESIA EVENT (OUTPATIENT)
Dept: SURGERY | Facility: HOSPITAL | Age: 59
DRG: 006 | End: 2019-07-03
Payer: COMMERCIAL

## 2019-07-03 ENCOUNTER — TELEPHONE (OUTPATIENT)
Dept: TRANSPLANT | Facility: CLINIC | Age: 59
End: 2019-07-03

## 2019-07-03 ENCOUNTER — ANESTHESIA (OUTPATIENT)
Dept: SURGERY | Facility: HOSPITAL | Age: 59
DRG: 006 | End: 2019-07-03
Payer: COMMERCIAL

## 2019-07-03 DIAGNOSIS — I48.91 ATRIAL FIBRILLATION WITH RVR: ICD-10-CM

## 2019-07-03 DIAGNOSIS — G47.33 OBSTRUCTIVE SLEEP APNEA ON CPAP: Chronic | ICD-10-CM

## 2019-07-03 DIAGNOSIS — R94.31 PROLONGED Q-T INTERVAL ON ECG: ICD-10-CM

## 2019-07-03 DIAGNOSIS — D69.6 THROMBOCYTOPENIA, UNSPECIFIED: ICD-10-CM

## 2019-07-03 DIAGNOSIS — I47.10 PAROXYSMAL SVT (SUPRAVENTRICULAR TACHYCARDIA): ICD-10-CM

## 2019-07-03 DIAGNOSIS — R00.0 TACHYCARDIA: ICD-10-CM

## 2019-07-03 DIAGNOSIS — E66.9 OBESITY (BMI 30.0-34.9): ICD-10-CM

## 2019-07-03 DIAGNOSIS — E83.39 HYPOPHOSPHATEMIA: ICD-10-CM

## 2019-07-03 DIAGNOSIS — I48.91 ATRIAL FIBRILLATION: ICD-10-CM

## 2019-07-03 DIAGNOSIS — Z79.60 LONG-TERM USE OF IMMUNOSUPPRESSANT MEDICATION: ICD-10-CM

## 2019-07-03 DIAGNOSIS — I10 ESSENTIAL HYPERTENSION: Chronic | ICD-10-CM

## 2019-07-03 DIAGNOSIS — Z91.89 AT RISK FOR OPPORTUNISTIC INFECTIONS: ICD-10-CM

## 2019-07-03 DIAGNOSIS — R00.2 PALPITATIONS: ICD-10-CM

## 2019-07-03 DIAGNOSIS — Z29.89 PROPHYLACTIC IMMUNOTHERAPY: ICD-10-CM

## 2019-07-03 DIAGNOSIS — T38.0X5A ADRENAL CORTICAL STEROIDS CAUSING ADVERSE EFFECT IN THERAPEUTIC USE: ICD-10-CM

## 2019-07-03 DIAGNOSIS — I48.0 PAROXYSMAL ATRIAL FIBRILLATION: ICD-10-CM

## 2019-07-03 DIAGNOSIS — Z99.11 ENCOUNTER FOR WEANING FROM VENTILATOR: ICD-10-CM

## 2019-07-03 DIAGNOSIS — I48.20 CHRONIC ATRIAL FIBRILLATION: ICD-10-CM

## 2019-07-03 DIAGNOSIS — E11.9 TYPE 2 DIABETES MELLITUS WITHOUT COMPLICATION, WITHOUT LONG-TERM CURRENT USE OF INSULIN: Chronic | ICD-10-CM

## 2019-07-03 DIAGNOSIS — Z94.4 S/P LIVER TRANSPLANT: Primary | ICD-10-CM

## 2019-07-03 DIAGNOSIS — K74.60 CIRRHOSIS: ICD-10-CM

## 2019-07-03 DIAGNOSIS — Z79.01 LONG TERM (CURRENT) USE OF ANTICOAGULANTS: ICD-10-CM

## 2019-07-03 DIAGNOSIS — I49.9 ARRHYTHMIA: ICD-10-CM

## 2019-07-03 LAB
ABO + RH BLD: NORMAL
APTT BLDCRRT: 38 SEC (ref 21–32)
BASOPHILS # BLD AUTO: 0.05 K/UL (ref 0–0.2)
BASOPHILS NFR BLD: 0.8 % (ref 0–1.9)
BLD GP AB SCN CELLS X3 SERPL QL: NORMAL
BLD PROD TYP BPU: NORMAL
BLOOD UNIT EXPIRATION DATE: NORMAL
BLOOD UNIT TYPE CODE: 5100
BLOOD UNIT TYPE: NORMAL
CODING SYSTEM: NORMAL
DIFFERENTIAL METHOD: ABNORMAL
DISPENSE STATUS: NORMAL
EOSINOPHIL # BLD AUTO: 0.1 K/UL (ref 0–0.5)
EOSINOPHIL NFR BLD: 1.7 % (ref 0–8)
ERYTHROCYTE [DISTWIDTH] IN BLOOD BY AUTOMATED COUNT: 13.1 % (ref 11.5–14.5)
ESTIMATED AVG GLUCOSE: 111 MG/DL (ref 68–131)
FIBRINOGEN PPP-MCNC: 346 MG/DL (ref 182–366)
HBA1C MFR BLD HPLC: 5.5 % (ref 4–5.6)
HCT VFR BLD AUTO: 47.6 % (ref 40–54)
HGB BLD-MCNC: 16.4 G/DL (ref 14–18)
IMM GRANULOCYTES # BLD AUTO: 0.05 K/UL (ref 0–0.04)
IMM GRANULOCYTES NFR BLD AUTO: 0.8 % (ref 0–0.5)
INR PPP: 2.2 (ref 0.8–1.2)
LYMPHOCYTES # BLD AUTO: 0.8 K/UL (ref 1–4.8)
LYMPHOCYTES NFR BLD: 13.2 % (ref 18–48)
MCH RBC QN AUTO: 29.7 PG (ref 27–31)
MCHC RBC AUTO-ENTMCNC: 34.5 G/DL (ref 32–36)
MCV RBC AUTO: 86 FL (ref 82–98)
MONOCYTES # BLD AUTO: 0.5 K/UL (ref 0.3–1)
MONOCYTES NFR BLD: 7.6 % (ref 4–15)
NEUTROPHILS # BLD AUTO: 4.5 K/UL (ref 1.8–7.7)
NEUTROPHILS NFR BLD: 75.9 % (ref 38–73)
NRBC BLD-RTO: 0 /100 WBC
NUM UNITS TRANS FFP: NORMAL
PLATELET # BLD AUTO: 247 K/UL (ref 150–350)
PMV BLD AUTO: 9.9 FL (ref 9.2–12.9)
PROTHROMBIN TIME: 21.5 SEC (ref 9–12.5)
RBC # BLD AUTO: 5.53 M/UL (ref 4.6–6.2)
WBC # BLD AUTO: 5.93 K/UL (ref 3.9–12.7)

## 2019-07-03 PROCEDURE — P9017 PLASMA 1 DONOR FRZ W/IN 8 HR: HCPCS

## 2019-07-03 PROCEDURE — 93503 PR INSERT/PLACE FLOW DIRECT CATH: ICD-10-PCS | Mod: 59,,, | Performed by: ANESTHESIOLOGY

## 2019-07-03 PROCEDURE — 87086 URINE CULTURE/COLONY COUNT: CPT

## 2019-07-03 PROCEDURE — 81001 URINALYSIS AUTO W/SCOPE: CPT

## 2019-07-03 PROCEDURE — D9220A PRA ANESTHESIA: ICD-10-PCS | Mod: ANES,,, | Performed by: ANESTHESIOLOGY

## 2019-07-03 PROCEDURE — D9220A PRA ANESTHESIA: Mod: CRNA,,, | Performed by: NURSE ANESTHETIST, CERTIFIED REGISTERED

## 2019-07-03 PROCEDURE — 36430 TRANSFUSION BLD/BLD COMPNT: CPT

## 2019-07-03 PROCEDURE — D9220A PRA ANESTHESIA: Mod: ANES,,, | Performed by: ANESTHESIOLOGY

## 2019-07-03 PROCEDURE — 36415 COLL VENOUS BLD VENIPUNCTURE: CPT

## 2019-07-03 PROCEDURE — 83735 ASSAY OF MAGNESIUM: CPT

## 2019-07-03 PROCEDURE — 87522 HEPATITIS C REVRS TRNSCRPJ: CPT

## 2019-07-03 PROCEDURE — 93010 ELECTROCARDIOGRAM REPORT: CPT | Mod: ,,, | Performed by: INTERNAL MEDICINE

## 2019-07-03 PROCEDURE — 93010 EKG 12-LEAD: ICD-10-PCS | Mod: ,,, | Performed by: INTERNAL MEDICINE

## 2019-07-03 PROCEDURE — 63600175 PHARM REV CODE 636 W HCPCS: Performed by: NURSE ANESTHETIST, CERTIFIED REGISTERED

## 2019-07-03 PROCEDURE — 47147 PREP DONOR LIVER/ARTERIAL: CPT | Mod: 51,,, | Performed by: SURGERY

## 2019-07-03 PROCEDURE — 85384 FIBRINOGEN ACTIVITY: CPT

## 2019-07-03 PROCEDURE — 36000931 HC OR TIME LEV VII EA ADD 15 MIN: Performed by: TRANSPLANT SURGERY

## 2019-07-03 PROCEDURE — 47135 PR TRANSPLANT LIVER,ALLOTRANSPLANT: ICD-10-PCS | Mod: ,,, | Performed by: TRANSPLANT SURGERY

## 2019-07-03 PROCEDURE — 86901 BLOOD TYPING SEROLOGIC RH(D): CPT

## 2019-07-03 PROCEDURE — 12000002 HC ACUTE/MED SURGE SEMI-PRIVATE ROOM

## 2019-07-03 PROCEDURE — 85610 PROTHROMBIN TIME: CPT

## 2019-07-03 PROCEDURE — 93503 INSERT/PLACE HEART CATHETER: CPT | Mod: 59,,, | Performed by: ANESTHESIOLOGY

## 2019-07-03 PROCEDURE — 47135 PR TRANSPLANT LIVER,ALLOTRANSPLANT: ICD-10-PCS | Mod: 82,,, | Performed by: SURGERY

## 2019-07-03 PROCEDURE — 47135 TRANSPLANTATION OF LIVER: CPT | Mod: 82,,, | Performed by: SURGERY

## 2019-07-03 PROCEDURE — 47147 PR TRANSPLANT,PREP DONOR LIVER/ARTERIAL: ICD-10-PCS | Mod: 51,,, | Performed by: SURGERY

## 2019-07-03 PROCEDURE — 37000008 HC ANESTHESIA 1ST 15 MINUTES: Performed by: TRANSPLANT SURGERY

## 2019-07-03 PROCEDURE — 20600001 HC STEP DOWN PRIVATE ROOM: Mod: NTX

## 2019-07-03 PROCEDURE — 80053 COMPREHEN METABOLIC PANEL: CPT

## 2019-07-03 PROCEDURE — D9220A PRA ANESTHESIA: ICD-10-PCS | Mod: CRNA,,, | Performed by: NURSE ANESTHETIST, CERTIFIED REGISTERED

## 2019-07-03 PROCEDURE — 86703 HIV-1/HIV-2 1 RESULT ANTBDY: CPT

## 2019-07-03 PROCEDURE — 99000 SPECIMEN HANDLING OFFICE-LAB: CPT

## 2019-07-03 PROCEDURE — 85025 COMPLETE CBC W/AUTO DIFF WBC: CPT

## 2019-07-03 PROCEDURE — 37000009 HC ANESTHESIA EA ADD 15 MINS: Performed by: TRANSPLANT SURGERY

## 2019-07-03 PROCEDURE — 36000930 HC OR TIME LEV VII 1ST 15 MIN: Performed by: TRANSPLANT SURGERY

## 2019-07-03 PROCEDURE — 87517 HEPATITIS B DNA QUANT: CPT

## 2019-07-03 PROCEDURE — 87088 URINE BACTERIA CULTURE: CPT

## 2019-07-03 PROCEDURE — 76937 PR  US GUIDE, VASCULAR ACCESS: ICD-10-PCS | Mod: 26,,, | Performed by: ANESTHESIOLOGY

## 2019-07-03 PROCEDURE — 87340 HEPATITIS B SURFACE AG IA: CPT

## 2019-07-03 PROCEDURE — 93005 ELECTROCARDIOGRAM TRACING: CPT

## 2019-07-03 PROCEDURE — 86920 COMPATIBILITY TEST SPIN: CPT

## 2019-07-03 PROCEDURE — 47135 TRANSPLANTATION OF LIVER: CPT | Mod: ,,, | Performed by: TRANSPLANT SURGERY

## 2019-07-03 PROCEDURE — 27201423 OPTIME MED/SURG SUP & DEVICES STERILE SUPPLY: Performed by: TRANSPLANT SURGERY

## 2019-07-03 PROCEDURE — 85730 THROMBOPLASTIN TIME PARTIAL: CPT

## 2019-07-03 PROCEDURE — 82248 BILIRUBIN DIRECT: CPT

## 2019-07-03 PROCEDURE — 83036 HEMOGLOBIN GLYCOSYLATED A1C: CPT

## 2019-07-03 PROCEDURE — 76937 US GUIDE VASCULAR ACCESS: CPT | Mod: 26,,, | Performed by: ANESTHESIOLOGY

## 2019-07-03 PROCEDURE — C1729 CATH, DRAINAGE: HCPCS | Performed by: TRANSPLANT SURGERY

## 2019-07-03 PROCEDURE — 47143 PR TRANSPLANT,PREP DONOR LIVER, WHOLE: ICD-10-PCS | Mod: 51,,, | Performed by: SURGERY

## 2019-07-03 RX ORDER — METHYLPREDNISOLONE SODIUM SUCCINATE 500 MG/8ML
500 INJECTION INTRAMUSCULAR; INTRAVENOUS
Status: CANCELLED | OUTPATIENT
Start: 2019-07-03

## 2019-07-03 RX ORDER — HYDROCODONE BITARTRATE AND ACETAMINOPHEN 500; 5 MG/1; MG/1
TABLET ORAL
Status: CANCELLED | OUTPATIENT
Start: 2019-07-03

## 2019-07-03 RX ORDER — HYDROCODONE BITARTRATE AND ACETAMINOPHEN 500; 5 MG/1; MG/1
TABLET ORAL
Status: DISCONTINUED | OUTPATIENT
Start: 2019-07-03 | End: 2019-07-08

## 2019-07-03 RX ORDER — METHYLPREDNISOLONE SODIUM SUCCINATE 500 MG/8ML
500 INJECTION INTRAMUSCULAR; INTRAVENOUS
Status: DISCONTINUED | OUTPATIENT
Start: 2019-07-03 | End: 2019-07-05

## 2019-07-03 RX ORDER — HYDROCODONE BITARTRATE AND ACETAMINOPHEN 500; 5 MG/1; MG/1
TABLET ORAL
Status: DISCONTINUED | OUTPATIENT
Start: 2019-07-03 | End: 2019-07-06

## 2019-07-03 RX ADMIN — LIDOCAINE HYDROCHLORIDE 100 MG: 20 INJECTION, SOLUTION INTRAVENOUS at 11:07

## 2019-07-03 RX ADMIN — SUCCINYLCHOLINE CHLORIDE 140 MG: 20 INJECTION, SOLUTION INTRAMUSCULAR; INTRAVENOUS at 11:07

## 2019-07-03 RX ADMIN — MIDAZOLAM HYDROCHLORIDE 2 MG: 1 INJECTION, SOLUTION INTRAMUSCULAR; INTRAVENOUS at 11:07

## 2019-07-03 RX ADMIN — ROCURONIUM BROMIDE 5 MG: 10 INJECTION, SOLUTION INTRAVENOUS at 11:07

## 2019-07-03 RX ADMIN — SODIUM CHLORIDE, SODIUM GLUCONATE, SODIUM ACETATE, POTASSIUM CHLORIDE, MAGNESIUM CHLORIDE, SODIUM PHOSPHATE, DIBASIC, AND POTASSIUM PHOSPHATE: .53; .5; .37; .037; .03; .012; .00082 INJECTION, SOLUTION INTRAVENOUS at 11:07

## 2019-07-03 RX ADMIN — PROPOFOL 150 MG: 10 INJECTION, EMULSION INTRAVENOUS at 11:07

## 2019-07-03 RX ADMIN — FENTANYL CITRATE 150 MCG: 50 INJECTION, SOLUTION INTRAMUSCULAR; INTRAVENOUS at 11:07

## 2019-07-04 PROBLEM — Z94.4 S/P LIVER TRANSPLANT: Status: ACTIVE | Noted: 2019-07-04

## 2019-07-04 PROBLEM — T38.0X5A ADRENAL CORTICAL STEROIDS CAUSING ADVERSE EFFECT IN THERAPEUTIC USE: Status: ACTIVE | Noted: 2019-07-04

## 2019-07-04 PROBLEM — E66.811 OBESITY (BMI 30.0-34.9): Status: ACTIVE | Noted: 2018-04-23

## 2019-07-04 PROBLEM — E66.9 OBESITY (BMI 30.0-34.9): Status: ACTIVE | Noted: 2018-04-23

## 2019-07-04 PROBLEM — Z76.82 LIVER TRANSPLANT CANDIDATE: Status: RESOLVED | Noted: 2019-02-06 | Resolved: 2019-07-04

## 2019-07-04 PROBLEM — Z29.89 PROPHYLACTIC IMMUNOTHERAPY: Status: ACTIVE | Noted: 2019-07-04

## 2019-07-04 PROBLEM — R16.0 LIVER MASS: Status: RESOLVED | Noted: 2019-04-12 | Resolved: 2019-07-04

## 2019-07-04 LAB
ALBUMIN SERPL BCP-MCNC: 2.9 G/DL (ref 3.5–5.2)
ALBUMIN SERPL BCP-MCNC: 3.4 G/DL (ref 3.5–5.2)
ALBUMIN SERPL BCP-MCNC: 3.4 G/DL (ref 3.5–5.2)
ALBUMIN SERPL BCP-MCNC: 3.5 G/DL (ref 3.5–5.2)
ALBUMIN SERPL BCP-MCNC: 3.7 G/DL (ref 3.5–5.2)
ALBUMIN SERPL BCP-MCNC: 3.8 G/DL (ref 3.5–5.2)
ALBUMIN SERPL BCP-MCNC: 4.5 G/DL (ref 3.5–5.2)
ALLENS TEST: ABNORMAL
ALP SERPL-CCNC: 111 U/L (ref 55–135)
ALP SERPL-CCNC: 151 U/L (ref 55–135)
ALP SERPL-CCNC: 155 U/L (ref 55–135)
ALP SERPL-CCNC: 155 U/L (ref 55–135)
ALT SERPL W/O P-5'-P-CCNC: 1239 U/L (ref 10–44)
ALT SERPL W/O P-5'-P-CCNC: 1292 U/L (ref 10–44)
ALT SERPL W/O P-5'-P-CCNC: 1292 U/L (ref 10–44)
ALT SERPL W/O P-5'-P-CCNC: 45 U/L (ref 10–44)
ALT SERPL W/O P-5'-P-CCNC: 832 U/L (ref 10–44)
AMYLASE SERPL-CCNC: 69 U/L (ref 20–110)
ANION GAP SERPL CALC-SCNC: 11 MMOL/L (ref 8–16)
ANION GAP SERPL CALC-SCNC: 12 MMOL/L (ref 8–16)
ANION GAP SERPL CALC-SCNC: 13 MMOL/L (ref 8–16)
ANION GAP SERPL CALC-SCNC: 16 MMOL/L (ref 8–16)
ANION GAP SERPL CALC-SCNC: 16 MMOL/L (ref 8–16)
ANION GAP SERPL CALC-SCNC: 17 MMOL/L (ref 8–16)
ANION GAP SERPL CALC-SCNC: 19 MMOL/L (ref 8–16)
APTT BLDCRRT: 30 SEC (ref 21–32)
APTT BLDCRRT: 30 SEC (ref 21–32)
APTT BLDCRRT: 31.4 SEC (ref 21–32)
APTT BLDCRRT: 35.1 SEC (ref 21–32)
APTT BLDCRRT: 35.4 SEC (ref 21–32)
AST SERPL-CCNC: 1038 U/L (ref 10–40)
AST SERPL-CCNC: 1825 U/L (ref 10–40)
AST SERPL-CCNC: 1954 U/L (ref 10–40)
AST SERPL-CCNC: 2042 U/L (ref 10–40)
AST SERPL-CCNC: 2061 U/L (ref 10–40)
AST SERPL-CCNC: 2071 U/L (ref 10–40)
AST SERPL-CCNC: 2118 U/L (ref 10–40)
AST SERPL-CCNC: 40 U/L (ref 10–40)
BASOPHILS # BLD AUTO: 0.02 K/UL (ref 0–0.2)
BASOPHILS # BLD AUTO: 0.03 K/UL (ref 0–0.2)
BASOPHILS # BLD AUTO: 0.04 K/UL (ref 0–0.2)
BASOPHILS # BLD AUTO: 0.04 K/UL (ref 0–0.2)
BASOPHILS NFR BLD: 0.1 % (ref 0–1.9)
BASOPHILS NFR BLD: 0.2 % (ref 0–1.9)
BASOPHILS NFR BLD: 0.3 % (ref 0–1.9)
BASOPHILS NFR BLD: 0.3 % (ref 0–1.9)
BILIRUB DIRECT SERPL-MCNC: 0.2 MG/DL (ref 0.1–0.3)
BILIRUB DIRECT SERPL-MCNC: 1 MG/DL (ref 0.1–0.3)
BILIRUB DIRECT SERPL-MCNC: 1 MG/DL (ref 0.1–0.3)
BILIRUB SERPL-MCNC: 0.4 MG/DL (ref 0.1–1)
BILIRUB SERPL-MCNC: 1.7 MG/DL (ref 0.1–1)
BILIRUB UR QL STRIP: NEGATIVE
BUN SERPL-MCNC: 23 MG/DL (ref 6–20)
BUN SERPL-MCNC: 23 MG/DL (ref 6–20)
BUN SERPL-MCNC: 29 MG/DL (ref 6–20)
BUN SERPL-MCNC: 30 MG/DL (ref 6–20)
CA-I BLDV-SCNC: 1.08 MMOL/L (ref 1.06–1.42)
CA-I BLDV-SCNC: 1.1 MMOL/L (ref 1.06–1.42)
CA-I BLDV-SCNC: 1.1 MMOL/L (ref 1.06–1.42)
CALCIUM SERPL-MCNC: 8.4 MG/DL (ref 8.7–10.5)
CALCIUM SERPL-MCNC: 8.5 MG/DL (ref 8.7–10.5)
CALCIUM SERPL-MCNC: 8.6 MG/DL (ref 8.7–10.5)
CALCIUM SERPL-MCNC: 8.6 MG/DL (ref 8.7–10.5)
CALCIUM SERPL-MCNC: 9.2 MG/DL (ref 8.7–10.5)
CALCIUM SERPL-MCNC: 9.2 MG/DL (ref 8.7–10.5)
CALCIUM SERPL-MCNC: 9.4 MG/DL (ref 8.7–10.5)
CHLORIDE SERPL-SCNC: 101 MMOL/L (ref 95–110)
CHLORIDE SERPL-SCNC: 101 MMOL/L (ref 95–110)
CHLORIDE SERPL-SCNC: 102 MMOL/L (ref 95–110)
CHLORIDE SERPL-SCNC: 105 MMOL/L (ref 95–110)
CHLORIDE SERPL-SCNC: 106 MMOL/L (ref 95–110)
CHLORIDE SERPL-SCNC: 109 MMOL/L (ref 95–110)
CHLORIDE SERPL-SCNC: 110 MMOL/L (ref 95–110)
CLARITY UR REFRACT.AUTO: CLEAR
CO2 SERPL-SCNC: 17 MMOL/L (ref 23–29)
CO2 SERPL-SCNC: 21 MMOL/L (ref 23–29)
CO2 SERPL-SCNC: 21 MMOL/L (ref 23–29)
CO2 SERPL-SCNC: 23 MMOL/L (ref 23–29)
CO2 SERPL-SCNC: 25 MMOL/L (ref 23–29)
COLOR UR AUTO: ABNORMAL
CREAT SERPL-MCNC: 1.4 MG/DL (ref 0.5–1.4)
CREAT SERPL-MCNC: 1.5 MG/DL (ref 0.5–1.4)
CREAT SERPL-MCNC: 1.8 MG/DL (ref 0.5–1.4)
CREAT SERPL-MCNC: 1.9 MG/DL (ref 0.5–1.4)
CREAT SERPL-MCNC: 1.9 MG/DL (ref 0.5–1.4)
CREAT SERPL-MCNC: 2 MG/DL (ref 0.5–1.4)
CREAT SERPL-MCNC: 2.1 MG/DL (ref 0.5–1.4)
DELSYS: ABNORMAL
DIFFERENTIAL METHOD: ABNORMAL
EOSINOPHIL # BLD AUTO: 0 K/UL (ref 0–0.5)
EOSINOPHIL NFR BLD: 0 % (ref 0–8)
ERYTHROCYTE [DISTWIDTH] IN BLOOD BY AUTOMATED COUNT: 13.4 % (ref 11.5–14.5)
ERYTHROCYTE [DISTWIDTH] IN BLOOD BY AUTOMATED COUNT: 13.4 % (ref 11.5–14.5)
ERYTHROCYTE [DISTWIDTH] IN BLOOD BY AUTOMATED COUNT: 13.5 % (ref 11.5–14.5)
ERYTHROCYTE [DISTWIDTH] IN BLOOD BY AUTOMATED COUNT: 13.6 % (ref 11.5–14.5)
ERYTHROCYTE [SEDIMENTATION RATE] IN BLOOD BY WESTERGREN METHOD: 15 MM/H
EST. GFR  (AFRICAN AMERICAN): 38.4 ML/MIN/1.73 M^2
EST. GFR  (AFRICAN AMERICAN): 40.7 ML/MIN/1.73 M^2
EST. GFR  (AFRICAN AMERICAN): 43.3 ML/MIN/1.73 M^2
EST. GFR  (AFRICAN AMERICAN): 43.3 ML/MIN/1.73 M^2
EST. GFR  (AFRICAN AMERICAN): 46.3 ML/MIN/1.73 M^2
EST. GFR  (AFRICAN AMERICAN): 57.7 ML/MIN/1.73 M^2
EST. GFR  (AFRICAN AMERICAN): >60 ML/MIN/1.73 M^2
EST. GFR  (NON AFRICAN AMERICAN): 33.2 ML/MIN/1.73 M^2
EST. GFR  (NON AFRICAN AMERICAN): 35.2 ML/MIN/1.73 M^2
EST. GFR  (NON AFRICAN AMERICAN): 37.5 ML/MIN/1.73 M^2
EST. GFR  (NON AFRICAN AMERICAN): 37.5 ML/MIN/1.73 M^2
EST. GFR  (NON AFRICAN AMERICAN): 40 ML/MIN/1.73 M^2
EST. GFR  (NON AFRICAN AMERICAN): 49.9 ML/MIN/1.73 M^2
EST. GFR  (NON AFRICAN AMERICAN): 54.2 ML/MIN/1.73 M^2
FIBRINOGEN PPP-MCNC: 231 MG/DL (ref 182–366)
FIBRINOGEN PPP-MCNC: 284 MG/DL (ref 182–366)
FIBRINOGEN PPP-MCNC: 301 MG/DL (ref 182–366)
FIO2: 100
GGT SERPL-CCNC: 390 U/L (ref 8–55)
GGT SERPL-CCNC: 390 U/L (ref 8–55)
GLUCOSE SERPL-MCNC: 114 MG/DL (ref 70–110)
GLUCOSE SERPL-MCNC: 134 MG/DL (ref 70–110)
GLUCOSE SERPL-MCNC: 134 MG/DL (ref 70–110)
GLUCOSE SERPL-MCNC: 135 MG/DL (ref 70–110)
GLUCOSE SERPL-MCNC: 142 MG/DL (ref 70–110)
GLUCOSE SERPL-MCNC: 150 MG/DL (ref 70–110)
GLUCOSE SERPL-MCNC: 176 MG/DL (ref 70–110)
GLUCOSE SERPL-MCNC: 196 MG/DL (ref 70–110)
GLUCOSE SERPL-MCNC: 202 MG/DL (ref 70–110)
GLUCOSE SERPL-MCNC: 216 MG/DL (ref 70–110)
GLUCOSE SERPL-MCNC: 216 MG/DL (ref 70–110)
GLUCOSE SERPL-MCNC: 223 MG/DL (ref 70–110)
GLUCOSE SERPL-MCNC: 233 MG/DL (ref 70–110)
GLUCOSE SERPL-MCNC: 286 MG/DL (ref 70–110)
GLUCOSE SERPL-MCNC: 287 MG/DL (ref 70–110)
GLUCOSE UR QL STRIP: NEGATIVE
HCO3 UR-SCNC: 19.8 MMOL/L (ref 24–28)
HCO3 UR-SCNC: 24.5 MMOL/L (ref 24–28)
HCO3 UR-SCNC: 25.2 MMOL/L (ref 24–28)
HCO3 UR-SCNC: 25.9 MMOL/L (ref 24–28)
HCO3 UR-SCNC: 26 MMOL/L (ref 24–28)
HCO3 UR-SCNC: 26.1 MMOL/L (ref 24–28)
HCO3 UR-SCNC: 26.3 MMOL/L (ref 24–28)
HCO3 UR-SCNC: 27.6 MMOL/L (ref 24–28)
HCO3 UR-SCNC: 31.9 MMOL/L (ref 24–28)
HCT VFR BLD AUTO: 36.5 % (ref 40–54)
HCT VFR BLD AUTO: 36.9 % (ref 40–54)
HCT VFR BLD AUTO: 37.2 % (ref 40–54)
HCT VFR BLD AUTO: 37.2 % (ref 40–54)
HCT VFR BLD AUTO: 37.6 % (ref 40–54)
HCT VFR BLD AUTO: 39.7 % (ref 40–54)
HCT VFR BLD AUTO: 40.6 % (ref 40–54)
HCT VFR BLD AUTO: 43 % (ref 40–54)
HCT VFR BLD AUTO: 43.5 % (ref 40–54)
HCT VFR BLD CALC: 36 %PCV (ref 36–54)
HCT VFR BLD CALC: 38 %PCV (ref 36–54)
HCT VFR BLD CALC: 40 %PCV (ref 36–54)
HCT VFR BLD CALC: 41 %PCV (ref 36–54)
HCT VFR BLD CALC: 41 %PCV (ref 36–54)
HCT VFR BLD CALC: 43 %PCV (ref 36–54)
HGB BLD-MCNC: 12.4 G/DL (ref 14–18)
HGB BLD-MCNC: 12.6 G/DL (ref 14–18)
HGB BLD-MCNC: 12.8 G/DL (ref 14–18)
HGB BLD-MCNC: 12.9 G/DL (ref 14–18)
HGB BLD-MCNC: 13 G/DL (ref 14–18)
HGB BLD-MCNC: 13.9 G/DL (ref 14–18)
HGB BLD-MCNC: 14.3 G/DL (ref 14–18)
HGB BLD-MCNC: 14.4 G/DL (ref 14–18)
HGB BLD-MCNC: 15.4 G/DL (ref 14–18)
HGB UR QL STRIP: ABNORMAL
IMM GRANULOCYTES # BLD AUTO: 0.06 K/UL (ref 0–0.04)
IMM GRANULOCYTES # BLD AUTO: 0.07 K/UL (ref 0–0.04)
IMM GRANULOCYTES # BLD AUTO: 0.09 K/UL (ref 0–0.04)
IMM GRANULOCYTES # BLD AUTO: 0.1 K/UL (ref 0–0.04)
IMM GRANULOCYTES # BLD AUTO: 0.19 K/UL (ref 0–0.04)
IMM GRANULOCYTES # BLD AUTO: 0.25 K/UL (ref 0–0.04)
IMM GRANULOCYTES NFR BLD AUTO: 0.5 % (ref 0–0.5)
IMM GRANULOCYTES NFR BLD AUTO: 0.9 % (ref 0–0.5)
IMM GRANULOCYTES NFR BLD AUTO: 1.3 % (ref 0–0.5)
IMM GRANULOCYTES NFR BLD AUTO: 1.7 % (ref 0–0.5)
INR PPP: 1.3 (ref 0.8–1.2)
INR PPP: 1.4 (ref 0.8–1.2)
INR PPP: 1.6 (ref 0.8–1.2)
INR PPP: 1.9 (ref 0.8–1.2)
INR PPP: 2.1 (ref 0.8–1.2)
KETONES UR QL STRIP: NEGATIVE
LACTATE SERPL-SCNC: 1.7 MMOL/L (ref 0.5–2.2)
LACTATE SERPL-SCNC: 2.4 MMOL/L (ref 0.5–2.2)
LACTATE SERPL-SCNC: 2.9 MMOL/L (ref 0.5–2.2)
LACTATE SERPL-SCNC: 4.1 MMOL/L (ref 0.5–2.2)
LACTATE SERPL-SCNC: 9 MMOL/L (ref 0.5–2.2)
LDH SERPL L TO P-CCNC: 3.03 MMOL/L (ref 0.5–2.2)
LDH SERPL L TO P-CCNC: 4498 U/L (ref 110–260)
LEUKOCYTE ESTERASE UR QL STRIP: NEGATIVE
LYMPHOCYTES # BLD AUTO: 0.2 K/UL (ref 1–4.8)
LYMPHOCYTES # BLD AUTO: 0.3 K/UL (ref 1–4.8)
LYMPHOCYTES # BLD AUTO: 0.4 K/UL (ref 1–4.8)
LYMPHOCYTES # BLD AUTO: 0.5 K/UL (ref 1–4.8)
LYMPHOCYTES NFR BLD: 2 % (ref 18–48)
LYMPHOCYTES NFR BLD: 2.6 % (ref 18–48)
LYMPHOCYTES NFR BLD: 3 % (ref 18–48)
LYMPHOCYTES NFR BLD: 3.3 % (ref 18–48)
MAGNESIUM SERPL-MCNC: 1.8 MG/DL (ref 1.6–2.6)
MAGNESIUM SERPL-MCNC: 1.8 MG/DL (ref 1.6–2.6)
MAGNESIUM SERPL-MCNC: 1.9 MG/DL (ref 1.6–2.6)
MAGNESIUM SERPL-MCNC: 1.9 MG/DL (ref 1.6–2.6)
MAGNESIUM SERPL-MCNC: 2.1 MG/DL (ref 1.6–2.6)
MCH RBC QN AUTO: 29 PG (ref 27–31)
MCH RBC QN AUTO: 29.3 PG (ref 27–31)
MCH RBC QN AUTO: 29.4 PG (ref 27–31)
MCH RBC QN AUTO: 29.4 PG (ref 27–31)
MCH RBC QN AUTO: 29.7 PG (ref 27–31)
MCH RBC QN AUTO: 30.1 PG (ref 27–31)
MCHC RBC AUTO-ENTMCNC: 33.3 G/DL (ref 32–36)
MCHC RBC AUTO-ENTMCNC: 33.5 G/DL (ref 32–36)
MCHC RBC AUTO-ENTMCNC: 34 G/DL (ref 32–36)
MCHC RBC AUTO-ENTMCNC: 34.5 G/DL (ref 32–36)
MCHC RBC AUTO-ENTMCNC: 34.9 G/DL (ref 32–36)
MCHC RBC AUTO-ENTMCNC: 35 G/DL (ref 32–36)
MCV RBC AUTO: 84 FL (ref 82–98)
MCV RBC AUTO: 84 FL (ref 82–98)
MCV RBC AUTO: 85 FL (ref 82–98)
MCV RBC AUTO: 87 FL (ref 82–98)
MCV RBC AUTO: 88 FL (ref 82–98)
MCV RBC AUTO: 89 FL (ref 82–98)
MICROSCOPIC COMMENT: NORMAL
MODE: ABNORMAL
MONOCYTES # BLD AUTO: 0.3 K/UL (ref 0.3–1)
MONOCYTES # BLD AUTO: 0.5 K/UL (ref 0.3–1)
MONOCYTES # BLD AUTO: 0.6 K/UL (ref 0.3–1)
MONOCYTES # BLD AUTO: 1.1 K/UL (ref 0.3–1)
MONOCYTES NFR BLD: 2.6 % (ref 4–15)
MONOCYTES NFR BLD: 3.3 % (ref 4–15)
MONOCYTES NFR BLD: 3.7 % (ref 4–15)
MONOCYTES NFR BLD: 4 % (ref 4–15)
MONOCYTES NFR BLD: 5.3 % (ref 4–15)
MONOCYTES NFR BLD: 7 % (ref 4–15)
NEUTROPHILS # BLD AUTO: 10.1 K/UL (ref 1.8–7.7)
NEUTROPHILS # BLD AUTO: 10.6 K/UL (ref 1.8–7.7)
NEUTROPHILS # BLD AUTO: 13.1 K/UL (ref 1.8–7.7)
NEUTROPHILS # BLD AUTO: 13.4 K/UL (ref 1.8–7.7)
NEUTROPHILS # BLD AUTO: 13.5 K/UL (ref 1.8–7.7)
NEUTROPHILS # BLD AUTO: 15.4 K/UL (ref 1.8–7.7)
NEUTROPHILS NFR BLD: 87.7 % (ref 38–73)
NEUTROPHILS NFR BLD: 91 % (ref 38–73)
NEUTROPHILS NFR BLD: 92.6 % (ref 38–73)
NEUTROPHILS NFR BLD: 92.9 % (ref 38–73)
NEUTROPHILS NFR BLD: 94 % (ref 38–73)
NEUTROPHILS NFR BLD: 94.3 % (ref 38–73)
NITRITE UR QL STRIP: NEGATIVE
NRBC BLD-RTO: 0 /100 WBC
PCO2 BLDA: 34.3 MMHG (ref 35–45)
PCO2 BLDA: 38 MMHG (ref 35–45)
PCO2 BLDA: 39 MMHG (ref 35–45)
PCO2 BLDA: 40 MMHG (ref 35–45)
PCO2 BLDA: 42.5 MMHG (ref 35–45)
PCO2 BLDA: 45.3 MMHG (ref 35–45)
PCO2 BLDA: 52.2 MMHG (ref 35–45)
PCO2 BLDA: 55.1 MMHG (ref 35–45)
PCO2 BLDA: 56.1 MMHG (ref 35–45)
PEEP: 5
PH SMN: 7.29 [PH] (ref 7.35–7.45)
PH SMN: 7.3 [PH] (ref 7.35–7.45)
PH SMN: 7.3 [PH] (ref 7.35–7.45)
PH SMN: 7.37 [PH] (ref 7.35–7.45)
PH SMN: 7.37 [PH] (ref 7.35–7.45)
PH SMN: 7.4 [PH] (ref 7.35–7.45)
PH SMN: 7.42 [PH] (ref 7.35–7.45)
PH SMN: 7.43 [PH] (ref 7.35–7.45)
PH SMN: 7.49 [PH] (ref 7.35–7.45)
PH UR STRIP: 6 [PH] (ref 5–8)
PHOSPHATE SERPL-MCNC: 1.5 MG/DL (ref 2.7–4.5)
PLATELET # BLD AUTO: 133 K/UL (ref 150–350)
PLATELET # BLD AUTO: 141 K/UL (ref 150–350)
PLATELET # BLD AUTO: 143 K/UL (ref 150–350)
PLATELET # BLD AUTO: 185 K/UL (ref 150–350)
PLATELET # BLD AUTO: 199 K/UL (ref 150–350)
PLATELET # BLD AUTO: 217 K/UL (ref 150–350)
PLATELET # BLD AUTO: 222 K/UL (ref 150–350)
PLATELET # BLD AUTO: 225 K/UL (ref 150–350)
PLATELET # BLD AUTO: 233 K/UL (ref 150–350)
PMV BLD AUTO: 10 FL (ref 9.2–12.9)
PMV BLD AUTO: 10 FL (ref 9.2–12.9)
PMV BLD AUTO: 10.1 FL (ref 9.2–12.9)
PMV BLD AUTO: 10.1 FL (ref 9.2–12.9)
PMV BLD AUTO: 10.3 FL (ref 9.2–12.9)
PMV BLD AUTO: 10.4 FL (ref 9.2–12.9)
PMV BLD AUTO: 9.9 FL (ref 9.2–12.9)
PO2 BLDA: 123 MMHG (ref 80–100)
PO2 BLDA: 124 MMHG (ref 80–100)
PO2 BLDA: 141 MMHG (ref 80–100)
PO2 BLDA: 141 MMHG (ref 80–100)
PO2 BLDA: 171 MMHG (ref 80–100)
PO2 BLDA: 178 MMHG (ref 80–100)
PO2 BLDA: 376 MMHG (ref 80–100)
PO2 BLDA: 56 MMHG (ref 40–60)
PO2 BLDA: 88 MMHG (ref 80–100)
POC BE: -1 MMOL/L
POC BE: -7 MMOL/L
POC BE: 0 MMOL/L
POC BE: 1 MMOL/L
POC BE: 2 MMOL/L
POC BE: 3 MMOL/L
POC BE: 7 MMOL/L
POC IONIZED CALCIUM: 0.99 MMOL/L (ref 1.06–1.42)
POC IONIZED CALCIUM: 1.08 MMOL/L (ref 1.06–1.42)
POC IONIZED CALCIUM: 1.13 MMOL/L (ref 1.06–1.42)
POC IONIZED CALCIUM: 1.13 MMOL/L (ref 1.06–1.42)
POC IONIZED CALCIUM: 1.14 MMOL/L (ref 1.06–1.42)
POC IONIZED CALCIUM: 1.18 MMOL/L (ref 1.06–1.42)
POC SATURATED O2: 100 % (ref 95–100)
POC SATURATED O2: 100 % (ref 95–100)
POC SATURATED O2: 85 % (ref 95–100)
POC SATURATED O2: 96 % (ref 95–100)
POC SATURATED O2: 98 % (ref 95–100)
POC SATURATED O2: 99 % (ref 95–100)
POC TCO2: 21 MMOL/L (ref 23–27)
POC TCO2: 26 MMOL/L (ref 23–27)
POC TCO2: 27 MMOL/L (ref 23–27)
POC TCO2: 28 MMOL/L (ref 23–27)
POC TCO2: 29 MMOL/L (ref 24–29)
POC TCO2: 34 MMOL/L (ref 23–27)
POCT GLUCOSE: 113 MG/DL (ref 70–110)
POCT GLUCOSE: 118 MG/DL (ref 70–110)
POCT GLUCOSE: 125 MG/DL (ref 70–110)
POCT GLUCOSE: 137 MG/DL (ref 70–110)
POCT GLUCOSE: 153 MG/DL (ref 70–110)
POCT GLUCOSE: 172 MG/DL (ref 70–110)
POCT GLUCOSE: 202 MG/DL (ref 70–110)
POCT GLUCOSE: 207 MG/DL (ref 70–110)
POCT GLUCOSE: 220 MG/DL (ref 70–110)
POCT GLUCOSE: 224 MG/DL (ref 70–110)
POCT GLUCOSE: 250 MG/DL (ref 70–110)
POCT GLUCOSE: 271 MG/DL (ref 70–110)
POCT GLUCOSE: 280 MG/DL (ref 70–110)
POCT GLUCOSE: 289 MG/DL (ref 70–110)
POCT GLUCOSE: 295 MG/DL (ref 70–110)
POCT GLUCOSE: 295 MG/DL (ref 70–110)
POCT GLUCOSE: 303 MG/DL (ref 70–110)
POCT GLUCOSE: 305 MG/DL (ref 70–110)
POCT GLUCOSE: 99 MG/DL (ref 70–110)
POTASSIUM BLD-SCNC: 3.1 MMOL/L (ref 3.5–5.1)
POTASSIUM BLD-SCNC: 3.1 MMOL/L (ref 3.5–5.1)
POTASSIUM BLD-SCNC: 3.4 MMOL/L (ref 3.5–5.1)
POTASSIUM BLD-SCNC: 3.8 MMOL/L (ref 3.5–5.1)
POTASSIUM BLD-SCNC: 4.2 MMOL/L (ref 3.5–5.1)
POTASSIUM BLD-SCNC: 4.6 MMOL/L (ref 3.5–5.1)
POTASSIUM SERPL-SCNC: 3 MMOL/L (ref 3.5–5.1)
POTASSIUM SERPL-SCNC: 3 MMOL/L (ref 3.5–5.1)
POTASSIUM SERPL-SCNC: 3.1 MMOL/L (ref 3.5–5.1)
POTASSIUM SERPL-SCNC: 3.2 MMOL/L (ref 3.5–5.1)
POTASSIUM SERPL-SCNC: 3.3 MMOL/L (ref 3.5–5.1)
POTASSIUM SERPL-SCNC: 3.6 MMOL/L (ref 3.5–5.1)
POTASSIUM SERPL-SCNC: 4 MMOL/L (ref 3.5–5.1)
PROT SERPL-MCNC: 6.8 G/DL (ref 6–8.4)
PROT SERPL-MCNC: 6.8 G/DL (ref 6–8.4)
PROT SERPL-MCNC: 7.5 G/DL (ref 6–8.4)
PROT SERPL-MCNC: 7.8 G/DL (ref 6–8.4)
PROT UR QL STRIP: NEGATIVE
PROTHROMBIN TIME: 13.4 SEC (ref 9–12.5)
PROTHROMBIN TIME: 13.7 SEC (ref 9–12.5)
PROTHROMBIN TIME: 14.3 SEC (ref 9–12.5)
PROTHROMBIN TIME: 15.5 SEC (ref 9–12.5)
PROTHROMBIN TIME: 15.7 SEC (ref 9–12.5)
PROTHROMBIN TIME: 18.4 SEC (ref 9–12.5)
PROTHROMBIN TIME: 20.6 SEC (ref 9–12.5)
PS: 10
PS: 10
RBC # BLD AUTO: 4.18 M/UL (ref 4.6–6.2)
RBC # BLD AUTO: 4.18 M/UL (ref 4.6–6.2)
RBC # BLD AUTO: 4.41 M/UL (ref 4.6–6.2)
RBC # BLD AUTO: 4.43 M/UL (ref 4.6–6.2)
RBC # BLD AUTO: 4.72 M/UL (ref 4.6–6.2)
RBC # BLD AUTO: 4.9 M/UL (ref 4.6–6.2)
RBC #/AREA URNS AUTO: 1 /HPF (ref 0–4)
SAMPLE: ABNORMAL
SITE: ABNORMAL
SODIUM BLD-SCNC: 138 MMOL/L (ref 136–145)
SODIUM BLD-SCNC: 138 MMOL/L (ref 136–145)
SODIUM BLD-SCNC: 139 MMOL/L (ref 136–145)
SODIUM BLD-SCNC: 140 MMOL/L (ref 136–145)
SODIUM BLD-SCNC: 140 MMOL/L (ref 136–145)
SODIUM BLD-SCNC: 142 MMOL/L (ref 136–145)
SODIUM SERPL-SCNC: 138 MMOL/L (ref 136–145)
SODIUM SERPL-SCNC: 139 MMOL/L (ref 136–145)
SODIUM SERPL-SCNC: 140 MMOL/L (ref 136–145)
SODIUM SERPL-SCNC: 141 MMOL/L (ref 136–145)
SODIUM SERPL-SCNC: 141 MMOL/L (ref 136–145)
SODIUM SERPL-SCNC: 144 MMOL/L (ref 136–145)
SODIUM SERPL-SCNC: 146 MMOL/L (ref 136–145)
SP GR UR STRIP: 1.01 (ref 1–1.03)
TACROLIMUS BLD-MCNC: <1.5 NG/ML (ref 5–15)
URN SPEC COLLECT METH UR: ABNORMAL
VT: 450
WBC # BLD AUTO: 11.09 K/UL (ref 3.9–12.7)
WBC # BLD AUTO: 11.27 K/UL (ref 3.9–12.7)
WBC # BLD AUTO: 14.37 K/UL (ref 3.9–12.7)
WBC # BLD AUTO: 14.62 K/UL (ref 3.9–12.7)
WBC # BLD AUTO: 14.97 K/UL (ref 3.9–12.7)
WBC # BLD AUTO: 16.44 K/UL (ref 3.9–12.7)
WBC #/AREA URNS AUTO: 0 /HPF (ref 0–5)

## 2019-07-04 PROCEDURE — 25000003 PHARM REV CODE 250: Performed by: STUDENT IN AN ORGANIZED HEALTH CARE EDUCATION/TRAINING PROGRAM

## 2019-07-04 PROCEDURE — 63600175 PHARM REV CODE 636 W HCPCS: Performed by: STUDENT IN AN ORGANIZED HEALTH CARE EDUCATION/TRAINING PROGRAM

## 2019-07-04 PROCEDURE — 93010 ELECTROCARDIOGRAM REPORT: CPT | Mod: 76,,, | Performed by: INTERNAL MEDICINE

## 2019-07-04 PROCEDURE — 93005 ELECTROCARDIOGRAM TRACING: CPT

## 2019-07-04 PROCEDURE — 85384 FIBRINOGEN ACTIVITY: CPT

## 2019-07-04 PROCEDURE — 27100088 HC CELL SAVER

## 2019-07-04 PROCEDURE — 82330 ASSAY OF CALCIUM: CPT

## 2019-07-04 PROCEDURE — 36620 INSERTION CATHETER ARTERY: CPT | Mod: 59,,, | Performed by: ANESTHESIOLOGY

## 2019-07-04 PROCEDURE — 84100 ASSAY OF PHOSPHORUS: CPT

## 2019-07-04 PROCEDURE — 88313 TISSUE SPECIMEN TO PATHOLOGY - SURGERY: ICD-10-PCS | Mod: 26,,, | Performed by: PATHOLOGY

## 2019-07-04 PROCEDURE — 27100092 HC HIGH FLOW DELIVERY CANNULA

## 2019-07-04 PROCEDURE — 80048 BASIC METABOLIC PNL TOTAL CA: CPT | Mod: 91

## 2019-07-04 PROCEDURE — 85610 PROTHROMBIN TIME: CPT

## 2019-07-04 PROCEDURE — 99223 PR INITIAL HOSPITAL CARE,LEVL III: ICD-10-PCS | Mod: ,,, | Performed by: NURSE PRACTITIONER

## 2019-07-04 PROCEDURE — 99223 PR INITIAL HOSPITAL CARE,LEVL III: ICD-10-PCS | Mod: 24,,, | Performed by: SURGERY

## 2019-07-04 PROCEDURE — 94002 VENT MGMT INPAT INIT DAY: CPT

## 2019-07-04 PROCEDURE — 80197 ASSAY OF TACROLIMUS: CPT

## 2019-07-04 PROCEDURE — 85049 AUTOMATED PLATELET COUNT: CPT

## 2019-07-04 PROCEDURE — P9045 ALBUMIN (HUMAN), 5%, 250 ML: HCPCS | Mod: JG | Performed by: TRANSPLANT SURGERY

## 2019-07-04 PROCEDURE — 83605 ASSAY OF LACTIC ACID: CPT | Mod: 91

## 2019-07-04 PROCEDURE — 27000221 HC OXYGEN, UP TO 24 HOURS

## 2019-07-04 PROCEDURE — 82330 ASSAY OF CALCIUM: CPT | Mod: 91

## 2019-07-04 PROCEDURE — 85610 PROTHROMBIN TIME: CPT | Mod: 91

## 2019-07-04 PROCEDURE — 85384 FIBRINOGEN ACTIVITY: CPT | Mod: 91

## 2019-07-04 PROCEDURE — 84132 ASSAY OF SERUM POTASSIUM: CPT

## 2019-07-04 PROCEDURE — 20000000 HC ICU ROOM

## 2019-07-04 PROCEDURE — 27000191 HC C-V MONITORING

## 2019-07-04 PROCEDURE — 99223 1ST HOSP IP/OBS HIGH 75: CPT | Mod: ,,, | Performed by: NURSE PRACTITIONER

## 2019-07-04 PROCEDURE — 36415 COLL VENOUS BLD VENIPUNCTURE: CPT

## 2019-07-04 PROCEDURE — 82947 ASSAY GLUCOSE BLOOD QUANT: CPT | Mod: 91

## 2019-07-04 PROCEDURE — 81300000 HC LIVER ACQUISITION CHARGE

## 2019-07-04 PROCEDURE — 83605 ASSAY OF LACTIC ACID: CPT

## 2019-07-04 PROCEDURE — 85520 HEPARIN ASSAY: CPT

## 2019-07-04 PROCEDURE — 93010 ELECTROCARDIOGRAM REPORT: CPT | Mod: ,,, | Performed by: INTERNAL MEDICINE

## 2019-07-04 PROCEDURE — S5010 5% DEXTROSE AND 0.45% SALINE: HCPCS | Performed by: STUDENT IN AN ORGANIZED HEALTH CARE EDUCATION/TRAINING PROGRAM

## 2019-07-04 PROCEDURE — 99900035 HC TECH TIME PER 15 MIN (STAT)

## 2019-07-04 PROCEDURE — 84295 ASSAY OF SERUM SODIUM: CPT

## 2019-07-04 PROCEDURE — 84460 ALANINE AMINO (ALT) (SGPT): CPT

## 2019-07-04 PROCEDURE — 88307 TISSUE EXAM BY PATHOLOGIST: CPT | Mod: 26,,, | Performed by: PATHOLOGY

## 2019-07-04 PROCEDURE — S0028 INJECTION, FAMOTIDINE, 20 MG: HCPCS | Performed by: STUDENT IN AN ORGANIZED HEALTH CARE EDUCATION/TRAINING PROGRAM

## 2019-07-04 PROCEDURE — 37799 UNLISTED PX VASCULAR SURGERY: CPT

## 2019-07-04 PROCEDURE — 63600175 PHARM REV CODE 636 W HCPCS: Mod: NTX | Performed by: STUDENT IN AN ORGANIZED HEALTH CARE EDUCATION/TRAINING PROGRAM

## 2019-07-04 PROCEDURE — 83615 LACTATE (LD) (LDH) ENZYME: CPT

## 2019-07-04 PROCEDURE — 82248 BILIRUBIN DIRECT: CPT

## 2019-07-04 PROCEDURE — 93010 EKG 12-LEAD: ICD-10-PCS | Mod: ,,, | Performed by: INTERNAL MEDICINE

## 2019-07-04 PROCEDURE — 85018 HEMOGLOBIN: CPT

## 2019-07-04 PROCEDURE — P9045 ALBUMIN (HUMAN), 5%, 250 ML: HCPCS | Mod: JG | Performed by: STUDENT IN AN ORGANIZED HEALTH CARE EDUCATION/TRAINING PROGRAM

## 2019-07-04 PROCEDURE — 25000003 PHARM REV CODE 250: Performed by: NURSE ANESTHETIST, CERTIFIED REGISTERED

## 2019-07-04 PROCEDURE — 99900026 HC AIRWAY MAINTENANCE (STAT)

## 2019-07-04 PROCEDURE — 81300031 HC LIVER TRANSPORT, FLIGHT WITHIN 301-700 MILES

## 2019-07-04 PROCEDURE — 84132 ASSAY OF SERUM POTASSIUM: CPT | Mod: 91

## 2019-07-04 PROCEDURE — 85025 COMPLETE CBC W/AUTO DIFF WBC: CPT | Mod: 91

## 2019-07-04 PROCEDURE — 94150 VITAL CAPACITY TEST: CPT

## 2019-07-04 PROCEDURE — 94761 N-INVAS EAR/PLS OXIMETRY MLT: CPT

## 2019-07-04 PROCEDURE — 99900017 HC EXTUBATION W/PARAMETERS (STAT)

## 2019-07-04 PROCEDURE — 83735 ASSAY OF MAGNESIUM: CPT

## 2019-07-04 PROCEDURE — 25000003 PHARM REV CODE 250: Performed by: TRANSPLANT SURGERY

## 2019-07-04 PROCEDURE — 27200966 HC CLOSED SUCTION SYSTEM

## 2019-07-04 PROCEDURE — 85730 THROMBOPLASTIN TIME PARTIAL: CPT | Mod: 91

## 2019-07-04 PROCEDURE — 80053 COMPREHEN METABOLIC PANEL: CPT | Mod: 91

## 2019-07-04 PROCEDURE — 82150 ASSAY OF AMYLASE: CPT

## 2019-07-04 PROCEDURE — 84450 TRANSFERASE (AST) (SGOT): CPT

## 2019-07-04 PROCEDURE — 85730 THROMBOPLASTIN TIME PARTIAL: CPT

## 2019-07-04 PROCEDURE — 80053 COMPREHEN METABOLIC PANEL: CPT

## 2019-07-04 PROCEDURE — 27201041 HC RESERVOIR, CARDIOTOMY

## 2019-07-04 PROCEDURE — 99000 SPECIMEN HANDLING OFFICE-LAB: CPT

## 2019-07-04 PROCEDURE — 85014 HEMATOCRIT: CPT

## 2019-07-04 PROCEDURE — 85014 HEMATOCRIT: CPT | Mod: 91

## 2019-07-04 PROCEDURE — P9017 PLASMA 1 DONOR FRZ W/IN 8 HR: HCPCS

## 2019-07-04 PROCEDURE — 36620 ARTERIAL: ICD-10-PCS | Mod: 59,,, | Performed by: ANESTHESIOLOGY

## 2019-07-04 PROCEDURE — 88307 TISSUE EXAM BY PATHOLOGIST: CPT | Performed by: PATHOLOGY

## 2019-07-04 PROCEDURE — 82977 ASSAY OF GGT: CPT

## 2019-07-04 PROCEDURE — 63600175 PHARM REV CODE 636 W HCPCS: Mod: JG | Performed by: TRANSPLANT SURGERY

## 2019-07-04 PROCEDURE — 63600175 PHARM REV CODE 636 W HCPCS: Performed by: NURSE ANESTHETIST, CERTIFIED REGISTERED

## 2019-07-04 PROCEDURE — 83735 ASSAY OF MAGNESIUM: CPT | Mod: 91

## 2019-07-04 PROCEDURE — 82803 BLOOD GASES ANY COMBINATION: CPT

## 2019-07-04 PROCEDURE — 27100171 HC OXYGEN HIGH FLOW UP TO 24 HOURS

## 2019-07-04 PROCEDURE — 82040 ASSAY OF SERUM ALBUMIN: CPT | Mod: 91

## 2019-07-04 PROCEDURE — 88309 TISSUE SPECIMEN TO PATHOLOGY - SURGERY: ICD-10-PCS | Mod: 26,,, | Performed by: PATHOLOGY

## 2019-07-04 PROCEDURE — 82947 ASSAY GLUCOSE BLOOD QUANT: CPT

## 2019-07-04 PROCEDURE — 88313 SPECIAL STAINS GROUP 2: CPT | Mod: 26,,, | Performed by: PATHOLOGY

## 2019-07-04 PROCEDURE — 82040 ASSAY OF SERUM ALBUMIN: CPT

## 2019-07-04 PROCEDURE — 93010 EKG 12-LEAD: ICD-10-PCS | Mod: 76,,, | Performed by: INTERNAL MEDICINE

## 2019-07-04 PROCEDURE — 88307 TISSUE SPECIMEN TO PATHOLOGY - SURGERY: ICD-10-PCS | Mod: 26,,, | Performed by: PATHOLOGY

## 2019-07-04 PROCEDURE — 88309 TISSUE EXAM BY PATHOLOGIST: CPT | Mod: 26,,, | Performed by: PATHOLOGY

## 2019-07-04 PROCEDURE — 80048 BASIC METABOLIC PNL TOTAL CA: CPT

## 2019-07-04 PROCEDURE — 85049 AUTOMATED PLATELET COUNT: CPT | Mod: 91

## 2019-07-04 PROCEDURE — 84450 TRANSFERASE (AST) (SGOT): CPT | Mod: 91

## 2019-07-04 PROCEDURE — 99223 1ST HOSP IP/OBS HIGH 75: CPT | Mod: 24,,, | Performed by: SURGERY

## 2019-07-04 PROCEDURE — 84295 ASSAY OF SERUM SODIUM: CPT | Mod: 91

## 2019-07-04 PROCEDURE — 85002 BLEEDING TIME TEST: CPT

## 2019-07-04 PROCEDURE — 63600175 PHARM REV CODE 636 W HCPCS: Mod: JG | Performed by: STUDENT IN AN ORGANIZED HEALTH CARE EDUCATION/TRAINING PROGRAM

## 2019-07-04 PROCEDURE — 93041 RHYTHM ECG TRACING: CPT

## 2019-07-04 PROCEDURE — 85018 HEMOGLOBIN: CPT | Mod: 91

## 2019-07-04 RX ORDER — PROPOFOL 10 MG/ML
VIAL (ML) INTRAVENOUS CONTINUOUS PRN
Status: DISCONTINUED | OUTPATIENT
Start: 2019-07-04 | End: 2019-07-04

## 2019-07-04 RX ORDER — PANTOPRAZOLE SODIUM 40 MG/1
40 TABLET, DELAYED RELEASE ORAL NIGHTLY
Status: DISCONTINUED | OUTPATIENT
Start: 2019-07-04 | End: 2019-07-07

## 2019-07-04 RX ORDER — SULFAMETHOXAZOLE AND TRIMETHOPRIM 400; 80 MG/1; MG/1
1 TABLET ORAL EVERY MORNING
Status: DISCONTINUED | OUTPATIENT
Start: 2019-07-04 | End: 2019-07-09 | Stop reason: HOSPADM

## 2019-07-04 RX ORDER — PREDNISONE 10 MG/1
20 TABLET ORAL DAILY
Status: DISCONTINUED | OUTPATIENT
Start: 2019-07-10 | End: 2019-07-09 | Stop reason: HOSPADM

## 2019-07-04 RX ORDER — NEOSTIGMINE METHYLSULFATE 1 MG/ML
INJECTION, SOLUTION INTRAVENOUS
Status: DISCONTINUED | OUTPATIENT
Start: 2019-07-04 | End: 2019-07-04

## 2019-07-04 RX ORDER — MYCOPHENOLATE MOFETIL 200 MG/ML
1000 POWDER, FOR SUSPENSION ORAL 2 TIMES DAILY
Status: DISCONTINUED | OUTPATIENT
Start: 2019-07-04 | End: 2019-07-05

## 2019-07-04 RX ORDER — METHYLPREDNISOLONE SOD SUCC 125 MG
100 VIAL (EA) INJECTION EVERY 12 HOURS
Status: COMPLETED | OUTPATIENT
Start: 2019-07-05 | End: 2019-07-05

## 2019-07-04 RX ORDER — PROPOFOL 10 MG/ML
VIAL (ML) INTRAVENOUS
Status: DISCONTINUED | OUTPATIENT
Start: 2019-07-03 | End: 2019-07-04

## 2019-07-04 RX ORDER — HEPARIN SODIUM 1000 [USP'U]/ML
INJECTION, SOLUTION INTRAVENOUS; SUBCUTANEOUS
Status: DISCONTINUED | OUTPATIENT
Start: 2019-07-04 | End: 2019-07-04 | Stop reason: HOSPADM

## 2019-07-04 RX ORDER — OXYCODONE HYDROCHLORIDE 5 MG/1
5 TABLET ORAL EVERY 4 HOURS PRN
Status: DISCONTINUED | OUTPATIENT
Start: 2019-07-04 | End: 2019-07-09 | Stop reason: HOSPADM

## 2019-07-04 RX ORDER — LIDOCAINE HCL/PF 100 MG/5ML
SYRINGE (ML) INTRAVENOUS
Status: DISCONTINUED | OUTPATIENT
Start: 2019-07-03 | End: 2019-07-04

## 2019-07-04 RX ORDER — FAMOTIDINE 10 MG/ML
20 INJECTION INTRAVENOUS 2 TIMES DAILY
Status: DISCONTINUED | OUTPATIENT
Start: 2019-07-04 | End: 2019-07-04

## 2019-07-04 RX ORDER — FENTANYL CITRATE 50 UG/ML
INJECTION, SOLUTION INTRAMUSCULAR; INTRAVENOUS
Status: DISCONTINUED | OUTPATIENT
Start: 2019-07-03 | End: 2019-07-04

## 2019-07-04 RX ORDER — METOPROLOL TARTRATE 25 MG/1
12.5 TABLET ORAL 2 TIMES DAILY
Status: DISCONTINUED | OUTPATIENT
Start: 2019-07-04 | End: 2019-07-04

## 2019-07-04 RX ORDER — METOPROLOL TARTRATE 1 MG/ML
INJECTION, SOLUTION INTRAVENOUS
Status: DISPENSED
Start: 2019-07-04 | End: 2019-07-05

## 2019-07-04 RX ORDER — HYDROMORPHONE HYDROCHLORIDE 1 MG/ML
0.5 INJECTION, SOLUTION INTRAMUSCULAR; INTRAVENOUS; SUBCUTANEOUS
Status: DISCONTINUED | OUTPATIENT
Start: 2019-07-04 | End: 2019-07-04

## 2019-07-04 RX ORDER — PHENYLEPHRINE HYDROCHLORIDE 10 MG/ML
INJECTION INTRAVENOUS
Status: DISCONTINUED | OUTPATIENT
Start: 2019-07-04 | End: 2019-07-04

## 2019-07-04 RX ORDER — SODIUM CHLORIDE 9 MG/ML
INJECTION, SOLUTION INTRAVENOUS CONTINUOUS
Status: DISCONTINUED | OUTPATIENT
Start: 2019-07-04 | End: 2019-07-06

## 2019-07-04 RX ORDER — POTASSIUM CHLORIDE 29.8 MG/ML
40 INJECTION INTRAVENOUS ONCE
Status: COMPLETED | OUTPATIENT
Start: 2019-07-04 | End: 2019-07-04

## 2019-07-04 RX ORDER — ALBUMIN HUMAN 50 G/1000ML
25 SOLUTION INTRAVENOUS ONCE
Status: COMPLETED | OUTPATIENT
Start: 2019-07-04 | End: 2019-07-04

## 2019-07-04 RX ORDER — SUCCINYLCHOLINE CHLORIDE 20 MG/ML
INJECTION INTRAMUSCULAR; INTRAVENOUS
Status: DISCONTINUED | OUTPATIENT
Start: 2019-07-03 | End: 2019-07-04

## 2019-07-04 RX ORDER — HYDROMORPHONE HYDROCHLORIDE 1 MG/ML
1 INJECTION, SOLUTION INTRAMUSCULAR; INTRAVENOUS; SUBCUTANEOUS
Status: DISCONTINUED | OUTPATIENT
Start: 2019-07-04 | End: 2019-07-04

## 2019-07-04 RX ORDER — FUROSEMIDE 10 MG/ML
INJECTION INTRAMUSCULAR; INTRAVENOUS
Status: DISCONTINUED | OUTPATIENT
Start: 2019-07-04 | End: 2019-07-04

## 2019-07-04 RX ORDER — HEPARIN SODIUM 1000 [USP'U]/ML
INJECTION, SOLUTION INTRAVENOUS; SUBCUTANEOUS
Status: DISCONTINUED | OUTPATIENT
Start: 2019-07-04 | End: 2019-07-04

## 2019-07-04 RX ORDER — ONDANSETRON 2 MG/ML
INJECTION INTRAMUSCULAR; INTRAVENOUS
Status: DISCONTINUED | OUTPATIENT
Start: 2019-07-04 | End: 2019-07-04

## 2019-07-04 RX ORDER — ROCURONIUM BROMIDE 10 MG/ML
INJECTION, SOLUTION INTRAVENOUS
Status: DISCONTINUED | OUTPATIENT
Start: 2019-07-03 | End: 2019-07-04

## 2019-07-04 RX ORDER — POTASSIUM CHLORIDE 14.9 MG/ML
20 INJECTION INTRAVENOUS ONCE
Status: COMPLETED | OUTPATIENT
Start: 2019-07-04 | End: 2019-07-04

## 2019-07-04 RX ORDER — GLYCOPYRROLATE 0.2 MG/ML
INJECTION INTRAMUSCULAR; INTRAVENOUS
Status: DISCONTINUED | OUTPATIENT
Start: 2019-07-04 | End: 2019-07-04

## 2019-07-04 RX ORDER — METHYLPREDNISOLONE SOD SUCC 125 MG
80 VIAL (EA) INJECTION EVERY 12 HOURS
Status: COMPLETED | OUTPATIENT
Start: 2019-07-06 | End: 2019-07-06

## 2019-07-04 RX ORDER — METOPROLOL TARTRATE 25 MG/1
12.5 TABLET ORAL ONCE
Status: DISCONTINUED | OUTPATIENT
Start: 2019-07-04 | End: 2019-07-05

## 2019-07-04 RX ORDER — METOPROLOL TARTRATE 25 MG/1
25 TABLET, FILM COATED ORAL 2 TIMES DAILY
Status: DISCONTINUED | OUTPATIENT
Start: 2019-07-04 | End: 2019-07-06

## 2019-07-04 RX ORDER — MIDAZOLAM HYDROCHLORIDE 1 MG/ML
INJECTION INTRAMUSCULAR; INTRAVENOUS
Status: DISCONTINUED | OUTPATIENT
Start: 2019-07-03 | End: 2019-07-04

## 2019-07-04 RX ORDER — MAGNESIUM SULFATE HEPTAHYDRATE 40 MG/ML
2 INJECTION, SOLUTION INTRAVENOUS ONCE
Status: COMPLETED | OUTPATIENT
Start: 2019-07-04 | End: 2019-07-04

## 2019-07-04 RX ORDER — NICARDIPINE HYDROCHLORIDE 0.2 MG/ML
1 INJECTION INTRAVENOUS CONTINUOUS
Status: DISCONTINUED | OUTPATIENT
Start: 2019-07-04 | End: 2019-07-06

## 2019-07-04 RX ORDER — METHYLPREDNISOLONE SOD SUCC 125 MG
60 VIAL (EA) INJECTION EVERY 12 HOURS
Status: COMPLETED | OUTPATIENT
Start: 2019-07-07 | End: 2019-07-07

## 2019-07-04 RX ORDER — NICARDIPINE HYDROCHLORIDE 0.2 MG/ML
2.5 INJECTION INTRAVENOUS CONTINUOUS
Status: DISCONTINUED | OUTPATIENT
Start: 2019-07-04 | End: 2019-07-04

## 2019-07-04 RX ORDER — DEXTROSE MONOHYDRATE AND SODIUM CHLORIDE 5; .45 G/100ML; G/100ML
INJECTION, SOLUTION INTRAVENOUS CONTINUOUS
Status: DISCONTINUED | OUTPATIENT
Start: 2019-07-04 | End: 2019-07-04

## 2019-07-04 RX ORDER — OXYCODONE HYDROCHLORIDE 10 MG/1
10 TABLET ORAL EVERY 4 HOURS PRN
Status: DISCONTINUED | OUTPATIENT
Start: 2019-07-04 | End: 2019-07-09 | Stop reason: HOSPADM

## 2019-07-04 RX ORDER — ALBUMIN HUMAN 50 G/1000ML
SOLUTION INTRAVENOUS CONTINUOUS PRN
Status: COMPLETED | OUTPATIENT
Start: 2019-07-04 | End: 2019-07-04

## 2019-07-04 RX ORDER — VALGANCICLOVIR 450 MG/1
450 TABLET, FILM COATED ORAL DAILY
Status: DISCONTINUED | OUTPATIENT
Start: 2019-07-14 | End: 2019-07-09 | Stop reason: HOSPADM

## 2019-07-04 RX ORDER — HYDROMORPHONE HYDROCHLORIDE 1 MG/ML
0.5 INJECTION, SOLUTION INTRAMUSCULAR; INTRAVENOUS; SUBCUTANEOUS
Status: DISCONTINUED | OUTPATIENT
Start: 2019-07-04 | End: 2019-07-07

## 2019-07-04 RX ORDER — LIDOCAINE HYDROCHLORIDE 40 MG/ML
INJECTION, SOLUTION RETROBULBAR
Status: DISCONTINUED | OUTPATIENT
Start: 2019-07-04 | End: 2019-07-04 | Stop reason: HOSPADM

## 2019-07-04 RX ORDER — PROPOFOL 10 MG/ML
5 INJECTION, EMULSION INTRAVENOUS CONTINUOUS
Status: DISCONTINUED | OUTPATIENT
Start: 2019-07-04 | End: 2019-07-05

## 2019-07-04 RX ORDER — NYSTATIN 100000 [USP'U]/ML
500000 SUSPENSION ORAL
Status: DISCONTINUED | OUTPATIENT
Start: 2019-07-04 | End: 2019-07-09 | Stop reason: HOSPADM

## 2019-07-04 RX ORDER — HYDRALAZINE HYDROCHLORIDE 20 MG/ML
10 INJECTION INTRAMUSCULAR; INTRAVENOUS EVERY 6 HOURS PRN
Status: DISCONTINUED | OUTPATIENT
Start: 2019-07-04 | End: 2019-07-09 | Stop reason: HOSPADM

## 2019-07-04 RX ORDER — MANNITOL 250 MG/ML
INJECTION, SOLUTION INTRAVENOUS
Status: DISCONTINUED | OUTPATIENT
Start: 2019-07-04 | End: 2019-07-04

## 2019-07-04 RX ADMIN — CALCIUM CHLORIDE 500 MG: 100 INJECTION, SOLUTION INTRAVENOUS at 03:07

## 2019-07-04 RX ADMIN — FENTANYL CITRATE 200 MCG: 50 INJECTION, SOLUTION INTRAMUSCULAR; INTRAVENOUS at 02:07

## 2019-07-04 RX ADMIN — FUROSEMIDE 90 MG: 10 INJECTION, SOLUTION INTRAMUSCULAR; INTRAVENOUS at 03:07

## 2019-07-04 RX ADMIN — SODIUM CHLORIDE 3 UNITS/HR: 9 INJECTION, SOLUTION INTRAVENOUS at 05:07

## 2019-07-04 RX ADMIN — METOPROLOL TARTRATE 25 MG: 25 TABLET ORAL at 08:07

## 2019-07-04 RX ADMIN — DEXTROSE AND SODIUM CHLORIDE: 5; .45 INJECTION, SOLUTION INTRAVENOUS at 06:07

## 2019-07-04 RX ADMIN — FENTANYL CITRATE 150 MCG: 50 INJECTION, SOLUTION INTRAMUSCULAR; INTRAVENOUS at 01:07

## 2019-07-04 RX ADMIN — FENTANYL CITRATE 150 MCG: 50 INJECTION, SOLUTION INTRAMUSCULAR; INTRAVENOUS at 04:07

## 2019-07-04 RX ADMIN — AMPICILLIN SODIUM AND SULBACTAM SODIUM 3 G: 2; 1 INJECTION, POWDER, FOR SOLUTION INTRAMUSCULAR; INTRAVENOUS at 04:07

## 2019-07-04 RX ADMIN — HYDROMORPHONE HYDROCHLORIDE 0.5 MG: 1 INJECTION, SOLUTION INTRAMUSCULAR; INTRAVENOUS; SUBCUTANEOUS at 08:07

## 2019-07-04 RX ADMIN — ONDANSETRON 4 MG: 2 INJECTION INTRAMUSCULAR; INTRAVENOUS at 05:07

## 2019-07-04 RX ADMIN — POTASSIUM CHLORIDE 40 MEQ: 400 INJECTION, SOLUTION INTRAVENOUS at 12:07

## 2019-07-04 RX ADMIN — PANTOPRAZOLE SODIUM 40 MG: 40 TABLET, DELAYED RELEASE ORAL at 11:07

## 2019-07-04 RX ADMIN — AMPICILLIN SODIUM AND SULBACTAM SODIUM 3 G: 2; 1 INJECTION, POWDER, FOR SOLUTION INTRAMUSCULAR; INTRAVENOUS at 11:07

## 2019-07-04 RX ADMIN — PHYTONADIONE 10 MG: 10 INJECTION, EMULSION INTRAMUSCULAR; INTRAVENOUS; SUBCUTANEOUS at 03:07

## 2019-07-04 RX ADMIN — GLYCOPYRROLATE 0.6 MG: 0.2 INJECTION, SOLUTION INTRAMUSCULAR; INTRAVENOUS at 05:07

## 2019-07-04 RX ADMIN — ROCURONIUM BROMIDE 20 MG: 10 INJECTION, SOLUTION INTRAVENOUS at 02:07

## 2019-07-04 RX ADMIN — ALBUMIN (HUMAN) 25 G: 12.5 SOLUTION INTRAVENOUS at 06:07

## 2019-07-04 RX ADMIN — MANNITOL 93 ML: 250 INJECTION, SOLUTION INTRAVENOUS at 03:07

## 2019-07-04 RX ADMIN — MAGNESIUM SULFATE IN WATER 2 G: 40 INJECTION, SOLUTION INTRAVENOUS at 08:07

## 2019-07-04 RX ADMIN — FENTANYL CITRATE 100 MCG: 50 INJECTION, SOLUTION INTRAMUSCULAR; INTRAVENOUS at 03:07

## 2019-07-04 RX ADMIN — NYSTATIN 500000 UNITS: 500000 SUSPENSION ORAL at 08:07

## 2019-07-04 RX ADMIN — HYDROMORPHONE HYDROCHLORIDE 1 MG: 1 INJECTION, SOLUTION INTRAMUSCULAR; INTRAVENOUS; SUBCUTANEOUS at 12:07

## 2019-07-04 RX ADMIN — ALBUMIN (HUMAN) 25 G: 12.5 SOLUTION INTRAVENOUS at 11:07

## 2019-07-04 RX ADMIN — NEOSTIGMINE METHYLSULFATE 5 MG: 1 INJECTION INTRAVENOUS at 05:07

## 2019-07-04 RX ADMIN — AMPICILLIN SODIUM AND SULBACTAM SODIUM 3 G: 2; 1 INJECTION, POWDER, FOR SOLUTION INTRAMUSCULAR; INTRAVENOUS at 05:07

## 2019-07-04 RX ADMIN — ROCURONIUM BROMIDE 10 MG: 10 INJECTION, SOLUTION INTRAVENOUS at 04:07

## 2019-07-04 RX ADMIN — NICARDIPINE HYDROCHLORIDE 1 MG/HR: 0.2 INJECTION, SOLUTION INTRAVENOUS at 06:07

## 2019-07-04 RX ADMIN — PHENYLEPHRINE HYDROCHLORIDE 200 MCG: 10 INJECTION INTRAVENOUS at 02:07

## 2019-07-04 RX ADMIN — Medication 1000 MG: at 11:07

## 2019-07-04 RX ADMIN — PHYTONADIONE 10 MG: 10 INJECTION, EMULSION INTRAMUSCULAR; INTRAVENOUS; SUBCUTANEOUS at 07:07

## 2019-07-04 RX ADMIN — SODIUM CHLORIDE, SODIUM LACTATE, POTASSIUM CHLORIDE, AND CALCIUM CHLORIDE 1000 ML: .6; .31; .03; .02 INJECTION, SOLUTION INTRAVENOUS at 01:07

## 2019-07-04 RX ADMIN — SODIUM CHLORIDE 4 UNITS/HR: 9 INJECTION, SOLUTION INTRAVENOUS at 07:07

## 2019-07-04 RX ADMIN — PHYTONADIONE 10 MG: 10 INJECTION, EMULSION INTRAMUSCULAR; INTRAVENOUS; SUBCUTANEOUS at 10:07

## 2019-07-04 RX ADMIN — SODIUM CHLORIDE: 0.9 INJECTION, SOLUTION INTRAVENOUS at 06:07

## 2019-07-04 RX ADMIN — METHYLPREDNISOLONE SODIUM SUCCINATE 500 MG: 500 INJECTION, POWDER, FOR SOLUTION INTRAMUSCULAR; INTRAVENOUS at 01:07

## 2019-07-04 RX ADMIN — SODIUM CHLORIDE, SODIUM GLUCONATE, SODIUM ACETATE, POTASSIUM CHLORIDE, MAGNESIUM CHLORIDE, SODIUM PHOSPHATE, DIBASIC, AND POTASSIUM PHOSPHATE: .53; .5; .37; .037; .03; .012; .00082 INJECTION, SOLUTION INTRAVENOUS at 12:07

## 2019-07-04 RX ADMIN — HEPARIN SODIUM 2000 UNITS: 1000 INJECTION, SOLUTION INTRAVENOUS; SUBCUTANEOUS at 02:07

## 2019-07-04 RX ADMIN — AMPICILLIN SODIUM AND SULBACTAM SODIUM 3 G: 2; 1 INJECTION, POWDER, FOR SOLUTION INTRAMUSCULAR; INTRAVENOUS at 01:07

## 2019-07-04 RX ADMIN — ALBUMIN (HUMAN) 25 G: 12.5 SOLUTION INTRAVENOUS at 08:07

## 2019-07-04 RX ADMIN — ROCURONIUM BROMIDE 45 MG: 10 INJECTION, SOLUTION INTRAVENOUS at 12:07

## 2019-07-04 RX ADMIN — FUROSEMIDE 93 MG: 10 INJECTION, SOLUTION INTRAMUSCULAR; INTRAVENOUS at 01:07

## 2019-07-04 RX ADMIN — FENTANYL CITRATE 100 MCG: 50 INJECTION, SOLUTION INTRAMUSCULAR; INTRAVENOUS at 05:07

## 2019-07-04 RX ADMIN — Medication 1 MG: at 05:07

## 2019-07-04 RX ADMIN — Medication 1000 MG: at 10:07

## 2019-07-04 RX ADMIN — HYDROMORPHONE HYDROCHLORIDE 0.5 MG: 1 INJECTION, SOLUTION INTRAMUSCULAR; INTRAVENOUS; SUBCUTANEOUS at 06:07

## 2019-07-04 RX ADMIN — POTASSIUM CHLORIDE 40 MEQ: 400 INJECTION, SOLUTION INTRAVENOUS at 09:07

## 2019-07-04 RX ADMIN — METOPROLOL TARTRATE 12.5 MG: 25 TABLET, FILM COATED ORAL at 11:07

## 2019-07-04 RX ADMIN — OXYCODONE HYDROCHLORIDE 10 MG: 10 TABLET ORAL at 09:07

## 2019-07-04 RX ADMIN — ALBUMIN (HUMAN) 25 G: 12.5 SOLUTION INTRAVENOUS at 01:07

## 2019-07-04 RX ADMIN — POTASSIUM CHLORIDE 20 MEQ: 200 INJECTION, SOLUTION INTRAVENOUS at 07:07

## 2019-07-04 RX ADMIN — HYDROMORPHONE HYDROCHLORIDE 0.5 MG: 1 INJECTION, SOLUTION INTRAMUSCULAR; INTRAVENOUS; SUBCUTANEOUS at 10:07

## 2019-07-04 RX ADMIN — NYSTATIN 500000 UNITS: 500000 SUSPENSION ORAL at 03:07

## 2019-07-04 RX ADMIN — FAMOTIDINE 20 MG: 10 INJECTION, SOLUTION INTRAVENOUS at 08:07

## 2019-07-04 RX ADMIN — ROCURONIUM BROMIDE 20 MG: 10 INJECTION, SOLUTION INTRAVENOUS at 01:07

## 2019-07-04 RX ADMIN — OXYCODONE HYDROCHLORIDE 5 MG: 5 TABLET ORAL at 05:07

## 2019-07-04 RX ADMIN — PHENYLEPHRINE HYDROCHLORIDE 200 MCG: 10 INJECTION INTRAVENOUS at 01:07

## 2019-07-04 RX ADMIN — PROPOFOL 35 MCG/KG/MIN: 10 INJECTION, EMULSION INTRAVENOUS at 05:07

## 2019-07-04 RX ADMIN — SODIUM CHLORIDE, SODIUM GLUCONATE, SODIUM ACETATE, POTASSIUM CHLORIDE, MAGNESIUM CHLORIDE, SODIUM PHOSPHATE, DIBASIC, AND POTASSIUM PHOSPHATE: .53; .5; .37; .037; .03; .012; .00082 INJECTION, SOLUTION INTRAVENOUS at 01:07

## 2019-07-04 RX ADMIN — Medication 1 MG: at 08:07

## 2019-07-04 RX ADMIN — MANNITOL 93 ML: 250 INJECTION, SOLUTION INTRAVENOUS at 01:07

## 2019-07-04 NOTE — PROGRESS NOTES
Patient passed SBT and was extubated to 2L NC per MD order.  Patient tolerated extubation well.  Will continue to monitor.

## 2019-07-04 NOTE — ASSESSMENT & PLAN NOTE
BG goal 140 - 180   Continue IV insulin infusion protocol  Requires intensive BG monitoring while on protocol

## 2019-07-04 NOTE — PROGRESS NOTES
HR in 130s and irregular rhythm noted. Dr. Carty @ bedside and aware. STAT EKG obtained, showed ST.

## 2019-07-04 NOTE — PROGRESS NOTES
Pt taken to surgery by 2 [facility] surgery doctors. Pt has all belongings with family. Family shown to waiting area. FFP is going.

## 2019-07-04 NOTE — H&P
[facility]  Critical Care - Surgery  History & Physical    Patient Name: Roman Hodges  MRN: [id number]  Admission Date: 7/3/2019  Code Status: Full Code  Attending Physician: Oscar Altman Jr., MD   Primary Care Provider: Jose Angel Munson MD   Principal Problem: Cirrhosis    Subjective:     HPI: Roman Hodges is a 59 y.o. male with history of ESLD secondary to ROMO (non-alcoholic steatohepatitis), T2DM, MALLIKA, HTN, HCC, and AFib admitted to [facility] on 7/3/2019 for liver transplantation. Patient denies fevers, chills, nausea, vomiting, CP, SOB, changes to bowel or bladder habits.    Hospital/ICU Course: Patient was admitted 7/3/2019 for liver transplantation. The patient was taken back to the OR and placed under anesthesia without difficulty. Liver transplantation was completed without complication, EBL 1200, and the patient was transferred to the SICU early morning of 7/4/2019. He arrived intubated on 50% O2, 5 PEEP, sats >98%. He was sedated with propofol and had cardene gtt running. Upon arrival to SICU, the patient had decreased UOP and low CVP<5 and received 1.5 L albumin.     On 7/4/2019 at 1230, the patient passed SBT and was extubated without difficulty.    Follow-up For: Procedure(s) (LRB):  TRANSPLANT, LIVER (N/A)    Post-Operative Day: 1 Day Post-Op     Past Medical History:   Diagnosis Date    A-fib     Allergy     Anticoagulant long-term use     Diabetes mellitus, type 2     GERD (gastroesophageal reflux disease)     Hepatocellular carcinoma     liver    Hyperlipidemia     Hypertension        Past Surgical History:   Procedure Laterality Date    COLONOSCOPY      EMBOLIZATION, YTTRIUM THERAPY N/A 11/9/2018    Performed by [facility] Diagnostic Provider at [facility] OR 2ND FLR    EMBOLIZATION, YTTRIUM THERAPY N/A 10/24/2018    Performed by [facility] Diagnostic Provider at [facility] OR 2ND FLR    ESOPHAGOGASTRODUODENOSCOPY (EGD) N/A 1/15/2019    Performed by Bony Saavedra MD at  [facility] ENDO    HERNIA REPAIR      Left heart cath Left 2018    Performed by Tyler Hinojosa MD at [facility] CATH LAB/EP    Radiofrequency Ablation N/A 2019    Performed by Rose Surgeon at [facility]       Review of patient's allergies indicates:   Allergen Reactions    Lisinopril      Stomach ache    Flu vaccine  (36 mos+)(pf)      Patient reports he developed Bell's Palsy 2 days after his last flu shot in        Family History     Problem Relation (Age of Onset)    Cancer Mother, Father    Hypertension Mother, Father    Stroke Father        Tobacco Use    Smoking status: Former Smoker     Packs/day: 1.00     Years: 10.00     Pack years: 10.00     Types: Cigarettes     Last attempt to quit: 1980     Years since quittin.5    Smokeless tobacco: Never Used   Substance and Sexual Activity    Alcohol use: No     Frequency: Never     Binge frequency: Never    Drug use: No    Sexual activity: Yes     Partners: Female      Review of Systems   Constitutional: Negative for appetite change, diaphoresis, fatigue and fever.   HENT: Negative for congestion and trouble swallowing.    Respiratory: Negative for apnea and chest tightness.    Cardiovascular: Negative for chest pain and leg swelling.   Gastrointestinal: Negative for abdominal pain and nausea.   Endocrine: Positive for heat intolerance.   Genitourinary: Negative for flank pain.   Musculoskeletal: Negative for arthralgias and myalgias.   Neurological: Negative for numbness.   Psychiatric/Behavioral: Negative for agitation and confusion.     Objective:     Vital Signs (Most Recent):  Temp: 98.5 °F (36.9 °C) (19 1515)  Pulse: 89 (19 1600)  Resp: 14 (19 1600)  BP: (!) 141/73 (19 1600)  SpO2: 96 % (19 1600) Vital Signs (24h Range):  Temp:  [97.8 °F (36.6 °C)-100.3 °F (37.9 °C)] 98.5 °F (36.9 °C)  Pulse:  [] 89  Resp:  [10-23] 14  SpO2:  [95 %-100 %] 96 %  BP: (121-179)/(57-91) 141/73  Arterial Line BP:  (114-140)/(54-78) 118/78     Weight: 93.7 kg (206 lb 9.1 oz)  Body mass index is 32.35 kg/m².      Intake/Output Summary (Last 24 hours) at 7/4/2019 1645  Last data filed at 7/4/2019 1600  Gross per 24 hour   Intake 6368 ml   Output 5294 ml   Net 1074 ml       Physical Exam   Constitutional: He is oriented to person, place, and time. He appears well-nourished. No distress.   HENT:   Head: Normocephalic and atraumatic.   Eyes: Pupils are equal, round, and reactive to light. EOM are normal.   Neck: Neck supple. No JVD present. No tracheal deviation present.   Cardiovascular: Normal rate and regular rhythm.   Pulmonary/Chest: Effort normal and breath sounds normal. No respiratory distress.   Abdominal: Soft. Bowel sounds are normal. There is no guarding.   Chevron incision C/D/I. JUVE drains with serosanguinous drainage.   Musculoskeletal: Normal range of motion. He exhibits no edema or tenderness.   Neurological: He is alert and oriented to person, place, and time.   Skin: Skin is warm and dry. He is not diaphoretic.       Vents:  Vent Mode: Spont (07/04/19 1235)  Ventilator Initiated: Yes (07/04/19 0554)  Set Rate: 15 bmp (07/04/19 0947)  Vt Set: 300 mL (07/04/19 0947)  Pressure Support: 10 cmH20 (07/04/19 1235)  PEEP/CPAP: 5 cmH20 (07/04/19 1235)  Oxygen Concentration (%): 40 (07/04/19 1235)  Peak Airway Pressure: 16 cmH2O (07/04/19 1235)  Plateau Pressure: 0 cmH20 (07/04/19 1235)  Total Ve: 9.24 mL (07/04/19 1235)  F/VT Ratio<105 (RSBI): (!) 16.1 (07/04/19 1235)    Lines/Drains/Airways     Central Venous Catheter Line                 Introducer 07/04/19 0041 right subclavian less than 1 day         Trialysis (Dialysis) Catheter 07/04/19 0041 right internal jugular less than 1 day          Drain                 Closed/Suction Drain 07/04/19 0430 Right Abdomen Bulb 19 Fr. less than 1 day         Closed/Suction Drain 07/04/19 0430 Right Abdomen Bulb 19 Fr. less than 1 day         Urethral Catheter 07/04/19 0014  Non-latex;Straight-tip 16 Fr. less than 1 day          Arterial Line                 Arterial Line 07/04/19 0015 Right Femoral less than 1 day          Peripheral Intravenous Line                 Peripheral IV - Single Lumen 18 G Right Hand -- days         Peripheral IV - Single Lumen 07/03/19 2211 22 G Anterior;Left Upper Arm less than 1 day                Significant Labs:    CBC/Anemia Profile:  Recent Labs   Lab 07/04/19  0558 07/04/19  0606 07/04/19  1000 07/04/19  1200   WBC 14.97*  --  14.62* 11.27   HGB 14.4  --  13.9* 12.8*   HCT 43.0 41 39.7* 37.6*     --  217 199   MCV 88  --  84 85   RDW 13.4  --  13.5 13.6        Chemistries:  Recent Labs   Lab 07/03/19  2301 07/04/19  0012 07/04/19  0340 07/04/19  0455 07/04/19  0558 07/04/19  1000 07/04/19  1200 07/04/19  1345    141 138 139 140  140 140  --  141   K 3.2* 3.2* 3.6 3.1* 3.2*  3.2* 3.3*  --  4.0     --   --   --  101  101 102  --  105   CO2 21*  --   --   --  23  23 21*  --  17*   BUN 29*  --   --   --  30*  30* 30*  --  30*   CREATININE 1.8*  --   --   --  1.9*  1.9* 2.0*  --  2.1*   CALCIUM 9.4  --   --   --  9.2  9.2 8.5*  --  8.4*   ALBUMIN 3.8 3.7 2.9* 3.5 3.4*  3.4*  --   --  4.5   PROT 7.8  --   --   --  6.8  6.8  --   --  7.5   BILITOT 0.4  --   --   --  1.7*  1.7*  --   --  1.7*   ALKPHOS 151*  --   --   --  155*  155*  --   --  111   ALT 45*  --  832*  --  1,292*  1,292*  --   --  1,239*   AST 40  --  1,038*  --  1,954*  1,954*  1,954* 2,042* 2,061* 2,118*   MG 1.9 1.8 1.9 1.8  --   --   --   --        Lactic Acid:   Recent Labs   Lab 07/04/19  0609 07/04/19  1200   LACTATE 2.9* 9.0*       Significant Imaging: I have reviewed all pertinent imaging results/findings within the past 24 hours.    Assessment/Plan:     Active Diagnoses:    Diagnosis Date Noted POA    PRINCIPAL PROBLEM:  Cirrhosis [K74.60] 07/03/2019 Yes    S/P liver transplant [Z94.4] 07/04/2019 Not Applicable    Prophylactic immunotherapy [Z29.8]  07/04/2019 Not Applicable    Adrenal cortical steroids causing adverse effect in therapeutic use [T38.0X5A] 07/04/2019 No    Type 2 diabetes mellitus, without long-term current use of insulin [E11.9] 11/29/2018 Yes     Chronic    Obesity (BMI 30.0-34.9) [E66.9] 04/23/2018 Yes      Problems Resolved During this Admission:       60 yo M with a history of ESLD 2/2 HCC and ROMO, Afib, HTN, T2DM, and MALLIKA admitted to SICU on 7/4/2019 following liver transplantation.    Neuro:  - off sedation, alert and oriented x3  - PRN oxy, dilaudid for pain    Pulm:  - extubated 7/4  - breathing comfortably on RA      Card:  - lopressor  - cardene gtt    Renal:  - Elevated lactate at 9.0  - 2 L albumin  - UOP adequate, continue to monitor  - Fluid bolus PRN for poor UOP  - Replete electrolytes PRN  - Lactate 9.0, continue to trend    FEN/GI:  -NPO    Heme:  - Monitor H/H  - Daily CBC    ID:  - Antimicrobials per ID: bactrim, amp-sulf, valganciclovir  - Immunosuppressives per transplant: prograf, cellcept, steroids,     Endo:  - Insulin gtt  - Appreciate endocrine recs    PPx:  - famotidine    Dispo:  -Continue ICU care     Beltran Brink MD  Critical Care - Surgery  [facility]    I have reviewed and concur with the resident's history, physical, assessment, and plan.  I have personally interviewed and examined the patient at bedside.  See below addendum for my evaluation and additional findings.    New Martin MD  AbdominalTransplant Surgery

## 2019-07-04 NOTE — ANESTHESIA PROCEDURE NOTES
Central Line    Diagnosis: ESLD  Patient location during procedure: done in OR  Procedure start time: 7/4/2019 12:31 AM  Timeout: 7/4/2019 12:30 AM  Procedure end time: 7/4/2019 12:40 AM    Staffing  Authorizing Provider: Jason Willis MD  Performing Provider: Jason Willis MD    Staffing  Anesthesiologist: Jason Willis MD  Performed: anesthesiologist   Anesthesiologist was present at the time of the procedure.  Preanesthetic Checklist  Completed: patient identified, site marked, surgical consent, pre-op evaluation, timeout performed, IV checked, risks and benefits discussed, monitors and equipment checked and anesthesia consent given  Indication   Indication: vascular access, med administration     Anesthesia   general anesthesia    Central Line   Skin Prep: skin prepped with ChloraPrep, skin prep agent completely dried prior to procedure  maximum sterile barriers used during central venous catheter insertion  hand hygiene performed prior to central venous catheter insertion  Location: right, internal jugular.   Catheter type: triple lumen  Catheter Size: 12 Fr  Ultrasound: vascular probe with ultrasound  Vessel Caliber: medium, patent  Vascular Doppler:  not done, compressibility normal  Needle advanced into vessel with real time Ultrasound guidance.  Guidewire confirmed in vessel.  Image recorded and saved.   Manometry: Venous cannualation confirmed by visual estimation of blood vessel pressure using manometry.  Insertion Attempts: 1   Securement:line sutured, chlorhexidine patch, sterile dressing applied and blood return through all ports    Post-Procedure   Adverse Events:none    Guidewire Guidewire removed intact.

## 2019-07-04 NOTE — TELEPHONE ENCOUNTER
Resident Anaya Ardon notified orders from DR Altman for pt to receive a unit of FFP on the floor as soon as an IV is placed. ASAP Stat. Pt is on Coumadin.  Cr 1.7 noted, notified DR Altman. No orders

## 2019-07-04 NOTE — ANESTHESIA PROCEDURE NOTES
Arterial    Diagnosis: ESLD    Patient location during procedure: done in OR  Procedure start time: 7/4/2019 12:15 AM  Timeout: 7/4/2019 12:14 AM  Procedure end time: 7/4/2019 12:16 AM    Staffing  Authorizing Provider: Jason Willis MD  Performing Provider: Jason Willis MD    Anesthesiologist was present at the time of the procedure.    Preanesthetic Checklist  Completed: patient identified, site marked, surgical consent, pre-op evaluation, timeout performed, IV checked, risks and benefits discussed, monitors and equipment checked and anesthesia consent givenArterial  Skin Prep: chlorhexidine gluconate  Local Infiltration: none  Orientation: right  Location: femoral  Catheter Size: 20 G  Catheter placement by Ultrasound guidance. Heme positive aspiration all ports.  Vessel Caliber: medium, patent, compressibility normal  Vascular Doppler:  not done  Needle advanced into vessel with real time Ultrasound guidance.  Guidewire confirmed in vessel.  Sterile sheath used.  Image recorded and saved.Insertion Attempts: 1  Assessment  Dressing: sutured in place and taped

## 2019-07-04 NOTE — PLAN OF CARE
Problem: Adult Inpatient Plan of Care  Goal: Plan of Care Review  Recommendations     Recommendation/Intervention: 1.) ADAT to regular. 2.) If unable to advance diet within 48 hr, suggest TF Peptamen VHP @ 20mL advancing 10mL q4h to goal rate 55mL/hr to provide 1320 kcals, 121gm protein and 1109mL of free water. If GRV >500mL, hold TF q4h then restart regimen. TF+current propofol rate will meet 99% EEN and 100% EPN. 3.) Daily weights.   Goals: 1.) Pt to receive nutrition by follow up. 2.) Pt to receive post-transplant diet education prior to discharge.  Nutrition Goal Status: new  Communication of RD Recs: (POC)    Assessment and Plan  Nutrition Problem  Increased nutrient needs     Related to (etiology):   Increased demand for protein     Signs and Symptoms (as evidenced by):   S/p OLTx 7/4/19     Interventions/Recommendations (treatment strategy):  Collaboration with providers & referral of care     Nutrition Diagnosis Status:   New

## 2019-07-04 NOTE — ANESTHESIA PROCEDURE NOTES
Arterial    Diagnosis: liver transplant  Doctor requesting consult: leslee    Patient location during procedure: done in OR  Procedure start time: 7/4/2019 12:00 PM  Timeout: 7/4/2019 12:00 PM  Procedure end time: 7/4/2019 12:01 PMAuthorizing Provider: NEDRA Chiangerforming Provider: Rina Hanley CRNA  Anesthesiologist was present at the time of the procedure.  Preanesthetic Checklist  Completed: patient identified, site marked, surgical consent, pre-op evaluation, timeout performed, IV checked, risks and benefits discussed, monitors and equipment checked and anesthesia consent givenArterial  Skin Prep: chlorhexidine gluconate  Local Infiltration: none  Orientation: left  Location: radial  Catheter Size: 20 G  Catheter placement by Anatomical landmarks. Heme positive aspiration all ports.Insertion Attempts: 1  Assessment  Dressing: secured with tape and tegaderm  Patient: Tolerated well

## 2019-07-04 NOTE — PROGRESS NOTES
Per LETY Childers NP, ok to continue following insulin rate change nomogram to increase insulin infusion. Once BCG < 250, will call her back.

## 2019-07-04 NOTE — OR NURSING
0135 patient family informed of prcdr start face to face and requested updated via text.     0250 Patient family updated via text message.

## 2019-07-04 NOTE — PROGRESS NOTES
LETY Childers, NP contacted about insulin protocol calling increase to 33 u/hr. Said it was ok and once pt , call her back.

## 2019-07-04 NOTE — PT/OT/SLP PROGRESS
Physical Therapy      Patient Name:  Roman Hodges   MRN:  [id number]    Patient not seen today secondary to MD hold. Pt remains  Intubated when PT attempted to treat at 1155. Will follow-up with pt at the next scheduled tx session.    Carolin Jones, PT

## 2019-07-04 NOTE — PROGRESS NOTES
Dr. Carty and Dr. Caro notified of JUVE drain 2 output more brown in color. Will come to bedside to assess drainage.

## 2019-07-04 NOTE — OP NOTE
Operative Report    Date of Procedure: 07/04/2019    Surgeons:  Surgeon(s) and Role:     * Oscar Altman Jr., MD - Primary     * Darshan Graves MD - Assisting     * Edvin Ramirez MD - Resident - Assisting    First Assistant Attestation:  The presence of an additional attending surgeon functioning as first assistant was required due to the complexity of the procedure relative to any available residents. I certify that no resident was available who was qualified to serve as first assistant. Duties performed by the assistant included assisting the primary surgeon.    Pre-operative Diagnosis (UNOS): End Stage Liver Disease due to Primary Liver Malignancy: Hepatoma (HCC) and Cirrhosis    Post-operative Diagnosis: Same; portal vein status:  patent    Principal Procedure Perfomed: Orthotopic Liver Transplant  (whole liver, DBD donor, biliary reconstruction end to end donor common bile duct to recipient common hepatic duct  )  Additional Procedures Perfomed:   None    Anesthesia: General endotracheal  Findings: cirrhosis    Preamble  Indications and Patient Counseling: The patient is a 59 y.o. year-old male with Primary Liver Malignancy: Hepatoma (HCC) and Cirrhosis,  (UNOS terminology) who has been evaluated for a liver transplant.  The procedure was thoroughly discussed with the patient, including potential risks, complications, and alternatives. Specific complications mentioned included death, graft non-function, bleeding, infection, rejection, renal failure, respiratory failure, and neurologic deficits, as well as the possibility of other complications not specifically mentioned.    Donor Risk Factors:  Prior to the operation, the patient was advised of any donor-specific risk factors requiring specific disclosure. Factors in this case included nothing that required specific disclosure.      Specific PHS Increased Risk Behavior criteria for the organ donor include:  None    All questions were answered, the patient voiced  appropriate understanding, and he agreed to proceed with the planned procedure.    ABO Confirmation: Immediately following arrival of the donor organ and prior to implantation, a formal ABO confirmation was done according to hospital and UNOS policies.  I confirmed the UNOS ID number of the donor organ ([id number]) and the donor and recipient ABO types, directly verifying these data by comparison with the UNOS Match Run report ([id number].  This confirmation was personally done by an attending surgeon and circulating nurse, and is officially documented elsewhere.    Time-Out: A complete time out was carried out prior to incision, with confirmation of patient identity, correct procedure, correct operative site, appropriate antibiotic prophylaxis, review of any known allergies, and presence of all needed equipment.    Procedure in Detail  Following the induction of general endotracheal anesthesia, appropriate arterial and venous lines were placed by the anesthesia team.  Care was taken to pad all pressure points and avoid any potential traction injuries from positioning. The urinary bladder was catheterized.  Sequential compression boots and Ismael Huggers were used. The chest, abdomen, and upper thighs were sterilely prepped and draped.     The abdomen was entered via a wide bilateral subcostal incision. 0 mL of ascites was removed. The round ligament was ligated and divided. The falciform, left triangular, and gastrohepatic ligaments were divided using the electocautery, with 2-0 silk ties as needed. The De Jesus self-retaining retractor was placed to provide exposure. Adhesions between the abdominal viscera and the abdominal wall and the undersurface of the liver were divided as needed using cautery dissection and silk ties or suture ligatures. Hilar dissection was then carried out, with ligation of the right and left hepatic arteries as well as the cystic duct and the common hepatic duct. The recipient arterial anatomy  was found to be: standard. The portal vein was exposed circumferentially from its bifurcation down to the superior border of the pancreas. The portal vein was found to be patent.  Attention was next directed to the right side of the liver where the right triangular ligament was taken down, exposing the bare area of the liver, and the IVC. The infrahepatic IVC was isolated, followed by mobilization of the retrohepatic cava, division of the right adrenal vein, and isolation of the suprahepatic IVC. The patient was given 2000 units of heparin prior to caval cross-clamping. . After assuring adequate stability after cross-clamping, the native liver was excised and the allograft liver was brought to the operative field.  The liver was perfused with cold 5% albumin during implantation to displace the organ preservation solution.  IVC reconstruction technique consisted of end to end ivc using 3-0 and 4-0 polypropylene as appropriate.  The donor portal vein was then anastomosed to the recipient portal inflow site (end portal vein) with 5-0 polypropylene. Once this was completed, anesthesia was notified and the liver was re-perfused. Copious irrigation with warm saline and temporary finger occlusion of portal inflow were used as needed to avoid any problems with hypothermia. Temporary packing, electocautery, and polypropylene suture ligatures were used as appropriate to provide hemostasis. Once this was satisfactory, attention was directed to the arterial reconstruction. This liver did not come from a DCD donor. Arterial inflow was provided to the graft by anastomosing the recipient right-left hepatic branch patch to the donor  common hepatic artery using 6-0 polypropylene. The liver was assessed for adequacy of perfusion, which was satisfactory. The gallbladder was then removed from the allograft liver, and a temporary packing period was carried out to help assure hemostatis.   Attention was next directed toward the bile  duct. The donor bile duct was prepared and assessed for adequate vascular supply. Biliary reconstruction was then performed by anastomosing the recipient common hepatic duct to the donor duct using 6-0 PDS sutures.  The biliary stent used was: none.  A final check for hemostasis was made. 2 19f Ford drains were placed through separate incisions below the transverse abdominal incision and placed in the usual positions. The cavity was irrigated with saline. The abdomen was closed in layers using running #1 PDS suture. The incision was irrigated and the skin was closed with staples. At the end of the case all instrument, needle, and sponge counts were correct. The patient was taken to the ICU in good condition.     Fluids Administered:   Crystalloid (mL) 3,500   FFP (Units) 1   Cell Saver (CCs) 262      Specimens: Explant liver  Drains: 19f Ford drains x 2    Additional Findings:    Estimated Blood Loss: 1,200 mL  Ascites Evacuated: 0 mL    Ischemic Times:  Time of portal reperfusion: 2019  3:15 AM  Time of arterial reperfusion: 2019  3:35 AM  Anastomosis (warm ischemia) time: 36 minutes  Cold ischemia time: 330 minutes    Surgical Data:  Graft type (whole vs. partial): whole liver  IVC reconstruction: end to end ivc  Portal vein status: patent  Portal graft performed? no  Donor arterial anatomy: replaced right hepatic from sma  Donor arterial inflow: common hepatic artery  Recipient arterial anatomy: standard  Recipient arterial inflow: right-left hepatic branch patch  Arterial graft performed? no  Biliary reconstruction: end to end (donor common bile duct to recipient common hepatic duct)  Biliary stent: none  Disposition of Vessels from donor of transplanted organ: used in transplant  Damage during procurement: no  Procurement damage comments: small capsular tear / decapsulated area on inferior right lobe    Donor Factors:  UNOS ID: [id number]  UNOS Match Run: [id number]  Donor type:  - brain  death  Donor with reported PHS increased risk behavior: no  Donor CMV serologic status: Negative  Donor with known cancer: no  Donor HCV serologic status: Negative  Donor HBcAb: Negative  Donor HBV MARIA TERESA: Negative  Donor HCV MARIA TERESA: Negative  Liver weight: 1710 grams

## 2019-07-04 NOTE — PROGRESS NOTES
TRANSPLANT NOTE:    Admit Date: 7/3/2019    ORGAN:   LIVER  Disease Etiology: Primary Liver Malignancy: Hepatoma (HCC) and Cirrhosis  Donor CMV Status: Negative  Donor HCV Status: Negative  Donor HBcAb: Negative  Donor HBV MARIA TERESA: Negative  Donor HCV MARIA TERESA: Negative  Whole or Partial: Whole Liver  Biliary Anastomosis: End to End  Arterial Anatomy: Replaced Right Hepatic from SMA    Roman Hodges is a 59 y.o. male s/p     - Brain Death liver transplant on 2019 (Liver) for Primary Liver Malignancy: Hepatoma (HCC) and Cirrhosis.  This patient will follow the Steroid Induction protocol.  This patients immunosuppression will include a steroid taper over 6 weeks, Cellcept for 3 and Prograf maintenance.  Opportunistic infection prophylaxis will include Valcyte for 3 months (CMV D- R+), Bactrim for 6 months, and nystatin.  I have reviewed the pre-op medications and those have been restarted those, as appropriate.

## 2019-07-04 NOTE — ANESTHESIA PREPROCEDURE EVALUATION
[facility]  Anesthesia Pre-Operative Evaluation         Patient Name: Roman Hodges  YOB: 1960  MRN: [id number]    SUBJECTIVE:     Pre-operative evaluation for Procedure(s) (LRB):  TRANSPLANT, LIVER (N/A)     07/03/2019    Roman Hodges is a 59 y.o. male w/ a significant PMHx of MALLIKA on CPAP, obesity, HTN, DMII, ROMO and HCC  who presents for the above procedure.    Patient last ate full meal at 8pm 7/3/19    LDA: None documented.    Prev airway:   Present Prior to Hospital Arrival?: No; Placement Date: 04/12/19; Placement Time: 1049; Method of Intubation: Direct laryngoscopy; Inserted by: MD; Airway Device: Endotracheal Tube; Mask Ventilation: Easy; Intubated: Postinduction; Blade: Payne #2; Airway Device Size: 7.0; Style: Cuffed; Cuff Inflation: Minimal occlusive pressure; Inflation Amount: 6; Placement Verified By: Auscultation, Capnometry; Grade: Grade II; Complicating Factors: None; Intubation Findings: Positive EtCO2, Bilateral breath sounds, Atraumatic/Condition of teeth unchanged;  Depth of Insertion: 21; Securment: Lips; Complications: None; Breath Sounds: Equal Bilateral; Insertion Attempts: 1; Removal Date: 04/12/19;  Removal Time: 1255; Removal Indication & Assessment: not present upon transfer    Drips: none           Patient Active Problem List   Diagnosis    Obstructive sleep apnea on CPAP    Severe obesity (BMI 35.0-39.9) with comorbidity    HCC (hepatocellular carcinoma)    New onset atrial fibrillation    Essential hypertension    Type 2 diabetes mellitus, without long-term current use of insulin    GERD (gastroesophageal reflux disease)    Acute systolic heart failure    Epigastric pain    Open angle with borderline findings and low 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 eyes    Liver transplant candidate    ROMO (nonalcoholic steatohepatitis)    Liver mass    Long term (current) use of anticoagulants       Review of patient's allergies indicates:   Allergen  Reactions    Lisinopril      Stomach ache    Flu vaccine 2011 (36 mos+)(pf)      Patient reports he developed Bell's Palsy 2 days after his last flu shot in 2008       Current Inpatient Medications:      Current Facility-Administered Medications on File Prior to Encounter   Medication Dose Route Frequency Provider Last Rate Last Dose    0.9%  NaCl infusion   Intravenous Continuous Bony Saavedra MD   Stopped at 01/15/19 1332    sodium chloride 0.9% flush 3 mL  3 mL Intravenous PRN Bony Saavedra MD         Current Outpatient Medications on File Prior to Encounter   Medication Sig Dispense Refill    amLODIPine (NORVASC) 5 MG tablet Take 5 mg by mouth once daily.      busPIRone (BUSPAR) 5 MG Tab Take 1 tablet (5 mg total) by mouth 2 (two) times daily. (Patient taking differently: Take 5 mg by mouth 2 (two) times daily as needed. ) 180 tablet 3    doxycycline (VIBRA-TABS) 100 MG tablet Take 1 tablet (100 mg total) by mouth every 12 (twelve) hours. 60 tablet 0    esomeprazole (NEXIUM) 40 MG capsule Take 1 capsule (40 mg total) by mouth once daily. 90 capsule 1    losartan-hydrochlorothiazide 100-25 mg (HYZAAR) 100-25 mg per tablet Take 1 tablet by mouth once daily. 90 tablet 3    metoprolol succinate (TOPROL-XL) 50 MG 24 hr tablet Take 1 tablet (50 mg total) by mouth once daily. 90 tablet 3    potassium chloride SA (K-DUR,KLOR-CON) 20 MEQ tablet Take 1 tablet (20 mEq total) by mouth 2 (two) times daily. 180 tablet 3    warfarin (COUMADIN) 5 MG tablet Take 1 tablet (5 mg total) by mouth Daily. Start after dentist appt on 4/10 30 tablet 5       Past Surgical History:   Procedure Laterality Date    COLONOSCOPY      EMBOLIZATION, YTTRIUM THERAPY N/A 11/9/2018    Performed by [facility] at [facility] OR 2ND FLR    EMBOLIZATION, YTTRIUM THERAPY N/A 10/24/2018    Performed by [facility] OR 2ND FLR    ESOPHAGOGASTRODUODENOSCOPY (EGD) N/A 1/15/2019    Performed by Bony MONTANEZ  MD Quentin at [facility] ENDO    HERNIA REPAIR      Left heart cath Left 2018    Performed by Tyler Hinojosa MD at [facility] CATH LAB/EP    Radiofrequency Ablation N/A 2019    Performed by [facility] at [facility]       Social History     Socioeconomic History    Marital status:      Spouse name: Not on file    Number of children: Not on file    Years of education: Not on file    Highest education level: Not on file   Occupational History    Not on file   Social Needs    Financial resource strain: Not very hard    Food insecurity:     Worry: Never true     Inability: Never true    Transportation needs:     Medical: No     Non-medical: No   Tobacco Use    Smoking status: Former Smoker     Packs/day: 1.00     Years: 10.00     Pack years: 10.00     Types: Cigarettes     Last attempt to quit: 1980     Years since quittin.5    Smokeless tobacco: Never Used   Substance and Sexual Activity    Alcohol use: No     Frequency: Never     Binge frequency: Never    Drug use: No    Sexual activity: Yes     Partners: Female   Lifestyle    Physical activity:     Days per week: 1 day     Minutes per session: 20 min    Stress: Not at all   Relationships    Social connections:     Talks on phone: More than three times a week     Gets together: Three times a week     Attends Rastafari service: Not on file     Active member of club or organization: Yes     Attends meetings of clubs or organizations: More than 4 times per year     Relationship status:    Other Topics Concern    Not on file   Social History Narrative    Not on file       OBJECTIVE:     Vital Signs Range (Last 24H):         CBC:   Lab Results   Component Value Date    WBC 5.44 2019    HGB 16.9 2019    HCT 49.6 2019    MCV 86 2019     2019       CMP:   CMP  Sodium   Date Value Ref Range Status   2019 138 136 - 145 mmol/L Final     Potassium   Date Value Ref Range Status   2019 3.7  3.5 - 5.1 mmol/L Final     Chloride   Date Value Ref Range Status   06/19/2019 105 95 - 110 mmol/L Final     CO2   Date Value Ref Range Status   06/19/2019 21 (L) 23 - 29 mmol/L Final     Glucose   Date Value Ref Range Status   06/19/2019 97 70 - 110 mg/dL Final     BUN, Bld   Date Value Ref Range Status   06/19/2019 27 (H) 6 - 20 mg/dL Final     Creatinine   Date Value Ref Range Status   06/19/2019 1.7 (H) 0.5 - 1.4 mg/dL Final     Calcium   Date Value Ref Range Status   06/19/2019 9.4 8.7 - 10.5 mg/dL Final     Total Protein   Date Value Ref Range Status   06/19/2019 7.7 6.0 - 8.4 g/dL Final     Albumin   Date Value Ref Range Status   06/19/2019 4.0 3.5 - 5.2 g/dL Final     Total Bilirubin   Date Value Ref Range Status   06/19/2019 0.5 0.1 - 1.0 mg/dL Final     Comment:     For infants and newborns, interpretation of results should be based  on gestational age, weight and in agreement with clinical  observations.  Premature Infant recommended reference ranges:  Up to 24 hours.............<8.0 mg/dL  Up to 48 hours............<12.0 mg/dL  3-5 days..................<15.0 mg/dL  6-29 days.................<15.0 mg/dL       Alkaline Phosphatase   Date Value Ref Range Status   06/19/2019 167 (H) 55 - 135 U/L Final     AST   Date Value Ref Range Status   06/19/2019 45 (H) 10 - 40 U/L Final     ALT   Date Value Ref Range Status   06/19/2019 70 (H) 10 - 44 U/L Final     Anion Gap   Date Value Ref Range Status   06/19/2019 12 8 - 16 mmol/L Final     eGFR if    Date Value Ref Range Status   06/19/2019 49.6 (A) >60 mL/min/1.73 m^2 Final     eGFR if non    Date Value Ref Range Status   06/19/2019 42.9 (A) >60 mL/min/1.73 m^2 Final     Comment:     Calculation used to obtain the estimated glomerular filtration  rate (eGFR) is the CKD-EPI equation.          INR:  No results for input(s): PT, INR, PROTIME, APTT in the last 72 hours.    Diagnostic Studies: No relevant studies.    EKG:    12/2018  Sinus bradycardia  LVH with QRS widening  Abnormal ECG  When compared with ECG of 03-DEC-2018 10:01,  Premature supraventricular complexes are no longer Present  No significant change was found      2D ECHO:  12/31/2018  · Eccentric left ventricular hypertrophy.  · Normal left ventricular systolic function. The estimated ejection fraction is 55%  · Normal LV diastolic function.  · Normal right ventricular systolic function.  · Normal central venous pressure (3 mm Hg).  · The estimated PA systolic pressure is 11 mm Hg  · Normal left atrial pressure.    ASSESSMENT/PLAN:         Anesthesia Evaluation    I have reviewed the Patient Summary Reports.    I have reviewed the Nursing Notes.   I have reviewed the Medications.     Review of Systems  Anesthesia Hx:  History of prior surgery of interest to airway management or planning: Denies Family Hx of Anesthesia complications.   Denies Personal Hx of Anesthesia complications.   Social:  Former Smoker    Hematology/Oncology:  Hematology Normal   Oncology Normal     EENT/Dental:EENT/Dental Normal   Cardiovascular:   Hypertension    Pulmonary:   Sleep Apnea    Hepatic/GI:   GERD Liver Disease, Hepatitis    Musculoskeletal:  Musculoskeletal Normal    Neurological:  Neurology Normal    Endocrine:   Diabetes, well controlled    Dermatological:  Skin Normal    Psych:  Psychiatric Normal           Physical Exam  General:  Obesity    Airway/Jaw/Neck:  Airway Findings: Mouth Opening: Normal Tongue: Normal  General Airway Assessment: Adult  Mallampati: II  Improves to I, II with phonation.  TM Distance: Normal, at least 6 cm        Eyes/Ears/Nose:  EYES/EARS/NOSE FINDINGS: Normal   Dental:  DENTAL FINDINGS: Normal   Chest/Lungs:  Chest/Lungs Clear    Heart/Vascular:  Heart Findings: Normal    Abdomen:  Abdomen Findings: Normal    Musculoskeletal:  Musculoskeletal Findings: Normal   Skin:  Skin Findings: Normal    Mental Status:  Mental Status Findings:  Cooperative, Alert  and Oriented         Anesthesia Plan  Type of Anesthesia, risks & benefits discussed:  Anesthesia Type:  general  Patient's Preference: General  Intra-op Monitoring Plan: standard ASA monitors  Intra-op Monitoring Plan Comments:   Post Op Pain Control Plan: per primary service following discharge from PACU, IV/PO Opioids PRN and multimodal analgesia  Post Op Pain Control Plan Comments:   Induction:   IV  Beta Blocker:  Patient is on a Beta-Blocker and has received one dose within the past 24 hours (No further documentation required).       Informed Consent: Patient understands risks and agrees with Anesthesia plan.  Questions answered. Anesthesia consent signed with patient.  ASA Score: 4     Day of Surgery Review of History & Physical: I have interviewed and examined the patient. I have reviewed the patient's H&P dated:  There are no significant changes.  H&P update referred to the surgeon.         Ready For Surgery From Anesthesia Perspective.

## 2019-07-04 NOTE — PLAN OF CARE
Problem: Adult Inpatient Plan of Care  Goal: Plan of Care Review  Outcome: Ongoing (interventions implemented as appropriate)  Pt AAOx4. NESS, nods/gestures appropriately. Tmax 100.3.  SR-ST [id number], 2 x episodes of HR in 130-140s this AM. SBP maintained  < 160. CVP 3-5, 2L albumin and 1L LR given today.  See note for further details. Extubated this AM, currently on 2L NC sating > 95%. [place]ron abdominal Incision with OR dsg, small amount of marked drainage. JUVE 1 with small brown, frothy output. JUVE 2 with moderate serous output. UOP excellent,  cc/hr. No BM, hypoactive bowel sounds noted. Gtts: MIVF and insulin. Q 1 accuchecks, q 4 labs. 60 K, 2 mag replaced today. Passed swallow study. Oxycodone 5mg and 10mg ordered for pain. Liver enzymes trending up, team aware. PoC reviewed with family. All questions answered/concerns addressed. Emotional support and positive reinforcement provided. See flow sheet for assessment

## 2019-07-04 NOTE — CARE UPDATE
Pt arrived from the OR intubated with 7.5 ETT tube 22 cm @ the lip.  Pt placed on a ventilator with the following settings: SIMV 15 450 +5 10 100%.  ABG done. Oxygen decreased to 50%.

## 2019-07-04 NOTE — ANESTHESIA PROCEDURE NOTES
[place]    Diagnosis: ESLD  Patient location during procedure: done in OR  Procedure start time: 7/4/2019 12:31 AM  Timeout: 7/4/2019 12:30 AM  Procedure end time: 7/4/2019 12:40 AM    Staffing  Authorizing Provider: Jason Willis MD  Performing Provider: Jason Willis MD    Anesthesiologist was present at the time of the procedure.  Preanesthetic Checklist  Completed: patient identified, site marked, surgical consent, pre-op evaluation, timeout performed, IV checked, risks and benefits discussed, monitors and equipment checked and anesthesia consent given  [place]  Skin Prep: chlorhexidine gluconate  Local Infiltration: none  Location: right,  internal jugular vein  Vessel Caliber: medium, patent, compressibility normal  Vascular Doppler:  not done  Introducer: 9 Fr single lumen, manometry used.  Device: CCO/Oximetric Catheter  Catheter Size: 7 Fr  Catheter placement by yes. Heme positive aspiration all ports. PAC floated with balloon up not wedgedSterile sheath used  Locked at: 52 cm.Insertion Attempts: 1  Indication: intravenous therapy  Ultrasound Guidance  Needle advanced into vessel with real time Ultrasound guidance.  Image recorded and saved.  Guidewire confirmed in vessel.  Sterile sheath used.  Assessment  Central Line Bundle Protocol followed. Hand hygiene before procedure, surgical cap worn, surgical mask worn, sterile surgical gloves worn, large sterile drape used.

## 2019-07-04 NOTE — ASSESSMENT & PLAN NOTE
BG goal 140 - 180       BG monitoring ac/hs and moderate dose correction scale.   Will monitor for prandial excursions.

## 2019-07-04 NOTE — OP NOTE
Operative Report    Date of Procedure: 7/3/2019    Surgeon: Darshan Graves MD  First Assistant:     Pre-operative Diagnosis: Allograft liver for transplantation  Post-operative Diagnosis: Same    Procedure(s) Performed:   1. Back Table Preparation of Liver with needle biopsy and vascular reconstruction of replaced right hepatic artery.    Anesthesia: Not applicable  Estimated Blood Loss: Not applicable  Fluids Administered: Not applicable    Findings: Estimated steatosis 0-10%, replaced right hepatic artery.  Drains: Not applicable  Specimens: Core biopsy of allograft liver      Preamble  Indications: This report describes only the backbench preparation of the liver prior to transplantation.  The transplant operation itself is described in a separate report.    ABO Confirmation: Immediately following arrival of the donor organ and prior to implantation, a formal ABO confirmation was done according to hospital and UNOS policies.  I confirmed the UNOS ID number of the donor organ and the donor and recipient ABO types, directly verifying these data by comparison with the UNOS [facility] report.  This confirmation was personally done by an attending surgeon and circulating nurse, and is officially documented elsewhere.    Time-Out: A complete time out was carried out prior to the procedure, with confirmation of patient identity, correct procedure, correct operative site, appropriate antibiotic prophylaxis, review of any known allergies, and presence of all needed equipment.    Procedure in Detail  The liver was recovered from the transport cooler and inspected for vascular anatomy and overall suitability for transplantation, with findings as noted above.  The remnant of the diaphragm was dissected away.  The phrenic veins and adrenal vein were identified and ligated or sutured as appropriate.  The portal vein and hepatic artery were mobilized toward the hilum of the liver, and extrahepatic branches were ligated.  A  replaced right hepatic artery was reconstructed by implantation to the gastroduodenal artery using 6-0 polypropylene.  The vessels were tested for leaks.  A needle biopsy was obtained for permanent section histology using a spring-loaded biopsy device. The liver was kept in ice temperature organ preservation solution until the time of implantation.

## 2019-07-04 NOTE — PLAN OF CARE
Pre-operative Discussion Note  Liver Transplant Surgery    Roman Hodges is a 59 y.o. male admitted for liver transplant.  I discussed the planned procedure in detail, including expected hospital course and outcomes, benefits, risks, and potential complications.  Complications discussed included death, graft failure, bleeding, infection, rejection, and neurologic problems.  I discussed the risks of anesthesia, as well as the potential need for re-operation.  The possibility of other complications not specifically mentioned was also discussed.  Also, I discussed the need for lifelong immunosuppression and the possibility of serious complications from immunosuppressive drugs.    The discussion included the risks that the patient will incur if he elects to not have the proposed procedure.    Relevant donor-specific risk factors were disclosed and discussed with the patient, including:   none    Specific PHS Increased Risk Behavior criteria for the organ donor include:  None    HCV Infection Not applicable.    Hepatocellular Carcinoma Recurrence: I explained that the transplant procedure would begin with a search for extrahepatic tumor, and if such tumor is found, the transplant operation will be aborted.  If there is no detectable tumor outside the liver and the transplant proceeds as planned, there is still a risk of tumor recurrence following transplant, which can be fatal.    All questions were answered.  The patient and available family members voice understanding and agree to proceed with the transplant.    UNOS Patient Status  Note on scores:  ICU = 10 = total assistance  TSU = 20-30 = partial assistance  Outpatient admitted for transplant requiring medical care in last year = 40-50 = partial assistance  Scores 60 or higher indicate no assistance, meaning no need for medical care in last year. This would be very unusual for a transplant candidate.    Functional Status: 50% - Requires considerable assistance  and frequent medical care  Physical Capacity: No Limitations     This discussion was held in his hospital room with a staff nurse serving as [place] . His wife and daughter and  were present.

## 2019-07-04 NOTE — H&P
History & Physical   Liver Transplant Surgery      SUBJECTIVE:     Chief Complaint/Reason for Admission: liver transplant    History of Present Illness: Roman Hodges is a 59 y.o. male with ESLD secondary to ROMO (non-alcoholic steatohepatitis), admitted for liver transplantation. Patient denies recent fevers, chills, nausea, vomiting, CP, shortness of breath, changes to bowel or bladder habits.    MELD Score: MELD-Na score: 23 at 6/19/2019  4:13 PM  MELD score: 23 at 6/19/2019  4:13 PM  Calculated from:  Serum Creatinine: 1.7 mg/dL at 6/19/2019  4:13 PM  Serum Sodium: 138 mmol/L (Rounded to 137 mmol/L) at 6/19/2019  4:13 PM  Total Bilirubin: 0.5 mg/dL (Rounded to 1 mg/dL) at 6/19/2019  4:13 PM  INR(ratio): 2.8 at 6/19/2019  4:13 PM  Age: 59 years    PTA Medications   Medication Sig    amLODIPine (NORVASC) 5 MG tablet Take 5 mg by mouth once daily.    busPIRone (BUSPAR) 5 MG Tab Take 1 tablet (5 mg total) by mouth 2 (two) times daily. (Patient taking differently: Take 5 mg by mouth 2 (two) times daily as needed. )    doxycycline (VIBRA-TABS) 100 MG tablet Take 1 tablet (100 mg total) by mouth every 12 (twelve) hours.    esomeprazole (NEXIUM) 40 MG capsule Take 1 capsule (40 mg total) by mouth once daily.    losartan-hydrochlorothiazide 100-25 mg (HYZAAR) 100-25 mg per tablet Take 1 tablet by mouth once daily.    metoprolol succinate (TOPROL-XL) 50 MG 24 hr tablet Take 1 tablet (50 mg total) by mouth once daily.    potassium chloride SA (K-DUR,KLOR-CON) 20 MEQ tablet Take 1 tablet (20 mEq total) by mouth 2 (two) times daily.    warfarin (COUMADIN) 5 MG tablet Take 1 tablet (5 mg total) by mouth Daily. Start after dentist appt on 4/10       Review of patient's allergies indicates:   Allergen Reactions    Lisinopril      Stomach ache    Flu vaccine 2011 (36 mos+)(pf)      Patient reports he developed Bell's Palsy 2 days after his last flu shot in 2008       Past Medical History:   Diagnosis Date     A-fib     Allergy     Anticoagulant long-term use     Diabetes mellitus, type 2     GERD (gastroesophageal reflux disease)     Hepatocellular carcinoma     liver    Hyperlipidemia     Hypertension      Past Surgical History:   Procedure Laterality Date    COLONOSCOPY      EMBOLIZATION, YTTRIUM THERAPY N/A 2018    Performed by [facility] at [facility] 2ND FLR    EMBOLIZATION, YTTRIUM THERAPY N/A 10/24/2018    Performed by [facility] Provider at [facility] OR 2ND FLR    ESOPHAGOGASTRODUODENOSCOPY (EGD) N/A 1/15/2019    Performed by Bony [facility]avedra [facility] at [facility] ENDO    HERNIA REPAIR      Left heart cath Left 2018    Performed by Tyler Hinojosa MD at [facility] CATH LAB/EP    Radiofrequency Ablation N/A 2019    Performed by Rose Surgeon at [facility]     Family History   Problem Relation Age of Onset    Hypertension Mother     Cancer Mother     Hypertension Father     Stroke Father     Cancer Father      Social History     Tobacco Use    Smoking status: Former Smoker     Packs/day: 1.00     Years: 10.00     Pack years: 10.00     Types: Cigarettes     Last attempt to quit: 1980     Years since quittin.5    Smokeless tobacco: Never Used   Substance Use Topics    Alcohol use: No     Frequency: Never     Binge frequency: Never    Drug use: No        Review of Systems   Constitutional: Negative for chills and fever.   Respiratory: Negative for chest tightness and shortness of breath.    Cardiovascular: Negative for chest pain.   Gastrointestinal: Positive for abdominal pain. Negative for blood in stool, constipation, diarrhea, nausea and vomiting.   Genitourinary: Negative for dysuria and hematuria.   Musculoskeletal: Negative for arthralgias.   Neurological: Negative for dizziness and headaches.   Hematological: Negative for adenopathy. Bruises/bleeds easily.     OBJECTIVE:     Vital Signs (Most Recent)       Physical Exam   Constitutional: He is oriented to person, place,  and time. He appears well-developed and well-nourished.   HENT:   Head: Normocephalic and atraumatic.   Cardiovascular: Normal rate.   Pulmonary/Chest: Effort normal. No respiratory distress.   Abdominal: Soft. He exhibits distension. There is tenderness (Mild TTP RUQ).   Neurological: He is alert and oriented to person, place, and time.   Skin: Skin is warm and dry.   Nursing note and vitals reviewed.    Laboratory  CBC: No results for input(s): WBC, RBC, HGB, HCT, PLT, MCV, MCH, MCHC in the last 168 hours.  CMP: No results for input(s): GLU, CALCIUM, ALBUMIN, PROT, NA, K, CO2, CL, BUN, CREATININE, ALKPHOS, ALT, AST, BILITOT in the last 168 hours.    Diagnostic Results:  Labs/imaging pending    ASSESSMENT/PLAN:     The patient presents for liver transplant.  There are no apparent contraindications to proceeding with the planned transplant.  The patient understands that the transplant could potentially be cancelled pending detailed assessment of the donor organ.    He will receive IV steroids pulse induction.    A complete discussion of the transplant procedure, including risks, complications, and alternatives, as well as any donor-specific risk factors requiring specific disclosure, will be carried out by the responsible staff surgeon prior to the procedure.     Anaya Merritt MD  Pager: [phone]  General Surgery PGY-III  [facility]

## 2019-07-04 NOTE — OP NOTE
Certification of Assistant at Surgery       Surgery Date: 7/3/2019     Participating Surgeons:  Surgeon(s) and Role:     * Oscar Altman Jr., MD - Primary     * Darshan Graves MD - Assisting     * Edvin Ramirez MD - Resident - Assisting    Procedures:  Procedure(s) (LRB):  TRANSPLANT, LIVER (N/A)    Assistant Surgeon's Certification of Necessity:  I understand that section 1842 (b) (6) (d) of the Social Security Act generally prohibits Medicare Part B reasonable charge payment for the services of assistants at surgery in [facility] hospitals when qualified residents are available to furnish such services. I certify that the services for which payment is claimed were medically necessary, and that no qualified resident was available to perform the services. I further understand that these services are subject to post-payment review by the Medicare carrier.      Darshan Graves MD    07/04/2019  3:41 AM

## 2019-07-04 NOTE — CONSULTS
"  [facility]  Adult Nutrition  Consult Note    SUMMARY     Recommendations    Recommendation/Intervention: 1.) ADAT to regular. 2.) If unable to advance diet within 48 hr, suggest TF Peptamen VHP @ 20mL advancing 10mL q4h to goal rate 55mL/hr to provide 1320 kcals, 121gm protein and 1109mL of free water. If GRV >500mL, hold TF q4h then restart regimen. TF+current propofol rate will meet 99% EEN and 100% EPN. 3.) Daily weights.   Goals: 1.) Pt to receive nutrition by follow up. 2.) Pt to receive post-transplant diet education prior to discharge.  Nutrition Goal Status: new  Communication of RD Recs: (POC)    Reason for Assessment    Reason For Assessment: consult  Diagnosis: transplant/postoperative complications(s/p OLTx 19)  Relevant Medical History: HTN, DM2, GERD, A Fib, HLD, hepatocellular cancer  Interdisciplinary Rounds: did not attend  General Information Comments: POD0 s/p OLTx . Pt intubated, sedated with propofol and family to bedside. No GI distress at this time. NKFA. NFPE partially completed. Pt with some edema to abdomen, otherwise appears well nourished. UBW 91.4kg. Pt does not meet malnutrition criteria at this time.   Nutrition Discharge Planning: TSU RD to provide post-transplant diet education at follow up.     Nutrition Risk Screen    Nutrition Risk Screen: no indicators present    Nutrition/Diet History    Spiritual, Cultural Beliefs, Buddhism Practices, Values that Affect Care: yes    Anthropometrics    Temp: 100.2 °F (37.9 °C)  Height Method: Stated  Height: 5' 7" (170.2 cm)  Height (inches): 67 in  Weight Method: Standard Scale  Weight: 93.7 kg (206 lb 9.1 oz)  Weight (lb): 206.57 lb  Ideal Body Weight (IBW), Male: 148 lb  % Ideal Body Weight, Male (lb): 139.57 lb  BMI (Calculated): 32.4  Usual Body Weight (UBW), k.4 kg(19)  % Usual Body Weight: 102.73       Lab/Procedures/Meds    Pertinent Labs Reviewed: reviewed  Pertinent Labs Comments: K 3.2, BUN 30, " Cr 1.9, GFR 37.5, HbA1C 5.5, Glu 216, , AST 1954, ALT 1292,   Pertinent Medications Reviewed: reviewed  Pertinent Medications Comments: magnesium sulfate, mycophenolate, statin. potassium chloride, tacrolimus, propofol      Estimated/Assessed Needs    Weight Used For Calorie Calculations: 93.7 kg (206 lb 9.1 oz)  Energy Calorie Requirements (kcal): 1788  Energy Need Method: [place] (modified)  Protein Requirements: 101-135(g/day)  Weight Used For Protein Calculations: 67.2 kg (148 lb 2.4 oz)(1.5-2.0 g/kg of IBW)     Estimated Fluid Requirement Method: RDA Method(or per MD)  RDA Method (mL): 1788  CHO Requirement: 50% total kcals      Nutrition Prescription Ordered    Current Diet Order: NPO    Evaluation of Received Nutrient/Fluid Intake    Other Calories (kcal): 446(propofol @ 16.9mL/hr)  I/O: +0.5L since admit  Energy Calories Required: not meeting needs  Protein Required: not meeting needs  Comments: LBM 7/3  % Intake of Estimated Energy Needs: 0 - 25 %  % Meal Intake: NPO    Nutrition Risk    Level of Risk/Frequency of Follow-up: high     Assessment and Plan  Nutrition Problem  Increased nutrient needs    Related to (etiology):   Increased demand for protein    Signs and Symptoms (as evidenced by):   S/p OLTx 7/4/19    Interventions/Recommendations (treatment strategy):  Collaboration with providers & referral of care    Nutrition Diagnosis Status:   New           Monitor and Evaluation    Food and Nutrient Intake: energy intake, enteral nutrition intake  Food and Nutrient Adminstration: diet order, enteral and parenteral nutrition administration  Knowledge/Beliefs/Attitudes: food and nutrition knowledge/skill  Physical Activity and Function: nutrition-related ADLs and IADLs  Anthropometric Measurements: weight, weight change, body mass index  Biochemical Data, Medical Tests and Procedures: electrolyte and renal panel, gastrointestinal profile, glucose/endocrine profile, inflammatory profile,  lipid profile  Nutrition-Focused Physical Findings: overall appearance     Malnutrition Assessment  Abdomen with edema, otherwise appears well nourished.     Nutrition Follow-Up    RD Follow-up?: Yes

## 2019-07-04 NOTE — ASSESSMENT & PLAN NOTE
Managed per LTS  avoid hypoglycemia  Lab Results   Component Value Date    ALT 1,292 (H) 07/04/2019    ALT 1,292 (H) 07/04/2019    AST 2,042 (H) 07/04/2019     (H) 07/04/2019     (H) 07/04/2019    ALKPHOS 155 (H) 07/04/2019    ALKPHOS 155 (H) 07/04/2019    BILITOT 1.7 (H) 07/04/2019    BILITOT 1.7 (H) 07/04/2019

## 2019-07-04 NOTE — CONSULTS
[facility]  Endocrinology  Diabetes Consult Note    Consult Requested by: Oscar Altman Jr., MD   Reason for admit: Cirrhosis    HISTORY OF PRESENT ILLNESS:  Reason for Consult: Management of T2DM, Hyperglycemia     Surgical Procedure and Date: liver transplant     Diabetes diagnosis year: SKYLA    Home Diabetes Medications:  none   Lab Results   Component Value Date    HGBA1C 5.5 07/03/2019       How often checking glucose at home? SKYLA  BG readings on regimen: SKYLA  Hypoglycemia on the regimen? SKYLA  Missed doses on regimen?   SKYLA    Diabetes Complications include:     none    Complicating diabetes co morbidities:   CIRRHOSIS and Glucocorticoid use       HPI:   Patient is a 59 y.o. 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 Patient admitted for liver transplant. Endocrinology consulted for BG/ DM management.                       Interval HPI:   Received in ICU. Remains intubated and sedated. BG above goal on IV insulin infusion rates 3-6 u/hr.   Eating:   NPO  Nausea: No  Hypoglycemia and intervention: No  Fever: No  TPN and/or TF: No    PMH, PSH, FH, SH  reviewed     Review of Systems   Unable to obtain due to: Sedation,Intubation,Altered mental status,Critical illness,Reviewed ROS from note dated 7/3/19 per Dr. Merritt.       Current Medications and/or Treatments Impacting Glycemic Control  Immunotherapy:    Immunosuppressants         Stop Route Frequency     mycophenolate mofetil 200 mg/mL suspension 1,000 mg      -- Oral 2 times daily     tacrolimus (PROGRAF) 1 mg/mL oral syringe      -- Oral 2 times daily        Steroids:   Hormones (From admission, onward)    Start     Stop Route Frequency Ordered    07/10/19 0900  predniSONE tablet 20 mg  (methylprednisolone taper panel)      -- Oral Daily 07/04/19 0555    07/09/19 0900  methylPREDNISolone sodium succinate injection 20 mg  (methylprednisolone taper panel)      07/10 0859 IV Every 12 hours 07/04/19 0555     07/08/19 0900  methylPREDNISolone sodium succinate injection 40 mg  (methylprednisolone taper panel)      07/09 0859 IV Every 12 hours 07/04/19 0555 07/07/19 0900  methylPREDNISolone sodium succinate injection 60 mg  (methylprednisolone taper panel)      07/08 0859 IV Every 12 hours 07/04/19 0555    07/06/19 0900  methylPREDNISolone sodium succinate injection 80 mg  (methylprednisolone taper panel)      07/07 0859 IV Every 12 hours 07/04/19 0555 07/05/19 0900  methylPREDNISolone sodium succinate injection 100 mg  (methylprednisolone taper panel)      07/06 0859 IV Every 12 hours 07/04/19 0555    07/03/19 2222  methylPREDNISolone sodium succinate injection 500 mg  (Med - Immunosuppression Induction Therapy (Methylprednisolone))      -- IV On Call Procedure 07/03/19 2222        Pressors:    Autonomic Drugs (From admission, onward)    None        Hyperglycemia/Diabetes Medications:   Antihyperglycemics (From admission, onward)    Start     Stop Route Frequency Ordered    07/04/19 0730  insulin regular (Humulin R) 100 Units in sodium chloride 0.9% 100 mL infusion     Question:  Insulin Rate Adjustment (DO NOT MODIFY ANSWER)  Answer:  \\[email or web address]    -- IV Continuous 07/04/19 0621             PHYSICAL EXAMINATION:  Vitals:    07/04/19 0700   BP:    Pulse: 90   Resp: 10   Temp:      Body mass index is 32.35 kg/m².    Physical Exam   Constitutional: Well developed, well nourished, NAD.  ENT: External ears no masses with nose patent.  Neck: Supple; trachea midline.  Cardiovascular: Normal heart sounds, no LE edema. DP +2 bilaterally.  Lungs: Normal effort; lungs anterior bilaterally clear to auscultation.  Abdomen: Soft, no masses, no hernias.  MS: No clubbing or cyanosis of nails noted;  unable to assess gait.  Skin: No rashes, lesions, or ulcers; no nodules. Chevron abdominal incision with dressing; JUVE x2.   Psychiatric: SKYLA  Neurological: SKYLA  Foot:  nails in good condition, no amputations noted.          Labs Reviewed and Include   Recent Labs   Lab 07/04/19  0558 07/04/19  1000   *  216* 286*   CALCIUM 9.2  9.2 8.5*   ALBUMIN 3.4*  3.4*  --    PROT 6.8  6.8  --      140 140   K 3.2*  3.2* 3.3*   CO2 23  23 21*     101 102   BUN 30*  30* 30*   CREATININE 1.9*  1.9* 2.0*   ALKPHOS 155*  155*  --    ALT 1,292*  1,292*  --    AST 1,954*  1,954*  1,954* 2,042*   BILITOT 1.7*  1.7*  --      Lab Results   Component Value Date    WBC 14.62 (H) 07/04/2019    HGB 13.9 (L) 07/04/2019    HCT 39.7 (L) 07/04/2019    MCV 84 07/04/2019     07/04/2019     No results for input(s): TSH, FREET4 in the last 168 hours.  Lab Results   Component Value Date    HGBA1C 5.5 07/03/2019       Nutritional status:   Body mass index is 32.35 kg/m².  Lab Results   Component Value Date    ALBUMIN 3.4 (L) 07/04/2019    ALBUMIN 3.4 (L) 07/04/2019    ALBUMIN 3.5 07/04/2019     No results found for: PREALBUMIN    Estimated Creatinine Clearance: 42.8 mL/min (A) (based on SCr of 2 mg/dL (H)).    Accu-Checks  Recent Labs     07/04/19  0600 07/04/19  0712 07/04/19  0800 07/04/19  0909 07/04/19  1016 07/04/19  1113   POCTGLUCOSE 207* 250* 271* 305* 280* 295*        ASSESSMENT and PLAN    * Cirrhosis  S/p liver transplant.       Type 2 diabetes mellitus, without long-term current use of insulin  BG goal 140 - 180   Continue IV insulin infusion protocol  Requires intensive BG monitoring while on protocol         S/P liver transplant  Managed per LTS  avoid hypoglycemia  Lab Results   Component Value Date    ALT 1,292 (H) 07/04/2019    ALT 1,292 (H) 07/04/2019    AST 1,954 (H) 07/04/2019    AST 1,954 (H) 07/04/2019    AST 1,954 (H) 07/04/2019     (H) 07/04/2019     (H) 07/04/2019    ALKPHOS 155 (H) 07/04/2019    ALKPHOS 155 (H) 07/04/2019    BILITOT 1.7 (H) 07/04/2019    BILITOT 1.7 (H) 07/04/2019           Adrenal cortical steroids causing adverse  effect in therapeutic use  Glucocorticoids markedly increase glucoses. Expect the steroid taper will help glucose control.       Prophylactic immunotherapy  May increase insulin resistance.         Obesity (BMI 30.0-34.9)  May increase insulin resistance.   Body mass index is 32.35 kg/m².            Plan discussed with family and RN at bedside.     Stefania Childers NP  Endocrinology  [facility]

## 2019-07-04 NOTE — SUBJECTIVE & OBJECTIVE
Interval HPI:   Received in ICU. Remains intubated and sedated. BG above goal on IV insulin infusion rates 3-6 u/hr.   Eating:   NPO  Nausea: No  Hypoglycemia and intervention: No  Fever: No  TPN and/or TF: No    PMH, PSH, FH, SH  reviewed     Review of Systems   Unable to obtain due to: Sedation,Intubation,Altered mental status,Critical illness,Reviewed ROS from note dated 7/3/19 per Dr. Merritt.       Current Medications and/or Treatments Impacting Glycemic Control  Immunotherapy:    Immunosuppressants         Stop Route Frequency     mycophenolate mofetil 200 mg/mL suspension 1,000 mg      -- Oral 2 times daily     tacrolimus (PROGRAF) 1 mg/mL oral syringe      -- Oral 2 times daily        Steroids:   Hormones (From admission, onward)    Start     Stop Route Frequency Ordered    07/10/19 0900  predniSONE tablet 20 mg  (methylprednisolone taper panel)      -- Oral Daily 07/04/19 0555    07/09/19 0900  methylPREDNISolone sodium succinate injection 20 mg  (methylprednisolone taper panel)      07/10 0859 IV Every 12 hours 07/04/19 0555    07/08/19 0900  methylPREDNISolone sodium succinate injection 40 mg  (methylprednisolone taper panel)      07/09 0859 IV Every 12 hours 07/04/19 0555    07/07/19 0900  methylPREDNISolone sodium succinate injection 60 mg  (methylprednisolone taper panel)      07/08 0859 IV Every 12 hours 07/04/19 0555    07/06/19 0900  methylPREDNISolone sodium succinate injection 80 mg  (methylprednisolone taper panel)      07/07 0859 IV Every 12 hours 07/04/19 0555    07/05/19 0900  methylPREDNISolone sodium succinate injection 100 mg  (methylprednisolone taper panel)      07/06 0859 IV Every 12 hours 07/04/19 0555    07/03/19 2222  methylPREDNISolone sodium succinate injection 500 mg  (Med - Immunosuppression Induction Therapy (Methylprednisolone))      -- IV On Call Procedure 07/03/19 2222        Pressors:    Autonomic Drugs (From admission, onward)    None        Hyperglycemia/Diabetes Medications:    Antihyperglycemics (From admission, onward)    Start     Stop Route Frequency Ordered    07/04/19 0730  insulin regular (Humulin R) 100 Units in sodium chloride 0.9% 100 mL infusion     Question:  Insulin Rate Adjustment (DO NOT MODIFY ANSWER)  Answer:  [email or web address] [id number]    -- IV Continuous 07/04/19 0621             PHYSICAL EXAMINATION:  Vitals:    07/04/19 0700   BP:    Pulse: 90   Resp: 10   Temp:      Body mass index is 32.35 kg/m².    Physical Exam   Constitutional: Well developed, well nourished, NAD.  ENT: External ears no masses with nose patent.  Neck: Supple; trachea midline.  Cardiovascular: Normal heart sounds, no LE edema. DP +2 bilaterally.  Lungs: Normal effort; lungs anterior bilaterally clear to auscultation.  Abdomen: Soft, no masses, no hernias.  MS: No clubbing or cyanosis of nails noted;  unable to assess gait.  Skin: No rashes, lesions, or ulcers; no nodules. Chevron abdominal incision with dressing; JUVE x2.   Psychiatric: SKYLA  Neurological: SKYLA  Foot: nails in good condition, no amputations noted.

## 2019-07-04 NOTE — TELEPHONE ENCOUNTER
ORGAN OFFER NOTE    Notified by Jaime Martinez , of liver offer with donor information.  Donor and recipient information read back and verified.  Spoke with Roman Hodges via  [facility]  and identified no acute medical issues during telephone assessment.  Patient instructed NPO. Patient instructed to leave within 15 minutes of this phone call and report to the admit office.  Patient verbalized understanding and willingness to come in for transplant.  ETA: 30min*.

## 2019-07-04 NOTE — HPI
Reason for Consult: Management of T2DM, Hyperglycemia     Surgical Procedure and Date: Liver transplant 7/4/19    Diabetes diagnosis year: 2018    Home Diabetes Medications:  none  Lab Results   Component Value Date    HGBA1C 5.5 07/03/2019       How often checking glucose at home? Not checking       Diabetes Complications include:     none    Complicating diabetes co morbidities:   CIRRHOSIS and Glucocorticoid use       HPI:   Patient is a 59 y.o. 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 Patient admitted for liver transplant. Endocrinology consulted for BG/ DM management.

## 2019-07-04 NOTE — ASSESSMENT & PLAN NOTE
Managed per LTS  avoid hypoglycemia  Lab Results   Component Value Date    ALT 1,292 (H) 07/04/2019    ALT 1,292 (H) 07/04/2019    AST 1,954 (H) 07/04/2019    AST 1,954 (H) 07/04/2019    AST 1,954 (H) 07/04/2019     (H) 07/04/2019     (H) 07/04/2019    ALKPHOS 155 (H) 07/04/2019    ALKPHOS 155 (H) 07/04/2019    BILITOT 1.7 (H) 07/04/2019    BILITOT 1.7 (H) 07/04/2019

## 2019-07-04 NOTE — TRANSFER OF CARE
"Anesthesia Transfer of Care Note    Patient: Roman Hodges    Procedure(s) Performed: Procedure(s) (LRB):  TRANSPLANT, LIVER (N/A)    Patient location: ICU    Anesthesia Type: general    Transport from OR: Transported from OR intubated on 100% O2 by AMBU with assisted ventilation. Continuous ECG monitoring in transport. Continuous SpO2 monitoring in transport. Continuos invasive BP monitoring in transport    Post pain: adequate analgesia    Post assessment: no apparent anesthetic complications and tolerated procedure well    Post vital signs: stable    Level of consciousness: sedated    Nausea/Vomiting: no nausea/vomiting    Complications: none    Transfer of care protocol was followedComments: See ICU vital signs and vent settings      Last vitals:   Visit Vitals  BP (!) 159/87   Pulse 83   Temp 37.1 °C (98.7 °F) (Oral)   Resp 18   Ht 5' 7" (1.702 m)   Wt 93.7 kg (206 lb 9.1 oz)   SpO2 97%   BMI 32.35 kg/m²     "

## 2019-07-04 NOTE — PROGRESS NOTES
Episode of Tachycardia in the 170s on monitor sustained for approximately 1 minute.  Dr. Caro notified and came to bedside. EKG ordered.

## 2019-07-04 NOTE — PROGRESS NOTES
HR 140s, STAT EKG ordered. ST confirmed. Pt awake and following commands. Complaining of pain unrelieved by 0.5 IV dilaudid. Also notified of all lab value results. Dr. Maldonado and SICU team aware. See chart for orders.

## 2019-07-04 NOTE — NURSING
Pt arrived from OR @ 0550. Arrived intubated, AC, currently on 50%, 5 PEEP, sats >98% . SBP <160 and MAPs >65 maintained. Pt NSR HR 80-90s. CVP 3. Currently, 500ml albumin infusing. Propofol @35mcg/kg/min, cardene @ 5mg/hr, Inuslin @ 3units/hr, and D51/2NS @ 100ml/hr. Pt awakens to voice and moves all extremities spontaneously. UO 600cc since arrival. 30cc from JUVE drains and chevron incision dressing CDI. All labs sent per order. Plan of care reviewed. Questions and concerns addressed. Will continue to monitor.

## 2019-07-05 PROBLEM — K74.60 CIRRHOSIS: Status: RESOLVED | Noted: 2019-07-03 | Resolved: 2019-07-05

## 2019-07-05 PROBLEM — Z85.05 HISTORY OF PRIMARY LIVER CANCER: Status: ACTIVE | Noted: 2018-10-24

## 2019-07-05 LAB
ALBUMIN SERPL BCP-MCNC: 4 G/DL (ref 3.5–5.2)
ALP SERPL-CCNC: 115 U/L (ref 55–135)
ALT SERPL W/O P-5'-P-CCNC: 1128 U/L (ref 10–44)
ANION GAP SERPL CALC-SCNC: 11 MMOL/L (ref 8–16)
APTT BLDCRRT: 44.2 SEC (ref 21–32)
AST SERPL-CCNC: 1013 U/L (ref 10–40)
AST SERPL-CCNC: 1113 U/L (ref 10–40)
BACTERIA UR CULT: ABNORMAL
BASOPHILS # BLD AUTO: 0.04 K/UL (ref 0–0.2)
BASOPHILS NFR BLD: 0.2 % (ref 0–1.9)
BILIRUB DIRECT SERPL-MCNC: 0.3 MG/DL (ref 0.1–0.3)
BILIRUB DIRECT SERPL-MCNC: 1.1 MG/DL (ref 0.1–0.3)
BILIRUB SERPL-MCNC: 1.9 MG/DL (ref 0.1–1)
BLD PROD TYP BPU: NORMAL
BLOOD UNIT EXPIRATION DATE: NORMAL
BLOOD UNIT TYPE CODE: 5100
BLOOD UNIT TYPE CODE: 9500
BLOOD UNIT TYPE: NORMAL
BUN SERPL-MCNC: 18 MG/DL (ref 6–20)
CALCIUM SERPL-MCNC: 8.7 MG/DL (ref 8.7–10.5)
CHLORIDE SERPL-SCNC: 107 MMOL/L (ref 95–110)
CO2 SERPL-SCNC: 24 MMOL/L (ref 23–29)
CODING SYSTEM: NORMAL
CREAT SERPL-MCNC: 1.1 MG/DL (ref 0.5–1.4)
DIFFERENTIAL METHOD: ABNORMAL
DISPENSE STATUS: NORMAL
EOSINOPHIL # BLD AUTO: 0 K/UL (ref 0–0.5)
EOSINOPHIL NFR BLD: 0 % (ref 0–8)
ERYTHROCYTE [DISTWIDTH] IN BLOOD BY AUTOMATED COUNT: 13.9 % (ref 11.5–14.5)
EST. GFR  (AFRICAN AMERICAN): >60 ML/MIN/1.73 M^2
EST. GFR  (NON AFRICAN AMERICAN): >60 ML/MIN/1.73 M^2
GGT SERPL-CCNC: 159 U/L (ref 8–55)
GGT SERPL-CCNC: 312 U/L (ref 8–55)
GLUCOSE SERPL-MCNC: 174 MG/DL (ref 70–110)
HBV SURFACE AG SERPL QL IA: NEGATIVE
HCT VFR BLD AUTO: 41.5 % (ref 40–54)
HGB BLD-MCNC: 14.1 G/DL (ref 14–18)
HIV 1+2 AB+HIV1 P24 AG SERPL QL IA: NEGATIVE
IMM GRANULOCYTES # BLD AUTO: 0.12 K/UL (ref 0–0.04)
IMM GRANULOCYTES NFR BLD AUTO: 0.6 % (ref 0–0.5)
INR PPP: 1.6 (ref 0.8–1.2)
LACTATE SERPL-SCNC: 0.8 MMOL/L (ref 0.5–2.2)
LACTATE SERPL-SCNC: 1 MMOL/L (ref 0.5–2.2)
LYMPHOCYTES # BLD AUTO: 0.4 K/UL (ref 1–4.8)
LYMPHOCYTES NFR BLD: 1.9 % (ref 18–48)
MCH RBC QN AUTO: 30.1 PG (ref 27–31)
MCHC RBC AUTO-ENTMCNC: 34 G/DL (ref 32–36)
MCV RBC AUTO: 89 FL (ref 82–98)
MONOCYTES # BLD AUTO: 0.7 K/UL (ref 0.3–1)
MONOCYTES NFR BLD: 3.4 % (ref 4–15)
NEUTROPHILS # BLD AUTO: 17.9 K/UL (ref 1.8–7.7)
NEUTROPHILS NFR BLD: 93.9 % (ref 38–73)
NRBC BLD-RTO: 0 /100 WBC
NUM UNITS TRANS FFP: NORMAL
PLATELET # BLD AUTO: 131 K/UL (ref 150–350)
PMV BLD AUTO: 10.5 FL (ref 9.2–12.9)
POCT GLUCOSE: 134 MG/DL (ref 70–110)
POCT GLUCOSE: 146 MG/DL (ref 70–110)
POCT GLUCOSE: 163 MG/DL (ref 70–110)
POCT GLUCOSE: 168 MG/DL (ref 70–110)
POCT GLUCOSE: 169 MG/DL (ref 70–110)
POCT GLUCOSE: 170 MG/DL (ref 70–110)
POCT GLUCOSE: 172 MG/DL (ref 70–110)
POCT GLUCOSE: 173 MG/DL (ref 70–110)
POCT GLUCOSE: 178 MG/DL (ref 70–110)
POCT GLUCOSE: 178 MG/DL (ref 70–110)
POCT GLUCOSE: 187 MG/DL (ref 70–110)
POCT GLUCOSE: 194 MG/DL (ref 70–110)
POCT GLUCOSE: 196 MG/DL (ref 70–110)
POTASSIUM SERPL-SCNC: 3.4 MMOL/L (ref 3.5–5.1)
PROT SERPL-MCNC: 6.8 G/DL (ref 6–8.4)
PROTHROMBIN TIME: 16 SEC (ref 9–12.5)
RBC # BLD AUTO: 4.69 M/UL (ref 4.6–6.2)
SODIUM SERPL-SCNC: 142 MMOL/L (ref 136–145)
TACROLIMUS BLD-MCNC: 2.3 NG/ML (ref 5–15)
WBC # BLD AUTO: 19.05 K/UL (ref 3.9–12.7)

## 2019-07-05 PROCEDURE — 82977 ASSAY OF GGT: CPT | Mod: 91

## 2019-07-05 PROCEDURE — 63600175 PHARM REV CODE 636 W HCPCS: Performed by: STUDENT IN AN ORGANIZED HEALTH CARE EDUCATION/TRAINING PROGRAM

## 2019-07-05 PROCEDURE — 25000003 PHARM REV CODE 250: Performed by: STUDENT IN AN ORGANIZED HEALTH CARE EDUCATION/TRAINING PROGRAM

## 2019-07-05 PROCEDURE — 85610 PROTHROMBIN TIME: CPT

## 2019-07-05 PROCEDURE — 27000190 HC CPAP FULL FACE MASK W/VALVE

## 2019-07-05 PROCEDURE — P9045 ALBUMIN (HUMAN), 5%, 250 ML: HCPCS | Mod: JG | Performed by: STUDENT IN AN ORGANIZED HEALTH CARE EDUCATION/TRAINING PROGRAM

## 2019-07-05 PROCEDURE — 99900035 HC TECH TIME PER 15 MIN (STAT)

## 2019-07-05 PROCEDURE — 93041 RHYTHM ECG TRACING: CPT

## 2019-07-05 PROCEDURE — 99232 SBSQ HOSP IP/OBS MODERATE 35: CPT | Mod: ,,, | Performed by: NURSE PRACTITIONER

## 2019-07-05 PROCEDURE — 85025 COMPLETE CBC W/AUTO DIFF WBC: CPT

## 2019-07-05 PROCEDURE — 25000003 PHARM REV CODE 250: Performed by: PHYSICIAN ASSISTANT

## 2019-07-05 PROCEDURE — 97161 PT EVAL LOW COMPLEX 20 MIN: CPT

## 2019-07-05 PROCEDURE — 99233 SBSQ HOSP IP/OBS HIGH 50: CPT | Mod: 24,,, | Performed by: SURGERY

## 2019-07-05 PROCEDURE — 82248 BILIRUBIN DIRECT: CPT | Mod: 91

## 2019-07-05 PROCEDURE — 20000000 HC ICU ROOM

## 2019-07-05 PROCEDURE — 99233 PR SUBSEQUENT HOSPITAL CARE,LEVL III: ICD-10-PCS | Mod: 24,,, | Performed by: SURGERY

## 2019-07-05 PROCEDURE — 85730 THROMBOPLASTIN TIME PARTIAL: CPT

## 2019-07-05 PROCEDURE — 99232 PR SUBSEQUENT HOSPITAL CARE,LEVL II: ICD-10-PCS | Mod: ,,, | Performed by: NURSE PRACTITIONER

## 2019-07-05 PROCEDURE — 83605 ASSAY OF LACTIC ACID: CPT

## 2019-07-05 PROCEDURE — 82977 ASSAY OF GGT: CPT

## 2019-07-05 PROCEDURE — 82248 BILIRUBIN DIRECT: CPT

## 2019-07-05 PROCEDURE — 63600175 PHARM REV CODE 636 W HCPCS: Performed by: NURSE PRACTITIONER

## 2019-07-05 PROCEDURE — 80197 ASSAY OF TACROLIMUS: CPT

## 2019-07-05 PROCEDURE — 94660 CPAP INITIATION&MGMT: CPT

## 2019-07-05 PROCEDURE — 80053 COMPREHEN METABOLIC PANEL: CPT

## 2019-07-05 RX ORDER — AMLODIPINE BESYLATE 5 MG/1
5 TABLET ORAL 2 TIMES DAILY
Status: DISCONTINUED | OUTPATIENT
Start: 2019-07-05 | End: 2019-07-06

## 2019-07-05 RX ORDER — GLUCAGON 1 MG
1 KIT INJECTION
Status: DISCONTINUED | OUTPATIENT
Start: 2019-07-05 | End: 2019-07-09 | Stop reason: HOSPADM

## 2019-07-05 RX ORDER — INSULIN ASPART 100 [IU]/ML
1-10 INJECTION, SOLUTION INTRAVENOUS; SUBCUTANEOUS
Status: DISCONTINUED | OUTPATIENT
Start: 2019-07-05 | End: 2019-07-09 | Stop reason: HOSPADM

## 2019-07-05 RX ORDER — LABETALOL HYDROCHLORIDE 5 MG/ML
10 INJECTION, SOLUTION INTRAVENOUS EVERY 30 MIN PRN
Status: DISCONTINUED | OUTPATIENT
Start: 2019-07-05 | End: 2019-07-05

## 2019-07-05 RX ORDER — PREDNISONE 5 MG/1
TABLET ORAL
Qty: 70 TABLET | Refills: 0 | Status: SHIPPED | OUTPATIENT
Start: 2019-07-05 | End: 2019-08-07

## 2019-07-05 RX ORDER — SULFAMETHOXAZOLE AND TRIMETHOPRIM 400; 80 MG/1; MG/1
1 TABLET ORAL DAILY
Qty: 30 TABLET | Refills: 5 | Status: SHIPPED | OUTPATIENT
Start: 2019-07-06 | End: 2020-01-10

## 2019-07-05 RX ORDER — ALBUMIN HUMAN 50 G/1000ML
25 SOLUTION INTRAVENOUS ONCE
Status: COMPLETED | OUTPATIENT
Start: 2019-07-05 | End: 2019-07-05

## 2019-07-05 RX ORDER — VALGANCICLOVIR 450 MG/1
450 TABLET, FILM COATED ORAL DAILY
Qty: 30 TABLET | Refills: 2 | Status: SHIPPED | OUTPATIENT
Start: 2019-07-05 | End: 2020-01-20

## 2019-07-05 RX ORDER — HEPARIN SODIUM 5000 [USP'U]/ML
5000 INJECTION, SOLUTION INTRAVENOUS; SUBCUTANEOUS EVERY 8 HOURS
Status: DISCONTINUED | OUTPATIENT
Start: 2019-07-05 | End: 2019-07-08

## 2019-07-05 RX ORDER — MYCOPHENOLATE MOFETIL 250 MG/1
1000 CAPSULE ORAL 2 TIMES DAILY
Qty: 240 CAPSULE | Refills: 2 | Status: SHIPPED | OUTPATIENT
Start: 2019-07-05 | End: 2019-10-17 | Stop reason: SDUPTHER

## 2019-07-05 RX ORDER — MYCOPHENOLATE MOFETIL 250 MG/1
1000 CAPSULE ORAL 2 TIMES DAILY
Qty: 240 CAPSULE | Refills: 2 | Status: SHIPPED | OUTPATIENT
Start: 2019-07-05 | End: 2019-07-05 | Stop reason: SDUPTHER

## 2019-07-05 RX ORDER — TACROLIMUS 1 MG/1
6 CAPSULE ORAL EVERY 12 HOURS
Qty: 360 CAPSULE | Refills: 11 | Status: SHIPPED | OUTPATIENT
Start: 2019-07-05 | End: 2019-07-11 | Stop reason: DRUGHIGH

## 2019-07-05 RX ORDER — TACROLIMUS 1 MG/1
6 CAPSULE ORAL EVERY 12 HOURS
Qty: 360 CAPSULE | Refills: 11 | Status: SHIPPED | OUTPATIENT
Start: 2019-07-05 | End: 2019-07-05 | Stop reason: SDUPTHER

## 2019-07-05 RX ORDER — ALBUMIN HUMAN 50 G/1000ML
12.5 SOLUTION INTRAVENOUS ONCE
Status: COMPLETED | OUTPATIENT
Start: 2019-07-05 | End: 2019-07-05

## 2019-07-05 RX ORDER — MYCOPHENOLATE MOFETIL 250 MG/1
1000 CAPSULE ORAL 2 TIMES DAILY
Status: DISCONTINUED | OUTPATIENT
Start: 2019-07-05 | End: 2019-07-09 | Stop reason: HOSPADM

## 2019-07-05 RX ORDER — AMLODIPINE BESYLATE 5 MG/1
5 TABLET ORAL DAILY
Status: DISCONTINUED | OUTPATIENT
Start: 2019-07-05 | End: 2019-07-05

## 2019-07-05 RX ORDER — POTASSIUM CHLORIDE 29.8 MG/ML
40 INJECTION INTRAVENOUS ONCE
Status: COMPLETED | OUTPATIENT
Start: 2019-07-05 | End: 2019-07-05

## 2019-07-05 RX ORDER — IBUPROFEN 200 MG
24 TABLET ORAL
Status: DISCONTINUED | OUTPATIENT
Start: 2019-07-05 | End: 2019-07-09 | Stop reason: HOSPADM

## 2019-07-05 RX ORDER — IBUPROFEN 200 MG
16 TABLET ORAL
Status: DISCONTINUED | OUTPATIENT
Start: 2019-07-05 | End: 2019-07-09 | Stop reason: HOSPADM

## 2019-07-05 RX ORDER — ONDANSETRON 2 MG/ML
4 INJECTION INTRAMUSCULAR; INTRAVENOUS EVERY 6 HOURS PRN
Status: DISCONTINUED | OUTPATIENT
Start: 2019-07-05 | End: 2019-07-09 | Stop reason: HOSPADM

## 2019-07-05 RX ADMIN — PANTOPRAZOLE SODIUM 40 MG: 40 TABLET, DELAYED RELEASE ORAL at 08:07

## 2019-07-05 RX ADMIN — NYSTATIN 500000 UNITS: 500000 SUSPENSION ORAL at 07:07

## 2019-07-05 RX ADMIN — SODIUM CHLORIDE: 0.9 INJECTION, SOLUTION INTRAVENOUS at 11:07

## 2019-07-05 RX ADMIN — AMLODIPINE BESYLATE 5 MG: 5 TABLET ORAL at 05:07

## 2019-07-05 RX ADMIN — METHYLPREDNISOLONE SODIUM SUCCINATE 100 MG: 125 INJECTION, POWDER, FOR SOLUTION INTRAMUSCULAR; INTRAVENOUS at 09:07

## 2019-07-05 RX ADMIN — OXYCODONE HYDROCHLORIDE 10 MG: 10 TABLET ORAL at 01:07

## 2019-07-05 RX ADMIN — Medication 1000 MG: at 09:07

## 2019-07-05 RX ADMIN — METHYLPREDNISOLONE SODIUM SUCCINATE 100 MG: 125 INJECTION, POWDER, FOR SOLUTION INTRAMUSCULAR; INTRAVENOUS at 08:07

## 2019-07-05 RX ADMIN — NYSTATIN 500000 UNITS: 500000 SUSPENSION ORAL at 09:07

## 2019-07-05 RX ADMIN — HYDROMORPHONE HYDROCHLORIDE 0.5 MG: 1 INJECTION, SOLUTION INTRAMUSCULAR; INTRAVENOUS; SUBCUTANEOUS at 04:07

## 2019-07-05 RX ADMIN — SULFAMETHOXAZOLE AND TRIMETHOPRIM 1 TABLET: 400; 80 TABLET ORAL at 09:07

## 2019-07-05 RX ADMIN — ALBUMIN (HUMAN) 12.5 G: 12.5 SOLUTION INTRAVENOUS at 07:07

## 2019-07-05 RX ADMIN — HEPARIN SODIUM 5000 UNITS: 5000 INJECTION, SOLUTION INTRAVENOUS; SUBCUTANEOUS at 09:07

## 2019-07-05 RX ADMIN — METOPROLOL TARTRATE 25 MG: 25 TABLET ORAL at 09:07

## 2019-07-05 RX ADMIN — ALBUMIN (HUMAN) 25 G: 12.5 SOLUTION INTRAVENOUS at 09:07

## 2019-07-05 RX ADMIN — OXYCODONE HYDROCHLORIDE 10 MG: 10 TABLET ORAL at 05:07

## 2019-07-05 RX ADMIN — INSULIN ASPART 2 UNITS: 100 INJECTION, SOLUTION INTRAVENOUS; SUBCUTANEOUS at 12:07

## 2019-07-05 RX ADMIN — HYDRALAZINE HYDROCHLORIDE 10 MG: 20 INJECTION INTRAMUSCULAR; INTRAVENOUS at 07:07

## 2019-07-05 RX ADMIN — MYCOPHENOLATE MOFETIL 1000 MG: 250 CAPSULE ORAL at 09:07

## 2019-07-05 RX ADMIN — POTASSIUM CHLORIDE 40 MEQ: 400 INJECTION, SOLUTION INTRAVENOUS at 07:07

## 2019-07-05 RX ADMIN — METOPROLOL TARTRATE 25 MG: 25 TABLET ORAL at 08:07

## 2019-07-05 RX ADMIN — HEPARIN SODIUM 5000 UNITS: 5000 INJECTION, SOLUTION INTRAVENOUS; SUBCUTANEOUS at 03:07

## 2019-07-05 RX ADMIN — INSULIN ASPART 1 UNITS: 100 INJECTION, SOLUTION INTRAVENOUS; SUBCUTANEOUS at 09:07

## 2019-07-05 RX ADMIN — Medication 1 MG: at 09:07

## 2019-07-05 RX ADMIN — AMLODIPINE BESYLATE 5 MG: 5 TABLET ORAL at 09:07

## 2019-07-05 RX ADMIN — ONDANSETRON 4 MG: 2 INJECTION INTRAMUSCULAR; INTRAVENOUS at 06:07

## 2019-07-05 RX ADMIN — INSULIN ASPART 2 UNITS: 100 INJECTION, SOLUTION INTRAVENOUS; SUBCUTANEOUS at 05:07

## 2019-07-05 RX ADMIN — NYSTATIN 500000 UNITS: 500000 SUSPENSION ORAL at 03:07

## 2019-07-05 RX ADMIN — Medication 3 MG: at 06:07

## 2019-07-05 RX ADMIN — OXYCODONE HYDROCHLORIDE 10 MG: 10 TABLET ORAL at 11:07

## 2019-07-05 NOTE — SUBJECTIVE & OBJECTIVE
"Interval HPI:   Overnight events: Remains in ICU. Extubated. BG well controlled overnight; insulin infusion suspended for orders.   Eating:    <25 % of clears   Nausea: No  Hypoglycemia and intervention: No  Fever: No  TPN and/or TF: No    BP (!) 142/77   Pulse 100   Temp 98.8 °F (37.1 °C) (Oral)   Resp 16   Ht 5' 7" (1.702 m)   Wt 93.7 kg (206 lb 9.1 oz)   SpO2 (!) 93%   BMI 32.35 kg/m²     Labs Reviewed and Include    Recent Labs   Lab 07/05/19  0501   *   CALCIUM 8.7   ALBUMIN 4.0   PROT 6.8      K 3.4*   CO2 24      BUN 18   CREATININE 1.1   ALKPHOS 115   ALT 1,128*   AST 1,013*   BILITOT 1.9*     Lab Results   Component Value Date    WBC 19.05 (H) 07/05/2019    HGB 14.1 07/05/2019    HCT 41.5 07/05/2019    MCV 89 07/05/2019     (L) 07/05/2019     No results for input(s): TSH, FREET4 in the last 168 hours.  Lab Results   Component Value Date    HGBA1C 5.5 07/03/2019       Nutritional status:   Body mass index is 32.35 kg/m².  Lab Results   Component Value Date    ALBUMIN 4.0 07/05/2019    ALBUMIN 4.5 07/04/2019    ALBUMIN 3.4 (L) 07/04/2019    ALBUMIN 3.4 (L) 07/04/2019     No results found for: PREALBUMIN    Estimated Creatinine Clearance: 77.9 mL/min (based on SCr of 1.1 mg/dL).    Accu-Checks  Recent Labs     07/04/19  2312 07/05/19  0020 07/05/19  0109 07/05/19  0220 07/05/19  0320 07/05/19  0405 07/05/19  0457 07/05/19  0624 07/05/19  0702 07/05/19  0802   POCTGLUCOSE 137* 168* 146* 173* 169* 134* 170* 178* 172* 163*       Current Medications and/or Treatments Impacting Glycemic Control  Immunotherapy:    Immunosuppressants         Stop Route Frequency     mycophenolate mofetil 200 mg/mL suspension 1,000 mg      -- Oral 2 times daily     tacrolimus (PROGRAF) 1 mg/mL oral syringe      -- Oral 2 times daily        Steroids:   Hormones (From admission, onward)    Start     Stop Route Frequency Ordered    07/10/19 0900  predniSONE tablet 20 mg  (methylprednisolone taper panel)   "    -- Oral Daily 07/04/19 0555    07/09/19 0900  methylPREDNISolone sodium succinate injection 20 mg  (methylprednisolone taper panel)      07/10 0859 IV Every 12 hours 07/04/19 0555    07/08/19 0900  methylPREDNISolone sodium succinate injection 40 mg  (methylprednisolone taper panel)      07/09 0859 IV Every 12 hours 07/04/19 0555    07/07/19 0900  methylPREDNISolone sodium succinate injection 60 mg  (methylprednisolone taper panel)      07/08 0859 IV Every 12 hours 07/04/19 0555    07/06/19 0900  methylPREDNISolone sodium succinate injection 80 mg  (methylprednisolone taper panel)      07/07 0859 IV Every 12 hours 07/04/19 0555    07/05/19 0900  methylPREDNISolone sodium succinate injection 100 mg  (methylprednisolone taper panel)      07/06 0859 IV Every 12 hours 07/04/19 0555 07/03/19 2222  methylPREDNISolone sodium succinate injection 500 mg  (Med - Immunosuppression Induction Therapy (Methylprednisolone))      -- IV On Call Procedure 07/03/19 2222        Pressors:    Autonomic Drugs (From admission, onward)    Start     Stop Route Frequency Ordered    07/04/19 1330  metoprolol tartrate (LOPRESSOR) split tablet 12.5 mg      -- Oral Once 07/04/19 1223        Hyperglycemia/Diabetes Medications:   Antihyperglycemics (From admission, onward)    Start     Stop Route Frequency Ordered    07/04/19 0730  insulin regular (Humulin R) 100 Units in sodium chloride 0.9% 100 mL infusion     Question:  Insulin Rate Adjustment (DO NOT MODIFY ANSWER)  Answer:  [email or web address] [id number]    -- IV Continuous 07/04/19 0621

## 2019-07-05 NOTE — CARE UPDATE
RT was able to temporarily place pt on NIV, it remained on for approximately 3 minutes before he wished to have it removed. The whistling from the exhalation valve would not allow him to sleep, provider and Rn has been informed.

## 2019-07-05 NOTE — PROGRESS NOTES
"[facility]  Endocrinology  Progress Note    Admit Date: 7/3/2019     Reason for Consult: Management of T2DM, Hyperglycemia     Surgical Procedure and Date: liver transplant     Diabetes diagnosis year: 2018    Home Diabetes Medications:  none  Lab Results   Component Value Date    HGBA1C 5.5 07/03/2019       How often checking glucose at home? Not checking       Diabetes Complications include:     none    Complicating diabetes co morbidities:   CIRRHOSIS and Glucocorticoid use       HPI:   Patient is a 59 y.o. 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 Patient admitted for liver transplant. Endocrinology consulted for BG/ DM management.                       Interval HPI:   Overnight events: Remains in ICU. Extubated. BG well controlled overnight; insulin infusion suspended for orders.   Eating:    <25 % of clears   Nausea: No  Hypoglycemia and intervention: No  Fever: No  TPN and/or TF: No    BP (!) 142/77   Pulse 100   Temp 98.8 °F (37.1 °C) (Oral)   Resp 16   Ht 5' 7" (1.702 m)   Wt 93.7 kg (206 lb 9.1 oz)   SpO2 (!) 93%   BMI 32.35 kg/m²      Labs Reviewed and Include    Recent Labs   Lab 07/05/19  0501   *   CALCIUM 8.7   ALBUMIN 4.0   PROT 6.8      K 3.4*   CO2 24      BUN 18   CREATININE 1.1   ALKPHOS 115   ALT 1,128*   AST 1,013*   BILITOT 1.9*     Lab Results   Component Value Date    WBC 19.05 (H) 07/05/2019    HGB 14.1 07/05/2019    HCT 41.5 07/05/2019    MCV 89 07/05/2019     (L) 07/05/2019     No results for input(s): TSH, FREET4 in the last 168 hours.  Lab Results   Component Value Date    HGBA1C 5.5 07/03/2019       Nutritional status:   Body mass index is 32.35 kg/m².  Lab Results   Component Value Date    ALBUMIN 4.0 07/05/2019    ALBUMIN 4.5 07/04/2019    ALBUMIN 3.4 (L) 07/04/2019    ALBUMIN 3.4 (L) 07/04/2019     No results found for: PREALBUMIN    Estimated Creatinine Clearance: 77.9 mL/min (based on SCr of 1.1 " mg/dL).    Accu-Checks  Recent Labs     07/04/19  2312 07/05/19  0020 07/05/19  0109 07/05/19  0220 07/05/19  0320 07/05/19  0405 07/05/19  0457 07/05/19  0624 07/05/19  0702 07/05/19  0802   POCTGLUCOSE 137* 168* 146* 173* 169* 134* 170* 178* 172* 163*       Current Medications and/or Treatments Impacting Glycemic Control  Immunotherapy:    Immunosuppressants         Stop Route Frequency     mycophenolate mofetil 200 mg/mL suspension 1,000 mg      -- Oral 2 times daily     tacrolimus (PROGRAF) 1 mg/mL oral syringe      -- Oral 2 times daily        Steroids:   Hormones (From admission, onward)    Start     Stop Route Frequency Ordered    07/10/19 0900  predniSONE tablet 20 mg  (methylprednisolone taper panel)      -- Oral Daily 07/04/19 0555    07/09/19 0900  methylPREDNISolone sodium succinate injection 20 mg  (methylprednisolone taper panel)      07/10 0859 IV Every 12 hours 07/04/19 0555    07/08/19 0900  methylPREDNISolone sodium succinate injection 40 mg  (methylprednisolone taper panel)      07/09 0859 IV Every 12 hours 07/04/19 0555    07/07/19 0900  methylPREDNISolone sodium succinate injection 60 mg  (methylprednisolone taper panel)      07/08 0859 IV Every 12 hours 07/04/19 0555    07/06/19 0900  methylPREDNISolone sodium succinate injection 80 mg  (methylprednisolone taper panel)      07/07 0859 IV Every 12 hours 07/04/19 0555    07/05/19 0900  methylPREDNISolone sodium succinate injection 100 mg  (methylprednisolone taper panel)      07/06 0859 IV Every 12 hours 07/04/19 0555    07/03/19 2222  methylPREDNISolone sodium succinate injection 500 mg  (Med - Immunosuppression Induction Therapy (Methylprednisolone))      -- IV On Call Procedure 07/03/19 2222        Pressors:    Autonomic Drugs (From admission, onward)    Start     Stop Route Frequency Ordered    07/04/19 1330  metoprolol tartrate (LOPRESSOR) split tablet 12.5 mg      -- Oral Once 07/04/19 1223        Hyperglycemia/Diabetes Medications:    Antihyperglycemics (From admission, onward)    Start     Stop Route Frequency Ordered    07/04/19 0730  insulin regular (Humulin R) 100 Units in sodium chloride 0.9% 100 mL infusion     Question:  Insulin Rate Adjustment (DO NOT MODIFY ANSWER)  Answer:  [email or web address] [id number]    -- IV Continuous 07/04/19 0621          ASSESSMENT and PLAN    * S/P liver transplant  Managed per LTS  avoid hypoglycemia  Lab Results   Component Value Date    ALT 1,292 (H) 07/04/2019    ALT 1,292 (H) 07/04/2019    AST 2,042 (H) 07/04/2019     (H) 07/04/2019     (H) 07/04/2019    ALKPHOS 155 (H) 07/04/2019    ALKPHOS 155 (H) 07/04/2019    BILITOT 1.7 (H) 07/04/2019    BILITOT 1.7 (H) 07/04/2019           Type 2 diabetes mellitus, without long-term current use of insulin  BG goal 140 - 180       BG monitoring ac/hs and moderate dose correction scale.   Will monitor for prandial excursions.       Cirrhosis-resolved as of 7/5/2019  S/p liver transplant.       Adrenal cortical steroids causing adverse effect in therapeutic use  Glucocorticoids markedly increase glucoses. Expect the steroid taper will help glucose control.       Prophylactic immunotherapy  May increase insulin resistance.         Obesity (BMI 30.0-34.9)  May increase insulin resistance.   Body mass index is 32.35 kg/m².            Stefania Childers NP  Endocrinology  [facility]

## 2019-07-05 NOTE — PLAN OF CARE
Problem: Physical Therapy Goal  Goal: Physical Therapy Goal  Goals to be met by: 19    Patient will increase functional independence with mobility by performin. Supine to sit with Stand-by Assistance -not met  2. Sit to stand transfer with Supervision -not met  3. Gait  x 250 feet with Supervision -not met    Evaluation completed and goals appropriate. Debo Huber, PT 2019

## 2019-07-05 NOTE — PROGRESS NOTES
Admit Note     Met with patient and wife to assess needs. Patient is a 59 y.o.  male, admitted for for liver transplant.      Patient admitted from home on 7/3/2019 .  At this time, patient presents as alert and oriented x 4, pleasant, good eye contact, well groomed, recall good, concentration/judgement good, average intelligence, calm, communicative, cooperative, asking and answering questions appropriately and extubated.  At this time, patients caregiver presents as alert and oriented x 4, pleasant, good eye contact, well groomed, recall good, concentration/judgement good, average intelligence, calm, communicative, cooperative and asking and answering questions appropriately.    Household/Family Systems     Patient resides with patient's wife, at [address] [address].  Support system includes wife (Kati) and daughter (Dang).  Patient does have dependents that are in need of being cared for. Patient's children are being cared for by other family.     Patients primary caregiver is Kati Hodges, patients wife, phone number [phone].  Confirmed patients contact information is [phone] (home);   Telephone Information:   Mobile [phone]     During admission, patient's caregiver plans to stay in patient's room.  Confirmed patient and patients caregivers do have access to reliable transportation.    Cognitive Status/Learning     Patient reports reading ability as 12th grade and states patient does have difficulty with seeing and hearing.  Patient reports patient learns best by written, verbal, and demonstration.   Needed: Yes; primary language is  Sami.  Patient informed of  services available and how to access services..Patient does speak and understand english as well.   Highest education level: High School (9-12) or GED    Vocation/Disability     Working for Income: yes  If yes, working activity level: Working Full Time  Patient is employed as  [employer]   at [employer]. Pt has worked for the [employer] for 32 years. Pt will utilize vacation time and/or STD/LTD.    Adherence     Patient reports a high level of adherence to patients health care regimen.  Adherence counseling and education provided. Patient verbalizes understanding.    Substance Use    Patient reports the following substance usage.    Tobacco: none, patient denies any use.  Alcohol: none, patient denies any use.  Illicit Drugs/Non-prescribed Medications: none, patient denies any use.  Patient states clear understanding of the potential impact of substance use.  Substance abstinence/cessation counseling, education and resources provided and reviewed.     Services Utilizing/ADLS    Infusion Service: Prior to admission, patient utilizing? no  Home Health: Prior to admission, patient utilizing? no  DME: Prior to admission, yes; CPAP  Pulmonary/Cardiac Rehab: Prior to admission, no  Dialysis:  Prior to admission, no  Transplant Specialty Pharmacy:  Prior to admission, no.    Prior to admission, patient reports patient was independent with ADLS and was driving.  Patient reports patient is not able to care for self at this time due to compromised medical condition (as documented in medical record) and physical weakness..  Patient indicates a willingness to care for self once medically cleared to do so.    Insurance/Medications    Insured by   Payor/Plan Subscr  Sex Relation Sub. Ins. ID Effective Group Num   1. CIGNA - CIGNA* KENDRA BREAUX IT* 1960 Male Self [id number]                                    [address] [facility] [address]   2. CIG[employer] - CIGNA* KENDRA BREAUX IT* 1960 Male  [id number] [birth year] [id number]                                   [address]      Primary Insurance (for UNOS reporting): Private Insurance  Secondary Insurance (for UNOS reporting): None    Patient reports patient is able to obtain and afford medications at this time and at time of discharge.    Living  Will/Healthcare Power of     Patient states patient does not have a LW and/or HCPA.   provided education regarding LW and HCPA and the completion of forms.    Coping/Mental Health    Patient is coping adequately with the aid of  family members and friends.  Patient denies mental health difficulties.     Discharge Planning    At time of discharge, patient plans to return to patient's home under the care of his spouse.  Patients wife will transport patient.  Per rounds today, expected discharge date has not been medically determined at this time. Patient and patients caregiver  verbalize understanding and are involved in treatment planning and discharge process.    Additional Concerns    Patient's caretaker denies additional needs and/or concerns at this time.  providing ongoing psychosocial support, education, resources and d/c planning as needed.  SW remains available.  remains available. Patient's caregiver verbalizes understanding and agreement with information reviewed,  availability and how to access available resources as needed. Patient denies additional needs and/or concerns at this time. Patient verbalizes understanding and agreement with information reviewed, social work availability, and how to access available resources as needed.

## 2019-07-05 NOTE — NURSING
Pt HR up to 190s for ~2-3 minutes. Dr. Brink and Dr. Caro notified and at bedside. Converted back to NSR without intervention. Labs sent. EKG performed, NSR confirmed. Pt states that he felt palpitations and pain radiates up to left shoulder. Will consult cardiology and continue to monitor.

## 2019-07-05 NOTE — PLAN OF CARE
Problem: Adult Inpatient Plan of Care  Goal: Plan of Care Review  Outcome: Ongoing (interventions implemented as appropriate)  Pt AAOx4. NESS, nods/gestures appropriately. Afebrile. SR-ST 80s-100s, 1 episode of SVT HR in the 190s for approximately 2 minutes. See note for details. SBP maintained  < 160. CVP 3-5. No orders received.  See note for further details. Currently on 2L NC sating > 95%. Chevron abdominal Incision with OR dsg, small amount of marked drainage. JUVE 1 with small brown, frothy output. JUVE 2 with moderate serous output. UOP excellent, 110-250 cc/hr. No BM, hypoactive bowel sounds noted. Gtts: MIVF and cardene. Q 1 accuchecks, q 4 labs. Electrolyes replaced PRN. Pain managed with oxycodone and dilaudid. PoC reviewed with family. All questions answered/concerns addressed. Emotional support and positive reinforcement provided. See flow sheet for assessment. Will continue to monitor.     Problem: Diabetes Comorbidity  Goal: Blood Glucose Level Within Desired Range  Outcome: Ongoing (interventions implemented as appropriate)  .    Problem: Pain Acute  Goal: Optimal Pain Control  Outcome: Ongoing (interventions implemented as appropriate)  .    Problem: Adjustment to Transplant (Liver Transplant)  Goal: Optimal Coping with Organ Transplant  Outcome: Ongoing (interventions implemented as appropriate)  .    Problem: Fall Injury Risk  Goal: Absence of Fall and Fall-Related Injury  Outcome: Ongoing (interventions implemented as appropriate)  .    Problem: Infection  Goal: Infection Symptom Resolution  Outcome: Ongoing (interventions implemented as appropriate)  .    Problem: Skin Injury Risk Increased  Goal: Skin Health and Integrity  Outcome: Ongoing (interventions implemented as appropriate)  .

## 2019-07-05 NOTE — ANESTHESIA POSTPROCEDURE EVALUATION
Anesthesia Post Evaluation    Patient: Roman Hodges    Procedure(s) Performed: Procedure(s) (LRB):  TRANSPLANT, LIVER (N/A)    Final Anesthesia Type: general  Patient location during evaluation: ICU  Patient participation: Yes- Able to Participate  Level of consciousness: awake and alert  Post-procedure vital signs: reviewed and stable  Pain management: adequate  Airway patency: patent  PONV status at discharge: No PONV  Anesthetic complications: no      Cardiovascular status: blood pressure returned to baseline and stable  Respiratory status: unassisted  Hydration status: euvolemic  Follow-up not needed.          Vitals Value Taken Time   /82 7/5/2019  7:32 AM   Temp 37.1 °C (98.8 °F) 7/5/2019  3:00 AM   Pulse 95 7/5/2019  7:45 AM   Resp 19 7/5/2019  7:45 AM   SpO2 93 % 7/5/2019  7:45 AM   Vitals shown include unvalidated device data.      No case tracking events are documented in the log.      Pain/Zelalem Score: Pain Rating Prior to Med Admin: 7 (7/5/2019  4:15 AM)  Pain Rating Post Med Admin: 0 (7/5/2019  4:45 AM)

## 2019-07-05 NOTE — SUBJECTIVE & OBJECTIVE
Interval History/Significant Events: NAEON. Episode of hemodynamically stable SVT; self-resolved - restarting home BB. Lactate cleared with improving/stable liver markers.     Follow-up For: Procedure(s) (LRB):  TRANSPLANT, LIVER (N/A)    Post-Operative Day: 2 Days Post-Op    Objective:     Vital Signs (Most Recent):  Temp: 98.8 °F (37.1 °C) (07/05/19 0300)  Pulse: 92 (07/05/19 0315)  Resp: 18 (07/05/19 0315)  BP: (!) 152/77 (07/05/19 0315)  SpO2: (!) 94 % (07/05/19 0315) Vital Signs (24h Range):  Temp:  [98.2 °F (36.8 °C)-100.3 °F (37.9 °C)] 98.8 °F (37.1 °C)  Pulse:  [] 92  Resp:  [14-23] 18  SpO2:  [94 %-99 %] 94 %  BP: (121-157)/(57-85) 152/77  Arterial Line BP: (114-162)/() 149/77     Weight: 93.7 kg (206 lb 9.1 oz)  Body mass index is 32.35 kg/m².      Intake/Output Summary (Last 24 hours) at 7/5/2019 0726  Last data filed at 7/5/2019 0600  Gross per 24 hour   Intake 5745 ml   Output 5040 ml   Net 705 ml       Physical Exam  Constitutional: He is oriented to person, place, and time. He appears well-nourished. No distress.   HENT:   Head: Normocephalic and atraumatic.   Eyes: Pupils are equal, round, and reactive to light. EOM are normal.   Neck: Neck supple. No JVD present. No tracheal deviation present.   Cardiovascular: Normal rate and regular rhythm.   Pulmonary/Chest: Effort normal and breath sounds normal. No respiratory distress.   Abdominal: Soft. Bowel sounds are normal. There is no guarding.   Chevron incision C/D/I. JUVE drains with serosanguinous drainage.   Musculoskeletal: Normal range of motion. He exhibits no edema or tenderness.   Neurological: He is alert and oriented to person, place, and time.   Skin: Skin is warm and dry. He is not diaphoretic.     Vents:  Vent Mode: Spont (07/04/19 1235)  Ventilator Initiated: Yes (07/04/19 0554)  Set Rate: 15 bmp (07/04/19 0947)  Vt Set: 300 mL (07/04/19 0947)  Pressure Support: 10 cmH20 (07/04/19 1235)  PEEP/CPAP: 5 cmH20 (07/04/19  1235)  Oxygen Concentration (%): 40 (07/04/19 1235)  Peak Airway Pressure: 16 cmH2O (07/04/19 1235)  Plateau Pressure: 0 cmH20 (07/04/19 1235)  Total Ve: 9.24 mL (07/04/19 1235)  F/VT Ratio<105 (RSBI): (!) 16.1 (07/04/19 1235)    Lines/Drains/Airways     Central Venous Catheter Line                 Introducer 07/04/19 0041 right subclavian 1 day         Trialysis (Dialysis) Catheter 07/04/19 0041 right internal jugular 1 day          Drain                 Closed/Suction Drain 07/04/19 0430 Right Abdomen Bulb 19 Fr. 1 day         Closed/Suction Drain 07/04/19 0430 Right Abdomen Bulb 19 Fr. 1 day         Urethral Catheter 07/04/19 0014 Non-latex;Straight-tip 16 Fr. 1 day          Arterial Line                 Arterial Line 07/04/19 0015 Right Femoral 1 day          Peripheral Intravenous Line                 Peripheral IV - Single Lumen 18 G Right Hand -- days         Peripheral IV - Single Lumen 07/03/19 2211 22 G Anterior;Left Upper Arm 1 day                Significant Labs:    CBC/Anemia Profile:  Recent Labs   Lab 07/04/19 2016 07/04/19  2326 07/05/19  0501   WBC 14.37* 16.44* 19.05*   HGB 13.0* 12.6* 14.1   HCT 37.2* 36.5* 41.5   * 133* 131*   MCV 84 87 89   RDW 13.4 13.6 13.9        Chemistries:  Recent Labs   Lab 07/04/19  0340 07/04/19  0455 07/04/19  0558  07/04/19  1345  07/04/19  1845 07/04/19 2016 07/04/19  2326 07/05/19  0501    139 140  140   < > 141  --  146* 144  --  142   K 3.6 3.1* 3.2*  3.2*   < > 4.0  --  3.0* 3.0*  --  3.4*   CL  --   --  101  101   < > 105  --  110 109  --  107   CO2  --   --  23  23   < > 17*  --  25 23  --  24   BUN  --   --  30*  30*   < > 30*  --  23* 23*  --  18   CREATININE  --   --  1.9*  1.9*   < > 2.1*  --  1.5* 1.4  --  1.1   CALCIUM  --   --  9.2  9.2   < > 8.4*  --  8.6* 8.6*  --  8.7   ALBUMIN 2.9* 3.5 3.4*  3.4*  --  4.5  --   --   --   --  4.0   PROT  --   --  6.8  6.8  --  7.5  --   --   --   --  6.8   BILITOT  --   --  1.7*  1.7*  --   1.7*  --   --   --   --  1.9*   ALKPHOS  --   --  155*  155*  --  111  --   --   --   --  115   *  --  1,292*  1,292*  --  1,239*  --   --   --   --  1,128*   AST 1,038*  --  1,954*  1,954*  1,954*   < > 2,118*   < >  --  1,825* 1,113* 1,013*   MG 1.9 1.8  --   --   --   --  2.1  --   --   --    PHOS  --   --   --   --   --   --  1.5*  --   --   --     < > = values in this interval not displayed.     Significant Imaging:  I have reviewed and interpreted all pertinent imaging results/findings within the past 24 hours.

## 2019-07-05 NOTE — PT/OT/SLP EVAL
Physical Therapy Evaluation    Patient Name:  Roman Hodges   MRN:  [id number]    Recommendations:     Discharge Recommendations:  (home no needs)   Discharge Equipment Recommendations: none   Barriers to discharge: None    Assessment:     Roman Hodges is a 59 y.o. male admitted with a medical diagnosis of S/P liver transplant.  He presents with the following impairments/functional limitations:  impaired endurance, impaired functional mobilty, gait instability pt bright treatment well and will benefit from skilled PT 4x/wk to progress physically. Pt will be able to discharge home with no therapy or DME needs. Pt is s/p liver transplant 7/4/19.    Rehab Prognosis: Good; patient would benefit from acute skilled PT services to address these deficits and reach maximum level of function.    Recent Surgery: Procedure(s) (LRB):  TRANSPLANT, LIVER (N/A) 2 Days Post-Op    Plan:     During this hospitalization, patient to be seen 4 x/week to address the identified rehab impairments via gait training, therapeutic activities and progress toward the following goals:    · Plan of Care Expires:  08/03/19    Subjective     Chief Complaint: pt c/o pain and dizziness with sitting on EOB.   Patient/Family Comments/goals: to get better and go home.   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 Pt wife works full-time. They live with their adult daughters in [address] [place] [address].   Prior to admission, patients level of function was Independent .  Equipment used at home: none.  DME owned (not currently used): none.  Upon discharge, patient will have assistance from wife who will take time off of work .    Objective:     Communicated with nurse prior to session.  Patient found supine with telemetry, arterial line, pulse ox  (continuous), blood pressure cuff, christie catheter, SCD, central line, oxygen, JUVE drain(CVP, B Sophia Oliver boots)  upon PT entry to room.    General Precautions: Standard, fall(out of room with mask.)   Orthopedic Precautions:    Braces:       Exams:  · Cognitive Exam:  Patient is oriented to Person, Place, Time and Situation  · RLE ROM: WFL  · RLE Strength: WFL  · LLE ROM: WFL  · LLE Strength: WFL    Functional Mobility:  · Bed Mobility:  Pt needed verbal cues for hand placement and sequencing for functional mobility.    · Rolling Left:  minimum assistance  · Supine to Sit: minimum assistance  ·   · Transfers:     · Sit to Stand:  contact guard assistance with no AD  ·   · Bed to Chair: contact guard assistance with  hand-held assist  using  Stand Pivot        AM-PAC 6 CLICK MOBILITY  Total Score:15     Patient left up in chair with all lines intact, call button in reach and wife present.    GOALS:   Multidisciplinary Problems     Physical Therapy Goals        Problem: Physical Therapy Goal    Goal Priority Disciplines Outcome Goal Variances Interventions   Physical Therapy Goal     PT, PT/OT      Description:  Goals to be met by: 19    Patient will increase functional independence with mobility by performin. Supine to sit with Stand-by Assistance -not met  2. Sit to stand transfer with Supervision -not met  3. Gait  x 250 feet with Supervision -not met                      History:     Past Medical History:   Diagnosis Date    A-fib     Allergy     Anticoagulant long-term use     Diabetes mellitus, type 2     GERD (gastroesophageal reflux disease)     Hepatocellular carcinoma     liver    Hyperlipidemia     Hypertension        Past Surgical History:   Procedure Laterality Date    COLONOSCOPY      EMBOLIZATION, YTTRIUM THERAPY N/A 2018    Performed by [facility] Diagnostic Provider at [facility] OR 2ND FLR    EMBOLIZATION, YTTRIUM THERAPY N/A 10/24/2018    Performed by [facility] Diagnostic Provider at [facility] OR  2ND FLR    ESOPHAGOGASTRODUODENOSCOPY (EGD) N/A 1/15/2019    Performed by Bony Saavedra MD at [facility] ENDO    HERNIA REPAIR      Left heart cath Left 12/4/2018    Performed by Tyler Hinojosa MD at [facility] CATH LAB/EP    Radiofrequency Ablation N/A 4/12/2019    Performed by Rose Surgeon at [facility] ROSE    TRANSPLANT, LIVER N/A 7/3/2019    Performed by Oscar Altman Jr., MD at [facility] OR 2ND FLR       Time Tracking:     PT Received On: 07/05/19  PT Start Time: 1120     PT Stop Time: 1133  PT Total Time (min): 13 min     Billable Minutes: Evaluation 13 min      Debo Huber, PT  07/05/2019

## 2019-07-05 NOTE — PROGRESS NOTES
Met with patient in the ICU.  Caregiver was at his bedside.  Gave caregiver My New Journey: Living Smart After My Liver Transplant.  Asked them to read the book in preparation for education.  Allowed time for questions and answers.

## 2019-07-05 NOTE — PROGRESS NOTES
[facility]  Critical Care - Surgery  Progress Note    Patient Name: Roman Hodges  MRN: [id number]  Admission Date: 7/3/2019  Hospital Length of Stay: 2 days  Code Status: Full Code  Attending Provider: Oscar Altman Jr., MD  Primary Care Provider: Jose Angel Munson MD   Principal Problem: Cirrhosis    Subjective:     Hospital/ICU Course:  No notes on file    Interval History/Significant Events: NAEON. Episode of hemodynamically stable SVT; self-resolved - restarting home BB. Lactate cleared with improving/stable liver markers.     Follow-up For: Procedure(s) (LRB):  TRANSPLANT, LIVER (N/A)    Post-Operative Day: 2 Days Post-Op    Objective:     Vital Signs (Most Recent):  Temp: 98.8 °F (37.1 °C) (07/05/19 0300)  Pulse: 92 (07/05/19 0315)  Resp: 18 (07/05/19 0315)  BP: (!) 152/77 (07/05/19 0315)  SpO2: (!) 94 % (07/05/19 0315) Vital Signs (24h Range):  Temp:  [98.2 °F (36.8 °C)-100.3 °F (37.9 °C)] 98.8 °F (37.1 °C)  Pulse:  [] 92  Resp:  [14-23] 18  SpO2:  [94 %-99 %] 94 %  BP: (121-157)/(57-85) 152/77  Arterial Line BP: (114-162)/() 149/77     Weight: 93.7 kg (206 lb 9.1 oz)  Body mass index is 32.35 kg/m².      Intake/Output Summary (Last 24 hours) at 7/5/2019 0726  Last data filed at 7/5/2019 0600  Gross per 24 hour   Intake 5745 ml   Output 5040 ml   Net 705 ml       Physical Exam  Constitutional: He is oriented to person, place, and time. He appears well-nourished. No distress.   HENT:   Head: Normocephalic and atraumatic.   Eyes: Pupils are equal, round, and reactive to light. EOM are normal.   Neck: Neck supple. No JVD present. No tracheal deviation present.   Cardiovascular: Normal rate and regular rhythm.   Pulmonary/Chest: Effort normal and breath sounds normal. No respiratory distress.   Abdominal: Soft. Bowel sounds are normal. There is no guarding.   Chevron incision C/D/I. JUVE drains with serosanguinous drainage.   Musculoskeletal: Normal range of motion. He  exhibits no edema or tenderness.   Neurological: He is alert and oriented to person, place, and time.   Skin: Skin is warm and dry. He is not diaphoretic.     Vents:  Vent Mode: Spont (07/04/19 1235)  Ventilator Initiated: Yes (07/04/19 0554)  Set Rate: 15 bmp (07/04/19 0947)  Vt Set: 300 mL (07/04/19 0947)  Pressure Support: 10 cmH20 (07/04/19 1235)  PEEP/CPAP: 5 cmH20 (07/04/19 1235)  Oxygen Concentration (%): 40 (07/04/19 1235)  Peak Airway Pressure: 16 cmH2O (07/04/19 1235)  Plateau Pressure: 0 cmH20 (07/04/19 1235)  Total Ve: 9.24 mL (07/04/19 1235)  F/VT Ratio<105 (RSBI): (!) 16.1 (07/04/19 1235)    Lines/Drains/Airways     Central Venous Catheter Line                 Introducer 07/04/19 0041 right subclavian 1 day         Trialysis (Dialysis) Catheter 07/04/19 0041 right internal jugular 1 day          Drain                 Closed/Suction Drain 07/04/19 0430 Right Abdomen Bulb 19 Fr. 1 day         Closed/Suction Drain 07/04/19 0430 Right Abdomen Bulb 19 Fr. 1 day         Urethral Catheter 07/04/19 0014 Non-latex;Straight-tip 16 Fr. 1 day          Arterial Line                 Arterial Line 07/04/19 0015 Right Femoral 1 day          Peripheral Intravenous Line                 Peripheral IV - Single Lumen 18 G Right Hand -- days         Peripheral IV - Single Lumen 07/03/19 2211 22 G Anterior;Left Upper Arm 1 day                Significant Labs:    CBC/Anemia Profile:  Recent Labs   Lab 07/04/19 2016 07/04/19  2326 07/05/19  0501   WBC 14.37* 16.44* 19.05*   HGB 13.0* 12.6* 14.1   HCT 37.2* 36.5* 41.5   * 133* 131*   MCV 84 87 89   RDW 13.4 13.6 13.9        Chemistries:  Recent Labs   Lab 07/04/19  0340 07/04/19  0455 07/04/19  0558  07/04/19  1345  07/04/19  1845 07/04/19 2016 07/04/19  2326 07/05/19  0501    139 140  140   < > 141  --  146* 144  --  142   K 3.6 3.1* 3.2*  3.2*   < > 4.0  --  3.0* 3.0*  --  3.4*   CL  --   --  101  101   < > 105  --  110 109  --  107   CO2  --   --  23 23    < > 17*  --  25 23  --  24   BUN  --   --  30*  30*   < > 30*  --  23* 23*  --  18   CREATININE  --   --  1.9*  1.9*   < > 2.1*  --  1.5* 1.4  --  1.1   CALCIUM  --   --  9.2  9.2   < > 8.4*  --  8.6* 8.6*  --  8.7   ALBUMIN 2.9* 3.5 3.4*  3.4*  --  4.5  --   --   --   --  4.0   PROT  --   --  6.8  6.8  --  7.5  --   --   --   --  6.8   BILITOT  --   --  1.7*  1.7*  --  1.7*  --   --   --   --  1.9*   ALKPHOS  --   --  155*  155*  --  111  --   --   --   --  115   *  --  1,292*  1,292*  --  1,239*  --   --   --   --  1,128*   AST 1,038*  --  1,954*  1,954*  1,954*   < > 2,118*   < >  --  1,825* 1,113* 1,013*   MG 1.9 1.8  --   --   --   --  2.1  --   --   --    PHOS  --   --   --   --   --   --  1.5*  --   --   --     < > = values in this interval not displayed.     Significant Imaging:  I have reviewed and interpreted all pertinent imaging results/findings within the past 24 hours.    Assessment/Plan:     S/P liver transplant  60 yo M with a history of ESLD 2/2 HCC and ROMO, Afib, HTN, T2DM, and MALLIKA admitted to SICU on 7/4/2019 following liver transplantation.     Neuro:  - off sedation, alert and oriented x3  - PRN oxy, dilaudid for pain     Pulm:  - extubated 7/4  - breathing comfortably on 2L NC     Card:  - lopressor and amlodipine  - wean cardene gtt as able     Renal:  - UOP adequate, continue to monitor  - Replete electrolytes PRN     FEN/GI:  -Advance diet as tolerate  S/p OLTx (7/3)  -Lactate cleared  -Liver markers improving/stable  -u/s with decreased resistive indices; overall normal     Heme:  - Monitor H/H  - Daily CBC     ID:  - Increasing leukocytosis, afebrile. Suspect reactive leukocytosis. Continue to trend.  - Antimicrobials per ID: bactrim, amp-sulf, valganciclovir  - Immunosuppressives per transplant: prograf, cellcept, steroids.     Endo:  - Insulin gtt  - Appreciate endocrine recs     PPx:  - famotidine     Dispo:  -Continue ICU care; possible step-down today      Critical  care was time spent personally by me on the following activities: development of treatment plan with patient or surrogate and bedside caregivers, discussions with consultants, evaluation of patient's response to treatment, examination of patient, ordering and performing treatments and interventions, ordering and review of laboratory studies, ordering and review of radiographic studies, pulse oximetry, re-evaluation of patient's condition.  This critical care time did not overlap with that of any other provider or involve time for any procedures.     Luis Maldonado MD  Critical Care - Surgery  [facility]    I have reviewed and concur with the resident's history, physical, assessment, and plan.  I have personally interviewed and examined the patient at bedside.  See below addendum for my evaluation and additional findings.    New Martin MD

## 2019-07-05 NOTE — ASSESSMENT & PLAN NOTE
Managed per LTS  avoid hypoglycemia  Lab Results   Component Value Date    ALT 1,128 (H) 07/05/2019    AST 1,013 (H) 07/05/2019     (H) 07/05/2019    ALKPHOS 115 07/05/2019    BILITOT 1.9 (H) 07/05/2019

## 2019-07-05 NOTE — ASSESSMENT & PLAN NOTE
60 yo M with a history of ESLD 2/2 HCC and ROMO, Afib, HTN, T2DM, and MALLIKA admitted to SICU on 7/4/2019 following liver transplantation.     Neuro:  - off sedation, alert and oriented x3  - PRN oxy, dilaudid for pain     Pulm:  - extubated 7/4  - breathing comfortably on 2L NC     Card:  - lopressor and amlodipine  - wean cardene gtt as able     Renal:  - UOP adequate, continue to monitor  - Replete electrolytes PRN     FEN/GI:  -Advance diet as tolerate  S/p OLTx (7/3)  -Lactate cleared  -Liver markers improving/stable  -u/s with decreased resistive indices; overall normal     Heme:  - Monitor H/H  - Daily CBC     ID:  - Increasing leukocytosis, afebrile. Suspect reactive leukocytosis. Continue to trend.  - Antimicrobials per ID: bactrim, amp-sulf, valganciclovir  - Immunosuppressives per transplant: prograf, cellcept, steroids.     Endo:  - Insulin gtt  - Appreciate endocrine recs     PPx:  - famotidine     Dispo:  -Continue ICU care; possible step-down today

## 2019-07-06 PROBLEM — K74.60 CIRRHOSIS: Status: ACTIVE | Noted: 2019-07-06

## 2019-07-06 LAB
ALBUMIN SERPL BCP-MCNC: 3.8 G/DL (ref 3.5–5.2)
ALP SERPL-CCNC: 137 U/L (ref 55–135)
ALT SERPL W/O P-5'-P-CCNC: 713 U/L (ref 10–44)
ANION GAP SERPL CALC-SCNC: 10 MMOL/L (ref 8–16)
ANION GAP SERPL CALC-SCNC: 11 MMOL/L (ref 8–16)
APTT BLDCRRT: 24.4 SEC (ref 21–32)
AST SERPL-CCNC: 292 U/L (ref 10–40)
BASOPHILS # BLD AUTO: 0.02 K/UL (ref 0–0.2)
BASOPHILS NFR BLD: 0.1 % (ref 0–1.9)
BILIRUB DIRECT SERPL-MCNC: 1.4 MG/DL (ref 0.1–0.3)
BILIRUB SERPL-MCNC: 2.2 MG/DL (ref 0.1–1)
BUN SERPL-MCNC: 20 MG/DL (ref 6–20)
BUN SERPL-MCNC: 22 MG/DL (ref 6–20)
CALCIUM SERPL-MCNC: 8.5 MG/DL (ref 8.7–10.5)
CALCIUM SERPL-MCNC: 8.7 MG/DL (ref 8.7–10.5)
CHLORIDE SERPL-SCNC: 104 MMOL/L (ref 95–110)
CHLORIDE SERPL-SCNC: 97 MMOL/L (ref 95–110)
CO2 SERPL-SCNC: 25 MMOL/L (ref 23–29)
CO2 SERPL-SCNC: 25 MMOL/L (ref 23–29)
CREAT SERPL-MCNC: 0.8 MG/DL (ref 0.5–1.4)
CREAT SERPL-MCNC: 0.8 MG/DL (ref 0.5–1.4)
DIFFERENTIAL METHOD: ABNORMAL
EOSINOPHIL # BLD AUTO: 0 K/UL (ref 0–0.5)
EOSINOPHIL NFR BLD: 0 % (ref 0–8)
ERYTHROCYTE [DISTWIDTH] IN BLOOD BY AUTOMATED COUNT: 13.8 % (ref 11.5–14.5)
EST. GFR  (AFRICAN AMERICAN): >60 ML/MIN/1.73 M^2
EST. GFR  (AFRICAN AMERICAN): >60 ML/MIN/1.73 M^2
EST. GFR  (NON AFRICAN AMERICAN): >60 ML/MIN/1.73 M^2
EST. GFR  (NON AFRICAN AMERICAN): >60 ML/MIN/1.73 M^2
GGT SERPL-CCNC: 466 U/L (ref 8–55)
GLUCOSE SERPL-MCNC: 175 MG/DL (ref 70–110)
GLUCOSE SERPL-MCNC: 198 MG/DL (ref 70–110)
HCT VFR BLD AUTO: 40.3 % (ref 40–54)
HGB BLD-MCNC: 13.7 G/DL (ref 14–18)
IMM GRANULOCYTES # BLD AUTO: 0.14 K/UL (ref 0–0.04)
IMM GRANULOCYTES NFR BLD AUTO: 0.9 % (ref 0–0.5)
INR PPP: 1 (ref 0.8–1.2)
LYMPHOCYTES # BLD AUTO: 0.5 K/UL (ref 1–4.8)
LYMPHOCYTES NFR BLD: 2.8 % (ref 18–48)
MAGNESIUM SERPL-MCNC: 2 MG/DL (ref 1.6–2.6)
MAGNESIUM SERPL-MCNC: 2.1 MG/DL (ref 1.6–2.6)
MCH RBC QN AUTO: 30.1 PG (ref 27–31)
MCHC RBC AUTO-ENTMCNC: 34 G/DL (ref 32–36)
MCV RBC AUTO: 89 FL (ref 82–98)
MONOCYTES # BLD AUTO: 0.3 K/UL (ref 0.3–1)
MONOCYTES NFR BLD: 2.1 % (ref 4–15)
NEUTROPHILS # BLD AUTO: 14.9 K/UL (ref 1.8–7.7)
NEUTROPHILS NFR BLD: 94.1 % (ref 38–73)
NRBC BLD-RTO: 0 /100 WBC
PHOSPHATE SERPL-MCNC: 2.2 MG/DL (ref 2.7–4.5)
PHOSPHATE SERPL-MCNC: 2.6 MG/DL (ref 2.7–4.5)
PLATELET # BLD AUTO: 114 K/UL (ref 150–350)
PMV BLD AUTO: 10.4 FL (ref 9.2–12.9)
POCT GLUCOSE: 176 MG/DL (ref 70–110)
POCT GLUCOSE: 192 MG/DL (ref 70–110)
POCT GLUCOSE: 206 MG/DL (ref 70–110)
POCT GLUCOSE: 208 MG/DL (ref 70–110)
POCT GLUCOSE: 217 MG/DL (ref 70–110)
POTASSIUM SERPL-SCNC: 3.5 MMOL/L (ref 3.5–5.1)
POTASSIUM SERPL-SCNC: 3.7 MMOL/L (ref 3.5–5.1)
PROT SERPL-MCNC: 6.2 G/DL (ref 6–8.4)
PROTHROMBIN TIME: 10.8 SEC (ref 9–12.5)
RBC # BLD AUTO: 4.55 M/UL (ref 4.6–6.2)
SODIUM SERPL-SCNC: 133 MMOL/L (ref 136–145)
SODIUM SERPL-SCNC: 139 MMOL/L (ref 136–145)
TACROLIMUS BLD-MCNC: 6.4 NG/ML (ref 5–15)
WBC # BLD AUTO: 15.88 K/UL (ref 3.9–12.7)

## 2019-07-06 PROCEDURE — 25000003 PHARM REV CODE 250: Performed by: STUDENT IN AN ORGANIZED HEALTH CARE EDUCATION/TRAINING PROGRAM

## 2019-07-06 PROCEDURE — 83735 ASSAY OF MAGNESIUM: CPT | Mod: 91

## 2019-07-06 PROCEDURE — 25000003 PHARM REV CODE 250: Performed by: TRANSPLANT SURGERY

## 2019-07-06 PROCEDURE — 99900035 HC TECH TIME PER 15 MIN (STAT)

## 2019-07-06 PROCEDURE — 82248 BILIRUBIN DIRECT: CPT

## 2019-07-06 PROCEDURE — 82977 ASSAY OF GGT: CPT

## 2019-07-06 PROCEDURE — 27000221 HC OXYGEN, UP TO 24 HOURS

## 2019-07-06 PROCEDURE — 85730 THROMBOPLASTIN TIME PARTIAL: CPT

## 2019-07-06 PROCEDURE — 63600175 PHARM REV CODE 636 W HCPCS: Performed by: STUDENT IN AN ORGANIZED HEALTH CARE EDUCATION/TRAINING PROGRAM

## 2019-07-06 PROCEDURE — 20600001 HC STEP DOWN PRIVATE ROOM

## 2019-07-06 PROCEDURE — 94660 CPAP INITIATION&MGMT: CPT

## 2019-07-06 PROCEDURE — 93005 ELECTROCARDIOGRAM TRACING: CPT

## 2019-07-06 PROCEDURE — 80197 ASSAY OF TACROLIMUS: CPT

## 2019-07-06 PROCEDURE — 80053 COMPREHEN METABOLIC PANEL: CPT

## 2019-07-06 PROCEDURE — 83735 ASSAY OF MAGNESIUM: CPT

## 2019-07-06 PROCEDURE — 85025 COMPLETE CBC W/AUTO DIFF WBC: CPT

## 2019-07-06 PROCEDURE — 84100 ASSAY OF PHOSPHORUS: CPT

## 2019-07-06 PROCEDURE — 80048 BASIC METABOLIC PNL TOTAL CA: CPT

## 2019-07-06 PROCEDURE — 84100 ASSAY OF PHOSPHORUS: CPT | Mod: 91

## 2019-07-06 PROCEDURE — 93010 ELECTROCARDIOGRAM REPORT: CPT | Mod: ,,, | Performed by: INTERNAL MEDICINE

## 2019-07-06 PROCEDURE — 25000003 PHARM REV CODE 250: Performed by: PHYSICIAN ASSISTANT

## 2019-07-06 PROCEDURE — 94761 N-INVAS EAR/PLS OXIMETRY MLT: CPT

## 2019-07-06 PROCEDURE — 99232 PR SUBSEQUENT HOSPITAL CARE,LEVL II: ICD-10-PCS | Mod: ,,, | Performed by: NURSE PRACTITIONER

## 2019-07-06 PROCEDURE — 85610 PROTHROMBIN TIME: CPT

## 2019-07-06 PROCEDURE — 99232 SBSQ HOSP IP/OBS MODERATE 35: CPT | Mod: ,,, | Performed by: NURSE PRACTITIONER

## 2019-07-06 PROCEDURE — 93010 EKG 12-LEAD: ICD-10-PCS | Mod: ,,, | Performed by: INTERNAL MEDICINE

## 2019-07-06 RX ORDER — CALCIUM CARBONATE 200(500)MG
1000 TABLET,CHEWABLE ORAL ONCE
Status: COMPLETED | OUTPATIENT
Start: 2019-07-06 | End: 2019-07-06

## 2019-07-06 RX ORDER — METOPROLOL TARTRATE 50 MG/1
50 TABLET ORAL 2 TIMES DAILY
Status: DISCONTINUED | OUTPATIENT
Start: 2019-07-07 | End: 2019-07-07

## 2019-07-06 RX ORDER — NIFEDIPINE 30 MG/1
30 TABLET, EXTENDED RELEASE ORAL DAILY
Status: DISCONTINUED | OUTPATIENT
Start: 2019-07-06 | End: 2019-07-09

## 2019-07-06 RX ORDER — HEPARIN SODIUM 5000 [USP'U]/ML
5000 INJECTION, SOLUTION INTRAVENOUS; SUBCUTANEOUS EVERY 8 HOURS
Status: DISCONTINUED | OUTPATIENT
Start: 2019-07-06 | End: 2019-07-06

## 2019-07-06 RX ORDER — METOPROLOL TARTRATE 25 MG/1
25 TABLET, FILM COATED ORAL ONCE
Status: COMPLETED | OUTPATIENT
Start: 2019-07-06 | End: 2019-07-06

## 2019-07-06 RX ORDER — CALCIUM CARBONATE 200(500)MG
500 TABLET,CHEWABLE ORAL DAILY PRN
Status: DISCONTINUED | OUTPATIENT
Start: 2019-07-06 | End: 2019-07-09 | Stop reason: HOSPADM

## 2019-07-06 RX ADMIN — HEPARIN SODIUM 5000 UNITS: 5000 INJECTION, SOLUTION INTRAVENOUS; SUBCUTANEOUS at 02:07

## 2019-07-06 RX ADMIN — Medication 3 MG: at 06:07

## 2019-07-06 RX ADMIN — MYCOPHENOLATE MOFETIL 1000 MG: 250 CAPSULE ORAL at 09:07

## 2019-07-06 RX ADMIN — CALCIUM CARBONATE (ANTACID) CHEW TAB 500 MG 1000 MG: 500 CHEW TAB at 04:07

## 2019-07-06 RX ADMIN — PANTOPRAZOLE SODIUM 40 MG: 40 TABLET, DELAYED RELEASE ORAL at 09:07

## 2019-07-06 RX ADMIN — NIFEDIPINE 30 MG: 30 TABLET, FILM COATED, EXTENDED RELEASE ORAL at 09:07

## 2019-07-06 RX ADMIN — Medication 3 MG: at 09:07

## 2019-07-06 RX ADMIN — METHYLPREDNISOLONE SODIUM SUCCINATE 80 MG: 125 INJECTION, POWDER, FOR SOLUTION INTRAMUSCULAR; INTRAVENOUS at 09:07

## 2019-07-06 RX ADMIN — BACLOFEN 5 MG: 10 TABLET ORAL at 10:07

## 2019-07-06 RX ADMIN — OXYCODONE HYDROCHLORIDE 10 MG: 10 TABLET ORAL at 08:07

## 2019-07-06 RX ADMIN — HYDRALAZINE HYDROCHLORIDE 10 MG: 20 INJECTION INTRAMUSCULAR; INTRAVENOUS at 01:07

## 2019-07-06 RX ADMIN — HEPARIN SODIUM 5000 UNITS: 5000 INJECTION, SOLUTION INTRAVENOUS; SUBCUTANEOUS at 05:07

## 2019-07-06 RX ADMIN — CALCIUM CARBONATE (ANTACID) CHEW TAB 500 MG 500 MG: 500 CHEW TAB at 11:07

## 2019-07-06 RX ADMIN — OXYCODONE HYDROCHLORIDE 10 MG: 10 TABLET ORAL at 03:07

## 2019-07-06 RX ADMIN — INSULIN ASPART 2 UNITS: 100 INJECTION, SOLUTION INTRAVENOUS; SUBCUTANEOUS at 08:07

## 2019-07-06 RX ADMIN — METOPROLOL TARTRATE 25 MG: 25 TABLET ORAL at 11:07

## 2019-07-06 RX ADMIN — NYSTATIN 500000 UNITS: 500000 SUSPENSION ORAL at 09:07

## 2019-07-06 RX ADMIN — INSULIN ASPART 2 UNITS: 100 INJECTION, SOLUTION INTRAVENOUS; SUBCUTANEOUS at 11:07

## 2019-07-06 RX ADMIN — METOPROLOL TARTRATE 25 MG: 25 TABLET ORAL at 09:07

## 2019-07-06 RX ADMIN — NYSTATIN 500000 UNITS: 500000 SUSPENSION ORAL at 03:07

## 2019-07-06 RX ADMIN — DIBASIC SODIUM PHOSPHATE, MONOBASIC POTASSIUM PHOSPHATE AND MONOBASIC SODIUM PHOSPHATE 2 TABLET: 852; 155; 130 TABLET ORAL at 11:07

## 2019-07-06 RX ADMIN — HEPARIN SODIUM 5000 UNITS: 5000 INJECTION, SOLUTION INTRAVENOUS; SUBCUTANEOUS at 09:07

## 2019-07-06 RX ADMIN — SULFAMETHOXAZOLE AND TRIMETHOPRIM 1 TABLET: 400; 80 TABLET ORAL at 09:07

## 2019-07-06 RX ADMIN — INSULIN ASPART 4 UNITS: 100 INJECTION, SOLUTION INTRAVENOUS; SUBCUTANEOUS at 06:07

## 2019-07-06 NOTE — ASSESSMENT & PLAN NOTE
Managed per LTS  avoid hypoglycemia  Lab Results   Component Value Date     (H) 07/06/2019     (H) 07/06/2019     (H) 07/06/2019    ALKPHOS 137 (H) 07/06/2019    BILITOT 2.2 (H) 07/06/2019

## 2019-07-06 NOTE — SUBJECTIVE & OBJECTIVE
"Interval HPI:   Overnight events: Remains in ICU. NAEON. BG at goal with correction scale coverage. Solumedrol 80 mg BID; standard steroid taper per primary.   Eatin%  Clears   Nausea: No  Hypoglycemia and intervention: No  Fever: No  TPN and/or TF: No      BP (!) 157/102 (BP Location: Right arm, Patient Position: Sitting)   Pulse 99   Temp 98.5 °F (36.9 °C) (Oral)   Resp (!) 24   Ht 5' 7" (1.702 m)   Wt 98.2 kg (216 lb 7.9 oz)   SpO2 95%   BMI 33.91 kg/m²     Labs Reviewed and Include    Recent Labs   Lab 19  0320   *   CALCIUM 8.5*   ALBUMIN 3.8   PROT 6.2      K 3.7   CO2 25      BUN 20   CREATININE 0.8   ALKPHOS 137*   *   *   BILITOT 2.2*     Lab Results   Component Value Date    WBC 15.88 (H) 2019    HGB 13.7 (L) 2019    HCT 40.3 2019    MCV 89 2019     (L) 2019     No results for input(s): TSH, FREET4 in the last 168 hours.  Lab Results   Component Value Date    HGBA1C 5.5 2019       Nutritional status:   Body mass index is 33.91 kg/m².  Lab Results   Component Value Date    ALBUMIN 3.8 2019    ALBUMIN 4.0 2019    ALBUMIN 4.5 2019     No results found for: PREALBUMIN    Estimated Creatinine Clearance: 109.6 mL/min (based on SCr of 0.8 mg/dL).    Accu-Checks  Recent Labs     19  0405 19  0457 19  0624 19  0702 19  0802 19  1018 19  1207 19  1756 19  2106 19  0805   POCTGLUCOSE 134* 170* 178* 172* 163* 178* 196* 187* 194* 176*       Current Medications and/or Treatments Impacting Glycemic Control  Immunotherapy:    Immunosuppressants         Stop Route Frequency     mycophenolate capsule 1,000 mg      -- Oral 2 times daily     tacrolimus (PROGRAF) 1 mg/mL oral syringe      -- Oral 2 times daily        Steroids:   Hormones (From admission, onward)    Start     Stop Route Frequency Ordered    07/10/19 0900  predniSONE tablet 20 mg  " (methylprednisolone taper panel)      -- Oral Daily 07/04/19 0555    07/09/19 0900  methylPREDNISolone sodium succinate injection 20 mg  (methylprednisolone taper panel)      07/10 0859 IV Every 12 hours 07/04/19 0555    07/08/19 0900  methylPREDNISolone sodium succinate injection 40 mg  (methylprednisolone taper panel)      07/09 0859 IV Every 12 hours 07/04/19 0555    07/07/19 0900  methylPREDNISolone sodium succinate injection 60 mg  (methylprednisolone taper panel)      07/08 0859 IV Every 12 hours 07/04/19 0555    07/06/19 0900  methylPREDNISolone sodium succinate injection 80 mg  (methylprednisolone taper panel)      07/07 0859 IV Every 12 hours 07/04/19 0555        Pressors:    Autonomic Drugs (From admission, onward)    None        Hyperglycemia/Diabetes Medications:   Antihyperglycemics (From admission, onward)    Start     Stop Route Frequency Ordered    07/05/19 1145  insulin aspart U-100 pen 1-10 Units      -- SubQ Before meals & nightly PRN 07/05/19 1045

## 2019-07-06 NOTE — ASSESSMENT & PLAN NOTE
BG goal 140 - 180       BG monitoring ac/hs and moderate dose correction scale.   Start novolog 4 units with meals.

## 2019-07-06 NOTE — PROGRESS NOTES
"[facility]  Endocrinology  Progress Note    Admit Date: 7/3/2019     Reason for Consult: Management of T2DM, Hyperglycemia     Surgical Procedure and Date: liver transplant     Diabetes diagnosis year:     Home Diabetes Medications:  none  Lab Results   Component Value Date    HGBA1C 5.5 2019       How often checking glucose at home? Not checking       Diabetes Complications include:     none    Complicating diabetes co morbidities:   CIRRHOSIS and Glucocorticoid use       HPI:   Patient is a 59 y.o. 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 Patient admitted for liver transplant. Endocrinology consulted for BG/ DM management.                       Interval HPI:   Overnight events: Remains in ICU. NAEON. BG at goal with correction scale coverage. Solumedrol 80 mg BID; standard steroid taper per primary.   Eatin%  Clears   Nausea: No  Hypoglycemia and intervention: No  Fever: No  TPN and/or TF: No      BP (!) 157/102 (BP Location: Right arm, Patient Position: Sitting)   Pulse 99   Temp 98.5 °F (36.9 °C) (Oral)   Resp (!) 24   Ht 5' 7" (1.702 m)   Wt 98.2 kg (216 lb 7.9 oz)   SpO2 95%   BMI 33.91 kg/m²      Labs Reviewed and Include    Recent Labs   Lab 19  0320   *   CALCIUM 8.5*   ALBUMIN 3.8   PROT 6.2      K 3.7   CO2 25      BUN 20   CREATININE 0.8   ALKPHOS 137*   *   *   BILITOT 2.2*     Lab Results   Component Value Date    WBC 15.88 (H) 2019    HGB 13.7 (L) 2019    HCT 40.3 2019    MCV 89 2019     (L) 2019     No results for input(s): TSH, FREET4 in the last 168 hours.  Lab Results   Component Value Date    HGBA1C 5.5 2019       Nutritional status:   Body mass index is 33.91 kg/m².  Lab Results   Component Value Date    ALBUMIN 3.8 2019    ALBUMIN 4.0 2019    ALBUMIN 4.5 2019     No results found for: PREALBUMIN    Estimated Creatinine " Clearance: 109.6 mL/min (based on SCr of 0.8 mg/dL).    Accu-Checks  Recent Labs     07/05/19  0405 07/05/19  0457 07/05/19  0624 07/05/19  0702 07/05/19  0802 07/05/19  1018 07/05/19  1207 07/05/19  1756 07/05/19  2106 07/06/19  0805   POCTGLUCOSE 134* 170* 178* 172* 163* 178* 196* 187* 194* 176*       Current Medications and/or Treatments Impacting Glycemic Control  Immunotherapy:    Immunosuppressants         Stop Route Frequency     mycophenolate capsule 1,000 mg      -- Oral 2 times daily     tacrolimus (PROGRAF) 1 mg/mL oral syringe      -- Oral 2 times daily        Steroids:   Hormones (From admission, onward)    Start     Stop Route Frequency Ordered    07/10/19 0900  predniSONE tablet 20 mg  (methylprednisolone taper panel)      -- Oral Daily 07/04/19 0555    07/09/19 0900  methylPREDNISolone sodium succinate injection 20 mg  (methylprednisolone taper panel)      07/10 0859 IV Every 12 hours 07/04/19 0555    07/08/19 0900  methylPREDNISolone sodium succinate injection 40 mg  (methylprednisolone taper panel)      07/09 0859 IV Every 12 hours 07/04/19 0555    07/07/19 0900  methylPREDNISolone sodium succinate injection 60 mg  (methylprednisolone taper panel)      07/08 0859 IV Every 12 hours 07/04/19 0555    07/06/19 0900  methylPREDNISolone sodium succinate injection 80 mg  (methylprednisolone taper panel)      07/07 0859 IV Every 12 hours 07/04/19 0555        Pressors:    Autonomic Drugs (From admission, onward)    None        Hyperglycemia/Diabetes Medications:   Antihyperglycemics (From admission, onward)    Start     Stop Route Frequency Ordered    07/05/19 1145  insulin aspart U-100 pen 1-10 Units      -- SubQ Before meals & nightly PRN 07/05/19 1045          ASSESSMENT and PLAN    * S/P liver transplant  Managed per LTS  avoid hypoglycemia  Lab Results   Component Value Date    ALT 1,128 (H) 07/05/2019    AST 1,013 (H) 07/05/2019     (H) 07/05/2019    ALKPHOS 115 07/05/2019    BILITOT 1.9 (H)  07/05/2019           Type 2 diabetes mellitus, without long-term current use of insulin  BG goal 140 - 180       BG monitoring ac/hs and moderate dose correction scale.   Will monitor for prandial excursions.       Adrenal cortical steroids causing adverse effect in therapeutic use  Glucocorticoids markedly increase glucoses. Expect the steroid taper will help glucose control.       Prophylactic immunotherapy  May increase insulin resistance.         Obesity (BMI 30.0-34.9)  May increase insulin resistance.   Body mass index is 32.35 kg/m².            Stefania Childers NP  Endocrinology  [facility]

## 2019-07-06 NOTE — PLAN OF CARE
Problem: Adult Inpatient Plan of Care  Goal: Plan of Care Review  Outcome: Ongoing (interventions implemented as appropriate)  POC reviewed with pt at 0400. Pt verbalized understanding. Questions and concerns addressed. Pt remained afebrile overnight. Oxycodone 10 mg po given for pain. Cardene gtt off. Linen changed. Pt performed independent bath per request. No acute events overnight. Pt progressing toward goals. Will continue to monitor. See flowsheets for full assessment and VS info

## 2019-07-06 NOTE — NURSING TRANSFER
Nursing Transfer Note      7/6/2019     Transfer To: [id number]     Transfer via wheelchair    Transfer with cardiac monitoring    Transported by RN    Medicines sent: insulin pen    Chart send with patient: Yes    Notified: spouse    Upon arrival to floor: cardiac monitor applied, patient oriented to room, call bell in reach and bed in lowest position

## 2019-07-06 NOTE — PLAN OF CARE
Problem: Adult Inpatient Plan of Care  Goal: Plan of Care Review  Outcome: Ongoing (interventions implemented as appropriate)  Pt AAOx4. NESS, nods/gestures appropriately. Afebrile.  SR 80s-90s,  SBP maintained  < 160 with cardene gtt. CVP 1, 4. , 750 albumin given today. n 2L NC sating > 95%. Chevron abdominal Incision with OR dsg, small amount of marked drainage. JUVE 1 with small brown, frothy output. JUVE 2 with moderate serous output. UOP excellent > 100 cc/hr. No BM  Gtts: MIVF and insulin. AC/HS accuchecks requiring coverage. Tolerating clear liquid diet.  40K given today. Oxycodone 5mg and 10mg ordered for pain. Liver enzymes trending down. Up with PT, OOB to chair majority of shift. PoC reviewed with family. All questions answered/concerns addressed. Emotional support and positive reinforcement provided. See flow sheet for assessment. Transfer tomorrow.

## 2019-07-06 NOTE — NURSING
Notified LETY Childers to question about new order being placed for Insulin drip by Dr. Ramirez. She was unaware of any new orders placed. NP to d/c order.

## 2019-07-07 PROBLEM — Z91.89 AT RISK FOR OPPORTUNISTIC INFECTIONS: Status: ACTIVE | Noted: 2019-07-07

## 2019-07-07 PROBLEM — Z79.60 LONG-TERM USE OF IMMUNOSUPPRESSANT MEDICATION: Status: ACTIVE | Noted: 2019-07-07

## 2019-07-07 PROBLEM — I48.0 PAROXYSMAL ATRIAL FIBRILLATION: Status: ACTIVE | Noted: 2019-07-07

## 2019-07-07 PROBLEM — K74.60 CIRRHOSIS: Status: RESOLVED | Noted: 2019-07-06 | Resolved: 2019-07-07

## 2019-07-07 PROBLEM — K75.81 NASH (NONALCOHOLIC STEATOHEPATITIS): Chronic | Status: RESOLVED | Noted: 2019-02-26 | Resolved: 2019-07-07

## 2019-07-07 PROBLEM — Z85.05 HISTORY OF PRIMARY LIVER CANCER: Status: RESOLVED | Noted: 2018-10-24 | Resolved: 2019-07-07

## 2019-07-07 PROBLEM — D72.829 LEUKOCYTOSIS: Status: ACTIVE | Noted: 2019-07-07

## 2019-07-07 PROBLEM — E87.1 HYPONATREMIA: Status: ACTIVE | Noted: 2019-07-07

## 2019-07-07 PROBLEM — D69.6 THROMBOCYTOPENIA, UNSPECIFIED: Status: ACTIVE | Noted: 2019-07-07

## 2019-07-07 PROBLEM — E83.39 HYPOPHOSPHATEMIA: Status: ACTIVE | Noted: 2019-07-07

## 2019-07-07 LAB
ALBUMIN SERPL BCP-MCNC: 3.8 G/DL (ref 3.5–5.2)
ALP SERPL-CCNC: 205 U/L (ref 55–135)
ALT SERPL W/O P-5'-P-CCNC: 575 U/L (ref 10–44)
ANION GAP SERPL CALC-SCNC: 12 MMOL/L (ref 8–16)
AST SERPL-CCNC: 149 U/L (ref 10–40)
BASOPHILS # BLD AUTO: 0.02 K/UL (ref 0–0.2)
BASOPHILS NFR BLD: 0.1 % (ref 0–1.9)
BILIRUB SERPL-MCNC: 1.5 MG/DL (ref 0.1–1)
BLD PROD TYP BPU: NORMAL
BLOOD UNIT EXPIRATION DATE: NORMAL
BLOOD UNIT TYPE CODE: 5100
BLOOD UNIT TYPE: NORMAL
BNP SERPL-MCNC: 93 PG/ML (ref 0–99)
BUN SERPL-MCNC: 25 MG/DL (ref 6–20)
CALCIUM SERPL-MCNC: 9.3 MG/DL (ref 8.7–10.5)
CHLORIDE SERPL-SCNC: 96 MMOL/L (ref 95–110)
CO2 SERPL-SCNC: 25 MMOL/L (ref 23–29)
CODING SYSTEM: NORMAL
CREAT SERPL-MCNC: 1 MG/DL (ref 0.5–1.4)
DIFFERENTIAL METHOD: ABNORMAL
DISPENSE STATUS: NORMAL
EOSINOPHIL # BLD AUTO: 0 K/UL (ref 0–0.5)
EOSINOPHIL NFR BLD: 0 % (ref 0–8)
ERYTHROCYTE [DISTWIDTH] IN BLOOD BY AUTOMATED COUNT: 13.5 % (ref 11.5–14.5)
EST. GFR  (AFRICAN AMERICAN): >60 ML/MIN/1.73 M^2
EST. GFR  (NON AFRICAN AMERICAN): >60 ML/MIN/1.73 M^2
GLUCOSE SERPL-MCNC: 225 MG/DL (ref 70–110)
HCT VFR BLD AUTO: 44.9 % (ref 40–54)
HGB BLD-MCNC: 15.4 G/DL (ref 14–18)
IMM GRANULOCYTES # BLD AUTO: 0.15 K/UL (ref 0–0.04)
IMM GRANULOCYTES NFR BLD AUTO: 1.1 % (ref 0–0.5)
INR PPP: 1 (ref 0.8–1.2)
LYMPHOCYTES # BLD AUTO: 0.5 K/UL (ref 1–4.8)
LYMPHOCYTES NFR BLD: 3.5 % (ref 18–48)
MAGNESIUM SERPL-MCNC: 2.2 MG/DL (ref 1.6–2.6)
MCH RBC QN AUTO: 29.3 PG (ref 27–31)
MCHC RBC AUTO-ENTMCNC: 34.3 G/DL (ref 32–36)
MCV RBC AUTO: 85 FL (ref 82–98)
MONOCYTES # BLD AUTO: 0.5 K/UL (ref 0.3–1)
MONOCYTES NFR BLD: 3.7 % (ref 4–15)
NEUTROPHILS # BLD AUTO: 12.5 K/UL (ref 1.8–7.7)
NEUTROPHILS NFR BLD: 91.6 % (ref 38–73)
NRBC BLD-RTO: 0 /100 WBC
PHOSPHATE SERPL-MCNC: 3.4 MG/DL (ref 2.7–4.5)
PLATELET # BLD AUTO: 170 K/UL (ref 150–350)
PMV BLD AUTO: 10.7 FL (ref 9.2–12.9)
POCT GLUCOSE: 250 MG/DL (ref 70–110)
POCT GLUCOSE: 264 MG/DL (ref 70–110)
POCT GLUCOSE: 289 MG/DL (ref 70–110)
POCT GLUCOSE: 330 MG/DL (ref 70–110)
POTASSIUM SERPL-SCNC: 3.5 MMOL/L (ref 3.5–5.1)
PROT SERPL-MCNC: 6.6 G/DL (ref 6–8.4)
PROTHROMBIN TIME: 10.5 SEC (ref 9–12.5)
RBC # BLD AUTO: 5.26 M/UL (ref 4.6–6.2)
SODIUM SERPL-SCNC: 133 MMOL/L (ref 136–145)
TACROLIMUS BLD-MCNC: 15 NG/ML (ref 5–15)
TRANS ERYTHROCYTES VOL PATIENT: NORMAL ML
TROPONIN I SERPL DL<=0.01 NG/ML-MCNC: 0.07 NG/ML (ref 0–0.03)
TROPONIN I SERPL DL<=0.01 NG/ML-MCNC: 0.09 NG/ML (ref 0–0.03)
TSH SERPL DL<=0.005 MIU/L-ACNC: 1.37 UIU/ML (ref 0.4–4)
WBC # BLD AUTO: 13.67 K/UL (ref 3.9–12.7)

## 2019-07-07 PROCEDURE — 93005 ELECTROCARDIOGRAM TRACING: CPT

## 2019-07-07 PROCEDURE — 99233 SBSQ HOSP IP/OBS HIGH 50: CPT | Mod: 24,,, | Performed by: NURSE PRACTITIONER

## 2019-07-07 PROCEDURE — 94660 CPAP INITIATION&MGMT: CPT

## 2019-07-07 PROCEDURE — 85610 PROTHROMBIN TIME: CPT

## 2019-07-07 PROCEDURE — 20600001 HC STEP DOWN PRIVATE ROOM

## 2019-07-07 PROCEDURE — 99232 SBSQ HOSP IP/OBS MODERATE 35: CPT | Mod: ,,, | Performed by: NURSE PRACTITIONER

## 2019-07-07 PROCEDURE — 25000003 PHARM REV CODE 250: Performed by: NURSE PRACTITIONER

## 2019-07-07 PROCEDURE — 63600175 PHARM REV CODE 636 W HCPCS: Performed by: STUDENT IN AN ORGANIZED HEALTH CARE EDUCATION/TRAINING PROGRAM

## 2019-07-07 PROCEDURE — 99900035 HC TECH TIME PER 15 MIN (STAT)

## 2019-07-07 PROCEDURE — 27000190 HC CPAP FULL FACE MASK W/VALVE

## 2019-07-07 PROCEDURE — 83880 ASSAY OF NATRIURETIC PEPTIDE: CPT

## 2019-07-07 PROCEDURE — 63600175 PHARM REV CODE 636 W HCPCS: Performed by: NURSE PRACTITIONER

## 2019-07-07 PROCEDURE — 80197 ASSAY OF TACROLIMUS: CPT

## 2019-07-07 PROCEDURE — 80053 COMPREHEN METABOLIC PANEL: CPT

## 2019-07-07 PROCEDURE — 25000003 PHARM REV CODE 250: Performed by: STUDENT IN AN ORGANIZED HEALTH CARE EDUCATION/TRAINING PROGRAM

## 2019-07-07 PROCEDURE — 93010 ELECTROCARDIOGRAM REPORT: CPT | Mod: 76,,, | Performed by: INTERNAL MEDICINE

## 2019-07-07 PROCEDURE — 63600175 PHARM REV CODE 636 W HCPCS: Performed by: PHYSICIAN ASSISTANT

## 2019-07-07 PROCEDURE — 25000003 PHARM REV CODE 250: Performed by: PHYSICIAN ASSISTANT

## 2019-07-07 PROCEDURE — 99233 PR SUBSEQUENT HOSPITAL CARE,LEVL III: ICD-10-PCS | Mod: 24,,, | Performed by: NURSE PRACTITIONER

## 2019-07-07 PROCEDURE — 84484 ASSAY OF TROPONIN QUANT: CPT

## 2019-07-07 PROCEDURE — 84443 ASSAY THYROID STIM HORMONE: CPT

## 2019-07-07 PROCEDURE — 99232 PR SUBSEQUENT HOSPITAL CARE,LEVL II: ICD-10-PCS | Mod: ,,, | Performed by: NURSE PRACTITIONER

## 2019-07-07 PROCEDURE — 84100 ASSAY OF PHOSPHORUS: CPT

## 2019-07-07 PROCEDURE — 85025 COMPLETE CBC W/AUTO DIFF WBC: CPT

## 2019-07-07 PROCEDURE — 83735 ASSAY OF MAGNESIUM: CPT

## 2019-07-07 PROCEDURE — 93010 EKG 12-LEAD: ICD-10-PCS | Mod: ,,, | Performed by: INTERNAL MEDICINE

## 2019-07-07 PROCEDURE — 93010 ELECTROCARDIOGRAM REPORT: CPT | Mod: ,,, | Performed by: INTERNAL MEDICINE

## 2019-07-07 PROCEDURE — 94761 N-INVAS EAR/PLS OXIMETRY MLT: CPT

## 2019-07-07 RX ORDER — SIMETHICONE 80 MG
1 TABLET,CHEWABLE ORAL 3 TIMES DAILY PRN
Status: DISCONTINUED | OUTPATIENT
Start: 2019-07-07 | End: 2019-07-09 | Stop reason: HOSPADM

## 2019-07-07 RX ORDER — ESOMEPRAZOLE MAGNESIUM 10 MG/1
40 GRANULE, FOR SUSPENSION, EXTENDED RELEASE ORAL DAILY
Status: DISCONTINUED | OUTPATIENT
Start: 2019-07-08 | End: 2019-07-08

## 2019-07-07 RX ORDER — INSULIN ASPART 100 [IU]/ML
4 INJECTION, SOLUTION INTRAVENOUS; SUBCUTANEOUS
Status: DISCONTINUED | OUTPATIENT
Start: 2019-07-07 | End: 2019-07-08

## 2019-07-07 RX ORDER — BISACODYL 5 MG
10 TABLET, DELAYED RELEASE (ENTERIC COATED) ORAL NIGHTLY
Status: DISCONTINUED | OUTPATIENT
Start: 2019-07-07 | End: 2019-07-09 | Stop reason: HOSPADM

## 2019-07-07 RX ORDER — LIDOCAINE HYDROCHLORIDE 10 MG/ML
1 INJECTION INFILTRATION; PERINEURAL ONCE
Status: COMPLETED | OUTPATIENT
Start: 2019-07-07 | End: 2019-07-07

## 2019-07-07 RX ORDER — FAMOTIDINE 20 MG/1
20 TABLET, FILM COATED ORAL NIGHTLY
Status: DISCONTINUED | OUTPATIENT
Start: 2019-07-08 | End: 2019-07-07

## 2019-07-07 RX ORDER — METOPROLOL TARTRATE 1 MG/ML
5 INJECTION, SOLUTION INTRAVENOUS EVERY 5 MIN PRN
Status: COMPLETED | OUTPATIENT
Start: 2019-07-07 | End: 2019-07-07

## 2019-07-07 RX ORDER — METOPROLOL TARTRATE 1 MG/ML
5 INJECTION, SOLUTION INTRAVENOUS EVERY 5 MIN PRN
Status: DISCONTINUED | OUTPATIENT
Start: 2019-07-07 | End: 2019-07-07

## 2019-07-07 RX ORDER — POTASSIUM CHLORIDE 20 MEQ/1
40 TABLET, EXTENDED RELEASE ORAL ONCE
Status: COMPLETED | OUTPATIENT
Start: 2019-07-07 | End: 2019-07-07

## 2019-07-07 RX ORDER — TACROLIMUS 1 MG/1
3 CAPSULE ORAL 2 TIMES DAILY
Status: DISCONTINUED | OUTPATIENT
Start: 2019-07-07 | End: 2019-07-07

## 2019-07-07 RX ORDER — MAG HYDROX/ALUMINUM HYD/SIMETH 200-200-20
30 SUSPENSION, ORAL (FINAL DOSE FORM) ORAL EVERY 6 HOURS PRN
Status: DISCONTINUED | OUTPATIENT
Start: 2019-07-07 | End: 2019-07-09 | Stop reason: HOSPADM

## 2019-07-07 RX ORDER — BISACODYL 10 MG
10 SUPPOSITORY, RECTAL RECTAL DAILY PRN
Status: DISCONTINUED | OUTPATIENT
Start: 2019-07-07 | End: 2019-07-09 | Stop reason: HOSPADM

## 2019-07-07 RX ORDER — DOCUSATE SODIUM 100 MG/1
100 CAPSULE, LIQUID FILLED ORAL 3 TIMES DAILY
Status: DISCONTINUED | OUTPATIENT
Start: 2019-07-07 | End: 2019-07-09 | Stop reason: HOSPADM

## 2019-07-07 RX ADMIN — METOPROLOL TARTRATE 37.5 MG: 25 TABLET ORAL at 08:07

## 2019-07-07 RX ADMIN — NYSTATIN 500000 UNITS: 500000 SUSPENSION ORAL at 06:07

## 2019-07-07 RX ADMIN — METOPROLOL TARTRATE 37.5 MG: 25 TABLET ORAL at 03:07

## 2019-07-07 RX ADMIN — BISACODYL 10 MG RECTAL SUPPOSITORY 10 MG: at 01:07

## 2019-07-07 RX ADMIN — SULFAMETHOXAZOLE AND TRIMETHOPRIM 1 TABLET: 400; 80 TABLET ORAL at 08:07

## 2019-07-07 RX ADMIN — DOCUSATE SODIUM 100 MG: 100 CAPSULE, LIQUID FILLED ORAL at 06:07

## 2019-07-07 RX ADMIN — METOPROLOL TARTRATE 5 MG: 5 INJECTION, SOLUTION INTRAVENOUS at 02:07

## 2019-07-07 RX ADMIN — TACROLIMUS 3 MG: 1 CAPSULE ORAL at 08:07

## 2019-07-07 RX ADMIN — POTASSIUM CHLORIDE 40 MEQ: 1500 TABLET, EXTENDED RELEASE ORAL at 01:07

## 2019-07-07 RX ADMIN — HEPARIN SODIUM 5000 UNITS: 5000 INJECTION, SOLUTION INTRAVENOUS; SUBCUTANEOUS at 09:07

## 2019-07-07 RX ADMIN — OXYCODONE HYDROCHLORIDE 10 MG: 10 TABLET ORAL at 03:07

## 2019-07-07 RX ADMIN — INSULIN ASPART 4 UNITS: 100 INJECTION, SOLUTION INTRAVENOUS; SUBCUTANEOUS at 04:07

## 2019-07-07 RX ADMIN — METOPROLOL TARTRATE 37.5 MG: 25 TABLET ORAL at 09:07

## 2019-07-07 RX ADMIN — METHYLPREDNISOLONE SODIUM SUCCINATE 60 MG: 125 INJECTION, POWDER, FOR SOLUTION INTRAMUSCULAR; INTRAVENOUS at 09:07

## 2019-07-07 RX ADMIN — DOCUSATE SODIUM 100 MG: 100 CAPSULE, LIQUID FILLED ORAL at 03:07

## 2019-07-07 RX ADMIN — METOPROLOL TARTRATE 5 MG: 5 INJECTION, SOLUTION INTRAVENOUS at 01:07

## 2019-07-07 RX ADMIN — BISACODYL 10 MG: 5 TABLET, COATED ORAL at 09:07

## 2019-07-07 RX ADMIN — INSULIN ASPART 4 UNITS: 100 INJECTION, SOLUTION INTRAVENOUS; SUBCUTANEOUS at 09:07

## 2019-07-07 RX ADMIN — NYSTATIN 500000 UNITS: 500000 SUSPENSION ORAL at 01:07

## 2019-07-07 RX ADMIN — INSULIN ASPART 6 UNITS: 100 INJECTION, SOLUTION INTRAVENOUS; SUBCUTANEOUS at 12:07

## 2019-07-07 RX ADMIN — INSULIN ASPART 6 UNITS: 100 INJECTION, SOLUTION INTRAVENOUS; SUBCUTANEOUS at 04:07

## 2019-07-07 RX ADMIN — MYCOPHENOLATE MOFETIL 1000 MG: 250 CAPSULE ORAL at 08:07

## 2019-07-07 RX ADMIN — METOPROLOL TARTRATE 5 MG: 5 INJECTION INTRAVENOUS at 06:07

## 2019-07-07 RX ADMIN — MYCOPHENOLATE MOFETIL 1000 MG: 250 CAPSULE ORAL at 09:07

## 2019-07-07 RX ADMIN — OXYCODONE HYDROCHLORIDE 5 MG: 5 TABLET ORAL at 01:07

## 2019-07-07 RX ADMIN — HEPARIN SODIUM 5000 UNITS: 5000 INJECTION, SOLUTION INTRAVENOUS; SUBCUTANEOUS at 05:07

## 2019-07-07 RX ADMIN — ALUMINUM HYDROXIDE, MAGNESIUM HYDROXIDE, AND SIMETHICONE 30 ML: 200; 200; 20 SUSPENSION ORAL at 01:07

## 2019-07-07 RX ADMIN — DIBASIC SODIUM PHOSPHATE, MONOBASIC POTASSIUM PHOSPHATE AND MONOBASIC SODIUM PHOSPHATE 2 TABLET: 852; 155; 130 TABLET ORAL at 08:07

## 2019-07-07 RX ADMIN — OXYCODONE HYDROCHLORIDE 5 MG: 5 TABLET ORAL at 09:07

## 2019-07-07 RX ADMIN — LIDOCAINE HYDROCHLORIDE 1 ML: 10 INJECTION, SOLUTION INFILTRATION; PERINEURAL at 12:07

## 2019-07-07 RX ADMIN — ALUMINUM HYDROXIDE, MAGNESIUM HYDROXIDE, AND SIMETHICONE 30 ML: 200; 200; 20 SUSPENSION ORAL at 06:07

## 2019-07-07 RX ADMIN — HEPARIN SODIUM 5000 UNITS: 5000 INJECTION, SOLUTION INTRAVENOUS; SUBCUTANEOUS at 01:07

## 2019-07-07 RX ADMIN — INSULIN ASPART 4 UNITS: 100 INJECTION, SOLUTION INTRAVENOUS; SUBCUTANEOUS at 12:07

## 2019-07-07 RX ADMIN — DOCUSATE SODIUM 100 MG: 100 CAPSULE, LIQUID FILLED ORAL at 09:07

## 2019-07-07 RX ADMIN — NIFEDIPINE 30 MG: 30 TABLET, FILM COATED, EXTENDED RELEASE ORAL at 08:07

## 2019-07-07 RX ADMIN — DOCUSATE SODIUM 100 MG: 100 CAPSULE, LIQUID FILLED ORAL at 08:07

## 2019-07-07 RX ADMIN — METHYLPREDNISOLONE SODIUM SUCCINATE 60 MG: 125 INJECTION, POWDER, FOR SOLUTION INTRAMUSCULAR; INTRAVENOUS at 08:07

## 2019-07-07 RX ADMIN — NYSTATIN 500000 UNITS: 500000 SUSPENSION ORAL at 08:07

## 2019-07-07 NOTE — ASSESSMENT & PLAN NOTE
- pt was on eliquis prior to transplant for new onset afib  - now with post op afib, need to discuss plan for resuming anticoagulation

## 2019-07-07 NOTE — ASSESSMENT & PLAN NOTE
- afib with RVR ( on EKG) 7/7 early AM  - has hx afib, followed by Dr. Johnson of [facility]  - BB resumed post-op after pt had short spontaneously resolving episodes of SVT in SICU (also had these episodes once to TSU)  - home dose metoprolol was 50 mg ER daily; pt was started on 25 mg BID in SICU post op  - 7/7 spoke with cards re: SVT and then Afib  - increased metoprolol from 25 mg BID to 37.5 mg TID, per cards recs  - 5 mg IV lopressor x 3 given with HR improving to 120s/130s   - troponin elevated to 0.085, will trend  - bnp wnl  - cards consulted to see pt during this admission, appreciate recs

## 2019-07-07 NOTE — PROGRESS NOTES
~0115, -160 on tele. Pt c/o palpitations, mild chest pressure, and mild SOB. Troponin, BNP, CXR ordered/pending. EKG confirmed Afib with RVR (). Lopressor 5mg x 3 given with slight improvement seen in HR to 120s/130s, no change in rhythm. Spoke with cardiology MD on call again. Per cards, ok to monitor with no further intervention overnight if pt remains hemodynamically stable. Goal HR <110. Also changed PO Lopressor dosing to 37.5 mg TID, per cards recs.

## 2019-07-07 NOTE — ASSESSMENT & PLAN NOTE
- has been seen by GI in past for GERD, takes nexium at home  - placed on protonix post-op, however QTC prolonged and worsening on EKGs 7/6-7/7  - d/c'd protonix 7/7 and Nexium started.   - PRN tums and maalox  - daily EKG to trend QTC

## 2019-07-07 NOTE — PROGRESS NOTES
"Patient has had 2 short episodes of hemodynamically stable SVT noted on tele that self-resolved since his arrival to TSU from SICU. Pt does report feeling palpitations during these episodes which have occurred during transfers between bed and chair. He denies SOB or lightheadedness. Also endorses heartburn and hiccups (hx GERD, on Protonix). EKG sinus tach with prolonged QTC (518). BMP: K 3.5, phos 2.2. Mg 2.1. Will replace phos and monitor with AM labs as scheduled. Spoke with on call cardiology MD. He reviewed EKG, stating that "minimal criteria anteroseptal infarct" noted on EKG is likely due to poor lead placement. No infarct, per cards. Increased lopressor to 50 mg BID from 25 mg BID, per cards recs. Will continue to monitor overnight and plan for repeat EKG in AM.  "

## 2019-07-07 NOTE — PROGRESS NOTES
"Pt continues in A-Fib, 's. Pt feels heart "racing" but no other complaints. Sats 94% on 2 L NC, O2 weaned down to 1 L, per JOE Pelletier's request. Scheduled PO Lopressor just given. Informed JOE Pelletier of HR, no additional orders given. Will continue to monitor.   "

## 2019-07-07 NOTE — PLAN OF CARE
Problem: Adult Inpatient Plan of Care  Goal: Plan of Care Review  Pt free from fall or injury so far this shift. Instructed to call if assistance needed, verbalized understanding.   Cardiac monitoring in progress. Pt converted to NSR at 12:30, confirmed with repeat EKG. Currently SR, HR 80's. PO Lopressor continued.   O2 weaned down to 1 L, sats low 90's. Attempted to remove O2, but pt prefers to leave it on at this time. IS in use.   BG checked AC/HS. Last , 10 units Novolog given (4 MTI + 6 SSI)  LE's continue to trend down.   Christie and JUVE DC'd today. Voiding with no difficulty since christie removed.   Suppository given, BM x 1. Colace and Dulcolax continued.   Pain well controlled with PRN Oxycodone, given once this shift.   No new skin breakdown noted. Pt able to adjust position independently.   Afebrile. Hand hygiene reinforced. Daily labs monitored.   Taught pt and wife how to pulls self med. Went over blue card in detail, verbalized understanding.

## 2019-07-07 NOTE — ASSESSMENT & PLAN NOTE
Managed per LTS  avoid hypoglycemia  Lab Results   Component Value Date     (H) 07/07/2019     (H) 07/07/2019     (H) 07/06/2019    ALKPHOS 205 (H) 07/07/2019    BILITOT 1.5 (H) 07/07/2019

## 2019-07-07 NOTE — PROGRESS NOTES
"[facility]  Endocrinology  Progress Note    Admit Date: 7/3/2019     Reason for Consult: Management of T2DM, Hyperglycemia     Surgical Procedure and Date: liver transplant     Diabetes diagnosis year:     Home Diabetes Medications:  none  Lab Results   Component Value Date    HGBA1C 5.5 2019       How often checking glucose at home? Not checking       Diabetes Complications include:     none    Complicating diabetes co morbidities:   CIRRHOSIS and Glucocorticoid use       HPI:   Patient is a 59 y.o. 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 Patient admitted for liver transplant. Endocrinology consulted for BG/ DM management.                       Interval HPI:   Overnight events: Transferred to TSU. Afib with RBR. NC. BG above goal with correction scale coverage. Solumedrol 60 mg BID; standard steroid taper per primary.   Eatin%  Nausea: No  Hypoglycemia and intervention: No  Fever: No  TPN and/or TF: No    BP (!) 146/84   Pulse (!) 115   Temp 98 °F (36.7 °C) (Oral)   Resp 16   Ht 5' 7" (1.702 m)   Wt 98.2 kg (216 lb 7.9 oz)   SpO2 (!) 94%   BMI 33.91 kg/m²      Labs Reviewed and Include    Recent Labs   Lab 19  0500   *   CALCIUM 9.3   ALBUMIN 3.8   PROT 6.6   *   K 3.5   CO2 25   CL 96   BUN 25*   CREATININE 1.0   ALKPHOS 205*   *   *   BILITOT 1.5*     Lab Results   Component Value Date    WBC 13.67 (H) 2019    HGB 15.4 2019    HCT 44.9 2019    MCV 85 2019     2019     No results for input(s): TSH, FREET4 in the last 168 hours.  Lab Results   Component Value Date    HGBA1C 5.5 2019       Nutritional status:   Body mass index is 33.91 kg/m².  Lab Results   Component Value Date    ALBUMIN 3.8 2019    ALBUMIN 3.8 2019    ALBUMIN 4.0 2019     No results found for: PREALBUMIN    Estimated Creatinine Clearance: 87.7 mL/min (based on SCr of 1 " mg/dL).    Accu-Checks  Recent Labs     07/05/19  0802 07/05/19  1018 07/05/19  1207 07/05/19  1756 07/05/19  2106 07/06/19  0805 07/06/19  1124 07/06/19  1643 07/06/19  1839 07/06/19  2106   POCTGLUCOSE 163* 178* 196* 187* 194* 176* 192* 217* 206* 208*       Current Medications and/or Treatments Impacting Glycemic Control  Immunotherapy:    Immunosuppressants         Stop Route Frequency     tacrolimus capsule 3 mg      -- Oral 2 times daily     mycophenolate capsule 1,000 mg      -- Oral 2 times daily        Steroids:   Hormones (From admission, onward)    Start     Stop Route Frequency Ordered    07/10/19 0900  predniSONE tablet 20 mg  (methylprednisolone taper panel)      -- Oral Daily 07/04/19 0555    07/09/19 0900  methylPREDNISolone sodium succinate injection 20 mg  (methylprednisolone taper panel)      07/10 0859 IV Every 12 hours 07/04/19 0555    07/08/19 0900  methylPREDNISolone sodium succinate injection 40 mg  (methylprednisolone taper panel)      07/09 0859 IV Every 12 hours 07/04/19 0555    07/07/19 0900  methylPREDNISolone sodium succinate injection 60 mg  (methylprednisolone taper panel)      07/08 0859 IV Every 12 hours 07/04/19 0555        Pressors:    Autonomic Drugs (From admission, onward)    Start     Stop Route Frequency Ordered    07/07/19 0900  metoprolol tartrate split tablet 37.5 mg      -- Oral 3 times daily 07/07/19 0238        Hyperglycemia/Diabetes Medications:   Antihyperglycemics (From admission, onward)    Start     Stop Route Frequency Ordered    07/05/19 1145  insulin aspart U-100 pen 1-10 Units      -- SubQ Before meals & nightly PRN 07/05/19 1045          ASSESSMENT and PLAN    * S/P liver transplant  Managed per LTS  avoid hypoglycemia  Lab Results   Component Value Date     (H) 07/06/2019     (H) 07/06/2019     (H) 07/06/2019    ALKPHOS 137 (H) 07/06/2019    BILITOT 2.2 (H) 07/06/2019           Type 2 diabetes mellitus, without long-term current use of  insulin  BG goal 140 - 180       BG monitoring ac/hs and moderate dose correction scale.   Start novolog 4 units with meals.       Adrenal cortical steroids causing adverse effect in therapeutic use  Glucocorticoids markedly increase glucoses. Expect the steroid taper will help glucose control.       Prophylactic immunotherapy  May increase insulin resistance.         Obesity (BMI 30.0-34.9)  May increase insulin resistance.   Body mass index is 33.91 kg/m².            Stefania Childers NP  Endocrinology  [facility]

## 2019-07-07 NOTE — PROGRESS NOTES
Trays arrived on the floor. . Pt did not receive a breakfast tray, dietary called, order now in. Instructed pt to call when breakfast arrives.     0925: Upon entering room found pt half way done with breakfast, pt reports forgot to call to have BG rechecked. Denia Aguiar NP of above situation, says to hold off on administering MTI/SSI until lunch arrives.

## 2019-07-07 NOTE — SUBJECTIVE & OBJECTIVE
Scheduled Meds:   bisacodyl  10 mg Oral QHS    docusate sodium  100 mg Oral TID    [START ON 7/8/2019] esomeprazole magnesium  40 mg Oral Daily    heparin (porcine)  5,000 Units Subcutaneous Q8H    insulin aspart U-100  4 Units Subcutaneous TIDWM    lidocaine HCL 10 mg/ml (1%)  1 mL Intradermal Once    methylPREDNISolone sodium succinate  60 mg Intravenous Q12H    Followed by    [START ON 7/8/2019] methylPREDNISolone sodium succinate  40 mg Intravenous Q12H    Followed by    [START ON 7/9/2019] methylPREDNISolone sodium succinate  20 mg Intravenous Q12H    Followed by    [START ON 7/10/2019] predniSONE  20 mg Oral Daily    metoprolol tartrate  37.5 mg Oral TID    mycophenolate  1,000 mg Oral BID    NIFEdipine  30 mg Oral Daily    nystatin  500,000 Units Mouth/Throat TID PC    sulfamethoxazole-trimethoprim 400-80mg  1 tablet Oral Daily    [START ON 7/14/2019] valGANciclovir  450 mg Oral Daily     Continuous Infusions:  PRN Meds:sodium chloride, sodium chloride, aluminum-magnesium hydroxide-simethicone, baclofen, bisacodyl, calcium carbonate, Dextrose 10% Bolus, Dextrose 10% Bolus, glucagon (human recombinant), glucose, glucose, hydrALAZINE, insulin aspart U-100, ondansetron, oxyCODONE, oxyCODONE, simethicone    Review of Systems   Constitutional: Positive for activity change and appetite change. Negative for fever.   HENT: Negative.  Negative for facial swelling.    Eyes: Negative.    Respiratory: Negative.  Negative for apnea, chest tightness, shortness of breath and wheezing.    Cardiovascular: Positive for leg swelling. Negative for chest pain and palpitations.   Gastrointestinal: Positive for abdominal distention and abdominal pain. Negative for constipation, diarrhea, nausea and vomiting.   Genitourinary: Negative.  Negative for decreased urine volume, difficulty urinating, dysuria, hematuria and urgency.   Musculoskeletal: Negative.  Negative for back pain, gait problem, neck pain and neck  stiffness.   Skin: Positive for wound. Negative for color change and pallor.   Allergic/Immunologic: Positive for immunocompromised state.   Neurological: Negative.  Negative for dizziness, seizures, weakness, light-headedness and headaches.   Psychiatric/Behavioral: Negative for behavioral problems, confusion, hallucinations, sleep disturbance and suicidal ideas. The patient is nervous/anxious.      Objective:     Vital Signs (Most Recent):  Temp: 98 °F (36.7 °C) (07/07/19 1119)  Pulse: (!) 125 (07/07/19 1204)  Resp: 19 (07/07/19 1204)  BP: 122/83 (07/07/19 1204)  SpO2: (!) 94 % (07/07/19 1204) Vital Signs (24h Range):  Temp:  [97.3 °F (36.3 °C)-98 °F (36.7 °C)] 98 °F (36.7 °C)  Pulse:  [] 125  Resp:  [12-21] 19  SpO2:  [90 %-95 %] 94 %  BP: (104-168)/(65-99) 122/83     Weight: 98.2 kg (216 lb 7.9 oz)  Body mass index is 33.91 kg/m².    Intake/Output - Last 3 Shifts       07/05 0700 - 07/06 0659 07/06 0700 - 07/07 0659 07/07 0700 - 07/08 0659    P.O. 625 900 360    I.V. (mL/kg) 2960.7 (30.1) 1405 (14.3)     NG/GT       IV Piggyback       Total Intake(mL/kg) 3585.7 (36.5) 2305 (23.5) 360 (3.7)    Urine (mL/kg/hr) 3350 (1.4) 3340 (1.4) 400 (0.8)    Drains 804 506 120    Stool 0      Total Output 4154 3846 520    Net -568.3 -1541 -160           Stool Occurrence 0 x            Physical Exam   Constitutional: He is oriented to person, place, and time. He appears well-developed. No distress.   HENT:   Head: Normocephalic and atraumatic.   Neck: Normal range of motion. Neck supple. No JVD present.   Cardiovascular: Normal rate, regular rhythm and normal heart sounds.   No murmur heard.  Pulmonary/Chest: Effort normal. No respiratory distress. He has decreased breath sounds in the right lower field and the left lower field. He has no wheezes. He exhibits no tenderness.   Abdominal: Soft. Bowel sounds are normal. He exhibits distension. There is tenderness.   Chevron inc MARIS w/ staples.   RLQ JUVE w/ SS drainage.     Musculoskeletal: Normal range of motion. He exhibits no edema (+1 ble) or tenderness.   Neurological: He is alert and oriented to person, place, and time. He has normal reflexes.   Skin: Skin is warm and dry. He is not diaphoretic.   Psychiatric: He has a normal mood and affect. His behavior is normal. Judgment and thought content normal.   Nursing note and vitals reviewed.      Laboratory:  Immunosuppressants         Stop Route Frequency     mycophenolate capsule 1,000 mg      -- Oral 2 times daily        CBC:   Recent Labs   Lab 07/07/19  0500   WBC 13.67*   RBC 5.26   HGB 15.4   HCT 44.9      MCV 85   MCH 29.3   MCHC 34.3     CMP:   Recent Labs   Lab 07/07/19  0500   *   CALCIUM 9.3   ALBUMIN 3.8   PROT 6.6   *   K 3.5   CO2 25   CL 96   BUN 25*   CREATININE 1.0   ALKPHOS 205*   *   *   BILITOT 1.5*     Coagulation:   Recent Labs   Lab 07/06/19  0320 07/07/19  0500   INR 1.0 1.0   APTT 24.4  --      Labs within the past 24 hours have been reviewed.    Diagnostic Results:  None

## 2019-07-07 NOTE — PROGRESS NOTES
AMBER Sandoval notified pt complaining of palpitations , orders for EKG placed.     2115: Notified AMBER Sandoval. Pt had 24 sec run of SVT, heart rate as high as 200. Sindy at bedside to speak with pt. STAT labs ordered.     2250: Notified AMBER Sandoval. Pt with another run of SVT, heart rate as high as 171.  Sindy at the bedside to see pt.     2305: Notified by Sindy CLARK, cardiology recommenced increasing Lopressor to 50 mg BID. Additional one time dose of Lopressor 25 mg PO for tonight added. Continue to monitor.

## 2019-07-07 NOTE — ASSESSMENT & PLAN NOTE
- continue bactrim for pcp prophylaxis.  - continue nystatin for thrush prophylaxis.  - start valcyte for cmv prophylaxis on 7/14/19.

## 2019-07-07 NOTE — SUBJECTIVE & OBJECTIVE
"Interval HPI:   Overnight events: Transferred to TSU. Afib with RBR. NC. BG above goal with correction scale coverage. Solumedrol 60 mg BID; standard steroid taper per primary.   Eatin%  Nausea: No  Hypoglycemia and intervention: No  Fever: No  TPN and/or TF: No    BP (!) 146/84   Pulse (!) 115   Temp 98 °F (36.7 °C) (Oral)   Resp 16   Ht 5' 7" (1.702 m)   Wt 98.2 kg (216 lb 7.9 oz)   SpO2 (!) 94%   BMI 33.91 kg/m²     Labs Reviewed and Include    Recent Labs   Lab 19  0500   *   CALCIUM 9.3   ALBUMIN 3.8   PROT 6.6   *   K 3.5   CO2 25   CL 96   BUN 25*   CREATININE 1.0   ALKPHOS 205*   *   *   BILITOT 1.5*     Lab Results   Component Value Date    WBC 13.67 (H) 2019    HGB 15.4 2019    HCT 44.9 2019    MCV 85 2019     2019     No results for input(s): TSH, FREET4 in the last 168 hours.  Lab Results   Component Value Date    HGBA1C 5.5 2019       Nutritional status:   Body mass index is 33.91 kg/m².  Lab Results   Component Value Date    ALBUMIN 3.8 2019    ALBUMIN 3.8 2019    ALBUMIN 4.0 2019     No results found for: PREALBUMIN    Estimated Creatinine Clearance: 87.7 mL/min (based on SCr of 1 mg/dL).    Accu-Checks  Recent Labs     19  0802 19  1018 19  1207 19  1756 19  2106 19  0805 19  1124 19  1643 19  1839 19  2106   POCTGLUCOSE 163* 178* 196* 187* 194* 176* 192* 217* 206* 208*       Current Medications and/or Treatments Impacting Glycemic Control  Immunotherapy:    Immunosuppressants         Stop Route Frequency     tacrolimus capsule 3 mg      -- Oral 2 times daily     mycophenolate capsule 1,000 mg      -- Oral 2 times daily        Steroids:   Hormones (From admission, onward)    Start     Stop Route Frequency Ordered    07/10/19 0900  predniSONE tablet 20 mg  (methylprednisolone taper panel)      -- Oral Daily 19 0555    19 " 0900  methylPREDNISolone sodium succinate injection 20 mg  (methylprednisolone taper panel)      07/10 0859 IV Every 12 hours 07/04/19 0555    07/08/19 0900  methylPREDNISolone sodium succinate injection 40 mg  (methylprednisolone taper panel)      07/09 0859 IV Every 12 hours 07/04/19 0555    07/07/19 0900  methylPREDNISolone sodium succinate injection 60 mg  (methylprednisolone taper panel)      07/08 0859 IV Every 12 hours 07/04/19 0555        Pressors:    Autonomic Drugs (From admission, onward)    Start     Stop Route Frequency Ordered    07/07/19 0900  metoprolol tartrate split tablet 37.5 mg      -- Oral 3 times daily 07/07/19 0238        Hyperglycemia/Diabetes Medications:   Antihyperglycemics (From admission, onward)    Start     Stop Route Frequency Ordered    07/05/19 1145  insulin aspart U-100 pen 1-10 Units      -- SubQ Before meals & nightly PRN 07/05/19 1044

## 2019-07-07 NOTE — ASSESSMENT & PLAN NOTE
- s/p DBD OLTX 7/4/19 (steroid induction, CMV D-/R+) 2/2 HCC/ROMO   - surgery without complication  - LFTS trending down  - transferred to TSU POD#2 (7/6) with christie and 2 JUVE drains in place  - Afib with RVR early AM 7/7   - plan to remove christie cath today and lateral drain.   Drain removed:  Site cleaned with alcohol, lidocaine 1% used to numb area to place prolene 3.0 suture to site.  Drain intact at time of removal.  Patient tolerated procedure well.  Will continue to monitor for any complications.

## 2019-07-07 NOTE — HPI
Roman Hodges is a 59 y.o. male with history of ESLD secondary to HCC and ROMO (non-alcoholic steatohepatitis), T2DM, MALLIKA, HTN, HCC, and AFib admitted to [facility] on 7/3/2019 for liver transplantation. Patient denies fevers, chills, nausea, vomiting, CP, SOB, changes to bowel or bladder habits.

## 2019-07-07 NOTE — ASSESSMENT & PLAN NOTE
- prior to txp, pt took amlodipine, losartan, & metoprolol  - post op, nifedipine started and metoprolol resumed   - continue nifedipine and metoprolol  - monitor

## 2019-07-07 NOTE — PROGRESS NOTES
Notified AMBER Sandoval. Pt's HR sustained in the 140's - 160's. Pt c/o palpitations, chest pain, and SOB.   Lori at the bedside. Orders received for labs, 12 lead ekg, chest xray. Afib with RVR noted orders received for lopressor 5 mg IV x 3 doses 5 minutes apart. Orders verified IV lopressor administered per hospital protocol patient tolerated well. Vital signs and telemetry monitored throughout (see chart). Will monitor.

## 2019-07-07 NOTE — ASSESSMENT & PLAN NOTE
BG goal 140 - 180     Novolog 8 units with meals, consider reducing today secondary to steroid taper  Moderate dose correction scale - due to IV steroids  BG monitoring AC/HS    Discharge planning: Recommend patient discharge on Januvia 100 mg PO daily and Novolog correction scale 150/50.  Patient has no history of pancreatitis or medullary thyroid cancer.  Provided BG logs with dosing instructions at bedside.  Reviewed topics related to DM including: the need for insulin, how insulin works, what makes it a high risk medication, the importance of follow up with endocrine provider, importance of and how to check BG, how to record BG on logs, how to administer insulin, appropriate insulin administration sites, importance of rotating injection sites, hyper/hypoglycemia, how and when to treat hypoglycemia, when to hold insulin, how the correction scale works, and when to seek medical attention.  Patient verbalized understanding, answered all questions to patient's satisfaction.  Will setup patient for hospital discharge follow up appointment.

## 2019-07-07 NOTE — HOSPITAL COURSE
Pt now s/p DBD OLTX 7/4/19 (steroid induction, CMV D-/R+) 2/2 HCC/ROMO  Surgery without complication. Progressing well post operatively with LFTs trending down daily. Transferred to TSU on POD#2 (7/6) with christie and 2 JUVE drains in place.    Interval History: pt recently transferred from ICU to TSU on 7/6/19. Progressing well post-op. However, with Afib with RVR ~0130 on 7/7/19. Case discussed with cardiology and pt placed on PO lopressor per cards recs. Now converted to sinus rhythm and rate controlled.  Will restart apixaban and monitor for signs of bleeding. Pt also reports long h/o GERD and use of nexium at home. Nexium restarted 7/7, and patient will use own supply. LFTs improving daily. Plan to remove last surgical drain today. Pain well controlled, ambulating, passing gas and tolerating diet. Monitor.

## 2019-07-07 NOTE — PROGRESS NOTES
[facility]  Liver Transplant  Progress Note    Patient Name: Roman Hodges  MRN: [id number]  Admission Date: 7/3/2019  Hospital Length of Stay: 4 days  Code Status: Full Code  Primary Care Provider: Jose Angel Munson MD  Post-Operative Day: 3    ORGAN:   LIVER  Disease Etiology: Primary Liver Malignancy: Hepatoma (HCC) and Cirrhosis  Donor Type:    - Brain Death  CDC High Risk:   No  Donor CMV Status:   Donor CMV Status: Negative  Donor HBcAB:   Negative  Donor HCV Status:   Negative  Donor HBV MARIA TERESA: Negative  Donor HCV MARIA TERESA: Negative  Whole or Partial: Whole Liver  Biliary Anastomosis: End to End  Arterial Anatomy: Replaced Right Hepatic from SMA  Subjective:     History of Present Illness:   Roman Hodges is a 59 y.o. 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 Patient denies fevers, chills, nausea, vomiting, CP, SOB, changes to bowel or bladder habits.    Hospital Course:  Pt now s/p DBD OLTX 19 (steroid induction, CMV D-/R+) 2/2 HCC/ROMO  Surgery without complication. Progressing well post operatively with LFTs trending down daily. Transferred to TSU on POD#2 () with christie and 2 JUVE drains in place.    Interval History: pt recently transferred from ICU to TSU on 19. Progressing well post-op but note with Afib with RVR ~0130 on 19. Case discussed with cardiology and pt placed on PO lopressor pre cards recs. HR with slight improvement but remains elevated at time. Pt also reports long h/o GERD and use of nexium a home. Nexium started today. Pt reports feeling well at this time. LFTs improving. Plan to remove christie cath and lateral JUVE drain. Pain well controlled, ambulating, passing gas and tolerating diet. Monitor.     Scheduled Meds:   bisacodyl  10 mg Oral QHS    docusate sodium  100 mg Oral TID    [START ON 2019] esomeprazole  magnesium  40 mg Oral Daily    heparin (porcine)  5,000 Units Subcutaneous Q8H    insulin aspart U-100  4 Units Subcutaneous TIDWM    lidocaine HCL 10 mg/ml (1%)  1 mL Intradermal Once    methylPREDNISolone sodium succinate  60 mg Intravenous Q12H    Followed by    [START ON 7/8/2019] methylPREDNISolone sodium succinate  40 mg Intravenous Q12H    Followed by    [START ON 7/9/2019] methylPREDNISolone sodium succinate  20 mg Intravenous Q12H    Followed by    [START ON 7/10/2019] predniSONE  20 mg Oral Daily    metoprolol tartrate  37.5 mg Oral TID    mycophenolate  1,000 mg Oral BID    NIFEdipine  30 mg Oral Daily    nystatin  500,000 Units Mouth/Throat TID PC    sulfamethoxazole-trimethoprim 400-80mg  1 tablet Oral Daily    [START ON 7/14/2019] valGANciclovir  450 mg Oral Daily     Continuous Infusions:  PRN Meds:sodium chloride, sodium chloride, aluminum-magnesium hydroxide-simethicone, baclofen, bisacodyl, calcium carbonate, Dextrose 10% Bolus, Dextrose 10% Bolus, glucagon (human recombinant), glucose, glucose, hydrALAZINE, insulin aspart U-100, ondansetron, oxyCODONE, oxyCODONE, simethicone    Review of Systems   Constitutional: Positive for activity change and appetite change. Negative for fever.   HENT: Negative.  Negative for facial swelling.    Eyes: Negative.    Respiratory: Negative.  Negative for apnea, chest tightness, shortness of breath and wheezing.    Cardiovascular: Positive for leg swelling. Negative for chest pain and palpitations.   Gastrointestinal: Positive for abdominal distention and abdominal pain. Negative for constipation, diarrhea, nausea and vomiting.   Genitourinary: Negative.  Negative for decreased urine volume, difficulty urinating, dysuria, hematuria and urgency.   Musculoskeletal: Negative.  Negative for back pain, gait problem, neck pain and neck stiffness.   Skin: Positive for wound. Negative for color change and pallor.   Allergic/Immunologic: Positive for  immunocompromised state.   Neurological: Negative.  Negative for dizziness, seizures, weakness, light-headedness and headaches.   Psychiatric/Behavioral: Negative for behavioral problems, confusion, hallucinations, sleep disturbance and suicidal ideas. The patient is nervous/anxious.      Objective:     Vital Signs (Most Recent):  Temp: 98 °F (36.7 °C) (07/07/19 1119)  Pulse: (!) 125 (07/07/19 1204)  Resp: 19 (07/07/19 1204)  BP: 122/83 (07/07/19 1204)  SpO2: (!) 94 % (07/07/19 1204) Vital Signs (24h Range):  Temp:  [97.3 °F (36.3 °C)-98 °F (36.7 °C)] 98 °F (36.7 °C)  Pulse:  [] 125  Resp:  [12-21] 19  SpO2:  [90 %-95 %] 94 %  BP: (104-168)/(65-99) 122/83     Weight: 98.2 kg (216 lb 7.9 oz)  Body mass index is 33.91 kg/m².    Intake/Output - Last 3 Shifts       07/05 0700 - 07/06 0659 07/06 0700 - 07/07 0659 07/07 0700 - 07/08 0659    P.O. 625 900 360    I.V. (mL/kg) 2960.7 (30.1) 1405 (14.3)     NG/GT       IV Piggyback       Total Intake(mL/kg) 3585.7 (36.5) 2305 (23.5) 360 (3.7)    Urine (mL/kg/hr) 3350 (1.4) 3340 (1.4) 400 (0.8)    Drains 804 506 120    Stool 0      Total Output 4154 3846 520    Net -568.3 -1541 -160           Stool Occurrence 0 x            Physical Exam   Constitutional: He is oriented to person, place, and time. He appears well-developed. No distress.   HENT:   Head: Normocephalic and atraumatic.   Neck: Normal range of motion. Neck supple. No JVD present.   Cardiovascular: Normal rate, regular rhythm and normal heart sounds.   No murmur heard.  Pulmonary/Chest: Effort normal. No respiratory distress. He has decreased breath sounds in the right lower field and the left lower field. He has no wheezes. He exhibits no tenderness.   Abdominal: Soft. Bowel sounds are normal. He exhibits distension. There is tenderness.   Chevron inc MARIS w/ staples.   RLQ JUVE w/ SS drainage.    Musculoskeletal: Normal range of motion. He exhibits no edema (+1 ble) or tenderness.   Neurological: He is alert and  "oriented to person, place, and time. He has normal reflexes.   Skin: Skin is warm and dry. He is not diaphoretic.   Psychiatric: He has a normal mood and affect. His behavior is normal. Judgment and thought content normal.   Nursing note and vitals reviewed.      Laboratory:  Immunosuppressants         Stop Route Frequency     mycophenolate capsule 1,000 mg      -- Oral 2 times daily        CBC:   Recent Labs   Lab 07/07/19  0500   WBC 13.67*   RBC 5.26   HGB 15.4   HCT 44.9      MCV 85   MCH 29.3   MCHC 34.3     CMP:   Recent Labs   Lab 07/07/19  0500   *   CALCIUM 9.3   ALBUMIN 3.8   PROT 6.6   *   K 3.5   CO2 25   CL 96   BUN 25*   CREATININE 1.0   ALKPHOS 205*   *   *   BILITOT 1.5*     Coagulation:   Recent Labs   Lab 07/06/19  0320 07/07/19  0500   INR 1.0 1.0   APTT 24.4  --      Labs within the past 24 hours have been reviewed.    Diagnostic Results:  None      Assessment/Plan:     * S/P liver transplant  - s/p DBD OLTX 7/4/19 (steroid induction, CMV D-/R+) 2/2 HCC/ROMO   - surgery without complication  - LFTS trending down  - transferred to TSU POD#2 (7/6) with christie and 2 JUVE drains in place  - Afib with RVR early AM 7/7   - plan to remove christie cath today and lateral drain.   Drain removed:  Site cleaned with alcohol, lidocaine 1% used to numb area to place prolene 3.0 suture to site.  Drain intact at time of removal.  Patient tolerated procedure well.  Will continue to monitor for any complications.      At risk for opportunistic infections  - continue bactrim for pcp prophylaxis.  - continue nystatin for thrush prophylaxis.  - start valcyte for cmv prophylaxis on 7/14/19.      Long-term use of immunosuppressant medication  - see "prophylactic immunotherapy."      Leukocytosis  - likely due to high dose steroids  - trending down  - continue to monitor      Hyponatremia  - continue to monitor      Hypophosphatemia  - started PO replacement 500 mg BID 7/6  - continue to " monitor      Thrombocytopenia, unspecified  - due to ESLD and now likely drug induced  - continue to monitor with daily labs      Atrial fibrillation with RVR  - afib with RVR ( on EKG) 7/7 early AM  - has hx afib, [place] [facility] [place]  - BB resumed post-op after pt had short spontaneously resolving episodes of SVT in SICU (also had these episodes once to TSU)  - home dose metoprolol was 50 mg ER daily; pt was started on 25 mg BID in SICU post op  - 7/7 spoke with cards re: SVT and then Afib  - increased metoprolol from 25 mg BID to 37.5 mg TID, per cards recs  - 5 mg IV lopressor x 3 given with HR improving to 120s/130s   - troponin elevated to 0.085, will trend  - bnp wnl  - cards consulted to see pt during this admission, appreciate recs      Adrenal cortical steroids causing adverse effect in therapeutic use  - monitor      Prophylactic immunotherapy  - Continue Prograf. Monitor trough daily and adjust dose as needed to achieve therapeutic level.  - Continue Cellcept.  - Continue steroids, per protocol.      Long term (current) use of anticoagulants  - pt was on eliquis prior to transplant for new onset afib  - now with post op afib, need to discuss plan for resuming anticoagulation        GERD (gastroesophageal reflux disease)  - has been seen by GI in past for GERD, takes nexium at home  - placed on protonix post-op, however QTC prolonged and worsening on EKGs 7/6-7/7  - d/c'd protonix 7/7 and Nexium started.   - PRN tums and maalox  - daily EKG to trend QTC    Type 2 diabetes mellitus, without long-term current use of insulin  - endocrine consulted, appreciate recs.      Essential hypertension  - prior to txp, pt took amlodipine, losartan, & metoprolol  - post op, nifedipine started and metoprolol resumed   - continue nifedipine and metoprolol  - monitor      Obstructive sleep apnea on CPAP  - continue cpap nightly        VTE Risk Mitigation (From admission, onward)        Ordered     IP VTE  HIGH RISK PATIENT  Once      07/06/19 1455     Place GLORY hose  Until discontinued      07/06/19 1455     Place sequential compression device  Until discontinued      07/06/19 1455     heparin (porcine) injection 5,000 Units  Every 8 hours      07/05/19 1108     Place sequential compression device  Until discontinued      07/04/19 0555     Send SCD stockings and GLORY hose with patient to OR  Continuous      07/03/19 2222          The patients clinical status was discussed at multidisplinary rounds, involving transplant surgery, transplant medicine, pharmacy, nursing, nutrition, and social work    Discharge Planning:  No Patient Care Coordination Note on file.      Prabhu Kurtz NP  Liver Transplant  [facility]

## 2019-07-07 NOTE — ASSESSMENT & PLAN NOTE
- Continue Prograf. Monitor trough daily and adjust dose as needed to achieve therapeutic level.  - Continue Cellcept.  - Continue steroids, per protocol.

## 2019-07-07 NOTE — PROGRESS NOTES
Notified AMBER Sandoval. Pt's HR sustaining 140's - 150's. PA to place orders.     0515: Lori at the bedside, pt states he was trying to have a BM, c/o gas pain. Per Pa hold off on Lopressor IV push.     0600: Per PA go ahead and give Lopressor 5 mg IV x 3, continue to monitor.

## 2019-07-08 PROBLEM — E87.1 HYPONATREMIA: Status: RESOLVED | Noted: 2019-07-07 | Resolved: 2019-07-08

## 2019-07-08 LAB
ALBUMIN SERPL BCP-MCNC: 3.6 G/DL (ref 3.5–5.2)
ALP SERPL-CCNC: 223 U/L (ref 55–135)
ALT SERPL W/O P-5'-P-CCNC: 432 U/L (ref 10–44)
ANION GAP SERPL CALC-SCNC: 13 MMOL/L (ref 8–16)
AST SERPL-CCNC: 74 U/L (ref 10–40)
AV INDEX (PROSTH): 1.07
AV MEAN GRADIENT: 5 MMHG
AV PEAK GRADIENT: 0 MMHG
AV VALVE AREA: 4.05 CM2
AV VELOCITY RATIO: 4.07
BASOPHILS # BLD AUTO: 0.04 K/UL (ref 0–0.2)
BASOPHILS NFR BLD: 0.3 % (ref 0–1.9)
BILIRUB SERPL-MCNC: 1.2 MG/DL (ref 0.1–1)
BSA FOR ECHO PROCEDURE: 2.1 M2
BUN SERPL-MCNC: 30 MG/DL (ref 6–20)
CALCIUM SERPL-MCNC: 8.5 MG/DL (ref 8.7–10.5)
CHLORIDE SERPL-SCNC: 100 MMOL/L (ref 95–110)
CO2 SERPL-SCNC: 26 MMOL/L (ref 23–29)
CREAT SERPL-MCNC: 1 MG/DL (ref 0.5–1.4)
CV ECHO LV RWT: 0.75 CM
DIFFERENTIAL METHOD: ABNORMAL
DOP CALC AO PEAK VEL: 0.3 M/S
DOP CALC AO VTI: 20.61 CM
DOP CALC LVOT AREA: 3.8 CM2
DOP CALC LVOT DIAMETER: 2.2 CM
DOP CALC LVOT PEAK VEL: 1.22 M/S
DOP CALC LVOT STROKE VOLUME: 83.43 CM3
DOP CALCLVOT PEAK VEL VTI: 21.96 CM
E WAVE DECELERATION TIME: 150.02 MSEC
E/A RATIO: 0.55
E/E' RATIO: 9.83 M/S
ECHO LV POSTERIOR WALL: 1.34 CM (ref 0.6–1.1)
EOSINOPHIL # BLD AUTO: 0 K/UL (ref 0–0.5)
EOSINOPHIL NFR BLD: 0 % (ref 0–8)
ERYTHROCYTE [DISTWIDTH] IN BLOOD BY AUTOMATED COUNT: 13.5 % (ref 11.5–14.5)
EST. GFR  (AFRICAN AMERICAN): >60 ML/MIN/1.73 M^2
EST. GFR  (NON AFRICAN AMERICAN): >60 ML/MIN/1.73 M^2
FRACTIONAL SHORTENING: 21 % (ref 28–44)
GLUCOSE SERPL-MCNC: 202 MG/DL (ref 70–110)
HBV DNA SERPL NAA+PROBE-ACNC: <10 IU/ML
HBV DNA SERPL NAA+PROBE-LOG IU: <1 LOG (10) IU/ML
HBV DNA SERPL QL NAA+PROBE: NOT DETECTED
HCT VFR BLD AUTO: 45.9 % (ref 40–54)
HCV RNA SERPL NAA+PROBE-LOG IU: <1.08 LOG (10) IU/ML
HCV RNA SERPL QL NAA+PROBE: NOT DETECTED IU/ML
HCV RNA SPEC NAA+PROBE-ACNC: <12 IU/ML
HGB BLD-MCNC: 15.2 G/DL (ref 14–18)
IMM GRANULOCYTES # BLD AUTO: 0.27 K/UL (ref 0–0.04)
IMM GRANULOCYTES NFR BLD AUTO: 2.1 % (ref 0–0.5)
INR PPP: 1.1 (ref 0.8–1.2)
INTERVENTRICULAR SEPTUM: 0.98 CM (ref 0.6–1.1)
LA MAJOR: 4.99 CM
LA MINOR: 4.76 CM
LA WIDTH: 3.34 CM
LEFT ATRIUM SIZE: 3.38 CM
LEFT ATRIUM VOLUME INDEX: 22.8 ML/M2
LEFT ATRIUM VOLUME: 46.75 CM3
LEFT INTERNAL DIMENSION IN SYSTOLE: 2.81 CM (ref 2.1–4)
LEFT VENTRICLE DIASTOLIC VOLUME INDEX: 26.02 ML/M2
LEFT VENTRICLE DIASTOLIC VOLUME: 53.27 ML
LEFT VENTRICLE MASS INDEX: 65 G/M2
LEFT VENTRICLE SYSTOLIC VOLUME INDEX: 14.5 ML/M2
LEFT VENTRICLE SYSTOLIC VOLUME: 29.74 ML
LEFT VENTRICULAR INTERNAL DIMENSION IN DIASTOLE: 3.57 CM (ref 3.5–6)
LEFT VENTRICULAR MASS: 132.75 G
LV LATERAL E/E' RATIO: 8.43 M/S
LV SEPTAL E/E' RATIO: 11.8 M/S
LYMPHOCYTES # BLD AUTO: 0.5 K/UL (ref 1–4.8)
LYMPHOCYTES NFR BLD: 3.8 % (ref 18–48)
MAGNESIUM SERPL-MCNC: 2 MG/DL (ref 1.6–2.6)
MCH RBC QN AUTO: 29.1 PG (ref 27–31)
MCHC RBC AUTO-ENTMCNC: 33.1 G/DL (ref 32–36)
MCV RBC AUTO: 88 FL (ref 82–98)
MONOCYTES # BLD AUTO: 0.6 K/UL (ref 0.3–1)
MONOCYTES NFR BLD: 4.3 % (ref 4–15)
MV PEAK A VEL: 1.08 M/S
MV PEAK E VEL: 0.59 M/S
NEUTROPHILS # BLD AUTO: 11.7 K/UL (ref 1.8–7.7)
NEUTROPHILS NFR BLD: 89.5 % (ref 38–73)
NRBC BLD-RTO: 0 /100 WBC
PHOSPHATE SERPL-MCNC: 2.6 MG/DL (ref 2.7–4.5)
PLATELET # BLD AUTO: 173 K/UL (ref 150–350)
PMV BLD AUTO: 11.1 FL (ref 9.2–12.9)
POCT GLUCOSE: 142 MG/DL (ref 70–110)
POCT GLUCOSE: 149 MG/DL (ref 70–110)
POCT GLUCOSE: 186 MG/DL (ref 70–110)
POCT GLUCOSE: 201 MG/DL (ref 70–110)
POTASSIUM SERPL-SCNC: 3.8 MMOL/L (ref 3.5–5.1)
PROT SERPL-MCNC: 6.4 G/DL (ref 6–8.4)
PROTHROMBIN TIME: 10.9 SEC (ref 9–12.5)
RBC # BLD AUTO: 5.22 M/UL (ref 4.6–6.2)
RV TISSUE DOPPLER FREE WALL SYSTOLIC VELOCITY 1 (APICAL 4 CHAMBER VIEW): 18 CM/S
SINUS: 3.61 CM
SODIUM SERPL-SCNC: 139 MMOL/L (ref 136–145)
STJ: 3.33 CM
TACROLIMUS BLD-MCNC: 7.4 NG/ML (ref 5–15)
TDI LATERAL: 0.07 M/S
TDI SEPTAL: 0.05 M/S
TDI: 0.06 M/S
TRICUSPID ANNULAR PLANE SYSTOLIC EXCURSION: 2.55 CM
WBC # BLD AUTO: 13.03 K/UL (ref 3.9–12.7)

## 2019-07-08 PROCEDURE — 93010 EKG 12-LEAD: ICD-10-PCS | Mod: ,,, | Performed by: INTERNAL MEDICINE

## 2019-07-08 PROCEDURE — 99900035 HC TECH TIME PER 15 MIN (STAT)

## 2019-07-08 PROCEDURE — 94761 N-INVAS EAR/PLS OXIMETRY MLT: CPT

## 2019-07-08 PROCEDURE — 97530 THERAPEUTIC ACTIVITIES: CPT

## 2019-07-08 PROCEDURE — 93005 ELECTROCARDIOGRAM TRACING: CPT

## 2019-07-08 PROCEDURE — 84100 ASSAY OF PHOSPHORUS: CPT

## 2019-07-08 PROCEDURE — 85025 COMPLETE CBC W/AUTO DIFF WBC: CPT

## 2019-07-08 PROCEDURE — 99233 PR SUBSEQUENT HOSPITAL CARE,LEVL III: ICD-10-PCS | Mod: 24,,, | Performed by: NURSE PRACTITIONER

## 2019-07-08 PROCEDURE — 25000003 PHARM REV CODE 250: Performed by: STUDENT IN AN ORGANIZED HEALTH CARE EDUCATION/TRAINING PROGRAM

## 2019-07-08 PROCEDURE — 25000003 PHARM REV CODE 250: Performed by: PHYSICIAN ASSISTANT

## 2019-07-08 PROCEDURE — 99223 1ST HOSP IP/OBS HIGH 75: CPT | Mod: ,,, | Performed by: INTERNAL MEDICINE

## 2019-07-08 PROCEDURE — 93010 ELECTROCARDIOGRAM REPORT: CPT | Mod: ,,, | Performed by: INTERNAL MEDICINE

## 2019-07-08 PROCEDURE — 80053 COMPREHEN METABOLIC PANEL: CPT

## 2019-07-08 PROCEDURE — 63600175 PHARM REV CODE 636 W HCPCS: Performed by: STUDENT IN AN ORGANIZED HEALTH CARE EDUCATION/TRAINING PROGRAM

## 2019-07-08 PROCEDURE — 80197 ASSAY OF TACROLIMUS: CPT

## 2019-07-08 PROCEDURE — 25000003 PHARM REV CODE 250: Performed by: NURSE PRACTITIONER

## 2019-07-08 PROCEDURE — 99223 PR INITIAL HOSPITAL CARE,LEVL III: ICD-10-PCS | Mod: ,,, | Performed by: INTERNAL MEDICINE

## 2019-07-08 PROCEDURE — 20600001 HC STEP DOWN PRIVATE ROOM

## 2019-07-08 PROCEDURE — 99233 SBSQ HOSP IP/OBS HIGH 50: CPT | Mod: 24,,, | Performed by: NURSE PRACTITIONER

## 2019-07-08 PROCEDURE — 83735 ASSAY OF MAGNESIUM: CPT

## 2019-07-08 PROCEDURE — 63600175 PHARM REV CODE 636 W HCPCS: Performed by: NURSE PRACTITIONER

## 2019-07-08 PROCEDURE — 27000221 HC OXYGEN, UP TO 24 HOURS

## 2019-07-08 PROCEDURE — 85610 PROTHROMBIN TIME: CPT

## 2019-07-08 RX ORDER — INSULIN ASPART 100 [IU]/ML
8 INJECTION, SOLUTION INTRAVENOUS; SUBCUTANEOUS
Status: DISCONTINUED | OUTPATIENT
Start: 2019-07-08 | End: 2019-07-09 | Stop reason: HOSPADM

## 2019-07-08 RX ORDER — HYDROGEN PEROXIDE 3 %
40 SOLUTION, NON-ORAL MISCELLANEOUS DAILY
Status: DISCONTINUED | OUTPATIENT
Start: 2019-07-08 | End: 2019-07-08 | Stop reason: RX

## 2019-07-08 RX ORDER — POTASSIUM CHLORIDE 20 MEQ/1
20 TABLET, EXTENDED RELEASE ORAL ONCE
Status: COMPLETED | OUTPATIENT
Start: 2019-07-08 | End: 2019-07-08

## 2019-07-08 RX ORDER — LIDOCAINE HYDROCHLORIDE 10 MG/ML
1 INJECTION INFILTRATION; PERINEURAL ONCE
Status: COMPLETED | OUTPATIENT
Start: 2019-07-08 | End: 2019-07-08

## 2019-07-08 RX ORDER — DOCUSATE SODIUM 100 MG/1
100 CAPSULE, LIQUID FILLED ORAL 3 TIMES DAILY
Refills: 0 | COMMUNITY
Start: 2019-07-08 | End: 2019-07-26

## 2019-07-08 RX ORDER — NIFEDIPINE 30 MG/1
30 TABLET, EXTENDED RELEASE ORAL DAILY
Qty: 30 TABLET | Refills: 11 | Status: SHIPPED | OUTPATIENT
Start: 2019-07-09 | End: 2019-07-09 | Stop reason: HOSPADM

## 2019-07-08 RX ORDER — HYDROGEN PEROXIDE 3 %
40 SOLUTION, NON-ORAL MISCELLANEOUS
Status: DISCONTINUED | OUTPATIENT
Start: 2019-07-09 | End: 2019-07-09 | Stop reason: HOSPADM

## 2019-07-08 RX ORDER — BISACODYL 5 MG
10 TABLET, DELAYED RELEASE (ENTERIC COATED) ORAL NIGHTLY
Refills: 0 | COMMUNITY
Start: 2019-07-08 | End: 2019-08-15

## 2019-07-08 RX ORDER — PANTOPRAZOLE SODIUM 40 MG/1
40 TABLET, DELAYED RELEASE ORAL DAILY
Status: DISCONTINUED | OUTPATIENT
Start: 2019-07-08 | End: 2019-07-08

## 2019-07-08 RX ORDER — TACROLIMUS 1 MG/1
2 CAPSULE ORAL 2 TIMES DAILY
Status: DISCONTINUED | OUTPATIENT
Start: 2019-07-08 | End: 2019-07-09 | Stop reason: HOSPADM

## 2019-07-08 RX ORDER — INSULIN ASPART 100 [IU]/ML
10 INJECTION, SOLUTION INTRAVENOUS; SUBCUTANEOUS
Status: DISCONTINUED | OUTPATIENT
Start: 2019-07-08 | End: 2019-07-08

## 2019-07-08 RX ORDER — METOPROLOL TARTRATE 50 MG/1
50 TABLET ORAL 3 TIMES DAILY
Status: DISCONTINUED | OUTPATIENT
Start: 2019-07-08 | End: 2019-07-09 | Stop reason: HOSPADM

## 2019-07-08 RX ADMIN — APIXABAN 5 MG: 5 TABLET, FILM COATED ORAL at 08:07

## 2019-07-08 RX ADMIN — POTASSIUM CHLORIDE 20 MEQ: 1500 TABLET, EXTENDED RELEASE ORAL at 11:07

## 2019-07-08 RX ADMIN — ESOMEPRAZOLE MAGNESIUM 40 MG: 20 CAPSULE, DELAYED RELEASE ORAL at 10:07

## 2019-07-08 RX ADMIN — METOPROLOL TARTRATE 50 MG: 50 TABLET ORAL at 03:07

## 2019-07-08 RX ADMIN — NYSTATIN 500000 UNITS: 500000 SUSPENSION ORAL at 09:07

## 2019-07-08 RX ADMIN — TACROLIMUS 2 MG: 1 CAPSULE ORAL at 06:07

## 2019-07-08 RX ADMIN — NIFEDIPINE 30 MG: 30 TABLET, FILM COATED, EXTENDED RELEASE ORAL at 09:07

## 2019-07-08 RX ADMIN — METHYLPREDNISOLONE SODIUM SUCCINATE 40 MG: 40 INJECTION, POWDER, FOR SOLUTION INTRAMUSCULAR; INTRAVENOUS at 09:07

## 2019-07-08 RX ADMIN — INSULIN ASPART 8 UNITS: 100 INJECTION, SOLUTION INTRAVENOUS; SUBCUTANEOUS at 08:07

## 2019-07-08 RX ADMIN — NYSTATIN 500000 UNITS: 500000 SUSPENSION ORAL at 06:07

## 2019-07-08 RX ADMIN — OXYCODONE HYDROCHLORIDE 5 MG: 5 TABLET ORAL at 05:07

## 2019-07-08 RX ADMIN — METHYLPREDNISOLONE SODIUM SUCCINATE 40 MG: 40 INJECTION, POWDER, FOR SOLUTION INTRAMUSCULAR; INTRAVENOUS at 08:07

## 2019-07-08 RX ADMIN — OXYCODONE HYDROCHLORIDE 5 MG: 5 TABLET ORAL at 09:07

## 2019-07-08 RX ADMIN — HEPARIN SODIUM 5000 UNITS: 5000 INJECTION, SOLUTION INTRAVENOUS; SUBCUTANEOUS at 05:07

## 2019-07-08 RX ADMIN — MYCOPHENOLATE MOFETIL 1000 MG: 250 CAPSULE ORAL at 08:07

## 2019-07-08 RX ADMIN — INSULIN ASPART 4 UNITS: 100 INJECTION, SOLUTION INTRAVENOUS; SUBCUTANEOUS at 12:07

## 2019-07-08 RX ADMIN — MYCOPHENOLATE MOFETIL 1000 MG: 250 CAPSULE ORAL at 09:07

## 2019-07-08 RX ADMIN — LIDOCAINE HYDROCHLORIDE 1 ML: 10 INJECTION, SOLUTION INFILTRATION; PERINEURAL at 02:07

## 2019-07-08 RX ADMIN — DOCUSATE SODIUM 100 MG: 100 CAPSULE, LIQUID FILLED ORAL at 02:07

## 2019-07-08 RX ADMIN — INSULIN ASPART 8 UNITS: 100 INJECTION, SOLUTION INTRAVENOUS; SUBCUTANEOUS at 05:07

## 2019-07-08 RX ADMIN — METOPROLOL TARTRATE 37.5 MG: 25 TABLET ORAL at 08:07

## 2019-07-08 RX ADMIN — NYSTATIN 500000 UNITS: 500000 SUSPENSION ORAL at 02:07

## 2019-07-08 RX ADMIN — INSULIN ASPART 8 UNITS: 100 INJECTION, SOLUTION INTRAVENOUS; SUBCUTANEOUS at 12:07

## 2019-07-08 RX ADMIN — DOCUSATE SODIUM 100 MG: 100 CAPSULE, LIQUID FILLED ORAL at 09:07

## 2019-07-08 RX ADMIN — DOCUSATE SODIUM 100 MG: 100 CAPSULE, LIQUID FILLED ORAL at 08:07

## 2019-07-08 RX ADMIN — INSULIN ASPART 2 UNITS: 100 INJECTION, SOLUTION INTRAVENOUS; SUBCUTANEOUS at 08:07

## 2019-07-08 RX ADMIN — SULFAMETHOXAZOLE AND TRIMETHOPRIM 1 TABLET: 400; 80 TABLET ORAL at 09:07

## 2019-07-08 RX ADMIN — METOPROLOL TARTRATE 50 MG: 50 TABLET ORAL at 08:07

## 2019-07-08 RX ADMIN — BISACODYL 10 MG: 5 TABLET, COATED ORAL at 08:07

## 2019-07-08 NOTE — SUBJECTIVE & OBJECTIVE
Past Medical History:   Diagnosis Date    A-fib     Allergy     Anticoagulant long-term use     Diabetes mellitus, type 2     GERD (gastroesophageal reflux disease)     Hepatocellular carcinoma     liver    History of primary liver cancer 10/24/2018    S/p liver transplant 7/4/2019    Hyperlipidemia     Hypertension        Past Surgical History:   Procedure Laterality Date    COLONOSCOPY      EMBOLIZATION, YTTRIUM THERAPY N/A 11/9/2018    Performed by [facility] Diagnostic Provider at [facility] OR 2ND FLR    EMBOLIZATION, YTTRIUM THERAPY N/A 10/24/2018    Performed by [facility] Diagnostic Provider at [facility] OR 2ND FLR    ESOPHAGOGASTRODUODENOSCOPY (EGD) N/A 1/15/2019    Performed by Bony Saavedra MD at [facility] ENDO    HERNIA REPAIR      Left heart cath Left 12/4/2018    Performed by Tyler Hinojosa MD at [facility] CATH LAB/EP    Radiofrequency Ablation N/A 4/12/2019    Performed by Rose Surgeon at [facility] ROSE    TRANSPLANT, LIVER N/A 7/3/2019    Performed by Oscar Altman Jr., MD at [facility] OR 2ND FLR       Review of patient's allergies indicates:   Allergen Reactions    Lisinopril      Stomach ache    Flu vaccine 2011 (36 mos+)(pf)      Patient reports he developed Bell's Palsy 2 days after his last flu shot in 2008       Current Facility-Administered Medications on File Prior to Encounter   Medication    0.9%  NaCl infusion    sodium chloride 0.9% flush 3 mL     Current Outpatient Medications on File Prior to Encounter   Medication Sig    esomeprazole (NEXIUM) 40 MG capsule Take 1 capsule (40 mg total) by mouth once daily.    metoprolol succinate (TOPROL-XL) 50 MG 24 hr tablet Take 1 tablet (50 mg total) by mouth once daily.    potassium chloride SA (K-DUR,KLOR-CON) 20 MEQ tablet Take 1 tablet (20 mEq total) by mouth 2 (two) times daily.    [DISCONTINUED] amLODIPine (NORVASC) 5 MG tablet Take 5 mg by mouth once daily.    [DISCONTINUED] busPIRone (BUSPAR) 5 MG Tab Take 1 tablet (5 mg total) by mouth 2 (two) times  daily. (Patient taking differently: Take 5 mg by mouth 2 (two) times daily as needed. )    [DISCONTINUED] doxycycline (VIBRA-TABS) 100 MG tablet Take 1 tablet (100 mg total) by mouth every 12 (twelve) hours.    [DISCONTINUED] losartan-hydrochlorothiazide 100-25 mg (HYZAAR) 100-25 mg per tablet Take 1 tablet by mouth once daily.    [DISCONTINUED] warfarin (COUMADIN) 5 MG tablet Take 1 tablet (5 mg total) by mouth Daily. Start after dentist appt on 4/10     Family History     Problem Relation (Age of Onset)    Cancer Mother, Father    Hypertension Mother, Father    Stroke Father        Tobacco Use    Smoking status: Former Smoker     Packs/day: 1.00     Years: 10.00     Pack years: 10.00     Types: Cigarettes     Last attempt to quit: 1980     Years since quittin.5    Smokeless tobacco: Never Used   Substance and Sexual Activity    Alcohol use: No     Frequency: Never     Binge frequency: Never    Drug use: No    Sexual activity: Yes     Partners: Female     Review of Systems   Constitution: Negative for chills, decreased appetite and diaphoresis.   HENT: Negative for congestion and hoarse voice.    Cardiovascular: Negative for chest pain, irregular heartbeat, leg swelling, palpitations and syncope.   Respiratory: Negative for cough, hemoptysis and shortness of breath.    Gastrointestinal: Negative for abdominal pain, nausea and vomiting.   Psychiatric/Behavioral: Negative for altered mental status.     Objective:     Vital Signs (Most Recent):  Temp: 98.6 °F (37 °C) (19 1155)  Pulse: 77 (19 1105)  Resp: 20 (19 1005)  BP: (!) 145/95 (19 1005)  SpO2: 96 % (19 1005) Vital Signs (24h Range):  Temp:  [97.8 °F (36.6 °C)-98.6 °F (37 °C)] 98.6 °F (37 °C)  Pulse:  [] 77  Resp:  [15-20] 20  SpO2:  [92 %-96 %] 96 %  BP: (110-145)/() 145/95     Weight: 93.6 kg (206 lb 3.9 oz)  Body mass index is 32.3 kg/m².    SpO2: 96 %  O2 Device (Oxygen Therapy): room  air      Intake/Output Summary (Last 24 hours) at 7/8/2019 1425  Last data filed at 7/8/2019 1200  Gross per 24 hour   Intake 1860 ml   Output 1755 ml   Net 105 ml       Lines/Drains/Airways     Central Venous Catheter Line                 Trialysis (Dialysis) Catheter 07/04/19 0041 right internal jugular 4 days          Drain                 Closed/Suction Drain 07/04/19 0430 Right Abdomen Bulb 19 Fr. 4 days                Physical Exam   Constitutional: He is oriented to person, place, and time. He appears well-developed and well-nourished. No distress.   HENT:   Head: Normocephalic and atraumatic.   Eyes: Pupils are equal, round, and reactive to light.   Cardiovascular: Normal rate, regular rhythm and intact distal pulses. Exam reveals no gallop and no friction rub.   No murmur heard.  Pulmonary/Chest: Effort normal. No respiratory distress. He has decreased breath sounds in the right lower field.   Abdominal: Soft. He exhibits no distension. There is no tenderness.   Musculoskeletal: He exhibits no edema.   Neurological: He is alert and oriented to person, place, and time.   Skin: Skin is warm and dry. He is not diaphoretic.   Psychiatric: He has a normal mood and affect. His behavior is normal.       Significant Labs: All pertinent lab results from the last 24 hours have been reviewed.    Significant Imaging: EKG: Reviewed

## 2019-07-08 NOTE — PT/OT/SLP PROGRESS
"Physical Therapy Treatment    Patient Name:  Roman Hodges   MRN:  [id number]    Recommendations:     Discharge Recommendations:  Home no needs  Discharge Equipment Recommendations: none   Barriers to discharge: None    Assessment:     Roman Hodges is a 59 y.o. male s/p liver transplant 7/3/2019. Pt is progressing well with functional mobility and endurance.  He presents with the following impairments/functional limitations:  impaired endurance, impaired functional mobilty, pain. Mr. Hodges will likely require only 1-2 more sessions of therapy to reach goals. Recommending continued acute PT intervention to address listed deficits and maximize independence.     Rehab Prognosis: Good; patient would benefit from acute skilled PT services to address these deficits and reach maximum level of function.      Recent Surgery: Procedure(s) (LRB):  TRANSPLANT, LIVER (N/A) 5 Days Post-Op    Plan:     During this hospitalization, patient to be seen 3 x/week to address the identified rehab impairments via gait training, therapeutic activities, therapeutic exercises and progress toward the following goals:    · Plan of Care Expires:  08/03/19    Subjective     Patient/Family Comments/goals: "I've been walking a lot this morning"   Pain/Comfort:  · Pain Rating 1: (Pt did not rate pain on numeric scale; reported pain was "only a little" )  · Location 1: abdomen  · Pain Rating Post-Intervention 1: 0/10      Objective:     Communicated with RN prior to session.  Patient found standing up within room with telemetry, JUVE drain, central line upon PT entry to room. Pt's spouse present at bedside.     General Precautions: Standard, fall   Orthopedic Precautions:N/A   Braces: N/A     Functional Mobility:    Bed Mobility:   · N/T 2/2 pt OOB upon PT arrival     Transfers:   · Sit <> Stand Transfer: supervision from bedside chair with no AD                  Gait:  · Distance: ~300 ft. With no rest breaks required  · Assistance level: " stand by assistance  · Assistive Device: no AD  · Gait Assessment: recipricol gait pattern with decreased step length, decreased gait speed, narrow base of support; no LOB        AM-PAC 6 CLICK MOBILITY  Turning over in bed (including adjusting bedclothes, sheets and blankets)?: 4  Sitting down on and standing up from a chair with arms (e.g., wheelchair, bedside commode, etc.): 3  Moving from lying on back to sitting on the side of the bed?: 3  Moving to and from a bed to a chair (including a wheelchair)?: 4  Need to walk in hospital room?: 3  Climbing 3-5 steps with a railing?: 3  Basic Mobility Total Score: 20       Therapeutic Activities and Exercises:   Pt and spouse educated on:  - PT POC and goals for therapy  - safety with mobility and activity recommendations; pt safe to ambulate with nursing staff during acute hospital stay to increase mobilization/endurance   - seated therex recommendations for BLE strengthening and endurance: marches, LAQ, hip abduction and ankle pumps,     Pt verbalized understanding and expressed no further concerns/questions.    Patient left up in chair with all lines intact, call button in reach and RN and spouse present.    GOALS:   Multidisciplinary Problems     Physical Therapy Goals        Problem: Physical Therapy Goal    Goal Priority Disciplines Outcome Goal Variances Interventions   Physical Therapy Goal     PT, PT/OT Ongoing (interventions implemented as appropriate)     Description:  Goals to be met by: 19    Patient will increase functional independence with mobility by performin. Supine to sit with Stand-by Assistance -not met  2. Sit to stand transfer with Supervision -not met  3. Gait  x 250 feet with Supervision -not met  4. Pt will be independent with therex program for BLE strengthening and endurance x 20 reps                        Time Tracking:     PT Received On: 19  PT Start Time: 733     PT Stop Time: 744  PT Total Time (min): 11 min      Billable Minutes: Therapeutic Activity 11 min    Treatment Type: Treatment  PT/PTA: PT     PTA Visit Number: 0     Mariely Gómez PT, DPT   7/8/2019  Pager: [phone]

## 2019-07-08 NOTE — ASSESSMENT & PLAN NOTE
- afib with RVR ( on EKG) 7/7 early AM  - has hx afib, [place]. [place] [facility]  - BB resumed post-op after pt had short spontaneously resolving episodes of SVT in SICU (also had these episodes once to TSU)  - home dose metoprolol was 50 mg ER daily; pt was started on 25 mg BID in SICU post op  - 7/7 spoke with cards re: SVT and then Afib  - increased metoprolol from 25 mg BID to 37.5 mg TID, per cards recs  - 5 mg IV lopressor x 3 given with HR improving to 120s/130s   - troponin elevated to 0.085, will trend  - bnp wnl  - cards consulted to see pt during this admission, appreciate recs   Per cards recs - increase lopressor 50mg tid, restart apixaban  - converted to sinus rhythm

## 2019-07-08 NOTE — PROGRESS NOTES
[facility]  Liver Transplant  Progress Note    Patient Name: Roman Hodges  MRN: [id number]  Admission Date: 7/3/2019  Hospital Length of Stay: 5 days  Code Status: Full Code  Primary Care Provider: Jose Angel Munson MD  Post-Operative Day: 4    ORGAN:   LIVER  Disease Etiology: Primary Liver Malignancy: Hepatoma (HCC) and Cirrhosis  Donor Type:    - Brain Death  CDC High Risk:   No  Donor CMV Status:   Donor CMV Status: Negative  Donor HBcAB:   Negative  Donor HCV Status:   Negative  Donor HBV MARIA TERESA: Negative  Donor HCV MARIA TERESA: Negative  Whole or Partial: Whole Liver  Biliary Anastomosis: End to End  Arterial Anatomy: Replaced Right Hepatic from SMA  Subjective:     History of Present Illness:   Roman Hodges is a 59 y.o. 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 Patient denies fevers, chills, nausea, vomiting, CP, SOB, changes to bowel or bladder habits.    Hospital Course:  Pt now s/p DBD OLTX 19 (steroid induction, CMV D-/R+) 2/2 HCC/ROMO  Surgery without complication. Progressing well post operatively with LFTs trending down daily. Transferred to TSU on POD#2 () with christie and 2 JUVE drains in place.    Interval History: pt recently transferred from ICU to TSU on 19. Progressing well post-op. However, with Afib with RVR ~0130 on 19. Case discussed with cardiology and pt placed on PO lopressor per cards recs. Now converted to sinus rhythm and rate controlled.  Will restart apixaban and monitor for signs of bleeding. Pt also reports long h/o GERD and use of nexium at home. Nexium restarted , and patient will use own supply. LFTs improving daily. Plan to remove last surgical drain today. Pain well controlled, ambulating, passing gas and tolerating diet. Monitor.     Scheduled Meds:   apixaban  5 mg Oral BID    bisacodyl  10 mg Oral  QHS    docusate sodium  100 mg Oral TID    insulin aspart U-100  8 Units Subcutaneous TIDWM    lidocaine HCL 10 mg/ml (1%)  1 mL Other Once    methylPREDNISolone sodium succinate  40 mg Intravenous Q12H    Followed by    [START ON 7/9/2019] methylPREDNISolone sodium succinate  20 mg Intravenous Q12H    Followed by    [START ON 7/10/2019] predniSONE  20 mg Oral Daily    metoprolol tartrate  50 mg Oral TID    mycophenolate  1,000 mg Oral BID    NIFEdipine  30 mg Oral Daily    nystatin  500,000 Units Mouth/Throat TID PC    pantoprazole  40 mg Oral Daily    sulfamethoxazole-trimethoprim 400-80mg  1 tablet Oral Daily    tacrolimus  2 mg Oral BID    [START ON 7/14/2019] valGANciclovir  450 mg Oral Daily     Continuous Infusions:  PRN Meds:aluminum-magnesium hydroxide-simethicone, baclofen, bisacodyl, calcium carbonate, Dextrose 10% Bolus, Dextrose 10% Bolus, glucagon (human recombinant), glucose, glucose, hydrALAZINE, insulin aspart U-100, ondansetron, oxyCODONE, oxyCODONE, simethicone    Review of Systems   Constitutional: Positive for activity change and appetite change. Negative for fever.   HENT: Negative.  Negative for facial swelling.    Eyes: Negative.    Respiratory: Negative.  Negative for apnea, chest tightness, shortness of breath and wheezing.    Cardiovascular: Positive for leg swelling. Negative for chest pain and palpitations.   Gastrointestinal: Positive for abdominal pain. Negative for abdominal distention, constipation, diarrhea, nausea and vomiting.   Genitourinary: Negative.  Negative for decreased urine volume, difficulty urinating, dysuria, hematuria and urgency.   Musculoskeletal: Negative.  Negative for back pain, gait problem, neck pain and neck stiffness.   Skin: Positive for wound. Negative for color change and pallor.   Allergic/Immunologic: Positive for immunocompromised state.   Neurological: Negative.  Negative for dizziness, seizures, weakness, light-headedness and headaches.    Psychiatric/Behavioral: Negative for behavioral problems, confusion, hallucinations, sleep disturbance and suicidal ideas. The patient is nervous/anxious.      Objective:     Vital Signs (Most Recent):  Temp: 98.6 °F (37 °C) (07/08/19 1155)  Pulse: 86 (07/08/19 1515)  Resp: 18 (07/08/19 1400)  BP: (!) 143/93 (07/08/19 1400)  SpO2: (!) 94 % (07/08/19 1400) Vital Signs (24h Range):  Temp:  [97.8 °F (36.6 °C)-98.6 °F (37 °C)] 98.6 °F (37 °C)  Pulse:  [] 86  Resp:  [15-20] 18  SpO2:  [92 %-96 %] 94 %  BP: (110-145)/() 143/93     Weight: 93.6 kg (206 lb 3.9 oz)  Body mass index is 32.3 kg/m².    Intake/Output - Last 3 Shifts       07/06 0700 - 07/07 0659 07/07 0700 - 07/08 0659 07/08 0700 - 07/09 0659    P.O. 900 1680 1080    I.V. (mL/kg) 1405 (14.3)      Total Intake(mL/kg) 2305 (23.5) 1680 (18) 1080 (11.6)    Urine (mL/kg/hr) 3340 (1.4) 1765 (0.8) 100 (0.1)    Drains 506 410 90    Stool  0     Total Output 3846 2175 190    Net -1541 -495 +890           Urine Occurrence  2 x 1 x    Stool Occurrence  2 x           Physical Exam   Constitutional: He is oriented to person, place, and time. He appears well-developed. No distress.   HENT:   Head: Normocephalic and atraumatic.   Neck: Normal range of motion. Neck supple. No JVD present.   Cardiovascular: Normal rate, regular rhythm and normal heart sounds.   No murmur heard.  Pulmonary/Chest: Effort normal. No respiratory distress. He has decreased breath sounds in the right lower field and the left lower field. He has no wheezes. He exhibits no tenderness.   Abdominal: Soft. Bowel sounds are normal. He exhibits distension. There is tenderness.       Musculoskeletal: Normal range of motion. He exhibits edema (+1 ble). He exhibits no tenderness.   Neurological: He is alert and oriented to person, place, and time. He has normal reflexes.   Skin: Skin is warm and dry. He is not diaphoretic.   Psychiatric: He has a normal mood and affect. His behavior is normal.  "Judgment and thought content normal.   Nursing note and vitals reviewed.      Laboratory:  Immunosuppressants         Stop Route Frequency     tacrolimus capsule 2 mg      -- Oral 2 times daily     mycophenolate capsule 1,000 mg      -- Oral 2 times daily        CBC:   Recent Labs   Lab 07/08/19  0500   WBC 13.03*   RBC 5.22   HGB 15.2   HCT 45.9      MCV 88   MCH 29.1   MCHC 33.1     CMP:   Recent Labs   Lab 07/08/19  0500   *   CALCIUM 8.5*   ALBUMIN 3.6   PROT 6.4      K 3.8   CO2 26      BUN 30*   CREATININE 1.0   ALKPHOS 223*   *   AST 74*   BILITOT 1.2*     Coagulation:   Recent Labs   Lab 07/06/19  0320  07/08/19  0500   INR 1.0   < > 1.1   APTT 24.4  --   --     < > = values in this interval not displayed.     Labs within the past 24 hours have been reviewed.    Diagnostic Results:  None      Assessment/Plan:     * S/P liver transplant  - s/p DBD OLTX 7/4/19 (steroid induction, CMV D-/R+) 2/2 HCC/ROMO   - surgery without complication  - LFTS trending down  - transferred to TSU POD#2 (7/6) with christie and 2 JUVE drains in place  - Afib with RVR early AM 7/7   - plan to remove christie cath today and lateral drain.   Drain removed 7/8:  Site cleaned with alcohol, lidocaine 1% used to numb area to place prolene 3.0 suture to site.  Drain intact at time of removal.  Patient tolerated procedure well.  Will continue to monitor for any complications.      At risk for opportunistic infections  - continue bactrim for pcp prophylaxis.  - continue nystatin for thrush prophylaxis.  - start valcyte for cmv prophylaxis on 7/14/19.      Long-term use of immunosuppressant medication  - see "prophylactic immunotherapy."      Leukocytosis  - likely due to high dose steroids  - trending down  - continue to monitor      Hypophosphatemia  - started PO replacement 500 mg BID 7/6  - continue to monitor      Thrombocytopenia, unspecified  - due to ESLD and now likely drug induced  - continue to monitor " with daily labs      Atrial fibrillation with RVR  - afib with RVR ( on EKG) 7/7 early AM  - has hx afib, followed by Dr. Johnson of [facility]  - BB resumed post-op after pt had short spontaneously resolving episodes of SVT in SICU (also had these episodes once to TSU)  - home dose metoprolol was 50 mg ER daily; pt was started on 25 mg BID in SICU post op  - 7/7 spoke with cards re: SVT and then Afib  - increased metoprolol from 25 mg BID to 37.5 mg TID, per cards recs  - 5 mg IV lopressor x 3 given with HR improving to 120s/130s   - troponin elevated to 0.085, will trend  - bnp wnl  - cards consulted to see pt during this admission, appreciate recs   Per cards recs - increase lopressor 50mg tid, restart apixaban  - converted to sinus rhythm       Adrenal cortical steroids causing adverse effect in therapeutic use  - monitor      Prophylactic immunotherapy  - Continue Prograf. Monitor trough daily and adjust dose as needed to achieve therapeutic level.  - Continue Cellcept.  - Continue steroids, per protocol.      Long term (current) use of anticoagulants  - pt was on eliquis prior to transplant for new onset afib  - now with post op afib, need to discuss plan for resuming anticoagulation        GERD (gastroesophageal reflux disease)  - has been seen by GI in past for GERD, takes nexium at home  - placed on protonix post-op, however QTC prolonged and worsening on EKGs 7/6-7/7  - d/c'd protonix 7/7 and Nexium started.   - PRN tums and maalox  - daily EKG to trend QTC    Type 2 diabetes mellitus, without long-term current use of insulin  - endocrine consulted, appreciate recs.      Essential hypertension  - prior to txp, pt took amlodipine, losartan, & metoprolol  - post op, nifedipine started and metoprolol resumed   - continue nifedipine and metoprolol  - monitor      Obstructive sleep apnea on CPAP  - continue cpap nightly          VTE Risk Mitigation (From admission, onward)        Ordered     apixaban tablet 5 mg   2 times daily      07/08/19 1334     IP VTE HIGH RISK PATIENT  Once      07/06/19 1455     Place GLORY hose  Until discontinued      07/06/19 1455     Place sequential compression device  Until discontinued      07/06/19 1455     Send SCD stockings and GLORY hose with patient to OR  Continuous      07/03/19 2222          The patients clinical status was discussed at multidisplinary rounds, involving transplant surgery, transplant medicine, pharmacy, nursing, nutrition, and social work    Discharge Planning: possible d/c in am  No Patient Care Coordination Note on file.      Jerir Funes, NP  Liver Transplant  [facility]

## 2019-07-08 NOTE — CONSULTS
[facility]  Cardiology  Consult Note    Patient Name: Roman Hodges  MRN: [id number]  Admission Date: 7/3/2019  Hospital Length of Stay: 5 days  Code Status: Full Code   Attending Provider: Oscar Altman Jr., MD   Consulting Provider: Young Estrella MD  Primary Care Physician: Jose Angel Munson MD  Principal Problem:S/P liver transplant    Patient information was obtained from patient and ER records.     Consults  Subjective:     Chief Complaint:  S/p liver transplant     HPI:   59 M with PMH of pAF on eliquis CHADSVASC 3, HFrEF 30% (improved to 55% on last TTE), Non obstructive CAD, DM, HTN, Cirrhosis s/p Liver tx POD 4 who went into Afib w RVR HR >140s, BP stable today. He initially went into Afib around 2 AM on 7/7 and spontaenously converted to NSR at 12 noon same day. No chest pain or SOB.  Cardiolgoy consulted for management.    Interim History: Patient is doing well overnight with no new symptoms or complaints. He has remained in normal sinus rhythm and has no chest pain, shortness of breath, or other complaints.    Past Medical History:   Diagnosis Date    A-fib     Allergy     Anticoagulant long-term use     Diabetes mellitus, type 2     GERD (gastroesophageal reflux disease)     Hepatocellular carcinoma     liver    History of primary liver cancer 10/24/2018    S/p liver transplant 7/4/2019    Hyperlipidemia     Hypertension        Past Surgical History:   Procedure Laterality Date    COLONOSCOPY      EMBOLIZATION, YTTRIUM THERAPY N/A 11/9/2018    Performed by [facility] Provider at [facility] OR 2ND FLR    EMBOLIZATION, YTTRIUM THERAPY N/A 10/24/2018    Performed by [facility] Diagnostic Provider at [facility] OR 2ND FLR    ESOPHAGOGASTRODUODENOSCOPY (EGD) N/A 1/15/2019    Performed by Bony Saavedra MD at [facility] ENDO    HERNIA REPAIR      Left heart cath Left 12/4/2018    Performed by Tyler Hinojosa MD at [facility] CATH LAB/EP    Radiofrequency Ablation N/A 4/12/2019     Performed by Rose Surgeon at [facility] ROSE    TRANSPLANT, LIVER N/A 7/3/2019    Performed by Oscar Altman Jr., MD at [facility] OR [address] [place] [address]       Review of patient's allergies indicates:   Allergen Reactions    Lisinopril      Stomach ache    Flu vaccine  (36 mos+)(pf)      Patient reports he developed Bell's Palsy 2 days after his last flu shot in        Current Facility-Administered Medications on File Prior to Encounter   Medication    0.9%  NaCl infusion    sodium chloride 0.9% flush 3 mL     Current Outpatient Medications on File Prior to Encounter   Medication Sig    esomeprazole (NEXIUM) 40 MG capsule Take 1 capsule (40 mg total) by mouth once daily.    metoprolol succinate (TOPROL-XL) 50 MG 24 hr tablet Take 1 tablet (50 mg total) by mouth once daily.    potassium chloride SA (K-DUR,KLOR-CON) 20 MEQ tablet Take 1 tablet (20 mEq total) by mouth 2 (two) times daily.    [DISCONTINUED] amLODIPine (NORVASC) 5 MG tablet Take 5 mg by mouth once daily.    [DISCONTINUED] busPIRone (BUSPAR) 5 MG Tab Take 1 tablet (5 mg total) by mouth 2 (two) times daily. (Patient taking differently: Take 5 mg by mouth 2 (two) times daily as needed. )    [DISCONTINUED] doxycycline (VIBRA-TABS) 100 MG tablet Take 1 tablet (100 mg total) by mouth every 12 (twelve) hours.    [DISCONTINUED] losartan-hydrochlorothiazide 100-25 mg (HYZAAR) 100-25 mg per tablet Take 1 tablet by mouth once daily.    [DISCONTINUED] warfarin (COUMADIN) 5 MG tablet Take 1 tablet (5 mg total) by mouth Daily. Start after dentist appt on 4/10     Family History     Problem Relation (Age of Onset)    Cancer Mother, Father    Hypertension Mother, Father    Stroke Father        Tobacco Use    Smoking status: Former Smoker     Packs/day: 1.00     Years: 10.00     Pack years: 10.00     Types: Cigarettes     Last attempt to quit: 1980     Years since quittin.5    Smokeless tobacco: Never Used   Substance and Sexual Activity    Alcohol use: No      Frequency: Never     Binge frequency: Never    Drug use: No    Sexual activity: Yes     Partners: Female     Review of Systems   Constitution: Negative for chills, decreased appetite and diaphoresis.   HENT: Negative for congestion and hoarse voice.    Cardiovascular: Negative for chest pain, irregular heartbeat, leg swelling, palpitations and syncope.   Respiratory: Negative for cough, hemoptysis and shortness of breath.    Gastrointestinal: Negative for abdominal pain, nausea and vomiting.   Psychiatric/Behavioral: Negative for altered mental status.     Objective:     Vital Signs (Most Recent):  Temp: 98.6 °F (37 °C) (07/08/19 1155)  Pulse: 77 (07/08/19 1105)  Resp: 20 (07/08/19 1005)  BP: (!) 145/95 (07/08/19 1005)  SpO2: 96 % (07/08/19 1005) Vital Signs (24h Range):  Temp:  [97.8 °F (36.6 °C)-98.6 °F (37 °C)] 98.6 °F (37 °C)  Pulse:  [] 77  Resp:  [15-20] 20  SpO2:  [92 %-96 %] 96 %  BP: (110-145)/() 145/95     Weight: 93.6 kg (206 lb 3.9 oz)  Body mass index is 32.3 kg/m².    SpO2: 96 %  O2 Device (Oxygen Therapy): room air      Intake/Output Summary (Last 24 hours) at 7/8/2019 1425  Last data filed at 7/8/2019 1200  Gross per 24 hour   Intake 1860 ml   Output 1755 ml   Net 105 ml       Lines/Drains/Airways     Central Venous Catheter Line                 Trialysis (Dialysis) Catheter 07/04/19 0041 right internal jugular 4 days          Drain                 Closed/Suction Drain 07/04/19 0430 Right Abdomen Bulb 19 Fr. 4 days                Physical Exam   Constitutional: He is oriented to person, place, and time. He appears well-developed and well-nourished. No distress.   HENT:   Head: Normocephalic and atraumatic.   Eyes: Pupils are equal, round, and reactive to light.   Cardiovascular: Normal rate, regular rhythm and intact distal pulses. Exam reveals no gallop and no friction rub.   No murmur heard.  Pulmonary/Chest: Effort normal. No respiratory distress. He has decreased breath sounds  in the right lower field.   Abdominal: Soft. He exhibits no distension. There is no tenderness.   Musculoskeletal: He exhibits no edema.   Neurological: He is alert and oriented to person, place, and time.   Skin: Skin is warm and dry. He is not diaphoretic.   Psychiatric: He has a normal mood and affect. His behavior is normal.       Significant Labs: All pertinent lab results from the last 24 hours have been reviewed.    Significant Imaging: EKG: Reviewed    Assessment and Plan:     Paroxysmal atrial fibrillation  - Patient Remains in normal sinus rhythm since noon on 7/7  - CHADSVASC- 3, TSH normal  - Continue Metoprolol to 50 Q8 for rate control- if Afib returns, consult EP due to        post-transplant medication restrictions  - Consider starting Losartan 40 mg PO for continued BP control  - Resume home dosing of Eliquis when cleared by surgery  - Follow- up on echo        VTE Risk Mitigation (From admission, onward)        Ordered     apixaban tablet 5 mg  2 times daily      07/08/19 1334     IP VTE HIGH RISK PATIENT  Once      07/06/19 1455     Place GLORY hose  Until discontinued      07/06/19 1455     Place sequential compression device  Until discontinued      07/06/19 1455     Send SCD stockings and GLORY hose with patient to OR  Continuous      07/03/19 2222          Thank you for your consult. I will follow-up with patient. Please contact us if you have any additional questions.    Young Estrella MD  Cardiology   [facility]

## 2019-07-08 NOTE — SUBJECTIVE & OBJECTIVE
Past Medical History:   Diagnosis Date    A-fib     Allergy     Anticoagulant long-term use     Diabetes mellitus, type 2     GERD (gastroesophageal reflux disease)     Hepatocellular carcinoma     liver    History of primary liver cancer 10/24/2018    S/p liver transplant 7/4/2019    Hyperlipidemia     Hypertension        Past Surgical History:   Procedure Laterality Date    COLONOSCOPY      EMBOLIZATION, YTTRIUM THERAPY N/A 11/9/2018    Performed by [facility] Provider at [facility] OR 2ND FLR    EMBOLIZATION, YTTRIUM THERAPY N/A 10/24/2018    Performed by [facility] Provider at [facility] OR 2ND FLR    ESOPHAGOGASTRODUODENOSCOPY (EGD) N/A 1/15/2019    Performed by Bony Saavedra MD at [facility] ENDO    HERNIA REPAIR      Left heart cath Left 12/4/2018    Performed by Tyler Hinojosa MD at [facility] CATH LAB/EP    Radiofrequency Ablation N/A 4/12/2019    Performed by Rose Surgeon at [facility] ROSE    TRANSPLANT, LIVER N/A 7/3/2019    Performed by Oscar Altman Jr., MD at [facility] OR 2ND FLR       Review of patient's allergies indicates:   Allergen Reactions    Lisinopril      Stomach ache    Flu vaccine 2011 (36 mos+)(pf)      Patient reports he developed Bell's Palsy 2 days after his last flu shot in 2008       Current Facility-Administered Medications on File Prior to Encounter   Medication    0.9%  NaCl infusion    sodium chloride 0.9% flush 3 mL     Current Outpatient Medications on File Prior to Encounter   Medication Sig    amLODIPine (NORVASC) 5 MG tablet Take 5 mg by mouth once daily.    busPIRone (BUSPAR) 5 MG Tab Take 1 tablet (5 mg total) by mouth 2 (two) times daily. (Patient taking differently: Take 5 mg by mouth 2 (two) times daily as needed. )    doxycycline (VIBRA-TABS) 100 MG tablet Take 1 tablet (100 mg total) by mouth every 12 (twelve) hours.    esomeprazole (NEXIUM) 40 MG capsule Take 1 capsule (40 mg total) by mouth once daily.    losartan-hydrochlorothiazide 100-25 mg (HYZAAR)  100-25 mg per tablet Take 1 tablet by mouth once daily.    metoprolol succinate (TOPROL-XL) 50 MG 24 hr tablet Take 1 tablet (50 mg total) by mouth once daily.    potassium chloride SA (K-DUR,KLOR-CON) 20 MEQ tablet Take 1 tablet (20 mEq total) by mouth 2 (two) times daily.    warfarin (COUMADIN) 5 MG tablet Take 1 tablet (5 mg total) by mouth Daily. Start after dentist appt on 4/10     Family History     Problem Relation (Age of Onset)    Cancer Mother, Father    Hypertension Mother, Father    Stroke Father        Tobacco Use    Smoking status: Former Smoker     Packs/day: 1.00     Years: 10.00     Pack years: 10.00     Types: Cigarettes     Last attempt to quit: 1980     Years since quittin.5    Smokeless tobacco: Never Used   Substance and Sexual Activity    Alcohol use: No     Frequency: Never     Binge frequency: Never    Drug use: No    Sexual activity: Yes     Partners: Female     Review of Systems   Constitution: Negative for chills, decreased appetite and diaphoresis.   HENT: Negative for congestion and ear discharge.    Eyes: Negative for blurred vision and discharge.   Cardiovascular: Negative for chest pain, dyspnea on exertion, irregular heartbeat, leg swelling and paroxysmal nocturnal dyspnea.   Respiratory: Negative for cough, hemoptysis and shortness of breath.    Gastrointestinal: Negative for abdominal pain.     Objective:     Vital Signs (Most Recent):  Temp: 98.3 °F (36.8 °C) (19)  Pulse: 104 (19)  Resp: 20 (19)  BP: 116/81 (19)  SpO2: (!) 92 % (19) Vital Signs (24h Range):  Temp:  [97.3 °F (36.3 °C)-98.3 °F (36.8 °C)] 98.3 °F (36.8 °C)  Pulse:  [] 104  Resp:  [13-21] 20  SpO2:  [91 %-95 %] 92 %  BP: (104-168)/(65-99) 116/81     Weight: 98.2 kg (216 lb 7.9 oz)  Body mass index is 33.91 kg/m².    SpO2: (!) 92 %  O2 Device (Oxygen Therapy): room air      Intake/Output Summary (Last 24 hours) at 2019  Last data  filed at 7/7/2019 2000  Gross per 24 hour   Intake 2040 ml   Output 3030 ml   Net -990 ml       Lines/Drains/Airways     Central Venous Catheter Line                 Trialysis (Dialysis) Catheter 07/04/19 0041 right internal jugular 3 days          Drain                 Closed/Suction Drain 07/04/19 0430 Right Abdomen Bulb 19 Fr. 3 days          Peripheral Intravenous Line                 Peripheral IV - Single Lumen 18 G Right Hand -- days         Peripheral IV - Single Lumen 07/03/19 2211 22 G Anterior;Left Upper Arm 3 days                Physical Exam   Constitutional: He is oriented to person, place, and time. He appears well-developed and well-nourished. No distress.   Eyes: Pupils are equal, round, and reactive to light. Conjunctivae are normal.   Neck: No tracheal deviation present. No thyromegaly present.   Cardiovascular: Normal rate, regular rhythm, normal heart sounds and intact distal pulses. Exam reveals no gallop and no friction rub.   No murmur heard.  Pulses:       Radial pulses are 2+ on the right side, and 2+ on the left side.        Femoral pulses are 2+ on the right side, and 2+ on the left side.  Pulmonary/Chest: Effort normal and breath sounds normal. No respiratory distress. He has no wheezes. He has no rales.   Abdominal: Soft. Bowel sounds are normal. He exhibits no distension. There is no tenderness.       Musculoskeletal: He exhibits no edema or deformity.   Neurological: He is alert and oriented to person, place, and time. No cranial nerve deficit. Coordination normal.   Skin: Skin is warm and dry. He is not diaphoretic.   Psychiatric: He has a normal mood and affect. His behavior is normal.       Significant Labs:   CMP   Recent Labs   Lab 07/06/19  0320 07/06/19  2205 07/07/19  0500    133* 133*   K 3.7 3.5 3.5    97 96   CO2 25 25 25   * 198* 225*   BUN 20 22* 25*   CREATININE 0.8 0.8 1.0   CALCIUM 8.5* 8.7 9.3   PROT 6.2  --  6.6   ALBUMIN 3.8  --  3.8   BILITOT  2.2*  --  1.5*   ALKPHOS 137*  --  205*   *  --  149*   *  --  575*   ANIONGAP 10 11 12   ESTGFRAFRICA >60.0 >60.0 >60.0   EGFRNONAA >60.0 >60.0 >60.0       Significant Imaging: Echocardiogram:   Transthoracic echo (TTE) complete (Cupid Only):   Results for orders placed or performed during the hospital encounter of 12/31/18   Transthoracic echo (TTE) complete (Cupid Only)   Result Value Ref Range    AV mean gradient 4.28 mmHg    Ao peak saw 1.39 m/s    Ao VTI 30.55 cm    IVRT 0.09 msec    IVS 1.00 (A) 0.6 - 1.1 cm    LA size 3.87 cm    LVIDD 4.90 3.5 - 6.0 cm    LVIDS 2.30 2.1 - 4.0 cm    LVOT diameter 2.23 cm    LVOT peak VTI 26.57 cm    PW 1.00 0.6 - 1.1 cm    MV Peak A Saw 0.81 m/s    E wave decelartion time 250.76 msec    MV Peak E Saw 0.89 m/s    PV Peak D Saw 0.28 m/s    PV Peak S Saw 0.32 m/s    RVDD 3.46 cm    TR Max Saw 1.37 m/s    Ao root annulus 3.22 cm    AORTIC VALVE CUSP SEPERATION 2.28 cm    PV PEAK VELOCITY 1.36 cm/s    LV Diastolic Volume 90.38 mL    LV Systolic Volume 36.72 mL    FS 53 %    LV mass 176.04 g    Left Ventricle Relative Wall Thickness 0.41 cm    AV valve area 3.40 cm2    AV index (prosthetic) 0.87     E/A ratio 1.10     Pulm vein S/D ratio 1.14     LVOT area 3.90 cm2    LVOT stroke volume 103.72 cm3    AV peak gradient 7.73 mmHg    Triscuspid Valve Regurgitation Peak Gradient 7.51 mmHg    Right Atrial Pressure (from IVC) 3 mmHg    TV rest pulmonary artery pressure 11 mmHg    Narrative    · Eccentric left ventricular hypertrophy.  · Normal left ventricular systolic function. The estimated ejection   fraction is 55%  · Normal LV diastolic function.  · Normal right ventricular systolic function.  · Normal central venous pressure (3 mm Hg).  · The estimated PA systolic pressure is 11 mm Hg  · Normal left atrial pressure.

## 2019-07-08 NOTE — CARE UPDATE
BG goal 140 - 180. BG above goal; prandial excursions noted. Solumedrol 40 mg BID; standard steroid taper per primary.       BG monitoring ac/hs and moderate dose correction scale.   increase novolog 8 units with meals (based on prior needs and weight based at 0.5 u/kg).     ** Please call Endocrine for any BG related issues **

## 2019-07-08 NOTE — ASSESSMENT & PLAN NOTE
- s/p DBD OLTX 7/4/19 (steroid induction, CMV D-/R+) 2/2 HCC/ROMO   - surgery without complication  - LFTS trending down  - transferred to TSU POD#2 (7/6) with christie and 2 JUVE drains in place  - Afib with RVR early AM 7/7   - plan to remove christie cath today and lateral drain.   Drain removed 7/8:  Site cleaned with alcohol, lidocaine 1% used to numb area to place prolene 3.0 suture to site.  Drain intact at time of removal.  Patient tolerated procedure well.  Will continue to monitor for any complications.

## 2019-07-08 NOTE — CONSULTS
[facility]  Cardiology  Consult Note    Patient Name: Roman Hodges  MRN: [id number]  Admission Date: 7/3/2019  Hospital Length of Stay: 4 days  Code Status: Full Code   Attending Provider: Oscar Altman Jr., MD   Consulting Provider: Anh Chapin MD  Primary Care Physician: Jose Angel Munson MD  Principal Problem:S/P liver transplant    Patient information was obtained from patient and ER records.     Inpatient consult to Cardiology  Consult performed by: Anh Chapin MD  Consult ordered by: Prabhu Kurtz NP        Subjective:     Chief Complaint:  Atrial Fibrillation     HPI:   59 M with PMH of pAF on eliquis CHADSVASC 3, HFrEF 30% (improved to 55% on last TTE), Non obstructive CAD, DM, HTN, Cirrhosis s/p Liver tx POD 4 who went into Afib w RVR HR >140s, BP stable today. He initially went into Afib around 2 AM on 7/7 and spontaenously converted to NSR at 12 noon same day. He had complained of some GERD pain that worsens his Afib. Otherwise no chest pain or SOB.  Cardiolgoy consulted for management.    Past Medical History:   Diagnosis Date    A-fib     Allergy     Anticoagulant long-term use     Diabetes mellitus, type 2     GERD (gastroesophageal reflux disease)     Hepatocellular carcinoma     liver    History of primary liver cancer 10/24/2018    S/p liver transplant 7/4/2019    Hyperlipidemia     Hypertension        Past Surgical History:   Procedure Laterality Date    COLONOSCOPY      EMBOLIZATION, YTTRIUM THERAPY N/A 11/9/2018    Performed by [facility] at [facility] OR 2ND FLR    EMBOLIZATION, YTTRIUM THERAPY N/A 10/24/2018    Performed by [facility] Diagnostic Provider at [facility] OR 2ND FLR    ESOPHAGOGASTRODUODENOSCOPY (EGD) N/A 1/15/2019    Performed by Bony Saavedra MD at [facility] ENDO    HERNIA REPAIR      Left heart cath Left 12/4/2018    Performed by Tyler Hinojosa MD at [facility] CATH LAB/EP    Radiofrequency Ablation N/A 4/12/2019     Performed by Rose Surgeon at [facility] ROSE    TRANSPLANT, LIVER N/A 7/3/2019    Performed by Oscar Altman Jr., MD at [facility] OR [address] [place] [address]       Review of patient's allergies indicates:   Allergen Reactions    Lisinopril      Stomach ache    Flu vaccine  (36 mos+)(pf)      Patient reports he developed Bell's Palsy 2 days after his last flu shot in        Current Facility-Administered Medications on File Prior to Encounter   Medication    0.9%  NaCl infusion    sodium chloride 0.9% flush 3 mL     Current Outpatient Medications on File Prior to Encounter   Medication Sig    amLODIPine (NORVASC) 5 MG tablet Take 5 mg by mouth once daily.    busPIRone (BUSPAR) 5 MG Tab Take 1 tablet (5 mg total) by mouth 2 (two) times daily. (Patient taking differently: Take 5 mg by mouth 2 (two) times daily as needed. )    doxycycline (VIBRA-TABS) 100 MG tablet Take 1 tablet (100 mg total) by mouth every 12 (twelve) hours.    esomeprazole (NEXIUM) 40 MG capsule Take 1 capsule (40 mg total) by mouth once daily.    losartan-hydrochlorothiazide 100-25 mg (HYZAAR) 100-25 mg per tablet Take 1 tablet by mouth once daily.    metoprolol succinate (TOPROL-XL) 50 MG 24 hr tablet Take 1 tablet (50 mg total) by mouth once daily.    potassium chloride SA (K-DUR,KLOR-CON) 20 MEQ tablet Take 1 tablet (20 mEq total) by mouth 2 (two) times daily.    warfarin (COUMADIN) 5 MG tablet Take 1 tablet (5 mg total) by mouth Daily. Start after dentist appt on 4/10     Family History     Problem Relation (Age of Onset)    Cancer Mother, Father    Hypertension Mother, Father    Stroke Father        Tobacco Use    Smoking status: Former Smoker     Packs/day: 1.00     Years: 10.00     Pack years: 10.00     Types: Cigarettes     Last attempt to quit: 1980     Years since quittin.5    Smokeless tobacco: Never Used   Substance and Sexual Activity    Alcohol use: No     Frequency: Never     Binge frequency: Never    Drug use: No     Sexual activity: Yes     Partners: Female     Review of Systems   Constitution: Negative for chills, decreased appetite and diaphoresis.   HENT: Negative for congestion and ear discharge.    Eyes: Negative for blurred vision and discharge.   Cardiovascular: Negative for chest pain, dyspnea on exertion, irregular heartbeat, leg swelling and paroxysmal nocturnal dyspnea.   Respiratory: Negative for cough, hemoptysis and shortness of breath.    Gastrointestinal: Negative for abdominal pain.     Objective:     Vital Signs (Most Recent):  Temp: 98.3 °F (36.8 °C) (07/07/19 1940)  Pulse: 104 (07/07/19 1940)  Resp: 20 (07/07/19 1940)  BP: 116/81 (07/07/19 1940)  SpO2: (!) 92 % (07/07/19 1940) Vital Signs (24h Range):  Temp:  [97.3 °F (36.3 °C)-98.3 °F (36.8 °C)] 98.3 °F (36.8 °C)  Pulse:  [] 104  Resp:  [13-21] 20  SpO2:  [91 %-95 %] 92 %  BP: (104-168)/(65-99) 116/81     Weight: 98.2 kg (216 lb 7.9 oz)  Body mass index is 33.91 kg/m².    SpO2: (!) 92 %  O2 Device (Oxygen Therapy): room air      Intake/Output Summary (Last 24 hours) at 7/7/2019 2135  Last data filed at 7/7/2019 2000  Gross per 24 hour   Intake 2040 ml   Output 3030 ml   Net -990 ml       Lines/Drains/Airways     Central Venous Catheter Line                 Trialysis (Dialysis) Catheter 07/04/19 0041 right internal jugular 3 days          Drain                 Closed/Suction Drain 07/04/19 0430 Right Abdomen Bulb 19 Fr. 3 days          Peripheral Intravenous Line                 Peripheral IV - Single Lumen 18 G Right Hand -- days         Peripheral IV - Single Lumen 07/03/19 2211 22 G Anterior;Left Upper Arm 3 days                Physical Exam   Constitutional: He is oriented to person, place, and time. He appears well-developed and well-nourished. No distress.   Eyes: Pupils are equal, round, and reactive to light. Conjunctivae are normal.   Neck: No tracheal deviation present. No thyromegaly present.   Cardiovascular: Normal rate, regular rhythm,  normal heart sounds and intact distal pulses. Exam reveals no gallop and no friction rub.   No murmur heard.  Pulses:       Radial pulses are 2+ on the right side, and 2+ on the left side.        Femoral pulses are 2+ on the right side, and 2+ on the left side.  Pulmonary/Chest: Effort normal and breath sounds normal. No respiratory distress. He has no wheezes. He has no rales.   Abdominal: Soft. Bowel sounds are normal. He exhibits no distension. There is no tenderness.       Musculoskeletal: He exhibits no edema or deformity.   Neurological: He is alert and oriented to person, place, and time. No cranial nerve deficit. Coordination normal.   Skin: Skin is warm and dry. He is not diaphoretic.   Psychiatric: He has a normal mood and affect. His behavior is normal.       Significant Labs:   CMP   Recent Labs   Lab 07/06/19  0320 07/06/19  2205 07/07/19  0500    133* 133*   K 3.7 3.5 3.5    97 96   CO2 25 25 25   * 198* 225*   BUN 20 22* 25*   CREATININE 0.8 0.8 1.0   CALCIUM 8.5* 8.7 9.3   PROT 6.2  --  6.6   ALBUMIN 3.8  --  3.8   BILITOT 2.2*  --  1.5*   ALKPHOS 137*  --  205*   *  --  149*   *  --  575*   ANIONGAP 10 11 12   ESTGFRAFRICA >60.0 >60.0 >60.0   EGFRNONAA >60.0 >60.0 >60.0       Significant Imaging: Echocardiogram:   Transthoracic echo (TTE) complete (Cupid Only):   Results for orders placed or performed during the hospital encounter of 12/31/18   Transthoracic echo (TTE) complete (Cupid Only)   Result Value Ref Range    AV mean gradient 4.28 mmHg    Ao peak saw 1.39 m/s    Ao VTI 30.55 cm    IVRT 0.09 msec    IVS 1.00 (A) 0.6 - 1.1 cm    LA size 3.87 cm    LVIDD 4.90 3.5 - 6.0 cm    LVIDS 2.30 2.1 - 4.0 cm    LVOT diameter 2.23 cm    LVOT peak VTI 26.57 cm    PW 1.00 0.6 - 1.1 cm    MV Peak A Saw 0.81 m/s    E wave decelartion time 250.76 msec    MV Peak E Saw 0.89 m/s    PV Peak D Saw 0.28 m/s    PV Peak S Saw 0.32 m/s    RVDD 3.46 cm    TR Max Saw 1.37 m/s    Ao root  annulus 3.22 cm    AORTIC VALVE CUSP SEPERATION 2.28 cm    PV PEAK VELOCITY 1.36 cm/s    LV Diastolic Volume 90.38 mL    LV Systolic Volume 36.72 mL    FS 53 %    LV mass 176.04 g    Left Ventricle Relative Wall Thickness 0.41 cm    AV valve area 3.40 cm2    AV index (prosthetic) 0.87     E/A ratio 1.10     Pulm vein S/D ratio 1.14     LVOT area 3.90 cm2    LVOT stroke volume 103.72 cm3    AV peak gradient 7.73 mmHg    Triscuspid Valve Regurgitation Peak Gradient 7.51 mmHg    Right Atrial Pressure (from IVC) 3 mmHg    TV rest pulmonary artery pressure 11 mmHg    Narrative    · Eccentric left ventricular hypertrophy.  · Normal left ventricular systolic function. The estimated ejection   fraction is 55%  · Normal LV diastolic function.  · Normal right ventricular systolic function.  · Normal central venous pressure (3 mm Hg).  · The estimated PA systolic pressure is 11 mm Hg  · Normal left atrial pressure.        Assessment and Plan:     Paroxysmal atrial fibrillation  - Patient seen and examined, already back in NSR this PM  - paroxysmal Afib CHADSVASC 3, TSH normal  - Increase Metoprolol to 50 Q8 (rate control)  - Control pain, acid refux etc   - If rate control fails may have to discuss with EP rhythm control options : Flecainide (IVCD 118-120), Sotalol (QTc slightly long), Amio (not ideal with Liver tx and AST/ALT mildly up)  - Keep Mg >2 K>4  - Anticoagulation when ok per surgery        VTE Risk Mitigation (From admission, onward)        Ordered     IP VTE HIGH RISK PATIENT  Once      07/06/19 1455     Place GLORY hose  Until discontinued      07/06/19 1455     Place sequential compression device  Until discontinued      07/06/19 1455     heparin (porcine) injection 5,000 Units  Every 8 hours      07/05/19 1108     Place sequential compression device  Until discontinued      07/04/19 0555     Send SCD stockings and GLORY hose with patient to OR  Continuous      07/03/19 9827          Thank you for your consult. I  will follow-up with patient. Please contact us if you have any additional questions.    Anh Chapin MD  Cardiology   [facility]

## 2019-07-08 NOTE — ASSESSMENT & PLAN NOTE
- Patient seen and examined, already back in NSR this PM  - paroxysmal Afib CHADSVASC 3, TSH normal  - Increase Metoprolol to 50 Q8 (rate control)  - Control pain, acid refux etc   - If rate control fails may have to discuss with EP rhythm control options : Flecainide (IVCD 118-120), Sotalol (QTc slightly long), Amio (not ideal with Liver tx and AST/ALT mildly up)  - Keep Mg >2 K>4  - Anticoagulation when ok per surgery

## 2019-07-08 NOTE — PLAN OF CARE
Problem: Adult Inpatient Plan of Care  Goal: Plan of Care Review  Pt AAOx4, bed low locked, call light within reach, wearing nonskid socks. Pt instructed to use call light for assistance, pt verbalizes understanding. Pt c/o incisional pain, PRN pain meds x 1, no further complaints. Tele NSR/ST. AC/HS, 330, covered with ss insulin. Self meds 100%. [employer] MARIS with staples CDI no SSI, painted with betadine. JUVE with 190 serosanguinous output. Pt with small BM this AM. Pt remains free from injury/harm at this time. Afebrile. See flowsheet for full assessment/VS.

## 2019-07-08 NOTE — PROGRESS NOTES
"Preparation for discharge with patient and caregiver, wife. Reviewed " My New Journey: Living Smart After My Liver Transplant".    Contact Numbers  Urgent Medical Concerns  The Basics (Medicines, Appointments, Prevention, and Plans)  Your Team  First Steps    Allowed time for questions and answers along with free discussion. Encouraged to use open book test to review knowledge shared by this coordinator and the pharmacist.   "

## 2019-07-08 NOTE — SUBJECTIVE & OBJECTIVE
Scheduled Meds:   apixaban  5 mg Oral BID    bisacodyl  10 mg Oral QHS    docusate sodium  100 mg Oral TID    insulin aspart U-100  8 Units Subcutaneous TIDWM    lidocaine HCL 10 mg/ml (1%)  1 mL Other Once    methylPREDNISolone sodium succinate  40 mg Intravenous Q12H    Followed by    [START ON 7/9/2019] methylPREDNISolone sodium succinate  20 mg Intravenous Q12H    Followed by    [START ON 7/10/2019] predniSONE  20 mg Oral Daily    metoprolol tartrate  50 mg Oral TID    mycophenolate  1,000 mg Oral BID    NIFEdipine  30 mg Oral Daily    nystatin  500,000 Units Mouth/Throat TID PC    pantoprazole  40 mg Oral Daily    sulfamethoxazole-trimethoprim 400-80mg  1 tablet Oral Daily    tacrolimus  2 mg Oral BID    [START ON 7/14/2019] valGANciclovir  450 mg Oral Daily     Continuous Infusions:  PRN Meds:aluminum-magnesium hydroxide-simethicone, baclofen, bisacodyl, calcium carbonate, Dextrose 10% Bolus, Dextrose 10% Bolus, glucagon (human recombinant), glucose, glucose, hydrALAZINE, insulin aspart U-100, ondansetron, oxyCODONE, oxyCODONE, simethicone    Review of Systems   Constitutional: Positive for activity change and appetite change. Negative for fever.   HENT: Negative.  Negative for facial swelling.    Eyes: Negative.    Respiratory: Negative.  Negative for apnea, chest tightness, shortness of breath and wheezing.    Cardiovascular: Positive for leg swelling. Negative for chest pain and palpitations.   Gastrointestinal: Positive for abdominal pain. Negative for abdominal distention, constipation, diarrhea, nausea and vomiting.   Genitourinary: Negative.  Negative for decreased urine volume, difficulty urinating, dysuria, hematuria and urgency.   Musculoskeletal: Negative.  Negative for back pain, gait problem, neck pain and neck stiffness.   Skin: Positive for wound. Negative for color change and pallor.   Allergic/Immunologic: Positive for immunocompromised state.   Neurological: Negative.   Negative for dizziness, seizures, weakness, light-headedness and headaches.   Psychiatric/Behavioral: Negative for behavioral problems, confusion, hallucinations, sleep disturbance and suicidal ideas. The patient is nervous/anxious.      Objective:     Vital Signs (Most Recent):  Temp: 98.6 °F (37 °C) (07/08/19 1155)  Pulse: 86 (07/08/19 1515)  Resp: 18 (07/08/19 1400)  BP: (!) 143/93 (07/08/19 1400)  SpO2: (!) 94 % (07/08/19 1400) Vital Signs (24h Range):  Temp:  [97.8 °F (36.6 °C)-98.6 °F (37 °C)] 98.6 °F (37 °C)  Pulse:  [] 86  Resp:  [15-20] 18  SpO2:  [92 %-96 %] 94 %  BP: (110-145)/() 143/93     Weight: 93.6 kg (206 lb 3.9 oz)  Body mass index is 32.3 kg/m².    Intake/Output - Last 3 Shifts       07/06 0700 - 07/07 0659 07/07 0700 - 07/08 0659 07/08 0700 - 07/09 0659    P.O. 900 1680 1080    I.V. (mL/kg) 1405 (14.3)      Total Intake(mL/kg) 2305 (23.5) 1680 (18) 1080 (11.6)    Urine (mL/kg/hr) 3340 (1.4) 1765 (0.8) 100 (0.1)    Drains 506 410 90    Stool  0     Total Output 3846 2175 190    Net -1541 -495 +890           Urine Occurrence  2 x 1 x    Stool Occurrence  2 x           Physical Exam   Constitutional: He is oriented to person, place, and time. He appears well-developed. No distress.   HENT:   Head: Normocephalic and atraumatic.   Neck: Normal range of motion. Neck supple. No JVD present.   Cardiovascular: Normal rate, regular rhythm and normal heart sounds.   No murmur heard.  Pulmonary/Chest: Effort normal. No respiratory distress. He has decreased breath sounds in the right lower field and the left lower field. He has no wheezes. He exhibits no tenderness.   Abdominal: Soft. Bowel sounds are normal. He exhibits distension. There is tenderness.       Musculoskeletal: Normal range of motion. He exhibits edema (+1 ble). He exhibits no tenderness.   Neurological: He is alert and oriented to person, place, and time. He has normal reflexes.   Skin: Skin is warm and dry. He is not diaphoretic.    Psychiatric: He has a normal mood and affect. His behavior is normal. Judgment and thought content normal.   Nursing note and vitals reviewed.      Laboratory:  Immunosuppressants         Stop Route Frequency     tacrolimus capsule 2 mg      -- Oral 2 times daily     mycophenolate capsule 1,000 mg      -- Oral 2 times daily        CBC:   Recent Labs   Lab 07/08/19  0500   WBC 13.03*   RBC 5.22   HGB 15.2   HCT 45.9      MCV 88   MCH 29.1   MCHC 33.1     CMP:   Recent Labs   Lab 07/08/19  0500   *   CALCIUM 8.5*   ALBUMIN 3.6   PROT 6.4      K 3.8   CO2 26      BUN 30*   CREATININE 1.0   ALKPHOS 223*   *   AST 74*   BILITOT 1.2*     Coagulation:   Recent Labs   Lab 07/06/19  0320  07/08/19  0500   INR 1.0   < > 1.1   APTT 24.4  --   --     < > = values in this interval not displayed.     Labs within the past 24 hours have been reviewed.    Diagnostic Results:  None

## 2019-07-08 NOTE — ASSESSMENT & PLAN NOTE
- Patient Remains in normal sinus rhythm since noon on 7/7  - CHADSVASC- 3, TSH normal  - Continue Metoprolol to 50 Q8 for rate control- if Afib returns, consult EP due to        post-transplant medication restrictions  - Consider starting Losartan 40 mg PO for continued BP control  - Resume home dosing of Eliquis when cleared by surgery  - Follow- up on echo

## 2019-07-08 NOTE — HPI
59 M with PMH of pAF on eliquis CHADSVASC 3, HFrEF 30% (improved to 55% on last TTE), Non obstructive CAD, DM, HTN, Cirrhosis s/p Liver tx POD 4 who went into Afib w RVR HR >140s, BP stable today. He initially went into Afib around 2 AM on 7/7 and spontaenously converted to NSR at 12 noon same day. No chest pain or SOB.  Cardiolgoy consulted for management.    Interim History: Patient is doing well overnight with no new symptoms or complaints. He has remained in normal sinus rhythm and has no chest pain, shortness of breath, or other complaints.

## 2019-07-08 NOTE — PROGRESS NOTES
SW met with pt and pt's spouse/primary caregiver Kati Hodges to f/u for continuity of care.  Pt found seated upright in recliner at bedside and presented a&ox4 with calm mood and appropriate affect.  Pt reports feeling well and denies any mental health difficulties.  SW discussed with pt and Kati cost estimation sheet provided to SW by pharmacy.  Pt and Kati expressed understanding and denied any concerns regarding copay cost estimations.  SW also informed pt/Kati that, per pharmacy notes on sheet, [facility] would have to be used to fill immunosuppressant medication. Kati stated plans of calling [facility] today to f/u on these medications.   Pt and Kati report adequate coping and deny having any questions or psychosocial concerns at this time.  SW remains available for further psychosocial support and will f/u as needed.

## 2019-07-08 NOTE — PROGRESS NOTES
EDUCATION NOTE:    Met with Roman Hodges and his caregivers to provide teaching re: immunosuppressant medications.  Reviewed medication section of the Liver Transplant Education book that was provided.  Emphasized the importance of compliance, role of the blue medication card, concerns for drug interactions, and process of obtaining refills.  Counseled regarding Prograf, Cellcept , prednisone, including directions for use, monitoring, how to handle missed doses, and side effects.  He and his wife verbalized understanding and had the opportunity to ask questions.

## 2019-07-08 NOTE — SUBJECTIVE & OBJECTIVE
"Interval HPI:   Overnight events: Remains in MAXWELLUARNALDO. BG fairly well controlled with scheduled mealtime insulin plus correction scale. Solumedrol 20 mg BID; standard steroid taper per primary.  Possible discharge today per primary team.  Eatin%  Nausea: No  Hypoglycemia and intervention: No  Fever: No  TPN and/or TF: No    /80   Pulse 74   Temp 98.3 °F (36.8 °C) (Oral)   Resp 16   Ht 5' 7" (1.702 m)   Wt 93.6 kg (206 lb 3.9 oz)   SpO2 95%   BMI 32.30 kg/m²     Labs Reviewed and Include    No results for input(s): GLU, CALCIUM, ALBUMIN, PROT, NA, K, CO2, CL, BUN, CREATININE, ALKPHOS, ALT, AST, BILITOT in the last 24 hours.  Lab Results   Component Value Date    WBC 13.67 (H) 2019    HGB 15.4 2019    HCT 44.9 2019    MCV 85 2019     2019     Recent Labs   Lab 19  1313   TSH 1.373     Lab Results   Component Value Date    HGBA1C 5.5 2019       Nutritional status:   Body mass index is 32.3 kg/m².  Lab Results   Component Value Date    ALBUMIN 3.8 2019    ALBUMIN 3.8 2019    ALBUMIN 4.0 2019     No results found for: PREALBUMIN    Estimated Creatinine Clearance: 85.7 mL/min (based on SCr of 1 mg/dL).    Accu-Checks  Recent Labs     19  2106 19  0805 19  1124 19  1643 19  1839 19  2106 19  0731 19  1229 19  1653 19  2119   POCTGLUCOSE 194* 176* 192* 217* 206* 208* 250* 289* 264* 330*       Current Medications and/or Treatments Impacting Glycemic Control  Immunotherapy:    Immunosuppressants         Stop Route Frequency     mycophenolate capsule 1,000 mg      -- Oral 2 times daily        Steroids:   Hormones (From admission, onward)    Start     Stop Route Frequency Ordered    07/10/19 0900  predniSONE tablet 20 mg  (methylprednisolone taper panel)      -- Oral Daily 19 0555    19 0900  methylPREDNISolone sodium succinate injection 20 mg  (methylprednisolone " taper panel)      07/10 0859 IV Every 12 hours 07/04/19 0555    07/08/19 0900  methylPREDNISolone sodium succinate injection 40 mg  (methylprednisolone taper panel)      07/09 0859 IV Every 12 hours 07/04/19 0555        Pressors:    Autonomic Drugs (From admission, onward)    Start     Stop Route Frequency Ordered    07/07/19 0900  metoprolol tartrate split tablet 37.5 mg      -- Oral 3 times daily 07/07/19 0238        Hyperglycemia/Diabetes Medications:   Antihyperglycemics (From admission, onward)    Start     Stop Route Frequency Ordered    07/07/19 0730  insulin aspart U-100 pen 4 Units      -- SubQ 3 times daily with meals 07/07/19 0721    07/05/19 1145  insulin aspart U-100 pen 1-10 Units      -- SubQ Before meals & nightly PRN 07/05/19 1045

## 2019-07-08 NOTE — PLAN OF CARE
Problem: Physical Therapy Goal  Goal: Physical Therapy Goal  Goals to be met by: 19    Patient will increase functional independence with mobility by performin. Supine to sit with Stand-by Assistance -not met  2. Sit to stand transfer with Supervision -not met  3. Gait  x 250 feet with Supervision -not met  4. Pt will be independent with therex program for BLE strengthening and endurance x 20 reps      Outcome: Ongoing (interventions implemented as appropriate)  Pt progressing towards goals; continue current POC.     Mariely Gómez PT, DPT   2019  Pager: [phone]

## 2019-07-09 ENCOUNTER — PATIENT MESSAGE (OUTPATIENT)
Dept: ENDOCRINOLOGY | Facility: HOSPITAL | Age: 59
End: 2019-07-09

## 2019-07-09 ENCOUNTER — TELEPHONE (OUTPATIENT)
Dept: ENDOCRINOLOGY | Facility: HOSPITAL | Age: 59
End: 2019-07-09

## 2019-07-09 VITALS
WEIGHT: 206 LBS | OXYGEN SATURATION: 94 % | BODY MASS INDEX: 32.33 KG/M2 | DIASTOLIC BLOOD PRESSURE: 91 MMHG | TEMPERATURE: 98 F | SYSTOLIC BLOOD PRESSURE: 130 MMHG | HEIGHT: 67 IN | HEART RATE: 75 BPM | RESPIRATION RATE: 20 BRPM

## 2019-07-09 DIAGNOSIS — Z94.4 LIVER REPLACED BY TRANSPLANT: Primary | ICD-10-CM

## 2019-07-09 PROBLEM — D72.829 LEUKOCYTOSIS: Status: RESOLVED | Noted: 2019-07-07 | Resolved: 2019-07-09

## 2019-07-09 LAB
ALBUMIN SERPL BCP-MCNC: 3 G/DL (ref 3.5–5.2)
ALP SERPL-CCNC: 199 U/L (ref 55–135)
ALT SERPL W/O P-5'-P-CCNC: 336 U/L (ref 10–44)
ANION GAP SERPL CALC-SCNC: 11 MMOL/L (ref 8–16)
AST SERPL-CCNC: 142 U/L (ref 10–40)
BASOPHILS # BLD AUTO: 0.08 K/UL (ref 0–0.2)
BASOPHILS NFR BLD: 0.7 % (ref 0–1.9)
BILIRUB SERPL-MCNC: 1.2 MG/DL (ref 0.1–1)
BUN SERPL-MCNC: 29 MG/DL (ref 6–20)
CALCIUM SERPL-MCNC: 8.3 MG/DL (ref 8.7–10.5)
CHLORIDE SERPL-SCNC: 103 MMOL/L (ref 95–110)
CO2 SERPL-SCNC: 22 MMOL/L (ref 23–29)
CREAT SERPL-MCNC: 0.9 MG/DL (ref 0.5–1.4)
DIFFERENTIAL METHOD: ABNORMAL
EOSINOPHIL # BLD AUTO: 0 K/UL (ref 0–0.5)
EOSINOPHIL NFR BLD: 0.3 % (ref 0–8)
ERYTHROCYTE [DISTWIDTH] IN BLOOD BY AUTOMATED COUNT: 13.6 % (ref 11.5–14.5)
EST. GFR  (AFRICAN AMERICAN): >60 ML/MIN/1.73 M^2
EST. GFR  (NON AFRICAN AMERICAN): >60 ML/MIN/1.73 M^2
GLUCOSE SERPL-MCNC: 152 MG/DL (ref 70–110)
HCT VFR BLD AUTO: 41.8 % (ref 40–54)
HGB BLD-MCNC: 14.1 G/DL (ref 14–18)
IMM GRANULOCYTES # BLD AUTO: 0.32 K/UL (ref 0–0.04)
IMM GRANULOCYTES NFR BLD AUTO: 2.7 % (ref 0–0.5)
INR PPP: 1.1 (ref 0.8–1.2)
LYMPHOCYTES # BLD AUTO: 0.7 K/UL (ref 1–4.8)
LYMPHOCYTES NFR BLD: 6.3 % (ref 18–48)
MAGNESIUM SERPL-MCNC: 2 MG/DL (ref 1.6–2.6)
MCH RBC QN AUTO: 29.6 PG (ref 27–31)
MCHC RBC AUTO-ENTMCNC: 33.7 G/DL (ref 32–36)
MCV RBC AUTO: 88 FL (ref 82–98)
MONOCYTES # BLD AUTO: 0.9 K/UL (ref 0.3–1)
MONOCYTES NFR BLD: 7.3 % (ref 4–15)
NEUTROPHILS # BLD AUTO: 9.8 K/UL (ref 1.8–7.7)
NEUTROPHILS NFR BLD: 82.7 % (ref 38–73)
NRBC BLD-RTO: 0 /100 WBC
PHOSPHATE SERPL-MCNC: 3.7 MG/DL (ref 2.7–4.5)
PLATELET # BLD AUTO: 147 K/UL (ref 150–350)
PMV BLD AUTO: 10.8 FL (ref 9.2–12.9)
POCT GLUCOSE: 129 MG/DL (ref 70–110)
POCT GLUCOSE: 132 MG/DL (ref 70–110)
POCT GLUCOSE: 165 MG/DL (ref 70–110)
POTASSIUM SERPL-SCNC: 4 MMOL/L (ref 3.5–5.1)
PROT SERPL-MCNC: 5.5 G/DL (ref 6–8.4)
PROTHROMBIN TIME: 11.2 SEC (ref 9–12.5)
RBC # BLD AUTO: 4.77 M/UL (ref 4.6–6.2)
SODIUM SERPL-SCNC: 136 MMOL/L (ref 136–145)
TACROLIMUS BLD-MCNC: 5 NG/ML (ref 5–15)
WBC # BLD AUTO: 11.83 K/UL (ref 3.9–12.7)

## 2019-07-09 PROCEDURE — 25000003 PHARM REV CODE 250: Performed by: PHYSICIAN ASSISTANT

## 2019-07-09 PROCEDURE — 83735 ASSAY OF MAGNESIUM: CPT

## 2019-07-09 PROCEDURE — 80053 COMPREHEN METABOLIC PANEL: CPT

## 2019-07-09 PROCEDURE — 94761 N-INVAS EAR/PLS OXIMETRY MLT: CPT

## 2019-07-09 PROCEDURE — 93010 ELECTROCARDIOGRAM REPORT: CPT | Mod: ,,, | Performed by: INTERNAL MEDICINE

## 2019-07-09 PROCEDURE — 99232 PR SUBSEQUENT HOSPITAL CARE,LEVL II: ICD-10-PCS | Mod: ,,, | Performed by: INTERNAL MEDICINE

## 2019-07-09 PROCEDURE — 99232 SBSQ HOSP IP/OBS MODERATE 35: CPT | Mod: ,,, | Performed by: INTERNAL MEDICINE

## 2019-07-09 PROCEDURE — 80197 ASSAY OF TACROLIMUS: CPT

## 2019-07-09 PROCEDURE — 99231 PR SUBSEQUENT HOSPITAL CARE,LEVL I: ICD-10-PCS | Mod: ,,, | Performed by: NURSE PRACTITIONER

## 2019-07-09 PROCEDURE — 63600175 PHARM REV CODE 636 W HCPCS: Performed by: STUDENT IN AN ORGANIZED HEALTH CARE EDUCATION/TRAINING PROGRAM

## 2019-07-09 PROCEDURE — 94660 CPAP INITIATION&MGMT: CPT

## 2019-07-09 PROCEDURE — 99900035 HC TECH TIME PER 15 MIN (STAT)

## 2019-07-09 PROCEDURE — 84100 ASSAY OF PHOSPHORUS: CPT

## 2019-07-09 PROCEDURE — 93010 EKG 12-LEAD: ICD-10-PCS | Mod: ,,, | Performed by: INTERNAL MEDICINE

## 2019-07-09 PROCEDURE — 99231 SBSQ HOSP IP/OBS SF/LOW 25: CPT | Mod: ,,, | Performed by: NURSE PRACTITIONER

## 2019-07-09 PROCEDURE — 25000003 PHARM REV CODE 250: Performed by: STUDENT IN AN ORGANIZED HEALTH CARE EDUCATION/TRAINING PROGRAM

## 2019-07-09 PROCEDURE — 85025 COMPLETE CBC W/AUTO DIFF WBC: CPT

## 2019-07-09 PROCEDURE — 25000003 PHARM REV CODE 250: Performed by: NURSE PRACTITIONER

## 2019-07-09 PROCEDURE — 85610 PROTHROMBIN TIME: CPT

## 2019-07-09 PROCEDURE — 63600175 PHARM REV CODE 636 W HCPCS: Performed by: NURSE PRACTITIONER

## 2019-07-09 PROCEDURE — 93005 ELECTROCARDIOGRAM TRACING: CPT

## 2019-07-09 PROCEDURE — 36415 COLL VENOUS BLD VENIPUNCTURE: CPT

## 2019-07-09 RX ORDER — INSULIN PUMP SYRINGE, 3 ML
EACH MISCELLANEOUS
Qty: 1 EACH | Refills: 0 | Status: ON HOLD | OUTPATIENT
Start: 2019-07-09 | End: 2020-04-16

## 2019-07-09 RX ORDER — NIFEDIPINE 60 MG/1
60 TABLET, EXTENDED RELEASE ORAL DAILY
Qty: 30 TABLET | Refills: 11 | Status: SHIPPED | OUTPATIENT
Start: 2019-07-10 | End: 2019-07-16

## 2019-07-09 RX ORDER — PEN NEEDLE, DIABETIC 30 GX3/16"
1 NEEDLE, DISPOSABLE MISCELLANEOUS 3 TIMES DAILY
Qty: 100 EACH | Refills: 11 | Status: ON HOLD | OUTPATIENT
Start: 2019-07-09 | End: 2020-04-16

## 2019-07-09 RX ORDER — LANCETS
1 EACH MISCELLANEOUS 3 TIMES DAILY
Qty: 100 EACH | Refills: 11 | Status: SHIPPED | OUTPATIENT
Start: 2019-07-09

## 2019-07-09 RX ORDER — MYCOPHENOLIC ACID 180 MG/1
720 TABLET, DELAYED RELEASE ORAL 2 TIMES DAILY
Qty: 240 TABLET | Refills: 0 | Status: SHIPPED | OUTPATIENT
Start: 2019-07-09 | End: 2019-07-16 | Stop reason: ALTCHOICE

## 2019-07-09 RX ORDER — TACROLIMUS 1 MG/1
3 CAPSULE ORAL EVERY 12 HOURS
Qty: 30 CAPSULE | Refills: 0 | Status: SHIPPED | OUTPATIENT
Start: 2019-07-09 | End: 2019-07-10 | Stop reason: SDUPTHER

## 2019-07-09 RX ORDER — NIFEDIPINE 30 MG/1
30 TABLET, EXTENDED RELEASE ORAL ONCE
Status: COMPLETED | OUTPATIENT
Start: 2019-07-09 | End: 2019-07-09

## 2019-07-09 RX ORDER — OXYCODONE HYDROCHLORIDE 10 MG/1
5-10 TABLET ORAL EVERY 4 HOURS PRN
Qty: 30 TABLET | Refills: 0 | Status: SHIPPED | OUTPATIENT
Start: 2019-07-09 | End: 2019-08-15

## 2019-07-09 RX ORDER — NIFEDIPINE 30 MG/1
60 TABLET, EXTENDED RELEASE ORAL DAILY
Status: DISCONTINUED | OUTPATIENT
Start: 2019-07-10 | End: 2019-07-09 | Stop reason: HOSPADM

## 2019-07-09 RX ORDER — INSULIN ASPART 100 [IU]/ML
INJECTION, SOLUTION INTRAVENOUS; SUBCUTANEOUS
Qty: 15 ML | Refills: 0 | Status: SHIPPED | OUTPATIENT
Start: 2019-07-09 | End: 2019-07-26 | Stop reason: SDUPTHER

## 2019-07-09 RX ORDER — METOPROLOL TARTRATE 50 MG/1
50 TABLET ORAL 3 TIMES DAILY
Qty: 90 TABLET | Refills: 11 | Status: SHIPPED | OUTPATIENT
Start: 2019-07-09 | End: 2019-07-16

## 2019-07-09 RX ADMIN — OXYCODONE HYDROCHLORIDE 5 MG: 5 TABLET ORAL at 08:07

## 2019-07-09 RX ADMIN — NYSTATIN 500000 UNITS: 500000 SUSPENSION ORAL at 08:07

## 2019-07-09 RX ADMIN — SULFAMETHOXAZOLE AND TRIMETHOPRIM 1 TABLET: 400; 80 TABLET ORAL at 08:07

## 2019-07-09 RX ADMIN — DOCUSATE SODIUM 100 MG: 100 CAPSULE, LIQUID FILLED ORAL at 03:07

## 2019-07-09 RX ADMIN — MYCOPHENOLATE MOFETIL 1000 MG: 250 CAPSULE ORAL at 08:07

## 2019-07-09 RX ADMIN — INSULIN ASPART 2 UNITS: 100 INJECTION, SOLUTION INTRAVENOUS; SUBCUTANEOUS at 12:07

## 2019-07-09 RX ADMIN — METOPROLOL TARTRATE 50 MG: 50 TABLET ORAL at 02:07

## 2019-07-09 RX ADMIN — OXYCODONE HYDROCHLORIDE 5 MG: 5 TABLET ORAL at 02:07

## 2019-07-09 RX ADMIN — INSULIN ASPART 8 UNITS: 100 INJECTION, SOLUTION INTRAVENOUS; SUBCUTANEOUS at 08:07

## 2019-07-09 RX ADMIN — Medication 40 MG: at 06:07

## 2019-07-09 RX ADMIN — INSULIN ASPART 8 UNITS: 100 INJECTION, SOLUTION INTRAVENOUS; SUBCUTANEOUS at 12:07

## 2019-07-09 RX ADMIN — METOPROLOL TARTRATE 50 MG: 50 TABLET ORAL at 08:07

## 2019-07-09 RX ADMIN — NIFEDIPINE 30 MG: 30 TABLET, FILM COATED, EXTENDED RELEASE ORAL at 08:07

## 2019-07-09 RX ADMIN — NYSTATIN 500000 UNITS: 500000 SUSPENSION ORAL at 02:07

## 2019-07-09 RX ADMIN — NIFEDIPINE 30 MG: 30 TABLET, FILM COATED, EXTENDED RELEASE ORAL at 10:07

## 2019-07-09 RX ADMIN — DOCUSATE SODIUM 100 MG: 100 CAPSULE, LIQUID FILLED ORAL at 08:07

## 2019-07-09 RX ADMIN — TACROLIMUS 2 MG: 1 CAPSULE ORAL at 08:07

## 2019-07-09 RX ADMIN — APIXABAN 5 MG: 5 TABLET, FILM COATED ORAL at 08:07

## 2019-07-09 RX ADMIN — METHYLPREDNISOLONE SODIUM SUCCINATE 20 MG: 40 INJECTION, POWDER, FOR SOLUTION INTRAMUSCULAR; INTRAVENOUS at 08:07

## 2019-07-09 RX ADMIN — OXYCODONE HYDROCHLORIDE 5 MG: 5 TABLET ORAL at 03:07

## 2019-07-09 RX ADMIN — INSULIN ASPART 8 UNITS: 100 INJECTION, SOLUTION INTRAVENOUS; SUBCUTANEOUS at 05:07

## 2019-07-09 NOTE — PLAN OF CARE
Problem: Adult Inpatient Plan of Care  Goal: Plan of Care Review  Plan of care reviewed on am rounds, afrebrile, bp elevated with Nifedipine dose increased, tele sr, wbc 11  H/h stable Cr 0.9 lefts continue to improve, glucoses 100's range, plan to dc on Januvia and correction scale insulin, patient/ wife instructed on use of insulin pen and wife observed doing injections correctly, to be instructed on glucometer prior to dc as meter will be obtained from outside pharmacy., chevron healing with staples intact, wound care per wife.Incisional pain controlled with occasional Oxy, voiding without problems, (+) bm, up ambulatory independent with steady gait, wife doing well with self meds, routine u/s this afternoon and plan to dc home once all teaching completed.

## 2019-07-09 NOTE — ASSESSMENT & PLAN NOTE
Patient Remains in normal sinus rhythm since noon on 7/7  - CHADSVASC- 3, TSH normal  - Echo shows EF of 55%, mild aortic valve sclerosis, and normal left atrial size  - Restart home Losartan 40 mg PO for continued BP control

## 2019-07-09 NOTE — PROGRESS NOTES
DISCHARGE NOTE:    Roman Hodges is a 59 y.o. male s/p   LIVER    - Brain Death transplant on 2019 (Liver) for ESLD secondary to Primary Liver Malignancy: Hepatoma (HCC) and Cirrhosis.      Past Medical History:   Diagnosis Date    A-fib     Allergy     Anticoagulant long-term use     Diabetes mellitus, type 2     GERD (gastroesophageal reflux disease)     Hepatocellular carcinoma     liver    History of primary liver cancer 10/24/2018    S/p liver transplant 2019    Hyperlipidemia     Hypertension        Hospital Course: Hospital course uncomplicated.  Of note, pt locked into [employer]. Wife has been calling them to arrange shipment once meds are approved. Discharged with tac #30 and Myfortic (duplicate orders on med list).     Allergies:   Review of patient's allergies indicates:   Allergen Reactions    Lisinopril      Stomach ache    Flu vaccine  (36 mos+)(pf)      Patient reports he developed Bell's Palsy 2 days after his last flu shot in        Patient Pharmacy: [facility]    Discharge Medications:   Roman Hodges   Home Medication Instructions SHOLA:[id number]    Printed on:19 9736   Medication Information                      apixaban (ELIQUIS) 5 mg Tab  Take 1 tablet (5 mg total) by mouth 2 (two) times daily.             bisacodyl (DULCOLAX) 5 mg EC tablet  Take 2 tablets (10 mg total) by mouth every evening.             blood sugar diagnostic Strp  Test blood glucose 3 (three) times daily.             blood-glucose meter kit  Use as instructed             docusate sodium (COLACE) 100 MG capsule  Take 1 capsule (100 mg total) by mouth 3 (three) times daily.             esomeprazole (NEXIUM) 40 MG capsule  Take 1 capsule (40 mg total) by mouth once daily.             insulin aspart U-100 (NOVOLOG) 100 unit/mL (3 mL) InPn pen  Sliding scale: 150-200 +1 unit; 201-250 +2 units; 251-300 +3 units; 301-350 +4 units; >350 +5 uits; TDD: 15 units            "  lancets Misc  Test blood glucose 3 (three) times daily.             metoprolol tartrate (LOPRESSOR) 50 MG tablet  Take 1 tablet (50 mg total) by mouth 3 (three) times daily.             mycophenolate (CELLCEPT) 250 mg Cap  Take 4 capsules (1,000 mg total) by mouth 2 (two) times daily.             mycophenolate (MYFORTIC) 180 MG TbEC  Take 4 tablets (720 mg total) by mouth 2 (two) times daily.             NIFEdipine (PROCARDIA-XL) 60 MG (OSM) 24 hr tablet  Take 1 tablet (60 mg total) by mouth once daily.             oxyCODONE (ROXICODONE) 10 mg Tab immediate release tablet  Take 0.5-1 tablets (5-10 mg total) by mouth every 4 (four) hours as needed.             pen needle, diabetic (EASY COMFORT PEN NEEDLES) 32 gauge x 5/32" Ndle  Inject 1 each into the skin 3 (three) times daily.             predniSONE (DELTASONE) 5 MG tablet  7/10-7/16 Take 20mg by mouth once daily;  From 7/17-7/23 take 15mg daily;  From 7/24-7/30 take 10mg daily;  From 7/31-8/6 5mg daily then stop on 8/7/19.             SITagliptin (JANUVIA) 100 MG Tab  Take 1 tablet (100 mg total) by mouth once daily.             sulfamethoxazole-trimethoprim 400-80mg (BACTRIM,SEPTRA) 400-80 mg per tablet  Take 1 tablet by mouth once daily. Stop 1/2/2020             tacrolimus (PROGRAF) 1 MG Cap  Take 6 capsules (6 mg total) by mouth every 12 (twelve) hours.             tacrolimus (PROGRAF) 1 MG Cap  Take 3 capsules (3 mg total) by mouth every 12 (twelve) hours.             valGANciclovir (VALCYTE) 450 mg Tab  Take 1 tablet (450 mg total) by mouth once daily. Stop 10/3/19                 Pharmacy Interventions/Recommendations:  1) Transplant Immunosuppression: tac 3/3, Myfortic, pred taper    2) Opportunistic Infection prophylaxis: bactrim x6 mo, valcyte x3 mo,     3) Patient Counseling/Education:  Demonstrated the use of the BP cuff, thermometer.    4) Follow-Up/Discharge Needs:  -get immunos approved through [facility]    5) Patient received prescriptions " for:      E-rx's:  As above         Printed rx's:  none      Faxed / Phoned in rx's:  none  To [facility] pharmacy per patient request.    6) Patient Assistance Information: none    7) The following medications have been placed on HOLD and should be restarted in the outpatient setting (when appropriate): none    Roman and his caregiver verbalized their understanding and had the opportunity to ask questions.

## 2019-07-09 NOTE — NURSING
Patient currently in u/s. Wife instructed on use of glucometer, endo NP to return to review blood sugar log/ followup appt.Med teaching/ home meds brought to room by PharmD, also instructed on use of bp cuff. Provided with needed suplles for home use.

## 2019-07-09 NOTE — DISCHARGE SUMMARY
[facility]  Liver Transplant  Discharge Summary      Patient Name: Roman Hodges  MRN: [id number]  Admission Date: 7/3/2019  Hospital Length of Stay: 6 days  Discharge Date and Time:  2019 4:28 PM  Attending Physician: Oscar Altman Jr., MD   Discharging Provider: Jerri Funes NP  Primary Care Provider: Jose Angel Munson MD  Post-Operative Day: 5     ORGAN:   LIVER  Disease Etiology: Primary Liver Malignancy: Hepatoma (HCC) and Cirrhosis  Donor Type:    - Brain Death  CDC High Risk:   No  Donor CMV Status:   Donor CMV Status: Negative  Donor HBcAB:   Negative  Donor HCV Status:   Negative  Whole or Partial: Whole Liver  Biliary Anastomosis: End to End  Arterial Anatomy: Replaced Right Hepatic from SMA    HPI:    Roman Hodges is a 59 y.o. 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 Patient denies fevers, chills, nausea, vomiting, CP, SOB, changes to bowel or bladder habits.    Procedure(s) (LRB):  TRANSPLANT, LIVER (N/A)     Hospital Course:    Pt now s/p DBD OLTX 19 (steroid induction, CMV D-/R+) 2/2 HCC/ROMO  Surgery without complication. Progressing well post operatively with LFTs trending down daily. Transferred to TSU on POD#2 () with christie and 2 JUVE drains in place. All drains and christie removed.  Chevron with staples, no redness or swelling noted.  Tolerating diet, pain controlled, ambulating, passing flatus, + BM, and voiding without issue.  Liver ultrasound completed  and unremarkable.    Afib with RVR ~0130 noted on 19. Case discussed with cardiology and pt placed on PO lopressor per cards recs. Now converted to sinus rhythm and rate controlled.  Will restart apixaban also per cards recommendations.  Remains in sinus rhythm upon discharge.    Patient stable and ready for discharge.  Will have labs and coordinator  appointment in am.  Surgeon appointment next week Tuesday, 7/16.    Final Active Diagnoses:    Diagnosis Date Noted POA    PRINCIPAL PROBLEM:  S/P liver transplant [Z94.4] 07/04/2019 Not Applicable    Thrombocytopenia, unspecified [D69.6] 07/07/2019 Yes    Hypophosphatemia [E83.39] 07/07/2019 Yes    Long-term use of immunosuppressant medication [Z79.899] 07/07/2019 Not Applicable    At risk for opportunistic infections [Z91.89] 07/07/2019 No    Paroxysmal atrial fibrillation [I48.0] 07/07/2019 Yes    Prolonged Q-T interval on ECG [R94.31]  Yes    Prophylactic immunotherapy [Z29.8] 07/04/2019 Not Applicable    Adrenal cortical steroids causing adverse effect in therapeutic use [T38.0X5A] 07/04/2019 No    Long term (current) use of anticoagulants [Z79.01] 04/26/2019 Not Applicable    Type 2 diabetes mellitus, without long-term current use of insulin [E11.9] 11/29/2018 Yes     Chronic    Essential hypertension [I10] 11/29/2018 Yes     Chronic    GERD (gastroesophageal reflux disease) [K21.9] 11/29/2018 Yes    Atrial fibrillation [I48.91] 11/29/2018 No    Obesity (BMI 30.0-34.9) [E66.9] 04/23/2018 Yes    Obstructive sleep apnea on CPAP [G47.33, Z99.89] 04/23/2018 Not Applicable     Chronic      Problems Resolved During this Admission:    Diagnosis Date Noted Date Resolved POA    Hyponatremia [E87.1] 07/07/2019 07/08/2019 No    Leukocytosis [D72.829] 07/07/2019 07/09/2019 No    Arrhythmia [I49.9]  07/09/2019 Yes    Cirrhosis [K74.60] 07/06/2019 07/07/2019 Yes    Atrial fibrillation with RVR [I48.91]  07/09/2019 No    Encounter for weaning from ventilator [Z99.11]  07/07/2019 Not Applicable    Cirrhosis [K74.60] 07/03/2019 07/05/2019 Yes       Consults (From admission, onward)        Status Ordering Provider     Inpatient consult to Cardiology  Once     Provider:  (Not yet assigned)    ISA Sanders     Inpatient consult to Endocrinology  Once     Provider:  (Not yet assigned)     Completed MODESTO SANTIAGO     Inpatient consult to Registered Dietitian/Nutritionist  Once     Provider:  (Not yet assigned)    Completed JAIRO PEREIRA     Inpatient consult to Registered Dietitian/Nutritionist  Once     Provider:  (Not yet assigned)    Completed MODESTO SANTIAGO          Pending Diagnostic Studies:     Procedure Component Value Units Date/Time    Freeze and Hold -BB HEP [[id number]] Collected:  07/04/19 0558    Order Status:  Sent Lab Status:  No result     Specimen:  Blood     Freeze and Hold -BB HEP [[id number]] Collected:  07/04/19 0558    Order Status:  Sent Lab Status:  No result     Specimen:  Blood     US Liver Transplant Post [[id number]] Resulted:  07/09/19 1514    Order Status:  Sent Lab Status:  In process Updated:  07/09/19 1601        Significant Diagnostic Studies: Labs:   CMP   Recent Labs   Lab 07/08/19  0500 07/09/19  0627    136   K 3.8 4.0    103   CO2 26 22*   * 152*   BUN 30* 29*   CREATININE 1.0 0.9   CALCIUM 8.5* 8.3*   PROT 6.4 5.5*   ALBUMIN 3.6 3.0*   BILITOT 1.2* 1.2*   ALKPHOS 223* 199*   AST 74* 142*   * 336*   ANIONGAP 13 11   ESTGFRAFRICA >60.0 >60.0   EGFRNONAA >60.0 >60.0   , CBC   Recent Labs   Lab 07/08/19  0500 07/09/19  0627   WBC 13.03* 11.83   HGB 15.2 14.1   HCT 45.9 41.8    147*    and INR   Lab Results   Component Value Date    INR 1.1 07/09/2019    INR 1.1 07/08/2019    INR 1.0 07/07/2019       The patients clinical status was discussed at multidisplinary rounds, involving transplant surgery, transplant medicine, pharmacy, nursing, nutrition, and social work    Discharged Condition: good    Disposition:     Follow Up:    Patient Instructions:   No discharge procedures on file.  Medications:  Reconciled Home Medications:      Medication List      START taking these medications    apixaban 5 mg Tab  Commonly known as:  ELIQUIS  Ila manolo tableta (5 mg en total) por vía oral 2 veces al día.  (Take 1 tablet (5 mg total) by mouth 2 (two) times  "daily.)     bisacodyl 5 mg EC tablet  Commonly known as:  DULCOLAX  Take 2 tablets (10 mg total) by mouth every evening.     blood sugar diagnostic Strp  Test blood glucose 3 (three) times daily.     blood-glucose meter kit  Use as instructed     docusate sodium 100 MG capsule  Commonly known as:  COLACE  Take 1 capsule (100 mg total) by mouth 3 (three) times daily.     insulin aspart U-100 100 unit/mL (3 mL) Inpn pen  Commonly known as:  NovoLOG  Sliding scale: 150-200 +1 unit; 201-250 +2 units; 251-300 +3 units; 301-350 +4 units; >350 +5 uits; TDD: 15 units     lancets Misc  Test blood glucose 3 (three) times daily.     metoprolol tartrate 50 MG tablet  Commonly known as:  LOPRESSOR  Take 1 tablet (50 mg total) by mouth 3 (three) times daily.     mycophenolate 180 MG Tbec  Commonly known as:  MYFORTIC  Take 4 tablets (720 mg total) by mouth 2 (two) times daily.     mycophenolate 250 mg Cap  Commonly known as:  CELLCEPT  Take 4 capsules (1,000 mg total) by mouth 2 (two) times daily.     NIFEdipine 60 MG (OSM) 24 hr tablet  Commonly known as:  PROCARDIA-XL  Crossett manolo tableta (60 mg en total) por vía oral diariamente.  (Take 1 tablet (60 mg total) by mouth once daily.)  Start taking on:  7/10/2019     oxyCODONE 10 mg Tab immediate release tablet  Commonly known as:  ROXICODONE  Take 0.5-1 tablets (5-10 mg total) by mouth every 4 (four) hours as needed.     pen needle, diabetic 32 gauge x 5/32" Ndle  Commonly known as:  EASY COMFORT PEN NEEDLES  Inject 1 each into the skin 3 (three) times daily.     predniSONE 5 MG tablet  Commonly known as:  DELTASONE  7/10-7/16 Take 20mg by mouth once daily;  From 7/17-7/23 take 15mg daily;  From 7/24-7/30 take 10mg daily;  From 7/31-8/6 5mg daily then stop on 8/7/19.     SITagliptin 100 MG Tab  Commonly known as:  JANUVIA  Crossett manolo tableta (100 mg en total) por vía oral diariamente.  (Take 1 tablet (100 mg total) by mouth once daily.)     sulfamethoxazole-trimethoprim 400-80mg " 400-80 mg per tablet  Commonly known as:  BACTRIM,SEPTRA  Take 1 tablet by mouth once daily. Stop 1/2/2020     * tacrolimus 1 MG Cap  Commonly known as:  PROGRAF  Take 6 capsules (6 mg total) by mouth every 12 (twelve) hours.     * tacrolimus 1 MG Cap  Commonly known as:  PROGRAF  Take 3 capsules (3 mg total) by mouth every 12 (twelve) hours.     valGANciclovir 450 mg Tab  Commonly known as:  VALCYTE  Take 1 tablet (450 mg total) by mouth once daily. Stop 10/3/19         * This list has 2 medication(s) that are the same as other medications prescribed for you. Read the directions carefully, and ask your doctor or other care provider to review them with you.            CONTINUE taking these medications    esomeprazole 40 MG capsule  Commonly known as:  NEXIUM  Take 1 capsule (40 mg total) by mouth once daily.        STOP taking these medications    amLODIPine 5 MG tablet  Commonly known as:  NORVASC     busPIRone 5 MG Tab  Commonly known as:  BUSPAR     doxycycline 100 MG tablet  Commonly known as:  VIBRA-TABS     losartan-hydrochlorothiazide 100-25 mg 100-25 mg per tablet  Commonly known as:  HYZAAR     metoprolol succinate 50 MG 24 hr tablet  Commonly known as:  TOPROL-XL     potassium chloride SA 20 MEQ tablet  Commonly known as:  K-DURKLOR-CON     warfarin 5 MG tablet  Commonly known as:  COUMADIN          Time spent caring for patient (Greater than 1/2 spent in direct face-to-face contact): > 30 minutes    Jerri Funes NP  Liver Transplant  [facility]

## 2019-07-09 NOTE — PHYSICIAN QUERY
PT Name: Roman Hodges  MR #: [id number]     PHYSICIAN QUERY -  ELECTROLYTE CLARIFICATION      CDS/: Carolin Ruiz RN              Contact information: [phone]  This form is a permanent document in the medical record.     Query Date: July 9, 2019    By submitting this query, we are merely seeking further clarification of documentation to reflect the severity of illness of your patient. Please utilize your independent clinical judgment when addressing the question(s) below.    The Medical record reflects the following:     Indicators   Supporting Clinical Findings Location in Medical Record   x Lab Value(s) Potassium 3.2-> 3.0-> 3.4-> 3.5 Lab 7/3, 7/4, 7/5, 7/6     x Treatment                                 Medication Potassium chloride IVPB  MAR 7/4, 7/4,7/4, 7/5    Other       Provider, please specify the diagnosis or diagnoses that correspond(s) to the above indicators. Chandana all that apply:    [ x  ] Hypokalemia   [   ] Other electrolyte disturbance (please specify): _______   [   ]  Clinically Undetermined       Please document in your progress notes daily for the duration of treatment until resolved, and include in your discharge summary.

## 2019-07-09 NOTE — PROGRESS NOTES
Met with patient and wife in preparation for discharge today.  Reviewed their answers to the questions in [facility].  Patient and wife missed one question. Rationale for correct answer given.  These questions cover: post op care, medication management, infection prevention and emergency contacts.  Reviewed outpatient appointments.  Allowed time for questions and answers.

## 2019-07-09 NOTE — NURSING
Patient ready for dc home, printed AVS reviewed along with followup appts. Discharge teaching completed by tx coordinator ,  PharmD. And Endo NP. Patient has all needed Rx for home use.Wife instructed on use of glucometer and has been administering  insulin injections with supervision after initial teaching yesterday/ Patient and wife verbalize undretsanding of dc instructions.

## 2019-07-09 NOTE — CONSULTS
[facility]  Cardiology  Consult Note    Patient Name: Roman Hodges  MRN: [id number]  Admission Date: 7/3/2019  Hospital Length of Stay: 6 days  Code Status: Full Code   Attending Provider: Oscar Altman Jr., MD   Consulting Provider: Young Estrella MD  Primary Care Physician: Jose Angel Munson MD  Principal Problem:S/P liver transplant    Patient information was obtained from patient and ER records.     Consults  Subjective:     Chief Complaint:  Afib     HPI:   59 M with PMH of pAF on eliquis CHADSVASC 3, HFrEF 30% (improved to 55% on last TTE), Non obstructive CAD, DM, HTN, Cirrhosis s/p Liver tx POD 4 who went into Afib w RVR HR >140s, BP stable today. He initially went into Afib around 2 AM on 7/7 and spontaenously converted to NSR at 12 noon same day. No chest pain or SOB.  Cardiolgoy consulted for management.    Interim History: Patient is doing well overnight with no new symptoms or complaints. He has remained in normal sinus rhythm and has no chest pain, shortness of breath, or other complaints.    No new subjective & objective note has been filed under this hospital service since the last note was generated.    Assessment and Plan:     Paroxysmal atrial fibrillation  Patient Remains in normal sinus rhythm since noon on 7/7  - CHADSVASC- 3, TSH normal  - Echo shows EF of 55%, mild aortic valve sclerosis, and normal left atrial size  - Restart home Losartan 40 mg PO for continued BP control        VTE Risk Mitigation (From admission, onward)        Ordered     apixaban tablet 5 mg  2 times daily      07/08/19 1334     IP VTE HIGH RISK PATIENT  Once      07/06/19 1455     Place GLORY hose  Until discontinued      07/06/19 1455     Place sequential compression device  Until discontinued      07/06/19 1455     Send SCD stockings and GLORY hose with patient to OR  Continuous      07/03/19 9184          Thank you for your consult. I will sign off. Please contact us if you have any  additional questions.    Young Estrella MD  Cardiology   [facility]

## 2019-07-09 NOTE — PHYSICIAN QUERY
"PT Name: Roman Hodges  MR #: [id number]    Physician Query Form - Hematology Clarification      CDS/: Carolin Ruiz RN              Contact information: [phone]    This form is a permanent document in the medical record.      Query Date: July 9, 2019    By submitting this query, we are merely seeking further clarification of documentation. Please utilize your independent clinical judgment when addressing the question(s) below.    The Medical record contains the following:   Indicators  Supporting Clinical Findings Location in Medical Record    "Anemia" documented     x H & H = Hct 43--> 36--> 41    Hct 16.4-> 12.9-> 14.4  Hgb 47.6-> 36.9-> 43   Point of care 7/4    Lab 7/4   x BP =                     HR= Signs (24h Range):    Pulse:  [] 89    BP: (121-179)/(57-91) 141/73    Arterial Line BP: (114-140)/(54-78) 118/78    H&P 7/4       Critical Care Surgery    "GI bleeding" documented     x Acute bleeding (Non GI site) Principal Procedure Perfomed:       Orthotopic Liver Transplant     Estimated B lood Loss:  1,200 mL    Op note 7/4       Orthotopic Liver Transplant   Dr Altman   x Transfusion(s) 1 unit FFP   Blood Transfusion flowsheet  7/4   x Treatment: Fluids Administered:        Crystalloid (mL)    3,500         FFP (Units)    1         Cell Saver (CCs)    262   Op note 7/4       Orthotopic Liver Transplant   Dr. Altman    Other:        Provider, please specify diagnosis or diagnoses associated with above clinical findings.    [ x ] Acute blood loss anemia expected post-operatively       [  ] Other Hematological Diagnosis (please specify):     [  ] Clinically Undetermined       Please document in your progress notes daily for the duration of treatment, until resolved, and include in your discharge summary.                                                                                                      "

## 2019-07-09 NOTE — PROGRESS NOTES
"[facility]  Endocrinology  Progress Note    Admit Date: 7/3/2019     Reason for Consult: Management of T2DM, Hyperglycemia     Surgical Procedure and Date: Liver transplant 19    Diabetes diagnosis year:     Home Diabetes Medications:  none  Lab Results   Component Value Date    HGBA1C 5.5 2019       How often checking glucose at home? Not checking       Diabetes Complications include:     none    Complicating diabetes co morbidities:   CIRRHOSIS and Glucocorticoid use       HPI:   Patient is a 59 y.o. 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 Patient admitted for liver transplant. Endocrinology consulted for BG/ DM management.                       Interval HPI:   Overnight events: Remains in TSU, NAEON. BG fairly well controlled with scheduled mealtime insulin plus correction scale. Solumedrol 20 mg BID; standard steroid taper per primary.  Possible discharge today per primary team.  Eatin%  Nausea: No  Hypoglycemia and intervention: No  Fever: No  TPN and/or TF: No    /80   Pulse 74   Temp 98.3 °F (36.8 °C) (Oral)   Resp 16   Ht 5' 7" (1.702 m)   Wt 93.6 kg (206 lb 3.9 oz)   SpO2 95%   BMI 32.30 kg/m²      Labs Reviewed and Include    No results for input(s): GLU, CALCIUM, ALBUMIN, PROT, NA, K, CO2, CL, BUN, CREATININE, ALKPHOS, ALT, AST, BILITOT in the last 24 hours.  Lab Results   Component Value Date    WBC 13.67 (H) 2019    HGB 15.4 2019    HCT 44.9 2019    MCV 85 2019     2019     Recent Labs   Lab 19  1313   TSH 1.373     Lab Results   Component Value Date    HGBA1C 5.5 2019       Nutritional status:   Body mass index is 32.3 kg/m².  Lab Results   Component Value Date    ALBUMIN 3.8 2019    ALBUMIN 3.8 2019    ALBUMIN 4.0 2019     No results found for: PREALBUMIN    Estimated Creatinine Clearance: 85.7 mL/min (based on SCr of 1 " mg/dL).    Accu-Checks  Recent Labs     07/05/19  2106 07/06/19  0805 07/06/19  1124 07/06/19  1643 07/06/19  1839 07/06/19  2106 07/07/19  0731 07/07/19  1229 07/07/19  1653 07/07/19  2119   POCTGLUCOSE 194* 176* 192* 217* 206* 208* 250* 289* 264* 330*       Current Medications and/or Treatments Impacting Glycemic Control  Immunotherapy:    Immunosuppressants         Stop Route Frequency     mycophenolate capsule 1,000 mg      -- Oral 2 times daily        Steroids:   Hormones (From admission, onward)    Start     Stop Route Frequency Ordered    07/10/19 0900  predniSONE tablet 20 mg  (methylprednisolone taper panel)      -- Oral Daily 07/04/19 0555    07/09/19 0900  methylPREDNISolone sodium succinate injection 20 mg  (methylprednisolone taper panel)      07/10 0859 IV Every 12 hours 07/04/19 0555    07/08/19 0900  methylPREDNISolone sodium succinate injection 40 mg  (methylprednisolone taper panel)      07/09 0859 IV Every 12 hours 07/04/19 0555        Pressors:    Autonomic Drugs (From admission, onward)    Start     Stop Route Frequency Ordered    07/07/19 0900  metoprolol tartrate split tablet 37.5 mg      -- Oral 3 times daily 07/07/19 0238        Hyperglycemia/Diabetes Medications:   Antihyperglycemics (From admission, onward)    Start     Stop Route Frequency Ordered    07/07/19 0730  insulin aspart U-100 pen 4 Units      -- SubQ 3 times daily with meals 07/07/19 0721    07/05/19 1145  insulin aspart U-100 pen 1-10 Units      -- SubQ Before meals & nightly PRN 07/05/19 1045          ASSESSMENT and PLAN    * S/P liver transplant  Managed per LTS  avoid hypoglycemia  Lab Results   Component Value Date     (H) 07/07/2019     (H) 07/07/2019     (H) 07/06/2019    ALKPHOS 205 (H) 07/07/2019    BILITOT 1.5 (H) 07/07/2019           Type 2 diabetes mellitus, without long-term current use of insulin  BG goal 140 - 180     Novolog 8 units with meals, consider reducing today secondary to steroid  taper  Moderate dose correction scale - due to IV steroids  BG monitoring AC/HS    Discharge planning: Recommend patient discharge on Januvia 100 mg PO daily and Novolog correction scale 150/50.  Patient has no history of pancreatitis or medullary thyroid cancer.  Provided BG logs with dosing instructions at bedside.  Reviewed topics related to DM including: the need for insulin, how insulin works, what makes it a high risk medication, the importance of follow up with endocrine provider, importance of and how to check BG, how to record BG on logs, how to administer insulin, appropriate insulin administration sites, importance of rotating injection sites, hyper/hypoglycemia, how and when to treat hypoglycemia, when to hold insulin, how the correction scale works, and when to seek medical attention.  Patient verbalized understanding, answered all questions to patient's satisfaction.  Will setup patient for hospital discharge follow up appointment.        Adrenal cortical steroids causing adverse effect in therapeutic use  Glucocorticoids markedly increase glucose. Expect the steroid taper will help glucose control.       Prophylactic immunotherapy  May increase insulin resistance.         Obesity (BMI 30.0-34.9)  May increase insulin resistance.   Body mass index is 33.91 kg/m².            Tobi Hackett, JOE  Endocrinology  [facility]

## 2019-07-09 NOTE — PROGRESS NOTES
"[facility]  Adult Nutrition  Progress Note    SUMMARY       Recommendations    Recommendation/Intervention: 1.) Continue diabetic diet. 2.) If PO intakes <50% of meals x 48hr, suggest Boost Glucose Control BID. 3.) Daily weights. 4.) Updated hand  strength.   Goals: 1.) Pt to consume/tolerate >75% EEN and EPN by follow up. 2.) Pt to maintain current body wt (+/-10% of 93.4 kg) during admit.   Nutrition Goal Status: goal met  Communication of RD Recs: discussed on rounds(POC)    Reason for Assessment    Reason For Assessment: RD follow-up  Diagnosis: transplant/postoperative complications(s/p OLTx 19)  Relevant Medical History: HTN, DM2, GERD, A Fib, HLD, hepatocellular cancer  Interdisciplinary Rounds: attended  General Information Comments: Pt sitting up in chair with wife to bedside. Pt reports good appetite, consuming ~100% of meals. He denies GI distress or chew/swallowing issues. NKFA. NFPE completed at bedside. Mild temporal wasting, otherwise appears well nourished.   Nutrition Discharge Planning: Post transplant nutrition education provided. Food safety/drug interactions emphasized. General healthy/low salt diet recommended. RD name/contact information, education material left.  No other needs identified. Caregiver present.      Nutrition Risk Screen    Nutrition Risk Screen: no indicators present    Nutrition/Diet History    Spiritual, Cultural Beliefs, Christian Practices, Values that Affect Care: no    Anthropometrics    Temp: 98 °F (36.7 °C)  Height Method: Stated  Height: 5' 7" (170.2 cm)  Height (inches): 67 in  Weight Method: Standard Scale  Weight: 93.4 kg (206 lb)  Weight (lb): 206 lb  Ideal Body Weight (IBW), Male: 148 lb  % Ideal Body Weight, Male (lb): 139.19 lb  BMI (Calculated): 32.3  Usual Body Weight (UBW), k.4 kg(19)  % Usual Body Weight: 102.73       Lab/Procedures/Meds    Pertinent Labs Reviewed: reviewed  Pertinent Labs Comments: BUN 29, Glu 152, Ca " 8.3, , , , HbA1C 5.5  Pertinent Medications Reviewed: reviewed  Pertinent Medications Comments: bisacodyl, docusate, insulin, methylprednisolone, mycophenolate, statin, tacrolimus      Estimated/Assessed Needs    Weight Used For Calorie Calculations: 93.4 kg (205 lb 14.6 oz)  Energy Calorie Requirements (kcal): 2129  Energy Need Method: Pitt-St Jeor(x1.25 PAL)  Protein Requirements: 101-135(g/day)  Weight Used For Protein Calculations: 67.2 kg (148 lb 2.4 oz)(1.5-2.0 g/kg of IBW)     Estimated Fluid Requirement Method: RDA Method(or per MD)  RDA Method (mL): 2129  CHO Requirement: 50% total kcals      Nutrition Prescription Ordered    Current Diet Order: diabetic    Evaluation of Received Nutrient/Fluid Intake    Other Calories (kcal): 0  I/O: -0.8L sicne admit  Energy Calories Required: not meeting needs  Protein Required: not meeting needs  Comments: LBM 7/8  % Intake of Estimated Energy Needs: 75 - 100 %  % Meal Intake: 75 - 100 %    Nutrition Risk    Level of Risk/Frequency of Follow-up: low     Assessment and Plan  Nutrition Problem  Increased nutrient needs     Related to (etiology):   Increased demand for protein     Signs and Symptoms (as evidenced by):   S/p OLTx 7/4/19     Interventions/Recommendations (treatment strategy):  Collaboration with providers & referral of care; post-transplant diet education (7/9/19)     Nutrition Diagnosis Status:   Continues    Monitor and Evaluation    Food and Nutrient Intake: energy intake, food and beverage intake  Food and Nutrient Adminstration: diet order  Knowledge/Beliefs/Attitudes: food and nutrition knowledge/skill  Physical Activity and Function: nutrition-related ADLs and IADLs  Anthropometric Measurements: weight, weight change, body mass index  Biochemical Data, Medical Tests and Procedures: electrolyte and renal panel, gastrointestinal profile, glucose/endocrine profile, inflammatory profile, lipid profile  Nutrition-Focused Physical  Findings: overall appearance     Malnutrition Assessment                 Orbital Region (Subcutaneous Fat Loss): well nourished  Upper Arm Region (Subcutaneous Fat Loss): well nourished   Advent Region (Muscle Loss): mild depletion  Clavicle Bone Region (Muscle Loss): well nourished  Clavicle and Acromion Bone Region (Muscle Loss): well nourished  Dorsal Hand (Muscle Loss): well nourished  Anterior Thigh Region (Muscle Loss): well nourished  Posterior Calf Region (Muscle Loss): well nourished   Edema (Fluid Accumulation): 0-->no edema present   Subcutaneous Fat Loss (Final Summary): well nourished  Muscle Loss Evaluation (Final Summary): well nourished         Nutrition Follow-Up    RD Follow-up?: Yes

## 2019-07-09 NOTE — PLAN OF CARE
Problem: Adult Inpatient Plan of Care  Goal: Plan of Care Review  Recommendations     Recommendation/Intervention: 1.) Continue diabetic diet. 2.) If PO intakes <50% of meals x 48hr, suggest Boost Glucose Control BID. 3.) Daily weights. 4.) Updated hand  strength.   Goals: 1.) Pt to consume/tolerate >75% EEN and EPN by follow up. 2.) Pt to maintain current body wt (+/-10% of 93.4 kg) during admit.   Nutrition Goal Status: goal met  Communication of RD Recs: discussed on rounds(POC)       Assessment and Plan  Nutrition Problem  Increased nutrient needs     Related to (etiology):   Increased demand for protein     Signs and Symptoms (as evidenced by):   S/p OLTx 7/4/19     Interventions/Recommendations (treatment strategy):  Collaboration with providers & referral of care; post-transplant diet education (7/9/19)     Nutrition Diagnosis Status:   Continues

## 2019-07-10 ENCOUNTER — CLINICAL SUPPORT (OUTPATIENT)
Dept: TRANSPLANT | Facility: CLINIC | Age: 59
End: 2019-07-10
Payer: COMMERCIAL

## 2019-07-10 ENCOUNTER — PATIENT MESSAGE (OUTPATIENT)
Dept: TRANSPLANT | Facility: CLINIC | Age: 59
End: 2019-07-10

## 2019-07-10 ENCOUNTER — LAB VISIT (OUTPATIENT)
Dept: LAB | Facility: HOSPITAL | Age: 59
End: 2019-07-10
Attending: SURGERY
Payer: COMMERCIAL

## 2019-07-10 ENCOUNTER — TELEPHONE (OUTPATIENT)
Dept: TRANSPLANT | Facility: HOSPITAL | Age: 59
End: 2019-07-10

## 2019-07-10 ENCOUNTER — TELEPHONE (OUTPATIENT)
Dept: TRANSPLANT | Facility: CLINIC | Age: 59
End: 2019-07-10

## 2019-07-10 VITALS
HEIGHT: 67 IN | OXYGEN SATURATION: 97 % | WEIGHT: 203.5 LBS | HEART RATE: 80 BPM | BODY MASS INDEX: 31.94 KG/M2 | SYSTOLIC BLOOD PRESSURE: 117 MMHG | RESPIRATION RATE: 18 BRPM | HEART RATE: 80 BPM | TEMPERATURE: 99 F | DIASTOLIC BLOOD PRESSURE: 83 MMHG | SYSTOLIC BLOOD PRESSURE: 117 MMHG | TEMPERATURE: 99 F | DIASTOLIC BLOOD PRESSURE: 83 MMHG | RESPIRATION RATE: 18 BRPM | WEIGHT: 203.5 LBS | OXYGEN SATURATION: 97 % | HEIGHT: 67 IN | BODY MASS INDEX: 31.94 KG/M2

## 2019-07-10 DIAGNOSIS — Z94.4 LIVER REPLACED BY TRANSPLANT: Primary | ICD-10-CM

## 2019-07-10 DIAGNOSIS — Z94.4 LIVER REPLACED BY TRANSPLANT: ICD-10-CM

## 2019-07-10 LAB
ALBUMIN SERPL BCP-MCNC: 3.2 G/DL (ref 3.5–5.2)
ALP SERPL-CCNC: 354 U/L (ref 55–135)
ALT SERPL W/O P-5'-P-CCNC: 741 U/L (ref 10–44)
ANION GAP SERPL CALC-SCNC: 10 MMOL/L (ref 8–16)
AST SERPL-CCNC: 388 U/L (ref 10–40)
BASOPHILS NFR BLD: 0 % (ref 0–1.9)
BILIRUB DIRECT SERPL-MCNC: 1.5 MG/DL (ref 0.1–0.3)
BILIRUB SERPL-MCNC: 2.4 MG/DL (ref 0.1–1)
BUN SERPL-MCNC: 28 MG/DL (ref 6–20)
CALCIUM SERPL-MCNC: 8.7 MG/DL (ref 8.7–10.5)
CHLORIDE SERPL-SCNC: 103 MMOL/L (ref 95–110)
CO2 SERPL-SCNC: 23 MMOL/L (ref 23–29)
CREAT SERPL-MCNC: 1.1 MG/DL (ref 0.5–1.4)
DIFFERENTIAL METHOD: ABNORMAL
EOSINOPHIL NFR BLD: 3 % (ref 0–8)
ERYTHROCYTE [DISTWIDTH] IN BLOOD BY AUTOMATED COUNT: 13.4 % (ref 11.5–14.5)
EST. GFR  (AFRICAN AMERICAN): >60 ML/MIN/1.73 M^2
EST. GFR  (NON AFRICAN AMERICAN): >60 ML/MIN/1.73 M^2
GLUCOSE SERPL-MCNC: 141 MG/DL (ref 70–110)
HCT VFR BLD AUTO: 45.3 % (ref 40–54)
HGB BLD-MCNC: 15.5 G/DL (ref 14–18)
IMM GRANULOCYTES # BLD AUTO: ABNORMAL K/UL (ref 0–0.04)
IMM GRANULOCYTES NFR BLD AUTO: ABNORMAL % (ref 0–0.5)
LYMPHOCYTES NFR BLD: 9 % (ref 18–48)
MCH RBC QN AUTO: 29.5 PG (ref 27–31)
MCHC RBC AUTO-ENTMCNC: 34.2 G/DL (ref 32–36)
MCV RBC AUTO: 86 FL (ref 82–98)
MONOCYTES NFR BLD: 5 % (ref 4–15)
MYELOCYTES NFR BLD MANUAL: 2 %
NEUTROPHILS NFR BLD: 81 % (ref 38–73)
NRBC BLD-RTO: 0 /100 WBC
PLATELET # BLD AUTO: 179 K/UL (ref 150–350)
PLATELET BLD QL SMEAR: ABNORMAL
PMV BLD AUTO: 10.5 FL (ref 9.2–12.9)
POTASSIUM SERPL-SCNC: 4 MMOL/L (ref 3.5–5.1)
PROT SERPL-MCNC: 6.1 G/DL (ref 6–8.4)
RBC # BLD AUTO: 5.26 M/UL (ref 4.6–6.2)
SODIUM SERPL-SCNC: 136 MMOL/L (ref 136–145)
TACROLIMUS BLD-MCNC: 5.1 NG/ML (ref 5–15)
WBC # BLD AUTO: 12.17 K/UL (ref 3.9–12.7)

## 2019-07-10 PROCEDURE — 80053 COMPREHEN METABOLIC PANEL: CPT

## 2019-07-10 PROCEDURE — 99999 PR PBB SHADOW E&M-EST. PATIENT-LVL IV: ICD-10-PCS | Mod: PBBFAC,,,

## 2019-07-10 PROCEDURE — 85027 COMPLETE CBC AUTOMATED: CPT

## 2019-07-10 PROCEDURE — 80197 ASSAY OF TACROLIMUS: CPT

## 2019-07-10 PROCEDURE — 85007 BL SMEAR W/DIFF WBC COUNT: CPT

## 2019-07-10 PROCEDURE — 99999 PR PBB SHADOW E&M-EST. PATIENT-LVL IV: CPT | Mod: PBBFAC,,,

## 2019-07-10 PROCEDURE — 82248 BILIRUBIN DIRECT: CPT

## 2019-07-10 PROCEDURE — 36415 COLL VENOUS BLD VENIPUNCTURE: CPT

## 2019-07-10 RX ORDER — TACROLIMUS 1 MG/1
5 CAPSULE ORAL EVERY 12 HOURS
Qty: 300 CAPSULE | Refills: 11 | Status: CANCELLED | OUTPATIENT
Start: 2019-07-10

## 2019-07-10 NOTE — TELEPHONE ENCOUNTER
1ST NURSING VISIT POST DISCHARGE NOTE    1st RN appointment with Roman Hodges post discharge 7/9/19   s/p liver transplant 7/4/19.  Patient's spouse accompanied him.  Upon assessment, patient complains of none.  Incision intact with staples.  Patient that he is able to explain daily incision care and showering instructions.  Medication list and rationale were reviewed.  Patient states  did bring blue medication card and medication bottles for review.  Self-assessment reviewed with patient and wife; time allowed for questions.  Patient expressed understanding of daily care including BID VS and medications.  Patient aware that nurse will review today's labs with a transplant physician and call with any does changes indicated.  Next labs and first post-operative transplant team appointment with labs scheduled for 7/10/19 review pending.

## 2019-07-10 NOTE — PROGRESS NOTES
Clinic Note: First Return to Clinic Post-  Liver Transplant    Roman Hodges  is a 59 y.o. male  S/p   LIVER transplant on 7/4/2019 (Liver) for Primary Liver Malignancy: Hepatoma (HCC) and Cirrhosis.      Discharge Course (Issues/Concerns): Patient complained of pain overnight.  Patient's wife did not give the patient pain meds or water overnight because of fasting labs. Explained to wife and patient that pain meds and water are okay overnight if you have labs the next day, just no other meds until labs have been taken.     Objective:   Vitals:    07/10/19 0934   BP: 117/83   Pulse: 80   Resp: 18   Temp: 98.8 °F (37.1 °C)       Met with patient and his caregiver in the clinic to review current medication list.     Current Outpatient Medications   Medication Sig Dispense Refill    apixaban (ELIQUIS) 5 mg Tab Take 1 tablet (5 mg total) by mouth 2 (two) times daily. 60 tablet 11    bisacodyl (DULCOLAX) 5 mg EC tablet Take 2 tablets (10 mg total) by mouth every evening.  0    blood sugar diagnostic Strp Test blood glucose 3 (three) times daily. 100 each 11    blood-glucose meter kit Use as instructed 1 each 0    docusate sodium (COLACE) 100 MG capsule Take 1 capsule (100 mg total) by mouth 3 (three) times daily.  0    esomeprazole (NEXIUM) 40 MG capsule Take 1 capsule (40 mg total) by mouth once daily. 90 capsule 1    insulin aspart U-100 (NOVOLOG) 100 unit/mL (3 mL) InPn pen Sliding scale: 150-200 +1 unit; 201-250 +2 units; 251-300 +3 units; 301-350 +4 units; >350 +5 uits; TDD: 15 units 15 mL 0    lancets Misc Test blood glucose 3 (three) times daily. 100 each 11    metoprolol tartrate (LOPRESSOR) 50 MG tablet Take 1 tablet (50 mg total) by mouth 3 (three) times daily. 90 tablet 11    mycophenolate (CELLCEPT) 250 mg Cap Take 4 capsules (1,000 mg total) by mouth 2 (two) times daily. 240 capsule 2    mycophenolate (MYFORTIC) 180 MG TbEC Take 4 tablets (720 mg total) by mouth 2 (two) times daily. 240 tablet 0  "   NIFEdipine (PROCARDIA-XL) 60 MG (OSM) 24 hr tablet Take 1 tablet (60 mg total) by mouth once daily. 30 tablet 11    oxyCODONE (ROXICODONE) 10 mg Tab immediate release tablet Take 0.5-1 tablets (5-10 mg total) by mouth every 4 (four) hours as needed. 30 tablet 0    pen needle, diabetic (EASY COMFORT PEN NEEDLES) 32 gauge x 5/32" Ndle Inject 1 each into the skin 3 (three) times daily. 100 each 11    predniSONE (DELTASONE) 5 MG tablet 7/10-7/16 Take 20mg by mouth once daily;  From 7/17-7/23 take 15mg daily;  From 7/24-7/30 take 10mg daily;  From 7/31-8/6 5mg daily then stop on 8/7/19. 70 tablet 0    SITagliptin (JANUVIA) 100 MG Tab Take 1 tablet (100 mg total) by mouth once daily. 90 tablet 3    sulfamethoxazole-trimethoprim 400-80mg (BACTRIM,SEPTRA) 400-80 mg per tablet Take 1 tablet by mouth once daily. Stop 1/2/2020 30 tablet 5    tacrolimus (PROGRAF) 1 MG Cap Take 6 capsules (6 mg total) by mouth every 12 (twelve) hours. 360 capsule 11    tacrolimus (PROGRAF) 1 MG Cap Take 3 capsules (3 mg total) by mouth every 12 (twelve) hours. 30 capsule 0    valGANciclovir (VALCYTE) 450 mg Tab Take 1 tablet (450 mg total) by mouth once daily. Stop 10/3/19 30 tablet 2     No current facility-administered medications for this visit.      Facility-Administered Medications Ordered in Other Visits   Medication Dose Route Frequency Provider Last Rate Last Dose    0.9%  NaCl infusion   Intravenous Continuous Bony Saavedra MD   Stopped at 01/15/19 1332    sodium chloride 0.9% flush 3 mL  3 mL Intravenous PRN Bony Saavedra MD           Pharmacy Interventions/Recommendations:     1) Graft Function & Immunosuppression Issues:   LFTs and Tbili went up today.  Could be from a low FK level - await level from today.   Immunos needs to come from [facility] - patient received a 5 day supply of prograf and 30 day voucher for [employer].    2) Opportunistic Infection prophylaxis:   PCP ppx: Bactrim until 1/2/2020  CMV ppx: " Valcyte until 10/3/19  Fungal ppx: N/A    3) Donor Serologies & Monitoring:     Donor CMV Status: Negative  Donor HCV Status: Negative  Donor HBcAb: Negative  Donor HBV MARIA TERESA: Negative  Donor HCV MARIA TERESA: Negative      4) Pain Management & Bowel Regimen: Patient in significant pain -- recommended starting tylenol 500 mg q6 scheduled and take the oxycodone for breakthrough pain.  Patient encouraged to start dulcolax for constipation as he is taking all these pain meds.     5) Blood Pressure Management: Patient's BP is under control and he is on Metoprolol 50 mg TID and Nifedipine 60 mg Daily    6) Blood Sugar Management & Follow-up: BS are well under control and patient is on Januvia + sliding scale insulin.  Diabetes appointment scheduled for July 29th.     7) Electrolyte Management: Lytes WNL.     8) OTHER medication follow-up (patient assistance, held medications, etc): Patient needs to prograf and cellcept from [employer] [facility].  Patient will run out of prograf in 5 days.  If [employer] does not send shipment by Friday, patient will need to obtain another 5 day supply for cash from [employer].     9) Reinforced medication education conducted in the hospital, including medication indications, dosing, administration, side effects, monitoring-- including timing of immunosuppressant levels.     Patient received their FIRST fill of medications from [employer].  Discussed the process for obtaining refills of medications, including verifying the dose and mailing address to have medications delivered.     Roman and his caregivers verbalized understanding and had the opportunity to ask questions.

## 2019-07-10 NOTE — TELEPHONE ENCOUNTER
Discharge Note:    SW spoke with patient's wife Kati this morning via phone ([phone]), as patient was discharged late yesterday evening after the pharmacy was able to fill all medications.   Pts wife reports pt alert and oriented x 4 this am, but in some pain and discomfort.   Pt and pts wife were in agreement with all dc plans for yesterday.    Pts wife reprots they discharged to their home locally.  Pt with no coping issues at this time.  Pt with no psychosocial needs for dc at this time.   Pts wife reports that pt will be seen in clinic this morning.  SW remains available for all transplant resources, education and psychosocial support.

## 2019-07-10 NOTE — TELEPHONE ENCOUNTER
----- Message from Darshan Graves MD sent at 7/10/2019  2:35 PM CDT -----  fk 5 bid  Labs tomorrow

## 2019-07-11 ENCOUNTER — LAB VISIT (OUTPATIENT)
Dept: LAB | Facility: HOSPITAL | Age: 59
End: 2019-07-11
Attending: SURGERY
Payer: COMMERCIAL

## 2019-07-11 DIAGNOSIS — Z94.4 LIVER REPLACED BY TRANSPLANT: ICD-10-CM

## 2019-07-11 LAB
ALBUMIN SERPL BCP-MCNC: 2.9 G/DL (ref 3.5–5.2)
ALP SERPL-CCNC: 235 U/L (ref 55–135)
ALT SERPL W/O P-5'-P-CCNC: 408 U/L (ref 10–44)
ANION GAP SERPL CALC-SCNC: 8 MMOL/L (ref 8–16)
ANISOCYTOSIS BLD QL SMEAR: SLIGHT
AST SERPL-CCNC: 43 U/L (ref 10–40)
BASOPHILS NFR BLD: 0 % (ref 0–1.9)
BILIRUB DIRECT SERPL-MCNC: 0.5 MG/DL (ref 0.1–0.3)
BILIRUB SERPL-MCNC: 0.9 MG/DL (ref 0.1–1)
BUN SERPL-MCNC: 27 MG/DL (ref 6–20)
CALCIUM SERPL-MCNC: 8.5 MG/DL (ref 8.7–10.5)
CHLORIDE SERPL-SCNC: 102 MMOL/L (ref 95–110)
CO2 SERPL-SCNC: 26 MMOL/L (ref 23–29)
CREAT SERPL-MCNC: 1.2 MG/DL (ref 0.5–1.4)
DIFFERENTIAL METHOD: ABNORMAL
DOHLE BOD BLD QL SMEAR: PRESENT
EOSINOPHIL NFR BLD: 4 % (ref 0–8)
ERYTHROCYTE [DISTWIDTH] IN BLOOD BY AUTOMATED COUNT: 13.8 % (ref 11.5–14.5)
EST. GFR  (AFRICAN AMERICAN): >60 ML/MIN/1.73 M^2
EST. GFR  (NON AFRICAN AMERICAN): >60 ML/MIN/1.73 M^2
GIANT PLATELETS BLD QL SMEAR: PRESENT
GLUCOSE SERPL-MCNC: 124 MG/DL (ref 70–110)
HCT VFR BLD AUTO: 43.1 % (ref 40–54)
HGB BLD-MCNC: 14.4 G/DL (ref 14–18)
IMM GRANULOCYTES # BLD AUTO: ABNORMAL K/UL (ref 0–0.04)
IMM GRANULOCYTES NFR BLD AUTO: ABNORMAL % (ref 0–0.5)
LYMPHOCYTES NFR BLD: 8 % (ref 18–48)
MCH RBC QN AUTO: 29.5 PG (ref 27–31)
MCHC RBC AUTO-ENTMCNC: 33.4 G/DL (ref 32–36)
MCV RBC AUTO: 88 FL (ref 82–98)
MONOCYTES NFR BLD: 4 % (ref 4–15)
NEUTROPHILS NFR BLD: 83 % (ref 38–73)
NEUTS BAND NFR BLD MANUAL: 1 %
NRBC BLD-RTO: 0 /100 WBC
PLATELET # BLD AUTO: 190 K/UL (ref 150–350)
PLATELET BLD QL SMEAR: ABNORMAL
PMV BLD AUTO: 10.7 FL (ref 9.2–12.9)
POTASSIUM SERPL-SCNC: 3.9 MMOL/L (ref 3.5–5.1)
PROT SERPL-MCNC: 5.6 G/DL (ref 6–8.4)
RBC # BLD AUTO: 4.88 M/UL (ref 4.6–6.2)
SODIUM SERPL-SCNC: 136 MMOL/L (ref 136–145)
TACROLIMUS BLD-MCNC: 5.5 NG/ML (ref 5–15)
TOXIC GRANULES BLD QL SMEAR: PRESENT
WBC # BLD AUTO: 12.71 K/UL (ref 3.9–12.7)

## 2019-07-11 PROCEDURE — 85007 BL SMEAR W/DIFF WBC COUNT: CPT

## 2019-07-11 PROCEDURE — 80053 COMPREHEN METABOLIC PANEL: CPT

## 2019-07-11 PROCEDURE — 85027 COMPLETE CBC AUTOMATED: CPT

## 2019-07-11 PROCEDURE — 80197 ASSAY OF TACROLIMUS: CPT

## 2019-07-11 PROCEDURE — 82248 BILIRUBIN DIRECT: CPT

## 2019-07-11 PROCEDURE — 36415 COLL VENOUS BLD VENIPUNCTURE: CPT

## 2019-07-12 RX ORDER — TACROLIMUS 1 MG/1
6 CAPSULE ORAL EVERY 12 HOURS
Qty: 360 CAPSULE | Refills: 11 | Status: SHIPPED | OUTPATIENT
Start: 2019-07-12 | End: 2019-07-15 | Stop reason: DRUGHIGH

## 2019-07-13 ENCOUNTER — PATIENT MESSAGE (OUTPATIENT)
Dept: TRANSPLANT | Facility: CLINIC | Age: 59
End: 2019-07-13

## 2019-07-13 ENCOUNTER — NURSE TRIAGE (OUTPATIENT)
Dept: ADMINISTRATIVE | Facility: CLINIC | Age: 59
End: 2019-07-13

## 2019-07-13 NOTE — TELEPHONE ENCOUNTER
"Feels like he is having "hot flashes".   @1335 b/p 103/73 pulse 81  This morning 115/84   97.7 temp  Wife states that metoprolol was changed recently to metoprolol 50 mg tid  Spoke to Dr. Cooper who advised this seemed like to much medication. He recommended holding the medication until Roman can see the doctor on Monday. Advised wife to call tx coordinator on Monday to get appt and follow up.      Reason for Disposition   Caller has urgent post-op question and triager unable to answer question    Protocols used: POST-OP SYMPTOMS AND [id number]    bpa 16  Liver tx 7/4/19  "

## 2019-07-15 ENCOUNTER — LAB VISIT (OUTPATIENT)
Dept: LAB | Facility: HOSPITAL | Age: 59
End: 2019-07-15
Attending: SURGERY
Payer: COMMERCIAL

## 2019-07-15 DIAGNOSIS — I48.91 ATRIAL FIBRILLATION, UNSPECIFIED TYPE: ICD-10-CM

## 2019-07-15 DIAGNOSIS — Z94.4 LIVER REPLACED BY TRANSPLANT: ICD-10-CM

## 2019-07-15 DIAGNOSIS — Z94.4 LIVER REPLACED BY TRANSPLANT: Primary | ICD-10-CM

## 2019-07-15 LAB
ALBUMIN SERPL BCP-MCNC: 3.3 G/DL (ref 3.5–5.2)
ALP SERPL-CCNC: 199 U/L (ref 55–135)
ALT SERPL W/O P-5'-P-CCNC: 136 U/L (ref 10–44)
ANION GAP SERPL CALC-SCNC: 11 MMOL/L (ref 8–16)
AST SERPL-CCNC: 44 U/L (ref 10–40)
BASOPHILS # BLD AUTO: 0.1 K/UL (ref 0–0.2)
BASOPHILS NFR BLD: 0.7 % (ref 0–1.9)
BILIRUB DIRECT SERPL-MCNC: 0.3 MG/DL (ref 0.1–0.3)
BILIRUB SERPL-MCNC: 0.6 MG/DL (ref 0.1–1)
BUN SERPL-MCNC: 28 MG/DL (ref 6–20)
CALCIUM SERPL-MCNC: 9 MG/DL (ref 8.7–10.5)
CHLORIDE SERPL-SCNC: 103 MMOL/L (ref 95–110)
CO2 SERPL-SCNC: 23 MMOL/L (ref 23–29)
CREAT SERPL-MCNC: 1.5 MG/DL (ref 0.5–1.4)
DIFFERENTIAL METHOD: ABNORMAL
EOSINOPHIL # BLD AUTO: 0.2 K/UL (ref 0–0.5)
EOSINOPHIL NFR BLD: 1.5 % (ref 0–8)
ERYTHROCYTE [DISTWIDTH] IN BLOOD BY AUTOMATED COUNT: 14.3 % (ref 11.5–14.5)
EST. GFR  (AFRICAN AMERICAN): 57.7 ML/MIN/1.73 M^2
EST. GFR  (NON AFRICAN AMERICAN): 49.9 ML/MIN/1.73 M^2
GLUCOSE SERPL-MCNC: 109 MG/DL (ref 70–110)
HCT VFR BLD AUTO: 40.8 % (ref 40–54)
HGB BLD-MCNC: 13.8 G/DL (ref 14–18)
IMM GRANULOCYTES # BLD AUTO: 0.66 K/UL (ref 0–0.04)
IMM GRANULOCYTES NFR BLD AUTO: 4.6 % (ref 0–0.5)
LYMPHOCYTES # BLD AUTO: 1.2 K/UL (ref 1–4.8)
LYMPHOCYTES NFR BLD: 8.4 % (ref 18–48)
MCH RBC QN AUTO: 30.1 PG (ref 27–31)
MCHC RBC AUTO-ENTMCNC: 33.8 G/DL (ref 32–36)
MCV RBC AUTO: 89 FL (ref 82–98)
MONOCYTES # BLD AUTO: 0.9 K/UL (ref 0.3–1)
MONOCYTES NFR BLD: 6.5 % (ref 4–15)
NEUTROPHILS # BLD AUTO: 11.2 K/UL (ref 1.8–7.7)
NEUTROPHILS NFR BLD: 78.3 % (ref 38–73)
NRBC BLD-RTO: 0 /100 WBC
PLATELET # BLD AUTO: 289 K/UL (ref 150–350)
PMV BLD AUTO: 9.4 FL (ref 9.2–12.9)
POTASSIUM SERPL-SCNC: 4.4 MMOL/L (ref 3.5–5.1)
PROT SERPL-MCNC: 6.1 G/DL (ref 6–8.4)
RBC # BLD AUTO: 4.58 M/UL (ref 4.6–6.2)
SODIUM SERPL-SCNC: 137 MMOL/L (ref 136–145)
TACROLIMUS BLD-MCNC: 12.5 NG/ML (ref 5–15)
WBC # BLD AUTO: 14.25 K/UL (ref 3.9–12.7)

## 2019-07-15 PROCEDURE — 82248 BILIRUBIN DIRECT: CPT

## 2019-07-15 PROCEDURE — 80197 ASSAY OF TACROLIMUS: CPT

## 2019-07-15 PROCEDURE — 80053 COMPREHEN METABOLIC PANEL: CPT

## 2019-07-15 PROCEDURE — 36415 COLL VENOUS BLD VENIPUNCTURE: CPT

## 2019-07-15 PROCEDURE — 85025 COMPLETE CBC W/AUTO DIFF WBC: CPT

## 2019-07-16 ENCOUNTER — OFFICE VISIT (OUTPATIENT)
Dept: TRANSPLANT | Facility: CLINIC | Age: 59
End: 2019-07-16
Payer: COMMERCIAL

## 2019-07-16 VITALS
RESPIRATION RATE: 18 BRPM | SYSTOLIC BLOOD PRESSURE: 103 MMHG | WEIGHT: 201.25 LBS | HEART RATE: 80 BPM | HEIGHT: 67 IN | DIASTOLIC BLOOD PRESSURE: 72 MMHG | OXYGEN SATURATION: 98 % | TEMPERATURE: 98 F | BODY MASS INDEX: 31.59 KG/M2

## 2019-07-16 DIAGNOSIS — Z51.81 ENCOUNTER FOR THERAPEUTIC DRUG MONITORING: Primary | ICD-10-CM

## 2019-07-16 DIAGNOSIS — Z94.4 LIVER REPLACED BY TRANSPLANT: ICD-10-CM

## 2019-07-16 DIAGNOSIS — Z94.4 S/P LIVER TRANSPLANT: ICD-10-CM

## 2019-07-16 PROCEDURE — 3074F PR MOST RECENT SYSTOLIC BLOOD PRESSURE < 130 MM HG: ICD-10-PCS | Mod: CPTII,S$GLB,, | Performed by: TRANSPLANT SURGERY

## 2019-07-16 PROCEDURE — 99999 PR PBB SHADOW E&M-EST. PATIENT-LVL IV: ICD-10-PCS | Mod: PBBFAC,,, | Performed by: TRANSPLANT SURGERY

## 2019-07-16 PROCEDURE — 3078F PR MOST RECENT DIASTOLIC BLOOD PRESSURE < 80 MM HG: ICD-10-PCS | Mod: CPTII,S$GLB,, | Performed by: TRANSPLANT SURGERY

## 2019-07-16 PROCEDURE — 3074F SYST BP LT 130 MM HG: CPT | Mod: CPTII,S$GLB,, | Performed by: TRANSPLANT SURGERY

## 2019-07-16 PROCEDURE — 99999 PR PBB SHADOW E&M-EST. PATIENT-LVL IV: CPT | Mod: PBBFAC,,, | Performed by: TRANSPLANT SURGERY

## 2019-07-16 PROCEDURE — 3008F PR BODY MASS INDEX (BMI) DOCUMENTED: ICD-10-PCS | Mod: CPTII,S$GLB,, | Performed by: TRANSPLANT SURGERY

## 2019-07-16 PROCEDURE — 3078F DIAST BP <80 MM HG: CPT | Mod: CPTII,S$GLB,, | Performed by: TRANSPLANT SURGERY

## 2019-07-16 PROCEDURE — 99214 OFFICE O/P EST MOD 30 MIN: CPT | Mod: 24,S$GLB,, | Performed by: TRANSPLANT SURGERY

## 2019-07-16 PROCEDURE — 99214 PR OFFICE/OUTPT VISIT, EST, LEVL IV, 30-39 MIN: ICD-10-PCS | Mod: 24,S$GLB,, | Performed by: TRANSPLANT SURGERY

## 2019-07-16 PROCEDURE — 3008F BODY MASS INDEX DOCD: CPT | Mod: CPTII,S$GLB,, | Performed by: TRANSPLANT SURGERY

## 2019-07-16 RX ORDER — CALCIUM CARBONATE 200(500)MG
1 TABLET,CHEWABLE ORAL 3 TIMES DAILY PRN
Qty: 30 TABLET | Refills: 0 | Status: ON HOLD | COMMUNITY
Start: 2019-07-16 | End: 2020-04-16

## 2019-07-16 RX ORDER — METOPROLOL TARTRATE 50 MG/1
50 TABLET ORAL 2 TIMES DAILY
Qty: 60 TABLET | Refills: 11 | Status: SHIPPED | OUTPATIENT
Start: 2019-07-16 | End: 2019-07-26

## 2019-07-16 RX ORDER — NIFEDIPINE 30 MG/1
30 TABLET, EXTENDED RELEASE ORAL DAILY
Qty: 30 TABLET | Refills: 11 | Status: SHIPPED | OUTPATIENT
Start: 2019-07-16 | End: 2019-07-26

## 2019-07-16 RX ORDER — TACROLIMUS 1 MG/1
5 CAPSULE ORAL EVERY 12 HOURS
Qty: 300 CAPSULE | Refills: 11 | Status: SHIPPED | OUTPATIENT
Start: 2019-07-16 | End: 2019-07-20

## 2019-07-16 NOTE — PROGRESS NOTES
Transplant Surgery  Liver Transplant Recipient Follow-up    Original Referring Physician: Jose Angel Munson  Current Corresponding Physician: Jose Angel Munson    Chief Complaint: Roman is here for follow up of his liver transplant performed 2019 for the primary diagnosis (UNOS) of Primary Liver Malignancy: Hepatoma (HCC) and Cirrhosis    ORGAN: LIVER  Whole or Partial: whole liver  Donor Type:  - brain death  PHS Increased Risk: no  Donor CMV Status: Negative  Donor HCV Status: Negative  Donor HBcAb: Negative  Donor HBV MARIA TERESA: Negative  Donor HCV MARIA TERESA: Negative    Biliary Anastomosis: end to end  Arterial Anatomy: replaced right hepatic from sma  IVC reconstruction: end to end ivc  Portal vein status: patent    Subjective:     History of Present Illness: He has had the following complications since transplant: none.  The noted complications are well controlled.    Interval History: Currently, he is doing adequately.  Current complaints include afib, palpitations.  Roman is here for management of his immunosuppression medication.    Review of Systems   Constitutional: Negative for activity change and appetite change.   HENT: Negative for congestion and tinnitus.    Eyes: Negative for redness and visual disturbance.   Respiratory: Negative for cough and shortness of breath.    Cardiovascular: Negative for chest pain and palpitations.   Gastrointestinal: Positive for abdominal distention. Negative for abdominal pain.   Endocrine: Negative for polydipsia and polyuria.   Genitourinary: Negative for decreased urine volume and dysuria.   Musculoskeletal: Negative for arthralgias and back pain.   Skin: Negative for pallor and rash.   Allergic/Immunologic: Positive for immunocompromised state. Negative for environmental allergies.   Neurological: Negative for tremors and headaches.   Hematological: Negative for adenopathy. Does not bruise/bleed easily.   Psychiatric/Behavioral: Negative for  behavioral problems and confusion.       Objective:     Physical Exam   Constitutional: He is oriented to person, place, and time. He appears well-developed and well-nourished. No distress.   HENT:   Head: Normocephalic.   Eyes: No scleral icterus.   Neck: Normal range of motion. Neck supple. No JVD present.   Cardiovascular: Normal rate, regular rhythm and intact distal pulses.   Pulmonary/Chest: Effort normal. No respiratory distress.   Abdominal: Soft. He exhibits no mass. There is no rebound and no guarding.       Musculoskeletal: Normal range of motion.   Neurological: He is alert and oriented to person, place, and time.   Skin: Skin is warm and dry. He is not diaphoretic.   Psychiatric: He has a normal mood and affect. His behavior is normal. Judgment and thought content normal.     Lab Results   Component Value Date    BILITOT 0.6 07/15/2019    AST 44 (H) 07/15/2019     (H) 07/15/2019    ALKPHOS 199 (H) 07/15/2019    CREATININE 1.5 (H) 07/15/2019    ALBUMIN 3.3 (L) 07/15/2019     Lab Results   Component Value Date    WBC 14.25 (H) 07/15/2019    HGB 13.8 (L) 07/15/2019    HCT 40.8 07/15/2019    HCT 41 07/04/2019     07/15/2019     Lab Results   Component Value Date    TACROLIMUS 12.5 07/15/2019       Assessment/Plan:          · S/P liver transplant.  · Chronic immunosuppressive medications for rejection prophylaxis supratherapeutic.  Plan: adjusted yesterday.  · Continue monitoring symptoms, labs and drug levels for drug-related toxicity and side effects.  · Incision: staples in place; wound clean, dry, and intact  · Femoral arterial line site: no complications evident   · BP: Metoprolol BID, reduce dose nifedipine    Aman Cooper MD       [facility] Patient Status  Functional Status: 70% - Cares for self: unable to carry on normal activity or active work  Physical Capacity: No Limitations

## 2019-07-16 NOTE — LETTER
July 19, 2019        Jose Angel Munson  [address]  [place] [address]  Phone: [phone]  Fax: [phone]             [facility]  [address]  [facility] [address]  Phone: [phone]   Patient: Roman Hodges   MR Number: [id number]   YOB: 1960   Date of Visit: 7/16/2019       Dear Dr. Jose Angel Munson    Thank you for referring Roman Hodges to me for evaluation. Attached you will find relevant portions of my assessment and plan of care.    If you have questions, please do not hesitate to call me. I look forward to following Roman Hodges along with you.    Sincerely,    Aman Cooper MD    Enclosure    If you would like to receive this communication electronically, please contact [email or web address] or [phone] to request m-spatial Link access.    m-spatial Link is a tool which provides read-only access to select patient information with whom you have a relationship. Its easy to use and provides real time access to review your patients record including encounter summaries, notes, results, and demographic information.    If you feel you have received this communication in error or would no longer like to receive these types of communications, please e-mail [email or web address]

## 2019-07-17 ENCOUNTER — TELEPHONE (OUTPATIENT)
Dept: TRANSPLANT | Facility: CLINIC | Age: 59
End: 2019-07-17

## 2019-07-17 ENCOUNTER — LAB VISIT (OUTPATIENT)
Dept: LAB | Facility: HOSPITAL | Age: 59
End: 2019-07-17
Attending: SURGERY
Payer: COMMERCIAL

## 2019-07-17 DIAGNOSIS — Z94.4 LIVER REPLACED BY TRANSPLANT: ICD-10-CM

## 2019-07-17 LAB
ALBUMIN SERPL BCP-MCNC: 3.3 G/DL (ref 3.5–5.2)
ALP SERPL-CCNC: 191 U/L (ref 55–135)
ALT SERPL W/O P-5'-P-CCNC: 115 U/L (ref 10–44)
ANION GAP SERPL CALC-SCNC: 9 MMOL/L (ref 8–16)
AST SERPL-CCNC: 46 U/L (ref 10–40)
BASOPHILS # BLD AUTO: 0.1 K/UL (ref 0–0.2)
BASOPHILS NFR BLD: 0.8 % (ref 0–1.9)
BILIRUB DIRECT SERPL-MCNC: 0.3 MG/DL (ref 0.1–0.3)
BILIRUB SERPL-MCNC: 0.6 MG/DL (ref 0.1–1)
BUN SERPL-MCNC: 30 MG/DL (ref 6–20)
CALCIUM SERPL-MCNC: 9 MG/DL (ref 8.7–10.5)
CHLORIDE SERPL-SCNC: 104 MMOL/L (ref 95–110)
CO2 SERPL-SCNC: 24 MMOL/L (ref 23–29)
CREAT SERPL-MCNC: 1.4 MG/DL (ref 0.5–1.4)
DIFFERENTIAL METHOD: ABNORMAL
EOSINOPHIL # BLD AUTO: 0.2 K/UL (ref 0–0.5)
EOSINOPHIL NFR BLD: 1.3 % (ref 0–8)
ERYTHROCYTE [DISTWIDTH] IN BLOOD BY AUTOMATED COUNT: 14 % (ref 11.5–14.5)
EST. GFR  (AFRICAN AMERICAN): >60 ML/MIN/1.73 M^2
EST. GFR  (NON AFRICAN AMERICAN): 54.2 ML/MIN/1.73 M^2
GLUCOSE SERPL-MCNC: 107 MG/DL (ref 70–110)
HCT VFR BLD AUTO: 39.7 % (ref 40–54)
HGB BLD-MCNC: 12.9 G/DL (ref 14–18)
IMM GRANULOCYTES # BLD AUTO: 0.32 K/UL (ref 0–0.04)
IMM GRANULOCYTES NFR BLD AUTO: 2.6 % (ref 0–0.5)
LYMPHOCYTES # BLD AUTO: 1.2 K/UL (ref 1–4.8)
LYMPHOCYTES NFR BLD: 10 % (ref 18–48)
MCH RBC QN AUTO: 29.3 PG (ref 27–31)
MCHC RBC AUTO-ENTMCNC: 32.5 G/DL (ref 32–36)
MCV RBC AUTO: 90 FL (ref 82–98)
MONOCYTES # BLD AUTO: 0.8 K/UL (ref 0.3–1)
MONOCYTES NFR BLD: 6.7 % (ref 4–15)
NEUTROPHILS # BLD AUTO: 9.6 K/UL (ref 1.8–7.7)
NEUTROPHILS NFR BLD: 78.6 % (ref 38–73)
NRBC BLD-RTO: 0 /100 WBC
PLATELET # BLD AUTO: 307 K/UL (ref 150–350)
PMV BLD AUTO: 9.3 FL (ref 9.2–12.9)
POTASSIUM SERPL-SCNC: 4.1 MMOL/L (ref 3.5–5.1)
PROT SERPL-MCNC: 5.9 G/DL (ref 6–8.4)
RBC # BLD AUTO: 4.4 M/UL (ref 4.6–6.2)
SODIUM SERPL-SCNC: 137 MMOL/L (ref 136–145)
TACROLIMUS BLD-MCNC: 14.7 NG/ML (ref 5–15)
WBC # BLD AUTO: 12.26 K/UL (ref 3.9–12.7)

## 2019-07-17 PROCEDURE — 85025 COMPLETE CBC W/AUTO DIFF WBC: CPT

## 2019-07-17 PROCEDURE — 82248 BILIRUBIN DIRECT: CPT

## 2019-07-17 PROCEDURE — 80197 ASSAY OF TACROLIMUS: CPT

## 2019-07-17 PROCEDURE — 36415 COLL VENOUS BLD VENIPUNCTURE: CPT

## 2019-07-17 PROCEDURE — 80053 COMPREHEN METABOLIC PANEL: CPT

## 2019-07-17 NOTE — TELEPHONE ENCOUNTER
Pt notified to hold prograf dose tonight and in the morning.  We will repeat labs tomorrow. Will call pt before evening dose is due with new instructions. Pt spouse agreed with plan

## 2019-07-17 NOTE — TELEPHONE ENCOUNTER
----- Message from Darshan Graves MD sent at 7/17/2019  2:42 PM CDT -----  Stop fk and labs tomorrow

## 2019-07-18 ENCOUNTER — TELEPHONE (OUTPATIENT)
Dept: TRANSPLANT | Facility: CLINIC | Age: 59
End: 2019-07-18

## 2019-07-18 ENCOUNTER — CONFERENCE (OUTPATIENT)
Dept: TRANSPLANT | Facility: CLINIC | Age: 59
End: 2019-07-18

## 2019-07-18 ENCOUNTER — LAB VISIT (OUTPATIENT)
Dept: LAB | Facility: HOSPITAL | Age: 59
End: 2019-07-18
Attending: SURGERY
Payer: COMMERCIAL

## 2019-07-18 ENCOUNTER — PATIENT MESSAGE (OUTPATIENT)
Dept: TRANSPLANT | Facility: CLINIC | Age: 59
End: 2019-07-18

## 2019-07-18 DIAGNOSIS — Z94.4 LIVER REPLACED BY TRANSPLANT: ICD-10-CM

## 2019-07-18 LAB
ALBUMIN SERPL BCP-MCNC: 3.4 G/DL (ref 3.5–5.2)
ALP SERPL-CCNC: 163 U/L (ref 55–135)
ALT SERPL W/O P-5'-P-CCNC: 84 U/L (ref 10–44)
ANION GAP SERPL CALC-SCNC: 8 MMOL/L (ref 8–16)
AST SERPL-CCNC: 15 U/L (ref 10–40)
BASOPHILS # BLD AUTO: 0.12 K/UL (ref 0–0.2)
BASOPHILS NFR BLD: 1.1 % (ref 0–1.9)
BILIRUB DIRECT SERPL-MCNC: 0.3 MG/DL (ref 0.1–0.3)
BILIRUB SERPL-MCNC: 0.5 MG/DL (ref 0.1–1)
BUN SERPL-MCNC: 34 MG/DL (ref 6–20)
CALCIUM SERPL-MCNC: 9.6 MG/DL (ref 8.7–10.5)
CHLORIDE SERPL-SCNC: 104 MMOL/L (ref 95–110)
CO2 SERPL-SCNC: 26 MMOL/L (ref 23–29)
CREAT SERPL-MCNC: 1.7 MG/DL (ref 0.5–1.4)
DIFFERENTIAL METHOD: ABNORMAL
EOSINOPHIL # BLD AUTO: 0.1 K/UL (ref 0–0.5)
EOSINOPHIL NFR BLD: 1.2 % (ref 0–8)
ERYTHROCYTE [DISTWIDTH] IN BLOOD BY AUTOMATED COUNT: 14 % (ref 11.5–14.5)
EST. GFR  (AFRICAN AMERICAN): 49.6 ML/MIN/1.73 M^2
EST. GFR  (NON AFRICAN AMERICAN): 42.9 ML/MIN/1.73 M^2
GLUCOSE SERPL-MCNC: 109 MG/DL (ref 70–110)
HCT VFR BLD AUTO: 39.2 % (ref 40–54)
HGB BLD-MCNC: 13.1 G/DL (ref 14–18)
IMM GRANULOCYTES # BLD AUTO: 0.27 K/UL (ref 0–0.04)
IMM GRANULOCYTES NFR BLD AUTO: 2.4 % (ref 0–0.5)
LYMPHOCYTES # BLD AUTO: 1 K/UL (ref 1–4.8)
LYMPHOCYTES NFR BLD: 8.8 % (ref 18–48)
MCH RBC QN AUTO: 30 PG (ref 27–31)
MCHC RBC AUTO-ENTMCNC: 33.4 G/DL (ref 32–36)
MCV RBC AUTO: 90 FL (ref 82–98)
MONOCYTES # BLD AUTO: 0.8 K/UL (ref 0.3–1)
MONOCYTES NFR BLD: 7 % (ref 4–15)
NEUTROPHILS # BLD AUTO: 9 K/UL (ref 1.8–7.7)
NEUTROPHILS NFR BLD: 79.5 % (ref 38–73)
NRBC BLD-RTO: 0 /100 WBC
PLATELET # BLD AUTO: 294 K/UL (ref 150–350)
PMV BLD AUTO: 9.4 FL (ref 9.2–12.9)
POTASSIUM SERPL-SCNC: 5.1 MMOL/L (ref 3.5–5.1)
PROT SERPL-MCNC: 6.2 G/DL (ref 6–8.4)
RBC # BLD AUTO: 4.37 M/UL (ref 4.6–6.2)
SODIUM SERPL-SCNC: 138 MMOL/L (ref 136–145)
TACROLIMUS BLD-MCNC: 9.9 NG/ML (ref 5–15)
WBC # BLD AUTO: 11.27 K/UL (ref 3.9–12.7)

## 2019-07-18 PROCEDURE — 80053 COMPREHEN METABOLIC PANEL: CPT

## 2019-07-18 PROCEDURE — 85025 COMPLETE CBC W/AUTO DIFF WBC: CPT

## 2019-07-18 PROCEDURE — 80197 ASSAY OF TACROLIMUS: CPT

## 2019-07-18 PROCEDURE — 82248 BILIRUBIN DIRECT: CPT

## 2019-07-18 PROCEDURE — 36415 COLL VENOUS BLD VENIPUNCTURE: CPT

## 2019-07-18 NOTE — TELEPHONE ENCOUNTER
Pt spouse notified to restart prograf at 3 mg bid and repeat labs on Saturday . Pt  Spouse made adjustment on blue card.    Pt spouse reports blood pressures are up and down  Adjustments made in clinic decreased nifedipine to 30 mg daily from 60 mg daily and

## 2019-07-19 ENCOUNTER — TELEPHONE (OUTPATIENT)
Dept: CARDIOLOGY | Facility: CLINIC | Age: 59
End: 2019-07-19

## 2019-07-19 DIAGNOSIS — Z00.6 RESEARCH STUDY PATIENT: Primary | ICD-10-CM

## 2019-07-19 DIAGNOSIS — C22.0 HCC (HEPATOCELLULAR CARCINOMA): ICD-10-CM

## 2019-07-19 NOTE — TELEPHONE ENCOUNTER
----- Message from Delmy Pritchard RN sent at 7/19/2019  1:44 PM CDT -----  Hi there,  This is a mutual patient that has had a liver transplant on 7/4/19  He has a hx of Afib. The transplant team is requesting a follow up for Mr. Hodges to further manage his B/p and monitor Afib.      Please advise      Thanks  Delmy Pritchard RN

## 2019-07-19 NOTE — TELEPHONE ENCOUNTER
7/18/19 Liver Pathology Conference    Liver Explant: HCC: 4.8cm nodule 100% necrotic; no residual tumor, no vascular invasion.      Plan: q 6 mo. survaillence.

## 2019-07-20 ENCOUNTER — LAB VISIT (OUTPATIENT)
Dept: LAB | Facility: HOSPITAL | Age: 59
End: 2019-07-20
Attending: SURGERY
Payer: COMMERCIAL

## 2019-07-20 DIAGNOSIS — Z94.4 LIVER REPLACED BY TRANSPLANT: Primary | ICD-10-CM

## 2019-07-20 DIAGNOSIS — Z94.4 LIVER REPLACED BY TRANSPLANT: ICD-10-CM

## 2019-07-20 LAB
ALBUMIN SERPL BCP-MCNC: 3.4 G/DL (ref 3.5–5.2)
ALP SERPL-CCNC: 175 U/L (ref 55–135)
ALT SERPL W/O P-5'-P-CCNC: 95 U/L (ref 10–44)
ANION GAP SERPL CALC-SCNC: 8 MMOL/L (ref 8–16)
AST SERPL-CCNC: 41 U/L (ref 10–40)
BASOPHILS # BLD AUTO: 0.18 K/UL (ref 0–0.2)
BASOPHILS NFR BLD: 1.6 % (ref 0–1.9)
BILIRUB DIRECT SERPL-MCNC: 0.3 MG/DL (ref 0.1–0.3)
BILIRUB SERPL-MCNC: 0.5 MG/DL (ref 0.1–1)
BUN SERPL-MCNC: 31 MG/DL (ref 6–20)
CALCIUM SERPL-MCNC: 9.6 MG/DL (ref 8.7–10.5)
CHLORIDE SERPL-SCNC: 103 MMOL/L (ref 95–110)
CO2 SERPL-SCNC: 27 MMOL/L (ref 23–29)
CREAT SERPL-MCNC: 1.6 MG/DL (ref 0.5–1.4)
DIFFERENTIAL METHOD: ABNORMAL
EOSINOPHIL # BLD AUTO: 0.2 K/UL (ref 0–0.5)
EOSINOPHIL NFR BLD: 1.5 % (ref 0–8)
ERYTHROCYTE [DISTWIDTH] IN BLOOD BY AUTOMATED COUNT: 14.1 % (ref 11.5–14.5)
EST. GFR  (AFRICAN AMERICAN): 53.3 ML/MIN/1.73 M^2
EST. GFR  (NON AFRICAN AMERICAN): 46.1 ML/MIN/1.73 M^2
GLUCOSE SERPL-MCNC: 93 MG/DL (ref 70–110)
HCT VFR BLD AUTO: 38 % (ref 40–54)
HGB BLD-MCNC: 12.8 G/DL (ref 14–18)
IMM GRANULOCYTES # BLD AUTO: 0.16 K/UL (ref 0–0.04)
IMM GRANULOCYTES NFR BLD AUTO: 1.4 % (ref 0–0.5)
LYMPHOCYTES # BLD AUTO: 1.1 K/UL (ref 1–4.8)
LYMPHOCYTES NFR BLD: 10 % (ref 18–48)
MCH RBC QN AUTO: 29.7 PG (ref 27–31)
MCHC RBC AUTO-ENTMCNC: 33.7 G/DL (ref 32–36)
MCV RBC AUTO: 88 FL (ref 82–98)
MONOCYTES # BLD AUTO: 0.8 K/UL (ref 0.3–1)
MONOCYTES NFR BLD: 7 % (ref 4–15)
NEUTROPHILS # BLD AUTO: 8.9 K/UL (ref 1.8–7.7)
NEUTROPHILS NFR BLD: 78.5 % (ref 38–73)
NRBC BLD-RTO: 0 /100 WBC
PLATELET # BLD AUTO: 308 K/UL (ref 150–350)
PMV BLD AUTO: 9.6 FL (ref 9.2–12.9)
POTASSIUM SERPL-SCNC: 4.8 MMOL/L (ref 3.5–5.1)
PROT SERPL-MCNC: 6.1 G/DL (ref 6–8.4)
RBC # BLD AUTO: 4.31 M/UL (ref 4.6–6.2)
SODIUM SERPL-SCNC: 138 MMOL/L (ref 136–145)
TACROLIMUS BLD-MCNC: 7.6 NG/ML (ref 5–15)
WBC # BLD AUTO: 11.35 K/UL (ref 3.9–12.7)

## 2019-07-20 PROCEDURE — 36415 COLL VENOUS BLD VENIPUNCTURE: CPT

## 2019-07-20 PROCEDURE — 85025 COMPLETE CBC W/AUTO DIFF WBC: CPT

## 2019-07-20 PROCEDURE — 80053 COMPREHEN METABOLIC PANEL: CPT

## 2019-07-20 PROCEDURE — 80197 ASSAY OF TACROLIMUS: CPT

## 2019-07-20 PROCEDURE — 82248 BILIRUBIN DIRECT: CPT

## 2019-07-20 NOTE — TELEPHONE ENCOUNTER
Reviewed labs with Dr Graves, called patient to let him know labs up a little and Prograf a little low.  Increase Prograf to 4 mg bid.  Repeat labs on Monday.

## 2019-07-22 ENCOUNTER — LAB VISIT (OUTPATIENT)
Dept: LAB | Facility: HOSPITAL | Age: 59
End: 2019-07-22
Attending: SURGERY
Payer: COMMERCIAL

## 2019-07-22 DIAGNOSIS — C22.0 HCC (HEPATOCELLULAR CARCINOMA): ICD-10-CM

## 2019-07-22 DIAGNOSIS — Z94.4 LIVER REPLACED BY TRANSPLANT: ICD-10-CM

## 2019-07-22 DIAGNOSIS — Z00.6 RESEARCH STUDY PATIENT: ICD-10-CM

## 2019-07-22 LAB
ALBUMIN SERPL BCP-MCNC: 3.6 G/DL (ref 3.5–5.2)
ALP SERPL-CCNC: 193 U/L (ref 55–135)
ALT SERPL W/O P-5'-P-CCNC: 118 U/L (ref 10–44)
ANION GAP SERPL CALC-SCNC: 11 MMOL/L (ref 8–16)
AST SERPL-CCNC: 96 U/L (ref 10–40)
BASOPHILS # BLD AUTO: 0.14 K/UL (ref 0–0.2)
BASOPHILS NFR BLD: 1.5 % (ref 0–1.9)
BILIRUB DIRECT SERPL-MCNC: 0.4 MG/DL (ref 0.1–0.3)
BILIRUB SERPL-MCNC: 0.6 MG/DL (ref 0.1–1)
BUN SERPL-MCNC: 30 MG/DL (ref 6–20)
CALCIUM SERPL-MCNC: 9.7 MG/DL (ref 8.7–10.5)
CHLORIDE SERPL-SCNC: 103 MMOL/L (ref 95–110)
CO2 SERPL-SCNC: 25 MMOL/L (ref 23–29)
CREAT SERPL-MCNC: 1.5 MG/DL (ref 0.5–1.4)
DIFFERENTIAL METHOD: ABNORMAL
EOSINOPHIL # BLD AUTO: 0.2 K/UL (ref 0–0.5)
EOSINOPHIL NFR BLD: 1.7 % (ref 0–8)
ERYTHROCYTE [DISTWIDTH] IN BLOOD BY AUTOMATED COUNT: 13.8 % (ref 11.5–14.5)
EST. GFR  (AFRICAN AMERICAN): 57.7 ML/MIN/1.73 M^2
EST. GFR  (NON AFRICAN AMERICAN): 49.9 ML/MIN/1.73 M^2
GLUCOSE SERPL-MCNC: 94 MG/DL (ref 70–110)
HCT VFR BLD AUTO: 38.4 % (ref 40–54)
HGB BLD-MCNC: 12.7 G/DL (ref 14–18)
IMM GRANULOCYTES # BLD AUTO: 0.09 K/UL (ref 0–0.04)
IMM GRANULOCYTES NFR BLD AUTO: 0.9 % (ref 0–0.5)
LYMPHOCYTES # BLD AUTO: 1 K/UL (ref 1–4.8)
LYMPHOCYTES NFR BLD: 10.9 % (ref 18–48)
MCH RBC QN AUTO: 29.6 PG (ref 27–31)
MCHC RBC AUTO-ENTMCNC: 33.1 G/DL (ref 32–36)
MCV RBC AUTO: 90 FL (ref 82–98)
MONOCYTES # BLD AUTO: 0.5 K/UL (ref 0.3–1)
MONOCYTES NFR BLD: 5.6 % (ref 4–15)
NEUTROPHILS # BLD AUTO: 7.6 K/UL (ref 1.8–7.7)
NEUTROPHILS NFR BLD: 79.4 % (ref 38–73)
NRBC BLD-RTO: 0 /100 WBC
PLATELET # BLD AUTO: 290 K/UL (ref 150–350)
PMV BLD AUTO: 9.4 FL (ref 9.2–12.9)
POTASSIUM SERPL-SCNC: 4.2 MMOL/L (ref 3.5–5.1)
PROT SERPL-MCNC: 6.5 G/DL (ref 6–8.4)
RBC # BLD AUTO: 4.29 M/UL (ref 4.6–6.2)
RESEARCH LAB: NORMAL
SODIUM SERPL-SCNC: 139 MMOL/L (ref 136–145)
TACROLIMUS BLD-MCNC: 9.7 NG/ML (ref 5–15)
WBC # BLD AUTO: 9.58 K/UL (ref 3.9–12.7)

## 2019-07-22 PROCEDURE — 85025 COMPLETE CBC W/AUTO DIFF WBC: CPT

## 2019-07-22 PROCEDURE — 36415 COLL VENOUS BLD VENIPUNCTURE: CPT

## 2019-07-22 PROCEDURE — 80053 COMPREHEN METABOLIC PANEL: CPT

## 2019-07-22 PROCEDURE — 82248 BILIRUBIN DIRECT: CPT

## 2019-07-22 PROCEDURE — 80197 ASSAY OF TACROLIMUS: CPT

## 2019-07-22 RX ORDER — TACROLIMUS 1 MG/1
4 CAPSULE ORAL EVERY 12 HOURS
Qty: 240 CAPSULE | Refills: 11 | Status: SHIPPED | OUTPATIENT
Start: 2019-07-22 | End: 2019-07-25 | Stop reason: DRUGHIGH

## 2019-07-23 ENCOUNTER — OFFICE VISIT (OUTPATIENT)
Dept: TRANSPLANT | Facility: CLINIC | Age: 59
End: 2019-07-23
Payer: COMMERCIAL

## 2019-07-23 VITALS
TEMPERATURE: 99 F | BODY MASS INDEX: 33.31 KG/M2 | DIASTOLIC BLOOD PRESSURE: 84 MMHG | HEART RATE: 70 BPM | HEIGHT: 65 IN | RESPIRATION RATE: 16 BRPM | SYSTOLIC BLOOD PRESSURE: 134 MMHG | OXYGEN SATURATION: 99 % | WEIGHT: 199.94 LBS

## 2019-07-23 DIAGNOSIS — Z94.4 S/P LIVER TRANSPLANT: Primary | ICD-10-CM

## 2019-07-23 DIAGNOSIS — Z79.60 LONG-TERM USE OF IMMUNOSUPPRESSANT MEDICATION: ICD-10-CM

## 2019-07-23 PROCEDURE — 3075F SYST BP GE 130 - 139MM HG: CPT | Mod: CPTII,S$GLB,, | Performed by: TRANSPLANT SURGERY

## 2019-07-23 PROCEDURE — 3008F BODY MASS INDEX DOCD: CPT | Mod: CPTII,S$GLB,, | Performed by: TRANSPLANT SURGERY

## 2019-07-23 PROCEDURE — 3008F PR BODY MASS INDEX (BMI) DOCUMENTED: ICD-10-PCS | Mod: CPTII,S$GLB,, | Performed by: TRANSPLANT SURGERY

## 2019-07-23 PROCEDURE — 99999 PR PBB SHADOW E&M-EST. PATIENT-LVL III: CPT | Mod: PBBFAC,,,

## 2019-07-23 PROCEDURE — 3075F PR MOST RECENT SYSTOLIC BLOOD PRESS GE 130-139MM HG: ICD-10-PCS | Mod: CPTII,S$GLB,, | Performed by: TRANSPLANT SURGERY

## 2019-07-23 PROCEDURE — 3079F DIAST BP 80-89 MM HG: CPT | Mod: CPTII,S$GLB,, | Performed by: TRANSPLANT SURGERY

## 2019-07-23 PROCEDURE — 99215 PR OFFICE/OUTPT VISIT, EST, LEVL V, 40-54 MIN: ICD-10-PCS | Mod: 24,S$GLB,, | Performed by: TRANSPLANT SURGERY

## 2019-07-23 PROCEDURE — 99999 PR PBB SHADOW E&M-EST. PATIENT-LVL III: ICD-10-PCS | Mod: PBBFAC,,,

## 2019-07-23 PROCEDURE — 99215 OFFICE O/P EST HI 40 MIN: CPT | Mod: 24,S$GLB,, | Performed by: TRANSPLANT SURGERY

## 2019-07-23 PROCEDURE — 3079F PR MOST RECENT DIASTOLIC BLOOD PRESSURE 80-89 MM HG: ICD-10-PCS | Mod: CPTII,S$GLB,, | Performed by: TRANSPLANT SURGERY

## 2019-07-23 NOTE — PROGRESS NOTES
STAPLE REMOVAL NOTE    Staples removed from liver transplant incision, steri-strips applied with Benzoin per Dr. Altman's order.  Patient tolerated procedure well.  Skin dry and intact.  Patient instructed to shower with back to water spray and to pat dry incision and to let the steri-strips wear off on their own.  Patient instructed to report any redness, warmth, or drainage from incision to transplant coordinators.  All questions answered.

## 2019-07-23 NOTE — PROGRESS NOTES
Transplant Surgery  Liver Transplant Recipient Follow-up    Original Referring Physician: Jose Angel Munson  Current Corresponding Physician: Jose Angel Munson    Chief Complaint: Roman is here for follow up of his liver transplant performed 2019 for the primary diagnosis (UNOS) of Primary Liver Malignancy: Hepatoma (HCC) and Cirrhosis    ORGAN: LIVER  Whole or Partial: whole liver  Donor Type:  - brain death  PHS Increased Risk: no  Donor CMV Status: Negative  Donor HCV Status: Negative  Donor HBcAb: Negative  Donor HBV MARIA TERESA: Negative  Donor HCV MARIA TERESA: Negative    Biliary Anastomosis: end to end  Arterial Anatomy: replaced right hepatic from sma  IVC reconstruction: end to end ivc  Portal vein status: patent    Subjective:     History of Present Illness: He has had the following complications since transplant: none.  The noted complications are well controlled.    Interval History: Currently, he is doing well.  Current complaints include none.  Roman is here for management of his immunosuppression medication.    Review of Systems   Constitutional: Negative for activity change, appetite change and fever.   HENT: Negative for hearing loss, sore throat and trouble swallowing.    Eyes: Negative for redness and visual disturbance.   Respiratory: Negative for shortness of breath and wheezing.    Cardiovascular: Negative for chest pain, palpitations and leg swelling.   Gastrointestinal: Negative for abdominal distention, abdominal pain, blood in stool, constipation, diarrhea, nausea and vomiting.   Genitourinary: Negative for decreased urine volume, dysuria, flank pain, frequency, hematuria and urgency.   Musculoskeletal: Negative for back pain.   Skin: Negative for color change.   Neurological: Negative for dizziness, seizures, weakness, light-headedness and headaches.   Psychiatric/Behavioral: Negative for agitation, confusion and hallucinations. The patient is not nervous/anxious.         Objective:     Physical Exam   Constitutional: He is oriented to person, place, and time. He appears well-developed and well-nourished.   HENT:   Head: Normocephalic and atraumatic.   Eyes: Pupils are equal, round, and reactive to light. EOM are normal.   Neck: No JVD present.   Cardiovascular: Normal rate, regular rhythm and normal heart sounds.   Pulmonary/Chest: Effort normal and breath sounds normal. No stridor.   Abdominal: Soft. He exhibits no distension. There is no tenderness.   Musculoskeletal: Normal range of motion. He exhibits no edema.   Neurological: He is alert and oriented to person, place, and time.   Skin: Skin is warm and dry.   Psychiatric: He has a normal mood and affect. His behavior is normal.     Lab Results   Component Value Date    BILITOT 0.6 07/22/2019    AST 96 (H) 07/22/2019     (H) 07/22/2019    ALKPHOS 193 (H) 07/22/2019    CREATININE 1.5 (H) 07/22/2019    ALBUMIN 3.6 07/22/2019     Lab Results   Component Value Date    WBC 9.58 07/22/2019    HGB 12.7 (L) 07/22/2019    HCT 38.4 (L) 07/22/2019    HCT 41 07/04/2019     07/22/2019     Lab Results   Component Value Date    TACROLIMUS 9.7 07/22/2019       Assessment/Plan:          · S/P liver transplant.  · Chronic immunosuppressive medications for rejection prophylaxis at target.  Plan: no adjustment needed.  · Ast/alt elevated.  Need to check liver US tomorrow  · Continue monitoring symptoms, labs and drug levels for drug-related toxicity and side effects.  · Incision: staples in place; wound clean, dry, and intact, remove today  · Femoral arterial line site: no complications evident    MD SONJA Chin Jr Patient Status  Functional Status: 90% - Able to carry on normal activity: minor symptoms of disease  Physical Capacity: No Limitations

## 2019-07-24 ENCOUNTER — PATIENT MESSAGE (OUTPATIENT)
Dept: TRANSPLANT | Facility: CLINIC | Age: 59
End: 2019-07-24

## 2019-07-24 ENCOUNTER — HOSPITAL ENCOUNTER (OUTPATIENT)
Dept: RADIOLOGY | Facility: HOSPITAL | Age: 59
Discharge: HOME | End: 2019-07-24
Attending: SURGERY
Payer: COMMERCIAL

## 2019-07-24 DIAGNOSIS — Z94.4 LIVER REPLACED BY TRANSPLANT: ICD-10-CM

## 2019-07-24 PROCEDURE — 93975 US LIVER TRANSPLANT POST: ICD-10-PCS | Mod: 26,,, | Performed by: RADIOLOGY

## 2019-07-24 PROCEDURE — 76705 ECHO EXAM OF ABDOMEN: CPT | Mod: 26,59,, | Performed by: RADIOLOGY

## 2019-07-24 PROCEDURE — 93975 VASCULAR STUDY: CPT | Mod: TC

## 2019-07-24 PROCEDURE — 93975 VASCULAR STUDY: CPT | Mod: 26,,, | Performed by: RADIOLOGY

## 2019-07-24 PROCEDURE — 76705 US LIVER TRANSPLANT POST: ICD-10-PCS | Mod: 26,59,, | Performed by: RADIOLOGY

## 2019-07-24 PROCEDURE — 76705 ECHO EXAM OF ABDOMEN: CPT | Mod: 59,TC

## 2019-07-25 ENCOUNTER — LAB VISIT (OUTPATIENT)
Dept: LAB | Facility: HOSPITAL | Age: 59
End: 2019-07-25
Attending: SURGERY
Payer: COMMERCIAL

## 2019-07-25 DIAGNOSIS — Z94.4 LIVER REPLACED BY TRANSPLANT: ICD-10-CM

## 2019-07-25 LAB
ALBUMIN SERPL BCP-MCNC: 3.4 G/DL (ref 3.5–5.2)
ALP SERPL-CCNC: 135 U/L (ref 55–135)
ALT SERPL W/O P-5'-P-CCNC: 57 U/L (ref 10–44)
ANION GAP SERPL CALC-SCNC: 8 MMOL/L (ref 8–16)
AST SERPL-CCNC: 14 U/L (ref 10–40)
BASOPHILS # BLD AUTO: 0.14 K/UL (ref 0–0.2)
BASOPHILS NFR BLD: 1.8 % (ref 0–1.9)
BILIRUB DIRECT SERPL-MCNC: 0.2 MG/DL (ref 0.1–0.3)
BILIRUB SERPL-MCNC: 0.4 MG/DL (ref 0.1–1)
BUN SERPL-MCNC: 29 MG/DL (ref 6–20)
CALCIUM SERPL-MCNC: 9.2 MG/DL (ref 8.7–10.5)
CHLORIDE SERPL-SCNC: 104 MMOL/L (ref 95–110)
CO2 SERPL-SCNC: 25 MMOL/L (ref 23–29)
CREAT SERPL-MCNC: 1.4 MG/DL (ref 0.5–1.4)
DIFFERENTIAL METHOD: ABNORMAL
EOSINOPHIL # BLD AUTO: 0.3 K/UL (ref 0–0.5)
EOSINOPHIL NFR BLD: 3.6 % (ref 0–8)
ERYTHROCYTE [DISTWIDTH] IN BLOOD BY AUTOMATED COUNT: 13.7 % (ref 11.5–14.5)
EST. GFR  (AFRICAN AMERICAN): >60 ML/MIN/1.73 M^2
EST. GFR  (NON AFRICAN AMERICAN): 54.2 ML/MIN/1.73 M^2
GLUCOSE SERPL-MCNC: 97 MG/DL (ref 70–110)
HCT VFR BLD AUTO: 37.6 % (ref 40–54)
HGB BLD-MCNC: 12.4 G/DL (ref 14–18)
IMM GRANULOCYTES # BLD AUTO: 0.07 K/UL (ref 0–0.04)
IMM GRANULOCYTES NFR BLD AUTO: 0.9 % (ref 0–0.5)
LYMPHOCYTES # BLD AUTO: 1.1 K/UL (ref 1–4.8)
LYMPHOCYTES NFR BLD: 13.6 % (ref 18–48)
MCH RBC QN AUTO: 29.8 PG (ref 27–31)
MCHC RBC AUTO-ENTMCNC: 33 G/DL (ref 32–36)
MCV RBC AUTO: 90 FL (ref 82–98)
MONOCYTES # BLD AUTO: 0.5 K/UL (ref 0.3–1)
MONOCYTES NFR BLD: 6.3 % (ref 4–15)
NEUTROPHILS # BLD AUTO: 5.9 K/UL (ref 1.8–7.7)
NEUTROPHILS NFR BLD: 73.8 % (ref 38–73)
NRBC BLD-RTO: 0 /100 WBC
PLATELET # BLD AUTO: 277 K/UL (ref 150–350)
PMV BLD AUTO: 9.3 FL (ref 9.2–12.9)
POTASSIUM SERPL-SCNC: 4.1 MMOL/L (ref 3.5–5.1)
PROT SERPL-MCNC: 6.2 G/DL (ref 6–8.4)
RBC # BLD AUTO: 4.16 M/UL (ref 4.6–6.2)
SODIUM SERPL-SCNC: 137 MMOL/L (ref 136–145)
TACROLIMUS BLD-MCNC: 9.5 NG/ML (ref 5–15)
WBC # BLD AUTO: 7.96 K/UL (ref 3.9–12.7)

## 2019-07-25 PROCEDURE — 80053 COMPREHEN METABOLIC PANEL: CPT

## 2019-07-25 PROCEDURE — 36415 COLL VENOUS BLD VENIPUNCTURE: CPT

## 2019-07-25 PROCEDURE — 80197 ASSAY OF TACROLIMUS: CPT

## 2019-07-25 PROCEDURE — 82248 BILIRUBIN DIRECT: CPT

## 2019-07-25 PROCEDURE — 85025 COMPLETE CBC W/AUTO DIFF WBC: CPT

## 2019-07-25 NOTE — TELEPHONE ENCOUNTER
----- Message from Stefania Chau MD sent at 7/25/2019  3:38 PM CDT -----  Decrease prograf to 3mg bid

## 2019-07-25 NOTE — TELEPHONE ENCOUNTER
----- Message from Stefania Chau MD sent at 7/25/2019  3:45 PM CDT -----  Results ok. No action needed.

## 2019-07-26 ENCOUNTER — PATIENT MESSAGE (OUTPATIENT)
Dept: TRANSPLANT | Facility: CLINIC | Age: 59
End: 2019-07-26

## 2019-07-26 ENCOUNTER — PATIENT MESSAGE (OUTPATIENT)
Dept: ENDOCRINOLOGY | Facility: CLINIC | Age: 59
End: 2019-07-26

## 2019-07-26 ENCOUNTER — OFFICE VISIT (OUTPATIENT)
Dept: CARDIOLOGY | Facility: CLINIC | Age: 59
End: 2019-07-26
Payer: COMMERCIAL

## 2019-07-26 VITALS
HEART RATE: 72 BPM | BODY MASS INDEX: 33.2 KG/M2 | SYSTOLIC BLOOD PRESSURE: 132 MMHG | WEIGHT: 199.5 LBS | OXYGEN SATURATION: 97 % | DIASTOLIC BLOOD PRESSURE: 88 MMHG

## 2019-07-26 DIAGNOSIS — I50.21 ACUTE SYSTOLIC HEART FAILURE: ICD-10-CM

## 2019-07-26 DIAGNOSIS — I10 ESSENTIAL HYPERTENSION: Primary | Chronic | ICD-10-CM

## 2019-07-26 DIAGNOSIS — Z79.60 LONG-TERM USE OF IMMUNOSUPPRESSANT MEDICATION: ICD-10-CM

## 2019-07-26 DIAGNOSIS — G47.33 OBSTRUCTIVE SLEEP APNEA ON CPAP: Chronic | ICD-10-CM

## 2019-07-26 DIAGNOSIS — K21.9 GASTROESOPHAGEAL REFLUX DISEASE, ESOPHAGITIS PRESENCE NOT SPECIFIED: ICD-10-CM

## 2019-07-26 DIAGNOSIS — Z29.89 PROPHYLACTIC IMMUNOTHERAPY: ICD-10-CM

## 2019-07-26 DIAGNOSIS — Z79.01 LONG TERM (CURRENT) USE OF ANTICOAGULANTS: ICD-10-CM

## 2019-07-26 DIAGNOSIS — I48.0 PAROXYSMAL ATRIAL FIBRILLATION: ICD-10-CM

## 2019-07-26 DIAGNOSIS — E66.9 OBESITY (BMI 30.0-34.9): ICD-10-CM

## 2019-07-26 DIAGNOSIS — E11.9 TYPE 2 DIABETES MELLITUS WITHOUT COMPLICATION, WITHOUT LONG-TERM CURRENT USE OF INSULIN: Chronic | ICD-10-CM

## 2019-07-26 DIAGNOSIS — Z94.4 S/P LIVER TRANSPLANT: ICD-10-CM

## 2019-07-26 DIAGNOSIS — I48.91 A-FIB: ICD-10-CM

## 2019-07-26 PROCEDURE — 93005 EKG 12-LEAD: ICD-10-PCS | Mod: S$GLB,,, | Performed by: INTERNAL MEDICINE

## 2019-07-26 PROCEDURE — 2024F PR 7 FIELD PHOTOS WITH INTERP/ REVIEW: ICD-10-PCS | Mod: S$GLB,,, | Performed by: INTERNAL MEDICINE

## 2019-07-26 PROCEDURE — 99999 PR PBB SHADOW E&M-EST. PATIENT-LVL III: CPT | Mod: PBBFAC,,, | Performed by: INTERNAL MEDICINE

## 2019-07-26 PROCEDURE — 93005 ELECTROCARDIOGRAM TRACING: CPT | Mod: S$GLB,,, | Performed by: INTERNAL MEDICINE

## 2019-07-26 PROCEDURE — 3044F PR MOST RECENT HEMOGLOBIN A1C LEVEL <7.0%: ICD-10-PCS | Mod: CPTII,S$GLB,, | Performed by: INTERNAL MEDICINE

## 2019-07-26 PROCEDURE — 3075F SYST BP GE 130 - 139MM HG: CPT | Mod: CPTII,S$GLB,, | Performed by: INTERNAL MEDICINE

## 2019-07-26 PROCEDURE — 99214 PR OFFICE/OUTPT VISIT, EST, LEVL IV, 30-39 MIN: ICD-10-PCS | Mod: S$GLB,,, | Performed by: INTERNAL MEDICINE

## 2019-07-26 PROCEDURE — 93010 ELECTROCARDIOGRAM REPORT: CPT | Mod: S$GLB,,, | Performed by: INTERNAL MEDICINE

## 2019-07-26 PROCEDURE — 3008F BODY MASS INDEX DOCD: CPT | Mod: CPTII,S$GLB,, | Performed by: INTERNAL MEDICINE

## 2019-07-26 PROCEDURE — 99999 PR PBB SHADOW E&M-EST. PATIENT-LVL III: ICD-10-PCS | Mod: PBBFAC,,, | Performed by: INTERNAL MEDICINE

## 2019-07-26 PROCEDURE — 2024F 7 FLD RTA PHOTO EVC RTNOPTHY: CPT | Mod: S$GLB,,, | Performed by: INTERNAL MEDICINE

## 2019-07-26 PROCEDURE — 3075F PR MOST RECENT SYSTOLIC BLOOD PRESS GE 130-139MM HG: ICD-10-PCS | Mod: CPTII,S$GLB,, | Performed by: INTERNAL MEDICINE

## 2019-07-26 PROCEDURE — 99214 OFFICE O/P EST MOD 30 MIN: CPT | Mod: S$GLB,,, | Performed by: INTERNAL MEDICINE

## 2019-07-26 PROCEDURE — 3079F PR MOST RECENT DIASTOLIC BLOOD PRESSURE 80-89 MM HG: ICD-10-PCS | Mod: CPTII,S$GLB,, | Performed by: INTERNAL MEDICINE

## 2019-07-26 PROCEDURE — 93010 EKG 12-LEAD: ICD-10-PCS | Mod: S$GLB,,, | Performed by: INTERNAL MEDICINE

## 2019-07-26 PROCEDURE — 3079F DIAST BP 80-89 MM HG: CPT | Mod: CPTII,S$GLB,, | Performed by: INTERNAL MEDICINE

## 2019-07-26 PROCEDURE — 3008F PR BODY MASS INDEX (BMI) DOCUMENTED: ICD-10-PCS | Mod: CPTII,S$GLB,, | Performed by: INTERNAL MEDICINE

## 2019-07-26 PROCEDURE — 3044F HG A1C LEVEL LT 7.0%: CPT | Mod: CPTII,S$GLB,, | Performed by: INTERNAL MEDICINE

## 2019-07-26 RX ORDER — INSULIN ASPART 100 [IU]/ML
INJECTION, SOLUTION INTRAVENOUS; SUBCUTANEOUS
Qty: 15 ML | Refills: 0 | Status: SHIPPED | OUTPATIENT
Start: 2019-07-26 | End: 2019-08-15

## 2019-07-26 RX ORDER — AMLODIPINE BESYLATE 10 MG/1
10 TABLET ORAL DAILY
Qty: 90 TABLET | Refills: 3 | Status: SHIPPED | OUTPATIENT
Start: 2019-07-26 | End: 2020-06-26

## 2019-07-26 RX ORDER — METOPROLOL SUCCINATE 50 MG/1
50 TABLET, EXTENDED RELEASE ORAL DAILY
Qty: 90 TABLET | Refills: 3 | Status: SHIPPED | OUTPATIENT
Start: 2019-07-26 | End: 2020-09-08

## 2019-07-26 NOTE — PROGRESS NOTES
Subjective:    Patient ID:  Roman Hodges is a 59 y.o. male who presents for follow-up of Atrial Fibrillation and Hypertension      HPI     58 y/o Yi speaking male with hx of HTN, DM, PAFib, HFrEF with return to normal function (previously 30%, last 2DE with 55%) and HCC s/p recent OLTx. Patient was initially admitted with AF RVR after presenting with near syncopal episode and spontaneously converted. At that time, 2DE with EF 30% with akinetic: mid anteroseptal, apical anterior, apical septal, apical lateral and apex and hypokinetic: mid inferoseptal and apical inferior walls. Again presented with similar symptoms, started on BB and DOAC and discharged home. Seen by Dr Sargent and was complaining of exertional epigastric/substernal pain and was admitted for ACS r/o. Had LHC with nonobstructive CAD and EF on V-gram normal without WMA's. Clinically improved and discharged home. Repeat 2DE with normalization of function to 55%. Did well post discharge, but then began having episodes of HTN urgency and had presented multiple times to ED with SBP >200.   Last clinic visit BP was controlled and since then has had Oltx successfully and doing well. BP meds had been changed and has been having elevated readings.   Denies regular CP, SOB, palps, orthopnea, PND, syncope, palps. Tolerating meds well and compliant.    Review of Systems   Constitution: Negative for malaise/fatigue.   HENT: Negative for congestion.    Eyes: Negative for blurred vision.   Cardiovascular: Negative for chest pain, claudication, cyanosis, dyspnea on exertion, irregular heartbeat, leg swelling, near-syncope, orthopnea, palpitations, paroxysmal nocturnal dyspnea and syncope.   Respiratory: Negative for shortness of breath.    Endocrine: Negative for polyuria.   Hematologic/Lymphatic: Negative for bleeding problem.   Skin: Negative for itching and rash.   Musculoskeletal: Negative for joint swelling, muscle cramps and muscle weakness.    Gastrointestinal: Negative for abdominal pain, hematemesis, hematochezia, melena, nausea and vomiting.   Genitourinary: Negative for dysuria and hematuria.   Neurological: Negative for dizziness, focal weakness, headaches, light-headedness, loss of balance and weakness.   Psychiatric/Behavioral: Negative for depression. The patient is not nervous/anxious.         Objective:    Physical Exam   Constitutional: He is oriented to person, place, and time. He appears well-developed and well-nourished.   HENT:   Head: Normocephalic and atraumatic.   Neck: Neck supple. No JVD present.   Cardiovascular: Normal rate, regular rhythm and normal heart sounds.   Pulses:       Carotid pulses are 2+ on the right side, and 2+ on the left side.       Radial pulses are 2+ on the right side, and 2+ on the left side.        Femoral pulses are 2+ on the right side, and 2+ on the left side.       Dorsalis pedis pulses are 2+ on the right side, and 2+ on the left side.        Posterior tibial pulses are 2+ on the right side, and 2+ on the left side.   Pulmonary/Chest: Effort normal and breath sounds normal.   Abdominal: Soft. Bowel sounds are normal.   Musculoskeletal: He exhibits no edema.   Neurological: He is alert and oriented to person, place, and time.   Skin: Skin is warm and dry.   Psychiatric: He has a normal mood and affect. His behavior is normal. Thought content normal.         Assessment:       1. Essential hypertension    2. Paroxysmal atrial fibrillation    3. Acute systolic heart failure    4. Long term (current) use of anticoagulants    5. S/P liver transplant    6. Long-term use of immunosuppressant medication    7. Obesity (BMI 30.0-34.9)    8. Type 2 diabetes mellitus without complication, without long-term current use of insulin    9. Obstructive sleep apnea on CPAP    10. Prophylactic immunotherapy    11. Gastroesophageal reflux disease, esophagitis presence not specified      60 y/o pt with hx and presentation as  above. Doing well from a cardiac perspective and compensated from a HF perspective. Needs better BP control and will re-initiate a similar regimen to previous. Discussed the etiology, evaluation, and management of HTN. Discussed the importance of med compliance, heart healthy diet, and regular exercise.          Plan:       -D/C lopressor   -Start Toprol 25 mg daily  -Start amlodipine 5 mg daily  -f/u in 1 month

## 2019-07-26 NOTE — TELEPHONE ENCOUNTER
----- Message from Saumya Carvajal sent at 7/26/2019  8:10 AM CDT -----  Contact: Kati pt's wife   Rx Refill/Request     Is this a Refill or New Rx: Refill      Rx Name and Strength: insulin aspart U-100 (NOVOLOG) 100 unit/mL (3 mL) InPn pen    Preferred Pharmacy with phone number: [facility]     Communication Preference: [phone]      Additional Information: Pt is currently out of medication

## 2019-07-27 RX ORDER — TACROLIMUS 1 MG/1
3 CAPSULE ORAL EVERY 12 HOURS
Qty: 180 CAPSULE | Refills: 11 | Status: SHIPPED | OUTPATIENT
Start: 2019-07-27 | End: 2019-08-15 | Stop reason: SDUPTHER

## 2019-07-28 ENCOUNTER — PATIENT MESSAGE (OUTPATIENT)
Dept: CARDIOLOGY | Facility: CLINIC | Age: 59
End: 2019-07-28

## 2019-07-29 ENCOUNTER — OFFICE VISIT (OUTPATIENT)
Dept: ENDOCRINOLOGY | Facility: CLINIC | Age: 59
End: 2019-07-29
Payer: COMMERCIAL

## 2019-07-29 ENCOUNTER — LAB VISIT (OUTPATIENT)
Dept: LAB | Facility: HOSPITAL | Age: 59
End: 2019-07-29
Attending: SURGERY
Payer: COMMERCIAL

## 2019-07-29 ENCOUNTER — TELEPHONE (OUTPATIENT)
Dept: TRANSPLANT | Facility: CLINIC | Age: 59
End: 2019-07-29

## 2019-07-29 ENCOUNTER — PATIENT MESSAGE (OUTPATIENT)
Dept: ENDOCRINOLOGY | Facility: CLINIC | Age: 59
End: 2019-07-29

## 2019-07-29 ENCOUNTER — PATIENT MESSAGE (OUTPATIENT)
Dept: CARDIOLOGY | Facility: CLINIC | Age: 59
End: 2019-07-29

## 2019-07-29 VITALS
BODY MASS INDEX: 31.28 KG/M2 | HEART RATE: 80 BPM | DIASTOLIC BLOOD PRESSURE: 89 MMHG | WEIGHT: 199.31 LBS | RESPIRATION RATE: 16 BRPM | HEIGHT: 67 IN | SYSTOLIC BLOOD PRESSURE: 141 MMHG

## 2019-07-29 DIAGNOSIS — Z94.4 LIVER REPLACED BY TRANSPLANT: ICD-10-CM

## 2019-07-29 DIAGNOSIS — T38.0X5A ADRENAL CORTICAL STEROIDS CAUSING ADVERSE EFFECT IN THERAPEUTIC USE: ICD-10-CM

## 2019-07-29 DIAGNOSIS — E11.9 TYPE 2 DIABETES MELLITUS WITHOUT COMPLICATION, WITHOUT LONG-TERM CURRENT USE OF INSULIN: Primary | Chronic | ICD-10-CM

## 2019-07-29 DIAGNOSIS — E66.9 OBESITY (BMI 30.0-34.9): ICD-10-CM

## 2019-07-29 LAB
ALBUMIN SERPL BCP-MCNC: 3.7 G/DL (ref 3.5–5.2)
ALP SERPL-CCNC: 176 U/L (ref 55–135)
ALT SERPL W/O P-5'-P-CCNC: 130 U/L (ref 10–44)
ANION GAP SERPL CALC-SCNC: 10 MMOL/L (ref 8–16)
AST SERPL-CCNC: 18 U/L (ref 10–40)
BASOPHILS # BLD AUTO: 0.15 K/UL (ref 0–0.2)
BASOPHILS NFR BLD: 2.1 % (ref 0–1.9)
BILIRUB DIRECT SERPL-MCNC: 0.2 MG/DL (ref 0.1–0.3)
BILIRUB SERPL-MCNC: 0.4 MG/DL (ref 0.1–1)
BUN SERPL-MCNC: 29 MG/DL (ref 6–20)
CALCIUM SERPL-MCNC: 9.2 MG/DL (ref 8.7–10.5)
CHLORIDE SERPL-SCNC: 104 MMOL/L (ref 95–110)
CO2 SERPL-SCNC: 27 MMOL/L (ref 23–29)
CREAT SERPL-MCNC: 1.3 MG/DL (ref 0.5–1.4)
DIFFERENTIAL METHOD: ABNORMAL
EOSINOPHIL # BLD AUTO: 0.3 K/UL (ref 0–0.5)
EOSINOPHIL NFR BLD: 3.8 % (ref 0–8)
ERYTHROCYTE [DISTWIDTH] IN BLOOD BY AUTOMATED COUNT: 13.5 % (ref 11.5–14.5)
EST. GFR  (AFRICAN AMERICAN): >60 ML/MIN/1.73 M^2
EST. GFR  (NON AFRICAN AMERICAN): 59.3 ML/MIN/1.73 M^2
GLUCOSE SERPL-MCNC: 100 MG/DL (ref 70–110)
HCT VFR BLD AUTO: 38.3 % (ref 40–54)
HGB BLD-MCNC: 12.8 G/DL (ref 14–18)
IMM GRANULOCYTES # BLD AUTO: 0.13 K/UL (ref 0–0.04)
IMM GRANULOCYTES NFR BLD AUTO: 1.8 % (ref 0–0.5)
LYMPHOCYTES # BLD AUTO: 0.9 K/UL (ref 1–4.8)
LYMPHOCYTES NFR BLD: 12.3 % (ref 18–48)
MCH RBC QN AUTO: 29.4 PG (ref 27–31)
MCHC RBC AUTO-ENTMCNC: 33.4 G/DL (ref 32–36)
MCV RBC AUTO: 88 FL (ref 82–98)
MONOCYTES # BLD AUTO: 0.6 K/UL (ref 0.3–1)
MONOCYTES NFR BLD: 7.8 % (ref 4–15)
NEUTROPHILS # BLD AUTO: 5.2 K/UL (ref 1.8–7.7)
NEUTROPHILS NFR BLD: 72.2 % (ref 38–73)
NRBC BLD-RTO: 0 /100 WBC
PLATELET # BLD AUTO: 274 K/UL (ref 150–350)
PMV BLD AUTO: 9.3 FL (ref 9.2–12.9)
POTASSIUM SERPL-SCNC: 4.1 MMOL/L (ref 3.5–5.1)
PROT SERPL-MCNC: 6.5 G/DL (ref 6–8.4)
RBC # BLD AUTO: 4.36 M/UL (ref 4.6–6.2)
SODIUM SERPL-SCNC: 141 MMOL/L (ref 136–145)
TACROLIMUS BLD-MCNC: 8.1 NG/ML (ref 5–15)
WBC # BLD AUTO: 7.15 K/UL (ref 3.9–12.7)

## 2019-07-29 PROCEDURE — 3008F PR BODY MASS INDEX (BMI) DOCUMENTED: ICD-10-PCS | Mod: CPTII,S$GLB,, | Performed by: INTERNAL MEDICINE

## 2019-07-29 PROCEDURE — 3079F PR MOST RECENT DIASTOLIC BLOOD PRESSURE 80-89 MM HG: ICD-10-PCS | Mod: CPTII,S$GLB,, | Performed by: INTERNAL MEDICINE

## 2019-07-29 PROCEDURE — 99213 PR OFFICE/OUTPT VISIT, EST, LEVL III, 20-29 MIN: ICD-10-PCS | Mod: S$GLB,,, | Performed by: INTERNAL MEDICINE

## 2019-07-29 PROCEDURE — 3077F PR MOST RECENT SYSTOLIC BLOOD PRESSURE >= 140 MM HG: ICD-10-PCS | Mod: CPTII,S$GLB,, | Performed by: INTERNAL MEDICINE

## 2019-07-29 PROCEDURE — 99213 OFFICE O/P EST LOW 20 MIN: CPT | Mod: S$GLB,,, | Performed by: INTERNAL MEDICINE

## 2019-07-29 PROCEDURE — 80053 COMPREHEN METABOLIC PANEL: CPT

## 2019-07-29 PROCEDURE — 85025 COMPLETE CBC W/AUTO DIFF WBC: CPT

## 2019-07-29 PROCEDURE — 82248 BILIRUBIN DIRECT: CPT

## 2019-07-29 PROCEDURE — 80197 ASSAY OF TACROLIMUS: CPT

## 2019-07-29 PROCEDURE — 3044F PR MOST RECENT HEMOGLOBIN A1C LEVEL <7.0%: ICD-10-PCS | Mod: CPTII,S$GLB,, | Performed by: INTERNAL MEDICINE

## 2019-07-29 PROCEDURE — 3044F HG A1C LEVEL LT 7.0%: CPT | Mod: CPTII,S$GLB,, | Performed by: INTERNAL MEDICINE

## 2019-07-29 PROCEDURE — 36415 COLL VENOUS BLD VENIPUNCTURE: CPT

## 2019-07-29 PROCEDURE — 3077F SYST BP >= 140 MM HG: CPT | Mod: CPTII,S$GLB,, | Performed by: INTERNAL MEDICINE

## 2019-07-29 PROCEDURE — 3008F BODY MASS INDEX DOCD: CPT | Mod: CPTII,S$GLB,, | Performed by: INTERNAL MEDICINE

## 2019-07-29 PROCEDURE — 99999 PR PBB SHADOW E&M-EST. PATIENT-LVL IV: CPT | Mod: PBBFAC,,,

## 2019-07-29 PROCEDURE — 3079F DIAST BP 80-89 MM HG: CPT | Mod: CPTII,S$GLB,, | Performed by: INTERNAL MEDICINE

## 2019-07-29 PROCEDURE — 99999 PR PBB SHADOW E&M-EST. PATIENT-LVL IV: ICD-10-PCS | Mod: PBBFAC,,,

## 2019-07-29 NOTE — PATIENT INSTRUCTIONS
Please follow-up with your PCP as discussed, and continue to send BG logs through patient portal and/or clinic drop off.     Need to schedule Diabetes education appointment.

## 2019-07-29 NOTE — PROGRESS NOTES
Subjective:      Patient ID: Roman Hodges is a 59 y.o. male.    Chief Complaint:    No chief complaint on file.    History of Present Illness  This is a follow-up visit after recent hospitalization             Endocrinology was consulted for management of hyperglycemia during previous [facility] Admission. Consult was documented as follows:  Admission Date: 7/3/2019  Hospital Length of Stay: 6 days  Discharge Date and Time:  07/09/2019 4:28 PM  Attending Physician: Oscar Altman Jr., MD   Discharging Provider: Jerri Funes NP  Primary Care Provider: Jose Angel Munson MD  Post-Operative Day: 5    Reason for Consult: Management of T2DM, Hyperglycemia      Surgical Procedure and Date: Liver transplant 7/4/19     Diabetes diagnosis year:   November 2018 per Dr. Muhammad PCP ( [address]).     Home Diabetes Medications:  none        Lab Results   Component Value Date     HGBA1C 5.5 07/03/2019         How often checking glucose at home? Not checking         Diabetes Complications include:     none     Complicating diabetes co morbidities:   CIRRHOSIS and Glucocorticoid use         HPI:   Patient is a 59 y.o. 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 Patient admitted for liver transplant. Endocrinology consulted for BG/ DM management.         Discharge Medication List       START taking these medications    apixaban 5 mg Tab  Commonly known as:  ELIQUIS  [place] manolo tableta (5 mg en total) por vía oral 2 veces al día.  (Take 1 tablet (5 mg total) by mouth 2 (two) times daily.)      bisacodyl 5 mg EC tablet  Commonly known as:  DULCOLAX  Take 2 tablets (10 mg total) by mouth every evening.      blood sugar diagnostic Strp  Test blood glucose 3 (three) times daily.      blood-glucose meter kit  Use as instructed      docusate sodium 100 MG capsule  Commonly known as:  COLACE  Take 1 capsule (100 mg total) by mouth 3 (three) times daily.      insulin aspart U-100 100  "unit/mL (3 mL) Inpn pen  Commonly known as:  NovoLOG  Sliding scale: 150-200 +1 unit; 201-250 +2 units; 251-300 +3 units; 301-350 +4 units; >350 +5 uits; TDD: 15 units      lancets Misc  Test blood glucose 3 (three) times daily.      metoprolol tartrate 50 MG tablet  Commonly known as:  LOPRESSOR  Take 1 tablet (50 mg total) by mouth 3 (three) times daily.      mycophenolate 180 MG Tbec  Commonly known as:  MYFORTIC  Take 4 tablets (720 mg total) by mouth 2 (two) times daily.      mycophenolate 250 mg Cap  Commonly known as:  CELLCEPT  Take 4 capsules (1,000 mg total) by mouth 2 (two) times daily.      NIFEdipine 60 MG (OSM) 24 hr tablet  Commonly known as:  PROCARDIA-XL  [place] manolo tableta (60 mg en total) por vía oral diariamente.  (Take 1 tablet (60 mg total) by mouth once daily.)  Start taking on:  7/10/2019      oxyCODONE 10 mg Tab immediate release tablet  Commonly known as:  ROXICODONE  Take 0.5-1 tablets (5-10 mg total) by mouth every 4 (four) hours as needed.      pen needle, diabetic 32 gauge x 5/32" Ndle  Commonly known as:  EASY COMFORT PEN NEEDLES  Inject 1 each into the skin 3 (three) times daily.      predniSONE 5 MG tablet  Commonly known as:  DELTASONE  7/10-7/16 Take 20mg by mouth once daily;  From 7/17-7/23 take 15mg daily;  From 7/24-7/30 take 10mg daily;  From 7/31-8/6 5mg daily then stop on 8/7/19.      SITagliptin 100 MG Tab  Commonly known as:  JANUVIA  [place] manolo tableta (100 mg en total) por vía oral diariamente.  (Take 1 tablet (100 mg total) by mouth once daily.)      sulfamethoxazole-trimethoprim 400-80mg 400-80 mg per tablet  Commonly known as:  BACTRIM,SEPTRA  Take 1 tablet by mouth once daily. Stop 1/2/2020      * tacrolimus 1 MG Cap  Commonly known as:  PROGRAF  Take 6 capsules (6 mg total) by mouth every 12 (twelve) hours.      * tacrolimus 1 MG Cap  Commonly known as:  PROGRAF  Take 3 capsules (3 mg total) by mouth every 12 (twelve) hours.      valGANciclovir 450 mg Tab  Commonly known " as:  VALCYTE  Take 1 tablet (450 mg total) by mouth once daily. Stop 10/3/19           * This list has 2 medication(s) that are the same as other medications prescribed for you. Read the directions carefully, and ask your doctor or other care provider to review them with you.              CONTINUE taking these medications    esomeprazole 40 MG capsule  Commonly known as:  NEXIUM  Take 1 capsule (40 mg total) by mouth once daily.          STOP taking these medications    amLODIPine 5 MG tablet  Commonly known as:  NORVASC      busPIRone 5 MG Tab  Commonly known as:  BUSPAR      doxycycline 100 MG tablet  Commonly known as:  VIBRA-TABS      losartan-hydrochlorothiazide 100-25 mg 100-25 mg per tablet  Commonly known as:  HYZAAR      metoprolol succinate 50 MG 24 hr tablet  Commonly known as:  TOPROL-XL      potassium chloride SA 20 MEQ tablet  Commonly known as:  K-DUR,KLOR-CON      warfarin 5 MG tablet  Commonly known as:  COUMADIN                               Discharge Follow-up Clinic Visit      Etiology of the hyperglycemia:known T2DM, steroid induced hyperglycemia, post transplant hyperglycemia     Discharge DM Regimen:  Abdulaziz Hackett NP 07/09/2019  Recommend patient discharge on Januvia 100 mg PO daily and Novolog correction scale 150/50.  Patient has no history of pancreatitis or medullary thyroid cancer.  Provided BG logs with dosing instructions at bedside.  Reviewed topics related to DM including: the need for insulin, how insulin works, what makes it a high risk medication, the importance of follow up with endocrine provider, importance of and how to check BG, how to record BG on logs, how to administer insulin, appropriate insulin administration sites, importance of rotating injection sites, hyper/hypoglycemia, how and when to treat hypoglycemia, when to hold insulin, how the correction scale works, and when to seek medical attention.  Patient verbalized understanding, answered all questions to patient's  satisfaction.  Will setup patient for hospital discharge follow up appointment.    Was able to fill prescription for medications and supplies.     Missed doses?      Prior medications not tolerated or Failed treatments:   Never took     # times a day testing 3 x day.   Log reviewed: yes - see picture    Hypoglycemic event at home? None   If yes, needed assistance?   Hypoglycemia unawareness None    Typical meals at home:   Breakfast: Eggs, Toast, Plantain, Pancakes  Lunch: chicken with rice, backed vegetables, and salad.   Dinner: Yoko bread with chicken, alvocado,    Snacks : rice cakes and fruit occasionally.       DM Education - last visit:  Needs to be scheduled.       With regards to reason for admit:  Doing better       Review of Systems   Constitutional: Positive for appetite change.   Gastrointestinal: Negative for nausea.   Endocrine: Positive for polydipsia and polyuria.   All other systems reviewed and are negative.  Endocrine: No Polyuria polydipsia nocturia or vision changes    Objective:   Physical Exam   Constitutional: He is oriented to person, place, and time. He appears well-developed and well-nourished.   Eyes: Pupils are equal, round, and reactive to light.   Neck: Normal range of motion.   Pulmonary/Chest: Effort normal.   Abdominal: Soft.   Surgical wound appears to be intact, healing, and without signs of infection.    Musculoskeletal: Normal range of motion.   Neurological: He is alert and oriented to person, place, and time.   Skin: Skin is warm and dry.   Injection sites are ok. No lipo hypertrophy or atrophy noted.   Nursing note and vitals reviewed.    There is no height or weight on file to calculate BMI.    Lab Review:   Lab Results   Component Value Date    HGBA1C 5.5 07/03/2019     Lab Results   Component Value Date    CHOL 145 12/14/2018    HDL 31 (L) 12/14/2018    LDLCALC 94.2 12/14/2018    TRIG 99 12/14/2018    CHOLHDL 21.4 12/14/2018     Lab Results   Component Value Date    NA  141 07/29/2019    K 4.1 07/29/2019     07/29/2019    CO2 27 07/29/2019     07/29/2019    BUN 29 (H) 07/29/2019    CREATININE 1.3 07/29/2019    CALCIUM 9.2 07/29/2019    PROT 6.5 07/29/2019    ALBUMIN 3.7 07/29/2019    BILITOT 0.4 07/29/2019    ALKPHOS 176 (H) 07/29/2019    AST 18 07/29/2019     (H) 07/29/2019    ANIONGAP 10 07/29/2019    ESTGFRAFRICA >60.0 07/29/2019    EGFRNONAA 59.3 (A) 07/29/2019    TSH 1.373 07/07/2019       Assessment and Plan   Reviewed goals of therapy are to get the best control we can without hypoglycemia    Refer to DM education.     Medication changes: no changes  Continue Januvia 100mg daily   Continue Novolog 2 units TIDWM. Expect to discontinue on next steroid taper. However BG logs show persistent prandial excursions.   Continue Novolog correction scale:       150 - 200 + 1 unit   201 - 250 + 2 units   251 - 300 + 3 units   301 - 350 + 4 units    > 350   + 5 units      Reviewed patient's current insulin regimen. Clarified proper insulin dose and timing in relation to meals, etc. Insulin injection sites and proper rotation instructed.       -Advised frequent self blood glucose monitoring.  Patient encouraged to document glucose results and send them to Endo clinic via patient portal and/or patient drop off. Expect need for dosage adjustments as steroids continue to taper downwards in dose.     -Hypoglycemia precautions discussed. Instructed on precautions before driving.      -Close adherence to lifestyle changes recommended.      - After discussing evaluation results, patient agrees with proposed plan of care, has no further concerns, and agrees to follow-up with his PCP and primary care tranpslant team.     Return to clinic PRN and for DM Education appointment.       Problem List Items Addressed This Visit        Endocrine    Type 2 diabetes mellitus, without long-term current use of insulin (Chronic)    Current Assessment & Plan     Please see above          Obesity (BMI 30.0-34.9)    Current Assessment & Plan     may contribute to insulin resistance              Other    Adrenal cortical steroids causing adverse effect in therapeutic use    Current Assessment & Plan     On steroid therapy per transplant team; may elevate BG readings

## 2019-07-30 ENCOUNTER — PATIENT MESSAGE (OUTPATIENT)
Dept: TRANSPLANT | Facility: CLINIC | Age: 59
End: 2019-07-30

## 2019-07-30 ENCOUNTER — PATIENT MESSAGE (OUTPATIENT)
Dept: CARDIOLOGY | Facility: CLINIC | Age: 59
End: 2019-07-30

## 2019-07-31 ENCOUNTER — CLINICAL SUPPORT (OUTPATIENT)
Dept: DIABETES | Facility: CLINIC | Age: 59
End: 2019-07-31
Payer: COMMERCIAL

## 2019-07-31 ENCOUNTER — LAB VISIT (OUTPATIENT)
Dept: LAB | Facility: HOSPITAL | Age: 59
End: 2019-07-31
Attending: SURGERY
Payer: COMMERCIAL

## 2019-07-31 ENCOUNTER — TELEPHONE (OUTPATIENT)
Dept: TRANSPLANT | Facility: CLINIC | Age: 59
End: 2019-07-31

## 2019-07-31 DIAGNOSIS — E11.9 TYPE 2 DIABETES MELLITUS WITHOUT COMPLICATION, WITHOUT LONG-TERM CURRENT USE OF INSULIN: Chronic | ICD-10-CM

## 2019-07-31 DIAGNOSIS — Z94.4 LIVER REPLACED BY TRANSPLANT: ICD-10-CM

## 2019-07-31 LAB
ALBUMIN SERPL BCP-MCNC: 3.7 G/DL (ref 3.5–5.2)
ALP SERPL-CCNC: 144 U/L (ref 55–135)
ALT SERPL W/O P-5'-P-CCNC: 83 U/L (ref 10–44)
ANION GAP SERPL CALC-SCNC: 8 MMOL/L (ref 8–16)
AST SERPL-CCNC: 14 U/L (ref 10–40)
BASOPHILS # BLD AUTO: 0.12 K/UL (ref 0–0.2)
BASOPHILS NFR BLD: 1.8 % (ref 0–1.9)
BILIRUB DIRECT SERPL-MCNC: 0.3 MG/DL (ref 0.1–0.3)
BILIRUB SERPL-MCNC: 0.5 MG/DL (ref 0.1–1)
BUN SERPL-MCNC: 23 MG/DL (ref 6–20)
CALCIUM SERPL-MCNC: 9.8 MG/DL (ref 8.7–10.5)
CHLORIDE SERPL-SCNC: 102 MMOL/L (ref 95–110)
CO2 SERPL-SCNC: 27 MMOL/L (ref 23–29)
CREAT SERPL-MCNC: 1.3 MG/DL (ref 0.5–1.4)
DIFFERENTIAL METHOD: ABNORMAL
EOSINOPHIL # BLD AUTO: 0.3 K/UL (ref 0–0.5)
EOSINOPHIL NFR BLD: 3.8 % (ref 0–8)
ERYTHROCYTE [DISTWIDTH] IN BLOOD BY AUTOMATED COUNT: 13.7 % (ref 11.5–14.5)
EST. GFR  (AFRICAN AMERICAN): >60 ML/MIN/1.73 M^2
EST. GFR  (NON AFRICAN AMERICAN): 59.3 ML/MIN/1.73 M^2
GLUCOSE SERPL-MCNC: 99 MG/DL (ref 70–110)
HCT VFR BLD AUTO: 38.5 % (ref 40–54)
HGB BLD-MCNC: 13 G/DL (ref 14–18)
IMM GRANULOCYTES # BLD AUTO: 0.12 K/UL (ref 0–0.04)
IMM GRANULOCYTES NFR BLD AUTO: 1.8 % (ref 0–0.5)
LYMPHOCYTES # BLD AUTO: 0.9 K/UL (ref 1–4.8)
LYMPHOCYTES NFR BLD: 14.2 % (ref 18–48)
MCH RBC QN AUTO: 29.5 PG (ref 27–31)
MCHC RBC AUTO-ENTMCNC: 33.8 G/DL (ref 32–36)
MCV RBC AUTO: 87 FL (ref 82–98)
MONOCYTES # BLD AUTO: 0.6 K/UL (ref 0.3–1)
MONOCYTES NFR BLD: 8.9 % (ref 4–15)
NEUTROPHILS # BLD AUTO: 4.6 K/UL (ref 1.8–7.7)
NEUTROPHILS NFR BLD: 69.5 % (ref 38–73)
NRBC BLD-RTO: 0 /100 WBC
PLATELET # BLD AUTO: 288 K/UL (ref 150–350)
PMV BLD AUTO: 9.3 FL (ref 9.2–12.9)
POTASSIUM SERPL-SCNC: 4.2 MMOL/L (ref 3.5–5.1)
PROT SERPL-MCNC: 6.8 G/DL (ref 6–8.4)
RBC # BLD AUTO: 4.41 M/UL (ref 4.6–6.2)
SODIUM SERPL-SCNC: 137 MMOL/L (ref 136–145)
TACROLIMUS BLD-MCNC: 6.9 NG/ML (ref 5–15)
WBC # BLD AUTO: 6.55 K/UL (ref 3.9–12.7)

## 2019-07-31 PROCEDURE — G0108 DIAB MANAGE TRN  PER INDIV: HCPCS | Mod: S$GLB,,, | Performed by: INTERNAL MEDICINE

## 2019-07-31 PROCEDURE — 82248 BILIRUBIN DIRECT: CPT

## 2019-07-31 PROCEDURE — 85025 COMPLETE CBC W/AUTO DIFF WBC: CPT

## 2019-07-31 PROCEDURE — 80053 COMPREHEN METABOLIC PANEL: CPT

## 2019-07-31 PROCEDURE — 80197 ASSAY OF TACROLIMUS: CPT

## 2019-07-31 PROCEDURE — 36415 COLL VENOUS BLD VENIPUNCTURE: CPT

## 2019-07-31 PROCEDURE — G0108 PR DIAB MANAGE TRN  PER INDIV: ICD-10-PCS | Mod: S$GLB,,, | Performed by: INTERNAL MEDICINE

## 2019-07-31 NOTE — PROGRESS NOTES
Diabetes Education  Author: Lyly Rae RN, CDE  Date: 7/31/2019    Diabetes Care Management Summary  Diabetes Education Record Assessment/Progress: Initial  Current Diabetes Risk Level: Low     Diabetes Type  Diabetes Type : Type II    Diabetes History  Diabetes Diagnosis: 1-3 years  Current Treatment: Insulin(Novolog 2 units ac +slide; Januvia; Prednisone 5 mg until 8/6/19 has been on tapering doses since transplant)  Reviewed Problem List with Patient: Yes    Health Maintenance was reviewed today with patient. Discussed with patient importance of routine eye exams, foot exams/foot care, blood work (i.e.: A1c, microalbumin, and lipid), dental visits, yearly flu vaccine, and pneumonia vaccine as indicated by PCP. Patient verbalized understanding.     Health Maintenance Topics with due status: Not Due       Topic Last Completion Date    Colonoscopy 04/28/2017    Influenza Vaccine 04/23/2018    TETANUS VACCINE 08/15/2018    Pneumococcal Vaccine (Highest Risk) 11/02/2018    Foot Exam 11/02/2018    Urine Microalbumin 11/02/2018    Lipid Panel 12/14/2018    Eye Exam 12/25/2018    Hemoglobin A1c 07/03/2019     There are no preventive care reminders to display for this patient.    Nutrition  Meal Planning: 3 meals per day, water, artificial sweeteners  What type of sweetener do you use?: Stevia/Truvia  What type of beverages do you drink?: diet soda/tea, juice, water  Meal Plan 24 Hour Recall - Breakfast: Eggs, wheat bread #2, decaf tea, oatmeal pancake, SF syrup, sometimes juice  Meal Plan 24 Hour Recall - Lunch: 1 cup rice, chicken or meat, salad, or yogurt/banana  Meal Plan 24 Hour Recall - Dinner: chicken eulalio/chesse, no rice at dinner  Meal Plan 24 Hour Recall - Snack: States does not snack    Monitoring   Self Monitoring : FBS ; Lunch 144-191; Dinner 111-168  Blood Glucose Logs: Yes  Do you use a personal continuous glucose monitor?: No  In the last month, how often have you had a low blood sugar reaction?:  never    Exercise   Exercise Type: none    Current Diabetes Treatment   Current Treatment: Insulin(Novolog 2 units ac +slide; Januvia; Prednisone 5 mg until 8/6/19 has been on tapering doses since transplant)    Social History  Preferred Learning Method: Face to Face  Primary Support: Self, Spouse  Smoking Status: Ex Smoker  Alcohol Use: Never    DDS-2 Score  ( > 3 = SIGNIFICANT DISTRESS): 1.5    Barriers to Change  Barriers to Change: None  Learning Challenges : None(Pt and spouse speak English well)    Readiness to Learn   Readiness to Learn : Acceptance    Cultural Influences  Cultural Influences: None    Diabetes Education Assessment/Progress    Diabetes Disease Process (diabetes disease process and treatment options): Discussion, Comprehends Key Points, Written Materials Provided  Reviewed disease process and general progression. Discussed patients most likely cause of recently elevated Hgb A1c.  Long- and short-term complications discussed including risk for cardiovascular and cerebellar vascular events.  Discussed blood glucose goals specific to patient. Reviewed current treatment plan and discussed all treatment options available, especially dietary and lifestyle modifications, as well as medication additions and/or changes. Explained that often treatments used for transplant pts can interfere with how endogenous insulin works.    Nutrition (Incorporating nutritional management into one's lifestyle): Discussion, Instructed, Individual Session, Written Materials Provided, Comprehends Key Points  Patient has made dietary changes prior to his transplant. States is following a modified Keto diet.  Explained that keto would be void of carbs which are needed, however, pt seems to be making better food choices. Reviewed carb sources and appropriate amounts per meal and snack. The plate method for carb counting was discussed.  Reviewed proper measurement of foods.  We reviewed in detail portion sizes of common  foods and developed a meal plan that involves carb portions per each meal. Stressed importance of eating 3 balanced meals per day and reasonable snacks if needed.  Discussed tips for eating out. Emphasized importance of eliminating all sugary soft drinks, limiting consumption of fruit juices and sugar free drink options.   Reviewed label reading and how to determine appropriate serving sizes of specific carb containing foods. Recommended carb amounts per meal discussed, see below.    Physical Activity (incorporating physical activity into one's lifestyle): Discussion, Comprehends Key Points, Written Materials Provided  Discussed goals and benefits of regular physical activity. Encouraged physical activity as tolerated and to consult with HCP before beginning a regiment. Reviewed physical activity goals of >150 minutes weekly.     Medications (states correct name, dose, onset, peak, duration, side effects & timing of meds): Discussion, Comprehends Key Points, Written Materials Provided  Discussed timing and mechanism of action of current insulin. Reviewed need for rotation of injection sites, appropriate insulin storage, safe disposal of used sharps and insulin pen preparation and use. Discussed possible side effects and how to avoid these. Emphasized need to take rapid insulin injections 5-10 min prior to eating meals.    Monitoring (monitoring blood glucose/other parameters & using results): Discussion, Comprehends Key Points, Written Materials Provided  Discussed overall blood glucose goal.  Instructed patient to check glucose as instructed by HCP.  Reviewed need for updated BG logs for all endo, PCP, and education appts.    Acute Complications (preventing, detecting, and treating acute complications): Discussion, Comprehends Key Points, Written Materials Provided  Discussed hypoglycemia vs hyperglycemia symptoms and discussed appropriate treatments for each.   Patient was advised to carry a source of fast acting  carbs at all times.     Chronic Complications (preventing, detecting, and treating chronic complications): Discussion, Comprehends Key Points, Written Materials Provided  Reviewed annual diabetes care schedule and patient priorities.    Clinical (diabetes, other pertinent medical history, and relevant comorbidities reviewed during visit): Discussion, Comprehends Key Points, Written Materials Provided    Cognitive (knowledge of self-management skills, functional health literacy): Discussion, Comprehends Key Points, Written Materials Provided, Discussion    Psychosocial (emotional response to diabetes): Individual Session  No negative feelings toward diabetes dx or disease management noted.    Diabetes Distress and Support Systems: Individual Session  Discussed DDS score and what it means.    Behavioral (readiness for change, lifestyle practices, self-care behaviors): Individual Session  Appears motivated to make recommended changes.     Diabetes Meal Plan  Restrictions: Restricted Carbohydrate  Carbohydrate Per Meal: 45-60g  Carbohydrate Per Snack : 15-20g    Today's Self-Management Care Plan was developed with the patient's input and is based on barriers identified during today's assessment.    The long and short-term goals in the care plan were written with the patient/caregiver's input. The patient has agreed to work toward these goals to improve his overall diabetes control.      The patient received a copy of today's self-management plan and verbalized understanding of the care plan, goals, and all of today's instructions.      The patient was encouraged to communicate with his physician and care team regarding his condition(s) and treatment.  I provided the patient with my contact information today and encouraged him to contact me via phone or patient portal as needed.     Education Units of Time   Time Spent: 60 min

## 2019-07-31 NOTE — TELEPHONE ENCOUNTER
----- Message from Darshan Graves MD sent at 7/31/2019  2:40 PM CDT -----  Results ok. No action needed

## 2019-08-01 ENCOUNTER — NURSE TRIAGE (OUTPATIENT)
Dept: ADMINISTRATIVE | Facility: CLINIC | Age: 59
End: 2019-08-01

## 2019-08-01 ENCOUNTER — OFFICE VISIT (OUTPATIENT)
Dept: WOUND CARE | Facility: CLINIC | Age: 59
End: 2019-08-01
Payer: COMMERCIAL

## 2019-08-01 DIAGNOSIS — Z94.4 LIVER REPLACED BY TRANSPLANT: Primary | ICD-10-CM

## 2019-08-01 DIAGNOSIS — E11.9 TYPE 2 DIABETES MELLITUS WITHOUT COMPLICATION, WITHOUT LONG-TERM CURRENT USE OF INSULIN: Primary | Chronic | ICD-10-CM

## 2019-08-01 DIAGNOSIS — T81.89XA NON-HEALING SURGICAL WOUND, INITIAL ENCOUNTER: ICD-10-CM

## 2019-08-01 DIAGNOSIS — Z94.4 S/P LIVER TRANSPLANT: ICD-10-CM

## 2019-08-01 PROCEDURE — 99203 PR OFFICE/OUTPT VISIT, NEW, LEVL III, 30-44 MIN: ICD-10-PCS | Mod: S$GLB,,, | Performed by: CLINICAL NURSE SPECIALIST

## 2019-08-01 PROCEDURE — 3044F PR MOST RECENT HEMOGLOBIN A1C LEVEL <7.0%: ICD-10-PCS | Mod: CPTII,S$GLB,, | Performed by: CLINICAL NURSE SPECIALIST

## 2019-08-01 PROCEDURE — 3044F HG A1C LEVEL LT 7.0%: CPT | Mod: CPTII,S$GLB,, | Performed by: CLINICAL NURSE SPECIALIST

## 2019-08-01 PROCEDURE — 99203 OFFICE O/P NEW LOW 30 MIN: CPT | Mod: S$GLB,,, | Performed by: CLINICAL NURSE SPECIALIST

## 2019-08-01 PROCEDURE — 2024F PR 7 FIELD PHOTOS WITH INTERP/ REVIEW: ICD-10-PCS | Mod: S$GLB,,, | Performed by: CLINICAL NURSE SPECIALIST

## 2019-08-01 PROCEDURE — 2024F 7 FLD RTA PHOTO EVC RTNOPTHY: CPT | Mod: S$GLB,,, | Performed by: CLINICAL NURSE SPECIALIST

## 2019-08-01 PROCEDURE — 99999 PR PBB SHADOW E&M-EST. PATIENT-LVL II: CPT | Mod: PBBFAC,,, | Performed by: CLINICAL NURSE SPECIALIST

## 2019-08-01 PROCEDURE — 99999 PR PBB SHADOW E&M-EST. PATIENT-LVL II: ICD-10-PCS | Mod: PBBFAC,,, | Performed by: CLINICAL NURSE SPECIALIST

## 2019-08-01 NOTE — LETTER
August 1, 2019      Suhail Escoto MD  [address]  [facility] [address]           [facility]  [address]  [facility] [address]  Phone: [phone]          Patient: Roman Hodges   MR Number: [id number]   YOB: 1960   Date of Visit: 8/1/2019       Dear Dr. Suhail Escoto:    Thank you for referring Roman Hodges to me for evaluation. Attached you will find relevant portions of my assessment and plan of care.    If you have questions, please do not hesitate to call me. I look forward to following Roman Hodges along with you.    Sincerely,    Evelyn Espinoza, CNS    Enclosure  CC:  No Recipients    If you would like to receive this communication electronically, please contact [email or web address] or [phone] to request more information on [employer] access.    For providers and/or their staff who would like to refer a patient to [facility], please contact us through our one-stop-shop provider referral line, [facility], at [phone].    If you feel you have received this communication in error or would no longer like to receive these types of communications, please e-mail [email or web address]

## 2019-08-01 NOTE — PROGRESS NOTES
Subjective:       Patient ID: Roman Hodges is a 59 y.o. male.    Chief Complaint: Wound Check    This is new pt who was sent for wound eval, he is post liver tx of July 3rd, his wound was opened yesterday due to seroma,  He is back home and wife is caregiver, she is not comfortable doing wound care at this moment    Review of Systems   Constitutional: Negative for chills and fever.   Respiratory: Negative for cough and shortness of breath.    Cardiovascular: Negative for chest pain and leg swelling.   Gastrointestinal: Negative for abdominal distention and abdominal pain.   Genitourinary: Negative for hematuria.   Musculoskeletal: Negative for arthralgias and back pain.   Skin: Positive for wound. Negative for color change and rash.   Neurological: Negative for weakness and headaches.       Objective:      Physical Exam   Constitutional: He is oriented to person, place, and time. He appears well-developed and well-nourished.   Pulmonary/Chest: Effort normal. No respiratory distress. He has no wheezes.   Abdominal: Soft. Bowel sounds are normal. He exhibits no distension.   Musculoskeletal: Normal range of motion. He exhibits no edema.   Neurological: He is alert and oriented to person, place, and time.   Skin: Skin is warm and dry. No erythema.   Psychiatric: He has a normal mood and affect.       8/1  Chevron opened 7/31  It is 1-2 cm and  39 cm long, with depth of 1cm, Clean and no signs of infection, will just need good wound care to promote secondary closure.   I cleansed with VASHE cleanser,  Soak for 5 minutes then packed wound with Aquacel ag rope.  Covered with pad and secure with tape.     Assessment:       1. Type 2 diabetes mellitus without complication, without long-term current use of insulin    2. S/P liver transplant    3. Non-healing surgical wound, initial encounter        Plan:       Wound care as above. Consult Home Health   VASHE cleanser,  Soak for 5 minutes then packed wound with Aquacel  ag rope.  Covered with pad and secure with tape.   Message left with coordinator Delmy Oseguera with me in 2 weeks,  Reviewed infection signs and dietary concerns for wound healing  I have reviewed the plan of care with the patient and/ wife and they express understanding. I spent over 50% of this 30 minute visit in face to face counseling.

## 2019-08-02 ENCOUNTER — PATIENT MESSAGE (OUTPATIENT)
Dept: ENDOCRINOLOGY | Facility: HOSPITAL | Age: 59
End: 2019-08-02

## 2019-08-02 ENCOUNTER — TELEPHONE (OUTPATIENT)
Dept: TRANSPLANT | Facility: CLINIC | Age: 59
End: 2019-08-02

## 2019-08-02 NOTE — TELEPHONE ENCOUNTER
MARVIN received a request from patient's post liver transplant nurse coordinator Delmy that patients transplant incision was opened and he is now requiring wound care from home health SN.   Pts nurse coordinator brought wound care note and signed home health orders to MARVIN late on Thursday.   Since patient insured by [employer], MARVIN needing to send referral for home health services to [facility], phone [phone], fax [phone].    MARVIN faxed all needed documents including home health orders to [facility] on Thursday 8/01 at 4pm.   MARVIN received a voice mail and fax this morning from Yuliana at [facility] that the home health wound care orders for skilled nurse and supplies received and they will contact us once a provider is secured.        MARVIN reached out to patient and pts wife Kati ([phone]) this morning to provide them with this information.   Pt speaks Belgian, with limit English, but his wife speaks English.  MARVIN lvm for pts wife about home health referral and for her to call back confirming she received MARVIN msg.    MARVIN remains available for all transplant resources, education and psychosocial support.

## 2019-08-02 NOTE — TELEPHONE ENCOUNTER
MARVIN received a call from Carie with [employer] today ([phone]) to report that they have set patient up with [facility] ([phone] or [phone], fax [phone]).   MARVIN spoke with Miladys at [facility], who has accepted the patient, but has not received paperwork from [employer] yet.  Miladys is asking for MARVIN to fax documents to her.  MARVIN able to fax referral documents to [place] at noon today.      MARVIN followed up w/pts wife Kati today, who has received a call from [employer] informing her of the home health agency and contact information.   MARVIN advised pts wife to call [place] after 2pm if she has not heard from them about coming to see pt tomorrow.   Pts wife with no other psychosocial concerns at this time.   MARVIN remains available for all transplant resources, education and psychosocial support.

## 2019-08-02 NOTE — TELEPHONE ENCOUNTER
Reason for Disposition   Dressing soaked with blood or body fluid (e.g., drainage)     Right side of pad is soaked with blood.    Protocols used: POST-OP INCISION SYMPTOMS-DELROY hinojosa md advised to observe for fever,rdness excessive bleeding.

## 2019-08-02 NOTE — TELEPHONE ENCOUNTER
"Called patient to check on how he is doing, patient called on call last night.  Wife states, "He is doing ok, it has not soaked the pads.  Home Health will see patient tomorrow.  "

## 2019-08-03 PROCEDURE — G0180 MD CERTIFICATION HHA PATIENT: HCPCS | Mod: ,,, | Performed by: SURGERY

## 2019-08-03 PROCEDURE — G0180 PR HOME HEALTH MD CERTIFICATION: ICD-10-PCS | Mod: ,,, | Performed by: SURGERY

## 2019-08-05 ENCOUNTER — LAB VISIT (OUTPATIENT)
Dept: LAB | Facility: HOSPITAL | Age: 59
End: 2019-08-05
Attending: SURGERY
Payer: COMMERCIAL

## 2019-08-05 ENCOUNTER — TELEPHONE (OUTPATIENT)
Dept: TRANSPLANT | Facility: CLINIC | Age: 59
End: 2019-08-05

## 2019-08-05 ENCOUNTER — PATIENT MESSAGE (OUTPATIENT)
Dept: TRANSPLANT | Facility: CLINIC | Age: 59
End: 2019-08-05

## 2019-08-05 DIAGNOSIS — Z94.4 LIVER REPLACED BY TRANSPLANT: ICD-10-CM

## 2019-08-05 LAB
ALBUMIN SERPL BCP-MCNC: 3.8 G/DL (ref 3.5–5.2)
ALP SERPL-CCNC: 111 U/L (ref 55–135)
ALT SERPL W/O P-5'-P-CCNC: 46 U/L (ref 10–44)
ANION GAP SERPL CALC-SCNC: 10 MMOL/L (ref 8–16)
AST SERPL-CCNC: 12 U/L (ref 10–40)
BASOPHILS # BLD AUTO: 0.1 K/UL (ref 0–0.2)
BASOPHILS NFR BLD: 1.6 % (ref 0–1.9)
BILIRUB DIRECT SERPL-MCNC: 0.3 MG/DL (ref 0.1–0.3)
BILIRUB SERPL-MCNC: 0.5 MG/DL (ref 0.1–1)
BUN SERPL-MCNC: 22 MG/DL (ref 6–20)
CALCIUM SERPL-MCNC: 9.2 MG/DL (ref 8.7–10.5)
CHLORIDE SERPL-SCNC: 105 MMOL/L (ref 95–110)
CO2 SERPL-SCNC: 25 MMOL/L (ref 23–29)
CREAT SERPL-MCNC: 1.2 MG/DL (ref 0.5–1.4)
DIFFERENTIAL METHOD: ABNORMAL
EOSINOPHIL # BLD AUTO: 0.2 K/UL (ref 0–0.5)
EOSINOPHIL NFR BLD: 3.1 % (ref 0–8)
ERYTHROCYTE [DISTWIDTH] IN BLOOD BY AUTOMATED COUNT: 13.3 % (ref 11.5–14.5)
EST. GFR  (AFRICAN AMERICAN): >60 ML/MIN/1.73 M^2
EST. GFR  (NON AFRICAN AMERICAN): >60 ML/MIN/1.73 M^2
GLUCOSE SERPL-MCNC: 96 MG/DL (ref 70–110)
HCT VFR BLD AUTO: 38.7 % (ref 40–54)
HGB BLD-MCNC: 12.8 G/DL (ref 14–18)
IMM GRANULOCYTES # BLD AUTO: 0.08 K/UL (ref 0–0.04)
IMM GRANULOCYTES NFR BLD AUTO: 1.3 % (ref 0–0.5)
LYMPHOCYTES # BLD AUTO: 0.8 K/UL (ref 1–4.8)
LYMPHOCYTES NFR BLD: 12.3 % (ref 18–48)
MCH RBC QN AUTO: 29 PG (ref 27–31)
MCHC RBC AUTO-ENTMCNC: 33.1 G/DL (ref 32–36)
MCV RBC AUTO: 88 FL (ref 82–98)
MONOCYTES # BLD AUTO: 0.6 K/UL (ref 0.3–1)
MONOCYTES NFR BLD: 9.7 % (ref 4–15)
NEUTROPHILS # BLD AUTO: 4.4 K/UL (ref 1.8–7.7)
NEUTROPHILS NFR BLD: 72 % (ref 38–73)
NRBC BLD-RTO: 0 /100 WBC
PLATELET # BLD AUTO: 281 K/UL (ref 150–350)
PMV BLD AUTO: 9 FL (ref 9.2–12.9)
POTASSIUM SERPL-SCNC: 3.8 MMOL/L (ref 3.5–5.1)
PROT SERPL-MCNC: 6.7 G/DL (ref 6–8.4)
RBC # BLD AUTO: 4.41 M/UL (ref 4.6–6.2)
SODIUM SERPL-SCNC: 140 MMOL/L (ref 136–145)
TACROLIMUS BLD-MCNC: 8 NG/ML (ref 5–15)
WBC # BLD AUTO: 6.09 K/UL (ref 3.9–12.7)

## 2019-08-05 PROCEDURE — 82248 BILIRUBIN DIRECT: CPT

## 2019-08-05 PROCEDURE — 85025 COMPLETE CBC W/AUTO DIFF WBC: CPT

## 2019-08-05 PROCEDURE — 36415 COLL VENOUS BLD VENIPUNCTURE: CPT

## 2019-08-05 PROCEDURE — 80053 COMPREHEN METABOLIC PANEL: CPT

## 2019-08-05 PROCEDURE — 80197 ASSAY OF TACROLIMUS: CPT

## 2019-08-05 NOTE — TELEPHONE ENCOUNTER
----- Message from Darshan Graves MD sent at 8/5/2019  3:11 PM CDT -----  Results ok. No action needed

## 2019-08-06 ENCOUNTER — EXTERNAL HOME HEALTH (OUTPATIENT)
Dept: HOME HEALTH SERVICES | Facility: HOSPITAL | Age: 59
End: 2019-08-06
Payer: COMMERCIAL

## 2019-08-06 ENCOUNTER — TELEPHONE (OUTPATIENT)
Dept: ENDOCRINOLOGY | Facility: HOSPITAL | Age: 59
End: 2019-08-06

## 2019-08-06 DIAGNOSIS — E11.9 TYPE 2 DIABETES MELLITUS WITHOUT COMPLICATION, WITHOUT LONG-TERM CURRENT USE OF INSULIN: Primary | Chronic | ICD-10-CM

## 2019-08-07 ENCOUNTER — PATIENT MESSAGE (OUTPATIENT)
Dept: TRANSPLANT | Facility: CLINIC | Age: 59
End: 2019-08-07

## 2019-08-07 DIAGNOSIS — Z94.4 S/P LIVER TRANSPLANT: Primary | ICD-10-CM

## 2019-08-08 ENCOUNTER — PATIENT MESSAGE (OUTPATIENT)
Dept: TRANSPLANT | Facility: CLINIC | Age: 59
End: 2019-08-08

## 2019-08-12 ENCOUNTER — PATIENT MESSAGE (OUTPATIENT)
Dept: ENDOCRINOLOGY | Facility: HOSPITAL | Age: 59
End: 2019-08-12

## 2019-08-13 ENCOUNTER — LAB VISIT (OUTPATIENT)
Dept: LAB | Facility: HOSPITAL | Age: 59
End: 2019-08-13
Attending: SURGERY
Payer: COMMERCIAL

## 2019-08-13 DIAGNOSIS — Z94.4 S/P LIVER TRANSPLANT: ICD-10-CM

## 2019-08-13 DIAGNOSIS — Z94.4 LIVER REPLACED BY TRANSPLANT: ICD-10-CM

## 2019-08-13 LAB
ALBUMIN SERPL BCP-MCNC: 3.9 G/DL (ref 3.5–5.2)
ALP SERPL-CCNC: 87 U/L (ref 55–135)
ALT SERPL W/O P-5'-P-CCNC: 30 U/L (ref 10–44)
ANION GAP SERPL CALC-SCNC: 9 MMOL/L (ref 8–16)
AST SERPL-CCNC: 12 U/L (ref 10–40)
BASOPHILS # BLD AUTO: 0.09 K/UL (ref 0–0.2)
BASOPHILS NFR BLD: 1.5 % (ref 0–1.9)
BILIRUB DIRECT SERPL-MCNC: 0.2 MG/DL (ref 0.1–0.3)
BILIRUB SERPL-MCNC: 0.5 MG/DL (ref 0.1–1)
BUN SERPL-MCNC: 21 MG/DL (ref 6–20)
CALCIUM SERPL-MCNC: 9.9 MG/DL (ref 8.7–10.5)
CHLORIDE SERPL-SCNC: 106 MMOL/L (ref 95–110)
CO2 SERPL-SCNC: 25 MMOL/L (ref 23–29)
CREAT SERPL-MCNC: 1.4 MG/DL (ref 0.5–1.4)
DIFFERENTIAL METHOD: ABNORMAL
EOSINOPHIL # BLD AUTO: 0.1 K/UL (ref 0–0.5)
EOSINOPHIL NFR BLD: 1.3 % (ref 0–8)
ERYTHROCYTE [DISTWIDTH] IN BLOOD BY AUTOMATED COUNT: 12.9 % (ref 11.5–14.5)
EST. GFR  (AFRICAN AMERICAN): >60 ML/MIN/1.73 M^2
EST. GFR  (NON AFRICAN AMERICAN): 54.2 ML/MIN/1.73 M^2
GLUCOSE SERPL-MCNC: 95 MG/DL (ref 70–110)
HCT VFR BLD AUTO: 40.8 % (ref 40–54)
HGB BLD-MCNC: 13.1 G/DL (ref 14–18)
IMM GRANULOCYTES # BLD AUTO: 0.06 K/UL (ref 0–0.04)
IMM GRANULOCYTES NFR BLD AUTO: 1 % (ref 0–0.5)
LYMPHOCYTES # BLD AUTO: 0.6 K/UL (ref 1–4.8)
LYMPHOCYTES NFR BLD: 9.4 % (ref 18–48)
MCH RBC QN AUTO: 28.2 PG (ref 27–31)
MCHC RBC AUTO-ENTMCNC: 32.1 G/DL (ref 32–36)
MCV RBC AUTO: 88 FL (ref 82–98)
MONOCYTES # BLD AUTO: 0.5 K/UL (ref 0.3–1)
MONOCYTES NFR BLD: 7.6 % (ref 4–15)
NEUTROPHILS # BLD AUTO: 4.9 K/UL (ref 1.8–7.7)
NEUTROPHILS NFR BLD: 79.2 % (ref 38–73)
NRBC BLD-RTO: 0 /100 WBC
PLATELET # BLD AUTO: 280 K/UL (ref 150–350)
PMV BLD AUTO: 9.2 FL (ref 9.2–12.9)
POTASSIUM SERPL-SCNC: 4.4 MMOL/L (ref 3.5–5.1)
PROT SERPL-MCNC: 7 G/DL (ref 6–8.4)
RBC # BLD AUTO: 4.65 M/UL (ref 4.6–6.2)
SODIUM SERPL-SCNC: 140 MMOL/L (ref 136–145)
TACROLIMUS BLD-MCNC: 10.8 NG/ML (ref 5–15)
WBC # BLD AUTO: 6.17 K/UL (ref 3.9–12.7)

## 2019-08-13 PROCEDURE — 80053 COMPREHEN METABOLIC PANEL: CPT

## 2019-08-13 PROCEDURE — 80197 ASSAY OF TACROLIMUS: CPT

## 2019-08-13 PROCEDURE — 85025 COMPLETE CBC W/AUTO DIFF WBC: CPT

## 2019-08-13 PROCEDURE — 82248 BILIRUBIN DIRECT: CPT

## 2019-08-13 PROCEDURE — 36415 COLL VENOUS BLD VENIPUNCTURE: CPT

## 2019-08-14 ENCOUNTER — OFFICE VISIT (OUTPATIENT)
Dept: WOUND CARE | Facility: CLINIC | Age: 59
End: 2019-08-14
Payer: COMMERCIAL

## 2019-08-14 DIAGNOSIS — E11.9 TYPE 2 DIABETES MELLITUS WITHOUT COMPLICATION, WITHOUT LONG-TERM CURRENT USE OF INSULIN: ICD-10-CM

## 2019-08-14 DIAGNOSIS — T81.89XD NON-HEALING SURGICAL WOUND, SUBSEQUENT ENCOUNTER: Primary | ICD-10-CM

## 2019-08-14 DIAGNOSIS — Z94.4 S/P LIVER TRANSPLANT: ICD-10-CM

## 2019-08-14 PROCEDURE — 3044F PR MOST RECENT HEMOGLOBIN A1C LEVEL <7.0%: ICD-10-PCS | Mod: CPTII,S$GLB,, | Performed by: CLINICAL NURSE SPECIALIST

## 2019-08-14 PROCEDURE — 3044F HG A1C LEVEL LT 7.0%: CPT | Mod: CPTII,S$GLB,, | Performed by: CLINICAL NURSE SPECIALIST

## 2019-08-14 PROCEDURE — 99999 PR PBB SHADOW E&M-EST. PATIENT-LVL II: ICD-10-PCS | Mod: PBBFAC,,, | Performed by: CLINICAL NURSE SPECIALIST

## 2019-08-14 PROCEDURE — 2024F 7 FLD RTA PHOTO EVC RTNOPTHY: CPT | Mod: S$GLB,,, | Performed by: CLINICAL NURSE SPECIALIST

## 2019-08-14 PROCEDURE — 2024F PR 7 FIELD PHOTOS WITH INTERP/ REVIEW: ICD-10-PCS | Mod: S$GLB,,, | Performed by: CLINICAL NURSE SPECIALIST

## 2019-08-14 PROCEDURE — 99999 PR PBB SHADOW E&M-EST. PATIENT-LVL II: CPT | Mod: PBBFAC,,, | Performed by: CLINICAL NURSE SPECIALIST

## 2019-08-14 PROCEDURE — 99214 OFFICE O/P EST MOD 30 MIN: CPT | Mod: S$GLB,,, | Performed by: CLINICAL NURSE SPECIALIST

## 2019-08-14 PROCEDURE — 99214 PR OFFICE/OUTPT VISIT, EST, LEVL IV, 30-39 MIN: ICD-10-PCS | Mod: S$GLB,,, | Performed by: CLINICAL NURSE SPECIALIST

## 2019-08-14 NOTE — TELEPHONE ENCOUNTER
Pt was seen today in clinic by wound care nurse Evelyn Espinoza. New wound care orders faxed to [facility] with progress notes

## 2019-08-15 ENCOUNTER — PATIENT MESSAGE (OUTPATIENT)
Dept: FAMILY MEDICINE | Facility: CLINIC | Age: 59
End: 2019-08-15

## 2019-08-15 ENCOUNTER — OFFICE VISIT (OUTPATIENT)
Dept: FAMILY MEDICINE | Facility: CLINIC | Age: 59
End: 2019-08-15
Payer: COMMERCIAL

## 2019-08-15 VITALS
OXYGEN SATURATION: 97 % | DIASTOLIC BLOOD PRESSURE: 70 MMHG | HEIGHT: 67 IN | SYSTOLIC BLOOD PRESSURE: 108 MMHG | WEIGHT: 196.88 LBS | BODY MASS INDEX: 30.9 KG/M2 | HEART RATE: 80 BPM

## 2019-08-15 DIAGNOSIS — I10 ESSENTIAL HYPERTENSION: Primary | ICD-10-CM

## 2019-08-15 DIAGNOSIS — Z94.4 LIVER REPLACED BY TRANSPLANT: Primary | ICD-10-CM

## 2019-08-15 DIAGNOSIS — E11.65 TYPE 2 DIABETES MELLITUS WITH HYPERGLYCEMIA, WITHOUT LONG-TERM CURRENT USE OF INSULIN: ICD-10-CM

## 2019-08-15 DIAGNOSIS — Z12.5 SCREENING FOR PROSTATE CANCER: ICD-10-CM

## 2019-08-15 PROCEDURE — 3074F SYST BP LT 130 MM HG: CPT | Mod: CPTII,S$GLB,, | Performed by: FAMILY MEDICINE

## 2019-08-15 PROCEDURE — 99999 PR PBB SHADOW E&M-EST. PATIENT-LVL III: ICD-10-PCS | Mod: PBBFAC,,, | Performed by: FAMILY MEDICINE

## 2019-08-15 PROCEDURE — 3074F PR MOST RECENT SYSTOLIC BLOOD PRESSURE < 130 MM HG: ICD-10-PCS | Mod: CPTII,S$GLB,, | Performed by: FAMILY MEDICINE

## 2019-08-15 PROCEDURE — 99999 PR PBB SHADOW E&M-EST. PATIENT-LVL III: CPT | Mod: PBBFAC,,, | Performed by: FAMILY MEDICINE

## 2019-08-15 PROCEDURE — 2024F 7 FLD RTA PHOTO EVC RTNOPTHY: CPT | Mod: S$GLB,,, | Performed by: FAMILY MEDICINE

## 2019-08-15 PROCEDURE — 99214 PR OFFICE/OUTPT VISIT, EST, LEVL IV, 30-39 MIN: ICD-10-PCS | Mod: S$GLB,,, | Performed by: FAMILY MEDICINE

## 2019-08-15 PROCEDURE — 3044F PR MOST RECENT HEMOGLOBIN A1C LEVEL <7.0%: ICD-10-PCS | Mod: CPTII,S$GLB,, | Performed by: FAMILY MEDICINE

## 2019-08-15 PROCEDURE — 3078F PR MOST RECENT DIASTOLIC BLOOD PRESSURE < 80 MM HG: ICD-10-PCS | Mod: CPTII,S$GLB,, | Performed by: FAMILY MEDICINE

## 2019-08-15 PROCEDURE — 2024F PR 7 FIELD PHOTOS WITH INTERP/ REVIEW: ICD-10-PCS | Mod: S$GLB,,, | Performed by: FAMILY MEDICINE

## 2019-08-15 PROCEDURE — 3008F BODY MASS INDEX DOCD: CPT | Mod: CPTII,S$GLB,, | Performed by: FAMILY MEDICINE

## 2019-08-15 PROCEDURE — 99214 OFFICE O/P EST MOD 30 MIN: CPT | Mod: S$GLB,,, | Performed by: FAMILY MEDICINE

## 2019-08-15 PROCEDURE — 3044F HG A1C LEVEL LT 7.0%: CPT | Mod: CPTII,S$GLB,, | Performed by: FAMILY MEDICINE

## 2019-08-15 PROCEDURE — 3008F PR BODY MASS INDEX (BMI) DOCUMENTED: ICD-10-PCS | Mod: CPTII,S$GLB,, | Performed by: FAMILY MEDICINE

## 2019-08-15 PROCEDURE — 3078F DIAST BP <80 MM HG: CPT | Mod: CPTII,S$GLB,, | Performed by: FAMILY MEDICINE

## 2019-08-15 RX ORDER — TACROLIMUS 1 MG/1
2 CAPSULE ORAL EVERY 12 HOURS
Qty: 120 CAPSULE | Refills: 11 | Status: SHIPPED | OUTPATIENT
Start: 2019-08-15 | End: 2019-12-03 | Stop reason: SDUPTHER

## 2019-08-15 NOTE — PROGRESS NOTES
Subjective:       Patient ID: Roman Hodges is a 59 y.o. male.    Chief Complaint: Follow-up and Diabetes    59 years old male who came to the clinic for blood pressure check.  Blood pressure today stable.  No chest pain, palpitation orthopnea or PND.  Last A1c was normal .  Patient currently only on Januvia.  No polyuria, polydipsia or polyphagia.    Diabetes   He has type 2 diabetes mellitus. No MedicAlert identification noted. The initial diagnosis of diabetes was made 1 years ago. Pertinent negatives for hypoglycemia include no confusion, dizziness, hunger, mood changes, pallor, seizures, speech difficulty or sweats. Pertinent negatives for diabetes include no blurred vision, no chest pain, no foot paresthesias, no foot ulcerations, no polydipsia, no polyphagia, no polyuria, no weakness and no weight loss. Pertinent negatives for hypoglycemia complications include no blackouts, no hospitalization, no required assistance and no required glucagon injection. Symptoms are improving. Pertinent negatives for diabetic complications include no autonomic neuropathy, CVA, heart disease, nephropathy, peripheral neuropathy or retinopathy. Risk factors for coronary artery disease include hypertension. Current diabetic treatment includes diet and oral agent (monotherapy). He is compliant with treatment all of the time. His weight is stable. He is following a generally healthy diet. Meal planning includes avoidance of concentrated sweets and carbohydrate counting. He has not had a previous visit with a dietitian. He participates in exercise intermittently. He monitors blood glucose at home 1-2 x per day. Blood glucose monitoring compliance is excellent. His home blood glucose trend is decreasing steadily. He does not see a podiatrist.Eye exam is current.     Review of Systems   Constitutional: Negative.  Negative for weight loss.   HENT: Negative.    Eyes: Negative.  Negative for blurred vision.   Respiratory: Negative.     Cardiovascular: Negative.  Negative for chest pain, palpitations and leg swelling.   Gastrointestinal: Negative.    Endocrine: Negative for polydipsia, polyphagia and polyuria.   Genitourinary: Negative.    Musculoskeletal: Negative.    Skin: Negative.  Negative for pallor.   Neurological: Negative.  Negative for dizziness, seizures, speech difficulty and weakness.   Psychiatric/Behavioral: Negative.  Negative for confusion.       Objective:      Physical Exam   Constitutional: He is oriented to person, place, and time. He appears well-developed and well-nourished. No distress.   HENT:   Head: Normocephalic and atraumatic.   Right Ear: External ear normal.   Left Ear: External ear normal.   Nose: Nose normal.   Mouth/Throat: Oropharynx is clear and moist. No oropharyngeal exudate.   Eyes: Pupils are equal, round, and reactive to light. Conjunctivae and EOM are normal. Right eye exhibits no discharge. Left eye exhibits no discharge. No scleral icterus.   Neck: Normal range of motion. Neck supple. No JVD present. No tracheal deviation present. No thyromegaly present.   Cardiovascular: Normal rate, regular rhythm, normal heart sounds and intact distal pulses. Exam reveals no gallop and no friction rub.   No murmur heard.  Pulmonary/Chest: Effort normal and breath sounds normal. No stridor. No respiratory distress. He has no wheezes. He has no rales. He exhibits no tenderness.   Abdominal: Soft. Bowel sounds are normal. He exhibits no distension and no mass. There is no tenderness. There is no rebound and no guarding.   Musculoskeletal: Normal range of motion. He exhibits no edema or tenderness.   Lymphadenopathy:     He has no cervical adenopathy.   Neurological: He is alert and oriented to person, place, and time. He has normal reflexes. He displays normal reflexes. No cranial nerve deficit. He exhibits normal muscle tone. Coordination normal.   Skin: Skin is warm and dry. No rash noted. He is not diaphoretic. No  erythema. No pallor.   Psychiatric: He has a normal mood and affect. His behavior is normal. Judgment and thought content normal.   Nursing note and vitals reviewed.      Assessment:       1. Essential hypertension    2. Type 2 diabetes mellitus with hyperglycemia, without long-term current use of insulin    3. Screening for prostate cancer        Plan:         Roman was seen today for follow-up and diabetes.    Diagnoses and all orders for this visit:    Essential hypertension  -     Comprehensive metabolic panel; Future  -     Lipid panel; Future    Type 2 diabetes mellitus with hyperglycemia, without long-term current use of insulin  -     Comprehensive metabolic panel; Future  -     Lipid panel; Future  -     Hemoglobin A1c; Future    Screening for prostate cancer  -     PSA, Screening; Future    Continue monitoring blood pressure at home, low sodium diet.  Continue monitoring blood sugar at home,ADA diet.

## 2019-08-16 ENCOUNTER — PATIENT MESSAGE (OUTPATIENT)
Dept: TRANSPLANT | Facility: CLINIC | Age: 59
End: 2019-08-16

## 2019-08-16 RX ORDER — ESOMEPRAZOLE MAGNESIUM 40 MG/1
CAPSULE, DELAYED RELEASE ORAL
Qty: 90 CAPSULE | Refills: 3 | Status: SHIPPED | OUTPATIENT
Start: 2019-08-16 | End: 2020-08-12

## 2019-08-19 ENCOUNTER — HOSPITAL ENCOUNTER (OUTPATIENT)
Dept: RADIOLOGY | Facility: HOSPITAL | Age: 59
Discharge: HOME | End: 2019-08-19
Attending: SURGERY
Payer: COMMERCIAL

## 2019-08-19 DIAGNOSIS — Z94.4 LIVER REPLACED BY TRANSPLANT: ICD-10-CM

## 2019-08-19 PROCEDURE — 93975 VASCULAR STUDY: CPT | Mod: TC

## 2019-08-19 PROCEDURE — 76705 US LIVER TRANSPLANT POST: ICD-10-PCS | Mod: 26,59,, | Performed by: INTERNAL MEDICINE

## 2019-08-19 PROCEDURE — 76705 ECHO EXAM OF ABDOMEN: CPT | Mod: TC

## 2019-08-19 PROCEDURE — 93975 VASCULAR STUDY: CPT | Mod: 26,,, | Performed by: INTERNAL MEDICINE

## 2019-08-19 PROCEDURE — 93975 US LIVER TRANSPLANT POST: ICD-10-PCS | Mod: 26,,, | Performed by: INTERNAL MEDICINE

## 2019-08-19 PROCEDURE — 76705 ECHO EXAM OF ABDOMEN: CPT | Mod: 26,59,, | Performed by: INTERNAL MEDICINE

## 2019-08-20 ENCOUNTER — LAB VISIT (OUTPATIENT)
Dept: LAB | Facility: HOSPITAL | Age: 59
End: 2019-08-20
Attending: SURGERY
Payer: COMMERCIAL

## 2019-08-20 ENCOUNTER — TELEPHONE (OUTPATIENT)
Dept: TRANSPLANT | Facility: CLINIC | Age: 59
End: 2019-08-20

## 2019-08-20 DIAGNOSIS — Z94.4 S/P LIVER TRANSPLANT: ICD-10-CM

## 2019-08-20 NOTE — TELEPHONE ENCOUNTER
----- Message from Stefania Chau MD sent at 8/19/2019  2:39 PM CDT -----  Results ok. No action needed.

## 2019-08-23 ENCOUNTER — TELEPHONE (OUTPATIENT)
Dept: TRANSPLANT | Facility: CLINIC | Age: 59
End: 2019-08-23

## 2019-08-23 ENCOUNTER — PATIENT MESSAGE (OUTPATIENT)
Dept: TRANSPLANT | Facility: CLINIC | Age: 59
End: 2019-08-23

## 2019-08-23 NOTE — TELEPHONE ENCOUNTER
HH called to report small amount of greenish yell fluid coming from right side of incision no smell and not warn to touch. Will discuss with surgeons

## 2019-08-23 NOTE — TELEPHONE ENCOUNTER
----- Message from Pippa Eason RN sent at 8/23/2019 12:16 PM CDT -----  Contact: Nurse Bo      ----- Message -----  From: Soo Vargas  Sent: 8/23/2019  11:22 AM  To: [facility] Staff    Pt's nurse called to request that a Rx for antibiotics be filled for pt due to his current condition. [phone]

## 2019-08-25 ENCOUNTER — PATIENT MESSAGE (OUTPATIENT)
Dept: WOUND CARE | Facility: CLINIC | Age: 59
End: 2019-08-25

## 2019-08-26 ENCOUNTER — TELEPHONE (OUTPATIENT)
Dept: TRANSPLANT | Facility: CLINIC | Age: 59
End: 2019-08-26

## 2019-08-26 NOTE — TELEPHONE ENCOUNTER
Spoke with wife - explained that I can meet them in clinic at 1300 today if they feel like the incision needs to be looked at today.   They are scheduled for surgery clinic 8/27/19 and wound nurse 8/28/19

## 2019-08-26 NOTE — TELEPHONE ENCOUNTER
Patient wanted coordinator to look at incision.  Saw patient in clinic and reviewed with Evelyn Espinoza and compared to last visit.  Incision is healing.  Surgery clinic 8/27 and wound visit 8/27

## 2019-08-26 NOTE — TELEPHONE ENCOUNTER
----- Message from Merced Marrufo sent at 8/26/2019  8:07 AM CDT -----  Contact: pt   Type: Needs Medical Advice    Who Called: pts wife   Symptoms pt is having an infection in his surgery site and would like to speak with the nurse pts wife left a message in the my pt portal along with a picture   How long has patient had these symptoms:  Since last Friday they left a message on Friday   Pharmacy name and phone #:  N/A   Best Call Back Number: can you please call pts wife at [phone]  Additional Information: none    DAVID

## 2019-08-27 ENCOUNTER — OFFICE VISIT (OUTPATIENT)
Dept: WOUND CARE | Facility: CLINIC | Age: 59
End: 2019-08-27
Payer: COMMERCIAL

## 2019-08-27 ENCOUNTER — OFFICE VISIT (OUTPATIENT)
Dept: TRANSPLANT | Facility: CLINIC | Age: 59
End: 2019-08-27
Payer: COMMERCIAL

## 2019-08-27 ENCOUNTER — LAB VISIT (OUTPATIENT)
Dept: LAB | Facility: HOSPITAL | Age: 59
End: 2019-08-27
Attending: SURGERY
Payer: COMMERCIAL

## 2019-08-27 DIAGNOSIS — Z94.4 S/P LIVER TRANSPLANT: ICD-10-CM

## 2019-08-27 DIAGNOSIS — Z94.4 LIVER REPLACED BY TRANSPLANT: ICD-10-CM

## 2019-08-27 DIAGNOSIS — Z51.81 ENCOUNTER FOR THERAPEUTIC DRUG MONITORING: Primary | ICD-10-CM

## 2019-08-27 DIAGNOSIS — T81.89XD NON-HEALING SURGICAL WOUND, SUBSEQUENT ENCOUNTER: Primary | ICD-10-CM

## 2019-08-27 DIAGNOSIS — E11.9 TYPE 2 DIABETES MELLITUS WITHOUT COMPLICATION, WITHOUT LONG-TERM CURRENT USE OF INSULIN: ICD-10-CM

## 2019-08-27 LAB
ALBUMIN SERPL BCP-MCNC: 4.2 G/DL (ref 3.5–5.2)
ALP SERPL-CCNC: 78 U/L (ref 55–135)
ALT SERPL W/O P-5'-P-CCNC: 26 U/L (ref 10–44)
ANION GAP SERPL CALC-SCNC: 10 MMOL/L (ref 8–16)
AST SERPL-CCNC: 12 U/L (ref 10–40)
BASOPHILS # BLD AUTO: 0.11 K/UL (ref 0–0.2)
BASOPHILS NFR BLD: 2 % (ref 0–1.9)
BILIRUB DIRECT SERPL-MCNC: 0.2 MG/DL (ref 0.1–0.3)
BILIRUB SERPL-MCNC: 0.4 MG/DL (ref 0.1–1)
BUN SERPL-MCNC: 21 MG/DL (ref 6–20)
CALCIUM SERPL-MCNC: 9.8 MG/DL (ref 8.7–10.5)
CHLORIDE SERPL-SCNC: 106 MMOL/L (ref 95–110)
CO2 SERPL-SCNC: 24 MMOL/L (ref 23–29)
CREAT SERPL-MCNC: 1.3 MG/DL (ref 0.5–1.4)
DIFFERENTIAL METHOD: ABNORMAL
EOSINOPHIL # BLD AUTO: 0.1 K/UL (ref 0–0.5)
EOSINOPHIL NFR BLD: 2.1 % (ref 0–8)
ERYTHROCYTE [DISTWIDTH] IN BLOOD BY AUTOMATED COUNT: 13 % (ref 11.5–14.5)
EST. GFR  (AFRICAN AMERICAN): >60 ML/MIN/1.73 M^2
EST. GFR  (NON AFRICAN AMERICAN): 59.3 ML/MIN/1.73 M^2
GLUCOSE SERPL-MCNC: 95 MG/DL (ref 70–110)
HCT VFR BLD AUTO: 41 % (ref 40–54)
HGB BLD-MCNC: 13.5 G/DL (ref 14–18)
IMM GRANULOCYTES # BLD AUTO: 0.08 K/UL (ref 0–0.04)
IMM GRANULOCYTES NFR BLD AUTO: 1.4 % (ref 0–0.5)
LYMPHOCYTES # BLD AUTO: 0.6 K/UL (ref 1–4.8)
LYMPHOCYTES NFR BLD: 10.5 % (ref 18–48)
MCH RBC QN AUTO: 28.4 PG (ref 27–31)
MCHC RBC AUTO-ENTMCNC: 32.9 G/DL (ref 32–36)
MCV RBC AUTO: 86 FL (ref 82–98)
MONOCYTES # BLD AUTO: 0.5 K/UL (ref 0.3–1)
MONOCYTES NFR BLD: 8 % (ref 4–15)
NEUTROPHILS # BLD AUTO: 4.3 K/UL (ref 1.8–7.7)
NEUTROPHILS NFR BLD: 76 % (ref 38–73)
NRBC BLD-RTO: 0 /100 WBC
PLATELET # BLD AUTO: 315 K/UL (ref 150–350)
PMV BLD AUTO: 9.7 FL (ref 9.2–12.9)
POTASSIUM SERPL-SCNC: 4.3 MMOL/L (ref 3.5–5.1)
PROT SERPL-MCNC: 7.1 G/DL (ref 6–8.4)
RBC # BLD AUTO: 4.75 M/UL (ref 4.6–6.2)
SODIUM SERPL-SCNC: 140 MMOL/L (ref 136–145)
WBC # BLD AUTO: 5.64 K/UL (ref 3.9–12.7)

## 2019-08-27 PROCEDURE — 2024F PR 7 FIELD PHOTOS WITH INTERP/ REVIEW: ICD-10-PCS | Mod: S$GLB,,, | Performed by: CLINICAL NURSE SPECIALIST

## 2019-08-27 PROCEDURE — 80053 COMPREHEN METABOLIC PANEL: CPT

## 2019-08-27 PROCEDURE — 3044F HG A1C LEVEL LT 7.0%: CPT | Mod: CPTII,S$GLB,, | Performed by: CLINICAL NURSE SPECIALIST

## 2019-08-27 PROCEDURE — 17250 PR CHEM CAUTERY GRANULATN TISSUE: ICD-10-PCS | Mod: S$GLB,,, | Performed by: CLINICAL NURSE SPECIALIST

## 2019-08-27 PROCEDURE — 99999 PR PBB SHADOW E&M-EST. PATIENT-LVL I: CPT | Mod: PBBFAC,,,

## 2019-08-27 PROCEDURE — 99214 PR OFFICE/OUTPT VISIT, EST, LEVL IV, 30-39 MIN: ICD-10-PCS | Mod: 25,S$GLB,, | Performed by: CLINICAL NURSE SPECIALIST

## 2019-08-27 PROCEDURE — 99999 PR PBB SHADOW E&M-EST. PATIENT-LVL II: ICD-10-PCS | Mod: PBBFAC,,, | Performed by: CLINICAL NURSE SPECIALIST

## 2019-08-27 PROCEDURE — 99213 PR OFFICE/OUTPT VISIT, EST, LEVL III, 20-29 MIN: ICD-10-PCS | Mod: 24,S$GLB,, | Performed by: SURGERY

## 2019-08-27 PROCEDURE — 82248 BILIRUBIN DIRECT: CPT

## 2019-08-27 PROCEDURE — 85025 COMPLETE CBC W/AUTO DIFF WBC: CPT

## 2019-08-27 PROCEDURE — 3044F PR MOST RECENT HEMOGLOBIN A1C LEVEL <7.0%: ICD-10-PCS | Mod: CPTII,S$GLB,, | Performed by: CLINICAL NURSE SPECIALIST

## 2019-08-27 PROCEDURE — 36415 COLL VENOUS BLD VENIPUNCTURE: CPT | Mod: PO

## 2019-08-27 PROCEDURE — 99213 OFFICE O/P EST LOW 20 MIN: CPT | Mod: 24,S$GLB,, | Performed by: SURGERY

## 2019-08-27 PROCEDURE — 2024F 7 FLD RTA PHOTO EVC RTNOPTHY: CPT | Mod: S$GLB,,, | Performed by: CLINICAL NURSE SPECIALIST

## 2019-08-27 PROCEDURE — 99999 PR PBB SHADOW E&M-EST. PATIENT-LVL II: CPT | Mod: PBBFAC,,, | Performed by: CLINICAL NURSE SPECIALIST

## 2019-08-27 PROCEDURE — 17250 CHEM CAUT OF GRANLTJ TISSUE: CPT | Mod: S$GLB,,, | Performed by: CLINICAL NURSE SPECIALIST

## 2019-08-27 PROCEDURE — 99214 OFFICE O/P EST MOD 30 MIN: CPT | Mod: 25,S$GLB,, | Performed by: CLINICAL NURSE SPECIALIST

## 2019-08-27 PROCEDURE — 99999 PR PBB SHADOW E&M-EST. PATIENT-LVL I: ICD-10-PCS | Mod: PBBFAC,,,

## 2019-08-27 PROCEDURE — 80197 ASSAY OF TACROLIMUS: CPT

## 2019-08-27 NOTE — PROGRESS NOTES
Subjective:       Patient ID: Roman Hodges is a 59 y.o. male.    Chief Complaint: Wound Check    This is his second fu with me for wound eval, he is post liver tx of July 3rd, his wound was opened  due to seroma,  He is back home and wife is caregiver, she is not comfortable doing wound care so Finn BURGESS is coming MWF and applying  TRIAD .     Wound Check       Review of Systems   Constitutional: Negative for chills and fever.   Respiratory: Negative for cough and shortness of breath.    Cardiovascular: Negative for chest pain and leg swelling.   Gastrointestinal: Negative for abdominal distention and abdominal pain.   Genitourinary: Negative for hematuria.   Musculoskeletal: Negative for arthralgias and back pain.   Skin: Positive for wound. Negative for color change and rash.   Neurological: Negative for weakness and headaches.       Objective:      Physical Exam   Constitutional: He is oriented to person, place, and time. He appears well-developed and well-nourished.   Pulmonary/Chest: Effort normal. No respiratory distress. He has no wheezes.   Abdominal: Soft. Bowel sounds are normal. He exhibits no distension.   Musculoskeletal: Normal range of motion. He exhibits no edema.   Neurological: He is alert and oriented to person, place, and time.   Skin: Skin is warm and dry. No erythema.   Psychiatric: He has a normal mood and affect.       8/1 first visit      8/14 after removal and soaking of alginate that dried in wound filling in well and granulated to skin level, will change topical as wound is too dry for alginate at this point         Applied TRIAD today 8/14 8/27 healing however he HAS EXCESS GRANULATION tissue across entire [employer] , chemical cautery indicated and done with lidocaine to help with pain  PROCEDURE:  CHEMICAL CAUTERY: Performed for hypergranulation tissue/granuloma(s) of incision . The tissue was anesthetized topically with application of Lidocaine gel 2% and 5 minute wait time. The  area noted was cauterized with AGNO3. The patient stated pain was 1 on 1-10 scale with this procedure. I then dressed wound with silver transfer foam to stay until Friday then resume Triad wound dressing    8/14 change to TRIAD wound dressing apply to cleaned wound bed and then cover with telfa island ( mepor) dressing   HHN to do MWF and wife to redo if needed in between    8/1  Chevron opened 7/31  It is 1-2 cm and  39 cm long, with depth of 1cm, Clean and no signs of infection, will just need good wound care to promote secondary closure.   I cleansed with VASHE cleanser,  Soak for 5 minutes then packed wound with Aquacel ag rope.  Covered with pad and secure with tape.     Assessment:       1. Non-healing surgical wound, subsequent encounter    2. Type 2 diabetes mellitus without complication, without long-term current use of insulin    3. S/P liver transplant        Plan:     chemical cautery today  Wound care as above. [facility]  Coordinate with Delmy  VASHE cleanser,  Soak for 5 minutes then apply TRIAD WOUND DRESSING ( paste) .  Covered with MEPOR like dressing  Fu with me in 2 weeks,  Reviewed infection signs and dietary concerns for wound healing  I have reviewed the plan of care with the patient and/ wife and they express understanding. I spent over 50% of this 25 minute visit in face to face counseling.

## 2019-08-27 NOTE — PROGRESS NOTES
Transplant Surgery  Liver Transplant Recipient Follow-up    Original Referring Physician: Jose Angel Munson  Current Corresponding Physician: Jose Angel Munson    Chief Complaint: Roman is here for follow up of his liver transplant performed 2019 for the primary diagnosis (UNOS) of Primary Liver Malignancy: Hepatoma (HCC) and Cirrhosis    ORGAN: LIVER  Whole or Partial: whole liver  Donor Type:  - brain death  PHS Increased Risk: no  Donor CMV Status: Negative  Donor HCV Status: Negative  Donor HBcAb: Negative  Donor HBV MARIA TERESA: Negative  Donor HCV MARIA TERESA: Negative    Biliary Anastomosis: end to end  Arterial Anatomy: replaced right hepatic from sma  IVC reconstruction: end to end ivc  Portal vein status: patent    Subjective:     History of Present Illness: He has had the following complications since transplant: wound infection.  The noted complications are well controlled.    Interval History: Currently, he is doing adequately.  Current complaints include none.  Roman is here for management of his immunosuppression medication.    Review of Systems   Constitutional: Negative for fatigue.   HENT: Negative for drooling, postnasal drip and sore throat.    Eyes: Negative for discharge and itching.   Respiratory: Negative for choking and stridor.    Gastrointestinal: Negative for rectal pain.   Endocrine: Negative for polydipsia.   Genitourinary: Negative for enuresis and genital sores.   Musculoskeletal: Negative for back pain, neck pain and neck stiffness.   Allergic/Immunologic: Positive for immunocompromised state.   Neurological: Negative for facial asymmetry and numbness.   Hematological: Negative for adenopathy.   Psychiatric/Behavioral: Negative for behavioral problems, self-injury and suicidal ideas.       Objective:     Physical Exam   Abdominal:       Wound healing well, continue to treat with wound care     Lab Results   Component Value Date    BILITOT 0.4 2019    AST 13  08/20/2019    ALT 26 08/20/2019    ALKPHOS 85 08/20/2019    CREATININE 1.3 08/20/2019    ALBUMIN 4.1 08/20/2019     Lab Results   Component Value Date    WBC 5.64 08/27/2019    HGB 13.5 (L) 08/27/2019    HCT 41.0 08/27/2019    HCT 41 07/04/2019     08/27/2019     Lab Results   Component Value Date    TACROLIMUS 8.7 08/20/2019       Assessment/Plan:          · S/P liver transplant.  · Chronic immunosuppressive medications for rejection prophylaxis at target.  Plan: no adjustment needed.  · Continue monitoring symptoms, labs and drug levels for drug-related toxicity and side effects.  · Incision: wound open: lots of proud flesh at wound - treated with silver nitrate; will see wound care today  · Femoral arterial line site: no complications evident    MD PARVIZ Rust Patient Status  Functional Status: 90% - Able to carry on normal activity: minor symptoms of disease  Physical Capacity: No Limitations

## 2019-08-28 LAB — TACROLIMUS BLD-MCNC: 6.5 NG/ML (ref 5–15)

## 2019-08-29 ENCOUNTER — PATIENT MESSAGE (OUTPATIENT)
Dept: TRANSPLANT | Facility: CLINIC | Age: 59
End: 2019-08-29

## 2019-08-30 ENCOUNTER — OFFICE VISIT (OUTPATIENT)
Dept: CARDIOLOGY | Facility: CLINIC | Age: 59
End: 2019-08-30
Payer: COMMERCIAL

## 2019-08-30 VITALS
HEART RATE: 75 BPM | BODY MASS INDEX: 30.87 KG/M2 | OXYGEN SATURATION: 96 % | WEIGHT: 197.06 LBS | DIASTOLIC BLOOD PRESSURE: 78 MMHG | SYSTOLIC BLOOD PRESSURE: 118 MMHG

## 2019-08-30 DIAGNOSIS — I10 ESSENTIAL HYPERTENSION: Primary | Chronic | ICD-10-CM

## 2019-08-30 DIAGNOSIS — K21.9 GASTROESOPHAGEAL REFLUX DISEASE, ESOPHAGITIS PRESENCE NOT SPECIFIED: ICD-10-CM

## 2019-08-30 DIAGNOSIS — E66.9 OBESITY (BMI 30.0-34.9): ICD-10-CM

## 2019-08-30 DIAGNOSIS — Z79.60 LONG-TERM USE OF IMMUNOSUPPRESSANT MEDICATION: ICD-10-CM

## 2019-08-30 DIAGNOSIS — Z79.01 LONG TERM (CURRENT) USE OF ANTICOAGULANTS: ICD-10-CM

## 2019-08-30 DIAGNOSIS — G47.33 OBSTRUCTIVE SLEEP APNEA ON CPAP: Chronic | ICD-10-CM

## 2019-08-30 DIAGNOSIS — E11.9 TYPE 2 DIABETES MELLITUS WITHOUT COMPLICATION, WITHOUT LONG-TERM CURRENT USE OF INSULIN: Chronic | ICD-10-CM

## 2019-08-30 DIAGNOSIS — I48.0 PAROXYSMAL ATRIAL FIBRILLATION: ICD-10-CM

## 2019-08-30 DIAGNOSIS — D69.6 THROMBOCYTOPENIA, UNSPECIFIED: ICD-10-CM

## 2019-08-30 DIAGNOSIS — Z94.4 S/P LIVER TRANSPLANT: ICD-10-CM

## 2019-08-30 DIAGNOSIS — I50.21 ACUTE SYSTOLIC HEART FAILURE: ICD-10-CM

## 2019-08-30 PROCEDURE — 3008F BODY MASS INDEX DOCD: CPT | Mod: CPTII,S$GLB,, | Performed by: INTERNAL MEDICINE

## 2019-08-30 PROCEDURE — 3044F HG A1C LEVEL LT 7.0%: CPT | Mod: CPTII,S$GLB,, | Performed by: INTERNAL MEDICINE

## 2019-08-30 PROCEDURE — 3008F PR BODY MASS INDEX (BMI) DOCUMENTED: ICD-10-PCS | Mod: CPTII,S$GLB,, | Performed by: INTERNAL MEDICINE

## 2019-08-30 PROCEDURE — 99999 PR PBB SHADOW E&M-EST. PATIENT-LVL III: ICD-10-PCS | Mod: PBBFAC,,, | Performed by: INTERNAL MEDICINE

## 2019-08-30 PROCEDURE — 3078F DIAST BP <80 MM HG: CPT | Mod: CPTII,S$GLB,, | Performed by: INTERNAL MEDICINE

## 2019-08-30 PROCEDURE — 2024F 7 FLD RTA PHOTO EVC RTNOPTHY: CPT | Mod: S$GLB,,, | Performed by: INTERNAL MEDICINE

## 2019-08-30 PROCEDURE — 3074F PR MOST RECENT SYSTOLIC BLOOD PRESSURE < 130 MM HG: ICD-10-PCS | Mod: CPTII,S$GLB,, | Performed by: INTERNAL MEDICINE

## 2019-08-30 PROCEDURE — 2024F PR 7 FIELD PHOTOS WITH INTERP/ REVIEW: ICD-10-PCS | Mod: S$GLB,,, | Performed by: INTERNAL MEDICINE

## 2019-08-30 PROCEDURE — 99999 PR PBB SHADOW E&M-EST. PATIENT-LVL III: CPT | Mod: PBBFAC,,, | Performed by: INTERNAL MEDICINE

## 2019-08-30 PROCEDURE — 3074F SYST BP LT 130 MM HG: CPT | Mod: CPTII,S$GLB,, | Performed by: INTERNAL MEDICINE

## 2019-08-30 PROCEDURE — 99214 OFFICE O/P EST MOD 30 MIN: CPT | Mod: S$GLB,,, | Performed by: INTERNAL MEDICINE

## 2019-08-30 PROCEDURE — 3044F PR MOST RECENT HEMOGLOBIN A1C LEVEL <7.0%: ICD-10-PCS | Mod: CPTII,S$GLB,, | Performed by: INTERNAL MEDICINE

## 2019-08-30 PROCEDURE — 3078F PR MOST RECENT DIASTOLIC BLOOD PRESSURE < 80 MM HG: ICD-10-PCS | Mod: CPTII,S$GLB,, | Performed by: INTERNAL MEDICINE

## 2019-08-30 PROCEDURE — 99214 PR OFFICE/OUTPT VISIT, EST, LEVL IV, 30-39 MIN: ICD-10-PCS | Mod: S$GLB,,, | Performed by: INTERNAL MEDICINE

## 2019-09-03 ENCOUNTER — LAB VISIT (OUTPATIENT)
Dept: LAB | Facility: HOSPITAL | Age: 59
End: 2019-09-03
Attending: SURGERY
Payer: COMMERCIAL

## 2019-09-03 ENCOUNTER — PATIENT MESSAGE (OUTPATIENT)
Dept: TRANSPLANT | Facility: CLINIC | Age: 59
End: 2019-09-03

## 2019-09-03 ENCOUNTER — PATIENT MESSAGE (OUTPATIENT)
Dept: FAMILY MEDICINE | Facility: CLINIC | Age: 59
End: 2019-09-03

## 2019-09-03 ENCOUNTER — TELEPHONE (OUTPATIENT)
Dept: TRANSPLANT | Facility: CLINIC | Age: 59
End: 2019-09-03

## 2019-09-03 DIAGNOSIS — Z94.4 S/P LIVER TRANSPLANT: ICD-10-CM

## 2019-09-03 DIAGNOSIS — Z94.4 LIVER REPLACED BY TRANSPLANT: ICD-10-CM

## 2019-09-03 LAB
ALBUMIN SERPL BCP-MCNC: 4.2 G/DL (ref 3.5–5.2)
ALP SERPL-CCNC: 72 U/L (ref 55–135)
ALT SERPL W/O P-5'-P-CCNC: 28 U/L (ref 10–44)
ANION GAP SERPL CALC-SCNC: 11 MMOL/L (ref 8–16)
AST SERPL-CCNC: 14 U/L (ref 10–40)
BASOPHILS # BLD AUTO: 0.11 K/UL (ref 0–0.2)
BASOPHILS NFR BLD: 1.8 % (ref 0–1.9)
BILIRUB DIRECT SERPL-MCNC: 0.2 MG/DL (ref 0.1–0.3)
BILIRUB SERPL-MCNC: 0.5 MG/DL (ref 0.1–1)
BUN SERPL-MCNC: 22 MG/DL (ref 6–20)
CALCIUM SERPL-MCNC: 9.3 MG/DL (ref 8.7–10.5)
CHLORIDE SERPL-SCNC: 107 MMOL/L (ref 95–110)
CO2 SERPL-SCNC: 22 MMOL/L (ref 23–29)
CREAT SERPL-MCNC: 1.2 MG/DL (ref 0.5–1.4)
DIFFERENTIAL METHOD: ABNORMAL
EOSINOPHIL # BLD AUTO: 0.1 K/UL (ref 0–0.5)
EOSINOPHIL NFR BLD: 1.4 % (ref 0–8)
ERYTHROCYTE [DISTWIDTH] IN BLOOD BY AUTOMATED COUNT: 13.1 % (ref 11.5–14.5)
EST. GFR  (AFRICAN AMERICAN): >60 ML/MIN/1.73 M^2
EST. GFR  (NON AFRICAN AMERICAN): >60 ML/MIN/1.73 M^2
GLUCOSE SERPL-MCNC: 92 MG/DL (ref 70–110)
HCT VFR BLD AUTO: 38.9 % (ref 40–54)
HGB BLD-MCNC: 12.6 G/DL (ref 14–18)
IMM GRANULOCYTES # BLD AUTO: 0.13 K/UL (ref 0–0.04)
IMM GRANULOCYTES NFR BLD AUTO: 2.1 % (ref 0–0.5)
LYMPHOCYTES # BLD AUTO: 0.7 K/UL (ref 1–4.8)
LYMPHOCYTES NFR BLD: 11.6 % (ref 18–48)
MCH RBC QN AUTO: 27.2 PG (ref 27–31)
MCHC RBC AUTO-ENTMCNC: 32.4 G/DL (ref 32–36)
MCV RBC AUTO: 84 FL (ref 82–98)
MONOCYTES # BLD AUTO: 0.4 K/UL (ref 0.3–1)
MONOCYTES NFR BLD: 6.9 % (ref 4–15)
NEUTROPHILS # BLD AUTO: 4.7 K/UL (ref 1.8–7.7)
NEUTROPHILS NFR BLD: 76.2 % (ref 38–73)
NRBC BLD-RTO: 0 /100 WBC
PLATELET # BLD AUTO: 302 K/UL (ref 150–350)
PMV BLD AUTO: 9.8 FL (ref 9.2–12.9)
POTASSIUM SERPL-SCNC: 3.5 MMOL/L (ref 3.5–5.1)
PROT SERPL-MCNC: 6.8 G/DL (ref 6–8.4)
RBC # BLD AUTO: 4.63 M/UL (ref 4.6–6.2)
SODIUM SERPL-SCNC: 140 MMOL/L (ref 136–145)
TACROLIMUS BLD-MCNC: 6.2 NG/ML (ref 5–15)
WBC # BLD AUTO: 6.22 K/UL (ref 3.9–12.7)

## 2019-09-03 PROCEDURE — 36415 COLL VENOUS BLD VENIPUNCTURE: CPT | Mod: PO

## 2019-09-03 PROCEDURE — 80053 COMPREHEN METABOLIC PANEL: CPT

## 2019-09-03 PROCEDURE — 85025 COMPLETE CBC W/AUTO DIFF WBC: CPT

## 2019-09-03 PROCEDURE — 80197 ASSAY OF TACROLIMUS: CPT

## 2019-09-03 PROCEDURE — 82248 BILIRUBIN DIRECT: CPT

## 2019-09-03 NOTE — TELEPHONE ENCOUNTER
----- Message from Aman Cooper MD sent at 9/3/2019  3:20 PM CDT -----  Results ok. No action required.

## 2019-09-09 ENCOUNTER — HOSPITAL ENCOUNTER (OUTPATIENT)
Dept: RADIOLOGY | Facility: HOSPITAL | Age: 59
Discharge: HOME | End: 2019-09-09
Attending: SURGERY
Payer: COMMERCIAL

## 2019-09-09 ENCOUNTER — PATIENT MESSAGE (OUTPATIENT)
Dept: TRANSPLANT | Facility: CLINIC | Age: 59
End: 2019-09-09

## 2019-09-09 ENCOUNTER — OFFICE VISIT (OUTPATIENT)
Dept: WOUND CARE | Facility: CLINIC | Age: 59
End: 2019-09-09
Payer: COMMERCIAL

## 2019-09-09 DIAGNOSIS — Z94.4 S/P LIVER TRANSPLANT: ICD-10-CM

## 2019-09-09 DIAGNOSIS — E11.9 TYPE 2 DIABETES MELLITUS WITHOUT COMPLICATION, WITHOUT LONG-TERM CURRENT USE OF INSULIN: ICD-10-CM

## 2019-09-09 DIAGNOSIS — T81.89XD NON-HEALING SURGICAL WOUND, SUBSEQUENT ENCOUNTER: Primary | ICD-10-CM

## 2019-09-09 DIAGNOSIS — L92.9 HYPERGRANULATION: ICD-10-CM

## 2019-09-09 DIAGNOSIS — Z94.4 LIVER REPLACED BY TRANSPLANT: ICD-10-CM

## 2019-09-09 PROCEDURE — 76705 US LIVER TRANSPLANT POST: ICD-10-PCS | Mod: 26,59,, | Performed by: RADIOLOGY

## 2019-09-09 PROCEDURE — 2024F 7 FLD RTA PHOTO EVC RTNOPTHY: CPT | Mod: S$GLB,,, | Performed by: CLINICAL NURSE SPECIALIST

## 2019-09-09 PROCEDURE — 99999 PR PBB SHADOW E&M-EST. PATIENT-LVL III: CPT | Mod: PBBFAC,,, | Performed by: CLINICAL NURSE SPECIALIST

## 2019-09-09 PROCEDURE — 76705 ECHO EXAM OF ABDOMEN: CPT | Mod: 59,TC

## 2019-09-09 PROCEDURE — 93975 US LIVER TRANSPLANT POST: ICD-10-PCS | Mod: 26,,, | Performed by: RADIOLOGY

## 2019-09-09 PROCEDURE — 17250 PR CHEM CAUTERY GRANULATN TISSUE: ICD-10-PCS | Mod: S$GLB,,, | Performed by: CLINICAL NURSE SPECIALIST

## 2019-09-09 PROCEDURE — 76705 ECHO EXAM OF ABDOMEN: CPT | Mod: 26,59,, | Performed by: RADIOLOGY

## 2019-09-09 PROCEDURE — 99214 PR OFFICE/OUTPT VISIT, EST, LEVL IV, 30-39 MIN: ICD-10-PCS | Mod: 25,S$GLB,, | Performed by: CLINICAL NURSE SPECIALIST

## 2019-09-09 PROCEDURE — 3044F HG A1C LEVEL LT 7.0%: CPT | Mod: CPTII,S$GLB,, | Performed by: CLINICAL NURSE SPECIALIST

## 2019-09-09 PROCEDURE — 2024F PR 7 FIELD PHOTOS WITH INTERP/ REVIEW: ICD-10-PCS | Mod: S$GLB,,, | Performed by: CLINICAL NURSE SPECIALIST

## 2019-09-09 PROCEDURE — 99214 OFFICE O/P EST MOD 30 MIN: CPT | Mod: 25,S$GLB,, | Performed by: CLINICAL NURSE SPECIALIST

## 2019-09-09 PROCEDURE — 93975 VASCULAR STUDY: CPT | Mod: 26,,, | Performed by: RADIOLOGY

## 2019-09-09 PROCEDURE — 3044F PR MOST RECENT HEMOGLOBIN A1C LEVEL <7.0%: ICD-10-PCS | Mod: CPTII,S$GLB,, | Performed by: CLINICAL NURSE SPECIALIST

## 2019-09-09 PROCEDURE — 17250 CHEM CAUT OF GRANLTJ TISSUE: CPT | Mod: S$GLB,,, | Performed by: CLINICAL NURSE SPECIALIST

## 2019-09-09 PROCEDURE — 93975 VASCULAR STUDY: CPT | Mod: TC

## 2019-09-09 PROCEDURE — 99999 PR PBB SHADOW E&M-EST. PATIENT-LVL III: ICD-10-PCS | Mod: PBBFAC,,, | Performed by: CLINICAL NURSE SPECIALIST

## 2019-09-09 NOTE — PROGRESS NOTES
Subjective:       Patient ID: Roman Hodges is a 59 y.o. male.    Chief Complaint: Wound Check    This is his third fu with me for wound eval, he is post liver tx of July 3rd, his wound was opened  due to seroma,  He is back home and wife is caregiver, she is not comfortable doing wound care so Finn BURGESS is coming MWF and applying  TRIAD . Today due to continued proud flesh will stop Triad and switch to silver foam    Wound Check       Review of Systems   Constitutional: Negative for chills and fever.   Respiratory: Negative for cough and shortness of breath.    Cardiovascular: Negative for chest pain and leg swelling.   Gastrointestinal: Negative for abdominal distention and abdominal pain.   Genitourinary: Negative for hematuria.   Musculoskeletal: Negative for arthralgias and back pain.   Skin: Positive for wound. Negative for color change and rash.   Neurological: Negative for weakness and headaches.       Objective:      Physical Exam   Constitutional: He is oriented to person, place, and time. He appears well-developed and well-nourished.   Pulmonary/Chest: Effort normal. No respiratory distress. He has no wheezes.   Abdominal: Soft. Bowel sounds are normal. He exhibits no distension.   Musculoskeletal: Normal range of motion. He exhibits no edema.   Neurological: He is alert and oriented to person, place, and time.   Skin: Skin is warm and dry. No erythema.   Psychiatric: He has a normal mood and affect.       8/1 first visit      8/14 9/9 9/9 9/9 again granulated to skin and above so CC needed.  PROCEDURE:  CHEMICAL CAUTERY: Performed for hypergranulation tissue/granuloma(s) of chevron . The tissue was anesthetized topically with application of Lidocaine gel 2% and 5 minute wait time. The area noted was cauterized with AGNO3. The patient stated pain was 3 on 1-10 scale with this procedure. I applied silver transfer foam today and will have it changed 2 x weekly       8/27 healing however  he HAS EXCESS GRANULATION tissue across entire [employer] , chemical cautery indicated and done with lidocaine to help with pain  PROCEDURE:  CHEMICAL CAUTERY: Performed for hypergranulation tissue/granuloma(s) of incision . The tissue was anesthetized topically with application of Lidocaine gel 2% and 5 minute wait time. The area noted was cauterized with AGNO3. The patient stated pain was 1 on 1-10 scale with this procedure. I then dressed wound with silver transfer foam to stay until Friday then resume Triad wound dressing    8/14 change to TRIAD wound dressing apply to cleaned wound bed and then cover with telfa island ( mepor) dressing   JENIFERN to do MWF and wife to redo if needed in between    8/1  Chevron opened 7/31  It is 1-2 cm and  39 cm long, with depth of 1cm, Clean and no signs of infection, will just need good wound care to promote secondary closure.   I cleansed with VASHE cleanser,  Soak for 5 minutes then packed wound with Aquacel ag rope.  Covered with pad and secure with tape.     Assessment:       1. Non-healing surgical wound, subsequent encounter    2. Type 2 diabetes mellitus without complication, without long-term current use of insulin    3. S/P liver transplant    4. Hypergranulation        Plan:     chemical cautery today  Wound care as above. [facility]  Coordinate with Delmy OHARA is Ke[place]   VASHE cleanser,  Soak for 5 minutes then apply silver transfer dressing cut to cover open areas, then secure with MEPOR Pro  Fu with me in 1 week,  Reviewed infection signs and dietary concerns for wound healing  I have reviewed the plan of care with the patient and/ wife and they express understanding. I spent over 50% of this 25 minute visit in face to face counseling.

## 2019-09-09 NOTE — PATIENT INSTRUCTIONS
OSMIN is [place] [id number]   VASHE cleanser,  Soak for 5 minutes then apply silver transfer dressing cut to cover open areas, then secure with MEPOR Pro

## 2019-09-09 NOTE — Clinical Note
MAYDA is [facility] [phone] cleanser,  Soak for 5 minutes then apply silver transfer dressing cut to cover open areas, then secure with MEPOR Pro2 x week I will see him again next week

## 2019-09-10 ENCOUNTER — LAB VISIT (OUTPATIENT)
Dept: LAB | Facility: HOSPITAL | Age: 59
End: 2019-09-10
Attending: SURGERY
Payer: COMMERCIAL

## 2019-09-10 DIAGNOSIS — Z94.4 S/P LIVER TRANSPLANT: ICD-10-CM

## 2019-09-10 DIAGNOSIS — Z94.4 LIVER REPLACED BY TRANSPLANT: ICD-10-CM

## 2019-09-10 LAB
ALBUMIN SERPL BCP-MCNC: 4.3 G/DL (ref 3.5–5.2)
ALP SERPL-CCNC: 73 U/L (ref 55–135)
ALT SERPL W/O P-5'-P-CCNC: 23 U/L (ref 10–44)
ANION GAP SERPL CALC-SCNC: 7 MMOL/L (ref 8–16)
AST SERPL-CCNC: 15 U/L (ref 10–40)
BASOPHILS # BLD AUTO: 0.11 K/UL (ref 0–0.2)
BASOPHILS NFR BLD: 1.4 % (ref 0–1.9)
BILIRUB DIRECT SERPL-MCNC: 0.2 MG/DL (ref 0.1–0.3)
BILIRUB SERPL-MCNC: 0.4 MG/DL (ref 0.1–1)
BUN SERPL-MCNC: 25 MG/DL (ref 6–20)
CALCIUM SERPL-MCNC: 9.4 MG/DL (ref 8.7–10.5)
CHLORIDE SERPL-SCNC: 104 MMOL/L (ref 95–110)
CO2 SERPL-SCNC: 26 MMOL/L (ref 23–29)
CREAT SERPL-MCNC: 1.3 MG/DL (ref 0.5–1.4)
DIFFERENTIAL METHOD: ABNORMAL
EOSINOPHIL # BLD AUTO: 0.1 K/UL (ref 0–0.5)
EOSINOPHIL NFR BLD: 1.8 % (ref 0–8)
ERYTHROCYTE [DISTWIDTH] IN BLOOD BY AUTOMATED COUNT: 13.2 % (ref 11.5–14.5)
EST. GFR  (AFRICAN AMERICAN): >60 ML/MIN/1.73 M^2
EST. GFR  (NON AFRICAN AMERICAN): 59.3 ML/MIN/1.73 M^2
GLUCOSE SERPL-MCNC: 101 MG/DL (ref 70–110)
HCT VFR BLD AUTO: 40.6 % (ref 40–54)
HGB BLD-MCNC: 12.8 G/DL (ref 14–18)
IMM GRANULOCYTES # BLD AUTO: 0.15 K/UL (ref 0–0.04)
IMM GRANULOCYTES NFR BLD AUTO: 2 % (ref 0–0.5)
LYMPHOCYTES # BLD AUTO: 0.6 K/UL (ref 1–4.8)
LYMPHOCYTES NFR BLD: 8.4 % (ref 18–48)
MCH RBC QN AUTO: 27.1 PG (ref 27–31)
MCHC RBC AUTO-ENTMCNC: 31.5 G/DL (ref 32–36)
MCV RBC AUTO: 86 FL (ref 82–98)
MONOCYTES # BLD AUTO: 0.4 K/UL (ref 0.3–1)
MONOCYTES NFR BLD: 4.9 % (ref 4–15)
NEUTROPHILS # BLD AUTO: 6.2 K/UL (ref 1.8–7.7)
NEUTROPHILS NFR BLD: 81.5 % (ref 38–73)
NRBC BLD-RTO: 0 /100 WBC
PLATELET # BLD AUTO: 281 K/UL (ref 150–350)
PMV BLD AUTO: 10 FL (ref 9.2–12.9)
POTASSIUM SERPL-SCNC: 4 MMOL/L (ref 3.5–5.1)
PROT SERPL-MCNC: 7 G/DL (ref 6–8.4)
RBC # BLD AUTO: 4.73 M/UL (ref 4.6–6.2)
SODIUM SERPL-SCNC: 137 MMOL/L (ref 136–145)
TACROLIMUS BLD-MCNC: 7.3 NG/ML (ref 5–15)
WBC # BLD AUTO: 7.61 K/UL (ref 3.9–12.7)

## 2019-09-10 PROCEDURE — 82248 BILIRUBIN DIRECT: CPT

## 2019-09-10 PROCEDURE — 36415 COLL VENOUS BLD VENIPUNCTURE: CPT | Mod: PO

## 2019-09-10 PROCEDURE — 80197 ASSAY OF TACROLIMUS: CPT

## 2019-09-10 PROCEDURE — 80053 COMPREHEN METABOLIC PANEL: CPT

## 2019-09-10 PROCEDURE — 85025 COMPLETE CBC W/AUTO DIFF WBC: CPT

## 2019-09-12 NOTE — PROGRESS NOTES
Subjective:    Patient ID:  Roman Hodges is a 59 y.o. male who presents for follow-up of Hypertension      HPI    58 y/o Syriac speaking male with hx of HTN, DM, PAFib, HFrEF with return to normal function (previously 30%, last 2DE with 55%) and HCC s/p recent OLTx. Patient was initially admitted with AF RVR after presenting with near syncopal episode and spontaneously converted. At that time, 2DE with EF 30% with akinetic: mid anteroseptal, apical anterior, apical septal, apical lateral and apex and hypokinetic: mid inferoseptal and apical inferior walls. Again presented with similar symptoms, started on BB and DOAC and discharged home. Seen by Dr Sargent and was complaining of exertional epigastric/substernal pain and was admitted for ACS r/o. Had LHC with nonobstructive CAD and EF on V-gram normal without WMA's. Clinically improved and discharged home. Repeat 2DE with normalization of function to 55%. Did well post discharge, but then began having episodes of HTN urgency and had presented multiple times to ED with SBP >200.   Had Oltx successfully and doing well. BP meds had been changed and had been having elevated readings. Changed BP regimen last clinic visit and BP now controlled.  Denies regular CP, SOB, palps, orthopnea, PND, syncope, palps. Tolerating meds well and compliant.    Review of Systems   Constitution: Negative for malaise/fatigue.   HENT: Negative for congestion.    Eyes: Negative for blurred vision.   Cardiovascular: Negative for chest pain, claudication, cyanosis, dyspnea on exertion, irregular heartbeat, leg swelling, near-syncope, orthopnea, palpitations, paroxysmal nocturnal dyspnea and syncope.   Respiratory: Negative for shortness of breath.    Endocrine: Negative for polyuria.   Hematologic/Lymphatic: Negative for bleeding problem.   Skin: Negative for itching and rash.   Musculoskeletal: Negative for joint swelling, muscle cramps and muscle weakness.   Gastrointestinal: Negative  for abdominal pain, hematemesis, hematochezia, melena, nausea and vomiting.   Genitourinary: Negative for dysuria and hematuria.   Neurological: Negative for dizziness, focal weakness, headaches, light-headedness, loss of balance and weakness.   Psychiatric/Behavioral: Negative for depression. The patient is not nervous/anxious.         Objective:    Physical Exam   Constitutional: He is oriented to person, place, and time. He appears well-developed and well-nourished.   HENT:   Head: Normocephalic and atraumatic.   Neck: Neck supple. No JVD present.   Cardiovascular: Normal rate, regular rhythm and normal heart sounds.   Pulses:       Carotid pulses are 2+ on the right side, and 2+ on the left side.       Radial pulses are 2+ on the right side, and 2+ on the left side.        Femoral pulses are 2+ on the right side, and 2+ on the left side.       Dorsalis pedis pulses are 2+ on the right side, and 2+ on the left side.        Posterior tibial pulses are 2+ on the right side, and 2+ on the left side.   Pulmonary/Chest: Effort normal and breath sounds normal.   Abdominal: Soft. Bowel sounds are normal.   Musculoskeletal: He exhibits no edema.   Neurological: He is alert and oriented to person, place, and time.   Skin: Skin is warm and dry.   Psychiatric: He has a normal mood and affect. His behavior is normal. Thought content normal.         Assessment:       1. Essential hypertension    2. Paroxysmal atrial fibrillation    3. Acute systolic heart failure    4. Long term (current) use of anticoagulants    5. Thrombocytopenia, unspecified    6. Obesity (BMI 30.0-34.9)    7. Type 2 diabetes mellitus without complication, without long-term current use of insulin    8. Gastroesophageal reflux disease, esophagitis presence not specified    9. S/P liver transplant    10. Long-term use of immunosuppressant medication    11. Obstructive sleep apnea on CPAP      60 y/o pt with hx and presentation as above. Doing well from a  cardiac perspective and compensated from a HF perspective. BP now controlled. Discussed the etiology, evaluation, and management of HTN, afib, CHF. Discussed the importance of med compliance, heart healthy diet, and regular exercise.          Plan:       -Continue current medical management  -f/u in 6 months

## 2019-09-13 ENCOUNTER — PATIENT MESSAGE (OUTPATIENT)
Dept: TRANSPLANT | Facility: CLINIC | Age: 59
End: 2019-09-13

## 2019-09-16 ENCOUNTER — OFFICE VISIT (OUTPATIENT)
Dept: SLEEP MEDICINE | Facility: CLINIC | Age: 59
End: 2019-09-16
Payer: COMMERCIAL

## 2019-09-16 VITALS
DIASTOLIC BLOOD PRESSURE: 71 MMHG | SYSTOLIC BLOOD PRESSURE: 119 MMHG | BODY MASS INDEX: 31.29 KG/M2 | WEIGHT: 199.38 LBS | HEART RATE: 70 BPM | HEIGHT: 67 IN

## 2019-09-16 DIAGNOSIS — G47.30 SLEEP APNEA, UNSPECIFIED TYPE: Primary | ICD-10-CM

## 2019-09-16 PROCEDURE — 99999 PR PBB SHADOW E&M-EST. PATIENT-LVL III: ICD-10-PCS | Mod: PBBFAC,,, | Performed by: PSYCHIATRY & NEUROLOGY

## 2019-09-16 PROCEDURE — 3074F PR MOST RECENT SYSTOLIC BLOOD PRESSURE < 130 MM HG: ICD-10-PCS | Mod: CPTII,S$GLB,, | Performed by: PSYCHIATRY & NEUROLOGY

## 2019-09-16 PROCEDURE — 3074F SYST BP LT 130 MM HG: CPT | Mod: CPTII,S$GLB,, | Performed by: PSYCHIATRY & NEUROLOGY

## 2019-09-16 PROCEDURE — 3008F PR BODY MASS INDEX (BMI) DOCUMENTED: ICD-10-PCS | Mod: CPTII,S$GLB,, | Performed by: PSYCHIATRY & NEUROLOGY

## 2019-09-16 PROCEDURE — 99204 OFFICE O/P NEW MOD 45 MIN: CPT | Mod: S$GLB,,, | Performed by: PSYCHIATRY & NEUROLOGY

## 2019-09-16 PROCEDURE — 99204 PR OFFICE/OUTPT VISIT, NEW, LEVL IV, 45-59 MIN: ICD-10-PCS | Mod: S$GLB,,, | Performed by: PSYCHIATRY & NEUROLOGY

## 2019-09-16 PROCEDURE — 3008F BODY MASS INDEX DOCD: CPT | Mod: CPTII,S$GLB,, | Performed by: PSYCHIATRY & NEUROLOGY

## 2019-09-16 PROCEDURE — 3078F DIAST BP <80 MM HG: CPT | Mod: CPTII,S$GLB,, | Performed by: PSYCHIATRY & NEUROLOGY

## 2019-09-16 PROCEDURE — 3078F PR MOST RECENT DIASTOLIC BLOOD PRESSURE < 80 MM HG: ICD-10-PCS | Mod: CPTII,S$GLB,, | Performed by: PSYCHIATRY & NEUROLOGY

## 2019-09-16 PROCEDURE — 99999 PR PBB SHADOW E&M-EST. PATIENT-LVL III: CPT | Mod: PBBFAC,,, | Performed by: PSYCHIATRY & NEUROLOGY

## 2019-09-16 NOTE — LETTER
September 23, 2019      Jose Angel Munson MD  [address]  [place] [address]           [facility]  [address]  [place] [address]  Phone: [phone]  Fax: [phone]          Patient: Roman Hodges   MR Number: [id number]   YOB: 1960   Date of Visit: 9/16/2019       Dear Dr. Jose Angel Munson:    Thank you for referring Roman Hodges to me for evaluation. Attached you will find relevant portions of my assessment and plan of care.    If you have questions, please do not hesitate to call me. I look forward to following Roman Hodges along with you.    Sincerely,    Flakita Angulo MD    Enclosure  CC:  No Recipients    If you would like to receive this communication electronically, please contact [email or web address] or [phone] to request more information on [facility] access.    For providers and/or their staff who would like to refer a patient to [facility], please contact us through our one-stop-shop provider referral line, [facility], at [phone].    If you feel you have received this communication in error or would no longer like to receive these types of communications, please e-mail [email or web address]

## 2019-09-16 NOTE — PATIENT INSTRUCTIONS
SLEEP LAB (Linette or Aleksandar) will contact you to schedulethe sleep study. Their number is [phone] (ext [phone]). Please call them if you do not hear from them in 2 weeks from now.  The [facility] is located on 7th floor of the [facility];      SLEEP CLINIC (my assistant) will call you when the sleep study results are ready - if you have not heard from us by 2 weeks from the date of the study, please call [phone] (ext 1) or you can use [facility] to contact me.    You are advised to abstain from driving should you feel sleepy or drowsy.

## 2019-09-16 NOTE — PROGRESS NOTES
Roman Hodges  was seen at the request of  Jose Angel Munson for sleep evaluation.    09/16/2019 INITIAL HISTORY OF PRESENT ILLNESS:  Roman Hodges is a 59 y.o. male is here to be evaluated for a sleep disorder.       CHIEF COMPLAINT:      The patient's complaints include excessive daytime sleepiness, excessive daytime fatigue, snoring,  witnessed breathing pauses,  gasping for air in sleep and interrupted sleep since  Over 10 yrs back.    He was diagnosed with MALLIKA over 15 yrs ago.  Since that time has been using CPAP compliantly all through theese years. Benefiting from CPAP use in terms of sleep continuity and daytime sleepiness.  His machine is over 10 yrs and needs replacement.     APAP 560 Respironics APAP 5-20; 90% 6  7:30 hrs  30/30 days with over 4 hrs  AHI 3.7  Leak 0%  PB 0%    Using nasal mask - curious of [employer].    He lost 50 lb weight since previous study following liver transplant; since then also extreme immunity precautions.  Liver transplant on 6/4/19.    His machine is old and needs replacement; power button is malfunction.    No difficulty falling or stayng asleep      EPWORTH SLEEPINESS SCALE 9/10/2019   Sitting and reading 2   Watching TV 1   Sitting, inactive in a public place (e.g. a theatre or a meeting) 0   As a passenger in a car for an hour without a break 0   Lying down to rest in the afternoon when circumstances permit 1   Sitting and talking to someone 0   Sitting quietly after a lunch without alcohol 1   In a car, while stopped for a few minutes in traffic 0   Total score 5       No flowsheet data found.  No flowsheet data found.      SLEEP ROUTINE AND LIFESTYLE 09/16/2019 :  Sleep Clinic New Patient 9/10/2019   What time do you go to bed on a week day? (Give a range) 10pm   What time do you go to bed on a day off? (Give a range) 11-12pm   How long does it take you to fall asleep? (Give a range) 5-10 minutes   On average, how many times per night do you wake up? 1    How long does it take you to fall back into sleep? (Give a range) 5 minutes   What time do you wake up to start your day on a week day? (Give a range) 7-8 am   What time do you wake up to start your day on a day off? (Give a range) 7-8 am   What time do you get out of bed? (Give a range) 7-8 am   On average, how many hours do you sleep? 7 hrs   On average, how many naps do you take per day? 1   Rate your sleep quality from 0 to 5 (0-poor, 5-great). 4   Have you experienced:  N/a   How much weight have you lost or gained (in lbs.) in the last year? Lost 50   On average, how many times per night do you go to the bathroom?  1   Have you ever had a sleep study/CPAP machine/surgery for sleep apnea? Yes   Have you ever had a CPAP machine for sleep apnea? Yes   Have you ever had surgery for sleep apnea? No       Sleep Clinic ROS  9/10/2019   Difficulty breathing through the nose?  No   Sore throat or dry mouth in the morning? Yes   Irregular or very fast heart beat?  No   Shortness of breath?  No   Acid reflux? Yes   Body aches and pains?  No   Morning headaches? No   Dizziness? No   Mood changes?  No   Do you exercise?  Sometimes   Do you feel like moving your legs a lot?  No             Social History:      Occupation:full time  Bed partner: his wife  Usually - walks a lot of hrs  Exercise routine: better  Caffeine:  No - decaf     PREVIOUS SLEEP STUDIES:     15 yrs ago - n/a    DME:       PAST MEDICAL HISTORY:    Active Ambulatory Problems     Diagnosis Date Noted    Obstructive sleep apnea on CPAP 04/23/2018    Obesity (BMI 30.0-34.9) 04/23/2018    Atrial fibrillation 11/29/2018    Essential hypertension 11/29/2018    Type 2 diabetes mellitus, without long-term current use of insulin 11/29/2018    GERD (gastroesophageal reflux disease) 11/29/2018    Acute systolic heart failure 11/29/2018    Open angle with borderline findings and low 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 eyes 12/25/2018    Long term (current) use of  anticoagulants 04/26/2019    S/P liver transplant 07/04/2019    Prophylactic immunotherapy 07/04/2019    Adrenal cortical steroids causing adverse effect in therapeutic use 07/04/2019    Thrombocytopenia, unspecified 07/07/2019    Hypophosphatemia 07/07/2019    Long-term use of immunosuppressant medication 07/07/2019    At risk for opportunistic infections 07/07/2019    Prolonged Q-T interval on ECG     Paroxysmal atrial fibrillation 07/07/2019     Resolved Ambulatory Problems     Diagnosis Date Noted    History of primary liver cancer 10/24/2018    Near syncope 11/29/2018    Liver transplant candidate 02/06/2019    ROMO (nonalcoholic steatohepatitis) 02/26/2019    Liver mass 04/12/2019    Cirrhosis 07/03/2019    Atrial fibrillation with RVR     Encounter for weaning from ventilator     Cirrhosis 07/06/2019    Hyponatremia 07/07/2019    Leukocytosis 07/07/2019    Arrhythmia      Past Medical History:   Diagnosis Date    A-fib     Allergy     Anticoagulant long-term use     Diabetes mellitus, type 2     GERD (gastroesophageal reflux disease)     Hepatocellular carcinoma     History of primary liver cancer 10/24/2018    Hyperlipidemia     Hypertension                 PAST SURGICAL HISTORY:    Past Surgical History:   Procedure Laterality Date    COLONOSCOPY      EMBOLIZATION, YTTRIUM THERAPY N/A 11/9/2018    [facility]formed [facility] OR 2ND FLR    EMBOLIZATION, YTTRIUM THERAPY N/A 10/24/2018    [facility] OR 2ND [facility]    ESOPHAGOGASTRODUODENOSCOPY (EGD) N/A 1/15/2019    [facility] ENDO    HERNIA REPAIR      Left heart cath Left 12/4/2018    Per[facility] by Tyler [facility]osa [facility] at [facility] CATH LAB/EP    Radiofrequency Ablation N/A 4/12/2019    Performed by [facility] Surgeon [facility] ROSE    TRANSPLANT, LIVER N/A 7/3/2019    Performed by Oscar Altman Jr., MD at [facility] OR 2ND FLR         FAMILY HISTORY:                 Family History   Problem Relation Age of Onset    Hypertension Mother     Cancer Mother     Hypertension Father     Stroke Father     Cancer Father        SOCIAL HISTORY:          Tobacco:   Social History     Tobacco Use   Smoking Status Former Smoker    Packs/day: 1.00    Years: 10.00    Pack years: 10.00    Types: Cigarettes    Last attempt to quit: 1980    Years since quittin.7   Smokeless Tobacco Never Used       alcohol use:    Social History     Substance and Sexual Activity   Alcohol Use No    Frequency: Never    Binge frequency: Never                   ALLERGIES:    Review of patient's allergies indicates:   Allergen Reactions    Lisinopril      Stomach ache    Flu vaccine  (36 mos+)(pf)      Patient reports he developed Bell's Palsy 2 days after his last flu shot in        CURRENT MEDICATIONS:    Current Outpatient Medications   Medication Sig Dispense Refill    amLODIPine (NORVASC) 10 MG tablet Take 1 tablet (10 mg total) by mouth once daily. 90 tablet 3    apixaban (ELIQUIS) 5 mg Tab Take 1 tablet (5 mg total) by mouth 2 (two) times daily. 60 tablet 11    blood sugar diagnostic Strp Test blood glucose 3 (three) times daily. 100 each 11    blood sugar diagnostic Strp To use 4 times daily with blood glucose meter 100 each 10    blood-glucose meter kit Use as instructed 1 each 0    calcium carbonate (TUMS) 200 mg calcium (500 mg) chewable tablet Take 1 tablet (500 mg total) by mouth 3 (three) times daily as needed for Heartburn. 30 tablet 0    esomeprazole (NEXIUM) 40 MG capsule TAKE 1 CAPSULE BY MOUTH EVERY DAY 90 capsule 3    lancets Misc Test blood glucose 3 (three) times daily. 100 each 11    metoprolol succinate (TOPROL-XL) 50 MG 24 hr tablet Take 1 tablet (50 mg total) by mouth once daily. 90 tablet 3    mycophenolate (CELLCEPT) 250 mg Cap Take 4 capsules (1,000 mg total) by mouth 2 (two) times daily. 240 capsule 2    pen needle, diabetic (EASY COMFORT  "PEN NEEDLES) 32 gauge x 5/32" Ndle Inject 1 each into the skin 3 (three) times daily. 100 each 11    SITagliptin (JANUVIA) 100 MG Tab Take 1 tablet (100 mg total) by mouth once daily. 90 tablet 3    sulfamethoxazole-trimethoprim 400-80mg (BACTRIM,SEPTRA) 400-80 mg per tablet Take 1 tablet by mouth once daily. Stop 1/2/2020 30 tablet 5    tacrolimus (PROGRAF) 1 MG Cap Take 2 capsules (2 mg total) by mouth every 12 (twelve) hours. 120 capsule 11    valGANciclovir (VALCYTE) 450 mg Tab Take 1 tablet (450 mg total) by mouth once daily. Stop 10/3/19 30 tablet 2     No current facility-administered medications for this visit.      Facility-Administered Medications Ordered in Other Visits   Medication Dose Route Frequency Provider Last Rate Last Dose    0.9%  NaCl infusion   Intravenous Continuous Bony Saavedra MD   Stopped at 01/15/19 1332    sodium chloride 0.9% flush 3 mL  3 mL Intravenous PRN Bony Saavedra MD                              PHYSICAL EXAM:  /71   Pulse 70   Ht 5' 7" (1.702 m)   Wt 90.4 kg (199 lb 6.4 oz)   BMI 31.23 kg/m²   GENERAL: Normal development, well groomed.  HEENT:   HEENT:  Conjunctivae are non-erythematous; Pupils equal, round, and reactive to light; Nose is symmetrical; Nasal mucosa is pink and moist; Septum is midline; Inferior turbinates are normal; Nasal airflow is normal; Posterior pharynx is pink; Modified Mallampati:III-IV; Posterior palate is low; Tonsils not visualized; Uvula is wide and elongated;Tongue is enlarged; Dentition is fair; No TMJ tenderness; Jaw opening and protrusion without click and without discomfort.  NECK: Supple. Neck circumference is 17 inches. No thyromegaly. No palpable nodes.     SKIN: On face and neck: No abrasions, no rashes, no lesions.  No subcutaneous nodules are palpable.  RESPIRATORY: Chest is clear to auscultation.  Normal chest expansion and non-labored breathing at rest.  CARDIOVASCULAR: Normal S1, S2.  No murmurs, gallops or rubs. " "No carotid bruits bilaterally.  No edema. No clubbing. No cyanosis.    NEURO: Oriented to time, place and person. Normal attention span and concentration. Gait normal.    PSYCH: Affect is full. Mood is normal  MUSCULOSKELETAL: Moves 4 extremities. Gait normal.         Using [facility]: yes      ASSESSMENT:    1. Sleep Apnea NEC. Previously diagnosed MALLIKA> The patient symptomatically has  excessive daytime sleepiness, snoring,  witnessed breathing pauses, excessive daytime fatigue, gasping for air in sleep, interrupted sleep and nocturia  with exam findings of "a crowded oral airway and elevated body mass index. The patient has medical co-morbidities of paroxysmal atrial fibrillation, diabetes and hypertension,  which can be worsened by MALLIKA. This warrants further investigation for possible obstructive sleep apnea. Lost 50 lbs since titration, symptoms improved, however still snores and gasping for air. Still using his CPAP compliantly. Benefiting from CPAP use in terms of sleep continuity and daytime sleepiness.           PLAN:    Will order HST to requal  Will order APAP after the study and will check on his new machine later.      More than 15 minutes of this 30 minutes visit was spent in counseling: during our discussion today, we talked about the etiology of  MALLIKA as well as the potential ramifications of untreated sleep apnea, which could include daytime sleepiness, hypertension, heart disease and/or stroke.  We discussed potential treatment options, which could include weight loss, body positioning, continuous positive airway pressure (CPAP), or referral for surgical consideration. Meanwhile, he  is urged to avoid supine sleep, weight gain and alcoholic beverages since all of these can worsen MALLIKA.     Precautions: The patient was advised to abstain from driving should he feel sleepy or drowsy.    Follow up: MD/NP  after the sleep study has been completed.     Thank you for allowing me the opportunity to " participate in the care of your patient.    This visit summary will be sent to referring provider via inbasket

## 2019-09-17 ENCOUNTER — LAB VISIT (OUTPATIENT)
Dept: LAB | Facility: HOSPITAL | Age: 59
End: 2019-09-17
Attending: SURGERY
Payer: COMMERCIAL

## 2019-09-17 DIAGNOSIS — Z94.4 S/P LIVER TRANSPLANT: ICD-10-CM

## 2019-09-17 DIAGNOSIS — Z94.4 LIVER REPLACED BY TRANSPLANT: ICD-10-CM

## 2019-09-17 LAB
ALBUMIN SERPL BCP-MCNC: 4.4 G/DL (ref 3.5–5.2)
ALP SERPL-CCNC: 72 U/L (ref 55–135)
ALT SERPL W/O P-5'-P-CCNC: 29 U/L (ref 10–44)
ANION GAP SERPL CALC-SCNC: 9 MMOL/L (ref 8–16)
AST SERPL-CCNC: 18 U/L (ref 10–40)
BASOPHILS # BLD AUTO: 0.06 K/UL (ref 0–0.2)
BASOPHILS NFR BLD: 0.9 % (ref 0–1.9)
BILIRUB DIRECT SERPL-MCNC: 0.3 MG/DL (ref 0.1–0.3)
BILIRUB SERPL-MCNC: 0.7 MG/DL (ref 0.1–1)
BUN SERPL-MCNC: 20 MG/DL (ref 6–20)
CALCIUM SERPL-MCNC: 9.5 MG/DL (ref 8.7–10.5)
CHLORIDE SERPL-SCNC: 104 MMOL/L (ref 95–110)
CO2 SERPL-SCNC: 25 MMOL/L (ref 23–29)
CREAT SERPL-MCNC: 1.4 MG/DL (ref 0.5–1.4)
DIFFERENTIAL METHOD: ABNORMAL
EOSINOPHIL # BLD AUTO: 0.1 K/UL (ref 0–0.5)
EOSINOPHIL NFR BLD: 1.7 % (ref 0–8)
ERYTHROCYTE [DISTWIDTH] IN BLOOD BY AUTOMATED COUNT: 13.6 % (ref 11.5–14.5)
EST. GFR  (AFRICAN AMERICAN): >60 ML/MIN/1.73 M^2
EST. GFR  (NON AFRICAN AMERICAN): 54.2 ML/MIN/1.73 M^2
GLUCOSE SERPL-MCNC: 97 MG/DL (ref 70–110)
HCT VFR BLD AUTO: 40.3 % (ref 40–54)
HGB BLD-MCNC: 13.3 G/DL (ref 14–18)
IMM GRANULOCYTES # BLD AUTO: 0.12 K/UL (ref 0–0.04)
IMM GRANULOCYTES NFR BLD AUTO: 1.9 % (ref 0–0.5)
LYMPHOCYTES # BLD AUTO: 0.7 K/UL (ref 1–4.8)
LYMPHOCYTES NFR BLD: 10.2 % (ref 18–48)
MCH RBC QN AUTO: 27.1 PG (ref 27–31)
MCHC RBC AUTO-ENTMCNC: 33 G/DL (ref 32–36)
MCV RBC AUTO: 82 FL (ref 82–98)
MONOCYTES # BLD AUTO: 0.5 K/UL (ref 0.3–1)
MONOCYTES NFR BLD: 7.5 % (ref 4–15)
NEUTROPHILS # BLD AUTO: 5 K/UL (ref 1.8–7.7)
NEUTROPHILS NFR BLD: 77.8 % (ref 38–73)
NRBC BLD-RTO: 0 /100 WBC
PLATELET # BLD AUTO: 278 K/UL (ref 150–350)
PMV BLD AUTO: 10.1 FL (ref 9.2–12.9)
POTASSIUM SERPL-SCNC: 4.1 MMOL/L (ref 3.5–5.1)
PROT SERPL-MCNC: 7.2 G/DL (ref 6–8.4)
RBC # BLD AUTO: 4.9 M/UL (ref 4.6–6.2)
SODIUM SERPL-SCNC: 138 MMOL/L (ref 136–145)
WBC # BLD AUTO: 6.39 K/UL (ref 3.9–12.7)

## 2019-09-17 PROCEDURE — 82248 BILIRUBIN DIRECT: CPT

## 2019-09-17 PROCEDURE — 85025 COMPLETE CBC W/AUTO DIFF WBC: CPT

## 2019-09-17 PROCEDURE — 80053 COMPREHEN METABOLIC PANEL: CPT

## 2019-09-17 PROCEDURE — 80197 ASSAY OF TACROLIMUS: CPT

## 2019-09-17 PROCEDURE — 36415 COLL VENOUS BLD VENIPUNCTURE: CPT | Mod: PO

## 2019-09-18 ENCOUNTER — PATIENT MESSAGE (OUTPATIENT)
Dept: WOUND CARE | Facility: CLINIC | Age: 59
End: 2019-09-18

## 2019-09-18 ENCOUNTER — OFFICE VISIT (OUTPATIENT)
Dept: WOUND CARE | Facility: CLINIC | Age: 59
End: 2019-09-18
Payer: COMMERCIAL

## 2019-09-18 ENCOUNTER — PATIENT MESSAGE (OUTPATIENT)
Dept: TRANSPLANT | Facility: CLINIC | Age: 59
End: 2019-09-18

## 2019-09-18 DIAGNOSIS — Z94.4 LIVER REPLACED BY TRANSPLANT: Primary | ICD-10-CM

## 2019-09-18 DIAGNOSIS — T81.89XD NON-HEALING SURGICAL WOUND, SUBSEQUENT ENCOUNTER: Primary | ICD-10-CM

## 2019-09-18 DIAGNOSIS — Z94.4 S/P LIVER TRANSPLANT: ICD-10-CM

## 2019-09-18 DIAGNOSIS — C22.0 HCC (HEPATOCELLULAR CARCINOMA): ICD-10-CM

## 2019-09-18 DIAGNOSIS — E11.9 TYPE 2 DIABETES MELLITUS WITHOUT COMPLICATION, WITHOUT LONG-TERM CURRENT USE OF INSULIN: ICD-10-CM

## 2019-09-18 LAB — TACROLIMUS BLD-MCNC: 8.3 NG/ML (ref 5–15)

## 2019-09-18 PROCEDURE — 99214 OFFICE O/P EST MOD 30 MIN: CPT | Mod: S$GLB,,, | Performed by: CLINICAL NURSE SPECIALIST

## 2019-09-18 PROCEDURE — 2024F PR 7 FIELD PHOTOS WITH INTERP/ REVIEW: ICD-10-PCS | Mod: S$GLB,,, | Performed by: CLINICAL NURSE SPECIALIST

## 2019-09-18 PROCEDURE — 99214 PR OFFICE/OUTPT VISIT, EST, LEVL IV, 30-39 MIN: ICD-10-PCS | Mod: S$GLB,,, | Performed by: CLINICAL NURSE SPECIALIST

## 2019-09-18 PROCEDURE — 3044F PR MOST RECENT HEMOGLOBIN A1C LEVEL <7.0%: ICD-10-PCS | Mod: CPTII,S$GLB,, | Performed by: CLINICAL NURSE SPECIALIST

## 2019-09-18 PROCEDURE — 3044F HG A1C LEVEL LT 7.0%: CPT | Mod: CPTII,S$GLB,, | Performed by: CLINICAL NURSE SPECIALIST

## 2019-09-18 PROCEDURE — 99999 PR PBB SHADOW E&M-EST. PATIENT-LVL II: CPT | Mod: PBBFAC,,, | Performed by: CLINICAL NURSE SPECIALIST

## 2019-09-18 PROCEDURE — 99999 PR PBB SHADOW E&M-EST. PATIENT-LVL II: ICD-10-PCS | Mod: PBBFAC,,, | Performed by: CLINICAL NURSE SPECIALIST

## 2019-09-18 PROCEDURE — 2024F 7 FLD RTA PHOTO EVC RTNOPTHY: CPT | Mod: S$GLB,,, | Performed by: CLINICAL NURSE SPECIALIST

## 2019-09-18 NOTE — PROGRESS NOTES
Subjective:       Patient ID: Roman Hodges is a 59 y.o. male.    Chief Complaint: Wound Check    This is fu for wound eval, he is post liver tx of July 3rd, his wound was opened  due to seroma,  He is back home and wife is caregiver, she is not comfortable doing wound care so Finn BURGESS is coming MWF and applying  TRIAD . Today due to continued proud flesh will stop Triad and switch to silver foam    Wound Check       Review of Systems   Constitutional: Negative for chills and fever.   Respiratory: Negative for cough and shortness of breath.    Cardiovascular: Negative for chest pain and leg swelling.   Gastrointestinal: Negative for abdominal distention and abdominal pain.   Genitourinary: Negative for hematuria.   Musculoskeletal: Negative for arthralgias and back pain.   Skin: Positive for wound. Negative for color change and rash.   Neurological: Negative for weakness and headaches.       Objective:      Physical Exam   Constitutional: He is oriented to person, place, and time. He appears well-developed and well-nourished.   Pulmonary/Chest: Effort normal. No respiratory distress. He has no wheezes.   Abdominal: Soft. Bowel sounds are normal. He exhibits no distension.   Musculoskeletal: Normal range of motion. He exhibits no edema.   Neurological: He is alert and oriented to person, place, and time.   Skin: Skin is warm and dry. No erythema.   Psychiatric: He has a normal mood and affect.        8/1 first visit      8/14 9/18      Left side healed        9/18 SO  MUCH BETTER  More area is healed, jerome the left side, and no hypergranulation today so will repeat use of transfer dressing and leave til Monday 9/9 again granulated to skin and above so CC needed.  PROCEDURE:  CHEMICAL CAUTERY: Performed for hypergranulation tissue/granuloma(s) of chevron . The tissue was anesthetized topically with application of Lidocaine gel 2% and 5 minute wait time. The area noted was cauterized with AGNO3. The  patient stated pain was 3 on 1-10 scale with this procedure. I applied silver transfer foam today and will have it changed 2 x weekly       8/27 healing however he HAS EXCESS GRANULATION tissue across entire [employer] , chemical cautery indicated and done with lidocaine to help with pain  PROCEDURE:  CHEMICAL CAUTERY: Performed for hypergranulation tissue/granuloma(s) of incision . The tissue was anesthetized topically with application of Lidocaine gel 2% and 5 minute wait time. The area noted was cauterized with AGNO3. The patient stated pain was 1 on 1-10 scale with this procedure. I then dressed wound with silver transfer foam to stay until Friday then resume Triad wound dressing    8/14 change to TRIAD wound dressing apply to cleaned wound bed and then cover with telfa island ( mepor) dressing   JENIFERN to do MWF and wife to redo if needed in between    8/1  Chevron opened 7/31  It is 1-2 cm and  39 cm long, with depth of 1cm, Clean and no signs of infection, will just need good wound care to promote secondary closure.   I cleansed with VASHE cleanser,  Soak for 5 minutes then packed wound with Aquacel ag rope.  Covered with pad and secure with tape.     Assessment:       1. Non-healing surgical wound, subsequent encounter    2. Type 2 diabetes mellitus without complication, without long-term current use of insulin    3. S/P liver transplant        Plan:     Wound care as above. [facility]  Coordinate with Delmy OHARA is Keke  ON HOLD til Monday when I see him again   apply silver transfer dressing cut to cover open areas, then secure with MEPOR Pro  Fu Monday   Reviewed infection signs and dietary concerns for wound healing  I have reviewed the plan of care with the patient and/ wife and they express understanding. I spent over 50% of this 25 minute visit in face to face counseling.

## 2019-09-20 ENCOUNTER — PATIENT OUTREACH (OUTPATIENT)
Dept: ADMINISTRATIVE | Facility: OTHER | Age: 59
End: 2019-09-20

## 2019-09-20 ENCOUNTER — TELEPHONE (OUTPATIENT)
Dept: TRANSPLANT | Facility: CLINIC | Age: 59
End: 2019-09-20

## 2019-09-20 NOTE — TELEPHONE ENCOUNTER
----- Message from Nallely Lainez sent at 9/20/2019 12:12 PM CDT -----    Called and sp to pt wife to let them know that we have to cx'ed the appt mariela'ed for 9/23 w/Dr. Damon. Pt has been re/mariela to 9/26

## 2019-09-24 ENCOUNTER — LAB VISIT (OUTPATIENT)
Dept: LAB | Facility: HOSPITAL | Age: 59
End: 2019-09-24
Attending: INTERNAL MEDICINE
Payer: COMMERCIAL

## 2019-09-24 DIAGNOSIS — Z94.4 LIVER REPLACED BY TRANSPLANT: ICD-10-CM

## 2019-09-24 LAB
ALBUMIN SERPL BCP-MCNC: 4.3 G/DL (ref 3.5–5.2)
ALP SERPL-CCNC: 73 U/L (ref 55–135)
ALT SERPL W/O P-5'-P-CCNC: 46 U/L (ref 10–44)
ANION GAP SERPL CALC-SCNC: 11 MMOL/L (ref 8–16)
AST SERPL-CCNC: 36 U/L (ref 10–40)
BASOPHILS # BLD AUTO: 0.04 K/UL (ref 0–0.2)
BASOPHILS NFR BLD: 1.6 % (ref 0–1.9)
BILIRUB DIRECT SERPL-MCNC: 0.3 MG/DL (ref 0.1–0.3)
BILIRUB SERPL-MCNC: 0.7 MG/DL (ref 0.1–1)
BUN SERPL-MCNC: 22 MG/DL (ref 6–20)
CALCIUM SERPL-MCNC: 9.1 MG/DL (ref 8.7–10.5)
CHLORIDE SERPL-SCNC: 107 MMOL/L (ref 95–110)
CO2 SERPL-SCNC: 24 MMOL/L (ref 23–29)
CREAT SERPL-MCNC: 1.2 MG/DL (ref 0.5–1.4)
DIFFERENTIAL METHOD: ABNORMAL
EOSINOPHIL # BLD AUTO: 0.1 K/UL (ref 0–0.5)
EOSINOPHIL NFR BLD: 2.7 % (ref 0–8)
ERYTHROCYTE [DISTWIDTH] IN BLOOD BY AUTOMATED COUNT: 13.8 % (ref 11.5–14.5)
EST. GFR  (AFRICAN AMERICAN): >60 ML/MIN/1.73 M^2
EST. GFR  (NON AFRICAN AMERICAN): >60 ML/MIN/1.73 M^2
GLUCOSE SERPL-MCNC: 97 MG/DL (ref 70–110)
HCT VFR BLD AUTO: 41 % (ref 40–54)
HGB BLD-MCNC: 13 G/DL (ref 14–18)
IMM GRANULOCYTES # BLD AUTO: 0.03 K/UL (ref 0–0.04)
IMM GRANULOCYTES NFR BLD AUTO: 1.2 % (ref 0–0.5)
LYMPHOCYTES # BLD AUTO: 0.5 K/UL (ref 1–4.8)
LYMPHOCYTES NFR BLD: 18.3 % (ref 18–48)
MCH RBC QN AUTO: 26.6 PG (ref 27–31)
MCHC RBC AUTO-ENTMCNC: 31.7 G/DL (ref 32–36)
MCV RBC AUTO: 84 FL (ref 82–98)
MONOCYTES # BLD AUTO: 0.4 K/UL (ref 0.3–1)
MONOCYTES NFR BLD: 16.3 % (ref 4–15)
NEUTROPHILS # BLD AUTO: 1.5 K/UL (ref 1.8–7.7)
NEUTROPHILS NFR BLD: 59.9 % (ref 38–73)
NRBC BLD-RTO: 0 /100 WBC
PLATELET # BLD AUTO: 260 K/UL (ref 150–350)
PMV BLD AUTO: 9.8 FL (ref 9.2–12.9)
POTASSIUM SERPL-SCNC: 3.8 MMOL/L (ref 3.5–5.1)
PROT SERPL-MCNC: 7 G/DL (ref 6–8.4)
RBC # BLD AUTO: 4.88 M/UL (ref 4.6–6.2)
SODIUM SERPL-SCNC: 142 MMOL/L (ref 136–145)
WBC # BLD AUTO: 2.57 K/UL (ref 3.9–12.7)

## 2019-09-24 PROCEDURE — 85025 COMPLETE CBC W/AUTO DIFF WBC: CPT

## 2019-09-24 PROCEDURE — 82248 BILIRUBIN DIRECT: CPT

## 2019-09-24 PROCEDURE — 80053 COMPREHEN METABOLIC PANEL: CPT

## 2019-09-24 PROCEDURE — 80197 ASSAY OF TACROLIMUS: CPT

## 2019-09-24 PROCEDURE — 36415 COLL VENOUS BLD VENIPUNCTURE: CPT | Mod: PO

## 2019-09-25 ENCOUNTER — TELEPHONE (OUTPATIENT)
Dept: TRANSPLANT | Facility: CLINIC | Age: 59
End: 2019-09-25

## 2019-09-25 LAB — TACROLIMUS BLD-MCNC: 8.1 NG/ML (ref 5–15)

## 2019-09-25 NOTE — TELEPHONE ENCOUNTER
----- Message from Fab Damon MD sent at 9/25/2019  7:54 AM CDT -----  Results reviewed. Please advise labs are stable.

## 2019-09-26 ENCOUNTER — TELEPHONE (OUTPATIENT)
Dept: TRANSPLANT | Facility: CLINIC | Age: 59
End: 2019-09-26

## 2019-09-26 ENCOUNTER — PATIENT OUTREACH (OUTPATIENT)
Dept: ADMINISTRATIVE | Facility: OTHER | Age: 59
End: 2019-09-26

## 2019-09-26 ENCOUNTER — PATIENT MESSAGE (OUTPATIENT)
Dept: TRANSPLANT | Facility: CLINIC | Age: 59
End: 2019-09-26

## 2019-09-26 ENCOUNTER — OFFICE VISIT (OUTPATIENT)
Dept: WOUND CARE | Facility: CLINIC | Age: 59
End: 2019-09-26
Payer: COMMERCIAL

## 2019-09-26 ENCOUNTER — OFFICE VISIT (OUTPATIENT)
Dept: TRANSPLANT | Facility: CLINIC | Age: 59
End: 2019-09-26
Attending: INTERNAL MEDICINE
Payer: COMMERCIAL

## 2019-09-26 VITALS
BODY MASS INDEX: 31.35 KG/M2 | HEIGHT: 67 IN | SYSTOLIC BLOOD PRESSURE: 132 MMHG | OXYGEN SATURATION: 98 % | HEART RATE: 76 BPM | TEMPERATURE: 98 F | RESPIRATION RATE: 16 BRPM | DIASTOLIC BLOOD PRESSURE: 84 MMHG | WEIGHT: 199.75 LBS

## 2019-09-26 DIAGNOSIS — T81.89XD NON-HEALING SURGICAL WOUND, SUBSEQUENT ENCOUNTER: Primary | ICD-10-CM

## 2019-09-26 DIAGNOSIS — Z29.89 PROPHYLACTIC IMMUNOTHERAPY: ICD-10-CM

## 2019-09-26 DIAGNOSIS — Z94.4 S/P LIVER TRANSPLANT: Primary | ICD-10-CM

## 2019-09-26 DIAGNOSIS — L92.9 HYPERGRANULATION: ICD-10-CM

## 2019-09-26 DIAGNOSIS — E11.9 TYPE 2 DIABETES MELLITUS WITHOUT COMPLICATION, WITHOUT LONG-TERM CURRENT USE OF INSULIN: ICD-10-CM

## 2019-09-26 PROCEDURE — 99999 PR PBB SHADOW E&M-EST. PATIENT-LVL II: ICD-10-PCS | Mod: PBBFAC,,, | Performed by: CLINICAL NURSE SPECIALIST

## 2019-09-26 PROCEDURE — 3078F PR MOST RECENT DIASTOLIC BLOOD PRESSURE < 80 MM HG: ICD-10-PCS | Mod: CPTII,S$GLB,, | Performed by: CLINICAL NURSE SPECIALIST

## 2019-09-26 PROCEDURE — 3078F DIAST BP <80 MM HG: CPT | Mod: CPTII,S$GLB,, | Performed by: CLINICAL NURSE SPECIALIST

## 2019-09-26 PROCEDURE — 3074F SYST BP LT 130 MM HG: CPT | Mod: CPTII,S$GLB,, | Performed by: CLINICAL NURSE SPECIALIST

## 2019-09-26 PROCEDURE — 99999 PR PBB SHADOW E&M-EST. PATIENT-LVL II: CPT | Mod: PBBFAC,,, | Performed by: CLINICAL NURSE SPECIALIST

## 2019-09-26 PROCEDURE — 3079F PR MOST RECENT DIASTOLIC BLOOD PRESSURE 80-89 MM HG: ICD-10-PCS | Mod: CPTII,S$GLB,, | Performed by: INTERNAL MEDICINE

## 2019-09-26 PROCEDURE — 99999 PR PBB SHADOW E&M-EST. PATIENT-LVL III: ICD-10-PCS | Mod: PBBFAC,,, | Performed by: INTERNAL MEDICINE

## 2019-09-26 PROCEDURE — 17250 PR CHEM CAUTERY GRANULATN TISSUE: ICD-10-PCS | Mod: S$GLB,,, | Performed by: CLINICAL NURSE SPECIALIST

## 2019-09-26 PROCEDURE — 2024F 7 FLD RTA PHOTO EVC RTNOPTHY: CPT | Mod: S$GLB,,, | Performed by: CLINICAL NURSE SPECIALIST

## 2019-09-26 PROCEDURE — 99214 OFFICE O/P EST MOD 30 MIN: CPT | Mod: 25,S$GLB,, | Performed by: CLINICAL NURSE SPECIALIST

## 2019-09-26 PROCEDURE — 99214 PR OFFICE/OUTPT VISIT, EST, LEVL IV, 30-39 MIN: ICD-10-PCS | Mod: 25,S$GLB,, | Performed by: CLINICAL NURSE SPECIALIST

## 2019-09-26 PROCEDURE — 99213 OFFICE O/P EST LOW 20 MIN: CPT | Mod: S$GLB,,, | Performed by: INTERNAL MEDICINE

## 2019-09-26 PROCEDURE — 3074F PR MOST RECENT SYSTOLIC BLOOD PRESSURE < 130 MM HG: ICD-10-PCS | Mod: CPTII,S$GLB,, | Performed by: CLINICAL NURSE SPECIALIST

## 2019-09-26 PROCEDURE — 3075F SYST BP GE 130 - 139MM HG: CPT | Mod: CPTII,S$GLB,, | Performed by: INTERNAL MEDICINE

## 2019-09-26 PROCEDURE — 3044F HG A1C LEVEL LT 7.0%: CPT | Mod: CPTII,S$GLB,, | Performed by: CLINICAL NURSE SPECIALIST

## 2019-09-26 PROCEDURE — 99213 PR OFFICE/OUTPT VISIT, EST, LEVL III, 20-29 MIN: ICD-10-PCS | Mod: S$GLB,,, | Performed by: INTERNAL MEDICINE

## 2019-09-26 PROCEDURE — 3044F PR MOST RECENT HEMOGLOBIN A1C LEVEL <7.0%: ICD-10-PCS | Mod: CPTII,S$GLB,, | Performed by: CLINICAL NURSE SPECIALIST

## 2019-09-26 PROCEDURE — 17250 CHEM CAUT OF GRANLTJ TISSUE: CPT | Mod: S$GLB,,, | Performed by: CLINICAL NURSE SPECIALIST

## 2019-09-26 PROCEDURE — 3008F PR BODY MASS INDEX (BMI) DOCUMENTED: ICD-10-PCS | Mod: CPTII,S$GLB,, | Performed by: INTERNAL MEDICINE

## 2019-09-26 PROCEDURE — 3075F PR MOST RECENT SYSTOLIC BLOOD PRESS GE 130-139MM HG: ICD-10-PCS | Mod: CPTII,S$GLB,, | Performed by: INTERNAL MEDICINE

## 2019-09-26 PROCEDURE — 3079F DIAST BP 80-89 MM HG: CPT | Mod: CPTII,S$GLB,, | Performed by: INTERNAL MEDICINE

## 2019-09-26 PROCEDURE — 3008F BODY MASS INDEX DOCD: CPT | Mod: CPTII,S$GLB,, | Performed by: INTERNAL MEDICINE

## 2019-09-26 PROCEDURE — 2024F PR 7 FIELD PHOTOS WITH INTERP/ REVIEW: ICD-10-PCS | Mod: S$GLB,,, | Performed by: CLINICAL NURSE SPECIALIST

## 2019-09-26 PROCEDURE — 99999 PR PBB SHADOW E&M-EST. PATIENT-LVL III: CPT | Mod: PBBFAC,,, | Performed by: INTERNAL MEDICINE

## 2019-09-26 NOTE — PROGRESS NOTES
Transplant Hepatology  Liver Transplant Recipient Follow-up    Transplant Date: 7/4/2019  UNOS Native Liver Dx: Primary Liver Malignancy: Hepatoma (HCC) and Cirrhosis    Roman is here for follow up of his liver transplant.    ORGAN: LIVER  Whole or Partial: whole liver  Donor Type: donation after brain death  [facility] High Risk: no  Donor CMV Status: Negative  Donor HCV Status: Negative  Donor HBcAb: Negative  Donor HBV MARIA TERESA: Negative  Donor HCV MARIA TERESA: Negative  Biliary Anastomosis: end to end  Arterial Anatomy: replaced right hepatic from sma  IVC reconstruction: end to end ivc  Portal vein status: patent    He has had the following complications since transplant: wound infection. The noted complications is well controlled.    Subjective:     Interval History: Roman was last seen on 8/27/19 by Dr. CHRISTINA Escoto. Currently, he is doing well. Current complaints include none. Incisional wound being followed closely by wound care. Excellent allograft function. No symptoms of hepatic decompensation.    Review of Systems   Constitutional: Negative for activity change, appetite change, chills, fatigue and unexpected weight change.   HENT: Negative for congestion, facial swelling and tinnitus.    Eyes: Negative for visual disturbance.   Respiratory: Negative for cough, shortness of breath and wheezing.    Cardiovascular: Negative for chest pain and palpitations.   Gastrointestinal: Negative for abdominal distention.   Genitourinary: Negative for dysuria.   Musculoskeletal: Negative for arthralgias, joint swelling and myalgias.   Neurological: Negative for syncope and headaches.   Hematological: Does not bruise/bleed easily.   Psychiatric/Behavioral: Negative for confusion.       Objective:     Physical Exam   Constitutional: He is oriented to person, place, and time. He appears well-developed and well-nourished.   Eyes: No scleral icterus.   Cardiovascular: Normal rate, regular rhythm and normal heart sounds.   Pulmonary/Chest:  Effort normal and breath sounds normal. No respiratory distress. He has no wheezes.   Abdominal: Soft. Bowel sounds are normal. He exhibits no distension and no mass. There is no tenderness. There is no rebound.       Musculoskeletal: Normal range of motion.   Lymphadenopathy:     He has no cervical adenopathy.   Neurological: He is alert and oriented to person, place, and time.   Skin: Skin is warm and dry.     Lab Results   Component Value Date    BILITOT 0.7 09/24/2019    AST 36 09/24/2019    ALT 46 (H) 09/24/2019    ALKPHOS 73 09/24/2019    CREATININE 1.2 09/24/2019    ALBUMIN 4.3 09/24/2019     Lab Results   Component Value Date    WBC 2.57 (L) 09/24/2019    HGB 13.0 (L) 09/24/2019    HCT 41.0 09/24/2019    HCT 41 07/04/2019     09/24/2019     Lab Results   Component Value Date    TACROLIMUS 8.1 09/24/2019       Assessment/Plan:     1. S/P liver transplant    2. Prophylactic immunotherapy      No change to current immunosuppression.  Reduce labs to q 2 weeks.  RTC in 3 months.    Fab Damon MD           [facility] Patient Status  Functional Status: 90% - Able to carry on normal activity: minor symptoms of disease  Physical Capacity: No Limitations

## 2019-09-26 NOTE — PROGRESS NOTES
Subjective:       Patient ID: Roman Hodges is a 59 y.o. male.    Chief Complaint: Wound Check    This is fu for wound eval, he is post liver tx of July 3rd, his wound was opened  due to seroma,  He is back home and wife is caregiver, she is not comfortable doing wound care so Finn BURGESS is coming MWF  Still battling hypergranulation but otherwise wound clean and slowly healing     Wound Check       Review of Systems   Constitutional: Negative for chills and fever.   Respiratory: Negative for cough and shortness of breath.    Cardiovascular: Negative for chest pain and leg swelling.   Gastrointestinal: Negative for abdominal distention and abdominal pain.   Genitourinary: Negative for hematuria.   Musculoskeletal: Negative for arthralgias and back pain.   Skin: Positive for wound. Negative for color change and rash.   Neurological: Negative for weakness and headaches.       Objective:      Physical Exam   Constitutional: He is oriented to person, place, and time. He appears well-developed and well-nourished.   Pulmonary/Chest: Effort normal. No respiratory distress. He has no wheezes.   Abdominal: Soft. Bowel sounds are normal. He exhibits no distension.   Musculoskeletal: Normal range of motion. He exhibits no edema.   Neurological: He is alert and oriented to person, place, and time.   Skin: Skin is warm and dry. No erythema.   Psychiatric: He has a normal mood and affect.        8/1 first visit      8/14 9/18      Left side healed            9/26 again granulated to skin and above so CC needed.applied HYDROFERA BLUE to it after CC   To be changed on Monday , WD FRiday  PROCEDURE:  CHEMICAL CAUTERY: Performed for hypergranulation tissue/granuloma(s) of chevron . The tissue was anesthetized topically with application of Lidocaine gel 2% and 5 minute wait time. The area noted was cauterized with AGNO3. The patient stated pain was 3 on 1-10 scale with this procedure. I applied silver transfer foam today and  will have it changed 2 x weekly       8/27 healing however he HAS EXCESS GRANULATION tissue across entire [employer] , chemical cautery indicated and done with lidocaine to help with pain  PROCEDURE:  CHEMICAL CAUTERY: Performed for hypergranulation tissue/granuloma(s) of incision . The tissue was anesthetized topically with application of Lidocaine gel 2% and 5 minute wait time. The area noted was cauterized with AGNO3. The patient stated pain was 1 on 1-10 scale with this procedure. I then dressed wound with silver transfer foam to stay until Friday then resume Triad wound dressing    8/14 change to TRIAD wound dressing apply to cleaned wound bed and then cover with telfa island ( mepor) dressing   JENIFERN to do MWF and wife to redo if needed in between    8/1  Chevron opened 7/31  It is 1-2 cm and  39 cm long, with depth of 1cm, Clean and no signs of infection, will just need good wound care to promote secondary closure.   I cleansed with VASHE cleanser,  Soak for 5 minutes then packed wound with Aquacel ag rope.  Covered with pad and secure with tape.     Assessment:       1. Non-healing surgical wound, subsequent encounter    2. Type 2 diabetes mellitus without complication, without long-term current use of insulin    3. Hypergranulation        Plan:     Wound care as above. [facility]  Coordinate with Delmy OHARA is Keke    apply Hydrofera Blue ready  Fu Monday   Reviewed infection signs and dietary concerns for wound healing  I have reviewed the plan of care with the patient and/ wife and they express understanding. I spent over 50% of this 25 minute visit in face to face counseling.

## 2019-09-26 NOTE — LETTER
September 26, 2019        Jose Angel Munson  [address]  [place] [address]  Phone: [phone]  Fax: [phone]             [facility] - Liver Transplant  [address]  [facility] [address]  Phone: [phone]   Patient: Roman Hodges   MR Number: [id number]   YOB: 1960   Date of Visit: 9/26/2019       Dear Dr. Jose Angel Munson    Thank you for referring Roman Hodges to me for evaluation. Attached you will find relevant portions of my assessment and plan of care.    If you have questions, please do not hesitate to call me. I look forward to following Roman Hodges along with you.    Sincerely,    Fab Damon MD    Enclosure    If you would like to receive this communication electronically, please contact [email or web address] or [phone] to request [employer] Link access.    [employer] Link is a tool which provides read-only access to select patient information with whom you have a relationship. Its easy to use and provides real time access to review your patients record including encounter summaries, notes, results, and demographic information.    If you feel you have received this communication in error or would no longer like to receive these types of communications, please e-mail [email or web address]

## 2019-09-26 NOTE — TELEPHONE ENCOUNTER
----- Message from JUNG Ochoa sent at 9/26/2019  3:14 PM CDT -----  Saw him today , can you call Eagn with [facility] to clean with vashe and apply HYRDOFERA BLUE READY and secure .  I will see him next week again and reeval , its just being stubborn.  Thanks

## 2019-09-27 ENCOUNTER — TELEPHONE (OUTPATIENT)
Dept: TRANSPLANT | Facility: CLINIC | Age: 59
End: 2019-09-27

## 2019-09-27 NOTE — TELEPHONE ENCOUNTER
----- Message from Fab Damon MD sent at 9/27/2019  7:28 AM CDT -----  Results reviewed. Please advise labs are stable.

## 2019-09-30 ENCOUNTER — TELEPHONE (OUTPATIENT)
Dept: SLEEP MEDICINE | Facility: OTHER | Age: 59
End: 2019-09-30

## 2019-10-02 ENCOUNTER — OFFICE VISIT (OUTPATIENT)
Dept: WOUND CARE | Facility: CLINIC | Age: 59
End: 2019-10-02
Payer: COMMERCIAL

## 2019-10-02 DIAGNOSIS — E11.9 TYPE 2 DIABETES MELLITUS WITHOUT COMPLICATION, WITHOUT LONG-TERM CURRENT USE OF INSULIN: Primary | ICD-10-CM

## 2019-10-02 DIAGNOSIS — Z94.4 S/P LIVER TRANSPLANT: ICD-10-CM

## 2019-10-02 DIAGNOSIS — T81.89XD NON-HEALING SURGICAL WOUND, SUBSEQUENT ENCOUNTER: ICD-10-CM

## 2019-10-02 PROCEDURE — G0179 PR HOME HEALTH MD RECERTIFICATION: ICD-10-PCS | Mod: ,,, | Performed by: SURGERY

## 2019-10-02 PROCEDURE — 99999 PR PBB SHADOW E&M-EST. PATIENT-LVL II: CPT | Mod: PBBFAC,,, | Performed by: CLINICAL NURSE SPECIALIST

## 2019-10-02 PROCEDURE — G0179 MD RECERTIFICATION HHA PT: HCPCS | Mod: ,,, | Performed by: SURGERY

## 2019-10-02 PROCEDURE — 2024F PR 7 FIELD PHOTOS WITH INTERP/ REVIEW: ICD-10-PCS | Mod: S$GLB,,, | Performed by: CLINICAL NURSE SPECIALIST

## 2019-10-02 PROCEDURE — 99213 OFFICE O/P EST LOW 20 MIN: CPT | Mod: S$GLB,,, | Performed by: CLINICAL NURSE SPECIALIST

## 2019-10-02 PROCEDURE — 3044F PR MOST RECENT HEMOGLOBIN A1C LEVEL <7.0%: ICD-10-PCS | Mod: CPTII,S$GLB,, | Performed by: CLINICAL NURSE SPECIALIST

## 2019-10-02 PROCEDURE — 99999 PR PBB SHADOW E&M-EST. PATIENT-LVL II: ICD-10-PCS | Mod: PBBFAC,,, | Performed by: CLINICAL NURSE SPECIALIST

## 2019-10-02 PROCEDURE — 99213 PR OFFICE/OUTPT VISIT, EST, LEVL III, 20-29 MIN: ICD-10-PCS | Mod: S$GLB,,, | Performed by: CLINICAL NURSE SPECIALIST

## 2019-10-02 PROCEDURE — 3044F HG A1C LEVEL LT 7.0%: CPT | Mod: CPTII,S$GLB,, | Performed by: CLINICAL NURSE SPECIALIST

## 2019-10-02 PROCEDURE — 2024F 7 FLD RTA PHOTO EVC RTNOPTHY: CPT | Mod: S$GLB,,, | Performed by: CLINICAL NURSE SPECIALIST

## 2019-10-02 NOTE — PROGRESS NOTES
Subjective:       Patient ID: Roman Hodges is a 59 y.o. male.    Chief Complaint: Wound Check    This is fu for wound eval, he is post liver tx of July 3rd, his wound was opened  due to seroma,  He is home and wife is caregiver, she is not comfortable doing wound care so Finn BURGESS is coming MWF  Still battling hypergranulation but otherwise wound clean and slowly healing but we switched to HFBR last week and seems to be helping reduce hypergranulation regrowth    Wound Check       Review of Systems   Constitutional: Negative for chills and fever.   Respiratory: Negative for cough and shortness of breath.    Cardiovascular: Negative for chest pain and leg swelling.   Gastrointestinal: Negative.    Genitourinary: Negative.    Musculoskeletal: Negative.    Skin: Positive for wound. Negative for color change and rash.   Neurological: Negative for weakness and headaches.       Objective:      Physical Exam   Constitutional: He is oriented to person, place, and time. He appears well-developed and well-nourished.   Pulmonary/Chest: Effort normal. No respiratory distress. He has no wheezes.   Abdominal: Soft. Bowel sounds are normal. He exhibits no distension.   Musculoskeletal: Normal range of motion. He exhibits no edema.   Neurological: He is alert and oriented to person, place, and time.   Skin: Skin is warm and dry. No erythema.   Psychiatric: He has a normal mood and affect.        8/1 first visit      8/14 9/18      Left side healed          9/26       10/2 can see new skin along edges  No hypergranulation today     10/2 continue with HFBR   9/26 again granulated to skin and above so CC needed.applied HYDROFERA BLUE ( HFBR)  to it after CC   To be changed on Monday , WD FRiday  PROCEDURE:  CHEMICAL CAUTERY: Performed for hypergranulation tissue/granuloma(s) of chevron . The tissue was anesthetized topically with application of Lidocaine gel 2% and 5 minute wait time. The area noted was cauterized with  AGNO3. The patient stated pain was 3 on 1-10 scale with this procedure. I applied silver transfer foam today and will have it changed 2 x weekly       8/27 healing however he HAS EXCESS GRANULATION tissue across entire Chevron , chemical cautery indicated and done with lidocaine to help with pain  PROCEDURE:  CHEMICAL CAUTERY: Performed for hypergranulation tissue/granuloma(s) of incision . The tissue was anesthetized topically with application of Lidocaine gel 2% and 5 minute wait time. The area noted was cauterized with AGNO3. The patient stated pain was 1 on 1-10 scale with this procedure. I then dressed wound with silver transfer foam to stay until Friday then resume Triad wound dressing    8/14 change to TRIAD wound dressing apply to cleaned wound bed and then cover with telfa island ( mepor) dressing   HH[facility] to do MWF and wife to redo if needed in between    8/1  Chevron opened 7/31  It is 1-2 cm and  39 cm long, with depth of 1cm, Clean and no signs of infection, will just need good wound care to promote secondary closure.   I cleansed with VASHE cleanser,  Soak for 5 minutes then packed wound with Aquacel ag rope.  Covered with pad and secure with tape.     Assessment:       1. Type 2 diabetes mellitus without complication, without long-term current use of insulin    2. S/P liver transplant    3. Non-healing surgical wound, subsequent encounter        Plan:     Wound care as above. [facility]     apply Hydrofera Blue ready  Fu 2 weeks    Reviewed infection signs and dietary concerns for wound healing  I have reviewed the plan of care with the patient and/ wife and they express understanding. I spent over 50% of this 15 minute visit in face to face counseling.

## 2019-10-07 ENCOUNTER — LAB VISIT (OUTPATIENT)
Dept: LAB | Facility: HOSPITAL | Age: 59
End: 2019-10-07
Attending: INTERNAL MEDICINE
Payer: COMMERCIAL

## 2019-10-07 DIAGNOSIS — C22.0 HCC (HEPATOCELLULAR CARCINOMA): ICD-10-CM

## 2019-10-07 DIAGNOSIS — Z94.4 LIVER REPLACED BY TRANSPLANT: ICD-10-CM

## 2019-10-07 LAB
AFP SERPL-MCNC: 1.8 NG/ML (ref 0–8.4)
ALBUMIN SERPL BCP-MCNC: 4.4 G/DL (ref 3.5–5.2)
ALP SERPL-CCNC: 75 U/L (ref 55–135)
ALT SERPL W/O P-5'-P-CCNC: 37 U/L (ref 10–44)
ANION GAP SERPL CALC-SCNC: 8 MMOL/L (ref 8–16)
AST SERPL-CCNC: 22 U/L (ref 10–40)
BASOPHILS # BLD AUTO: 0.05 K/UL (ref 0–0.2)
BASOPHILS NFR BLD: 0.9 % (ref 0–1.9)
BILIRUB SERPL-MCNC: 0.5 MG/DL (ref 0.1–1)
BUN SERPL-MCNC: 25 MG/DL (ref 6–20)
CALCIUM SERPL-MCNC: 9.3 MG/DL (ref 8.7–10.5)
CHLORIDE SERPL-SCNC: 105 MMOL/L (ref 95–110)
CO2 SERPL-SCNC: 24 MMOL/L (ref 23–29)
CREAT SERPL-MCNC: 1.5 MG/DL (ref 0.5–1.4)
DIFFERENTIAL METHOD: ABNORMAL
EOSINOPHIL # BLD AUTO: 0.1 K/UL (ref 0–0.5)
EOSINOPHIL NFR BLD: 1.7 % (ref 0–8)
ERYTHROCYTE [DISTWIDTH] IN BLOOD BY AUTOMATED COUNT: 13.9 % (ref 11.5–14.5)
EST. GFR  (AFRICAN AMERICAN): 57.7 ML/MIN/1.73 M^2
EST. GFR  (NON AFRICAN AMERICAN): 49.9 ML/MIN/1.73 M^2
GLUCOSE SERPL-MCNC: 107 MG/DL (ref 70–110)
HCT VFR BLD AUTO: 39.8 % (ref 40–54)
HGB BLD-MCNC: 13.3 G/DL (ref 14–18)
IMM GRANULOCYTES # BLD AUTO: 0.09 K/UL (ref 0–0.04)
IMM GRANULOCYTES NFR BLD AUTO: 1.7 % (ref 0–0.5)
LYMPHOCYTES # BLD AUTO: 0.8 K/UL (ref 1–4.8)
LYMPHOCYTES NFR BLD: 14.8 % (ref 18–48)
MCH RBC QN AUTO: 27 PG (ref 27–31)
MCHC RBC AUTO-ENTMCNC: 33.4 G/DL (ref 32–36)
MCV RBC AUTO: 81 FL (ref 82–98)
MONOCYTES # BLD AUTO: 0.6 K/UL (ref 0.3–1)
MONOCYTES NFR BLD: 10.4 % (ref 4–15)
NEUTROPHILS # BLD AUTO: 3.8 K/UL (ref 1.8–7.7)
NEUTROPHILS NFR BLD: 70.5 % (ref 38–73)
NRBC BLD-RTO: 0 /100 WBC
PLATELET # BLD AUTO: 302 K/UL (ref 150–350)
PMV BLD AUTO: 9.1 FL (ref 9.2–12.9)
POTASSIUM SERPL-SCNC: 3.8 MMOL/L (ref 3.5–5.1)
PROT SERPL-MCNC: 7.2 G/DL (ref 6–8.4)
RBC # BLD AUTO: 4.92 M/UL (ref 4.6–6.2)
SODIUM SERPL-SCNC: 137 MMOL/L (ref 136–145)
WBC # BLD AUTO: 5.4 K/UL (ref 3.9–12.7)

## 2019-10-07 PROCEDURE — 80197 ASSAY OF TACROLIMUS: CPT

## 2019-10-07 PROCEDURE — 36415 COLL VENOUS BLD VENIPUNCTURE: CPT | Mod: PO

## 2019-10-07 PROCEDURE — 80053 COMPREHEN METABOLIC PANEL: CPT

## 2019-10-07 PROCEDURE — 85025 COMPLETE CBC W/AUTO DIFF WBC: CPT

## 2019-10-07 PROCEDURE — 82105 ALPHA-FETOPROTEIN SERUM: CPT

## 2019-10-08 ENCOUNTER — TELEPHONE (OUTPATIENT)
Dept: TRANSPLANT | Facility: CLINIC | Age: 59
End: 2019-10-08

## 2019-10-08 LAB — TACROLIMUS BLD-MCNC: 7.9 NG/ML (ref 5–15)

## 2019-10-08 NOTE — TELEPHONE ENCOUNTER
----- Message from Fab Damon MD sent at 10/8/2019 10:11 AM CDT -----  Results reviewed. Please advise labs are stable.

## 2019-10-11 ENCOUNTER — TELEPHONE (OUTPATIENT)
Dept: SLEEP MEDICINE | Facility: OTHER | Age: 59
End: 2019-10-11

## 2019-10-15 ENCOUNTER — PATIENT OUTREACH (OUTPATIENT)
Dept: ADMINISTRATIVE | Facility: OTHER | Age: 59
End: 2019-10-15

## 2019-10-16 ENCOUNTER — HOSPITAL ENCOUNTER (OUTPATIENT)
Dept: RADIOLOGY | Facility: HOSPITAL | Age: 59
Discharge: HOME | End: 2019-10-16
Attending: INTERNAL MEDICINE
Payer: COMMERCIAL

## 2019-10-16 ENCOUNTER — TELEPHONE (OUTPATIENT)
Dept: TRANSPLANT | Facility: CLINIC | Age: 59
End: 2019-10-16

## 2019-10-16 ENCOUNTER — OFFICE VISIT (OUTPATIENT)
Dept: WOUND CARE | Facility: CLINIC | Age: 59
End: 2019-10-16
Payer: COMMERCIAL

## 2019-10-16 ENCOUNTER — PATIENT MESSAGE (OUTPATIENT)
Dept: TRANSPLANT | Facility: CLINIC | Age: 59
End: 2019-10-16

## 2019-10-16 DIAGNOSIS — T81.89XD NON-HEALING SURGICAL WOUND, SUBSEQUENT ENCOUNTER: ICD-10-CM

## 2019-10-16 DIAGNOSIS — Z94.4 S/P LIVER TRANSPLANT: Primary | ICD-10-CM

## 2019-10-16 DIAGNOSIS — C22.0 HCC (HEPATOCELLULAR CARCINOMA): ICD-10-CM

## 2019-10-16 DIAGNOSIS — Z94.4 LIVER REPLACED BY TRANSPLANT: ICD-10-CM

## 2019-10-16 DIAGNOSIS — E11.9 TYPE 2 DIABETES MELLITUS WITHOUT COMPLICATION, WITHOUT LONG-TERM CURRENT USE OF INSULIN: ICD-10-CM

## 2019-10-16 PROCEDURE — 71260 CT CHEST ABDOMEN PELVIS WITH CONTRAST (XPD): ICD-10-PCS | Mod: 26,,, | Performed by: RADIOLOGY

## 2019-10-16 PROCEDURE — 3044F HG A1C LEVEL LT 7.0%: CPT | Mod: CPTII,S$GLB,, | Performed by: CLINICAL NURSE SPECIALIST

## 2019-10-16 PROCEDURE — 76705 US LIVER TRANSPLANT POST: ICD-10-PCS | Mod: 26,59,, | Performed by: RADIOLOGY

## 2019-10-16 PROCEDURE — 93975 VASCULAR STUDY: CPT | Mod: 26,,, | Performed by: RADIOLOGY

## 2019-10-16 PROCEDURE — 76705 ECHO EXAM OF ABDOMEN: CPT | Mod: 59,TC

## 2019-10-16 PROCEDURE — 74177 CT ABD & PELVIS W/CONTRAST: CPT | Mod: 26,,, | Performed by: RADIOLOGY

## 2019-10-16 PROCEDURE — 99999 PR PBB SHADOW E&M-EST. PATIENT-LVL II: ICD-10-PCS | Mod: PBBFAC,,, | Performed by: CLINICAL NURSE SPECIALIST

## 2019-10-16 PROCEDURE — 3044F PR MOST RECENT HEMOGLOBIN A1C LEVEL <7.0%: ICD-10-PCS | Mod: CPTII,S$GLB,, | Performed by: CLINICAL NURSE SPECIALIST

## 2019-10-16 PROCEDURE — 99999 PR PBB SHADOW E&M-EST. PATIENT-LVL II: CPT | Mod: PBBFAC,,, | Performed by: CLINICAL NURSE SPECIALIST

## 2019-10-16 PROCEDURE — 99213 OFFICE O/P EST LOW 20 MIN: CPT | Mod: S$GLB,,, | Performed by: CLINICAL NURSE SPECIALIST

## 2019-10-16 PROCEDURE — 25500020 PHARM REV CODE 255: Performed by: INTERNAL MEDICINE

## 2019-10-16 PROCEDURE — 74177 CT CHEST ABDOMEN PELVIS WITH CONTRAST (XPD): ICD-10-PCS | Mod: 26,,, | Performed by: RADIOLOGY

## 2019-10-16 PROCEDURE — 93975 VASCULAR STUDY: CPT | Mod: TC

## 2019-10-16 PROCEDURE — 74177 CT ABD & PELVIS W/CONTRAST: CPT | Mod: TC

## 2019-10-16 PROCEDURE — 76705 ECHO EXAM OF ABDOMEN: CPT | Mod: 26,59,, | Performed by: RADIOLOGY

## 2019-10-16 PROCEDURE — 93975 US LIVER TRANSPLANT POST: ICD-10-PCS | Mod: 26,,, | Performed by: RADIOLOGY

## 2019-10-16 PROCEDURE — 2024F PR 7 FIELD PHOTOS WITH INTERP/ REVIEW: ICD-10-PCS | Mod: S$GLB,,, | Performed by: CLINICAL NURSE SPECIALIST

## 2019-10-16 PROCEDURE — 99213 PR OFFICE/OUTPT VISIT, EST, LEVL III, 20-29 MIN: ICD-10-PCS | Mod: S$GLB,,, | Performed by: CLINICAL NURSE SPECIALIST

## 2019-10-16 PROCEDURE — 2024F 7 FLD RTA PHOTO EVC RTNOPTHY: CPT | Mod: S$GLB,,, | Performed by: CLINICAL NURSE SPECIALIST

## 2019-10-16 PROCEDURE — 71260 CT THORAX DX C+: CPT | Mod: 26,,, | Performed by: RADIOLOGY

## 2019-10-16 RX ADMIN — IOHEXOL 100 ML: 350 INJECTION, SOLUTION INTRAVENOUS at 01:10

## 2019-10-16 NOTE — TELEPHONE ENCOUNTER
----- Message from Evelyn Espinoza, CNS sent at 10/16/2019 10:00 AM CDT -----  You can discontinue home health he is essentially healed  !  No fu needed with me

## 2019-10-18 ENCOUNTER — PATIENT MESSAGE (OUTPATIENT)
Dept: TRANSPLANT | Facility: CLINIC | Age: 59
End: 2019-10-18

## 2019-10-18 ENCOUNTER — EXTERNAL HOME HEALTH (OUTPATIENT)
Dept: HOME HEALTH SERVICES | Facility: HOSPITAL | Age: 59
End: 2019-10-18
Payer: COMMERCIAL

## 2019-10-18 ENCOUNTER — LAB VISIT (OUTPATIENT)
Dept: LAB | Facility: HOSPITAL | Age: 59
End: 2019-10-18
Attending: FAMILY MEDICINE
Payer: COMMERCIAL

## 2019-10-18 ENCOUNTER — OFFICE VISIT (OUTPATIENT)
Dept: FAMILY MEDICINE | Facility: CLINIC | Age: 59
End: 2019-10-18
Payer: COMMERCIAL

## 2019-10-18 VITALS
BODY MASS INDEX: 31.8 KG/M2 | OXYGEN SATURATION: 97 % | DIASTOLIC BLOOD PRESSURE: 70 MMHG | HEART RATE: 72 BPM | HEIGHT: 67 IN | WEIGHT: 202.63 LBS | SYSTOLIC BLOOD PRESSURE: 120 MMHG

## 2019-10-18 DIAGNOSIS — I10 ESSENTIAL HYPERTENSION: Primary | ICD-10-CM

## 2019-10-18 DIAGNOSIS — R20.2 TINGLING OF SKIN: ICD-10-CM

## 2019-10-18 DIAGNOSIS — T78.40XA ALLERGIC REACTION, INITIAL ENCOUNTER: ICD-10-CM

## 2019-10-18 LAB
25(OH)D3+25(OH)D2 SERPL-MCNC: 15 NG/ML (ref 30–96)
FOLATE SERPL-MCNC: 5.2 NG/ML (ref 4–24)
VIT B12 SERPL-MCNC: 451 PG/ML (ref 210–950)

## 2019-10-18 PROCEDURE — 99214 PR OFFICE/OUTPT VISIT, EST, LEVL IV, 30-39 MIN: ICD-10-PCS | Mod: S$GLB,,, | Performed by: FAMILY MEDICINE

## 2019-10-18 PROCEDURE — 99999 PR PBB SHADOW E&M-EST. PATIENT-LVL III: ICD-10-PCS | Mod: PBBFAC,,, | Performed by: FAMILY MEDICINE

## 2019-10-18 PROCEDURE — 3078F PR MOST RECENT DIASTOLIC BLOOD PRESSURE < 80 MM HG: ICD-10-PCS | Mod: CPTII,S$GLB,, | Performed by: FAMILY MEDICINE

## 2019-10-18 PROCEDURE — 3078F DIAST BP <80 MM HG: CPT | Mod: CPTII,S$GLB,, | Performed by: FAMILY MEDICINE

## 2019-10-18 PROCEDURE — 82306 VITAMIN D 25 HYDROXY: CPT

## 2019-10-18 PROCEDURE — 99214 OFFICE O/P EST MOD 30 MIN: CPT | Mod: S$GLB,,, | Performed by: FAMILY MEDICINE

## 2019-10-18 PROCEDURE — 99999 PR PBB SHADOW E&M-EST. PATIENT-LVL III: CPT | Mod: PBBFAC,,, | Performed by: FAMILY MEDICINE

## 2019-10-18 PROCEDURE — 3008F PR BODY MASS INDEX (BMI) DOCUMENTED: ICD-10-PCS | Mod: CPTII,S$GLB,, | Performed by: FAMILY MEDICINE

## 2019-10-18 PROCEDURE — 82607 VITAMIN B-12: CPT

## 2019-10-18 PROCEDURE — 84207 ASSAY OF VITAMIN B-6: CPT

## 2019-10-18 PROCEDURE — 36415 COLL VENOUS BLD VENIPUNCTURE: CPT | Mod: PO

## 2019-10-18 PROCEDURE — 84425 ASSAY OF VITAMIN B-1: CPT

## 2019-10-18 PROCEDURE — 3008F BODY MASS INDEX DOCD: CPT | Mod: CPTII,S$GLB,, | Performed by: FAMILY MEDICINE

## 2019-10-18 PROCEDURE — 3074F PR MOST RECENT SYSTOLIC BLOOD PRESSURE < 130 MM HG: ICD-10-PCS | Mod: CPTII,S$GLB,, | Performed by: FAMILY MEDICINE

## 2019-10-18 PROCEDURE — 3074F SYST BP LT 130 MM HG: CPT | Mod: CPTII,S$GLB,, | Performed by: FAMILY MEDICINE

## 2019-10-18 PROCEDURE — 82746 ASSAY OF FOLIC ACID SERUM: CPT

## 2019-10-18 RX ORDER — MYCOPHENOLATE MOFETIL 250 MG/1
CAPSULE ORAL
Qty: 240 CAPSULE | Refills: 1 | Status: SHIPPED | OUTPATIENT
Start: 2019-10-18 | End: 2020-02-03 | Stop reason: SDUPTHER

## 2019-10-18 RX ORDER — HYDROXYZINE PAMOATE 25 MG/1
25 CAPSULE ORAL EVERY 6 HOURS PRN
Qty: 90 CAPSULE | Refills: 0 | Status: SHIPPED | OUTPATIENT
Start: 2019-10-18 | End: 2020-01-20

## 2019-10-18 NOTE — PROGRESS NOTES
Subjective:       Patient ID: Roman Hodges is a 59 y.o. male.    Chief Complaint: Allergic Reaction (on and off) and Tingling (on his body)    59 years old male came to the clinic with tingling sensation or over the skin for the last couple years.  Patient previously evaluated by neurologist with normal workup.  He was treated with a seizure medicine with no significant improvement.  Patient is concerned about possible allergy reaction.  Blood pressure today stable.  No chest pain, palpitation, orthopnea PND.  Last bilirubin was normal.    Review of Systems   Constitutional: Positive for activity change. Negative for unexpected weight change.   HENT: Positive for hearing loss. Negative for rhinorrhea and trouble swallowing.    Eyes: Negative.  Negative for discharge and visual disturbance.   Respiratory: Negative.  Negative for chest tightness and wheezing.    Cardiovascular: Negative.  Negative for chest pain and palpitations.   Gastrointestinal: Negative.  Negative for blood in stool, constipation, diarrhea and vomiting.   Endocrine: Negative for polydipsia and polyuria.   Genitourinary: Negative.  Negative for difficulty urinating, hematuria and urgency.   Musculoskeletal: Negative.  Negative for arthralgias, joint swelling and neck pain.   Skin: Negative.    Neurological: Negative.  Negative for weakness, numbness and headaches.   Psychiatric/Behavioral: Negative.  Negative for confusion and dysphoric mood.       Objective:      Physical Exam   Constitutional: He is oriented to person, place, and time. He appears well-developed and well-nourished. No distress.   HENT:   Head: Normocephalic and atraumatic.   Right Ear: External ear normal.   Left Ear: External ear normal.   Nose: Nose normal.   Mouth/Throat: Oropharynx is clear and moist. No oropharyngeal exudate.   Eyes: Pupils are equal, round, and reactive to light. Conjunctivae and EOM are normal. Right eye exhibits no discharge. Left eye exhibits no  discharge. No scleral icterus.   Neck: Normal range of motion. Neck supple. No JVD present. No tracheal deviation present. No thyromegaly present.   Cardiovascular: Normal rate, regular rhythm, normal heart sounds and intact distal pulses. Exam reveals no gallop and no friction rub.   No murmur heard.  Pulmonary/Chest: Effort normal and breath sounds normal. No stridor. No respiratory distress. He has no wheezes. He has no rales. He exhibits no tenderness.   Abdominal: Soft. Bowel sounds are normal. He exhibits no distension and no mass. There is no tenderness. There is no rebound and no guarding.   Musculoskeletal: Normal range of motion. He exhibits no edema or tenderness.   Lymphadenopathy:     He has no cervical adenopathy.   Neurological: He is alert and oriented to person, place, and time. He has normal reflexes. He displays normal reflexes. No cranial nerve deficit. He exhibits normal muscle tone. Coordination normal.   Skin: Skin is warm and dry. No rash noted. He is not diaphoretic. No erythema. No pallor.   Psychiatric: He has a normal mood and affect. His behavior is normal. Judgment and thought content normal.   Nursing note and vitals reviewed.      Assessment:       1. Essential hypertension    2. Tingling of skin    3. Allergic reaction, initial encounter        Plan:         Roman was seen today for allergic reaction and tingling.    Diagnoses and all orders for this visit:    Essential hypertension    Tingling of skin  -     VITAMIN B1; Future  -     Folate; Future  -     VITAMIN B12; Future  -     VITAMIN B6; Future  -     Vitamin D; Future  -     hydrOXYzine pamoate (VISTARIL) 25 MG Cap; Take 1 capsule (25 mg total) by mouth every 6 (six) hours as needed.    Allergic reaction, initial encounter  -     Ambulatory consult to Allergy    Continue monitoring blood pressure at home, low sodium diet.

## 2019-10-20 ENCOUNTER — PATIENT MESSAGE (OUTPATIENT)
Dept: FAMILY MEDICINE | Facility: CLINIC | Age: 59
End: 2019-10-20

## 2019-10-21 ENCOUNTER — LAB VISIT (OUTPATIENT)
Dept: LAB | Facility: HOSPITAL | Age: 59
End: 2019-10-21
Attending: INTERNAL MEDICINE
Payer: COMMERCIAL

## 2019-10-21 ENCOUNTER — PATIENT OUTREACH (OUTPATIENT)
Dept: ADMINISTRATIVE | Facility: OTHER | Age: 59
End: 2019-10-21

## 2019-10-21 ENCOUNTER — TELEPHONE (OUTPATIENT)
Dept: TRANSPLANT | Facility: CLINIC | Age: 59
End: 2019-10-21

## 2019-10-21 DIAGNOSIS — Z94.4 LIVER REPLACED BY TRANSPLANT: ICD-10-CM

## 2019-10-21 LAB
ALBUMIN SERPL BCP-MCNC: 4.1 G/DL (ref 3.5–5.2)
ALP SERPL-CCNC: 73 U/L (ref 55–135)
ALT SERPL W/O P-5'-P-CCNC: 26 U/L (ref 10–44)
ANION GAP SERPL CALC-SCNC: 8 MMOL/L (ref 8–16)
AST SERPL-CCNC: 13 U/L (ref 10–40)
BASOPHILS # BLD AUTO: 0.04 K/UL (ref 0–0.2)
BASOPHILS NFR BLD: 0.7 % (ref 0–1.9)
BILIRUB SERPL-MCNC: 0.4 MG/DL (ref 0.1–1)
BUN SERPL-MCNC: 25 MG/DL (ref 6–20)
CALCIUM SERPL-MCNC: 8.9 MG/DL (ref 8.7–10.5)
CHLORIDE SERPL-SCNC: 106 MMOL/L (ref 95–110)
CO2 SERPL-SCNC: 24 MMOL/L (ref 23–29)
CREAT SERPL-MCNC: 1.3 MG/DL (ref 0.5–1.4)
DIFFERENTIAL METHOD: ABNORMAL
EOSINOPHIL # BLD AUTO: 0.2 K/UL (ref 0–0.5)
EOSINOPHIL NFR BLD: 3.1 % (ref 0–8)
ERYTHROCYTE [DISTWIDTH] IN BLOOD BY AUTOMATED COUNT: 14.3 % (ref 11.5–14.5)
EST. GFR  (AFRICAN AMERICAN): >60 ML/MIN/1.73 M^2
EST. GFR  (NON AFRICAN AMERICAN): 59.3 ML/MIN/1.73 M^2
GLUCOSE SERPL-MCNC: 105 MG/DL (ref 70–110)
HCT VFR BLD AUTO: 40.4 % (ref 40–54)
HGB BLD-MCNC: 13 G/DL (ref 14–18)
IMM GRANULOCYTES # BLD AUTO: 0.03 K/UL (ref 0–0.04)
IMM GRANULOCYTES NFR BLD AUTO: 0.5 % (ref 0–0.5)
LYMPHOCYTES # BLD AUTO: 0.7 K/UL (ref 1–4.8)
LYMPHOCYTES NFR BLD: 11.8 % (ref 18–48)
MCH RBC QN AUTO: 26.4 PG (ref 27–31)
MCHC RBC AUTO-ENTMCNC: 32.2 G/DL (ref 32–36)
MCV RBC AUTO: 82 FL (ref 82–98)
MONOCYTES # BLD AUTO: 0.6 K/UL (ref 0.3–1)
MONOCYTES NFR BLD: 11.1 % (ref 4–15)
NEUTROPHILS # BLD AUTO: 4.2 K/UL (ref 1.8–7.7)
NEUTROPHILS NFR BLD: 72.8 % (ref 38–73)
NRBC BLD-RTO: 0 /100 WBC
PLATELET # BLD AUTO: 270 K/UL (ref 150–350)
PMV BLD AUTO: 9.9 FL (ref 9.2–12.9)
POTASSIUM SERPL-SCNC: 3.6 MMOL/L (ref 3.5–5.1)
PROT SERPL-MCNC: 6.9 G/DL (ref 6–8.4)
RBC # BLD AUTO: 4.92 M/UL (ref 4.6–6.2)
SODIUM SERPL-SCNC: 138 MMOL/L (ref 136–145)
TACROLIMUS BLD-MCNC: 11.8 NG/ML (ref 5–15)
WBC # BLD AUTO: 5.75 K/UL (ref 3.9–12.7)

## 2019-10-21 PROCEDURE — 85025 COMPLETE CBC W/AUTO DIFF WBC: CPT

## 2019-10-21 PROCEDURE — 80053 COMPREHEN METABOLIC PANEL: CPT

## 2019-10-21 PROCEDURE — 36415 COLL VENOUS BLD VENIPUNCTURE: CPT | Mod: PO

## 2019-10-21 PROCEDURE — 80197 ASSAY OF TACROLIMUS: CPT

## 2019-10-21 NOTE — TELEPHONE ENCOUNTER
----- Message from Zully Qureshi sent at 10/21/2019 12:06 PM CDT -----  Home Health care nurse called stating pt wound has healed and wants to clarify discharge      Nurse Number [phone]

## 2019-10-22 ENCOUNTER — TELEPHONE (OUTPATIENT)
Dept: TRANSPLANT | Facility: CLINIC | Age: 59
End: 2019-10-22

## 2019-10-22 ENCOUNTER — TELEPHONE (OUTPATIENT)
Dept: SLEEP MEDICINE | Facility: OTHER | Age: 59
End: 2019-10-22

## 2019-10-22 LAB
PYRIDOXAL SERPL-MCNC: 7 UG/L (ref 5–50)
VIT B1 BLD-MCNC: 38 UG/L (ref 38–122)

## 2019-10-22 NOTE — TELEPHONE ENCOUNTER
----- Message from Sujey Joiner sent at 10/22/2019  8:19 AM CDT -----  Son Brock  Can you call this patients  at [facility] at [phone] ext [phone]  Or fax her latest post transplant clinic note to [phone]  Sara Rm

## 2019-10-23 ENCOUNTER — TELEPHONE (OUTPATIENT)
Dept: HOME HEALTH SERVICES | Facility: HOSPITAL | Age: 59
End: 2019-10-23

## 2019-10-23 ENCOUNTER — PATIENT MESSAGE (OUTPATIENT)
Dept: TRANSPLANT | Facility: CLINIC | Age: 59
End: 2019-10-23

## 2019-10-24 ENCOUNTER — LAB VISIT (OUTPATIENT)
Dept: LAB | Facility: HOSPITAL | Age: 59
End: 2019-10-24
Attending: ALLERGY & IMMUNOLOGY
Payer: COMMERCIAL

## 2019-10-24 ENCOUNTER — OFFICE VISIT (OUTPATIENT)
Dept: ALLERGY | Facility: CLINIC | Age: 59
End: 2019-10-24
Payer: COMMERCIAL

## 2019-10-24 VITALS — BODY MASS INDEX: 32.63 KG/M2 | HEIGHT: 67 IN | WEIGHT: 207.88 LBS

## 2019-10-24 DIAGNOSIS — L29.9 PRURITUS: Primary | ICD-10-CM

## 2019-10-24 DIAGNOSIS — L29.9 PRURITUS: ICD-10-CM

## 2019-10-24 LAB
THYROGLOB AB SERPL IA-ACNC: <4 IU/ML (ref 0–3.9)
THYROPEROXIDASE IGG SERPL-ACNC: <6 IU/ML
TSH SERPL DL<=0.005 MIU/L-ACNC: 1.62 UIU/ML (ref 0.4–4)

## 2019-10-24 PROCEDURE — 99203 PR OFFICE/OUTPT VISIT, NEW, LEVL III, 30-44 MIN: ICD-10-PCS | Mod: S$GLB,,, | Performed by: ALLERGY & IMMUNOLOGY

## 2019-10-24 PROCEDURE — 99999 PR PBB SHADOW E&M-EST. PATIENT-LVL III: CPT | Mod: PBBFAC,,, | Performed by: ALLERGY & IMMUNOLOGY

## 2019-10-24 PROCEDURE — 84165 PROTEIN E-PHORESIS SERUM: CPT

## 2019-10-24 PROCEDURE — 99203 OFFICE O/P NEW LOW 30 MIN: CPT | Mod: S$GLB,,, | Performed by: ALLERGY & IMMUNOLOGY

## 2019-10-24 PROCEDURE — 86376 MICROSOMAL ANTIBODY EACH: CPT

## 2019-10-24 PROCEDURE — 99999 PR PBB SHADOW E&M-EST. PATIENT-LVL III: ICD-10-PCS | Mod: PBBFAC,,, | Performed by: ALLERGY & IMMUNOLOGY

## 2019-10-24 PROCEDURE — 88184 FLOWCYTOMETRY/ TC 1 MARKER: CPT

## 2019-10-24 PROCEDURE — 84443 ASSAY THYROID STIM HORMONE: CPT

## 2019-10-24 PROCEDURE — 86800 THYROGLOBULIN ANTIBODY: CPT

## 2019-10-24 PROCEDURE — 88185 FLOWCYTOMETRY/TC ADD-ON: CPT | Mod: 59

## 2019-10-24 PROCEDURE — 84165 PATHOLOGIST INTERPRETATION SPE: ICD-10-PCS | Mod: 26,,, | Performed by: PATHOLOGY

## 2019-10-24 PROCEDURE — 86003 ALLG SPEC IGE CRUDE XTRC EA: CPT | Mod: 59

## 2019-10-24 PROCEDURE — 84165 PROTEIN E-PHORESIS SERUM: CPT | Mod: 26,,, | Performed by: PATHOLOGY

## 2019-10-24 PROCEDURE — 86003 ALLG SPEC IGE CRUDE XTRC EA: CPT

## 2019-10-24 NOTE — PROGRESS NOTES
Subjective:       Patient ID: Roman Hodges is a 59 y.o. male.    Chief Complaint:  Rash      58 yo man presents for consult from Dr Jose Angel Munson for itch and pins and needle sensation. He is accompanied by wife who helps translate. They state he gets spots which itch but no sin change. They last short time but may be couple days. Has on arms, legs, eyebrows, scalp, anywhere. They was related to liver but had his transplant in July and doing great form that and still with itch. Head especially will feel like needles sticking. He is taking hydroxyzine prn and helps. He has no rhinitis, no asthma, no eczema. No H/o sinus surgery or ENT surgery. He does have H/o [place] palsy after flu shot so tried to avoid vaccines.       Environmental History: see history section for home environment  Review of Systems   Constitutional: Negative for activity change, appetite change, chills, fatigue, fever and unexpected weight change.   HENT: Negative for congestion, ear discharge, ear pain, facial swelling, hearing loss, mouth sores, nosebleeds, postnasal drip, rhinorrhea, sinus pressure, sneezing, sore throat, tinnitus, trouble swallowing and voice change.    Eyes: Negative for discharge, redness, itching and visual disturbance.   Respiratory: Negative for cough, chest tightness, shortness of breath and wheezing.    Cardiovascular: Negative for chest pain, palpitations and leg swelling.   Gastrointestinal: Negative for abdominal distention, abdominal pain, constipation, diarrhea, nausea and vomiting.   Genitourinary: Negative for difficulty urinating.   Musculoskeletal: Negative for arthralgias, back pain, joint swelling and myalgias.   Skin: Negative for color change, pallor and rash.   Neurological: Negative for dizziness, tremors, speech difficulty, weakness, light-headedness and headaches.   Hematological: Negative for adenopathy. Does not bruise/bleed easily.   Psychiatric/Behavioral: Negative for agitation,  confusion, decreased concentration and sleep disturbance. The patient is not nervous/anxious.         Objective:      Physical Exam   Constitutional: He is oriented to person, place, and time. He appears well-developed and well-nourished. No distress.   HENT:   Head: Normocephalic and atraumatic.   Right Ear: Hearing, tympanic membrane, external ear and ear canal normal.   Left Ear: Hearing, tympanic membrane, external ear and ear canal normal.   Nose: No mucosal edema (pink turbinates), rhinorrhea, sinus tenderness or septal deviation. No epistaxis.   Mouth/Throat: Oropharynx is clear and moist and mucous membranes are normal. No uvula swelling.   Eyes: Conjunctivae are normal. Right eye exhibits no discharge. Left eye exhibits no discharge.   Neck: Normal range of motion. No thyromegaly present.   Cardiovascular: Normal rate, regular rhythm and normal heart sounds.   No murmur heard.  Pulmonary/Chest: Effort normal and breath sounds normal. No respiratory distress. He has no wheezes.   Abdominal: Soft. He exhibits no distension. There is no tenderness.   Musculoskeletal: Normal range of motion. He exhibits no edema.   Lymphadenopathy:     He has no cervical adenopathy.   Neurological: He is alert and oriented to person, place, and time. Coordination normal.   Skin: Skin is warm and dry. No rash noted. No erythema.   Psychiatric: He has a normal mood and affect. His behavior is normal. Judgment and thought content normal.   Nursing note and vitals reviewed.      Laboratory:   none performed   Assessment:       1. Pruritus         Plan:       1. Advised itch and tingling can be urticaria process or cold be nerve, will send labs to eval  2. Hydroxyzine prn  3. Phone review  4. Dr Munson notified of completed consult via [email or web address]

## 2019-10-24 NOTE — LETTER
October 24, 2019      Jose Angel Munson MD  [address] [place] [address]           [place] - Allergy  [address]  [place] [address]  Phone: [phone]          Patient: Roman Hodges   MR Number: [id number]   YOB: 1960   Date of Visit: 10/24/2019       Dear Dr. Jose Angel Munson:    Thank you for referring Roman Hodges to me for evaluation. Attached you will find relevant portions of my assessment and plan of care.    If you have questions, please do not hesitate to call me. I look forward to following Roman Hodges along with you.    Sincerely,    Dory Doe MD    Enclosure  CC:  No Recipients    If you would like to receive this communication electronically, please contact [email or web address] or [phone] to request more information on [employer] access.    For providers and/or their staff who would like to refer a patient to [facility], please contact us through our one-stop-shop provider referral line, [facility], at [phone].    If you feel you have received this communication in error or would no longer like to receive these types of communications, please e-mail [email or web address]

## 2019-10-25 LAB
ALBUMIN SERPL ELPH-MCNC: 4.54 G/DL (ref 3.35–5.55)
ALPHA1 GLOB SERPL ELPH-MCNC: 0.28 G/DL (ref 0.17–0.41)
ALPHA2 GLOB SERPL ELPH-MCNC: 0.62 G/DL (ref 0.43–0.99)
B-GLOBULIN SERPL ELPH-MCNC: 0.71 G/DL (ref 0.5–1.1)
GAMMA GLOB SERPL ELPH-MCNC: 0.94 G/DL (ref 0.67–1.58)
PATHOLOGIST INTERPRETATION SPE: NORMAL
PROT SERPL-MCNC: 7.1 G/DL (ref 6–8.4)

## 2019-10-27 ENCOUNTER — TELEPHONE (OUTPATIENT)
Dept: FAMILY MEDICINE | Facility: CLINIC | Age: 59
End: 2019-10-27

## 2019-10-27 DIAGNOSIS — E55.9 VITAMIN D DEFICIENCY DISEASE: Primary | ICD-10-CM

## 2019-10-27 RX ORDER — ERGOCALCIFEROL 1.25 MG/1
50000 CAPSULE ORAL
Qty: 12 CAPSULE | Refills: 0 | Status: SHIPPED | OUTPATIENT
Start: 2019-10-27 | End: 2020-01-12

## 2019-10-27 NOTE — TELEPHONE ENCOUNTER
Low vitamin-D.    Supplementation was ordered.    Please notify the patient prescription was sent to the pharmacy.

## 2019-10-28 ENCOUNTER — TELEPHONE (OUTPATIENT)
Dept: TRANSPLANT | Facility: CLINIC | Age: 59
End: 2019-10-28

## 2019-10-28 LAB
A ALTERNATA IGE QN: <0.1 KU/L
A FUMIGATUS IGE QN: <0.1 KU/L
BERMUDA GRASS IGE QN: <0.1 KU/L
CAT DANDER IGE QN: <0.1 KU/L
CEDAR IGE QN: <0.1 KU/L
COW MILK IGE QN: <0.1 KU/L
D FARINAE IGE QN: 0.4 KU/L
D PTERONYSS IGE QN: 0.36 KU/L
DEPRECATED A ALTERNATA IGE RAST QL: NORMAL
DEPRECATED A FUMIGATUS IGE RAST QL: NORMAL
DEPRECATED BERMUDA GRASS IGE RAST QL: NORMAL
DEPRECATED CAT DANDER IGE RAST QL: NORMAL
DEPRECATED CEDAR IGE RAST QL: NORMAL
DEPRECATED COW MILK IGE RAST QL: NORMAL
DEPRECATED D FARINAE IGE RAST QL: ABNORMAL
DEPRECATED D PTERONYSS IGE RAST QL: ABNORMAL
DEPRECATED DOG DANDER IGE RAST QL: NORMAL
DEPRECATED EGG WHITE IGE RAST QL: NORMAL
DEPRECATED ELDER IGE RAST QL: NORMAL
DEPRECATED ENGL PLANTAIN IGE RAST QL: NORMAL
DEPRECATED PEANUT IGE RAST QL: NORMAL
DEPRECATED PECAN/HICK TREE IGE RAST QL: NORMAL
DEPRECATED PECAN/HICK TREE IGE RAST QL: NORMAL
DEPRECATED ROACH IGE RAST QL: NORMAL
DEPRECATED SHRIMP IGE RAST QL: NORMAL
DEPRECATED SOYBEAN IGE RAST QL: NORMAL
DEPRECATED TIMOTHY IGE RAST QL: NORMAL
DEPRECATED TUNA IGE RAST QL: NORMAL
DEPRECATED WEST RAGWEED IGE RAST QL: NORMAL
DEPRECATED WHEAT IGE RAST QL: NORMAL
DEPRECATED WHITE OAK IGE RAST QL: NORMAL
DOG DANDER IGE QN: <0.1 KU/L
EGG WHITE IGE QN: 0.1 KU/L
ELDER IGE QN: <0.1 KU/L
ENGL PLANTAIN IGE QN: <0.1 KU/L
PEANUT IGE QN: <0.1 KU/L
PECAN/HICK TREE IGE QN: <0.1 KU/L
PECAN/HICK TREE IGE QN: <0.1 KU/L
ROACH IGE QN: <0.1 KU/L
SHRIMP IGE QN: <0.1 KU/L
SOYBEAN IGE QN: <0.1 KU/L
TIMOTHY IGE QN: <0.1 KU/L
TUNA IGE QN: <0.1 KU/L
WEST RAGWEED IGE QN: <0.1 KU/L
WHEAT IGE QN: <0.1 KU/L
WHITE OAK IGE QN: <0.1 KU/L

## 2019-10-28 NOTE — TELEPHONE ENCOUNTER
----- Message from Pippa Eason RN sent at 10/28/2019  4:23 PM CDT -----  I sent the blank form to disability office on Thursday. They sent it today to Dr. Damon for him to sign. Waiting for him to sign.     ----- Message -----  From: Joann Lino  Sent: 10/28/2019   3:14 PM CDT  To: Pippa Eason RN    Patient of Delmy's I got him now starting today,  Wife is asking if we have his disability form that was sent to disability office , they said they don't find it.  Do you have it by chance?  I sent message to disability office already. Thanks.

## 2019-10-28 NOTE — TELEPHONE ENCOUNTER
----- Message from Leann Rae sent at 10/28/2019  2:41 PM CDT -----  Contact:    Pt  [phone]  Pt submitted disability form to the office,  Pt  Checking the status ....  Call  Back to verify

## 2019-10-28 NOTE — TELEPHONE ENCOUNTER
Left message with wife that his Vitamin D was low and that Rx was send to the pharmacy. Patient wife voices understanding.

## 2019-11-01 ENCOUNTER — CLINICAL SUPPORT (OUTPATIENT)
Dept: INFECTIOUS DISEASES | Facility: CLINIC | Age: 59
End: 2019-11-01
Payer: COMMERCIAL

## 2019-11-01 ENCOUNTER — TELEPHONE (OUTPATIENT)
Dept: TRANSPLANT | Facility: CLINIC | Age: 59
End: 2019-11-01

## 2019-11-01 DIAGNOSIS — K75.81 NASH (NONALCOHOLIC STEATOHEPATITIS): Chronic | ICD-10-CM

## 2019-11-01 DIAGNOSIS — Z76.82 LIVER TRANSPLANT CANDIDATE: ICD-10-CM

## 2019-11-01 DIAGNOSIS — C22.0 HCC (HEPATOCELLULAR CARCINOMA): ICD-10-CM

## 2019-11-01 PROCEDURE — 90471 PNEUMOCOCCAL CONJUGATE VACCINE 13-VALENT LESS THAN 5YO & GREATER THAN: ICD-10-PCS | Mod: S$GLB,,, | Performed by: INTERNAL MEDICINE

## 2019-11-01 PROCEDURE — 99999 PR PBB SHADOW E&M-EST. PATIENT-LVL I: CPT | Mod: PBBFAC,,,

## 2019-11-01 PROCEDURE — 90670 PNEUMOCOCCAL CONJUGATE VACCINE 13-VALENT LESS THAN 5YO & GREATER THAN: ICD-10-PCS | Mod: S$GLB,,, | Performed by: INTERNAL MEDICINE

## 2019-11-01 PROCEDURE — 90670 PCV13 VACCINE IM: CPT | Mod: S$GLB,,, | Performed by: INTERNAL MEDICINE

## 2019-11-01 PROCEDURE — 99999 PR PBB SHADOW E&M-EST. PATIENT-LVL I: ICD-10-PCS | Mod: PBBFAC,,,

## 2019-11-01 PROCEDURE — 90471 IMMUNIZATION ADMIN: CPT | Mod: S$GLB,,, | Performed by: INTERNAL MEDICINE

## 2019-11-01 NOTE — TELEPHONE ENCOUNTER
----- Message from Delia Ross sent at 11/1/2019 10:34 AM CDT -----  Contact: Pt wife Lela  Pt wife Lela needs to speak with Delmy Pritchard about getting Medical Clearance for pt to go see the Dentist and the Clearance can be fax to [phone]  Callback#[phone]  Thank You  LETY Ross

## 2019-11-01 NOTE — TELEPHONE ENCOUNTER
Message left for patient/wife returning her call. Informed her patient to wait till 6-12 months post transplant to have routine dental care; if is a dental emergency , have dentist fax clearence form (stating what dental work being done and what anesthesia to be used ) to [phone].

## 2019-11-04 ENCOUNTER — PATIENT MESSAGE (OUTPATIENT)
Dept: TRANSPLANT | Facility: CLINIC | Age: 59
End: 2019-11-04

## 2019-11-04 ENCOUNTER — LAB VISIT (OUTPATIENT)
Dept: LAB | Facility: HOSPITAL | Age: 59
End: 2019-11-04
Attending: INTERNAL MEDICINE
Payer: COMMERCIAL

## 2019-11-04 ENCOUNTER — PATIENT MESSAGE (OUTPATIENT)
Dept: FAMILY MEDICINE | Facility: CLINIC | Age: 59
End: 2019-11-04

## 2019-11-04 DIAGNOSIS — Z94.4 LIVER REPLACED BY TRANSPLANT: ICD-10-CM

## 2019-11-04 LAB
ALBUMIN SERPL BCP-MCNC: 4.2 G/DL (ref 3.5–5.2)
ALP SERPL-CCNC: 68 U/L (ref 55–135)
ALT SERPL W/O P-5'-P-CCNC: 27 U/L (ref 10–44)
ANION GAP SERPL CALC-SCNC: 8 MMOL/L (ref 8–16)
AST SERPL-CCNC: 16 U/L (ref 10–40)
BASOPHILS # BLD AUTO: 0.04 K/UL (ref 0–0.2)
BASOPHILS NFR BLD: 0.7 % (ref 0–1.9)
BILIRUB SERPL-MCNC: 0.6 MG/DL (ref 0.1–1)
BUN SERPL-MCNC: 29 MG/DL (ref 6–20)
CALCIUM SERPL-MCNC: 9.3 MG/DL (ref 8.7–10.5)
CHLORIDE SERPL-SCNC: 106 MMOL/L (ref 95–110)
CO2 SERPL-SCNC: 26 MMOL/L (ref 23–29)
CREAT SERPL-MCNC: 1.4 MG/DL (ref 0.5–1.4)
DIFFERENTIAL METHOD: ABNORMAL
EOSINOPHIL # BLD AUTO: 0.1 K/UL (ref 0–0.5)
EOSINOPHIL NFR BLD: 2.2 % (ref 0–8)
ERYTHROCYTE [DISTWIDTH] IN BLOOD BY AUTOMATED COUNT: 14.1 % (ref 11.5–14.5)
EST. GFR  (AFRICAN AMERICAN): >60 ML/MIN/1.73 M^2
EST. GFR  (NON AFRICAN AMERICAN): 54.2 ML/MIN/1.73 M^2
GLUCOSE SERPL-MCNC: 102 MG/DL (ref 70–110)
HCT VFR BLD AUTO: 43.6 % (ref 40–54)
HGB BLD-MCNC: 13.7 G/DL (ref 14–18)
IMM GRANULOCYTES # BLD AUTO: 0.04 K/UL (ref 0–0.04)
IMM GRANULOCYTES NFR BLD AUTO: 0.7 % (ref 0–0.5)
LYMPHOCYTES # BLD AUTO: 0.8 K/UL (ref 1–4.8)
LYMPHOCYTES NFR BLD: 13.8 % (ref 18–48)
MCH RBC QN AUTO: 25.8 PG (ref 27–31)
MCHC RBC AUTO-ENTMCNC: 31.4 G/DL (ref 32–36)
MCV RBC AUTO: 82 FL (ref 82–98)
MONOCYTES # BLD AUTO: 0.6 K/UL (ref 0.3–1)
MONOCYTES NFR BLD: 10.2 % (ref 4–15)
NEUTROPHILS # BLD AUTO: 4.2 K/UL (ref 1.8–7.7)
NEUTROPHILS NFR BLD: 72.4 % (ref 38–73)
NRBC BLD-RTO: 0 /100 WBC
PLATELET # BLD AUTO: 296 K/UL (ref 150–350)
PMV BLD AUTO: 9.8 FL (ref 9.2–12.9)
POTASSIUM SERPL-SCNC: 4.4 MMOL/L (ref 3.5–5.1)
PROT SERPL-MCNC: 7.2 G/DL (ref 6–8.4)
RBC # BLD AUTO: 5.3 M/UL (ref 4.6–6.2)
SODIUM SERPL-SCNC: 140 MMOL/L (ref 136–145)
TACROLIMUS BLD-MCNC: 8 NG/ML (ref 5–15)
WBC # BLD AUTO: 5.8 K/UL (ref 3.9–12.7)

## 2019-11-04 PROCEDURE — 80053 COMPREHEN METABOLIC PANEL: CPT

## 2019-11-04 PROCEDURE — 85025 COMPLETE CBC W/AUTO DIFF WBC: CPT

## 2019-11-04 PROCEDURE — 80197 ASSAY OF TACROLIMUS: CPT

## 2019-11-04 PROCEDURE — 36415 COLL VENOUS BLD VENIPUNCTURE: CPT | Mod: PO

## 2019-11-05 ENCOUNTER — TELEPHONE (OUTPATIENT)
Dept: TRANSPLANT | Facility: CLINIC | Age: 59
End: 2019-11-05

## 2019-11-05 ENCOUNTER — PATIENT MESSAGE (OUTPATIENT)
Dept: TRANSPLANT | Facility: CLINIC | Age: 59
End: 2019-11-05

## 2019-11-05 DIAGNOSIS — Z94.4 LIVER REPLACED BY TRANSPLANT: Primary | ICD-10-CM

## 2019-11-05 NOTE — TELEPHONE ENCOUNTER
----- Message from Fab Damon MD sent at 11/5/2019  4:44 PM CST -----  Results reviewed. Please advise labs are stable.

## 2019-11-05 NOTE — TELEPHONE ENCOUNTER
MARVIN called and spoke with pt's spouse Kati Hodges ([phone]) regarding message received below.  Kati reports calling phone number listed on Transplant Patient Holiday Party invitation ([phone]) to [facility] and was directed to speak with SW.  MARVIN informed Kati, according to transplant SW supervisor Mari Burns, transplant operators are asked to take a tally and submit each week with names of pts who have rsvp'd.  MARVIN instructed Kati to call back to speak with transplant  so that RSVP can be recorded.  MARVIN also sent message to  Soo Vargas who sent original message to SW informing of above conversation.  MARVIN remains available for further support and will f/u as needed.    ----- Message from Joann Lino sent at 11/4/2019  4:31 PM CST -----  Patient did not know who to call to RSVP he and his wife will attend the patient [place] party. He said the lady he called from the invite did not know anything about it.

## 2019-11-05 NOTE — TELEPHONE ENCOUNTER
Patient was notified and instructed via [facility]; labs reviewed by Dr.Girgrah; Tobin. No medicine changes made. Repeat labs due 11/18/19.

## 2019-11-06 ENCOUNTER — TELEPHONE (OUTPATIENT)
Dept: SLEEP MEDICINE | Facility: OTHER | Age: 59
End: 2019-11-06

## 2019-11-07 ENCOUNTER — TELEPHONE (OUTPATIENT)
Dept: FAMILY MEDICINE | Facility: CLINIC | Age: 59
End: 2019-11-07

## 2019-11-07 ENCOUNTER — HOSPITAL ENCOUNTER (OUTPATIENT)
Dept: SLEEP MEDICINE | Facility: OTHER | Age: 59
Discharge: HOME | End: 2019-11-07
Attending: PSYCHIATRY & NEUROLOGY
Payer: COMMERCIAL

## 2019-11-07 DIAGNOSIS — E11.65 TYPE 2 DIABETES MELLITUS WITH HYPERGLYCEMIA, WITHOUT LONG-TERM CURRENT USE OF INSULIN: Primary | ICD-10-CM

## 2019-11-07 DIAGNOSIS — G47.30 SLEEP APNEA, UNSPECIFIED TYPE: ICD-10-CM

## 2019-11-07 DIAGNOSIS — G47.33 OSA (OBSTRUCTIVE SLEEP APNEA): ICD-10-CM

## 2019-11-07 PROCEDURE — 95800 SLP STDY UNATTENDED: CPT

## 2019-11-07 NOTE — PROGRESS NOTES
Per physician orders, patient was given home sleep testing device and instructed on how to apply the device before going to bed tonight.  I sized the device and showed the patient using a mirror how the device fits and what it should look like so they can use a mirror when putting it on themselves at home.  We reviewed the instruction booklet and the number to call if they have any questions at night.  Patient understood and was instructed to return the device the next day back to the sleep lab.

## 2019-11-07 NOTE — TELEPHONE ENCOUNTER
I ordered the microalbumin and podiatric Medicine evaluation     Please contact the patient with appointments.

## 2019-11-09 LAB
CIU ASSOCIATED BASOPHIL ACTIVATION: 5.8 % (ref 0–12)
IGE RECEPTOR ANTIBODY: NORMAL

## 2019-11-11 ENCOUNTER — PATIENT MESSAGE (OUTPATIENT)
Dept: ALLERGY | Facility: CLINIC | Age: 59
End: 2019-11-11

## 2019-11-18 ENCOUNTER — LAB VISIT (OUTPATIENT)
Dept: LAB | Facility: HOSPITAL | Age: 59
End: 2019-11-18
Attending: INTERNAL MEDICINE
Payer: COMMERCIAL

## 2019-11-18 DIAGNOSIS — Z94.4 LIVER REPLACED BY TRANSPLANT: ICD-10-CM

## 2019-11-18 LAB
ALBUMIN SERPL BCP-MCNC: 4.2 G/DL (ref 3.5–5.2)
ALP SERPL-CCNC: 73 U/L (ref 55–135)
ALT SERPL W/O P-5'-P-CCNC: 21 U/L (ref 10–44)
ANION GAP SERPL CALC-SCNC: 9 MMOL/L (ref 8–16)
AST SERPL-CCNC: 13 U/L (ref 10–40)
BASOPHILS # BLD AUTO: 0.05 K/UL (ref 0–0.2)
BASOPHILS NFR BLD: 0.8 % (ref 0–1.9)
BILIRUB SERPL-MCNC: 0.5 MG/DL (ref 0.1–1)
BUN SERPL-MCNC: 22 MG/DL (ref 6–20)
CALCIUM SERPL-MCNC: 9.1 MG/DL (ref 8.7–10.5)
CHLORIDE SERPL-SCNC: 106 MMOL/L (ref 95–110)
CO2 SERPL-SCNC: 24 MMOL/L (ref 23–29)
CREAT SERPL-MCNC: 1.3 MG/DL (ref 0.5–1.4)
DIFFERENTIAL METHOD: ABNORMAL
EOSINOPHIL # BLD AUTO: 0.2 K/UL (ref 0–0.5)
EOSINOPHIL NFR BLD: 2.3 % (ref 0–8)
ERYTHROCYTE [DISTWIDTH] IN BLOOD BY AUTOMATED COUNT: 14.2 % (ref 11.5–14.5)
EST. GFR  (AFRICAN AMERICAN): >60 ML/MIN/1.73 M^2
EST. GFR  (NON AFRICAN AMERICAN): 59.3 ML/MIN/1.73 M^2
GLUCOSE SERPL-MCNC: 101 MG/DL (ref 70–110)
HCT VFR BLD AUTO: 42.8 % (ref 40–54)
HGB BLD-MCNC: 13.6 G/DL (ref 14–18)
IMM GRANULOCYTES # BLD AUTO: 0.03 K/UL (ref 0–0.04)
IMM GRANULOCYTES NFR BLD AUTO: 0.5 % (ref 0–0.5)
LYMPHOCYTES # BLD AUTO: 0.8 K/UL (ref 1–4.8)
LYMPHOCYTES NFR BLD: 12.1 % (ref 18–48)
MCH RBC QN AUTO: 25.9 PG (ref 27–31)
MCHC RBC AUTO-ENTMCNC: 31.8 G/DL (ref 32–36)
MCV RBC AUTO: 81 FL (ref 82–98)
MONOCYTES # BLD AUTO: 0.7 K/UL (ref 0.3–1)
MONOCYTES NFR BLD: 10.7 % (ref 4–15)
NEUTROPHILS # BLD AUTO: 4.8 K/UL (ref 1.8–7.7)
NEUTROPHILS NFR BLD: 73.6 % (ref 38–73)
NRBC BLD-RTO: 0 /100 WBC
PLATELET # BLD AUTO: 288 K/UL (ref 150–350)
PMV BLD AUTO: 9.9 FL (ref 9.2–12.9)
POTASSIUM SERPL-SCNC: 4.5 MMOL/L (ref 3.5–5.1)
PROT SERPL-MCNC: 7 G/DL (ref 6–8.4)
RBC # BLD AUTO: 5.26 M/UL (ref 4.6–6.2)
SODIUM SERPL-SCNC: 139 MMOL/L (ref 136–145)
TACROLIMUS BLD-MCNC: 8.7 NG/ML (ref 5–15)
WBC # BLD AUTO: 6.46 K/UL (ref 3.9–12.7)

## 2019-11-18 PROCEDURE — 80197 ASSAY OF TACROLIMUS: CPT

## 2019-11-18 PROCEDURE — 85025 COMPLETE CBC W/AUTO DIFF WBC: CPT

## 2019-11-18 PROCEDURE — 80053 COMPREHEN METABOLIC PANEL: CPT

## 2019-11-18 PROCEDURE — 36415 COLL VENOUS BLD VENIPUNCTURE: CPT | Mod: PO

## 2019-11-19 ENCOUNTER — TELEPHONE (OUTPATIENT)
Dept: TRANSPLANT | Facility: CLINIC | Age: 59
End: 2019-11-19

## 2019-11-19 ENCOUNTER — PATIENT MESSAGE (OUTPATIENT)
Dept: TRANSPLANT | Facility: CLINIC | Age: 59
End: 2019-11-19

## 2019-11-19 DIAGNOSIS — Z94.4 LIVER REPLACED BY TRANSPLANT: Primary | ICD-10-CM

## 2019-11-19 NOTE — TELEPHONE ENCOUNTER
----- Message from Fab Damon MD sent at 11/19/2019  8:04 AM CST -----  Results reviewed. Please advise labs are stable.

## 2019-11-21 PROCEDURE — 95800 PR SLEEP STUDY, UNATTENDED, RECORD HEART RATE/O2 SAT/RESP ANAL/SLEEP TIME: ICD-10-PCS | Mod: 26,,, | Performed by: PSYCHIATRY & NEUROLOGY

## 2019-11-21 PROCEDURE — 95800 SLP STDY UNATTENDED: CPT | Mod: 26,,, | Performed by: PSYCHIATRY & NEUROLOGY

## 2019-11-26 ENCOUNTER — PATIENT MESSAGE (OUTPATIENT)
Dept: TRANSPLANT | Facility: CLINIC | Age: 59
End: 2019-11-26

## 2019-11-29 ENCOUNTER — ANTI-COAG VISIT (OUTPATIENT)
Dept: CARDIOLOGY | Facility: CLINIC | Age: 59
End: 2019-11-29

## 2019-11-29 DIAGNOSIS — Z79.01 LONG TERM (CURRENT) USE OF ANTICOAGULANTS: ICD-10-CM

## 2019-11-30 ENCOUNTER — PATIENT MESSAGE (OUTPATIENT)
Dept: TRANSPLANT | Facility: CLINIC | Age: 59
End: 2019-11-30

## 2019-12-02 ENCOUNTER — PATIENT MESSAGE (OUTPATIENT)
Dept: TRANSPLANT | Facility: CLINIC | Age: 59
End: 2019-12-02

## 2019-12-02 ENCOUNTER — LAB VISIT (OUTPATIENT)
Dept: LAB | Facility: HOSPITAL | Age: 59
End: 2019-12-02
Attending: INTERNAL MEDICINE
Payer: COMMERCIAL

## 2019-12-02 DIAGNOSIS — Z94.4 LIVER REPLACED BY TRANSPLANT: ICD-10-CM

## 2019-12-02 LAB
ALBUMIN SERPL BCP-MCNC: 4.1 G/DL (ref 3.5–5.2)
ALP SERPL-CCNC: 78 U/L (ref 55–135)
ALT SERPL W/O P-5'-P-CCNC: 44 U/L (ref 10–44)
ANION GAP SERPL CALC-SCNC: 8 MMOL/L (ref 8–16)
AST SERPL-CCNC: 22 U/L (ref 10–40)
BASOPHILS # BLD AUTO: 0.05 K/UL (ref 0–0.2)
BASOPHILS NFR BLD: 0.9 % (ref 0–1.9)
BILIRUB SERPL-MCNC: 0.5 MG/DL (ref 0.1–1)
BUN SERPL-MCNC: 19 MG/DL (ref 6–20)
CALCIUM SERPL-MCNC: 9.1 MG/DL (ref 8.7–10.5)
CHLORIDE SERPL-SCNC: 104 MMOL/L (ref 95–110)
CO2 SERPL-SCNC: 27 MMOL/L (ref 23–29)
CREAT SERPL-MCNC: 1.4 MG/DL (ref 0.5–1.4)
DIFFERENTIAL METHOD: ABNORMAL
EOSINOPHIL # BLD AUTO: 0.2 K/UL (ref 0–0.5)
EOSINOPHIL NFR BLD: 2.7 % (ref 0–8)
ERYTHROCYTE [DISTWIDTH] IN BLOOD BY AUTOMATED COUNT: 14.4 % (ref 11.5–14.5)
EST. GFR  (AFRICAN AMERICAN): >60 ML/MIN/1.73 M^2
EST. GFR  (NON AFRICAN AMERICAN): 54.2 ML/MIN/1.73 M^2
GLUCOSE SERPL-MCNC: 104 MG/DL (ref 70–110)
HCT VFR BLD AUTO: 43.1 % (ref 40–54)
HGB BLD-MCNC: 13.6 G/DL (ref 14–18)
IMM GRANULOCYTES # BLD AUTO: 0.03 K/UL (ref 0–0.04)
IMM GRANULOCYTES NFR BLD AUTO: 0.5 % (ref 0–0.5)
LYMPHOCYTES # BLD AUTO: 0.8 K/UL (ref 1–4.8)
LYMPHOCYTES NFR BLD: 14.6 % (ref 18–48)
MCH RBC QN AUTO: 25.4 PG (ref 27–31)
MCHC RBC AUTO-ENTMCNC: 31.6 G/DL (ref 32–36)
MCV RBC AUTO: 81 FL (ref 82–98)
MONOCYTES # BLD AUTO: 0.6 K/UL (ref 0.3–1)
MONOCYTES NFR BLD: 10.4 % (ref 4–15)
NEUTROPHILS # BLD AUTO: 3.9 K/UL (ref 1.8–7.7)
NEUTROPHILS NFR BLD: 70.9 % (ref 38–73)
NRBC BLD-RTO: 0 /100 WBC
PLATELET # BLD AUTO: 296 K/UL (ref 150–350)
PMV BLD AUTO: 9.9 FL (ref 9.2–12.9)
POTASSIUM SERPL-SCNC: 4.4 MMOL/L (ref 3.5–5.1)
PROT SERPL-MCNC: 7.4 G/DL (ref 6–8.4)
RBC # BLD AUTO: 5.35 M/UL (ref 4.6–6.2)
SODIUM SERPL-SCNC: 139 MMOL/L (ref 136–145)
TACROLIMUS BLD-MCNC: 10.1 NG/ML (ref 5–15)
WBC # BLD AUTO: 5.48 K/UL (ref 3.9–12.7)

## 2019-12-02 PROCEDURE — 36415 COLL VENOUS BLD VENIPUNCTURE: CPT | Mod: PO

## 2019-12-02 PROCEDURE — 85025 COMPLETE CBC W/AUTO DIFF WBC: CPT

## 2019-12-02 PROCEDURE — 80197 ASSAY OF TACROLIMUS: CPT

## 2019-12-02 PROCEDURE — 80053 COMPREHEN METABOLIC PANEL: CPT

## 2019-12-03 ENCOUNTER — PATIENT MESSAGE (OUTPATIENT)
Dept: TRANSPLANT | Facility: CLINIC | Age: 59
End: 2019-12-03

## 2019-12-03 DIAGNOSIS — Z94.4 LIVER REPLACED BY TRANSPLANT: Primary | ICD-10-CM

## 2019-12-03 RX ORDER — TACROLIMUS 1 MG/1
CAPSULE ORAL
Qty: 120 CAPSULE | Refills: 11 | Status: ON HOLD | OUTPATIENT
Start: 2019-12-03 | End: 2020-04-20 | Stop reason: HOSPADM

## 2019-12-03 NOTE — TELEPHONE ENCOUNTER
Patient notified and instructed via [facility]:     reviewed your labs. He would like you to reduce the Prograf dose to 2mg in AM and 1mg in PM; and repeat labs on 1/13/19 at the Main Lab at [facility] (same day as your appointment) thanks

## 2019-12-04 ENCOUNTER — PATIENT MESSAGE (OUTPATIENT)
Dept: CARDIOLOGY | Facility: CLINIC | Age: 59
End: 2019-12-04

## 2019-12-06 ENCOUNTER — TELEPHONE (OUTPATIENT)
Dept: ELECTROPHYSIOLOGY | Facility: CLINIC | Age: 59
End: 2019-12-06

## 2019-12-06 NOTE — TELEPHONE ENCOUNTER
Spoke w/ pts wife regarding upcoming appt. She stated pt has been out of work & will return in 1/2020, so he may not be able to schedule appt in jan. She will call back if they would like to be seen sooner by JF @

## 2019-12-09 ENCOUNTER — PATIENT MESSAGE (OUTPATIENT)
Dept: SLEEP MEDICINE | Facility: CLINIC | Age: 59
End: 2019-12-09

## 2019-12-09 DIAGNOSIS — I49.8 OTHER SPECIFIED CARDIAC ARRHYTHMIAS: Primary | ICD-10-CM

## 2019-12-12 ENCOUNTER — OFFICE VISIT (OUTPATIENT)
Dept: CARDIOLOGY | Facility: CLINIC | Age: 59
End: 2019-12-12
Payer: COMMERCIAL

## 2019-12-12 ENCOUNTER — TELEPHONE (OUTPATIENT)
Dept: SLEEP MEDICINE | Facility: CLINIC | Age: 59
End: 2019-12-12

## 2019-12-12 VITALS
DIASTOLIC BLOOD PRESSURE: 84 MMHG | SYSTOLIC BLOOD PRESSURE: 126 MMHG | HEART RATE: 70 BPM | WEIGHT: 200 LBS | BODY MASS INDEX: 31.39 KG/M2 | HEIGHT: 67 IN

## 2019-12-12 DIAGNOSIS — G47.33 OSA (OBSTRUCTIVE SLEEP APNEA): Chronic | ICD-10-CM

## 2019-12-12 DIAGNOSIS — Z94.4 S/P LIVER TRANSPLANT: ICD-10-CM

## 2019-12-12 DIAGNOSIS — G47.33 OSA (OBSTRUCTIVE SLEEP APNEA): Primary | ICD-10-CM

## 2019-12-12 DIAGNOSIS — I49.8 OTHER SPECIFIED CARDIAC ARRHYTHMIAS: ICD-10-CM

## 2019-12-12 DIAGNOSIS — Z79.01 LONG TERM (CURRENT) USE OF ANTICOAGULANTS: ICD-10-CM

## 2019-12-12 DIAGNOSIS — I10 ESSENTIAL HYPERTENSION: Chronic | ICD-10-CM

## 2019-12-12 DIAGNOSIS — I48.0 PAROXYSMAL ATRIAL FIBRILLATION: Primary | ICD-10-CM

## 2019-12-12 PROCEDURE — 93005 ELECTROCARDIOGRAM TRACING: CPT | Mod: S$GLB,,, | Performed by: INTERNAL MEDICINE

## 2019-12-12 PROCEDURE — 3008F BODY MASS INDEX DOCD: CPT | Mod: CPTII,S$GLB,, | Performed by: INTERNAL MEDICINE

## 2019-12-12 PROCEDURE — 93010 RHYTHM STRIP: ICD-10-PCS | Mod: S$GLB,,, | Performed by: INTERNAL MEDICINE

## 2019-12-12 PROCEDURE — 3074F SYST BP LT 130 MM HG: CPT | Mod: CPTII,S$GLB,, | Performed by: INTERNAL MEDICINE

## 2019-12-12 PROCEDURE — 99999 PR PBB SHADOW E&M-EST. PATIENT-LVL III: ICD-10-PCS | Mod: PBBFAC,,, | Performed by: INTERNAL MEDICINE

## 2019-12-12 PROCEDURE — 99214 OFFICE O/P EST MOD 30 MIN: CPT | Mod: S$GLB,,, | Performed by: INTERNAL MEDICINE

## 2019-12-12 PROCEDURE — 3079F PR MOST RECENT DIASTOLIC BLOOD PRESSURE 80-89 MM HG: ICD-10-PCS | Mod: CPTII,S$GLB,, | Performed by: INTERNAL MEDICINE

## 2019-12-12 PROCEDURE — 3008F PR BODY MASS INDEX (BMI) DOCUMENTED: ICD-10-PCS | Mod: CPTII,S$GLB,, | Performed by: INTERNAL MEDICINE

## 2019-12-12 PROCEDURE — 99214 PR OFFICE/OUTPT VISIT, EST, LEVL IV, 30-39 MIN: ICD-10-PCS | Mod: S$GLB,,, | Performed by: INTERNAL MEDICINE

## 2019-12-12 PROCEDURE — 3079F DIAST BP 80-89 MM HG: CPT | Mod: CPTII,S$GLB,, | Performed by: INTERNAL MEDICINE

## 2019-12-12 PROCEDURE — 93010 ELECTROCARDIOGRAM REPORT: CPT | Mod: S$GLB,,, | Performed by: INTERNAL MEDICINE

## 2019-12-12 PROCEDURE — 3074F PR MOST RECENT SYSTOLIC BLOOD PRESSURE < 130 MM HG: ICD-10-PCS | Mod: CPTII,S$GLB,, | Performed by: INTERNAL MEDICINE

## 2019-12-12 PROCEDURE — 99999 PR PBB SHADOW E&M-EST. PATIENT-LVL III: CPT | Mod: PBBFAC,,, | Performed by: INTERNAL MEDICINE

## 2019-12-12 PROCEDURE — 93005 RHYTHM STRIP: ICD-10-PCS | Mod: S$GLB,,, | Performed by: INTERNAL MEDICINE

## 2019-12-12 RX ORDER — LOSARTAN POTASSIUM 100 MG/1
TABLET ORAL
COMMUNITY
Start: 2019-12-11 | End: 2019-12-12

## 2019-12-12 RX ORDER — NIFEDIPINE 30 MG/1
TABLET, EXTENDED RELEASE ORAL
COMMUNITY
Start: 2019-12-11 | End: 2020-01-20

## 2019-12-12 NOTE — PROGRESS NOTES
Subjective:    Patient ID:  Roman Hodges is a 59 y.o. male who presents for evaluation of Atrial Fibrillation      Atrial Fibrillation   Symptoms are negative for chest pain, dizziness, palpitations, shortness of breath, syncope and weakness. Past medical history includes atrial fibrillation.      59 y.o. M  HTN on meds  DM2 on meds  MALLIKA, on CPAP  Hepatocellular CA, s/p OLT (5/2019)    During several recent visits to ER, found in AF with RVR 11/29. Rx diltiazem, with return to NSR.  Also has c/o severe epigastric pain, diarrhea. Pain increases with exertion but is also present at rest.  echo during that eval: 30% LVEF, with RWMAs... on background of LVEF >55% 10/15/17 (Dr. Patterson's office).    At first visit, pt c/o ongoing CP; sent to ER. Subsequent cath showed nonobstructive CAD. Echo thereafter showed LVEF back to 55%.  Has struggled with HTN urgencies. Medical regimen improved; now .  No palpitations c/w prior AF episode.    He's been feeling well. Recovering nicely from OLT.    My interpretation of today's ECG is NSR 70      Review of Systems   Constitution: Negative. Negative for malaise/fatigue.   HENT: Negative.  Negative for ear pain and tinnitus.    Eyes: Negative for blurred vision.   Cardiovascular: Negative.  Negative for chest pain, dyspnea on exertion, near-syncope, palpitations and syncope.   Respiratory: Negative.  Negative for shortness of breath.    Endocrine: Negative.  Negative for polyuria.   Hematologic/Lymphatic: Does not bruise/bleed easily.   Skin: Negative.  Negative for rash.   Musculoskeletal: Negative.  Negative for joint pain and muscle weakness.   Gastrointestinal: Negative for change in bowel habit.   Genitourinary: Negative for frequency.   Neurological: Negative.  Negative for dizziness and weakness.   Psychiatric/Behavioral: Negative.  Negative for depression. The patient is not nervous/anxious.    Allergic/Immunologic: Negative for environmental allergies.         Objective:    Physical Exam   Constitutional: He is oriented to person, place, and time. He appears well-developed and well-nourished.   HENT:   Head: Normocephalic and atraumatic.   Eyes: Conjunctivae, EOM and lids are normal. No scleral icterus.   Neck: Normal range of motion. No JVD present. No tracheal deviation present. No thyromegaly present.   Cardiovascular: Normal rate, regular rhythm, normal heart sounds and intact distal pulses.  No extrasystoles are present. PMI is not displaced. Exam reveals no gallop and no friction rub.   No murmur heard.  Pulses:       Radial pulses are 2+ on the right side, and 2+ on the left side.   Pulmonary/Chest: Effort normal and breath sounds normal. No accessory muscle usage. No tachypnea. No respiratory distress. He has no wheezes. He has no rales.   Abdominal: Soft. Bowel sounds are normal. He exhibits no distension. There is no hepatosplenomegaly. There is no tenderness.   Musculoskeletal: Normal range of motion. He exhibits no edema.   Neurological: He is alert and oriented to person, place, and time. He has normal reflexes. He exhibits normal muscle tone.   Skin: Skin is warm and dry. No rash noted.   Psychiatric: His behavior is normal.   Nursing note and vitals reviewed.        Assessment:       1. Paroxysmal atrial fibrillation    2. Other specified cardiac arrhythmias    3. Essential hypertension    4. Long term (current) use of anticoagulants    5. S/P liver transplant    6. MALLIKA (obstructive sleep apnea)    Epigastric pain, r/o UA     Plan:       1. AF  One episode, resolved post diltiazem.  Continue BB, eliquis.  Next year, consider placing ILR in hopes of eventually d/c anticoagulation.    Return in 1 year with echo, or earlier prn (e.g., if symptomatic AF recurs).

## 2019-12-12 NOTE — TELEPHONE ENCOUNTER
CB pls schedule cpap follow up 7-8 weeks me or NP    TY    _______________  Dear Mr. Hodges    I apologize for the delay in communicating results to you    Please find attached your sleep study report.    Positive for mild to moderate sleep apnea  with 19 breathing interruptions per hour of sleep.     I will order the machine for you.    You will hear from [facility] dept  called [employer] (DME)  They are located in [facility], also in  [facility].    Respiratory therapist will explain you how to use the machine and find the mask for you.    If you have not heard from them, you may reach them at [phone]-> option 3->option 2    It is very important that I check you back in the clinic with machine to see if we get the feed back and see if the settings need further adjustment.  My assistant will call you to schedule the follow up.    Once you  the mask, if you do not find it comfortable, you will have 30 days to contact DME and try something else.  Please let me know if you have any questions.        Sincerely,    Flakita Angulo MD    _______      PHYSICIAN INTERPRETATION AND COMMENTS: Findings are consistent with mild, obstructive sleep apnea (MALLIKA)  (G47.33), by overall AHI (apnea hypopnea index). However, findings on this study suggest that the degree of sleep disordered  breathing is in the moderate severity range, when RDI is measured. This study was technically adequate to allow for  interpretation.  CLINICAL HISTORY: 59 year old male presented with: 17 inch neck, BMI of 31, an [place] sleepiness score of 3, history  of hypertension, heart disease, diabetes, a previous diagnosis of MALLIKA and symptoms of nocturnal snoring. Based on the clinical  history, the patient has a high pre-test probability of having severe MALLIKA.  SLEEP STUDY FINDINGS: Patient underwent a one night Home Sleep Test and by behavioral criteria, slept for  approximately 5.4  hours, with a sleep latency of 2 minutes and a sleep efficiency of 76.9%. Mild sleep disordered breathing  (AHI=6) is noted based on a 4% hypopnea desaturation criteria. The patient slept supine 75.5% of the night based on valid  recording time of 5.39 hours and is 1.2 times as likely to have apneas/hypopneas when supine. When considering more subtle  measures of sleep disordered breathing, the overall respiratory disturbance index is moderate (RDI=19) based on a 1%  hypopnea desaturation criteria with confirmation by surrogate arousal indicators, predominantly in the supine position (22  events/hour). The apneas/hypopneas are accompanied by minimal oxygen desaturation (percent time below 90% SpO2: 0.3%,  Min SpO2: 87.4%). The average desaturation across all sleep disordered breathing events is 2.3%. Snoring occurs for 19.6%  (30 dB) of the study. The mean pulse rate is 61 BPM, with infrequent pulse rate variability (31 events with >= 6 BPM  increase/decrease per hour).  TREATMENT CONSIDERATIONS: Consider trial of Auto-titrating CPAP 6-20 cm, mask of patient's choice, and heated  humidification. If patient has difficulty with CPAP adherence or ongoing MALLIKA symptoms or despite CPAP adherence, then  consider an in-lab titration sleep study in order to determine optimal fixed CPAP setting. Alternatively consider oral appliance  fitted by a dentist specializing in these devices, or surgical consultation for uvulopalatopharyngoplasty (UPPP) for treatment of  obstructive sleep apnea.  DISEASE MANAGEMENT CONSIDERATIONS: Definitive treatment for MALLIKA is recommended. Consider Sleep Clinic  referral for MALLIKA management.  Study Review: The raw data of this VIRGEN study has been reviewed, with the report confirmed and electronically signed by Flakita Angulo  Read and interpreted by Flakita Angulo MD, Diplomate, Sleep Medicine, ABPN.. Caution:The diagnosis of the Obstructive Sleep Apnea  Syndrome must be based on all available  clinical data, of which this study is only a part. Thus final diagnosis and treatment  recommendations should include information from an examination of the patient by a knowledgeable healthcare provider.  VIRGEN Sleep Study Report  Patient Name Roman Hodges Study Ordered by Flakita Angulo  Date of Night 1 11/07/2019 Date of Birth 1960  Identification Number [id number]  Overall AHI* Overall RDI % time < 90% SpO2 Mean SpO2 % time snoring > 30 dB  6 19 0.3 % 95.7 % 19.6 %  Signature:        Sincerely,    Dr. Angulo        ===View-only below this line===      ----- Message -----     From: Roman Hodges     Sent: 12/9/2019 11:13 PM CST       To: Flakita Angulo MD  Subject: Test Results    Dr. Angulo, good morning  I would like to confirm if you have already received the results of my sleep apnea test done on 11/18/19?  Please advise Roman

## 2019-12-16 ENCOUNTER — LAB VISIT (OUTPATIENT)
Dept: LAB | Facility: HOSPITAL | Age: 59
End: 2019-12-16
Attending: INTERNAL MEDICINE
Payer: COMMERCIAL

## 2019-12-16 DIAGNOSIS — Z94.4 LIVER REPLACED BY TRANSPLANT: ICD-10-CM

## 2019-12-16 LAB
ALBUMIN SERPL BCP-MCNC: 4 G/DL (ref 3.5–5.2)
ALP SERPL-CCNC: 81 U/L (ref 55–135)
ALT SERPL W/O P-5'-P-CCNC: 27 U/L (ref 10–44)
ANION GAP SERPL CALC-SCNC: 8 MMOL/L (ref 8–16)
AST SERPL-CCNC: 11 U/L (ref 10–40)
BASOPHILS # BLD AUTO: 0.04 K/UL (ref 0–0.2)
BASOPHILS NFR BLD: 0.7 % (ref 0–1.9)
BILIRUB SERPL-MCNC: 0.3 MG/DL (ref 0.1–1)
BUN SERPL-MCNC: 24 MG/DL (ref 6–20)
CALCIUM SERPL-MCNC: 9.3 MG/DL (ref 8.7–10.5)
CHLORIDE SERPL-SCNC: 106 MMOL/L (ref 95–110)
CO2 SERPL-SCNC: 26 MMOL/L (ref 23–29)
CREAT SERPL-MCNC: 1.2 MG/DL (ref 0.5–1.4)
DIFFERENTIAL METHOD: ABNORMAL
EOSINOPHIL # BLD AUTO: 0.2 K/UL (ref 0–0.5)
EOSINOPHIL NFR BLD: 2.7 % (ref 0–8)
ERYTHROCYTE [DISTWIDTH] IN BLOOD BY AUTOMATED COUNT: 14.6 % (ref 11.5–14.5)
EST. GFR  (AFRICAN AMERICAN): >60 ML/MIN/1.73 M^2
EST. GFR  (NON AFRICAN AMERICAN): >60 ML/MIN/1.73 M^2
GLUCOSE SERPL-MCNC: 103 MG/DL (ref 70–110)
HCT VFR BLD AUTO: 40.9 % (ref 40–54)
HGB BLD-MCNC: 13.1 G/DL (ref 14–18)
IMM GRANULOCYTES # BLD AUTO: 0.03 K/UL (ref 0–0.04)
IMM GRANULOCYTES NFR BLD AUTO: 0.5 % (ref 0–0.5)
LYMPHOCYTES # BLD AUTO: 0.9 K/UL (ref 1–4.8)
LYMPHOCYTES NFR BLD: 15.2 % (ref 18–48)
MCH RBC QN AUTO: 26.1 PG (ref 27–31)
MCHC RBC AUTO-ENTMCNC: 32 G/DL (ref 32–36)
MCV RBC AUTO: 82 FL (ref 82–98)
MONOCYTES # BLD AUTO: 0.6 K/UL (ref 0.3–1)
MONOCYTES NFR BLD: 10.2 % (ref 4–15)
NEUTROPHILS # BLD AUTO: 4 K/UL (ref 1.8–7.7)
NEUTROPHILS NFR BLD: 70.7 % (ref 38–73)
NRBC BLD-RTO: 0 /100 WBC
PLATELET # BLD AUTO: 321 K/UL (ref 150–350)
PMV BLD AUTO: 10.1 FL (ref 9.2–12.9)
POTASSIUM SERPL-SCNC: 3.7 MMOL/L (ref 3.5–5.1)
PROT SERPL-MCNC: 6.8 G/DL (ref 6–8.4)
RBC # BLD AUTO: 5.02 M/UL (ref 4.6–6.2)
SODIUM SERPL-SCNC: 140 MMOL/L (ref 136–145)
WBC # BLD AUTO: 5.66 K/UL (ref 3.9–12.7)

## 2019-12-16 PROCEDURE — 80053 COMPREHEN METABOLIC PANEL: CPT

## 2019-12-16 PROCEDURE — 85025 COMPLETE CBC W/AUTO DIFF WBC: CPT

## 2019-12-16 PROCEDURE — 36415 COLL VENOUS BLD VENIPUNCTURE: CPT | Mod: PO

## 2019-12-16 PROCEDURE — 80197 ASSAY OF TACROLIMUS: CPT

## 2019-12-17 LAB — TACROLIMUS BLD-MCNC: 6 NG/ML (ref 5–15)

## 2019-12-19 ENCOUNTER — TELEPHONE (OUTPATIENT)
Dept: TRANSPLANT | Facility: CLINIC | Age: 59
End: 2019-12-19

## 2019-12-19 ENCOUNTER — PATIENT MESSAGE (OUTPATIENT)
Dept: TRANSPLANT | Facility: CLINIC | Age: 59
End: 2019-12-19

## 2019-12-19 NOTE — TELEPHONE ENCOUNTER
----- Message from Fab Damon MD sent at 12/18/2019  1:52 PM CST -----  Results reviewed. Please advise labs are stable.

## 2019-12-19 NOTE — TELEPHONE ENCOUNTER
Patient notified and instructed via [facility]: labs stable. No changes made. Repeat labs due 1/13/20 as scheduled.

## 2019-12-30 DIAGNOSIS — Z00.6 RESEARCH STUDY PATIENT: Primary | ICD-10-CM

## 2019-12-30 DIAGNOSIS — C22.0 HCC (HEPATOCELLULAR CARCINOMA): ICD-10-CM

## 2020-01-01 NOTE — TELEPHONE ENCOUNTER
----- Message from Aman Cooper MD sent at 7/15/2019  3:19 PM CDT -----  Decrease prograf to 5/5mg  
Pt spouse notified to reduce prograf to 5 mg bid from 6 mg bid repeat labs on Wednesday.    
The meconium at delivery is of no clinical significance.

## 2020-01-12 DIAGNOSIS — E55.9 VITAMIN D DEFICIENCY DISEASE: ICD-10-CM

## 2020-01-12 RX ORDER — ERGOCALCIFEROL 1.25 MG/1
CAPSULE ORAL
Qty: 12 CAPSULE | Refills: 0 | Status: SHIPPED | OUTPATIENT
Start: 2020-01-12 | End: 2020-04-06

## 2020-01-13 ENCOUNTER — RESEARCH ENCOUNTER (OUTPATIENT)
Dept: RESEARCH | Facility: HOSPITAL | Age: 60
End: 2020-01-13

## 2020-01-13 ENCOUNTER — PATIENT MESSAGE (OUTPATIENT)
Dept: TRANSPLANT | Facility: CLINIC | Age: 60
End: 2020-01-13

## 2020-01-13 ENCOUNTER — TELEPHONE (OUTPATIENT)
Dept: TRANSPLANT | Facility: CLINIC | Age: 60
End: 2020-01-13

## 2020-01-13 ENCOUNTER — LAB VISIT (OUTPATIENT)
Dept: LAB | Facility: HOSPITAL | Age: 60
End: 2020-01-13
Attending: INTERNAL MEDICINE
Payer: COMMERCIAL

## 2020-01-13 ENCOUNTER — OFFICE VISIT (OUTPATIENT)
Dept: TRANSPLANT | Facility: CLINIC | Age: 60
End: 2020-01-13
Attending: INTERNAL MEDICINE
Payer: COMMERCIAL

## 2020-01-13 VITALS
BODY MASS INDEX: 31.32 KG/M2 | DIASTOLIC BLOOD PRESSURE: 80 MMHG | RESPIRATION RATE: 16 BRPM | OXYGEN SATURATION: 98 % | SYSTOLIC BLOOD PRESSURE: 129 MMHG | HEART RATE: 69 BPM | HEIGHT: 67 IN

## 2020-01-13 DIAGNOSIS — Z29.89 PROPHYLACTIC IMMUNOTHERAPY: Primary | ICD-10-CM

## 2020-01-13 DIAGNOSIS — Z94.4 LIVER REPLACED BY TRANSPLANT: ICD-10-CM

## 2020-01-13 DIAGNOSIS — C22.0 HCC (HEPATOCELLULAR CARCINOMA): ICD-10-CM

## 2020-01-13 DIAGNOSIS — Z00.6 RESEARCH STUDY PATIENT: ICD-10-CM

## 2020-01-13 DIAGNOSIS — Z94.4 S/P LIVER TRANSPLANT: ICD-10-CM

## 2020-01-13 LAB
ALBUMIN SERPL BCP-MCNC: 4.2 G/DL (ref 3.5–5.2)
ALP SERPL-CCNC: 75 U/L (ref 55–135)
ALT SERPL W/O P-5'-P-CCNC: 16 U/L (ref 10–44)
ANION GAP SERPL CALC-SCNC: 8 MMOL/L (ref 8–16)
AST SERPL-CCNC: 13 U/L (ref 10–40)
BASOPHILS # BLD AUTO: 0.04 K/UL (ref 0–0.2)
BASOPHILS NFR BLD: 0.4 % (ref 0–1.9)
BILIRUB SERPL-MCNC: 0.8 MG/DL (ref 0.1–1)
BUN SERPL-MCNC: 17 MG/DL (ref 6–20)
CALCIUM SERPL-MCNC: 9.2 MG/DL (ref 8.7–10.5)
CHLORIDE SERPL-SCNC: 105 MMOL/L (ref 95–110)
CO2 SERPL-SCNC: 26 MMOL/L (ref 23–29)
CREAT SERPL-MCNC: 1.3 MG/DL (ref 0.5–1.4)
DIFFERENTIAL METHOD: ABNORMAL
EOSINOPHIL # BLD AUTO: 0.2 K/UL (ref 0–0.5)
EOSINOPHIL NFR BLD: 1.6 % (ref 0–8)
ERYTHROCYTE [DISTWIDTH] IN BLOOD BY AUTOMATED COUNT: 14.8 % (ref 11.5–14.5)
EST. GFR  (AFRICAN AMERICAN): >60 ML/MIN/1.73 M^2
EST. GFR  (NON AFRICAN AMERICAN): 59.3 ML/MIN/1.73 M^2
GLUCOSE SERPL-MCNC: 107 MG/DL (ref 70–110)
HCT VFR BLD AUTO: 42.8 % (ref 40–54)
HGB BLD-MCNC: 13.3 G/DL (ref 14–18)
IMM GRANULOCYTES # BLD AUTO: 0.04 K/UL (ref 0–0.04)
IMM GRANULOCYTES NFR BLD AUTO: 0.4 % (ref 0–0.5)
LYMPHOCYTES # BLD AUTO: 0.7 K/UL (ref 1–4.8)
LYMPHOCYTES NFR BLD: 7.2 % (ref 18–48)
MCH RBC QN AUTO: 25.5 PG (ref 27–31)
MCHC RBC AUTO-ENTMCNC: 31.1 G/DL (ref 32–36)
MCV RBC AUTO: 82 FL (ref 82–98)
MONOCYTES # BLD AUTO: 0.7 K/UL (ref 0.3–1)
MONOCYTES NFR BLD: 7.5 % (ref 4–15)
NEUTROPHILS # BLD AUTO: 8.2 K/UL (ref 1.8–7.7)
NEUTROPHILS NFR BLD: 82.9 % (ref 38–73)
NRBC BLD-RTO: 0 /100 WBC
PLATELET # BLD AUTO: 317 K/UL (ref 150–350)
PMV BLD AUTO: 9.7 FL (ref 9.2–12.9)
POTASSIUM SERPL-SCNC: 4.5 MMOL/L (ref 3.5–5.1)
PROT SERPL-MCNC: 7.4 G/DL (ref 6–8.4)
RBC # BLD AUTO: 5.21 M/UL (ref 4.6–6.2)
RESEARCH LAB: NORMAL
SODIUM SERPL-SCNC: 139 MMOL/L (ref 136–145)
TACROLIMUS BLD-MCNC: 29.7 NG/ML (ref 5–15)
WBC # BLD AUTO: 9.84 K/UL (ref 3.9–12.7)

## 2020-01-13 PROCEDURE — 3008F BODY MASS INDEX DOCD: CPT | Mod: CPTII,S$GLB,, | Performed by: INTERNAL MEDICINE

## 2020-01-13 PROCEDURE — 3079F PR MOST RECENT DIASTOLIC BLOOD PRESSURE 80-89 MM HG: ICD-10-PCS | Mod: CPTII,S$GLB,, | Performed by: INTERNAL MEDICINE

## 2020-01-13 PROCEDURE — 80053 COMPREHEN METABOLIC PANEL: CPT

## 2020-01-13 PROCEDURE — 99213 OFFICE O/P EST LOW 20 MIN: CPT | Mod: S$GLB,,, | Performed by: INTERNAL MEDICINE

## 2020-01-13 PROCEDURE — 99999 PR PBB SHADOW E&M-EST. PATIENT-LVL III: CPT | Mod: PBBFAC,,, | Performed by: INTERNAL MEDICINE

## 2020-01-13 PROCEDURE — 85025 COMPLETE CBC W/AUTO DIFF WBC: CPT

## 2020-01-13 PROCEDURE — 3079F DIAST BP 80-89 MM HG: CPT | Mod: CPTII,S$GLB,, | Performed by: INTERNAL MEDICINE

## 2020-01-13 PROCEDURE — 36415 COLL VENOUS BLD VENIPUNCTURE: CPT

## 2020-01-13 PROCEDURE — 80197 ASSAY OF TACROLIMUS: CPT

## 2020-01-13 PROCEDURE — 3074F PR MOST RECENT SYSTOLIC BLOOD PRESSURE < 130 MM HG: ICD-10-PCS | Mod: CPTII,S$GLB,, | Performed by: INTERNAL MEDICINE

## 2020-01-13 PROCEDURE — 99999 PR PBB SHADOW E&M-EST. PATIENT-LVL III: ICD-10-PCS | Mod: PBBFAC,,, | Performed by: INTERNAL MEDICINE

## 2020-01-13 PROCEDURE — 3008F PR BODY MASS INDEX (BMI) DOCUMENTED: ICD-10-PCS | Mod: CPTII,S$GLB,, | Performed by: INTERNAL MEDICINE

## 2020-01-13 PROCEDURE — 3074F SYST BP LT 130 MM HG: CPT | Mod: CPTII,S$GLB,, | Performed by: INTERNAL MEDICINE

## 2020-01-13 PROCEDURE — 99213 PR OFFICE/OUTPT VISIT, EST, LEVL III, 20-29 MIN: ICD-10-PCS | Mod: S$GLB,,, | Performed by: INTERNAL MEDICINE

## 2020-01-13 NOTE — PROGRESS NOTES
RESEARCH STUDY FOLLOW-UP SPECIMEN COLLECTION ENCOUNTER  ORGAN TRANSPLANT  [facility] [address]    Study Title: Role of Tumor-Induced Immune Tolerance in the Patient Response to Locoregional Therapy: Implications in Assessment Risk of Hepatocellular Carcinoma Recurrence Following Liver Transplantation    IRB #: [id number]    IRB Approval Date: 6/8/2016    : Darshan Graves MD  Sub-investigator: Luke Pulido, PhD    Patient Number: [id number]     In accordance with the study protocol, Research Lab orders were placed and follow-up specimens were collected on (date: 1/13/2020) in:  [facility] VNP: YES/NO: yes  [facility] LABTX: YES/NO: no  [facility] LAB IM: YES/NO: no    Katie Ramos  Admin Research- Liver Transplant

## 2020-01-13 NOTE — TELEPHONE ENCOUNTER
Update received from patient via [facility]:    Yes Joann   I totally forgot and automatically took my morning medicines . I informed Dr Damon this morning during the appt          You  Roman Hodges 1 hour ago (12:20 PM)         Did you happen to take your Prograf dose before your labs were drawn this morning? Your level is high at 26????

## 2020-01-13 NOTE — PROGRESS NOTES
RESEARCH STUDY ICF VERBAL UPDATE ENCOUNTER  ORGAN TRANSPLANT  [facility] [address]    Study Title: Role of Tumor-Induced Immune Tolerance in the Patient Response to Locoregional Therapy: Implications in Assessment Risk of Hepatocellular Carcinoma Recurrence Following Liver Transplantation    IRB #: [id number]    IRB Approval Date: 6/8/2016    : Darshan Graves MD  Sub-investigator: Luke Pulido, PhD    Patient Number: [id number]    Patient was previously consented for this study on (date: 11/9/2018). On 8/22/2019, the ICF underwent a minor change which saw an increase in total study enrollment.     Patient was informed of this change on (date: 1/13/2020) during:  A regularly scheduled appointment at [facility]: YES/NO: yes   A new LRT IR procedure at [facility]:  YES/NO: no     Adequate time was given to answer any questions the patient had regarding this change.    Although the patient's preferred language is listed as [place], during his original consent encounter on 11/9/2018, patient confirmed that he was comfortable speaking in English. This was re-confirmed today so the encounter was carried out in English.    This study is an observational study to evaluate patients receiving transarterial chemoembolization (TACE) or transarterial radioembolization (TARE) therapy to define the link between tumor-elicited peripheral cell populations, and the risk of hepatocellular carcinoma (HCC) recurrence before orthotopic liver transplantation (OLT). [IRB 2016.131.B]      Present for discussion: Patient's wife was present for discussion.  Is LAR Consenting for Subject: YES/NO: no    Prior to any protocol required testing, procedure, or intervention being performed, the following was also done or discussed with Roman Hodges:    Purpose of the Study, Qualifications to Participate: YES/NO: yes  Study Design, Schedule and Procedures: YES/NO: yes  Risks, Benefits, Alternative Treatments, Compensation and Costs: YES/NO:  yes  Confidentiality and HIPAA Authorization for Release of Medical Records for the research trial/subject's right/study related injury: YES/NO: yes  Study related contact information: YES/NO: yes  Voluntary Participation and Withdrawal from the research trial at any time: YES/NO: yes  Patient has been offered the opportunity to ask questions regarding the study and all questions were answered satisfactorily: YES/NO: yes  Patient verbalizes understanding of the study/procedures and agrees to participate: YES/NO: yes  CRC and PI contact information given to patient: YES/NO: yes    Research staff present during discussion: Eran Serrano    No study procedures (I.e. specimen collection) were performed before the discussion was complete.   Specimens were collected in [facility] DOS at the time of peripheral IV line placement: YES/NO: no  Specimens were collected in [facility] RAD IR: YES/NO: no  Specimens were collected in [facility] LAB VNP: YES/NO: yes  Specimens were collected in [facility] LABTX: YES/NO: no  Specimens were collected in [facility] LAB IM: YES/NO: no    Eran Serrano  Admin Research- Liver Transplant

## 2020-01-13 NOTE — PROGRESS NOTES
Transplant Hepatology  Liver Transplant Recipient Follow-up    Transplant Date: 7/4/2019  UNOS Native Liver Dx: Primary Liver Malignancy: Hepatoma (HCC) and Cirrhosis    Roman is here for follow up of his liver transplant.    ORGAN: LIVER  Whole or Partial: whole liver  Donor Type: donation after brain death  CDC High Risk: no  Donor CMV Status: Negative  Donor HCV Status: Negative  Donor HBcAb: Negative  Donor HBV MARIA TERESA: Negative  Donor HCV MARIA TERESA: Negative  Biliary Anastomosis: end to end  Arterial Anatomy: replaced right hepatic from sma  IVC reconstruction: end to end ivc  Portal vein status: patent    He has had the following complications since transplant: wound infection. The noted complications is well controlled.    Subjective:     Interval History: Roman was last seen on 9/26/19 by myself. Currently, he is doing well. Current complaints include none. Excellent allograft function. No symptoms of hepatic decompensation. Has returned to work full time.    Review of Systems   Constitutional: Negative for activity change, appetite change, chills, fatigue and unexpected weight change.   HENT: Negative for congestion, facial swelling and tinnitus.    Eyes: Negative for visual disturbance.   Respiratory: Negative for cough, shortness of breath and wheezing.    Cardiovascular: Negative for chest pain and palpitations.   Gastrointestinal: Negative for abdominal distention.   Genitourinary: Negative for dysuria.   Musculoskeletal: Negative for arthralgias, joint swelling and myalgias.   Neurological: Negative for syncope and headaches.   Hematological: Does not bruise/bleed easily.   Psychiatric/Behavioral: Negative for confusion.       Objective:     Physical Exam   Constitutional: He is oriented to person, place, and time. He appears well-developed and well-nourished.   Eyes: No scleral icterus.   Cardiovascular: Normal rate, regular rhythm and normal heart sounds.   Pulmonary/Chest: Effort normal and breath sounds  normal. No respiratory distress. He has no wheezes.   Abdominal: Soft. Bowel sounds are normal. He exhibits no distension and no mass. There is no tenderness. There is no rebound.       Musculoskeletal: Normal range of motion.   Lymphadenopathy:     He has no cervical adenopathy.   Neurological: He is alert and oriented to person, place, and time.   Skin: Skin is warm and dry.     Lab Results   Component Value Date    BILITOT 0.8 01/13/2020    AST 13 01/13/2020    ALT 16 01/13/2020    ALKPHOS 75 01/13/2020    CREATININE 1.3 01/13/2020    ALBUMIN 4.2 01/13/2020     Lab Results   Component Value Date    WBC 9.84 01/13/2020    HGB 13.3 (L) 01/13/2020    HCT 42.8 01/13/2020    HCT 41 07/04/2019     01/13/2020     Lab Results   Component Value Date    TACROLIMUS 6.0 12/16/2019       Assessment/Plan:     1. Prophylactic immunotherapy    2. S/P liver transplant      No change to current immunosuppression.  Reduce labs to q monthly.  RTC in 6 months.    Fab Damon MD           [facility] Patient Status  Functional Status: 100%  Physical Capacity: No Limitations

## 2020-01-13 NOTE — LETTER
January 13, 2020        Jose Angel Munson  [address]  [place] [address]  Phone: [phone]  Fax: [phone]             [facility] - Liver Transplant  [address]  [facility] [address]  Phone: [phone]   Patient: Roman Hodges   MR Number: [id number]   YOB: 1960   Date of Visit: 1/13/2020       Dear Dr. Jose Angel Munson    Thank you for referring Roman Hodges to me for evaluation. Attached you will find relevant portions of my assessment and plan of care.    If you have questions, please do not hesitate to call me. I look forward to following Roman Hodges along with you.    Sincerely,    Fab Damon MD    Enclosure    If you would like to receive this communication electronically, please contact [email or web address] or [phone] to request [employer] access.    [facility] is a tool which provides read-only access to select patient information with whom you have a relationship. Its easy to use and provides real time access to review your patients record including encounter summaries, notes, results, and demographic information.    If you feel you have received this communication in error or would no longer like to receive these types of communications, please e-mail [email or web address]

## 2020-01-15 ENCOUNTER — PATIENT MESSAGE (OUTPATIENT)
Dept: TRANSPLANT | Facility: CLINIC | Age: 60
End: 2020-01-15

## 2020-01-20 ENCOUNTER — OFFICE VISIT (OUTPATIENT)
Dept: FAMILY MEDICINE | Facility: CLINIC | Age: 60
End: 2020-01-20
Payer: COMMERCIAL

## 2020-01-20 ENCOUNTER — PATIENT MESSAGE (OUTPATIENT)
Dept: TRANSPLANT | Facility: CLINIC | Age: 60
End: 2020-01-20

## 2020-01-20 VITALS
HEART RATE: 70 BPM | OXYGEN SATURATION: 98 % | TEMPERATURE: 98 F | DIASTOLIC BLOOD PRESSURE: 74 MMHG | WEIGHT: 205.5 LBS | SYSTOLIC BLOOD PRESSURE: 110 MMHG | HEIGHT: 67 IN | BODY MASS INDEX: 32.25 KG/M2

## 2020-01-20 DIAGNOSIS — Z94.4 LIVER REPLACED BY TRANSPLANT: Primary | ICD-10-CM

## 2020-01-20 DIAGNOSIS — J01.90 ACUTE BACTERIAL SINUSITIS: Primary | ICD-10-CM

## 2020-01-20 DIAGNOSIS — B96.89 ACUTE BACTERIAL SINUSITIS: Primary | ICD-10-CM

## 2020-01-20 PROCEDURE — 3074F PR MOST RECENT SYSTOLIC BLOOD PRESSURE < 130 MM HG: ICD-10-PCS | Mod: CPTII,S$GLB,, | Performed by: FAMILY MEDICINE

## 2020-01-20 PROCEDURE — 99999 PR PBB SHADOW E&M-EST. PATIENT-LVL III: ICD-10-PCS | Mod: PBBFAC,,, | Performed by: FAMILY MEDICINE

## 2020-01-20 PROCEDURE — 3078F DIAST BP <80 MM HG: CPT | Mod: CPTII,S$GLB,, | Performed by: FAMILY MEDICINE

## 2020-01-20 PROCEDURE — 99214 PR OFFICE/OUTPT VISIT, EST, LEVL IV, 30-39 MIN: ICD-10-PCS | Mod: S$GLB,,, | Performed by: FAMILY MEDICINE

## 2020-01-20 PROCEDURE — 3078F PR MOST RECENT DIASTOLIC BLOOD PRESSURE < 80 MM HG: ICD-10-PCS | Mod: CPTII,S$GLB,, | Performed by: FAMILY MEDICINE

## 2020-01-20 PROCEDURE — 99214 OFFICE O/P EST MOD 30 MIN: CPT | Mod: S$GLB,,, | Performed by: FAMILY MEDICINE

## 2020-01-20 PROCEDURE — 3074F SYST BP LT 130 MM HG: CPT | Mod: CPTII,S$GLB,, | Performed by: FAMILY MEDICINE

## 2020-01-20 PROCEDURE — 99999 PR PBB SHADOW E&M-EST. PATIENT-LVL III: CPT | Mod: PBBFAC,,, | Performed by: FAMILY MEDICINE

## 2020-01-20 PROCEDURE — 3008F BODY MASS INDEX DOCD: CPT | Mod: CPTII,S$GLB,, | Performed by: FAMILY MEDICINE

## 2020-01-20 PROCEDURE — 3008F PR BODY MASS INDEX (BMI) DOCUMENTED: ICD-10-PCS | Mod: CPTII,S$GLB,, | Performed by: FAMILY MEDICINE

## 2020-01-20 RX ORDER — GUAIFENESIN 600 MG/1
600 TABLET, EXTENDED RELEASE ORAL 2 TIMES DAILY
Qty: 20 TABLET | Refills: 0 | Status: SHIPPED | OUTPATIENT
Start: 2020-01-20 | End: 2020-01-30

## 2020-01-20 RX ORDER — CETIRIZINE HYDROCHLORIDE 10 MG/1
10 TABLET ORAL DAILY
Qty: 10 TABLET | Refills: 0 | Status: ON HOLD | OUTPATIENT
Start: 2020-01-20 | End: 2020-04-16

## 2020-01-20 RX ORDER — AZITHROMYCIN 500 MG/1
500 TABLET, FILM COATED ORAL DAILY
Qty: 3 TABLET | Refills: 0 | Status: SHIPPED | OUTPATIENT
Start: 2020-01-20 | End: 2020-01-23

## 2020-01-20 NOTE — PROGRESS NOTES
Subjective:       Patient ID: Roman Hodges is a 59 y.o. male.    Chief Complaint: Cough (x 6 days) and Nasal Congestion (x 6 days)    59 years old male came to the clinic with sinus congestion for the week.  Patient with sinus pressure and postnasal drip.  No fever or chills.    Cough   This is a new problem. The current episode started in the past 7 days. The problem has been waxing and waning. The problem occurs every few minutes. The cough is productive of sputum. Associated symptoms include nasal congestion, rhinorrhea and a sore throat. Pertinent negatives include no chest pain, chills, ear congestion, ear pain, fever, headaches, heartburn, hemoptysis, myalgias, postnasal drip, rash, shortness of breath, sweats, weight loss or wheezing. The symptoms are aggravated by cold air. He has tried OTC cough suppressant for the symptoms. The treatment provided mild relief. There is no history of asthma, bronchiectasis, bronchitis, COPD, emphysema, environmental allergies or pneumonia.     Review of Systems   Constitutional: Negative.  Negative for chills, fever and weight loss.   HENT: Positive for rhinorrhea and sore throat. Negative for ear pain and postnasal drip.    Eyes: Negative.    Respiratory: Positive for cough. Negative for hemoptysis, shortness of breath and wheezing.    Cardiovascular: Negative.  Negative for chest pain.   Gastrointestinal: Negative.  Negative for heartburn.   Genitourinary: Negative.    Musculoskeletal: Negative.  Negative for myalgias.   Skin: Negative.  Negative for rash.   Allergic/Immunologic: Negative for environmental allergies.   Neurological: Negative.  Negative for headaches.   Psychiatric/Behavioral: Negative.        Objective:      Physical Exam   Constitutional: He is oriented to person, place, and time. He appears well-developed and well-nourished. No distress.   HENT:   Head: Normocephalic and atraumatic.   Right Ear: External ear normal.   Left Ear: External ear  normal.   Nose: Right sinus exhibits maxillary sinus tenderness. Right sinus exhibits no frontal sinus tenderness. Left sinus exhibits maxillary sinus tenderness. Left sinus exhibits no frontal sinus tenderness.   Mouth/Throat: Oropharynx is clear and moist. No oropharyngeal exudate.   Eyes: Pupils are equal, round, and reactive to light. Conjunctivae and EOM are normal. Right eye exhibits no discharge. Left eye exhibits no discharge. No scleral icterus.   Neck: Normal range of motion. Neck supple. No JVD present. No tracheal deviation present. No thyromegaly present.   Cardiovascular: Normal rate, regular rhythm, normal heart sounds and intact distal pulses. Exam reveals no gallop and no friction rub.   No murmur heard.  Pulmonary/Chest: Effort normal and breath sounds normal. No stridor. No respiratory distress. He has no wheezes. He has no rales. He exhibits no tenderness.   Abdominal: Soft. Bowel sounds are normal. He exhibits no distension and no mass. There is no tenderness. There is no rebound and no guarding.   Musculoskeletal: Normal range of motion. He exhibits no edema or tenderness.   Lymphadenopathy:     He has no cervical adenopathy.   Neurological: He is alert and oriented to person, place, and time. He has normal reflexes. He displays normal reflexes. No cranial nerve deficit. He exhibits normal muscle tone. Coordination normal.   Skin: Skin is warm and dry. No rash noted. He is not diaphoretic. No erythema. No pallor.   Psychiatric: He has a normal mood and affect. His behavior is normal. Judgment and thought content normal.   Nursing note and vitals reviewed.      Assessment:       1. Acute bacterial sinusitis        Plan:         Roman was seen today for cough and nasal congestion.    Diagnoses and all orders for this visit:    Acute bacterial sinusitis  -     azithromycin (ZITHROMAX) 500 MG tablet; Take 1 tablet (500 mg total) by mouth once daily. for 3 days  -     cetirizine (ZYRTEC) 10 MG  tablet; Take 1 tablet (10 mg total) by mouth once daily. for 10 days  -     guaiFENesin (MUCINEX) 600 mg 12 hr tablet; Take 1 tablet (600 mg total) by mouth 2 (two) times daily. for 10 days

## 2020-01-22 ENCOUNTER — PATIENT MESSAGE (OUTPATIENT)
Dept: TRANSPLANT | Facility: CLINIC | Age: 60
End: 2020-01-22

## 2020-01-24 ENCOUNTER — PATIENT OUTREACH (OUTPATIENT)
Dept: ADMINISTRATIVE | Facility: OTHER | Age: 60
End: 2020-01-24

## 2020-01-24 ENCOUNTER — LAB VISIT (OUTPATIENT)
Dept: LAB | Facility: HOSPITAL | Age: 60
End: 2020-01-24
Attending: INTERNAL MEDICINE
Payer: COMMERCIAL

## 2020-01-24 DIAGNOSIS — Z94.4 LIVER REPLACED BY TRANSPLANT: ICD-10-CM

## 2020-01-24 LAB
ALBUMIN SERPL BCP-MCNC: 4.3 G/DL (ref 3.5–5.2)
ALP SERPL-CCNC: 69 U/L (ref 55–135)
ALT SERPL W/O P-5'-P-CCNC: 24 U/L (ref 10–44)
ANION GAP SERPL CALC-SCNC: 8 MMOL/L (ref 8–16)
AST SERPL-CCNC: 17 U/L (ref 10–40)
BASOPHILS # BLD AUTO: 0.05 K/UL (ref 0–0.2)
BASOPHILS NFR BLD: 0.7 % (ref 0–1.9)
BILIRUB SERPL-MCNC: 0.3 MG/DL (ref 0.1–1)
BUN SERPL-MCNC: 20 MG/DL (ref 6–20)
CALCIUM SERPL-MCNC: 9 MG/DL (ref 8.7–10.5)
CHLORIDE SERPL-SCNC: 108 MMOL/L (ref 95–110)
CO2 SERPL-SCNC: 25 MMOL/L (ref 23–29)
CREAT SERPL-MCNC: 1.2 MG/DL (ref 0.5–1.4)
DIFFERENTIAL METHOD: ABNORMAL
EOSINOPHIL # BLD AUTO: 0.2 K/UL (ref 0–0.5)
EOSINOPHIL NFR BLD: 2.2 % (ref 0–8)
ERYTHROCYTE [DISTWIDTH] IN BLOOD BY AUTOMATED COUNT: 15 % (ref 11.5–14.5)
EST. GFR  (AFRICAN AMERICAN): >60 ML/MIN/1.73 M^2
EST. GFR  (NON AFRICAN AMERICAN): >60 ML/MIN/1.73 M^2
GLUCOSE SERPL-MCNC: 117 MG/DL (ref 70–110)
HCT VFR BLD AUTO: 42.2 % (ref 40–54)
HGB BLD-MCNC: 13 G/DL (ref 14–18)
IMM GRANULOCYTES # BLD AUTO: 0.03 K/UL (ref 0–0.04)
IMM GRANULOCYTES NFR BLD AUTO: 0.4 % (ref 0–0.5)
LYMPHOCYTES # BLD AUTO: 1 K/UL (ref 1–4.8)
LYMPHOCYTES NFR BLD: 14.7 % (ref 18–48)
MCH RBC QN AUTO: 25.2 PG (ref 27–31)
MCHC RBC AUTO-ENTMCNC: 30.8 G/DL (ref 32–36)
MCV RBC AUTO: 82 FL (ref 82–98)
MONOCYTES # BLD AUTO: 0.6 K/UL (ref 0.3–1)
MONOCYTES NFR BLD: 9.3 % (ref 4–15)
NEUTROPHILS # BLD AUTO: 5 K/UL (ref 1.8–7.7)
NEUTROPHILS NFR BLD: 72.7 % (ref 38–73)
NRBC BLD-RTO: 0 /100 WBC
PLATELET # BLD AUTO: 312 K/UL (ref 150–350)
PMV BLD AUTO: 9.7 FL (ref 9.2–12.9)
POTASSIUM SERPL-SCNC: 4 MMOL/L (ref 3.5–5.1)
PROT SERPL-MCNC: 7.1 G/DL (ref 6–8.4)
RBC # BLD AUTO: 5.15 M/UL (ref 4.6–6.2)
SODIUM SERPL-SCNC: 141 MMOL/L (ref 136–145)
WBC # BLD AUTO: 6.89 K/UL (ref 3.9–12.7)

## 2020-01-24 PROCEDURE — 80053 COMPREHEN METABOLIC PANEL: CPT

## 2020-01-24 PROCEDURE — 80197 ASSAY OF TACROLIMUS: CPT

## 2020-01-24 PROCEDURE — 85025 COMPLETE CBC W/AUTO DIFF WBC: CPT

## 2020-01-24 PROCEDURE — 36415 COLL VENOUS BLD VENIPUNCTURE: CPT | Mod: PO

## 2020-01-25 LAB — TACROLIMUS BLD-MCNC: 7.7 NG/ML (ref 5–15)

## 2020-01-27 ENCOUNTER — PATIENT MESSAGE (OUTPATIENT)
Dept: TRANSPLANT | Facility: CLINIC | Age: 60
End: 2020-01-27

## 2020-01-27 ENCOUNTER — TELEPHONE (OUTPATIENT)
Dept: TRANSPLANT | Facility: CLINIC | Age: 60
End: 2020-01-27

## 2020-01-27 DIAGNOSIS — Z94.4 LIVER REPLACED BY TRANSPLANT: Primary | ICD-10-CM

## 2020-01-27 NOTE — TELEPHONE ENCOUNTER
----- Message from Fab Damon MD sent at 1/26/2020  8:42 AM CST -----  Results reviewed. Please advise labs are stable.

## 2020-01-27 NOTE — TELEPHONE ENCOUNTER
Patient notified and instructed via [facility]: labs reviewed by ; results stable. No medicine changes made. Repeat labs due 2/24/20.

## 2020-02-03 ENCOUNTER — PATIENT MESSAGE (OUTPATIENT)
Dept: TRANSPLANT | Facility: CLINIC | Age: 60
End: 2020-02-03

## 2020-02-03 RX ORDER — MYCOPHENOLATE MOFETIL 250 MG/1
CAPSULE ORAL
Qty: 240 CAPSULE | Refills: 1 | Status: SHIPPED | OUTPATIENT
Start: 2020-02-03 | End: 2020-02-05

## 2020-02-03 NOTE — TELEPHONE ENCOUNTER
----- Message from Kelsea Banerjee sent at 2/3/2020  2:44 PM CST -----  Contact: Kati Hodges   [facility] calling about approval for  medication mycophenolate (CELLCEPT) 250 mg Cap       Call Back : [phone]

## 2020-02-05 RX ORDER — MYCOPHENOLATE MOFETIL 250 MG/1
CAPSULE ORAL
Qty: 240 CAPSULE | Refills: 1 | Status: SHIPPED | OUTPATIENT
Start: 2020-02-05 | End: 2020-03-02

## 2020-02-10 ENCOUNTER — TELEPHONE (OUTPATIENT)
Dept: TRANSPLANT | Facility: CLINIC | Age: 60
End: 2020-02-10

## 2020-02-10 NOTE — TELEPHONE ENCOUNTER
----- Message from Nely Kruse sent at 2/10/2020  9:34 AM CST -----  Contact: Mrs Hodges  Pt wife is calling stating that the Dentist office have not received the release form from the Dr office and if possible can we resend it       Fax number [phone] [place] Naty   Pt contact [phone]

## 2020-02-11 ENCOUNTER — PATIENT OUTREACH (OUTPATIENT)
Dept: ADMINISTRATIVE | Facility: OTHER | Age: 60
End: 2020-02-11

## 2020-02-13 ENCOUNTER — OFFICE VISIT (OUTPATIENT)
Dept: SLEEP MEDICINE | Facility: CLINIC | Age: 60
End: 2020-02-13
Payer: COMMERCIAL

## 2020-02-13 VITALS
HEART RATE: 68 BPM | HEIGHT: 67 IN | SYSTOLIC BLOOD PRESSURE: 135 MMHG | WEIGHT: 208.88 LBS | BODY MASS INDEX: 32.79 KG/M2 | DIASTOLIC BLOOD PRESSURE: 82 MMHG

## 2020-02-13 DIAGNOSIS — G47.33 OBSTRUCTIVE SLEEP APNEA: Primary | ICD-10-CM

## 2020-02-13 PROCEDURE — 99499 NO LOS: ICD-10-PCS | Mod: S$GLB,,, | Performed by: NURSE PRACTITIONER

## 2020-02-13 PROCEDURE — 99999 PR PBB SHADOW E&M-EST. PATIENT-LVL III: CPT | Mod: PBBFAC,,, | Performed by: NURSE PRACTITIONER

## 2020-02-13 PROCEDURE — 99999 PR PBB SHADOW E&M-EST. PATIENT-LVL III: ICD-10-PCS | Mod: PBBFAC,,, | Performed by: NURSE PRACTITIONER

## 2020-02-13 PROCEDURE — 99499 UNLISTED E&M SERVICE: CPT | Mod: S$GLB,,, | Performed by: NURSE PRACTITIONER

## 2020-02-24 ENCOUNTER — LAB VISIT (OUTPATIENT)
Dept: LAB | Facility: HOSPITAL | Age: 60
End: 2020-02-24
Attending: INTERNAL MEDICINE
Payer: COMMERCIAL

## 2020-02-24 DIAGNOSIS — Z94.4 LIVER REPLACED BY TRANSPLANT: ICD-10-CM

## 2020-02-24 LAB
ALBUMIN SERPL BCP-MCNC: 4.1 G/DL (ref 3.5–5.2)
ALP SERPL-CCNC: 105 U/L (ref 55–135)
ALT SERPL W/O P-5'-P-CCNC: 65 U/L (ref 10–44)
ANION GAP SERPL CALC-SCNC: 9 MMOL/L (ref 8–16)
AST SERPL-CCNC: 20 U/L (ref 10–40)
BASOPHILS # BLD AUTO: 0.04 K/UL (ref 0–0.2)
BASOPHILS NFR BLD: 0.6 % (ref 0–1.9)
BILIRUB SERPL-MCNC: 0.3 MG/DL (ref 0.1–1)
BUN SERPL-MCNC: 19 MG/DL (ref 8–23)
CALCIUM SERPL-MCNC: 8.7 MG/DL (ref 8.7–10.5)
CHLORIDE SERPL-SCNC: 106 MMOL/L (ref 95–110)
CO2 SERPL-SCNC: 25 MMOL/L (ref 23–29)
CREAT SERPL-MCNC: 1.2 MG/DL (ref 0.5–1.4)
DIFFERENTIAL METHOD: ABNORMAL
EOSINOPHIL # BLD AUTO: 0.1 K/UL (ref 0–0.5)
EOSINOPHIL NFR BLD: 1.9 % (ref 0–8)
ERYTHROCYTE [DISTWIDTH] IN BLOOD BY AUTOMATED COUNT: 14.6 % (ref 11.5–14.5)
EST. GFR  (AFRICAN AMERICAN): >60 ML/MIN/1.73 M^2
EST. GFR  (NON AFRICAN AMERICAN): >60 ML/MIN/1.73 M^2
GLUCOSE SERPL-MCNC: 107 MG/DL (ref 70–110)
HCT VFR BLD AUTO: 43.6 % (ref 40–54)
HGB BLD-MCNC: 13.5 G/DL (ref 14–18)
IMM GRANULOCYTES # BLD AUTO: 0.02 K/UL (ref 0–0.04)
IMM GRANULOCYTES NFR BLD AUTO: 0.3 % (ref 0–0.5)
LYMPHOCYTES # BLD AUTO: 0.9 K/UL (ref 1–4.8)
LYMPHOCYTES NFR BLD: 13.6 % (ref 18–48)
MCH RBC QN AUTO: 25.7 PG (ref 27–31)
MCHC RBC AUTO-ENTMCNC: 31 G/DL (ref 32–36)
MCV RBC AUTO: 83 FL (ref 82–98)
MONOCYTES # BLD AUTO: 0.5 K/UL (ref 0.3–1)
MONOCYTES NFR BLD: 8.2 % (ref 4–15)
NEUTROPHILS # BLD AUTO: 4.9 K/UL (ref 1.8–7.7)
NEUTROPHILS NFR BLD: 75.4 % (ref 38–73)
NRBC BLD-RTO: 0 /100 WBC
PLATELET # BLD AUTO: 298 K/UL (ref 150–350)
PMV BLD AUTO: 10.1 FL (ref 9.2–12.9)
POTASSIUM SERPL-SCNC: 3.8 MMOL/L (ref 3.5–5.1)
PROT SERPL-MCNC: 7.2 G/DL (ref 6–8.4)
RBC # BLD AUTO: 5.25 M/UL (ref 4.6–6.2)
SODIUM SERPL-SCNC: 140 MMOL/L (ref 136–145)
TACROLIMUS BLD-MCNC: 7.8 NG/ML (ref 5–15)
WBC # BLD AUTO: 6.48 K/UL (ref 3.9–12.7)

## 2020-02-24 PROCEDURE — 80053 COMPREHEN METABOLIC PANEL: CPT

## 2020-02-24 PROCEDURE — 80197 ASSAY OF TACROLIMUS: CPT

## 2020-02-24 PROCEDURE — 85025 COMPLETE CBC W/AUTO DIFF WBC: CPT

## 2020-02-24 PROCEDURE — 36415 COLL VENOUS BLD VENIPUNCTURE: CPT | Mod: PO

## 2020-02-26 ENCOUNTER — TELEPHONE (OUTPATIENT)
Dept: TRANSPLANT | Facility: CLINIC | Age: 60
End: 2020-02-26

## 2020-02-26 ENCOUNTER — PATIENT MESSAGE (OUTPATIENT)
Dept: TRANSPLANT | Facility: CLINIC | Age: 60
End: 2020-02-26

## 2020-02-26 DIAGNOSIS — Z94.4 LIVER REPLACED BY TRANSPLANT: Primary | ICD-10-CM

## 2020-02-26 NOTE — TELEPHONE ENCOUNTER
Patient notified and instructed via [facility]: labs reviewed by , no medicine changes were made. Repeat labs in 2 weeks(due 3/9/20).

## 2020-02-27 ENCOUNTER — PATIENT MESSAGE (OUTPATIENT)
Dept: TRANSPLANT | Facility: CLINIC | Age: 60
End: 2020-02-27

## 2020-03-02 RX ORDER — MYCOPHENOLATE MOFETIL 250 MG/1
CAPSULE ORAL
Qty: 8 CAPSULE | Refills: 0 | Status: SHIPPED | OUTPATIENT
Start: 2020-03-02 | End: 2020-03-02

## 2020-03-02 RX ORDER — MYCOPHENOLATE MOFETIL 250 MG/1
CAPSULE ORAL
Qty: 240 CAPSULE | Refills: 0 | Status: ON HOLD | OUTPATIENT
Start: 2020-03-02 | End: 2020-04-20 | Stop reason: HOSPADM

## 2020-03-05 ENCOUNTER — PATIENT OUTREACH (OUTPATIENT)
Dept: ADMINISTRATIVE | Facility: OTHER | Age: 60
End: 2020-03-05

## 2020-03-05 ENCOUNTER — PATIENT MESSAGE (OUTPATIENT)
Dept: TRANSPLANT | Facility: CLINIC | Age: 60
End: 2020-03-05

## 2020-03-09 ENCOUNTER — LAB VISIT (OUTPATIENT)
Dept: LAB | Facility: HOSPITAL | Age: 60
End: 2020-03-09
Attending: INTERNAL MEDICINE
Payer: COMMERCIAL

## 2020-03-09 DIAGNOSIS — C22.0 HCC (HEPATOCELLULAR CARCINOMA): ICD-10-CM

## 2020-03-09 DIAGNOSIS — Z94.4 LIVER REPLACED BY TRANSPLANT: ICD-10-CM

## 2020-03-09 LAB
AFP SERPL-MCNC: 1.7 NG/ML (ref 0–8.4)
ALBUMIN SERPL BCP-MCNC: 4.2 G/DL (ref 3.5–5.2)
ALP SERPL-CCNC: 83 U/L (ref 55–135)
ALT SERPL W/O P-5'-P-CCNC: 41 U/L (ref 10–44)
ANION GAP SERPL CALC-SCNC: 8 MMOL/L (ref 8–16)
AST SERPL-CCNC: 18 U/L (ref 10–40)
BASOPHILS # BLD AUTO: 0.05 K/UL (ref 0–0.2)
BASOPHILS NFR BLD: 0.8 % (ref 0–1.9)
BILIRUB SERPL-MCNC: 0.5 MG/DL (ref 0.1–1)
BUN SERPL-MCNC: 14 MG/DL (ref 8–23)
CALCIUM SERPL-MCNC: 8.9 MG/DL (ref 8.7–10.5)
CHLORIDE SERPL-SCNC: 104 MMOL/L (ref 95–110)
CO2 SERPL-SCNC: 27 MMOL/L (ref 23–29)
CREAT SERPL-MCNC: 1.1 MG/DL (ref 0.5–1.4)
DIFFERENTIAL METHOD: ABNORMAL
EOSINOPHIL # BLD AUTO: 0.2 K/UL (ref 0–0.5)
EOSINOPHIL NFR BLD: 2.8 % (ref 0–8)
ERYTHROCYTE [DISTWIDTH] IN BLOOD BY AUTOMATED COUNT: 14.2 % (ref 11.5–14.5)
EST. GFR  (AFRICAN AMERICAN): >60 ML/MIN/1.73 M^2
EST. GFR  (NON AFRICAN AMERICAN): >60 ML/MIN/1.73 M^2
GLUCOSE SERPL-MCNC: 103 MG/DL (ref 70–110)
HCT VFR BLD AUTO: 43.2 % (ref 40–54)
HGB BLD-MCNC: 13.6 G/DL (ref 14–18)
IMM GRANULOCYTES # BLD AUTO: 0.02 K/UL (ref 0–0.04)
IMM GRANULOCYTES NFR BLD AUTO: 0.3 % (ref 0–0.5)
LYMPHOCYTES # BLD AUTO: 1 K/UL (ref 1–4.8)
LYMPHOCYTES NFR BLD: 15.9 % (ref 18–48)
MCH RBC QN AUTO: 25.6 PG (ref 27–31)
MCHC RBC AUTO-ENTMCNC: 31.5 G/DL (ref 32–36)
MCV RBC AUTO: 81 FL (ref 82–98)
MONOCYTES # BLD AUTO: 0.6 K/UL (ref 0.3–1)
MONOCYTES NFR BLD: 9.3 % (ref 4–15)
NEUTROPHILS # BLD AUTO: 4.3 K/UL (ref 1.8–7.7)
NEUTROPHILS NFR BLD: 70.9 % (ref 38–73)
NRBC BLD-RTO: 0 /100 WBC
PLATELET # BLD AUTO: 309 K/UL (ref 150–350)
PMV BLD AUTO: 9.8 FL (ref 9.2–12.9)
POTASSIUM SERPL-SCNC: 3.8 MMOL/L (ref 3.5–5.1)
PROT SERPL-MCNC: 7.2 G/DL (ref 6–8.4)
RBC # BLD AUTO: 5.31 M/UL (ref 4.6–6.2)
SODIUM SERPL-SCNC: 139 MMOL/L (ref 136–145)
WBC # BLD AUTO: 6.1 K/UL (ref 3.9–12.7)

## 2020-03-09 PROCEDURE — 85025 COMPLETE CBC W/AUTO DIFF WBC: CPT

## 2020-03-09 PROCEDURE — 80053 COMPREHEN METABOLIC PANEL: CPT

## 2020-03-09 PROCEDURE — 80197 ASSAY OF TACROLIMUS: CPT

## 2020-03-09 PROCEDURE — 36415 COLL VENOUS BLD VENIPUNCTURE: CPT | Mod: PO

## 2020-03-09 PROCEDURE — 82105 ALPHA-FETOPROTEIN SERUM: CPT

## 2020-03-10 ENCOUNTER — TELEPHONE (OUTPATIENT)
Dept: TRANSPLANT | Facility: CLINIC | Age: 60
End: 2020-03-10

## 2020-03-10 DIAGNOSIS — Z94.4 LIVER REPLACED BY TRANSPLANT: Primary | ICD-10-CM

## 2020-03-10 LAB — TACROLIMUS BLD-MCNC: 6.9 NG/ML (ref 5–15)

## 2020-03-10 NOTE — TELEPHONE ENCOUNTER
----- Message from Fab Damon MD sent at 3/10/2020  8:57 AM CDT -----  Results reviewed. Please advise labs are stable.

## 2020-03-10 NOTE — TELEPHONE ENCOUNTER
Patient notified and instructed via [facility]: labs reviewed, results ok, no changes made. Repeat labs due 4/13/20.

## 2020-03-12 ENCOUNTER — PATIENT MESSAGE (OUTPATIENT)
Dept: TRANSPLANT | Facility: CLINIC | Age: 60
End: 2020-03-12

## 2020-03-13 ENCOUNTER — PATIENT MESSAGE (OUTPATIENT)
Dept: TRANSPLANT | Facility: CLINIC | Age: 60
End: 2020-03-13

## 2020-03-13 ENCOUNTER — TELEPHONE (OUTPATIENT)
Dept: TRANSPLANT | Facility: CLINIC | Age: 60
End: 2020-03-13

## 2020-03-13 NOTE — TELEPHONE ENCOUNTER
----- Message from Fab Damon MD sent at 3/12/2020  9:04 AM CDT -----  I think I would write a letter suggesting time off work given this situation. Thoughts Elliot? Thanks  ----- Message -----  From: Joann Lino  Sent: 3/11/2020   2:14 PM CDT  To: Fab Damon MD    Received this from the patient : any other recommendations on this?? Thanks.:      Joann thanks a lot for all your help.   There is another situation related to my job.   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 They are taking the necessary measurements. 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 In there,  they explained what it is going on and they want each of us to contact our doctors and request a letter specifying if I am able or if my immune system is strong enough to keep on working in that environment. They are also suggesting for us to take a leave of absence until everything is kind of back to normal.  Therefore, I please need  to know Dr. Damon recommendation and to get the letter that my job is asking for ASAP.  I will really appreciate your help.

## 2020-03-16 ENCOUNTER — PATIENT MESSAGE (OUTPATIENT)
Dept: TRANSPLANT | Facility: CLINIC | Age: 60
End: 2020-03-16

## 2020-03-27 ENCOUNTER — PATIENT MESSAGE (OUTPATIENT)
Dept: TRANSPLANT | Facility: CLINIC | Age: 60
End: 2020-03-27

## 2020-04-03 ENCOUNTER — PATIENT MESSAGE (OUTPATIENT)
Dept: TRANSPLANT | Facility: CLINIC | Age: 60
End: 2020-04-03

## 2020-04-05 DIAGNOSIS — E55.9 VITAMIN D DEFICIENCY DISEASE: ICD-10-CM

## 2020-04-06 ENCOUNTER — TELEPHONE (OUTPATIENT)
Dept: TRANSPLANT | Facility: CLINIC | Age: 60
End: 2020-04-06

## 2020-04-06 ENCOUNTER — OFFICE VISIT (OUTPATIENT)
Dept: FAMILY MEDICINE | Facility: CLINIC | Age: 60
End: 2020-04-06
Payer: COMMERCIAL

## 2020-04-06 DIAGNOSIS — Z79.60 LONG-TERM USE OF IMMUNOSUPPRESSANT MEDICATION: ICD-10-CM

## 2020-04-06 DIAGNOSIS — R05.9 COUGH: ICD-10-CM

## 2020-04-06 DIAGNOSIS — Z20.822 SUSPECTED COVID-19 VIRUS INFECTION: ICD-10-CM

## 2020-04-06 DIAGNOSIS — R50.9 FEVER, UNSPECIFIED FEVER CAUSE: Primary | ICD-10-CM

## 2020-04-06 PROCEDURE — 99214 OFFICE O/P EST MOD 30 MIN: CPT | Mod: 95,,, | Performed by: FAMILY MEDICINE

## 2020-04-06 PROCEDURE — 99214 PR OFFICE/OUTPT VISIT, EST, LEVL IV, 30-39 MIN: ICD-10-PCS | Mod: 95,,, | Performed by: FAMILY MEDICINE

## 2020-04-06 RX ORDER — ERGOCALCIFEROL 1.25 MG/1
CAPSULE ORAL
Qty: 12 CAPSULE | Refills: 0 | Status: SHIPPED | OUTPATIENT
Start: 2020-04-06 | End: 2020-06-01

## 2020-04-06 NOTE — TELEPHONE ENCOUNTER
----- Message from Charmaine Gusman sent at 4/6/2020  9:05 AM CDT -----  Contact: Wife Kati [phone]  Type:  Same Day Appointment Request    Caller is requesting a same day appointment.  Caller declined first available appointment listed below.    Name of Caller:Pt's wife   When is the first available appointment?-   Symptoms:Check up and COVID-19 Concerns   Best Call Back Number:[phone]  Additional Information: Liver transplant pt

## 2020-04-06 NOTE — TELEPHONE ENCOUNTER
Update message received from patient via [facility]:      Joann good afternoon   I just finished my appt with Dr. Angel   And I am now enrolled in the 14 day symptom monitoring program. We chose that option before having the test done .   I will keep you posted .

## 2020-04-06 NOTE — TELEPHONE ENCOUNTER
Message was received from patient via [facility]:        Joann good morning   Since I reported to you last Thursday until today , sometimes  I have been having a temperature  of 100 but I have kept it down with Tylenol every six hours and It goes down to 98-99. Some other times it has been 100.4 Early this morning around 1:30 am, I had a 102 fever but again I took two Tylenols and it went done . Today at 8am my temperature was 100.  Sometimes , I have headaches but not always . I do not have body ache , chest pressure . Coughing once in a while but nothing that bothers me .   My daughter and my wife have also been sick .   Thanks       Patient sent follow up message that he is scheduled for video visit with his PCP  today.  Instructed him to keep our office updated.

## 2020-04-07 ENCOUNTER — PATIENT MESSAGE (OUTPATIENT)
Dept: FAMILY MEDICINE | Facility: CLINIC | Age: 60
End: 2020-04-07

## 2020-04-08 ENCOUNTER — NURSE TRIAGE (OUTPATIENT)
Dept: ADMINISTRATIVE | Facility: CLINIC | Age: 60
End: 2020-04-08

## 2020-04-08 NOTE — TELEPHONE ENCOUNTER
Patient contacted after answering YES to COVID-19 Home Symptom Monitoring Program Text Message.    Patient was not tested for COVID-19 as he did not fully meet criteria for testing.      Patient reports persistent dry coughing spells that quickly resolve. Denies fever, dyspnea, SOB, chest pain, myalgias, HA, sore throat, or N/V/D. Has not tried any treatments for cough but will try using OTC Robitussin-DM. Encouraged fluids and supportive measures. Tolerating fluids and meals. Continues to quarantine at home.    Based on this call, patient seems stable enough to continue care at home.      Care Advice  - Stressed importance of proper handwashing and strict quarantine.   -Encouraged patient to drink plenty fluids.  -Eat as tolerated.  -Advised patient to avoid close contact with family members/friends to prevent the spread of the virus.  -Advised against leaving the home and to quarantine in a room in the house.  -Warning signs discussed and instructed to go the nearest ED if dyspnea, SOB, chest pain or weakness/confusion develops.  -Notified patient that an automated text message will be sent daily for the next 2 weeks to check on symptoms.      Reason for Disposition   Cough with no complications    Additional Information   Negative: Bluish (or gray) lips or face   Negative: Severe difficulty breathing (e.g., struggling for each breath, speaks in single words)   Negative: Rapid onset of cough and has hives   Negative: Coughing started suddenly after medicine, an allergic food or bee sting   Negative: Difficulty breathing after exposure to flames, smoke, or fumes   Negative: Sounds like a life-threatening emergency to the triager    Protocols used: COUGH-A-OH      Ilana Barker NP

## 2020-04-10 ENCOUNTER — NURSE TRIAGE (OUTPATIENT)
Dept: ADMINISTRATIVE | Facility: CLINIC | Age: 60
End: 2020-04-10

## 2020-04-10 ENCOUNTER — PATIENT MESSAGE (OUTPATIENT)
Dept: FAMILY MEDICINE | Facility: CLINIC | Age: 60
End: 2020-04-10

## 2020-04-10 NOTE — TELEPHONE ENCOUNTER
[id number] used.     Pt approximate normal /84     Before taking morning BP med, BP was 102/72, and asymptomatic.      Advised pt to not take this medication at this time and to continue to monitor BP and any sx at home. nurse triage unable to find BP parameters for taking medication in chart    nurse triage unable to find BP parameters for taking medication in chart pt would like virtual visit with doctor today.      Reason for Disposition   COVID-19, questions about    Additional Information   Negative: SEVERE difficulty breathing (e.g., struggling for each breath, speaks in single words)   Negative: Difficult to awaken or acting confused (e.g., disoriented, slurred speech)   Negative: Bluish (or gray) lips or face now   Negative: Shock suspected (e.g., cold/pale/clammy skin, too weak to stand, low BP, rapid pulse)   Negative: Sounds like a life-threatening emergency to the triager   Negative: [1] COVID-19 suspected (e.g., cough, fever, shortness of breath) AND [2] public health department recommends testing   Negative: [1] COVID-19 exposure AND [2] no symptoms   Negative: COVID-19 and Breastfeeding, questions about   Negative: SEVERE or constant chest pain (Exception: mild central chest pain, present only when coughing)   Negative: MODERATE difficulty breathing (e.g., speaks in phrases, SOB even at rest, pulse 100-120)   Negative: Patient sounds very sick or weak to the triager   Negative: MILD difficulty breathing (e.g., minimal/no SOB at rest, SOB with walking, pulse <100)   Negative: Chest pain   Negative: Fever > 103 F (39.4 C)   Negative: [1] Fever > 101 F (38.3 C) AND [2] age > 60   Negative: [1] Fever > 100.0 F (37.8 C) AND [2] bedridden (e.g., nursing home patient, CVA, chronic illness, recovering from surgery)   Negative: HIGH RISK patient (e.g., age > 64 years, diabetes, heart or lung disease, weak immune system)   Negative: Fever present > 3 days (72 hours)    Negative: [1] Fever returns after gone for over 24 hours AND [2] symptoms worse or not improved   Negative: [1] Continuous (nonstop) coughing interferes with work or school AND [2] no improvement using cough treatment per protocol   Negative: 1] COVID-19 infection diagnosed or suspected AND [2] mild symptoms (fever, cough) AND [2] no trouble breathing or other complications    Protocols used: CORONAVIRUS (COVID-19) DIAGNOSED OR [id number]

## 2020-04-13 ENCOUNTER — TELEPHONE (OUTPATIENT)
Dept: TRANSPLANT | Facility: CLINIC | Age: 60
End: 2020-04-13

## 2020-04-13 ENCOUNTER — PATIENT MESSAGE (OUTPATIENT)
Dept: TRANSPLANT | Facility: CLINIC | Age: 60
End: 2020-04-13

## 2020-04-13 NOTE — TELEPHONE ENCOUNTER
[facility] notified and instructed [facility]:    Your liver tests are quite elevated today's labs.  wants you to have an EUS/EUS guided liver biopsy(Endoscopic ultrasound guided) . The GI department will contact you to set this up . Thanks.

## 2020-04-14 ENCOUNTER — TELEPHONE (OUTPATIENT)
Dept: ENDOSCOPY | Facility: HOSPITAL | Age: 60
End: 2020-04-14

## 2020-04-14 ENCOUNTER — TELEPHONE (OUTPATIENT)
Dept: TRANSPLANT | Facility: CLINIC | Age: 60
End: 2020-04-14

## 2020-04-14 ENCOUNTER — NURSE TRIAGE (OUTPATIENT)
Dept: ADMINISTRATIVE | Facility: CLINIC | Age: 60
End: 2020-04-14

## 2020-04-14 DIAGNOSIS — R05.9 COUGH: Primary | ICD-10-CM

## 2020-04-14 DIAGNOSIS — K83.1 BILIARY STRICTURE: Primary | ICD-10-CM

## 2020-04-14 RX ORDER — BENZONATATE 100 MG/1
100 CAPSULE ORAL 3 TIMES DAILY PRN
Qty: 30 CAPSULE | Refills: 0 | Status: SHIPPED | OUTPATIENT
Start: 2020-04-14 | End: 2020-05-14

## 2020-04-14 NOTE — TELEPHONE ENCOUNTER
Patient contacted after answering YES to COVID-19 Home Symptom Monitoring Program Text Message.    Patient was not tested for COVID-19 as he did not fully meet criteria.      Patient reports an intermittent dry cough. Says he has very mild SOB with coughing spells but it quickly resolves. Denies fever, dyspnea, chest pain, myalgias or HA. Has tried using OTC Mucinex with mild relief. Tolerating fluids and meals. Rx benzonatate sent to local pharmacy for cough relief. Warning signs discussed. No other questions or concerns.      Based on this call, patient seems stable enough to continue care at home.      Reason for Disposition   Cough with no complications    Additional Information   Negative: Bluish (or gray) lips or face   Negative: Severe difficulty breathing (e.g., struggling for each breath, speaks in single words)   Negative: Rapid onset of cough and has hives   Negative: Coughing started suddenly after medicine, an allergic food or bee sting   Negative: Difficulty breathing after exposure to flames, smoke, or fumes   Negative: Sounds like a life-threatening emergency to the triager    Protocols used: COUGH-A-OH      Ilana Barker NP

## 2020-04-14 NOTE — TELEPHONE ENCOUNTER
----- Message from Katy Shin MD sent at 4/14/2020  2:34 PM CDT -----  Regarding: RE:  EUS+/- ERCP sometime this week.    ----- Message -----  From: Joann Lino  Sent: 4/14/2020   2:22 PM CDT  To: Kiara Felix MA, Katy Shin MD  Subject: FW:                                              See below from :   ----- Message -----  From: Fab Damon MD  Sent: 4/14/2020   2:19 PM CDT  To: Joann Lino  Subject: RE:                                              Urgent. I want to prevent doing ultrasound first and then delaying ERCP or biopsy, whichever is needed.  ----- Message -----  From: Joann Lino  Sent: 4/14/2020  12:10 PM CDT  To: Fab Damon MD        ----- Message -----  From: Kiara Felix MA  Sent: 4/14/2020  12:07 PM CDT  To: Joann Lino    Please see Dr Dave Shin's message  ----- Message -----  From: Katy Shin MD  Sent: 4/14/2020  11:15 AM CDT  To: Kiara Felix MA    I don't know, please check with referral. Also if it is mainly for liver biopsy, I suggest percutaneous. Less risk with no sedation and airway protection.     ----- Message -----  From: Kiara Felix MA  Sent: 4/13/2020   3:13 PM CDT  To: Katy Shin MD    Please review and advise how urgent  ----- Message -----  From: Joann Lino  Sent: 4/13/2020   2:57 PM CDT  To: Kiara Felix MA    Per ADRIANA Gan: patient needs EUS and EUS guided liver biopsy set up (due to elevated LFTs). Thanks.

## 2020-04-15 ENCOUNTER — OFFICE VISIT (OUTPATIENT)
Dept: FAMILY MEDICINE | Facility: CLINIC | Age: 60
End: 2020-04-15
Payer: COMMERCIAL

## 2020-04-15 ENCOUNTER — PATIENT MESSAGE (OUTPATIENT)
Dept: TRANSPLANT | Facility: CLINIC | Age: 60
End: 2020-04-15

## 2020-04-15 ENCOUNTER — HOSPITAL ENCOUNTER (INPATIENT)
Facility: HOSPITAL | Age: 60
LOS: 5 days | Discharge: HOME | DRG: 177 | End: 2020-04-20
Attending: EMERGENCY MEDICINE | Admitting: EMERGENCY MEDICINE
Payer: COMMERCIAL

## 2020-04-15 DIAGNOSIS — J12.82 PNEUMONIA DUE TO COVID-19 VIRUS: Primary | ICD-10-CM

## 2020-04-15 DIAGNOSIS — R06.02 SOB (SHORTNESS OF BREATH): ICD-10-CM

## 2020-04-15 DIAGNOSIS — Z20.822 SUSPECTED COVID-19 VIRUS INFECTION: ICD-10-CM

## 2020-04-15 DIAGNOSIS — E87.6 HYPOKALEMIA: ICD-10-CM

## 2020-04-15 DIAGNOSIS — E83.42 HYPOMAGNESEMIA: ICD-10-CM

## 2020-04-15 DIAGNOSIS — D84.9 IMMUNOCOMPROMISED STATE: ICD-10-CM

## 2020-04-15 DIAGNOSIS — Z20.822 SUSPECTED COVID-19 VIRUS INFECTION: Primary | ICD-10-CM

## 2020-04-15 DIAGNOSIS — D72.810 LYMPHOPENIA: ICD-10-CM

## 2020-04-15 DIAGNOSIS — R09.02 HYPOXIA: ICD-10-CM

## 2020-04-15 DIAGNOSIS — U07.1 PNEUMONIA DUE TO COVID-19 VIRUS: Primary | ICD-10-CM

## 2020-04-15 PROBLEM — Z94.4 S/P LIVER TRANSPLANT: Chronic | Status: ACTIVE | Noted: 2019-07-04

## 2020-04-15 PROBLEM — K21.9 GERD (GASTROESOPHAGEAL REFLUX DISEASE): Chronic | Status: ACTIVE | Noted: 2018-11-29

## 2020-04-15 PROBLEM — I50.22 CHRONIC SYSTOLIC HEART FAILURE: Status: RESOLVED | Noted: 2018-11-29 | Resolved: 2020-04-15

## 2020-04-15 PROBLEM — I50.22 CHRONIC SYSTOLIC HEART FAILURE: Status: ACTIVE | Noted: 2018-11-29

## 2020-04-15 PROBLEM — I48.91 ATRIAL FIBRILLATION: Chronic | Status: ACTIVE | Noted: 2018-11-29

## 2020-04-15 PROBLEM — Z79.01 LONG TERM (CURRENT) USE OF ANTICOAGULANTS: Chronic | Status: ACTIVE | Noted: 2019-04-26

## 2020-04-15 PROBLEM — Z29.89 PROPHYLACTIC IMMUNOTHERAPY: Chronic | Status: ACTIVE | Noted: 2019-07-04

## 2020-04-15 LAB
ALBUMIN SERPL BCP-MCNC: 3.3 G/DL (ref 3.5–5.2)
ALP SERPL-CCNC: 259 U/L (ref 55–135)
ALT SERPL W/O P-5'-P-CCNC: 106 U/L (ref 10–44)
ANION GAP SERPL CALC-SCNC: 11 MMOL/L (ref 8–16)
AST SERPL-CCNC: 41 U/L (ref 10–40)
BASOPHILS # BLD AUTO: 0.01 K/UL (ref 0–0.2)
BASOPHILS NFR BLD: 0.3 % (ref 0–1.9)
BILIRUB SERPL-MCNC: 0.4 MG/DL (ref 0.1–1)
BILIRUB UR QL STRIP: NEGATIVE
BUN SERPL-MCNC: 16 MG/DL (ref 8–23)
CALCIUM SERPL-MCNC: 8.1 MG/DL (ref 8.7–10.5)
CHLORIDE SERPL-SCNC: 101 MMOL/L (ref 95–110)
CK SERPL-CCNC: 146 U/L (ref 20–200)
CLARITY UR REFRACT.AUTO: CLEAR
CO2 SERPL-SCNC: 21 MMOL/L (ref 23–29)
COLOR UR AUTO: YELLOW
CREAT SERPL-MCNC: 1.2 MG/DL (ref 0.5–1.4)
CRP SERPL-MCNC: 62 MG/L (ref 0–8.2)
DIFFERENTIAL METHOD: ABNORMAL
EOSINOPHIL # BLD AUTO: 0 K/UL (ref 0–0.5)
EOSINOPHIL NFR BLD: 0 % (ref 0–8)
ERYTHROCYTE [DISTWIDTH] IN BLOOD BY AUTOMATED COUNT: 14.5 % (ref 11.5–14.5)
EST. GFR  (AFRICAN AMERICAN): >60 ML/MIN/1.73 M^2
EST. GFR  (NON AFRICAN AMERICAN): >60 ML/MIN/1.73 M^2
FERRITIN SERPL-MCNC: 441 NG/ML (ref 20–300)
GLUCOSE SERPL-MCNC: 136 MG/DL (ref 70–110)
GLUCOSE UR QL STRIP: NEGATIVE
HCT VFR BLD AUTO: 39 % (ref 40–54)
HGB BLD-MCNC: 12.8 G/DL (ref 14–18)
HGB UR QL STRIP: NEGATIVE
IMM GRANULOCYTES # BLD AUTO: 0.02 K/UL (ref 0–0.04)
IMM GRANULOCYTES NFR BLD AUTO: 0.7 % (ref 0–0.5)
INFLUENZA A, MOLECULAR: NEGATIVE
INFLUENZA B, MOLECULAR: NEGATIVE
KETONES UR QL STRIP: NEGATIVE
LACTATE SERPL-SCNC: 1.1 MMOL/L (ref 0.5–2.2)
LDH SERPL L TO P-CCNC: 260 U/L (ref 110–260)
LEUKOCYTE ESTERASE UR QL STRIP: NEGATIVE
LYMPHOCYTES # BLD AUTO: 0.3 K/UL (ref 1–4.8)
LYMPHOCYTES NFR BLD: 10.6 % (ref 18–48)
MCH RBC QN AUTO: 25.2 PG (ref 27–31)
MCHC RBC AUTO-ENTMCNC: 32.8 G/DL (ref 32–36)
MCV RBC AUTO: 77 FL (ref 82–98)
MONOCYTES # BLD AUTO: 0.3 K/UL (ref 0.3–1)
MONOCYTES NFR BLD: 10.6 % (ref 4–15)
NEUTROPHILS # BLD AUTO: 2.3 K/UL (ref 1.8–7.7)
NEUTROPHILS NFR BLD: 77.8 % (ref 38–73)
NITRITE UR QL STRIP: NEGATIVE
NRBC BLD-RTO: 0 /100 WBC
PH UR STRIP: 6 [PH] (ref 5–8)
PLATELET # BLD AUTO: 308 K/UL (ref 150–350)
PMV BLD AUTO: 8.9 FL (ref 9.2–12.9)
POCT GLUCOSE: 120 MG/DL (ref 70–110)
POTASSIUM SERPL-SCNC: 4.1 MMOL/L (ref 3.5–5.1)
PROT SERPL-MCNC: 7.5 G/DL (ref 6–8.4)
PROT UR QL STRIP: NEGATIVE
RBC # BLD AUTO: 5.08 M/UL (ref 4.6–6.2)
SARS-COV-2 RDRP RESP QL NAA+PROBE: POSITIVE
SODIUM SERPL-SCNC: 133 MMOL/L (ref 136–145)
SP GR UR STRIP: 1.01 (ref 1–1.03)
SPECIMEN SOURCE: NORMAL
TROPONIN I SERPL DL<=0.01 NG/ML-MCNC: 0.01 NG/ML (ref 0–0.03)
URN SPEC COLLECT METH UR: NORMAL
WBC # BLD AUTO: 2.92 K/UL (ref 3.9–12.7)

## 2020-04-15 PROCEDURE — 96374 THER/PROPH/DIAG INJ IV PUSH: CPT

## 2020-04-15 PROCEDURE — 63600175 PHARM REV CODE 636 W HCPCS: Performed by: EMERGENCY MEDICINE

## 2020-04-15 PROCEDURE — 99441 PR PHYSICIAN TELEPHONE EVALUATION 5-10 MIN: ICD-10-PCS | Mod: 95,,, | Performed by: FAMILY MEDICINE

## 2020-04-15 PROCEDURE — 93010 EKG 12-LEAD: ICD-10-PCS | Mod: ,,, | Performed by: INTERNAL MEDICINE

## 2020-04-15 PROCEDURE — 80053 COMPREHEN METABOLIC PANEL: CPT

## 2020-04-15 PROCEDURE — 99285 PR EMERGENCY DEPT VISIT,LEVEL V: ICD-10-PCS | Mod: ,,, | Performed by: EMERGENCY MEDICINE

## 2020-04-15 PROCEDURE — 82550 ASSAY OF CK (CPK): CPT

## 2020-04-15 PROCEDURE — 82728 ASSAY OF FERRITIN: CPT

## 2020-04-15 PROCEDURE — 86140 C-REACTIVE PROTEIN: CPT

## 2020-04-15 PROCEDURE — 99285 EMERGENCY DEPT VISIT HI MDM: CPT | Mod: 25

## 2020-04-15 PROCEDURE — 96375 TX/PRO/DX INJ NEW DRUG ADDON: CPT

## 2020-04-15 PROCEDURE — 93005 ELECTROCARDIOGRAM TRACING: CPT

## 2020-04-15 PROCEDURE — 83605 ASSAY OF LACTIC ACID: CPT

## 2020-04-15 PROCEDURE — 25000003 PHARM REV CODE 250: Performed by: EMERGENCY MEDICINE

## 2020-04-15 PROCEDURE — U0002 COVID-19 LAB TEST NON-CDC: HCPCS

## 2020-04-15 PROCEDURE — 99441 PR PHYSICIAN TELEPHONE EVALUATION 5-10 MIN: CPT | Mod: 95,,, | Performed by: FAMILY MEDICINE

## 2020-04-15 PROCEDURE — 93010 ELECTROCARDIOGRAM REPORT: CPT | Mod: ,,, | Performed by: INTERNAL MEDICINE

## 2020-04-15 PROCEDURE — 11000001 HC ACUTE MED/SURG PRIVATE ROOM

## 2020-04-15 PROCEDURE — 87040 BLOOD CULTURE FOR BACTERIA: CPT

## 2020-04-15 PROCEDURE — 99223 PR INITIAL HOSPITAL CARE,LEVL III: ICD-10-PCS | Mod: ,,, | Performed by: HOSPITALIST

## 2020-04-15 PROCEDURE — 87502 INFLUENZA DNA AMP PROBE: CPT

## 2020-04-15 PROCEDURE — 85025 COMPLETE CBC W/AUTO DIFF WBC: CPT

## 2020-04-15 PROCEDURE — 87449 NOS EACH ORGANISM AG IA: CPT

## 2020-04-15 PROCEDURE — 83615 LACTATE (LD) (LDH) ENZYME: CPT

## 2020-04-15 PROCEDURE — 99285 EMERGENCY DEPT VISIT HI MDM: CPT | Mod: ,,, | Performed by: EMERGENCY MEDICINE

## 2020-04-15 PROCEDURE — 81003 URINALYSIS AUTO W/O SCOPE: CPT

## 2020-04-15 PROCEDURE — 99223 1ST HOSP IP/OBS HIGH 75: CPT | Mod: ,,, | Performed by: HOSPITALIST

## 2020-04-15 PROCEDURE — 84484 ASSAY OF TROPONIN QUANT: CPT

## 2020-04-15 RX ORDER — LOPERAMIDE HYDROCHLORIDE 2 MG/1
2 CAPSULE ORAL EVERY 6 HOURS PRN
Status: DISCONTINUED | OUTPATIENT
Start: 2020-04-15 | End: 2020-04-20 | Stop reason: HOSPADM

## 2020-04-15 RX ORDER — IBUPROFEN 200 MG
24 TABLET ORAL
Status: DISCONTINUED | OUTPATIENT
Start: 2020-04-15 | End: 2020-04-20 | Stop reason: HOSPADM

## 2020-04-15 RX ORDER — TACROLIMUS 1 MG/1
1 CAPSULE ORAL EVERY EVENING
Status: DISCONTINUED | OUTPATIENT
Start: 2020-04-15 | End: 2020-04-15

## 2020-04-15 RX ORDER — ACETAMINOPHEN 325 MG/1
650 TABLET ORAL EVERY 4 HOURS PRN
Status: DISCONTINUED | OUTPATIENT
Start: 2020-04-15 | End: 2020-04-20 | Stop reason: HOSPADM

## 2020-04-15 RX ORDER — CETIRIZINE HYDROCHLORIDE 10 MG/1
10 TABLET ORAL DAILY
Status: DISCONTINUED | OUTPATIENT
Start: 2020-04-16 | End: 2020-04-20 | Stop reason: HOSPADM

## 2020-04-15 RX ORDER — ONDANSETRON 2 MG/ML
4 INJECTION INTRAMUSCULAR; INTRAVENOUS EVERY 8 HOURS PRN
Status: DISCONTINUED | OUTPATIENT
Start: 2020-04-15 | End: 2020-04-20 | Stop reason: HOSPADM

## 2020-04-15 RX ORDER — SODIUM CHLORIDE 0.9 % (FLUSH) 0.9 %
10 SYRINGE (ML) INJECTION
Status: DISCONTINUED | OUTPATIENT
Start: 2020-04-15 | End: 2020-04-20 | Stop reason: HOSPADM

## 2020-04-15 RX ORDER — CALCIUM CARBONATE 200(500)MG
1 TABLET,CHEWABLE ORAL 3 TIMES DAILY PRN
Status: DISCONTINUED | OUTPATIENT
Start: 2020-04-15 | End: 2020-04-19

## 2020-04-15 RX ORDER — GLUCAGON 1 MG
1 KIT INJECTION
Status: DISCONTINUED | OUTPATIENT
Start: 2020-04-15 | End: 2020-04-20 | Stop reason: HOSPADM

## 2020-04-15 RX ORDER — INSULIN ASPART 100 [IU]/ML
0-5 INJECTION, SOLUTION INTRAVENOUS; SUBCUTANEOUS
Status: DISCONTINUED | OUTPATIENT
Start: 2020-04-15 | End: 2020-04-20 | Stop reason: HOSPADM

## 2020-04-15 RX ORDER — AZITHROMYCIN 250 MG/1
250 TABLET, FILM COATED ORAL
Status: COMPLETED | OUTPATIENT
Start: 2020-04-16 | End: 2020-04-19

## 2020-04-15 RX ORDER — CEFTRIAXONE 1 G/1
1 INJECTION, POWDER, FOR SOLUTION INTRAMUSCULAR; INTRAVENOUS NIGHTLY
Status: COMPLETED | OUTPATIENT
Start: 2020-04-16 | End: 2020-04-19

## 2020-04-15 RX ORDER — IBUPROFEN 200 MG
16 TABLET ORAL
Status: DISCONTINUED | OUTPATIENT
Start: 2020-04-15 | End: 2020-04-20 | Stop reason: HOSPADM

## 2020-04-15 RX ORDER — METOPROLOL SUCCINATE 50 MG/1
50 TABLET, EXTENDED RELEASE ORAL DAILY
Status: DISCONTINUED | OUTPATIENT
Start: 2020-04-16 | End: 2020-04-19

## 2020-04-15 RX ORDER — NIFEDIPINE 30 MG/1
TABLET, EXTENDED RELEASE ORAL
Status: ON HOLD | COMMUNITY
Start: 2020-03-07 | End: 2020-04-16

## 2020-04-15 RX ORDER — NIFEDIPINE 30 MG/1
30 TABLET, EXTENDED RELEASE ORAL DAILY
Status: DISCONTINUED | OUTPATIENT
Start: 2020-04-16 | End: 2020-04-15

## 2020-04-15 RX ORDER — CEFTRIAXONE 1 G/1
1 INJECTION, POWDER, FOR SOLUTION INTRAMUSCULAR; INTRAVENOUS
Status: COMPLETED | OUTPATIENT
Start: 2020-04-15 | End: 2020-04-15

## 2020-04-15 RX ORDER — TACROLIMUS 0.5 MG/1
1 CAPSULE ORAL EVERY EVENING
Status: DISCONTINUED | OUTPATIENT
Start: 2020-04-16 | End: 2020-04-16

## 2020-04-15 RX ORDER — TACROLIMUS 0.5 MG/1
2 CAPSULE ORAL EVERY MORNING
Status: DISCONTINUED | OUTPATIENT
Start: 2020-04-16 | End: 2020-04-16

## 2020-04-15 RX ORDER — AMLODIPINE BESYLATE 10 MG/1
10 TABLET ORAL DAILY
Status: DISCONTINUED | OUTPATIENT
Start: 2020-04-16 | End: 2020-04-15

## 2020-04-15 RX ORDER — TALC
6 POWDER (GRAM) TOPICAL NIGHTLY PRN
Status: DISCONTINUED | OUTPATIENT
Start: 2020-04-15 | End: 2020-04-20 | Stop reason: HOSPADM

## 2020-04-15 RX ORDER — MYCOPHENOLATE MOFETIL 250 MG/1
1000 CAPSULE ORAL 2 TIMES DAILY
Status: DISCONTINUED | OUTPATIENT
Start: 2020-04-15 | End: 2020-04-15

## 2020-04-15 RX ORDER — PANTOPRAZOLE SODIUM 40 MG/1
40 TABLET, DELAYED RELEASE ORAL DAILY
Status: DISCONTINUED | OUTPATIENT
Start: 2020-04-16 | End: 2020-04-20 | Stop reason: HOSPADM

## 2020-04-15 RX ORDER — ALBUTEROL SULFATE 90 UG/1
2 AEROSOL, METERED RESPIRATORY (INHALATION) EVERY 6 HOURS PRN
Status: DISCONTINUED | OUTPATIENT
Start: 2020-04-15 | End: 2020-04-20 | Stop reason: HOSPADM

## 2020-04-15 RX ORDER — BENZONATATE 100 MG/1
100 CAPSULE ORAL 3 TIMES DAILY PRN
Status: DISCONTINUED | OUTPATIENT
Start: 2020-04-15 | End: 2020-04-20 | Stop reason: HOSPADM

## 2020-04-15 RX ADMIN — CEFTRIAXONE SODIUM 1 G: 1 INJECTION, POWDER, FOR SOLUTION INTRAMUSCULAR; INTRAVENOUS at 08:04

## 2020-04-15 RX ADMIN — MYCOPHENOLATE MOFETIL 1000 MG: 250 CAPSULE ORAL at 08:04

## 2020-04-15 RX ADMIN — TACROLIMUS 1 MG: 1 CAPSULE ORAL at 08:04

## 2020-04-15 RX ADMIN — AZITHROMYCIN 500 MG: 500 INJECTION, POWDER, LYOPHILIZED, FOR SOLUTION INTRAVENOUS at 08:04

## 2020-04-15 RX ADMIN — APIXABAN 5 MG: 5 TABLET, FILM COATED ORAL at 08:04

## 2020-04-15 NOTE — PROGRESS NOTES
The patient location is: home  The chief complaint leading to consultation is: SOB  Visit type: audiovisual  Total time spent with patient: 10 min      Each patient to whom he or she provides medical services by telemedicine is:  (1) informed of the relationship between the physician and patient and the respective role of any other health care provider with respect to management of the patient; and (2) notified that he or she may decline to receive medical services by telemedicine and may withdraw from such care at any time.    Notes:  60 years old male who came to the clinic with for dressing shortness of breath for the last 2 days.  He is a liver transplant patient.  Patient was using a cough medicine with no significant improvement.  The cough sometimes is productive with yellowish sputum.  He was not able to check the oxygen at home.  He reports significant improvement using the CPAP machine.  No fever or chills.  No muscle aches.    Roman was seen today for shortness of breath.    Diagnoses and all orders for this visit:    Suspected Covid-19 Virus Infection    SOB (shortness of breath)     Patient was advised to go to the emergency room because of the worsening of the shortness of breath.    Patient probably needs additional workup and he is in the high risk group.

## 2020-04-15 NOTE — ED PROVIDER NOTES
Encounter Date: 4/15/2020       History     Chief Complaint   Patient presents with    Shortness of Breath     c/o SOB  and cough x3 days, denies fever, liver transplant patient       60-year-old male past medical history of liver transplant (7/2019), MALLIKA, hypertension, paroxysmal AFib, NIDDM, presenting with 3 days of cough and shortness of breath.  Shortness of breath worse with ambulation and coughing.  Shortness of breath improved with CPAP. Has tried Mucinex with mild relief.  No GI complaints.  Denies fever/chills, chest pain, myalgias, headache.          Review of patient's allergies indicates:   Allergen Reactions    Lisinopril      Stomach ache    Flu vaccine 2011 (36 mos+)(pf)      Patient reports he developed Bell's Palsy 2 days after his last flu shot in 2008     Past Medical History:   Diagnosis Date    A-fib     Allergy     Anticoagulant long-term use     Diabetes mellitus, type 2     GERD (gastroesophageal reflux disease)     Hepatocellular carcinoma     liver    History of primary liver cancer 10/24/2018    S/p liver transplant 7/4/2019    Hyperlipidemia     Hypertension      Past Surgical History:   Procedure Laterality Date    COLONOSCOPY      ESOPHAGOGASTRODUODENOSCOPY N/A 1/15/2019    Procedure: ESOPHAGOGASTRODUODENOSCOPY (EGD);  Surgeon: Bony Saavedra MD;  Location: [facility] ENDO;  Service: Endoscopy;  Laterality: N/A;    HERNIA REPAIR      LEFT HEART CATHETERIZATION Left 12/4/2018    Procedure: Left heart cath;  Surgeon: Tyler Hinojosa MD;  Location: [facility] CATH LAB/EP;  Service: Cardiology;  Laterality: Left;    LIVER TRANSPLANT N/A 7/3/2019    Procedure: TRANSPLANT, LIVER;  Surgeon: Oscar Altman Jr., MD;  [facility];  Service: Transplant;  Laterality: N/A;    RADIOFREQUENCY ABLATION N/A 4/12/2019    Procedure: Radiofrequency Ablation;  Surgeon: Rose Surgeon;  Location: [facility] ROSE;  Service: Anesthesiology;  Laterality: N/A;  [place] [id number]/[facility]  History   Problem Relation Age of Onset    Hypertension Mother     Cancer Mother     Hypertension Father     Stroke Father     Cancer Father     Allergic rhinitis Neg Hx     Allergies Neg Hx     Angioedema Neg Hx     Asthma Neg Hx     Atopy Neg Hx     Eczema Neg Hx     Immunodeficiency Neg Hx     Rhinitis Neg Hx     Urticaria Neg Hx      Social History     Tobacco Use    Smoking status: Former Smoker     Packs/day: 1.00     Years: 10.00     Pack years: 10.00     Types: Cigarettes     Last attempt to quit: 1980     Years since quittin.3    Smokeless tobacco: Never Used   Substance Use Topics    Alcohol use: No     Frequency: Never     Binge frequency: Never    Drug use: No     Review of Systems   Constitutional: Positive for fatigue and fever. Negative for chills.   HENT: Negative for congestion, rhinorrhea and sore throat.    Eyes: Negative for visual disturbance.   Respiratory: Positive for cough and shortness of breath. Negative for chest tightness.    Cardiovascular: Negative for chest pain and leg swelling.   Gastrointestinal: Negative for abdominal pain, diarrhea, nausea and vomiting.   Genitourinary: Negative for decreased urine volume, difficulty urinating, flank pain and urgency.   Musculoskeletal: Negative for back pain, myalgias, neck pain and neck stiffness.   Skin: Negative for color change and rash.   Allergic/Immunologic: Positive for immunocompromised state.   Neurological: Negative for dizziness, weakness, light-headedness and headaches.   Hematological: Does not bruise/bleed easily.       Physical Exam     Initial Vitals [04/15/20 1740]   BP Pulse Resp Temp SpO2   (!) 144/85 100 18 100.1 °F (37.8 °C) (!) 93 %      MAP       --         Physical Exam    Nursing note and vitals reviewed.  Constitutional: He appears well-developed and well-nourished. He is not diaphoretic. No distress.   HENT:   Head: Normocephalic and atraumatic.   Nose: Nose normal.   Eyes: Conjunctivae  and EOM are normal. Pupils are equal, round, and reactive to light. No scleral icterus.   Neck: Normal range of motion. Neck supple.   Cardiovascular: Normal rate, regular rhythm and intact distal pulses.   Pulmonary/Chest: No stridor. No respiratory distress.   Abdominal: Soft. He exhibits no distension. There is no tenderness. There is no rebound.   Musculoskeletal: Normal range of motion. He exhibits no edema or tenderness.   Neurological: He is alert and oriented to person, place, and time. He has normal strength.   Skin: Skin is warm and dry. Capillary refill takes less than 2 seconds. No pallor.   Psychiatric: He has a normal mood and affect. His behavior is normal. Judgment and thought content normal.         ED Course   Procedures  Labs Reviewed   CULTURE, BLOOD   CULTURE, BLOOD   CBC W/ AUTO DIFFERENTIAL   COMPREHENSIVE METABOLIC PANEL   C-REACTIVE PROTEIN   FERRITIN   LACTATE DEHYDROGENASE   CK   LACTIC ACID, PLASMA   TROPONIN I   SARS-COV-2 RNA AMPLIFICATION, QUAL          Imaging Results    None          Medical Decision Making:   History:   Old Medical Records: I decided to obtain old medical records.  Old Records Summarized: records from clinic visits, records from previous admission(s) and records from another hospital.       <> Summary of Records: Patient has recently had elevated LFTs, scheduled for upcoming biopsy.  Seen by family medicine nurse practitioner via tele med yesterday, prescribed Tessalon Perles, however today referred to ED for worsening shortness of breath    Initial Assessment:   Patient appears mildly dyspneic after ambulation, O2 sat 92% on room air improved to 98% on 2 L nasal cannula, remainder vital signs stable.  Differential Diagnosis:   Influenza, covid, viral syndrome, PNA, pleural effusion, pulmonary edema, electrolyte abnormality, metabolic abnormality.  Have considered but do not suspect CHF given no evidence of volume overload, no chest pain concerning for  ACS.    Independently Interpreted Test(s):   I have ordered and independently interpreted X-rays - see summary below.       <> Summary of X-Ray Reading(s): Mild bilateral PNA, L>R  I have ordered and independently interpreted EKG Reading(s) - see summary below       <> Summary of EKG Reading(s): No STEMI, NSR, rate 94  Clinical Tests:   Lab Tests: Ordered and Reviewed  Radiological Study: Ordered and Reviewed  Medical Tests: Ordered and Reviewed  ED Management:  CBC, CMP, COVID labs, COVID swab, chest x-ray                                 Clinical Impression:       ICD-10-CM ICD-9-CM   1. Pneumonia due to COVID-19 virus U07.1     J12.89    2. Suspected Covid-19 Virus Infection R68.89    3. Hypoxia R09.02 799.02   4. Immunocompromised state D89.9 279.9                                Malka Delong MD  04/15/20 2051

## 2020-04-15 NOTE — ED TRIAGE NOTES
Pt reports increased SOB since yesterday, called and spoke with Nurse Practitioner and was told to come to ER, Hx of Liver transplant 07/2019  also reports dry cough, denies CP, fever, N/V/D, cold or flu like symptoms.        LOC: The patient is awake, alert, and oriented to place, time, situation. Affect is appropriate.  Speech is appropriate and clear.     APPEARANCE: Patient resting comfortably in no acute distress.  Patient is clean and well groomed.    SKIN: The skin is warm and dry; color consistent with ethnicity.  Patient has normal skin turgor and moist mucus membranes.  Skin intact; no breakdown or bruising noted.     MUSCULOSKELETAL: Patient moving upper and lower extremities without difficulty.  Denies weakness.     RESPIRATORY: Airway is open and patent. Respirations spontaneous, labored breathing noted with activity sats 91-92% on RA, pt reports SOB, placed on @ liters O2 sats 97%. No accessory muscle use noted. Dry cough.     CARDIAC:  Normal rhythm and rate noted.  No peripheral edema noted. No complaints of chest pain.      ABDOMEN: Soft and non tender to palpation.  No distention noted.     NEUROLOGIC: Eyes open spontaneously.  Behavior appropriate to situation.  Follows commands; facial expression symmetrical.  Purposeful motor response noted; normal sensation in all extremities.

## 2020-04-16 PROBLEM — E87.1 HYPONATREMIA: Status: ACTIVE | Noted: 2020-04-16

## 2020-04-16 PROBLEM — E83.42 HYPOMAGNESEMIA: Status: ACTIVE | Noted: 2020-04-16

## 2020-04-16 PROBLEM — D69.6 THROMBOCYTOPENIA, UNSPECIFIED: Status: RESOLVED | Noted: 2019-07-07 | Resolved: 2020-04-16

## 2020-04-16 PROBLEM — D72.810 LYMPHOPENIA: Status: ACTIVE | Noted: 2020-04-16

## 2020-04-16 PROBLEM — E87.6 HYPOKALEMIA: Status: ACTIVE | Noted: 2020-04-16

## 2020-04-16 LAB
ALBUMIN SERPL BCP-MCNC: 3.1 G/DL (ref 3.5–5.2)
ALP SERPL-CCNC: 231 U/L (ref 55–135)
ALT SERPL W/O P-5'-P-CCNC: 82 U/L (ref 10–44)
ANION GAP SERPL CALC-SCNC: 11 MMOL/L (ref 8–16)
AST SERPL-CCNC: 26 U/L (ref 10–40)
BASOPHILS # BLD AUTO: 0 K/UL (ref 0–0.2)
BASOPHILS NFR BLD: 0 % (ref 0–1.9)
BILIRUB SERPL-MCNC: 0.5 MG/DL (ref 0.1–1)
BNP SERPL-MCNC: 24 PG/ML (ref 0–99)
BUN SERPL-MCNC: 13 MG/DL (ref 8–23)
CALCIUM SERPL-MCNC: 8.2 MG/DL (ref 8.7–10.5)
CHLORIDE SERPL-SCNC: 101 MMOL/L (ref 95–110)
CO2 SERPL-SCNC: 23 MMOL/L (ref 23–29)
CREAT SERPL-MCNC: 1 MG/DL (ref 0.5–1.4)
D DIMER PPP IA.FEU-MCNC: 0.2 MG/L FEU
DIFFERENTIAL METHOD: ABNORMAL
EOSINOPHIL # BLD AUTO: 0 K/UL (ref 0–0.5)
EOSINOPHIL NFR BLD: 0 % (ref 0–8)
ERYTHROCYTE [DISTWIDTH] IN BLOOD BY AUTOMATED COUNT: 14.5 % (ref 11.5–14.5)
ERYTHROCYTE [SEDIMENTATION RATE] IN BLOOD BY WESTERGREN METHOD: 69 MM/HR (ref 0–23)
EST. GFR  (AFRICAN AMERICAN): >60 ML/MIN/1.73 M^2
EST. GFR  (NON AFRICAN AMERICAN): >60 ML/MIN/1.73 M^2
ESTIMATED AVG GLUCOSE: 137 MG/DL (ref 68–131)
GLUCOSE SERPL-MCNC: 102 MG/DL (ref 70–110)
HBA1C MFR BLD HPLC: 6.4 % (ref 4–5.6)
HCT VFR BLD AUTO: 39.4 % (ref 40–54)
HGB BLD-MCNC: 12.5 G/DL (ref 14–18)
IMM GRANULOCYTES # BLD AUTO: 0.02 K/UL (ref 0–0.04)
IMM GRANULOCYTES NFR BLD AUTO: 0.7 % (ref 0–0.5)
LYMPHOCYTES # BLD AUTO: 0.3 K/UL (ref 1–4.8)
LYMPHOCYTES NFR BLD: 10.4 % (ref 18–48)
MAGNESIUM SERPL-MCNC: 1.3 MG/DL (ref 1.6–2.6)
MCH RBC QN AUTO: 24.8 PG (ref 27–31)
MCHC RBC AUTO-ENTMCNC: 31.7 G/DL (ref 32–36)
MCV RBC AUTO: 78 FL (ref 82–98)
MONOCYTES # BLD AUTO: 0.3 K/UL (ref 0.3–1)
MONOCYTES NFR BLD: 10.7 % (ref 4–15)
NEUTROPHILS # BLD AUTO: 2.3 K/UL (ref 1.8–7.7)
NEUTROPHILS NFR BLD: 78.2 % (ref 38–73)
NRBC BLD-RTO: 0 /100 WBC
PHOSPHATE SERPL-MCNC: 3 MG/DL (ref 2.7–4.5)
PLATELET # BLD AUTO: 312 K/UL (ref 150–350)
PMV BLD AUTO: 8.9 FL (ref 9.2–12.9)
POCT GLUCOSE: 104 MG/DL (ref 70–110)
POCT GLUCOSE: 113 MG/DL (ref 70–110)
POCT GLUCOSE: 127 MG/DL (ref 70–110)
POCT GLUCOSE: 146 MG/DL (ref 70–110)
POTASSIUM SERPL-SCNC: 3.2 MMOL/L (ref 3.5–5.1)
PROCALCITONIN SERPL IA-MCNC: 0.05 NG/ML
PROT SERPL-MCNC: 6.8 G/DL (ref 6–8.4)
RBC # BLD AUTO: 5.05 M/UL (ref 4.6–6.2)
SODIUM SERPL-SCNC: 135 MMOL/L (ref 136–145)
TACROLIMUS BLD-MCNC: 17.8 NG/ML (ref 5–15)
WBC # BLD AUTO: 2.98 K/UL (ref 3.9–12.7)

## 2020-04-16 PROCEDURE — 85025 COMPLETE CBC W/AUTO DIFF WBC: CPT

## 2020-04-16 PROCEDURE — 63600175 PHARM REV CODE 636 W HCPCS: Performed by: PHYSICIAN ASSISTANT

## 2020-04-16 PROCEDURE — 99900035 HC TECH TIME PER 15 MIN (STAT)

## 2020-04-16 PROCEDURE — 25000003 PHARM REV CODE 250: Performed by: HOSPITALIST

## 2020-04-16 PROCEDURE — 36415 COLL VENOUS BLD VENIPUNCTURE: CPT

## 2020-04-16 PROCEDURE — 84145 PROCALCITONIN (PCT): CPT

## 2020-04-16 PROCEDURE — 80197 ASSAY OF TACROLIMUS: CPT

## 2020-04-16 PROCEDURE — 99233 SBSQ HOSP IP/OBS HIGH 50: CPT | Mod: ,,, | Performed by: HOSPITALIST

## 2020-04-16 PROCEDURE — 63700000 PHARM REV CODE 250 ALT 637 W/O HCPCS: Performed by: PHYSICIAN ASSISTANT

## 2020-04-16 PROCEDURE — 94761 N-INVAS EAR/PLS OXIMETRY MLT: CPT

## 2020-04-16 PROCEDURE — 80053 COMPREHEN METABOLIC PANEL: CPT

## 2020-04-16 PROCEDURE — 84100 ASSAY OF PHOSPHORUS: CPT

## 2020-04-16 PROCEDURE — 80197 ASSAY OF TACROLIMUS: CPT | Mod: 91

## 2020-04-16 PROCEDURE — 83880 ASSAY OF NATRIURETIC PEPTIDE: CPT

## 2020-04-16 PROCEDURE — 11000001 HC ACUTE MED/SURG PRIVATE ROOM

## 2020-04-16 PROCEDURE — 85652 RBC SED RATE AUTOMATED: CPT

## 2020-04-16 PROCEDURE — 85379 FIBRIN DEGRADATION QUANT: CPT

## 2020-04-16 PROCEDURE — 25000003 PHARM REV CODE 250: Performed by: PHYSICIAN ASSISTANT

## 2020-04-16 PROCEDURE — 82960 TEST FOR G6PD ENZYME: CPT

## 2020-04-16 PROCEDURE — 83036 HEMOGLOBIN GLYCOSYLATED A1C: CPT

## 2020-04-16 PROCEDURE — 99233 PR SUBSEQUENT HOSPITAL CARE,LEVL III: ICD-10-PCS | Mod: ,,, | Performed by: HOSPITALIST

## 2020-04-16 PROCEDURE — 83735 ASSAY OF MAGNESIUM: CPT

## 2020-04-16 RX ORDER — HYDROXYCHLOROQUINE SULFATE 200 MG/1
400 TABLET, FILM COATED ORAL 2 TIMES DAILY
Status: COMPLETED | OUTPATIENT
Start: 2020-04-16 | End: 2020-04-16

## 2020-04-16 RX ORDER — LORAZEPAM/0.9% SODIUM CHLORIDE 100MG/0.1L
2 PLASTIC BAG, INJECTION (ML) INTRAVENOUS
Status: COMPLETED | OUTPATIENT
Start: 2020-04-16 | End: 2020-04-16

## 2020-04-16 RX ORDER — TACROLIMUS 0.5 MG/1
2 CAPSULE ORAL EVERY MORNING
Status: DISCONTINUED | OUTPATIENT
Start: 2020-04-17 | End: 2020-04-16

## 2020-04-16 RX ORDER — ATORVASTATIN CALCIUM 20 MG/1
40 TABLET, FILM COATED ORAL NIGHTLY
Status: DISCONTINUED | OUTPATIENT
Start: 2020-04-16 | End: 2020-04-20 | Stop reason: HOSPADM

## 2020-04-16 RX ORDER — HYDROXYCHLOROQUINE SULFATE 200 MG/1
400 TABLET, FILM COATED ORAL DAILY
Status: COMPLETED | OUTPATIENT
Start: 2020-04-17 | End: 2020-04-20

## 2020-04-16 RX ORDER — POTASSIUM CHLORIDE 20 MEQ/1
40 TABLET, EXTENDED RELEASE ORAL ONCE
Status: DISCONTINUED | OUTPATIENT
Start: 2020-04-16 | End: 2020-04-18

## 2020-04-16 RX ADMIN — MAGNESIUM SULFATE IN WATER 2 G: 40 INJECTION, SOLUTION INTRAVENOUS at 11:04

## 2020-04-16 RX ADMIN — HYDROXYCHLOROQUINE SULFATE 400 MG: 200 TABLET, FILM COATED ORAL at 08:04

## 2020-04-16 RX ADMIN — APIXABAN 5 MG: 5 TABLET, FILM COATED ORAL at 09:04

## 2020-04-16 RX ADMIN — PANTOPRAZOLE SODIUM 40 MG: 40 TABLET, DELAYED RELEASE ORAL at 09:04

## 2020-04-16 RX ADMIN — CETIRIZINE HYDROCHLORIDE 10 MG: 10 TABLET, FILM COATED ORAL at 09:04

## 2020-04-16 RX ADMIN — METOPROLOL SUCCINATE 50 MG: 50 TABLET, EXTENDED RELEASE ORAL at 09:04

## 2020-04-16 RX ADMIN — APIXABAN 5 MG: 5 TABLET, FILM COATED ORAL at 08:04

## 2020-04-16 RX ADMIN — CEFTRIAXONE SODIUM 1 G: 1 INJECTION, POWDER, FOR SOLUTION INTRAMUSCULAR; INTRAVENOUS at 08:04

## 2020-04-16 RX ADMIN — HYDROXYCHLOROQUINE SULFATE 400 MG: 200 TABLET, FILM COATED ORAL at 02:04

## 2020-04-16 RX ADMIN — ATORVASTATIN CALCIUM 40 MG: 20 TABLET, FILM COATED ORAL at 08:04

## 2020-04-16 RX ADMIN — MAGNESIUM SULFATE IN WATER 2 G: 40 INJECTION, SOLUTION INTRAVENOUS at 09:04

## 2020-04-16 RX ADMIN — AZITHROMYCIN MONOHYDRATE 250 MG: 250 TABLET ORAL at 08:04

## 2020-04-16 NOTE — H&P
Hospital Medicine  History and Physical  [facility]      Patient Name: Roman Hodges  MRN:  [id number]  Hospital Medicine Team: Networked reference to record PCT    Date of Admission:  4/15/2020     Length of Stay:  LOS: 0 days     Principal Problem: Covid-19 Virus Infection    Chief complaint    Shortness of breath    HPI  Patient is a 60 year old gentleman with a h/o liver transplant (7/2019) on Prograf and Cellcept, MALLIKA, HTN, paroxysmal A Fib on Eliquis, DM II, and GERD.  He presents with SOB for three days. The patient says that the dry cough started first abut a week ago and then over the past couple of days he has had the worsening SOB.  Patient states symptoms are worse with exertion and improves with use of his CPAP.  Denies fevers, chills, nausea, vomiting chest pain, dizziness, or palpations.    Review of Systems    Constitutional: Negative for fever, chills, fatigue, poor appetite   HENT: Negative for trouble swallowing.    Eyes: Negative for photophobia, visual disturbance.   Respiratory: Positive for cough, shortness of breath  Cardiovascular: Negative for chest pain, palpitations, leg swelling.   Gastrointestinal: Negative for abdominal pain, constipation, nausea, vomiting, diarrhea.   Endocrine: Negative for cold intolerance, heat intolerance.   Genitourinary: Negative for dysuria, frequency.   Musculoskeletal: Negative for arthralgias, myalgias.   Skin: Negative for rash  Neurological: Negative for dizziness, syncope, light-headedness.   Psychiatric/Behavioral: Negative for confusion, hallucinations, anxiety    Past Medical History:   Diagnosis Date    A-fib     Allergy     Anticoagulant long-term use     Diabetes mellitus, type 2     GERD (gastroesophageal reflux disease)     Hepatocellular carcinoma     liver    History of primary liver cancer 10/24/2018    S/p liver transplant 7/4/2019    Hyperlipidemia     Hypertension      Past Surgical History:   Procedure  Laterality Date    COLONOSCOPY      ESOPHAGOGASTRODUODENOSCOPY N/A 1/15/2019    Procedure: ESOPHAGOGASTRODUODENOSCOPY (EGD);  Surgeon: Bony Saavedra MD;  Location: [facility] ENDO;  Service: Endoscopy;  Laterality: N/A;    HERNIA REPAIR      LEFT HEART CATHETERIZATION Left 2018    Procedure: Left heart cath;  Surgeon: Tyler Hinojosa MD;  Location: [facility] CATH LAB/EP;  Service: Cardiology;  Laterality: Left;    LIVER TRANSPLANT N/A 7/3/2019    Procedure: TRANSPLANT, LIVER;  Surgeon: Oscar Altman Jr., MD;  Location: [address] FLR;  Service: Transplant;  Laterality: N/A;    RADIOFREQUENCY ABLATION N/A 2019    Procedure: Radiofrequency Ablation;  Surgeon: Rose Surgeon;  Location: [facility] RO[facility];  Service: Anesthesiology;  Laterality: N/A;  Room [address] [place] [address]     Family History   Problem Relation Age of Onset    Hypertension Mother     Cancer Mother     Hypertension Father     Stroke Father     Cancer Father     Allergic rhinitis Neg Hx     Allergies Neg Hx     Angioedema Neg Hx     Asthma Neg Hx     Atopy Neg Hx     Eczema Neg Hx     Immunodeficiency Neg Hx     Rhinitis Neg Hx     Urticaria Neg Hx      Social History     Socioeconomic History    Marital status:      Spouse name: Not on file    Number of children: Not on file    Years of education: Not on file    Highest education level: Not on file   Occupational History    Not on file   Social Needs    Financial resource strain: Not very hard    Food insecurity:     Worry: Never true     Inability: Never true    Transportation needs:     Medical: No     Non-medical: No   Tobacco Use    Smoking status: Former Smoker     Packs/day: 1.00     Years: 10.00     Pack years: 10.00     Types: Cigarettes     Last attempt to quit: 1980     Years since quittin.3    Smokeless tobacco: Never Used   Substance and Sexual Activity    Alcohol use: No     Frequency: Never     Binge frequency: Never    Drug use: No    Sexual  activity: Yes     Partners: Female   Lifestyle    Physical activity:     Days per week: 1 day     Minutes per session: 20 min    Stress: Not at all   Relationships    Social connections:     Talks on phone: More than three times a week     Gets together: Three times a week     Attends Congregational service: Not on file     Active member of club or organization: Yes     Attends meetings of clubs or organizations: More than 4 times per year     Relationship status:    Other Topics Concern    Not on file   Social History Narrative    Not on file       Medications  Current Facility-Administered Medications on File Prior to Encounter   Medication Dose Route Frequency Provider Last Rate Last Dose    0.9%  NaCl infusion   Intravenous Continuous Bony Saavedra MD   Stopped at 01/15/19 1332    sodium chloride 0.9% flush 3 mL  3 mL Intravenous PRN Bony Saavedra MD         Current Outpatient Medications on File Prior to Encounter   Medication Sig Dispense Refill    amLODIPine (NORVASC) 10 MG tablet Take 1 tablet (10 mg total) by mouth once daily. 90 tablet 3    apixaban (ELIQUIS) 5 mg Tab Take 1 tablet (5 mg total) by mouth 2 (two) times daily. 60 tablet 11    benzonatate (TESSALON) 100 MG capsule Take 1 capsule (100 mg total) by mouth 3 (three) times daily as needed for Cough. 30 capsule 0    blood sugar diagnostic Strp Test blood glucose 3 (three) times daily. 100 each 11    blood sugar diagnostic Strp To use 4 times daily with blood glucose meter 100 each 10    blood-glucose meter kit Use as instructed 1 each 0    calcium carbonate (TUMS) 200 mg calcium (500 mg) chewable tablet Take 1 tablet (500 mg total) by mouth 3 (three) times daily as needed for Heartburn. 30 tablet 0    ergocalciferol (ERGOCALCIFEROL) 50,000 unit Cap TAKE 1 CAPSULE BY MOUTH ONE TIME PER WEEK 12 capsule 0    esomeprazole (NEXIUM) 40 MG capsule TAKE 1 CAPSULE BY MOUTH EVERY DAY 90 capsule 3    lancets Misc Test blood glucose 3  "(three) times daily. 100 each 11    metoprolol succinate (TOPROL-XL) 50 MG 24 hr tablet Take 1 tablet (50 mg total) by mouth once daily. 90 tablet 3    mycophenolate (CELLCEPT) 250 mg Cap TAKE FOUR CAPSULES BY MOUTH TWICE DAILY 240 capsule 0    NIFEdipine (PROCARDIA-XL) 30 MG (OSM) 24 hr tablet       pen needle, diabetic (EASY COMFORT PEN NEEDLES) 32 gauge x 5/32" Ndle Inject 1 each into the skin 3 (three) times daily. 100 each 11    SITagliptin (JANUVIA) 100 MG Tab Take 1 tablet (100 mg total) by mouth once daily. 90 tablet 3    tacrolimus (PROGRAF) 1 MG Cap Take 2mg in AM and 1mg in  capsule 11    cetirizine (ZYRTEC) 10 MG tablet Take 1 tablet (10 mg total) by mouth once daily. for 10 days 10 tablet 0       Allergies  Flu vaccine 2011 (36 mos+)(pf) and Lisinopril    Physical Examination  Temp:  [100.1 °F (37.8 °C)]   Pulse:  []   Resp:  [18-26]   BP: (142-151)/(85-93)   SpO2:  [93 %-97 %]     Gen: NAD, conversant  Head: NC, AT  Eyes: PERRLA, EOMI  Throat: MMM, OP clear  CV: RRR, no M/R/G, no peripheral edema, no JVD  Resp: CTAB, no increased work of breathing on RA  GI: Soft, NT, ND, +BS  Ext: MAEW, no c/c/e  Neuro: AAOx3, CN grossly intact, no focal neurologic deficits  Psychiatry: Normal mood, normal affect    Laboratory:  Recent Labs   Lab 04/13/20  0736 04/15/20  1811   WBC 2.76* 2.92*   LYMPH 11.2*  0.3* 10.6*  0.3*   HGB 13.4* 12.8*   HCT 41.7 39.0*    308     Recent Labs   Lab 04/13/20  0736 04/15/20  1811    133*   K 3.4* 4.1    101   CO2 26 21*   BUN 10 16   CREATININE 1.1 1.2   * 136*   CALCIUM 8.7 8.1*     Recent Labs   Lab 04/13/20  0736 04/15/20  1811   ALKPHOS 254* 259*   * 106*   * 41*   ALBUMIN 3.5 3.3*   PROT 7.5 7.5   BILITOT 0.5 0.4      Recent Labs     04/15/20  1811 04/15/20  1930   FERRITIN 441*  --    CRP 62.0*  --    LDH  --  260   TROPONINI 0.008  --    LACTATE 1.1  --        All labs within the last 24 hours were reviewed. "     Microbiology:  Lab Results   Component Value Date    [id number] Positive (A) 04/15/2020       Microbiology Results (last 7 days)     Procedure Component Value Units Date/Time    Culture, Respiratory with Gram Stain [[id number]]     Order Status:  No result Specimen:  Sputum, Expectorated     Influenza A & B by Molecular [[id number]]     Order Status:  No result Specimen:  Nasopharyngeal Swab     Blood Culture #2 **CANNOT BE ORDERED STAT** [[id number]] Collected:  04/15/20 1822    Order Status:  Sent Specimen:  Blood from Peripheral, Antecubital, Right Updated:  04/15/20 1919    Blood Culture #1 **CANNOT BE ORDERED STAT** [[id number]] Collected:  04/15/20 1811    Order Status:  Sent Specimen:  Blood from Peripheral, Hand, Right Updated:  04/15/20 1837            Imaging      Results for orders placed during the hospital encounter of 07/03/19   Transthoracic echo (TTE) 2D with Color Flow    Narrative · Concentric left ventricular remodeling.  · Normal left ventricular systolic function. The estimated ejection   fraction is 55%  · Normal LV diastolic function.  · Normal right ventricular systolic function.          X-Ray Chest AP Portable  Narrative: EXAMINATION:  XR CHEST AP PORTABLE    CLINICAL HISTORY:  Suspected Covid-19 Virus Infection;    TECHNIQUE:  Single frontal view of the chest was performed.    COMPARISON:  07/07/2019    FINDINGS:  The cardiomediastinal silhouette is magnified by technique, stable in appearance..  There is no pleural effusion.  The trachea is midline.  The lungs are symmetrically expanded bilaterally with coarse interstitial attenuation and bilateral basilar subsegmental atelectasis.  There may be slightly more focal parenchymal attenuation projected along the lateral aspect of the left lower and midlung zones..  There is no pneumothorax.  The osseous structures are remarkable for degenerative change..  Impression: 1. Bilateral basilar subsegmental atelectasis.  There may be developing  parenchymal attenuation projected over the left lower lung zone, changes of infection or inflammation are considerations versus edema, correlation is needed.    Electronically signed by: Darren Kaba MD  Date:    04/15/2020  Time:    18:50      All imaging within the last 24 hours was reviewed.       Assessment and Plan:    Active Hospital Problems    Diagnosis  POA    *Pneumonia due to COVID-19 virus [U07.1, J12.89]  Yes    S/P liver transplant [Z94.4]  Not Applicable     Chronic    Prophylactic immunotherapy [Z29.8]  Not Applicable     Chronic    Long term (current) use of anticoagulants [Z79.01]  Not Applicable     Chronic    Essential hypertension [I10]  Yes     Chronic    Type 2 diabetes mellitus, without long-term current use of insulin [E11.9]  Yes     Chronic    Atrial fibrillation [I48.91]  Yes     Chronic    GERD (gastroesophageal reflux disease) [K21.9]  Yes     Chronic      Resolved Hospital Problems    Diagnosis Date Resolved POA    Chronic systolic heart failure [I50.22] 04/15/2020 Yes       Suspected Covid-19 Virus Infection  Person Under Investigation (PUI) for COVID-19  - COVID-19 testing: POSITIVE 4/15  - Infection Control notified    - Isolation:   - Airborne and Droplet Precautions  - Surgical mask on patient   - N95 masks must be fit tested, wear eye protection  - 20 second hand hygiene   - Limit visitors per hospital policy   - Consolidate lab draws, nursing care, and interventions    - Diagnostics: (Lymphopenia, hyponatremia, hyperferritinemia, elevated troponin, elevated d-dimer, age, and comorbidities are significant predictors of poor clinical outcome)   - CBC:  WBC 2.92, lymph #0.3, trend Q48hrs  - CMP:  trend Q48hrs  - Procalcitonin:  Pending  - D-dimer:  Pending, repeat prior to discharge  - Ferritin:  441, repeat prior to discharge   - CRP:  62.0, trend Q48hrs  - LDH:  260, repeat prior to discharge   - BNP:  Pending  - Troponin:  0.008   - ECG:  NSR   - rapid Flu:   Pending   - RIP only if BMT/solid transplant:  N/A   - Legionella antigen:  Pending   - Blood culture x2:  Pending   - Sputum culture:  Pending   - CXR:  Bilateral basilar subsegmental atelectasis.  There may be developing parenchymal attenuation projected over the left lower lung zone, changes of infection or inflammation are considerations versus edema.   - UA and culture:  Pending   - CPK:  146    - Management:   Bundle care as able to maintain isolation & minimize in/out of room   - Supplemental O2 to maintain SpO2 92%-96%   if requiring 6L NC or higher, place on nonrebreather and discuss case with MICU   - Telemetry & continuous pulse oximetry    - If wheezing   - albuterol inhaler 2-4 puff Q6hr PRN    - ipratropium daily    - acetaminophen 650mg PO Q6hr PRN fever   - loperamide PRN for viral diarrhea  - Empiric antibiotics per likely source & patient allergies    - CAP: x 5 day course (d/c early if low concern for bacterial co-infection)  Ceftriaxone 1g IV Q24hrs            Azithromycin 500mg IV day #1, then 250mg PO daily x4 days     If azithromycin is not available, start doxycycline                 If MRSA risk factors, add Vancomycin IV (PharmD consult)   - Investigational Treatment Protocol: (if patient meets criteria)   [email or web address]     - statin: atorvastatin 40mg po daily (if CPK WNL)    - start HCQ 400mg PO BID x1 day, then 400mg PO daily x 4 days   (check glucose 6 phosphate dehydrogenase (NOT G6PD Quant), ECG at start & 48hrs, and start Qshift POCT glucose)    Safety notes:   - Avoid NIPPV (CPAP/BiPAP) to prevent aerosolization, use on a case-by-case basis if in neg pressure room   - Cautious use of NSAIDS for fever per WHO recommendations (3/16/2020)   - No new ACEi/ARB start or discontinuation of chronic med unless hypotensive (Esler et al. Journal of Hypertension 2020, 38:000-000)   - Careful  use of steroids in the absence of other indications (shock, ARDS)   - Fluid sparing resuscitation, avoid maintenance fluids     S/p liver transplant  Propylactic immunotherapy  - AST 41, , AlkPhos 259. Pt. Was to undergo EUS liver biopsy sometime this week according to documentation  - Continue Prograf 2mg daily and 1mg qHS.  Will monitor levels.  - Hold Cellcept.  - Will consult LTS for further recommendations for immunotherapy and need for scheduled biopsy during COVID-19 illness    Atrial fibrillation  Long term use of anticoagulants  -Pt. In NSR on admit, no acute issues  - Continue Eliquis 5mg BID for AC and toprol-XL for rate control    DM II  - Diabetic diet, low dose SSI.    HTN  -metoprolol succinate 50mg daily, pt. Reports PCP has discontinued other blood pressure medicines.    GERD  - Protonix.      VTE High Risk Prophylaxis:   VTE Risk Mitigation (From admission, onward)         Ordered     apixaban tablet 5 mg  2 times daily      04/15/20 1948              Lazarus Qureshi MD  Hospital Medicine Staff  [phone] pager

## 2020-04-16 NOTE — PHARMACY MED REC
"Admission Medication Reconciliation - Pharmacy Consult Note    The home medication history was taken by Trinity Hall, Pharmacy Tech.     You may go to "Admission" then "Reconcile Home Medications" tabs to review and/or act upon these items.     Potentially problematic discrepancies with current MAR  o Patient IS taking the following which was not ordered upon admit  o Norvasc 10mg PO daily (pt normotensive currently)    Molly Clements, PharmD, BCPS  [id number]                    .    .            "

## 2020-04-16 NOTE — PROGRESS NOTES
Hospital Medicine  Progress Note  [facility]      Patient Name: Roman Hodges  MRN:  [id number]  Hospital Medicine Team: [facility] LIVER TRANSPLANT Yanely Betancourt MD  Date of Admission:  4/15/2020     Length of Stay:  LOS: 1 day       Principal Problem:  Pneumonia due to COVID-19 virus      HPI:  60 year old gentleman with a h/o liver transplant (7/2019) on Prograf and Cellcept, MALLIKA, HTN, paroxysmal A Fib on Eliquis, DM II, and GERD.  He presents with SOB for three days. The patient says that the dry cough started first abut a week ago and then over the past couple of days he has had the worsening SOB.  Patient states symptoms are worse with exertion and improves with use of his CPAP.  Denies fevers, chills, nausea, vomiting chest pain, dizziness, or palpations.    Hospital Course:  Patient admitted for evaluation of +COVID-19.    Interval History:     Doing well   On 2L NC  Feeling better   HDS  No cough   No abd pain or n/v or diarrhea. Labs reviewed.  Prograf held due to high prograf level of 17 on AM labs. HCQ initiated . Hepatology consulted for IS assistance     Review of Systems:  ROS (Positive in Bold, otherwise negative)  Constitutional: fever, chills, night sweats, malaise, weakness  CV: chest pain, edema, palpitations  Resp: SOB, cough, sputum production  GI: changes in appetite, NVDC, pain, melena, hematochezia, GERD, hematemesis  : Dysuria, hematuria, urinary urgency, frequency  MSK: arthralgia/myalgia, joint swelling  Neuro/Psych: anxiety, depression    Inpatient Medications:     apixaban  5 mg Oral BID    azithromycin  250 mg Oral Q24H    cefTRIAXone (ROCEPHIN) IVPB  1 g Intravenous QHS    cetirizine  10 mg Oral Daily    hydroxychloroquine  400 mg Oral BID    Followed by    [START ON 4/17/2020] hydroxychloroquine  400 mg Oral Daily    metoprolol succinate  50 mg Oral Daily    pantoprazole  40 mg Oral Daily    potassium chloride SA  40 mEq Oral Once  "    acetaminophen, albuterol **AND** MDI Q6H PRN, benzonatate, calcium carbonate, Dextrose 10% Bolus, Dextrose 10% Bolus, glucagon (human recombinant), glucose, glucose, insulin aspart U-100, loperamide, melatonin, ondansetron, promethazine (PHENERGAN) IVPB, sodium chloride 0.9%, sodium chloride 0.9%    Physical Exam:      Intake/Output Summary (Last 24 hours) at 4/16/2020 1521  Last data filed at 4/15/2020 2213  Gross per 24 hour   Intake 250 ml   Output --   Net 250 ml     Wt Readings from Last 3 Encounters:   04/15/20 94.1 kg (207 lb 6.4 oz)   02/13/20 94.8 kg (208 lb 14.4 oz)   01/20/20 93.2 kg (205 lb 7.5 oz)       /79   Pulse 80   Temp 98.5 °F (36.9 °C) (Oral)   Resp (!) 27   Ht 5' 8" (1.727 m)   Wt 94.1 kg (207 lb 6.4 oz)   SpO2 (!) 94%   BMI 31.54 kg/m²     GEN: NAD, conversant  Resp: decreased bilateral breath sounds, no wheezes or rales, normal work of breathing   On 2 L NC  CV: RRR, no m/r/g, no edema  GI: soft, NTND  Skin: no rash    Laboratory:  Lab Results   Component Value Date    [id number] Positive (A) 04/15/2020       Recent Labs   Lab 04/13/20  0736 04/15/20  1811 04/16/20  0432   WBC 2.76* 2.92* 2.98*   LYMPH 11.2*  0.3* 10.6*  0.3* 10.4*  0.3*   HGB 13.4* 12.8* 12.5*   HCT 41.7 39.0* 39.4*    308 312     Recent Labs   Lab 04/13/20  0736 04/15/20  1811 04/16/20  0432    133* 135*   K 3.4* 4.1 3.2*    101 101   CO2 26 21* 23   BUN 10 16 13   CREATININE 1.1 1.2 1.0   * 136* 102   CALCIUM 8.7 8.1* 8.2*   MG  --   --  1.3*   PHOS  --   --  3.0     Recent Labs   Lab 04/13/20  0736 04/15/20  1811 04/16/20  0432   ALKPHOS 254* 259* 231*   * 106* 82*   * 41* 26   ALBUMIN 3.5 3.3* 3.1*   PROT 7.5 7.5 6.8   BILITOT 0.5 0.4 0.5        Recent Labs     04/15/20  1811 04/15/20  1930 04/16/20  0432   DDIMER  --   --  0.20   FERRITIN 441*  --   --    CRP 62.0*  --   --    LDH  --  260  --    BNP  --   --  24   TROPONINI 0.008  --   --      --   --  "       All labs within the last 24 hours were reviewed.     Microbiology:    All microbiology within the last 24 hours was reviewed.     Imaging    All imaging within the last 24 hours was reviewed.     Assessment and Plan:    Active Hospital Problems    Diagnosis  POA    *Pneumonia due to COVID-19 virus [U07.1, J12.89]  Yes    Lymphopenia [D72.810]  Yes    Hyponatremia [E87.1]  Yes    Hypokalemia [E87.6]  Yes    Hypomagnesemia [E83.42]  Yes    Long-term use of immunosuppressant medication [Z79.899]  Not Applicable    S/P liver transplant [Z94.4]  Not Applicable     Chronic    Prophylactic immunotherapy [Z29.8]  Not Applicable     Chronic    Long term (current) use of anticoagulants [Z79.01]  Not Applicable     Chronic    Essential hypertension [I10]  Yes     Chronic    Type 2 diabetes mellitus, without long-term current use of insulin [E11.9]  Yes     Chronic    Atrial fibrillation [I48.91]  Yes     Chronic    GERD (gastroesophageal reflux disease) [K21.9]  Yes     Chronic      Resolved Hospital Problems   No resolved problems to display.       Pneumonia due to COVID-19 virus    - COVID-19 testing: POSITIVE 4/15  - Infection Control notified     - Isolation:   - Airborne and Droplet Precautions  - Surgical mask on patient   - N95 masks must be fit tested, wear eye protection  - 20 second hand hygiene              - Limit visitors per hospital policy              - Consolidate lab draws, nursing care, and interventions     - Diagnostics: (Lymphopenia, hyponatremia, hyperferritinemia, elevated troponin, elevated d-dimer, age, and comorbidities are significant predictors of poor clinical outcome)              - CBC:  WBC 2.92, lymph #0.3, trend Q48hrs  - CMP:  trend Q48hrs  - Procalcitonin:  Pending  - D-dimer:  Pending, repeat prior to discharge  - Ferritin:  441, repeat prior to discharge   - CRP:  62.0, trend Q48hrs  - LDH:  260, repeat prior to discharge   - BNP:  Pending  - Troponin:  0.008               - ECG:  NSR              - rapid Flu:  Pending              - RIP only if BMT/solid transplant:  N/A              - Legionella antigen:  Pending              - Blood culture x2:  Pending              - Sputum culture:  Pending              - CXR:  Bilateral basilar subsegmental atelectasis.  There may be developing parenchymal attenuation projected over the left lower lung zone, changes of infection or inflammation are considerations versus edema.              - UA and culture:  negative               - CPK:  146     - Management:   Bundle care as able to maintain isolation & minimize in/out of room   - Supplemental O2 to maintain SpO2 92%-96%   if requiring 6L NC or higher, place on nonrebreather and discuss case with MICU              - Telemetry & continuous pulse oximetry               - If wheezing   - albuterol inhaler 2-4 puff Q6hr PRN    - ipratropium daily               - acetaminophen 650mg PO Q6hr PRN fever              - loperamide PRN for viral diarrhea  - Empiric antibiotics per likely source & patient allergies                          - CAP: x 5 day course (d/c early if low concern for bacterial co-infection)  Ceftriaxone 1g IV Q24hrs                                      Azithromycin 500mg IV day #1, then 250mg PO daily x4 days   - Investigational Treatment Protocol:                            -start statin: atorvastatin 40mg po daily (if CPK WNL)                          - start HCQ 400mg PO BID x1 day, then 400mg PO daily x 4 days   (check glucose 6 phosphate dehydrogenase (NOT G6PD Quant), ECG at start & 48hrs, and start Qshift POCT glucose)     Safety notes:              - Avoid NIPPV (CPAP/BiPAP) to prevent aerosolization, use on a case-by-case basis if in neg pressure room              - Cautious use of NSAIDS for fever per WHO recommendations (3/16/2020)              - No new ACEi/ARB start or discontinuation of chronic med unless hypotensive (Esler et al. Journal of Hypertension 2020,  38:000-000)              - Careful use of steroids in the absence of other indications (shock, ARDS)              - Fluid sparing resuscitation, avoid maintenance fluids                S/p liver transplant  Propylactic immunotherapy  - AST 41, , AlkPhos 259. Pt. Was to undergo EUS liver biopsy sometime this week according to documentation  - at home on Prograf 2mg daily and 1mg qHS.    - Holding  Cellcept.  - prograf level of 17 on 4/16- prograf held  - hepatology consulted for IS management      Atrial fibrillation  Long term use of anticoagulants  -Pt. In NSR on admit, no acute issues  - Continue Eliquis 5mg BID for AC and toprol-XL for rate control     DM II  - Diabetic diet, low dose SSI.     HTN  -metoprolol succinate 50mg daily, pt. Reports PCP has discontinued other blood pressure medicines.     GERD  - Protonix.      Signing Physician:     Yanely Betancourt MD  Department of Hospital Medicine   [facility]  Pager [phone]  Spectra [phone]  4/16/2020

## 2020-04-16 NOTE — PROGRESS NOTES
Report received from WANDA Whitley. Pt arrived to room via stretcher and assisted to bed.  Patient oriented to room and call light and instructed to call for assistance.  Wife notified of admission.  Patient >92% on 2L.  Call light and belongings within reach.  Will continue to monitor.

## 2020-04-16 NOTE — PLAN OF CARE
Plan of care discussed with patient. Pt AAOx4. Pt sats >90% on 2L NC. Patient is free of fall/trauma/injury. Denies CP, SOB, or pain/discomfort. All questions addressed. Will continue to monitor

## 2020-04-16 NOTE — TREATMENT PLAN
Hepatology Treatment Plan    Roman Hodges is a 60 y.o. male admitted to hospital 4/15/2020 (Hospital Day: 2) due to Pneumonia due to COVID-19 virus. Hepatology following for immunosuppression management.        Lab Results   Component Value Date    TACROLIMUS 17.8 (H) 04/16/2020    TACROLIMUS 10.8 04/13/2020    TACROLIMUS 6.9 03/09/2020       Lab Results   Component Value Date    CREATININE 1.0 04/16/2020    CREATININE 1.2 04/15/2020    CREATININE 1.1 04/13/2020       Lab Results   Component Value Date    ALT 82 (H) 04/16/2020    AST 26 04/16/2020     (H) 07/06/2019    ALKPHOS 231 (H) 04/16/2020    BILITOT 0.5 04/16/2020    WBC 2.98 (L) 04/16/2020    HGB 12.5 (L) 04/16/2020     04/16/2020     Roman Hodges is a 60 y.o. 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     Kidney function is stable  Liver enzymes are improving    Plan  Hold tacrolimus today given high trough levels      Immunosuppression Regimen:  Tacrolimus: 2mg qAM, 1mg qPM (home dose) hold for today.     Please notify hepatology on day of discharge to discuss discharge medications and follow up.     Please obtain daily CBC, BMP, LFT, INR, immunosuppressant trough level    Thank you for involving us in the care of Roman Hodges. Please call with any additional concerns or questions.    Due to COVID-19, the Hepatology team is limiting interaction with consult patients unless absolutely necessary in order to limit patient's exposure to the virus. As of now, the Hepatology team will be chart checking this patient and discussing with staff. If the patient has an acute decompensation, change, and/or you believe the patient needs to be examined by a provider, please page Hepatology team on call to discuss the patient with a team member.     Steven Locke MD  Gastroenterology Fellow      ATTESTATION  I have personally taken the history and examined  this patient and I agree with the fellow's note as stated above with the following comments:     Liver transplant recipient admitted with COVID-19. Liver allograft function is good. Liver tests were elevated past few days but now improving.  Patient is not on MMF. TAC trough level is 17 - supratherapeutic.    Will hold tacrolimus today, please check TAC level daily and monitor liver tests. Supportive care for COVID-19.    Stephon Trevizo MD  Staff Physician  Hepatology and Liver Transplant  [facility]  [facility]

## 2020-04-16 NOTE — PROGRESS NOTES
Admit Note     CoVid-19 precaution - Transplant SW conducted with wife by telephone.    Pt's wife reported, pt will need a Egyptian speaking . Thus SW completed admit note with pt's wife Kati Hodges [phone].     Met with wife to assess patients needs due to pt needed a Kiswahili speaking .  Patient is a 60 y.o.  male, admitted Hypoxia, Immunocompromised state, Pneumonia due to COVID-19 virus, and Suspected Covid-19 Virus Infection    Patient admitted from direct admit on 4/15/2020 . At this time, patients caregiver presents as alert and oriented x 4, pleasant, recall good, concentration/judgement good, average intelligence, calm, communicative, cooperative and asking and answering questions appropriately.      Household/Family Systems (as reported by patients caregiver)     Patient resides with patient's wife, at [address] [place] [address].  Support system includes family.  Patient does not have dependents that are in need of being cared for.      Patients primary caregiver is Kati Hodges, patients wife, phone number [phone].  Confirmed patients contact information is [phone] (home);   Telephone Information:   Mobile [phone]     During admission, patient's caregiver plans to stay at home.  Confirmed patient and patients caregivers do have access to reliable transportation.    Cognitive Status/Learning     Patients caregiver reports patients reading ability as 12th grade and states patient does have difficulty with seeing and hearing.  Patients caregiver reports patient learns best by written, verbal, and demonstration.   Needed: Yes; primary language is  Egyptian.  Patient's caregiver informed of  services available and how to access services..   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    Adherence     Patients caregiver reports patient has a high level of adherence to patients health care regimen.  Adherence counseling and education provided.  Patient's caregiver verbalizes understanding.    Substance Use    Patients caregiver reports patients substance usage as the following:    Tobacco: none, patient denies any use.  Alcohol: none, patient denies any use.  Illicit Drugs/Non-prescribed Medications: none, patient denies any use.  Patients caregiver states clear understanding of the potential impact of substance use.  Substance abstinence/cessation counseling, education and resources provided and reviewed.     Services Utilizing/ADLS (as reported by patients caregiver)    Infusion Service: Prior to admission, patient utilizing? no  Home Health: Prior to admission, patient utilizing? no  DME: Prior to admission, yes. CPAP.   Pulmonary/Cardiac Rehab: Prior to admission, no  Dialysis:  Prior to admission, no  Transplant Specialty Pharmacy:  [facility] to admission[facility]     Prior to admission, patients caregiver reports patient was independent with ADLS and was driving.  Patients caregiver reports patient is able to care for self at this time..  Patients caregiver reports patient indicates a willingness to care for self once medically cleared to do so.    Insurance/Medications    Insured by   Payor/Plan Subscr  Sex Relation Sub. Ins. ID Effective Group Num   1. CIG[employer] - CIGNA* KENDRA BREAUX IT* 1960 Male  [id number] [birth year] [id number]                                   [address]      Primary Insurance (for UNOS reporting): Private Insurance  Secondary Insurance (for UNOS reporting): None    Patients caregiver reports patient is able to obtain and afford medications at this time and at time of discharge.    Living Will/Healthcare Power of     Patients caregiver reports patient does not have a LW and/or HCPA.    provided education regarding LW and HCPA and the completion of forms.    Coping/Mental Health (as reported by patients caregiver)    Patient's caregiver denies pt having any mental health symptoms of anxiety,  depression, or any other mental health diagnosis or symptoms. Patients caregiver is coping adequately with the aid of  family members.      Discharge Planning (as reported by patients caregiver)    At time of discharge, patient plans to return to patient's home under the care of wife.  Patients wife will transport patient.  Per rounds today, expected discharge date has not been medically determined at this time. Patients caretaker verbalizes understanding and is involved in treatment planning and discharge process.    Additional Concerns    Patient's caretaker denies additional needs and/or concerns at this time. Patient is being followed for needs, education, resources, information, emotional support, supportive counseling, and for supportive and skilled discharge plan of care.  providing ongoing psychosocial support, education, resources and d/c planning as needed.  SW remains available.  provided resource list, patient choice, psychosocial and supportive counseling, resources, education, assistance and discharge planning with patient and caregiver involvement, ongoing SW availability and services as appropriate.  remains available. Patient's caregiver verbalizes understanding and agreement with information reviewed,  availability and how to access available resources as needed.

## 2020-04-17 LAB
ALBUMIN SERPL BCP-MCNC: 2.9 G/DL (ref 3.5–5.2)
ALP SERPL-CCNC: 196 U/L (ref 55–135)
ALT SERPL W/O P-5'-P-CCNC: 57 U/L (ref 10–44)
ANION GAP SERPL CALC-SCNC: 11 MMOL/L (ref 8–16)
AST SERPL-CCNC: 20 U/L (ref 10–40)
BILIRUB SERPL-MCNC: 0.4 MG/DL (ref 0.1–1)
BUN SERPL-MCNC: 15 MG/DL (ref 8–23)
CALCIUM SERPL-MCNC: 8.2 MG/DL (ref 8.7–10.5)
CHLORIDE SERPL-SCNC: 101 MMOL/L (ref 95–110)
CO2 SERPL-SCNC: 24 MMOL/L (ref 23–29)
CREAT SERPL-MCNC: 1.2 MG/DL (ref 0.5–1.4)
EST. GFR  (AFRICAN AMERICAN): >60 ML/MIN/1.73 M^2
EST. GFR  (NON AFRICAN AMERICAN): >60 ML/MIN/1.73 M^2
GLUCOSE SERPL-MCNC: 99 MG/DL (ref 70–110)
GLUCOSE-6-PHOSPHATE DEHYDROGENASE QUAL: NORMAL
MAGNESIUM SERPL-MCNC: 2.1 MG/DL (ref 1.6–2.6)
POCT GLUCOSE: 103 MG/DL (ref 70–110)
POCT GLUCOSE: 109 MG/DL (ref 70–110)
POCT GLUCOSE: 110 MG/DL (ref 70–110)
POCT GLUCOSE: 114 MG/DL (ref 70–110)
POTASSIUM SERPL-SCNC: 3.5 MMOL/L (ref 3.5–5.1)
PROT SERPL-MCNC: 6.7 G/DL (ref 6–8.4)
SODIUM SERPL-SCNC: 136 MMOL/L (ref 136–145)
TACROLIMUS BLD-MCNC: 12.6 NG/ML (ref 5–15)
TACROLIMUS BLD-MCNC: 8.2 NG/ML (ref 5–15)

## 2020-04-17 PROCEDURE — 25000003 PHARM REV CODE 250

## 2020-04-17 PROCEDURE — 83735 ASSAY OF MAGNESIUM: CPT

## 2020-04-17 PROCEDURE — 25000003 PHARM REV CODE 250: Performed by: PHYSICIAN ASSISTANT

## 2020-04-17 PROCEDURE — 63600175 PHARM REV CODE 636 W HCPCS: Performed by: PHYSICIAN ASSISTANT

## 2020-04-17 PROCEDURE — 80197 ASSAY OF TACROLIMUS: CPT

## 2020-04-17 PROCEDURE — 99232 SBSQ HOSP IP/OBS MODERATE 35: CPT | Mod: ,,, | Performed by: HOSPITALIST

## 2020-04-17 PROCEDURE — 63700000 PHARM REV CODE 250 ALT 637 W/O HCPCS: Performed by: PHYSICIAN ASSISTANT

## 2020-04-17 PROCEDURE — 25000003 PHARM REV CODE 250: Performed by: HOSPITALIST

## 2020-04-17 PROCEDURE — 80053 COMPREHEN METABOLIC PANEL: CPT

## 2020-04-17 PROCEDURE — 11000001 HC ACUTE MED/SURG PRIVATE ROOM

## 2020-04-17 PROCEDURE — 36415 COLL VENOUS BLD VENIPUNCTURE: CPT

## 2020-04-17 PROCEDURE — 99232 PR SUBSEQUENT HOSPITAL CARE,LEVL II: ICD-10-PCS | Mod: ,,, | Performed by: HOSPITALIST

## 2020-04-17 RX ORDER — TACROLIMUS 0.5 MG/1
1 CAPSULE ORAL EVERY MORNING
Status: DISCONTINUED | OUTPATIENT
Start: 2020-04-17 | End: 2020-04-18

## 2020-04-17 RX ORDER — TACROLIMUS 0.5 MG/1
2 CAPSULE ORAL EVERY MORNING
Status: DISCONTINUED | OUTPATIENT
Start: 2020-04-17 | End: 2020-04-18

## 2020-04-17 RX ADMIN — TACROLIMUS 1 MG: 0.5 CAPSULE ORAL at 08:04

## 2020-04-17 RX ADMIN — METOPROLOL SUCCINATE 50 MG: 50 TABLET, EXTENDED RELEASE ORAL at 09:04

## 2020-04-17 RX ADMIN — APIXABAN 5 MG: 5 TABLET, FILM COATED ORAL at 09:04

## 2020-04-17 RX ADMIN — CEFTRIAXONE SODIUM 1 G: 1 INJECTION, POWDER, FOR SOLUTION INTRAMUSCULAR; INTRAVENOUS at 08:04

## 2020-04-17 RX ADMIN — APIXABAN 5 MG: 5 TABLET, FILM COATED ORAL at 08:04

## 2020-04-17 RX ADMIN — ATORVASTATIN CALCIUM 40 MG: 20 TABLET, FILM COATED ORAL at 08:04

## 2020-04-17 RX ADMIN — AZITHROMYCIN MONOHYDRATE 250 MG: 250 TABLET ORAL at 08:04

## 2020-04-17 RX ADMIN — TACROLIMUS 2 MG: 0.5 CAPSULE ORAL at 11:04

## 2020-04-17 RX ADMIN — PANTOPRAZOLE SODIUM 40 MG: 40 TABLET, DELAYED RELEASE ORAL at 09:04

## 2020-04-17 RX ADMIN — HYDROXYCHLOROQUINE SULFATE 400 MG: 200 TABLET, FILM COATED ORAL at 09:04

## 2020-04-17 RX ADMIN — CETIRIZINE HYDROCHLORIDE 10 MG: 10 TABLET, FILM COATED ORAL at 09:04

## 2020-04-17 NOTE — PROGRESS NOTES
Hospital Medicine  Progress Note  [facility]      Patient Name: Roman Hodges  MRN:  [id number]  Hospital Medicine Team: [facility] LIVER TRANSPLANT Yanely Betancourt MD  Date of Admission:  4/15/2020     Length of Stay:  LOS: 2 days       Principal Problem:  Pneumonia due to COVID-19 virus      HPI:  60 year old gentleman with a h/o liver transplant (7/2019) on Prograf and Cellcept, MALLIKA, HTN, paroxysmal A Fib on Eliquis, DM II, and GERD.  He presents with SOB for three days. The patient says that the dry cough started first abut a week ago and then over the past couple of days he has had the worsening SOB.  Patient states symptoms are worse with exertion and improves with use of his CPAP.  Denies fevers, chills, nausea, vomiting chest pain, dizziness, or palpations.    Hospital Course:  Patient admitted for evaluation of +COVID-19.    Interval History:     Doing well   On 2L-2.5 NC  Continues to feel better   HDS  No cough   No abd pain or n/v or diarrhea. Labs reviewed.  Prograf restarted, cellcept held.  LFTs wnlDayne Parikh (wife)  [phone]- updated via phone on 4/17    Review of Systems:  ROS (Positive in Bold, otherwise negative)  Constitutional: fever, chills, night sweats, malaise, weakness  CV: chest pain, edema, palpitations  Resp: SOB, cough, sputum production  GI: changes in appetite, NVDC, pain, melena, hematochezia, GERD, hematemesis  : Dysuria, hematuria, urinary urgency, frequency  MSK: arthralgia/myalgia, joint swelling  Neuro/Psych: anxiety, depression    Inpatient Medications:     apixaban  5 mg Oral BID    atorvastatin  40 mg Oral QHS    azithromycin  250 mg Oral Q24H    cefTRIAXone (ROCEPHIN) IVPB  1 g Intravenous QHS    cetirizine  10 mg Oral Daily    hydroxychloroquine  400 mg Oral Daily    metoprolol succinate  50 mg Oral Daily    pantoprazole  40 mg Oral Daily    potassium chloride SA  40 mEq Oral Once    tacrolimus  1 mg Oral Daily AM    tacrolimus  2  "mg Oral Daily AM     acetaminophen, albuterol **AND** MDI Q6H PRN, benzonatate, calcium carbonate, Dextrose 10% Bolus, Dextrose 10% Bolus, glucagon (human recombinant), glucose, glucose, insulin aspart U-100, loperamide, melatonin, ondansetron, promethazine (PHENERGAN) IVPB, sodium chloride 0.9%, sodium chloride 0.9%    Physical Exam:      Intake/Output Summary (Last 24 hours) at 4/17/2020 1532  Last data filed at 4/17/2020 0900  Gross per 24 hour   Intake 300 ml   Output 625 ml   Net -325 ml     Wt Readings from Last 3 Encounters:   04/15/20 94.1 kg (207 lb 6.4 oz)   02/13/20 94.8 kg (208 lb 14.4 oz)   01/20/20 93.2 kg (205 lb 7.5 oz)       /89   Pulse 78   Temp 98.6 °F (37 °C) (Oral)   Resp (!) 26   Ht 5' 8" (1.727 m)   Wt 94.1 kg (207 lb 6.4 oz)   SpO2 (!) 92%   BMI 31.54 kg/m²     GEN: NAD, conversant  Resp: decreased bilateral breath sounds, no wheezes or rales, normal work of breathing   On 2 L NC  CV: RRR, no m/r/g, no edema  GI: soft, NTND  Skin: no rash    Laboratory:  Lab Results   Component Value Date    [id number] Positive (A) 04/15/2020       Recent Labs   Lab 04/13/20  0736 04/15/20  1811 04/16/20  0432   WBC 2.76* 2.92* 2.98*   LYMPH 11.2*  0.3* 10.6*  0.3* 10.4*  0.3*   HGB 13.4* 12.8* 12.5*   HCT 41.7 39.0* 39.4*    308 312     Recent Labs   Lab 04/15/20  1811 04/16/20  0432 04/17/20  0352   * 135* 136   K 4.1 3.2* 3.5    101 101   CO2 21* 23 24   BUN 16 13 15   CREATININE 1.2 1.0 1.2   * 102 99   CALCIUM 8.1* 8.2* 8.2*   MG  --  1.3* 2.1   PHOS  --  3.0  --      Recent Labs   Lab 04/15/20  1811 04/16/20  0432 04/17/20  0352   ALKPHOS 259* 231* 196*   * 82* 57*   AST 41* 26 20   ALBUMIN 3.3* 3.1* 2.9*   PROT 7.5 6.8 6.7   BILITOT 0.4 0.5 0.4        Recent Labs     04/15/20  1811 04/15/20  1930 04/16/20  0432   DDIMER  --   --  0.20   FERRITIN 441*  --   --    CRP 62.0*  --   --    LDH  --  260  --    BNP  --   --  24   TROPONINI 0.008  --   --  "     --   --        All labs within the last 24 hours were reviewed.     Microbiology:    All microbiology within the last 24 hours was reviewed.     Imaging    All imaging within the last 24 hours was reviewed.     Assessment and Plan:    Active Hospital Problems    Diagnosis  POA    *Pneumonia due to COVID-19 virus [U07.1, J12.89]  Yes    Lymphopenia [D72.810]  Yes    Hyponatremia [E87.1]  Yes    Hypokalemia [E87.6]  Yes    Hypomagnesemia [E83.42]  Yes    Long-term use of immunosuppressant medication [Z79.899]  Not Applicable    S/P liver transplant [Z94.4]  Not Applicable     Chronic    Prophylactic immunotherapy [Z29.8]  Not Applicable     Chronic    Long term (current) use of anticoagulants [Z79.01]  Not Applicable     Chronic    Essential hypertension [I10]  Yes     Chronic    Type 2 diabetes mellitus, without long-term current use of insulin [E11.9]  Yes     Chronic    Atrial fibrillation [I48.91]  Yes     Chronic    GERD (gastroesophageal reflux disease) [K21.9]  Yes     Chronic      Resolved Hospital Problems   No resolved problems to display.       Pneumonia due to COVID-19 virus    - COVID-19 testing: POSITIVE 4/15  - Infection Control notified     - Isolation:   - Airborne and Droplet Precautions  - Surgical mask on patient   - N95 masks must be fit tested, wear eye protection  - 20 second hand hygiene              - Limit visitors per hospital policy              - Consolidate lab draws, nursing care, and interventions     - Diagnostics: (Lymphopenia, hyponatremia, hyperferritinemia, elevated troponin, elevated d-dimer, age, and comorbidities are significant predictors of poor clinical outcome)              - CBC:  WBC 2.92, lymph #0.3, trend Q48hrs  - CMP:  trend Q48hrs  - Procalcitonin:  Pending  - D-dimer:  Pending, repeat prior to discharge  - Ferritin:  441, repeat prior to discharge   - CRP:  62.0, trend Q48hrs  - LDH:  260, repeat prior to discharge   - BNP:  Pending  -  Troponin:  0.008              - ECG:  NSR              - rapid Flu:  Pending              - RIP only if BMT/solid transplant:  N/A              - Legionella antigen:  Pending              - Blood culture x2:  Pending              - Sputum culture:  Pending              - CXR:  Bilateral basilar subsegmental atelectasis.  There may be developing parenchymal attenuation projected over the left lower lung zone, changes of infection or inflammation are considerations versus edema.              - UA and culture:  negative               - CPK:  146     - Management:   Bundle care as able to maintain isolation & minimize in/out of room   - Supplemental O2 to maintain SpO2 92%-96%   if requiring 6L NC or higher, place on nonrebreather and discuss case with MICU              - Telemetry & continuous pulse oximetry               - If wheezing   - albuterol inhaler 2-4 puff Q6hr PRN    - ipratropium daily               - acetaminophen 650mg PO Q6hr PRN fever              - loperamide PRN for viral diarrhea  - Empiric antibiotics per likely source & patient allergies                          - CAP: x 5 day course (d/c early if low concern for bacterial co-infection)  Ceftriaxone 1g IV Q24hrs                                      Azithromycin 500mg IV day #1, then 250mg PO daily x4 days   - Investigational Treatment Protocol:                            -start statin: atorvastatin 40mg po daily (if CPK WNL)                          - start HCQ 400mg PO BID x1 day, then 400mg PO daily x 4 days   (check glucose 6 phosphate dehydrogenase (NOT G6PD Quant), ECG at start & 48hrs, and start Qshift POCT glucose)     Safety notes:              - Avoid NIPPV (CPAP/BiPAP) to prevent aerosolization, use on a case-by-case basis if in neg pressure room              - Cautious use of NSAIDS for fever per WHO recommendations (3/16/2020)              - No new ACEi/ARB start or discontinuation of chronic med unless hypotensive (Yaneth et al. Journal  of Hypertension 2020, 38:000-000)              - Careful use of steroids in the absence of other indications (shock, ARDS)              - Fluid sparing resuscitation, avoid maintenance fluids                S/p liver transplant  Propylactic immunotherapy  - AST 41, , AlkPhos 259. Pt. Was to undergo EUS liver biopsy sometime this week according to documentation  - at home on Prograf 2mg daily and 1mg qHS.    - Holding  Cellcept, given active infection   - prograf level of 17 on 4/16- prograf held on 4/16  - prograf restarted on 4/17 at prior home dose   - hepatology consulted for IS management      Atrial fibrillation  Long term use of anticoagulants  -Pt. In NSR on admit, no acute issues  - Continue Eliquis 5mg BID for AC and toprol-XL for rate control     DM II  - Diabetic diet, low dose SSI.     HTN  -metoprolol succinate 50mg daily, pt. Reports PCP has discontinued other blood pressure medicines.     GERD  - Protonix.    Kati (wife)  [phone]- updated via phone on 4/17    Anticipate transfer to telemedicine hospital medicine team in next 1-2 days if patient remains stable.     Signing Physician:     Yanely Betancourt MD  Department of Hospital Medicine   [facility]  Pager [phone]  Spectra [phone]  4/17/2020

## 2020-04-17 NOTE — PLAN OF CARE
Plan of care discussed with patient. Pt AAOx4 and able to make needs known. Cardiac monitor d/c'd today per MD order. Pt on 2.5L NC, sats >90%. Patient is free of fall/trauma/injury. Denies CP, SOB, or pain/discomfort. All questions addressed. Will continue to monitor

## 2020-04-17 NOTE — TREATMENT PLAN
Hepatology Treatment Plan    Roman Hodges is a 60 y.o. male admitted to hospital 4/15/2020 (Hospital Day: 3) due to Pneumonia due to COVID-19 virus. Hepatology following for immunosuppression management.        Lab Results   Component Value Date    TACROLIMUS 8.2 04/17/2020    TACROLIMUS 12.6 04/16/2020    TACROLIMUS 17.8 (H) 04/16/2020       Lab Results   Component Value Date    CREATININE 1.2 04/17/2020    CREATININE 1.0 04/16/2020    CREATININE 1.2 04/15/2020       Lab Results   Component Value Date    ALT 57 (H) 04/17/2020    AST 20 04/17/2020     (H) 07/06/2019    ALKPHOS 196 (H) 04/17/2020    BILITOT 0.4 04/17/2020    WBC 2.98 (L) 04/16/2020    HGB 12.5 (L) 04/16/2020     04/16/2020     Roman Hodges is a 60 y.o. 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 And Cellcept 250 QID.     Kidney function is stable  Liver enzymes are improving    Plan  Tacrolimus level 8.2 today. LFTs stable and improving.     Immunosuppression Regimen:  Tacrolimus: 2mg qAM, 1mg qPM (home dose) resume today.   Hold Cellcept in setting of active infection    Please notify hepatology on day of discharge to discuss discharge medications and follow up.     Please obtain daily CBC, BMP, LFT, INR, immunosuppressant trough level    Thank you for involving us in the care of Roman Hodges. Please call with any additional concerns or questions.    Due to COVID-19, the Hepatology team is limiting interaction with consult patients unless absolutely necessary in order to limit patient's exposure to the virus. As of now, the Hepatology team will be chart checking this patient and discussing with staff. If the patient has an acute decompensation, change, and/or you believe the patient needs to be examined by a provider, please page Hepatology team on call to discuss the patient with a team member.     Steven Locke MD  Gastroenterology  Fellow

## 2020-04-18 PROBLEM — J96.01 ACUTE HYPOXEMIC RESPIRATORY FAILURE: Status: ACTIVE | Noted: 2020-04-18

## 2020-04-18 LAB
ALBUMIN SERPL BCP-MCNC: 2.9 G/DL (ref 3.5–5.2)
ALP SERPL-CCNC: 174 U/L (ref 55–135)
ALT SERPL W/O P-5'-P-CCNC: 52 U/L (ref 10–44)
ANION GAP SERPL CALC-SCNC: 9 MMOL/L (ref 8–16)
AST SERPL-CCNC: 23 U/L (ref 10–40)
BASOPHILS # BLD AUTO: 0.01 K/UL (ref 0–0.2)
BASOPHILS NFR BLD: 0.3 % (ref 0–1.9)
BILIRUB SERPL-MCNC: 0.4 MG/DL (ref 0.1–1)
BUN SERPL-MCNC: 16 MG/DL (ref 8–23)
CALCIUM SERPL-MCNC: 8.3 MG/DL (ref 8.7–10.5)
CHLORIDE SERPL-SCNC: 104 MMOL/L (ref 95–110)
CO2 SERPL-SCNC: 24 MMOL/L (ref 23–29)
CREAT SERPL-MCNC: 1.2 MG/DL (ref 0.5–1.4)
CRP SERPL-MCNC: 74.1 MG/L (ref 0–8.2)
DIFFERENTIAL METHOD: ABNORMAL
EOSINOPHIL # BLD AUTO: 0 K/UL (ref 0–0.5)
EOSINOPHIL NFR BLD: 0.9 % (ref 0–8)
ERYTHROCYTE [DISTWIDTH] IN BLOOD BY AUTOMATED COUNT: 14.5 % (ref 11.5–14.5)
EST. GFR  (AFRICAN AMERICAN): >60 ML/MIN/1.73 M^2
EST. GFR  (NON AFRICAN AMERICAN): >60 ML/MIN/1.73 M^2
GLUCOSE SERPL-MCNC: 99 MG/DL (ref 70–110)
HCT VFR BLD AUTO: 37.7 % (ref 40–54)
HGB BLD-MCNC: 11.8 G/DL (ref 14–18)
IMM GRANULOCYTES # BLD AUTO: 0.04 K/UL (ref 0–0.04)
IMM GRANULOCYTES NFR BLD AUTO: 1.2 % (ref 0–0.5)
LYMPHOCYTES # BLD AUTO: 0.4 K/UL (ref 1–4.8)
LYMPHOCYTES NFR BLD: 11.1 % (ref 18–48)
MAGNESIUM SERPL-MCNC: 1.8 MG/DL (ref 1.6–2.6)
MCH RBC QN AUTO: 24.7 PG (ref 27–31)
MCHC RBC AUTO-ENTMCNC: 31.3 G/DL (ref 32–36)
MCV RBC AUTO: 79 FL (ref 82–98)
MONOCYTES # BLD AUTO: 0.6 K/UL (ref 0.3–1)
MONOCYTES NFR BLD: 16.8 % (ref 4–15)
NEUTROPHILS # BLD AUTO: 2.3 K/UL (ref 1.8–7.7)
NEUTROPHILS NFR BLD: 69.7 % (ref 38–73)
NRBC BLD-RTO: 0 /100 WBC
PHOSPHATE SERPL-MCNC: 4.1 MG/DL (ref 2.7–4.5)
PLATELET # BLD AUTO: 370 K/UL (ref 150–350)
PMV BLD AUTO: 9.1 FL (ref 9.2–12.9)
POCT GLUCOSE: 113 MG/DL (ref 70–110)
POCT GLUCOSE: 115 MG/DL (ref 70–110)
POCT GLUCOSE: 134 MG/DL (ref 70–110)
POCT GLUCOSE: 88 MG/DL (ref 70–110)
POTASSIUM SERPL-SCNC: 3.7 MMOL/L (ref 3.5–5.1)
PROT SERPL-MCNC: 6.5 G/DL (ref 6–8.4)
RBC # BLD AUTO: 4.77 M/UL (ref 4.6–6.2)
SODIUM SERPL-SCNC: 137 MMOL/L (ref 136–145)
TACROLIMUS BLD-MCNC: 11.5 NG/ML (ref 5–15)
WBC # BLD AUTO: 3.34 K/UL (ref 3.9–12.7)

## 2020-04-18 PROCEDURE — 99232 SBSQ HOSP IP/OBS MODERATE 35: CPT | Mod: ,,, | Performed by: HOSPITALIST

## 2020-04-18 PROCEDURE — 11000001 HC ACUTE MED/SURG PRIVATE ROOM

## 2020-04-18 PROCEDURE — 25000003 PHARM REV CODE 250

## 2020-04-18 PROCEDURE — 25000003 PHARM REV CODE 250: Performed by: HOSPITALIST

## 2020-04-18 PROCEDURE — 63600175 PHARM REV CODE 636 W HCPCS: Performed by: PHYSICIAN ASSISTANT

## 2020-04-18 PROCEDURE — 36415 COLL VENOUS BLD VENIPUNCTURE: CPT

## 2020-04-18 PROCEDURE — 25000003 PHARM REV CODE 250: Performed by: PHYSICIAN ASSISTANT

## 2020-04-18 PROCEDURE — 83735 ASSAY OF MAGNESIUM: CPT

## 2020-04-18 PROCEDURE — 63700000 PHARM REV CODE 250 ALT 637 W/O HCPCS: Performed by: PHYSICIAN ASSISTANT

## 2020-04-18 PROCEDURE — 84100 ASSAY OF PHOSPHORUS: CPT

## 2020-04-18 PROCEDURE — 80197 ASSAY OF TACROLIMUS: CPT

## 2020-04-18 PROCEDURE — 99232 PR SUBSEQUENT HOSPITAL CARE,LEVL II: ICD-10-PCS | Mod: ,,, | Performed by: HOSPITALIST

## 2020-04-18 PROCEDURE — 80053 COMPREHEN METABOLIC PANEL: CPT

## 2020-04-18 PROCEDURE — 86140 C-REACTIVE PROTEIN: CPT

## 2020-04-18 PROCEDURE — 85025 COMPLETE CBC W/AUTO DIFF WBC: CPT

## 2020-04-18 RX ORDER — TACROLIMUS 0.5 MG/1
1 CAPSULE ORAL 2 TIMES DAILY
Status: DISCONTINUED | OUTPATIENT
Start: 2020-04-19 | End: 2020-04-20 | Stop reason: HOSPADM

## 2020-04-18 RX ADMIN — METOPROLOL SUCCINATE 50 MG: 50 TABLET, EXTENDED RELEASE ORAL at 08:04

## 2020-04-18 RX ADMIN — APIXABAN 5 MG: 5 TABLET, FILM COATED ORAL at 08:04

## 2020-04-18 RX ADMIN — LOPERAMIDE HYDROCHLORIDE 2 MG: 2 CAPSULE ORAL at 11:04

## 2020-04-18 RX ADMIN — TACROLIMUS 2 MG: 0.5 CAPSULE ORAL at 08:04

## 2020-04-18 RX ADMIN — HYDROXYCHLOROQUINE SULFATE 400 MG: 200 TABLET, FILM COATED ORAL at 08:04

## 2020-04-18 RX ADMIN — CEFTRIAXONE SODIUM 1 G: 1 INJECTION, POWDER, FOR SOLUTION INTRAMUSCULAR; INTRAVENOUS at 08:04

## 2020-04-18 RX ADMIN — ATORVASTATIN CALCIUM 40 MG: 20 TABLET, FILM COATED ORAL at 08:04

## 2020-04-18 RX ADMIN — CETIRIZINE HYDROCHLORIDE 10 MG: 10 TABLET, FILM COATED ORAL at 08:04

## 2020-04-18 RX ADMIN — PANTOPRAZOLE SODIUM 40 MG: 40 TABLET, DELAYED RELEASE ORAL at 08:04

## 2020-04-18 RX ADMIN — AZITHROMYCIN MONOHYDRATE 250 MG: 250 TABLET ORAL at 08:04

## 2020-04-18 NOTE — PROGRESS NOTES
Hospital Medicine  Progress Note  [facility]      Patient Name: Roman Hodges  MRN:  [id number]  [place] [facility] [place] Yanely Betancourt MD  Date of Admission:  4/15/2020     Length of Stay:  LOS: 3 days       Principal Problem:  Pneumonia due to COVID-19 virus      HPI:  60 year old gentleman with a h/o liver transplant (7/2019) on Prograf and Cellcept, MALLIKA, HTN, paroxysmal A Fib on Eliquis, DM II, and GERD.  He presents with SOB for three days. The patient says that the dry cough started first abut a week ago and then over the past couple of days he has had the worsening SOB.  Patient states symptoms are worse with exertion and improves with use of his CPAP.  Denies fevers, chills, nausea, vomiting chest pain, dizziness, or palpations.    Hospital Course:  Patient admitted for evaluation of +COVID-19.    Interval History:     Doing well   On 2.5 NC  Continues to feel better   HDS  No cough   No abd pain   Had a bout loose stool   Kati (wife)  [phone]- updated   Prograf titrated as below     Review of Systems:  ROS (Positive in Bold, otherwise negative)  Constitutional: fever, chills, night sweats, malaise, weakness  CV: chest pain, edema, palpitations  Resp: SOB, cough, sputum production  GI: changes in appetite, NVDC, pain, melena, hematochezia, GERD, hematemesis  : Dysuria, hematuria, urinary urgency, frequency  MSK: arthralgia/myalgia, joint swelling  Neuro/Psych: anxiety, depression    Inpatient Medications:     apixaban  5 mg Oral BID    atorvastatin  40 mg Oral QHS    azithromycin  250 mg Oral Q24H    cefTRIAXone (ROCEPHIN) IVPB  1 g Intravenous QHS    cetirizine  10 mg Oral Daily    hydroxychloroquine  400 mg Oral Daily    metoprolol succinate  50 mg Oral Daily    pantoprazole  40 mg Oral Daily    [START ON 4/19/2020] tacrolimus  1 mg Oral BID     acetaminophen, albuterol **AND** MDI Q6H PRN, benzonatate, calcium carbonate, Dextrose 10%  "Bolus, Dextrose 10% Bolus, glucagon (human recombinant), glucose, glucose, insulin aspart U-100, loperamide, melatonin, ondansetron, promethazine (PHENERGAN) IVPB, sodium chloride 0.9%, sodium chloride 0.9%    Physical Exam:      Intake/Output Summary (Last 24 hours) at 4/18/2020 1758  Last data filed at 4/18/2020 0548  Gross per 24 hour   Intake 195 ml   Output --   Net 195 ml     Wt Readings from Last 3 Encounters:   04/15/20 94.1 kg (207 lb 6.4 oz)   02/13/20 94.8 kg (208 lb 14.4 oz)   01/20/20 93.2 kg (205 lb 7.5 oz)       BP (!) 157/108   Pulse 72   Temp 97.5 °F (36.4 °C) (Oral)   Resp (!) 28   Ht 5' 8" (1.727 m)   Wt 94.1 kg (207 lb 6.4 oz)   SpO2 (!) 94%   BMI 31.54 kg/m²     GEN: NAD, conversant  Resp: decreased bilateral breath sounds, no wheezes or rales, normal work of breathing   On 2 L NC  CV: RRR, no m/r/g, no edema  GI: soft, NTND  Skin: no rash    Laboratory:  Lab Results   Component Value Date    [id number] Positive (A) 04/15/2020       Recent Labs   Lab 04/15/20  1811 04/16/20  0432 04/18/20  0436   WBC 2.92* 2.98* 3.34*   LYMPH 10.6*  0.3* 10.4*  0.3* 11.1*  0.4*   HGB 12.8* 12.5* 11.8*   HCT 39.0* 39.4* 37.7*    312 370*     Recent Labs   Lab 04/16/20  0432 04/17/20  0352 04/18/20  0436   * 136 137   K 3.2* 3.5 3.7    101 104   CO2 23 24 24   BUN 13 15 16   CREATININE 1.0 1.2 1.2    99 99   CALCIUM 8.2* 8.2* 8.3*   MG 1.3* 2.1 1.8   PHOS 3.0  --  4.1     Recent Labs   Lab 04/16/20 0432 04/17/20 0352 04/18/20 0436   ALKPHOS 231* 196* 174*   ALT 82* 57* 52*   AST 26 20 23   ALBUMIN 3.1* 2.9* 2.9*   PROT 6.8 6.7 6.5   BILITOT 0.5 0.4 0.4        Recent Labs     04/15/20  1811 04/15/20  1930 04/16/20 0432 04/18/20 0436   DDIMER  --   --  0.20  --    FERRITIN 441*  --   --   --    CRP 62.0*  --   --  74.1*   LDH  --  260  --   --    BNP  --   --  24  --    TROPONINI 0.008  --   --   --      --   --   --        All labs within the last 24 hours were " reviewed.     Microbiology:    All microbiology within the last 24 hours was reviewed.     Imaging    All imaging within the last 24 hours was reviewed.     Assessment and Plan:    Active Hospital Problems    Diagnosis  POA    *Pneumonia due to COVID-19 virus [U07.1, J12.89]  Yes    Acute hypoxemic respiratory failure [J96.01]  Yes    Lymphopenia [D72.810]  Yes    Hyponatremia [E87.1]  Yes    Hypokalemia [E87.6]  Yes    Hypomagnesemia [E83.42]  Yes    Long-term use of immunosuppressant medication [Z79.899]  Not Applicable    S/P liver transplant [Z94.4]  Not Applicable     Chronic    Prophylactic immunotherapy [Z29.8]  Not Applicable     Chronic    Long term (current) use of anticoagulants [Z79.01]  Not Applicable     Chronic    Essential hypertension [I10]  Yes     Chronic    Type 2 diabetes mellitus, without long-term current use of insulin [E11.9]  Yes     Chronic    Atrial fibrillation [I48.91]  Yes     Chronic    GERD (gastroesophageal reflux disease) [K21.9]  Yes     Chronic      Resolved Hospital Problems   No resolved problems to display.       Pneumonia due to COVID-19 virus  Acute hypoxemic respiratory failure    - COVID-19 testing: POSITIVE 4/15  - Infection Control notified     - Isolation:   - Airborne and Droplet Precautions  - Surgical mask on patient   - N95 masks must be fit tested, wear eye protection  - 20 second hand hygiene              - Limit visitors per hospital policy              - Consolidate lab draws, nursing care, and interventions     - Diagnostics: (Lymphopenia, hyponatremia, hyperferritinemia, elevated troponin, elevated d-dimer, age, and comorbidities are significant predictors of poor clinical outcome)              - CBC:  WBC 2.92, lymph #0.3, trend Q48hrs  - CMP:  trend Q48hrs  - Procalcitonin:  Pending  - D-dimer:  Pending, repeat prior to discharge  - Ferritin:  441, repeat prior to discharge   - CRP:  62.0, trend Q48hrs  - LDH:  260, repeat prior to discharge    - BNP:  Pending  - Troponin:  0.008              - ECG:  NSR              - rapid Flu:  Pending              - RIP only if BMT/solid transplant:  N/A              - Legionella antigen:  Pending              - Blood culture x2:  Pending              - Sputum culture:  Pending              - CXR:  Bilateral basilar subsegmental atelectasis.  There may be developing parenchymal attenuation projected over the left lower lung zone, changes of infection or inflammation are considerations versus edema.              - UA and culture:  negative               - CPK:  146     - Management:   Bundle care as able to maintain isolation & minimize in/out of room   - Supplemental O2 to maintain SpO2 92%-96%   if requiring 6L NC or higher, place on nonrebreather and discuss case with MICU              - Telemetry & continuous pulse oximetry               - If wheezing   - albuterol inhaler 2-4 puff Q6hr PRN    - ipratropium daily               - acetaminophen 650mg PO Q6hr PRN fever              - loperamide PRN for viral diarrhea  - Empiric antibiotics per likely source & patient allergies                          - CAP: x 5 day course (d/c early if low concern for bacterial co-infection)  Ceftriaxone 1g IV Q24hrs                                      Azithromycin 500mg IV day #1, then 250mg PO daily x4 days   - Investigational Treatment Protocol:                            -start statin: atorvastatin 40mg po daily (if CPK WNL)                          - start HCQ 400mg PO BID x1 day, then 400mg PO daily x 4 days   (check glucose 6 phosphate dehydrogenase (NOT G6PD Quant), ECG at start & 48hrs, and start Qshift POCT glucose)     Safety notes:              - Avoid NIPPV (CPAP/BiPAP) to prevent aerosolization, use on a case-by-case basis if in neg pressure room              - Cautious use of NSAIDS for fever per WHO recommendations (3/16/2020)              - No new ACEi/ARB start or discontinuation of chronic med unless hypotensive  (Esler et al. Journal of Hypertension 2020, 38:000-000)              - Careful use of steroids in the absence of other indications (shock, ARDS)              - Fluid sparing resuscitation, avoid maintenance fluids                S/p liver transplant  Propylactic immunotherapy  - AST 41, , AlkPhos 259. Pt. Was to undergo EUS liver biopsy sometime this week according to documentation  - at home on Prograf 2mg daily and 1mg qHS.    - Holding  Cellcept, given active infection   - prograf level of 17 on 4/16- prograf held on 4/16  - prograf restarted on 4/17 at prior home dose - titrated to the dose above  - hepatology consulted for IS management      Atrial fibrillation  Long term use of anticoagulants  -Pt. In NSR on admit, no acute issues  - Continue Eliquis 5mg BID for AC and toprol-XL for rate control     DM II  - Diabetic diet, low dose SSI.     HTN  -metoprolol succinate 50mg daily, pt. Reports PCP has discontinued other blood pressure medicines.     GERD  - Protonix.    Kati (wife)  [phone]- updated via phone on 4/18    Anticipate transfer to telemedicine hospital medicine team in next 1-2 days if patient remains stable.     Signing Physician:     Yanely Betancourt MD  Department of Hospital Medicine   [facility]  Pager [phone]  Spectra [phone]  4/18/2020

## 2020-04-18 NOTE — PLAN OF CARE
Pt AAOx4, VSS, no complaints of pain or nausea. Pt did receive PRN imodium for incident of diarrhea. Free of falls and injuries. BS monitoring maintained per orders. Pt 2.5L O2 maintained per orders. Isolation precautions maintained per orders. No acute changes, see previous notes, will continue to monitor.   Problem: Fall Injury Risk  Goal: Absence of Fall and Fall-Related Injury  4/18/2020 1700 by Rosa Andrews RN  Outcome: Ongoing, Progressing  4/18/2020 1633 by Rosa Andrews RN  Outcome: Ongoing, Progressing  Intervention: Identify and Manage Contributors to Fall Injury Risk  4/18/2020 1700 by Rosa Andrews RN  Flowsheets (Taken 4/18/2020 1658)  Self-Care Promotion: independence encouraged;BADL personal objects within reach;BADL personal routines maintained;meal setup provided  Medication Review/Management: medications reviewed  4/18/2020 1633 by Rosa Andrews RN  Flowsheets (Taken 4/18/2020 1615)  Self-Care Promotion: independence encouraged;BADL personal objects within reach;BADL personal routines maintained  Medication Review/Management: medications reviewed  Intervention: Promote Injury-Free Environment  4/18/2020 1700 by Rosa Andrews RN  Flowsheets (Taken 4/18/2020 1658)  Safety Promotion/Fall Prevention: assistive device/personal item within reach;bed alarm set;diversional activities provided;Fall Risk reviewed with patient/family;Fall Risk signage in place;lighting adjusted;medications reviewed;nonskid shoes/socks when out of bed;side rails raised x 2;commode/urinal/bedpan at bedside  Environmental Safety Modification: assistive device/personal items within reach;clutter free environment maintained;lighting adjusted;room organization consistent  4/18/2020 1633 by Rosa Andrews RN  Flowsheets (Taken 4/18/2020 1615)  Safety Promotion/Fall Prevention: assistive device/personal item within reach;bed alarm set;diversional activities provided;Fall Risk reviewed with patient/family;Fall Risk signage in  place;lighting adjusted;medications reviewed;nonskid shoes/socks when out of bed;side rails raised x 2  Environmental Safety Modification: assistive device/personal items within reach;clutter free environment maintained;lighting adjusted;room organization consistent     Problem: Adult Inpatient Plan of Care  Goal: Plan of Care Review  4/18/2020 1700 by Rosa Andrews RN  Outcome: Ongoing, Progressing  Flowsheets (Taken 4/18/2020 1658)  Plan of Care Reviewed With: patient  4/18/2020 1633 by Rosa Andrews RN  Outcome: Ongoing, Progressing  Flowsheets (Taken 4/18/2020 1615)  Plan of Care Reviewed With: patient  Goal: Patient-Specific Goal (Individualization)  4/18/2020 1700 by Rosa Andrews RN  Outcome: Ongoing, Progressing  4/18/2020 1633 by Rosa Andrews RN  Outcome: Ongoing, Progressing  Goal: Absence of Hospital-Acquired Illness or Injury  4/18/2020 1700 by Rosa Andrews RN  Outcome: Ongoing, Progressing  4/18/2020 1633 by Rosa Andrews RN  Outcome: Ongoing, Progressing  Intervention: Identify and Manage Fall Risk  4/18/2020 1700 by Rosa Andrews RN  Flowsheets (Taken 4/18/2020 1658)  Safety Promotion/Fall Prevention: assistive device/personal item within reach;bed alarm set;diversional activities provided;Fall Risk reviewed with patient/family;Fall Risk signage in place;lighting adjusted;medications reviewed;nonskid shoes/socks when out of bed;side rails raised x 2;commode/urinal/bedpan at bedside  4/18/2020 1633 by Rosa Andrews RN  Flowsheets (Taken 4/18/2020 1615)  Safety Promotion/Fall Prevention: assistive device/personal item within reach;bed alarm set;diversional activities provided;Fall Risk reviewed with patient/family;Fall Risk signage in place;lighting adjusted;medications reviewed;nonskid shoes/socks when out of bed;side rails raised x 2  Intervention: Prevent VTE (venous thromboembolism)  4/18/2020 1700 by Rosa Andrews RN  Flowsheets (Taken 4/18/2020 1658)  VTE Prevention/Management:  ambulation promoted;bleeding precautions maintained;bleeding risk assessed;bleeding risk factor(s) identified, provider notified;fluids promoted  4/18/2020 1633 by Rosa Andrews RN  Flowsheets (Taken 4/18/2020 1615)  VTE Prevention/Management: ambulation promoted;bleeding precautions maintained;bleeding risk assessed;bleeding risk factor(s) identified, provider notified;fluids promoted  Goal: Optimal Comfort and Wellbeing  4/18/2020 1700 by Rosa Andrews RN  Outcome: Ongoing, Progressing  4/18/2020 1633 by Rosa Andrews RN  Outcome: Ongoing, Progressing  Intervention: Provide Person-Centered Care  4/18/2020 1700 by Rosa Andrews RN  Flowsheets (Taken 4/18/2020 1658)  Trust Relationship/Rapport: care explained;choices provided;emotional support provided;empathic listening provided;reassurance provided;questions encouraged;questions answered;thoughts/feelings acknowledged  4/18/2020 1633 by Rosa Andrews RN  Flowsheets (Taken 4/18/2020 1615)  Trust Relationship/Rapport: care explained;choices provided;emotional support provided;empathic listening provided;questions answered;questions encouraged;reassurance provided;thoughts/feelings acknowledged  Goal: Readiness for Transition of Care  4/18/2020 1700 by Rosa Andrews RN  Outcome: Ongoing, Progressing  4/18/2020 1633 by Rosa Andrews RN  Outcome: Ongoing, Progressing  Goal: Rounds/Family Conference  4/18/2020 1700 by Rosa Andrews RN  Outcome: Ongoing, Progressing  4/18/2020 1633 by Rosa Andrews RN  Outcome: Ongoing, Progressing     Problem: Diabetes Comorbidity  Goal: Blood Glucose Level Within Desired Range  4/18/2020 1700 by Rosa Andrews RN  Outcome: Ongoing, Progressing  4/18/2020 1633 by Rosa Andrews RN  Outcome: Ongoing, Progressing  Intervention: Maintain Glycemic Control  Flowsheets (Taken 4/18/2020 1658)  Glycemic Management: blood glucose monitoring     Problem: Skin Injury Risk Increased  Goal: Skin Health and Integrity  4/18/2020 1700 by  Rosa Andrews RN  Outcome: Ongoing, Progressing  4/18/2020 1633 by Rosa Andrews RN  Outcome: Ongoing, Progressing  Intervention: Optimize Skin Protection  Flowsheets (Taken 4/18/2020 1658)  Pressure Reduction Techniques: frequent weight shift encouraged;weight shift assistance provided  Pressure Reduction Devices: pressure-redistributing mattress utilized  Skin Protection: adhesive use limited;electrode sites changed;tubing/devices free from skin contact  Head of Bed (HOB): HOB elevated  Intervention: Promote and Optimize Oral Intake  Flowsheets (Taken 4/18/2020 1658)  Oral Nutrition Promotion: calorie dense foods provided

## 2020-04-18 NOTE — TREATMENT PLAN
Hepatology Treatment Plan    Roman Hodges is a 60 y.o. male admitted to hospital 4/15/2020 (Hospital Day: 4) due to Pneumonia due to COVID-19 virus. Hepatology following for immunosuppression management.        Lab Results   Component Value Date    TACROLIMUS 11.5 04/18/2020    TACROLIMUS 8.2 04/17/2020    TACROLIMUS 12.6 04/16/2020       Lab Results   Component Value Date    CREATININE 1.2 04/18/2020    CREATININE 1.2 04/17/2020    CREATININE 1.0 04/16/2020       Lab Results   Component Value Date    ALT 52 (H) 04/18/2020    AST 23 04/18/2020     (H) 07/06/2019    ALKPHOS 174 (H) 04/18/2020    BILITOT 0.4 04/18/2020    WBC 3.34 (L) 04/18/2020    HGB 11.8 (L) 04/18/2020     (H) 04/18/2020     Roman Hodges is a 60 y.o. 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 And Cellcept 250 QID.     Kidney function is stable  Liver enzymes are improving    Plan  Tacrolimus level 11.5 today. LFTs stable and improving.     Immunosuppression Regimen:  Tacrolimus: 2mg qAM, 1mg qPM (home dose) resume today.   Hold Cellcept in setting of active infection    Please notify hepatology on day of discharge to discuss discharge medications and follow up.     Please obtain daily CBC, BMP, LFT, INR, immunosuppressant trough level    Thank you for involving us in the care of Roman Hodges. Please call with any additional concerns or questions.    Due to COVID-19, the Hepatology team is limiting interaction with consult patients unless absolutely necessary in order to limit patient's exposure to the virus. As of now, the Hepatology team will be chart checking this patient and discussing with staff. If the patient has an acute decompensation, change, and/or you believe the patient needs to be examined by a provider, please page Hepatology team on call to discuss the patient with a team member.     Steven Locke MD  Gastroenterology  Fellow

## 2020-04-19 LAB
ALBUMIN SERPL BCP-MCNC: 2.8 G/DL (ref 3.5–5.2)
ALP SERPL-CCNC: 187 U/L (ref 55–135)
ALT SERPL W/O P-5'-P-CCNC: 63 U/L (ref 10–44)
ANION GAP SERPL CALC-SCNC: 12 MMOL/L (ref 8–16)
AST SERPL-CCNC: 52 U/L (ref 10–40)
BACTERIA BLD CULT: NORMAL
BACTERIA BLD CULT: NORMAL
BILIRUB SERPL-MCNC: 0.4 MG/DL (ref 0.1–1)
BUN SERPL-MCNC: 17 MG/DL (ref 8–23)
CALCIUM SERPL-MCNC: 8.4 MG/DL (ref 8.7–10.5)
CHLORIDE SERPL-SCNC: 105 MMOL/L (ref 95–110)
CO2 SERPL-SCNC: 23 MMOL/L (ref 23–29)
CREAT SERPL-MCNC: 1.3 MG/DL (ref 0.5–1.4)
EST. GFR  (AFRICAN AMERICAN): >60 ML/MIN/1.73 M^2
EST. GFR  (NON AFRICAN AMERICAN): 58.9 ML/MIN/1.73 M^2
GLUCOSE SERPL-MCNC: 94 MG/DL (ref 70–110)
MAGNESIUM SERPL-MCNC: 1.7 MG/DL (ref 1.6–2.6)
POCT GLUCOSE: 101 MG/DL (ref 70–110)
POCT GLUCOSE: 106 MG/DL (ref 70–110)
POCT GLUCOSE: 116 MG/DL (ref 70–110)
POCT GLUCOSE: 137 MG/DL (ref 70–110)
POCT GLUCOSE: 142 MG/DL (ref 70–110)
POTASSIUM SERPL-SCNC: 4 MMOL/L (ref 3.5–5.1)
PROT SERPL-MCNC: 6.6 G/DL (ref 6–8.4)
SODIUM SERPL-SCNC: 140 MMOL/L (ref 136–145)
TACROLIMUS BLD-MCNC: 9 NG/ML (ref 5–15)

## 2020-04-19 PROCEDURE — 83735 ASSAY OF MAGNESIUM: CPT

## 2020-04-19 PROCEDURE — 63600175 PHARM REV CODE 636 W HCPCS: Performed by: PHYSICIAN ASSISTANT

## 2020-04-19 PROCEDURE — 11000001 HC ACUTE MED/SURG PRIVATE ROOM

## 2020-04-19 PROCEDURE — 36415 COLL VENOUS BLD VENIPUNCTURE: CPT

## 2020-04-19 PROCEDURE — 25000003 PHARM REV CODE 250: Performed by: PHYSICIAN ASSISTANT

## 2020-04-19 PROCEDURE — 80053 COMPREHEN METABOLIC PANEL: CPT

## 2020-04-19 PROCEDURE — 63700000 PHARM REV CODE 250 ALT 637 W/O HCPCS: Performed by: PHYSICIAN ASSISTANT

## 2020-04-19 PROCEDURE — 25000003 PHARM REV CODE 250: Performed by: SURGERY

## 2020-04-19 PROCEDURE — 99232 SBSQ HOSP IP/OBS MODERATE 35: CPT | Mod: ,,, | Performed by: HOSPITALIST

## 2020-04-19 PROCEDURE — 99232 PR SUBSEQUENT HOSPITAL CARE,LEVL II: ICD-10-PCS | Mod: ,,, | Performed by: HOSPITALIST

## 2020-04-19 PROCEDURE — 25000003 PHARM REV CODE 250: Performed by: HOSPITALIST

## 2020-04-19 PROCEDURE — 80197 ASSAY OF TACROLIMUS: CPT

## 2020-04-19 RX ORDER — AMLODIPINE BESYLATE 5 MG/1
5 TABLET ORAL NIGHTLY
Status: DISCONTINUED | OUTPATIENT
Start: 2020-04-19 | End: 2020-04-20 | Stop reason: HOSPADM

## 2020-04-19 RX ORDER — BISMUTH SUBSALICYLATE 525 MG/30ML
30 LIQUID ORAL EVERY 6 HOURS PRN
Status: DISCONTINUED | OUTPATIENT
Start: 2020-04-19 | End: 2020-04-20 | Stop reason: HOSPADM

## 2020-04-19 RX ORDER — HYDRALAZINE HYDROCHLORIDE 25 MG/1
25 TABLET, FILM COATED ORAL EVERY 8 HOURS PRN
Status: DISCONTINUED | OUTPATIENT
Start: 2020-04-19 | End: 2020-04-20 | Stop reason: HOSPADM

## 2020-04-19 RX ORDER — CALCIUM CARBONATE 200(500)MG
500 TABLET,CHEWABLE ORAL DAILY PRN
Status: DISCONTINUED | OUTPATIENT
Start: 2020-04-19 | End: 2020-04-20 | Stop reason: HOSPADM

## 2020-04-19 RX ADMIN — CETIRIZINE HYDROCHLORIDE 10 MG: 10 TABLET, FILM COATED ORAL at 08:04

## 2020-04-19 RX ADMIN — TACROLIMUS 1 MG: 0.5 CAPSULE ORAL at 08:04

## 2020-04-19 RX ADMIN — APIXABAN 5 MG: 5 TABLET, FILM COATED ORAL at 08:04

## 2020-04-19 RX ADMIN — CEFTRIAXONE SODIUM 1 G: 1 INJECTION, POWDER, FOR SOLUTION INTRAMUSCULAR; INTRAVENOUS at 08:04

## 2020-04-19 RX ADMIN — HYDRALAZINE HYDROCHLORIDE 25 MG: 25 TABLET, FILM COATED ORAL at 06:04

## 2020-04-19 RX ADMIN — AZITHROMYCIN MONOHYDRATE 250 MG: 250 TABLET ORAL at 08:04

## 2020-04-19 RX ADMIN — Medication 30 ML: at 12:04

## 2020-04-19 RX ADMIN — AMLODIPINE BESYLATE 5 MG: 5 TABLET ORAL at 08:04

## 2020-04-19 RX ADMIN — TACROLIMUS 1 MG: 0.5 CAPSULE ORAL at 07:04

## 2020-04-19 RX ADMIN — ATORVASTATIN CALCIUM 40 MG: 20 TABLET, FILM COATED ORAL at 08:04

## 2020-04-19 RX ADMIN — HYDROXYCHLOROQUINE SULFATE 400 MG: 200 TABLET, FILM COATED ORAL at 08:04

## 2020-04-19 RX ADMIN — PANTOPRAZOLE SODIUM 40 MG: 40 TABLET, DELAYED RELEASE ORAL at 08:04

## 2020-04-19 RX ADMIN — METOPROLOL SUCCINATE 50 MG: 50 TABLET, EXTENDED RELEASE ORAL at 08:04

## 2020-04-19 NOTE — TREATMENT PLAN
Hepatology Treatment Plan    Roman Hodges is a 60 y.o. male admitted to hospital 4/15/2020 (Hospital Day: 5) due to Pneumonia due to COVID-19 virus. Hepatology following for immunosuppression management.        Lab Results   Component Value Date    TACROLIMUS 9.0 04/19/2020    TACROLIMUS 11.5 04/18/2020    TACROLIMUS 8.2 04/17/2020       Lab Results   Component Value Date    CREATININE 1.3 04/19/2020    CREATININE 1.2 04/18/2020    CREATININE 1.2 04/17/2020       Lab Results   Component Value Date    ALT 63 (H) 04/19/2020    AST 52 (H) 04/19/2020     (H) 07/06/2019    ALKPHOS 187 (H) 04/19/2020    BILITOT 0.4 04/19/2020    WBC 3.34 (L) 04/18/2020    HGB 11.8 (L) 04/18/2020     (H) 04/18/2020     Roman Hodges is a 60 y.o. 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 And Cellcept 250 QID.     Kidney function is stable  Liver enzymes are improving    Plan  Tacrolimus level 9.0 today. LFTs stable.     Immunosuppression Regimen:  Tacrolimus: 2mg qAM, 1mg qPM (home dose) resume today.   Hold Cellcept in setting of active infection    Please notify hepatology on day of discharge to discuss discharge medications and follow up.     Please obtain daily CBC, BMP, LFT, INR, immunosuppressant trough level    Thank you for involving us in the care of Roman Hodges. Please call with any additional concerns or questions.    Due to COVID-19, the Hepatology team is limiting interaction with consult patients unless absolutely necessary in order to limit patient's exposure to the virus. As of now, the Hepatology team will be chart checking this patient and discussing with staff. If the patient has an acute decompensation, change, and/or you believe the patient needs to be examined by a provider, please page Hepatology team on call to discuss the patient with a team member.     Steven Locke MD  Gastroenterology Fellow

## 2020-04-19 NOTE — PLAN OF CARE
Pt AAOx4, Pt BP has slowly been creeping up, pt received PRN hydralazine for BP of 167/107. Free of falls and injuries, no complaints of pain. Bs monitoring maintained per orders. Pt 2L O2 maintained. Remained afebrile. Isolation precautions maintained per orders. See previous notes, will continue to monitor.    Problem: Fall Injury Risk  Goal: Absence of Fall and Fall-Related Injury  Outcome: Ongoing, Progressing  Intervention: Identify and Manage Contributors to Fall Injury Risk  Flowsheets (Taken 4/19/2020 1812)  Self-Care Promotion: independence encouraged; BADL personal objects within reach; BADL personal routines maintained; meal setup provided  Medication Review/Management: medications reviewed  Intervention: Promote Injury-Free Environment  Flowsheets (Taken 4/19/2020 1812)  Safety Promotion/Fall Prevention: assistive device/personal item within reach; bed alarm set; diversional activities provided; Fall Risk reviewed with patient/family; Fall Risk signage in place; nonskid shoes/socks when out of bed; side rails raised x 2; medications reviewed; lighting adjusted; commode/urinal/bedpan at bedside  Environmental Safety Modification: assistive device/personal items within reach; clutter free environment maintained; lighting adjusted; room organization consistent     Problem: Adult Inpatient Plan of Care  Goal: Plan of Care Review  Outcome: Ongoing, Progressing  Flowsheets (Taken 4/19/2020 1812)  Plan of Care Reviewed With: patient  Goal: Patient-Specific Goal (Individualization)  Outcome: Ongoing, Progressing  Goal: Absence of Hospital-Acquired Illness or Injury  Outcome: Ongoing, Progressing  Intervention: Identify and Manage Fall Risk  Flowsheets (Taken 4/19/2020 1812)  Safety Promotion/Fall Prevention: assistive device/personal item within reach; bed alarm set; diversional activities provided; Fall Risk reviewed with patient/family; Fall Risk signage in place; nonskid shoes/socks when out of bed; side  rails raised x 2; medications reviewed; lighting adjusted; commode/urinal/bedpan at bedside  Intervention: Prevent VTE (venous thromboembolism)  Flowsheets (Taken 4/19/2020 1812)  VTE Prevention/Management: ambulation promoted; bleeding precautions maintained; bleeding risk assessed; bleeding risk factor(s) identified, provider notified; fluids promoted  Goal: Optimal Comfort and Wellbeing  Outcome: Ongoing, Progressing  Intervention: Provide Person-Centered Care  Flowsheets (Taken 4/19/2020 1812)  Trust Relationship/Rapport: care explained; choices provided; emotional support provided; empathic listening provided; questions answered; thoughts/feelings acknowledged; reassurance provided; questions encouraged  Goal: Readiness for Transition of Care  Outcome: Ongoing, Progressing  Goal: Rounds/Family Conference  Outcome: Ongoing, Progressing     Problem: Diabetes Comorbidity  Goal: Blood Glucose Level Within Desired Range  Outcome: Ongoing, Progressing  Intervention: Maintain Glycemic Control  Flowsheets (Taken 4/19/2020 1812)  Glycemic Management: blood glucose monitoring     Problem: Skin Injury Risk Increased  Goal: Skin Health and Integrity  Outcome: Ongoing, Progressing  Intervention: Optimize Skin Protection  Flowsheets (Taken 4/19/2020 1812)  Pressure Reduction Techniques: frequent weight shift encouraged; weight shift assistance provided  Pressure Reduction Devices: pressure-redistributing mattress utilized  Skin Protection: adhesive use limited; electrode sites changed; incontinence pads utilized; tubing/devices free from skin contact  Head of Bed (HOB): HOB at 20-30 degrees  Intervention: Promote and Optimize Oral Intake  Flowsheets (Taken 4/19/2020 1812)  Oral Nutrition Promotion: calorie dense foods provided

## 2020-04-19 NOTE — CONSULTS
Roman Hodges  has been accepted for transfer to [facility] and will be followed through telemedicine services beginning 04/20/20  at 7am.    Maria Dolores Bowles

## 2020-04-19 NOTE — PROGRESS NOTES
Hospital Medicine  Progress Note  [facility]      Patient Name: Roman Hodges  MRN:  [id number]  Hospital Medicine Team: [facility] Yanely Betancourt MD  Date of Admission:  4/15/2020     Length of Stay:  LOS: 4 days       Principal Problem:  Pneumonia due to COVID-19 virus      HPI:  60 year old gentleman with a h/o liver transplant (7/2019) on Prograf and Cellcept, MALLIKA, HTN, paroxysmal A Fib on Eliquis, DM II, and GERD.  He presents with SOB for three days. The patient says that the dry cough started first abut a week ago and then over the past couple of days he has had the worsening SOB.  Patient states symptoms are worse with exertion and improves with use of his CPAP.  Denies fevers, chills, nausea, vomiting chest pain, dizziness, or palpations.    Hospital Course:  Patient admitted for evaluation of +COVID-19.    Interval History:     Doing well   Continues to feel better   HDS  No cough   No abd pain   On 2L NC   Transferring to telemedicine patient on 4/20    Review of Systems:  ROS (Positive in Bold, otherwise negative)  Constitutional: fever, chills, night sweats, malaise, weakness  CV: chest pain, edema, palpitations  Resp: resolved SOB, cough, sputum production  GI: changes in appetite, NVDC, pain, melena, hematochezia, GERD, hematemesis  : Dysuria, hematuria, urinary urgency, frequency  MSK: arthralgia/myalgia, joint swelling  Neuro/Psych: anxiety, depression    Inpatient Medications:     amLODIPine  5 mg Oral QHS    apixaban  5 mg Oral BID    atorvastatin  40 mg Oral QHS    azithromycin  250 mg Oral Q24H    cefTRIAXone (ROCEPHIN) IVPB  1 g Intravenous QHS    cetirizine  10 mg Oral Daily    hydroxychloroquine  400 mg Oral Daily    pantoprazole  40 mg Oral Daily    tacrolimus  1 mg Oral BID     acetaminophen, albuterol **AND** MDI Q6H PRN, benzonatate, bismuth subsalicylate, calcium carbonate, Dextrose 10% Bolus, Dextrose 10% Bolus, glucagon (human  "recombinant), glucose, glucose, hydrALAZINE, insulin aspart U-100, loperamide, melatonin, ondansetron, promethazine (PHENERGAN) IVPB, sodium chloride 0.9%, sodium chloride 0.9%    Physical Exam:      Intake/Output Summary (Last 24 hours) at 4/19/2020 1846  Last data filed at 4/19/2020 0542  Gross per 24 hour   Intake 200 ml   Output --   Net 200 ml     Wt Readings from Last 3 Encounters:   04/15/20 94.1 kg (207 lb 6.4 oz)   02/13/20 94.8 kg (208 lb 14.4 oz)   01/20/20 93.2 kg (205 lb 7.5 oz)       BP (!) 163/107   Pulse 69   Temp 98.8 °F (37.1 °C) (Oral)   Resp (!) 28   Ht 5' 8" (1.727 m)   Wt 94.1 kg (207 lb 6.4 oz)   SpO2 95%   BMI 31.54 kg/m²     GEN: NAD, conversant  Resp: decreased bilateral breath sounds, no wheezes or rales, normal work of breathing   On 2 L NC  CV: RRR, no m/r/g, no edema  GI: soft, NTND  Skin: no rash    Laboratory:  Lab Results   Component Value Date    [id number] Positive (A) 04/15/2020       Recent Labs   Lab 04/15/20  1811 04/16/20  0432 04/18/20  0436   WBC 2.92* 2.98* 3.34*   LYMPH 10.6*  0.3* 10.4*  0.3* 11.1*  0.4*   HGB 12.8* 12.5* 11.8*   HCT 39.0* 39.4* 37.7*    312 370*     Recent Labs   Lab 04/16/20  0432 04/17/20  0352 04/18/20  0436 04/19/20  0347   * 136 137 140   K 3.2* 3.5 3.7 4.0    101 104 105   CO2 23 24 24 23   BUN 13 15 16 17   CREATININE 1.0 1.2 1.2 1.3    99 99 94   CALCIUM 8.2* 8.2* 8.3* 8.4*   MG 1.3* 2.1 1.8 1.7   PHOS 3.0  --  4.1  --      Recent Labs   Lab 04/17/20  0352 04/18/20  0436 04/19/20  0347   ALKPHOS 196* 174* 187*   ALT 57* 52* 63*   AST 20 23 52*   ALBUMIN 2.9* 2.9* 2.8*   PROT 6.7 6.5 6.6   BILITOT 0.4 0.4 0.4        Recent Labs     04/18/20  0436   CRP 74.1*       All labs within the last 24 hours were reviewed.     Microbiology:    All microbiology within the last 24 hours was reviewed.     Imaging    All imaging within the last 24 hours was reviewed.     Assessment and Plan:    Active Hospital Problems    " Diagnosis  POA    *Pneumonia due to COVID-19 virus [U07.1, J12.89]  Yes    Acute hypoxemic respiratory failure [J96.01]  Yes    Lymphopenia [D72.810]  Yes    Hyponatremia [E87.1]  Yes    Hypokalemia [E87.6]  Yes    Hypomagnesemia [E83.42]  Yes    Long-term use of immunosuppressant medication [Z79.899]  Not Applicable    S/P liver transplant [Z94.4]  Not Applicable     Chronic    Prophylactic immunotherapy [Z29.8]  Not Applicable     Chronic    Long term (current) use of anticoagulants [Z79.01]  Not Applicable     Chronic    Essential hypertension [I10]  Yes     Chronic    Type 2 diabetes mellitus, without long-term current use of insulin [E11.9]  Yes     Chronic    Atrial fibrillation [I48.91]  Yes     Chronic    GERD (gastroesophageal reflux disease) [K21.9]  Yes     Chronic      Resolved Hospital Problems   No resolved problems to display.       Pneumonia due to COVID-19 virus  Acute hypoxemic respiratory failure    - COVID-19 testing: POSITIVE 4/15  - Infection Control notified     - Isolation:   - Airborne and Droplet Precautions  - Surgical mask on patient   - N95 masks must be fit tested, wear eye protection  - 20 second hand hygiene              - Limit visitors per hospital policy              - Consolidate lab draws, nursing care, and interventions     - Diagnostics: (Lymphopenia, hyponatremia, hyperferritinemia, elevated troponin, elevated d-dimer, age, and comorbidities are significant predictors of poor clinical outcome)              - CBC:  WBC 2.92, lymph #0.3, trend Q48hrs  - CMP:  trend Q48hrs  - Procalcitonin:  Pending  - D-dimer:  Pending, repeat prior to discharge  - Ferritin:  441, repeat prior to discharge   - CRP:  62.0, trend Q48hrs  - LDH:  260, repeat prior to discharge   - BNP:  Pending  - Troponin:  0.008              - ECG:  NSR              - rapid Flu:  Pending              - RIP only if BMT/solid transplant:  N/A              - Legionella antigen:  Pending              -  Blood culture x2:  Pending              - Sputum culture:  Pending              - CXR:  Bilateral basilar subsegmental atelectasis.  There may be developing parenchymal attenuation projected over the left lower lung zone, changes of infection or inflammation are considerations versus edema.              - UA and culture:  negative               - CPK:  146     - Management:   Bundle care as able to maintain isolation & minimize in/out of room   - Supplemental O2 to maintain SpO2 92%-96%   if requiring 6L NC or higher, place on nonrebreather and discuss case with MICU              - Telemetry & continuous pulse oximetry               - If wheezing   - albuterol inhaler 2-4 puff Q6hr PRN    - ipratropium daily               - acetaminophen 650mg PO Q6hr PRN fever              - loperamide PRN for viral diarrhea  - Empiric antibiotics per likely source & patient allergies                          - CAP: x 5 day course (d/c early if low concern for bacterial co-infection)  Ceftriaxone 1g IV Q24hrs                                      Azithromycin 500mg IV day #1, then 250mg PO daily x4 days   - Investigational Treatment Protocol:                            -start statin: atorvastatin 40mg po daily (if CPK WNL)                          - start HCQ 400mg PO BID x1 day, then 400mg PO daily x 4 days   (check glucose 6 phosphate dehydrogenase (NOT G6PD Quant), ECG at start & 48hrs, and start Qshift POCT glucose)     Safety notes:              - Avoid NIPPV (CPAP/BiPAP) to prevent aerosolization, use on a case-by-case basis if in neg pressure room              - Cautious use of NSAIDS for fever per WHO recommendations (3/16/2020)              - No new ACEi/ARB start or discontinuation of chronic med unless hypotensive (Esler et al. Journal of Hypertension 2020, 38:000-000)              - Careful use of steroids in the absence of other indications (shock, ARDS)              - Fluid sparing resuscitation, avoid maintenance  fluids                S/p liver transplant  Propylactic immunotherapy  - AST 41, , AlkPhos 259. Pt. Was to undergo EUS liver biopsy sometime this week according to documentation  - at home on Prograf 2mg daily and 1mg qHS.    - Holding  Cellcept, given active infection   - prograf level of 17 on 4/16- prograf held on 4/16  - prograf restarted on 4/17 at prior home dose - titrated to the dose above  - hepatology consulted for IS management      Atrial fibrillation  Long term use of anticoagulants  -Pt. In NSR on admit, no acute issues  - Continue Eliquis 5mg BID for AC and toprol-XL for rate control     DM II  - Diabetic diet, low dose SSI.     HTN  -metoprolol succinate 50mg daily, pt. Reports PCP has discontinued other blood pressure medicines.  - metop stopped on 4/19 and amlodipine started given poor BP control     GERD  - Protonix.    Kati (wife)  [phone]- updated via phone     Transferring to telemedicine patient on 4/20    Signing Physician:     Yanely Betancourt MD  Department of Hospital Medicine   [facility]  Pager [phone] [place] [phone]  4/19/2020

## 2020-04-19 NOTE — PROGRESS NOTES
contacted Md pt /101, but coming down. MD said she would put in for PRN BP medication and monitor BP and give medication if sustained.

## 2020-04-20 ENCOUNTER — PATIENT MESSAGE (OUTPATIENT)
Dept: TRANSPLANT | Facility: CLINIC | Age: 60
End: 2020-04-20

## 2020-04-20 ENCOUNTER — PATIENT MESSAGE (OUTPATIENT)
Dept: FAMILY MEDICINE | Facility: CLINIC | Age: 60
End: 2020-04-20

## 2020-04-20 ENCOUNTER — TELEPHONE (OUTPATIENT)
Dept: TRANSPLANT | Facility: CLINIC | Age: 60
End: 2020-04-20

## 2020-04-20 VITALS
WEIGHT: 207.38 LBS | OXYGEN SATURATION: 94 % | BODY MASS INDEX: 31.43 KG/M2 | HEART RATE: 72 BPM | RESPIRATION RATE: 18 BRPM | HEIGHT: 68 IN | SYSTOLIC BLOOD PRESSURE: 165 MMHG | DIASTOLIC BLOOD PRESSURE: 91 MMHG | TEMPERATURE: 98 F

## 2020-04-20 DIAGNOSIS — Z94.4 LIVER REPLACED BY TRANSPLANT: Primary | ICD-10-CM

## 2020-04-20 LAB
ALBUMIN SERPL BCP-MCNC: 2.8 G/DL (ref 3.5–5.2)
ALP SERPL-CCNC: 172 U/L (ref 55–135)
ALT SERPL W/O P-5'-P-CCNC: 56 U/L (ref 10–44)
ANION GAP SERPL CALC-SCNC: 11 MMOL/L (ref 8–16)
AST SERPL-CCNC: 25 U/L (ref 10–40)
BASOPHILS # BLD AUTO: 0.02 K/UL (ref 0–0.2)
BASOPHILS NFR BLD: 0.5 % (ref 0–1.9)
BILIRUB SERPL-MCNC: 0.5 MG/DL (ref 0.1–1)
BUN SERPL-MCNC: 14 MG/DL (ref 8–23)
CALCIUM SERPL-MCNC: 8.6 MG/DL (ref 8.7–10.5)
CHLORIDE SERPL-SCNC: 107 MMOL/L (ref 95–110)
CO2 SERPL-SCNC: 23 MMOL/L (ref 23–29)
CREAT SERPL-MCNC: 1.2 MG/DL (ref 0.5–1.4)
CRP SERPL-MCNC: 41.4 MG/L (ref 0–8.2)
DIFFERENTIAL METHOD: ABNORMAL
EOSINOPHIL # BLD AUTO: 0.1 K/UL (ref 0–0.5)
EOSINOPHIL NFR BLD: 2.8 % (ref 0–8)
ERYTHROCYTE [DISTWIDTH] IN BLOOD BY AUTOMATED COUNT: 14.1 % (ref 11.5–14.5)
EST. GFR  (AFRICAN AMERICAN): >60 ML/MIN/1.73 M^2
EST. GFR  (NON AFRICAN AMERICAN): >60 ML/MIN/1.73 M^2
GLUCOSE SERPL-MCNC: 102 MG/DL (ref 70–110)
HCT VFR BLD AUTO: 37.6 % (ref 40–54)
HGB BLD-MCNC: 11.7 G/DL (ref 14–18)
IMM GRANULOCYTES # BLD AUTO: 0.04 K/UL (ref 0–0.04)
IMM GRANULOCYTES NFR BLD AUTO: 1 % (ref 0–0.5)
L PNEUMO AG UR QL IA: NOT DETECTED
LYMPHOCYTES # BLD AUTO: 0.5 K/UL (ref 1–4.8)
LYMPHOCYTES NFR BLD: 12.7 % (ref 18–48)
MAGNESIUM SERPL-MCNC: 1.6 MG/DL (ref 1.6–2.6)
MCH RBC QN AUTO: 24.7 PG (ref 27–31)
MCHC RBC AUTO-ENTMCNC: 31.1 G/DL (ref 32–36)
MCV RBC AUTO: 79 FL (ref 82–98)
MONOCYTES # BLD AUTO: 0.7 K/UL (ref 0.3–1)
MONOCYTES NFR BLD: 17.3 % (ref 4–15)
NEUTROPHILS # BLD AUTO: 2.6 K/UL (ref 1.8–7.7)
NEUTROPHILS NFR BLD: 65.7 % (ref 38–73)
NRBC BLD-RTO: 0 /100 WBC
PHOSPHATE SERPL-MCNC: 3.9 MG/DL (ref 2.7–4.5)
PLATELET # BLD AUTO: 433 K/UL (ref 150–350)
PMV BLD AUTO: 8.8 FL (ref 9.2–12.9)
POCT GLUCOSE: 102 MG/DL (ref 70–110)
POCT GLUCOSE: 140 MG/DL (ref 70–110)
POCT GLUCOSE: 94 MG/DL (ref 70–110)
POTASSIUM SERPL-SCNC: 4 MMOL/L (ref 3.5–5.1)
PROT SERPL-MCNC: 6.6 G/DL (ref 6–8.4)
RBC # BLD AUTO: 4.74 M/UL (ref 4.6–6.2)
SODIUM SERPL-SCNC: 141 MMOL/L (ref 136–145)
TACROLIMUS BLD-MCNC: 10 NG/ML (ref 5–15)
WBC # BLD AUTO: 3.94 K/UL (ref 3.9–12.7)

## 2020-04-20 PROCEDURE — 86140 C-REACTIVE PROTEIN: CPT

## 2020-04-20 PROCEDURE — 99239 HOSP IP/OBS DSCHRG MGMT >30: CPT | Mod: ,,, | Performed by: HOSPITALIST

## 2020-04-20 PROCEDURE — 36415 COLL VENOUS BLD VENIPUNCTURE: CPT

## 2020-04-20 PROCEDURE — 99239 PR HOSPITAL DISCHARGE DAY,>30 MIN: ICD-10-PCS | Mod: ,,, | Performed by: HOSPITALIST

## 2020-04-20 PROCEDURE — 25000003 PHARM REV CODE 250: Performed by: HOSPITALIST

## 2020-04-20 PROCEDURE — 84100 ASSAY OF PHOSPHORUS: CPT

## 2020-04-20 PROCEDURE — 25000003 PHARM REV CODE 250: Performed by: PHYSICIAN ASSISTANT

## 2020-04-20 PROCEDURE — 25000003 PHARM REV CODE 250: Performed by: SURGERY

## 2020-04-20 PROCEDURE — 80197 ASSAY OF TACROLIMUS: CPT

## 2020-04-20 PROCEDURE — 80053 COMPREHEN METABOLIC PANEL: CPT

## 2020-04-20 PROCEDURE — 85025 COMPLETE CBC W/AUTO DIFF WBC: CPT

## 2020-04-20 PROCEDURE — 83735 ASSAY OF MAGNESIUM: CPT

## 2020-04-20 RX ORDER — METOPROLOL SUCCINATE 50 MG/1
50 TABLET, EXTENDED RELEASE ORAL DAILY
Status: DISCONTINUED | OUTPATIENT
Start: 2020-04-20 | End: 2020-04-20 | Stop reason: HOSPADM

## 2020-04-20 RX ORDER — TACROLIMUS 1 MG/1
1 CAPSULE ORAL EVERY 12 HOURS
Qty: 60 CAPSULE | Refills: 1 | Status: SHIPPED | OUTPATIENT
Start: 2020-04-20 | End: 2020-04-28

## 2020-04-20 RX ADMIN — METOPROLOL SUCCINATE 50 MG: 50 TABLET, EXTENDED RELEASE ORAL at 10:04

## 2020-04-20 RX ADMIN — TACROLIMUS 1 MG: 0.5 CAPSULE ORAL at 09:04

## 2020-04-20 RX ADMIN — PANTOPRAZOLE SODIUM 40 MG: 40 TABLET, DELAYED RELEASE ORAL at 09:04

## 2020-04-20 RX ADMIN — HYDROXYCHLOROQUINE SULFATE 400 MG: 200 TABLET, FILM COATED ORAL at 09:04

## 2020-04-20 RX ADMIN — APIXABAN 5 MG: 5 TABLET, FILM COATED ORAL at 09:04

## 2020-04-20 RX ADMIN — ACETAMINOPHEN 650 MG: 325 TABLET ORAL at 09:04

## 2020-04-20 RX ADMIN — BENZONATATE 100 MG: 100 CAPSULE ORAL at 09:04

## 2020-04-20 NOTE — TELEPHONE ENCOUNTER
----- Message from Jodee Mcmullen, RN sent at 4/20/2020  9:36 AM CDT -----  Regarding: Post txp discharge  Mr. Hodges who was admitted for Covid 19. He is being discharged today.  Cellcept has been discontinued at this time.  FK is decreased to 1/1.  Level today 10. Please get labs next week.      I know that Dr Damon had requested an EUS but his LFT's are now normal.  It may have been the virus that caused the brief elevation.  So we will just watch is what the docs are saying.     Thanks  Rita

## 2020-04-20 NOTE — PLAN OF CARE
Jose Angel Munson MD  [address] / [place] [address]    [id number] [place] [place] - [address]  [address]  [place] [address]  Phone: [phone] Fax: [phone]    [id number] - [place], [place] - [address]  [address]  [place] [address]  Phone: [phone] Fax: [phone]    [facility]  [address]  [place] [address]  Phone: [phone] Fax: [phone]    [id number] [place], [place] - [address] [facility]  [address]  [place] [address]  Phone: [phone] Fax: [phone]    Payor: ALE / Plan: [employer] OPEN ACCESS PLUS / Product Type: Commercial /        04/20/20 1435   Discharge Assessment   Assessment Type Discharge Planning Assessment   Confirmed/corrected address and phone number on facesheet? Yes   Assessment information obtained from? Medical Record   Prior to hospitilization cognitive status: Alert/Oriented   Prior to hospitalization functional status: Independent   Current cognitive status: Unable to Assess   Current Functional Status:   (SKYLA)   Lives With spouse;child(pierce), adult   Able to Return to Prior Arrangements yes   Is patient able to care for self after discharge? Unable to determine at this time (comments)   Patient's perception of discharge disposition home or selfcare   Readmission Within the Last 30 Days no previous admission in last 30 days   Patient currently being followed by outpatient case management? No   Patient currently receives any other outside agency services? No   Do you have any problems affording any of your prescribed medications? No   Is the patient taking medications as prescribed? yes   Does the patient have transportation home? Yes   Transportation Anticipated family or friend will provide   Does the patient receive services at the [facility]? No    Discharge Plan A Home;Home with family   DME Needed Upon Discharge  other (see comments)  (TBD)   Patient/Family in Agreement with Plan unable to assess

## 2020-04-20 NOTE — DISCHARGE SUMMARY
[facility] Hospital Medicine  Discharge Summary      Patient Name: Roman Hodges  MRN: [id number]  Admission Date: 4/15/2020  Hospital Length of Stay: 5 days  Discharge Date and Time: No discharge date for patient encounter.  Attending Physician: Maria Dolores Bowles MD   Discharging Provider: Maria Dolores Bowles MD  Primary Care Provider: Jose Angel Munson MD  Hospital Medicine Team: [facility] MEDICINE TEAM 10 Maria Dolores Bowles MD     Length of Stay:  LOS: 5 days     This service was provided through telemedicine.  Start time:11:34  Chief complaint: slept well  The patient location is: 6/646 A  The patient arrived at:   Present with the patient at the time of the telemed/virtual assessment: no one  End time:  11;40, and called wife Lela,   Total time spent with patient: 6 minutes,  Spoke ti Lela and DTR for another 6 minutes.  I have assessed these findings virtually using telemed platform and with assistance of bedside nurse or telemedicine presenter.  The attending portion of this evaluation, treatment, and documentation was performed per Maria Dolores Bowles MD via audio only.    Patient was transferred to the telemedicine service on: 4/20.    Principal Problem:  Pneumonia due to COVID-19 virus      HPI:  60 year old gentleman with a h/o liver transplant (7/2019) on Prograf and Cellcept, MALLIKA, HTN, paroxysmal A Fib on Eliquis, DM II, and GERD.  He presents with SOB for three days. The patient says that the dry cough started first abut a week ago and then over the past couple of days he has had the worsening SOB.  Patient states symptoms are worse with exertion and improves with use of his CPAP.  Denies fevers, chills, nausea, vomiting chest pain, dizziness, or palpations.    Hospital Course:  Patient admitted for evaluation of +COVID-19.    Interval History:     Doing well   Continues to feel better   HDS  No cough   No abd pain   On 2L NC   Transferring to telemedicine patient on  "4/20 4/20 -  hos med 6:  94% 2 liters.   Medically ready for dc.  I spsoke to Hepatology.  Will dc on Prograf 1 mg q 12 hours.   F/u Heptology. And pcp.    Resume home BP meds at dc.   Cale enroll in COVID monitoring proram.     Review of Systems:  ROS (Positive in Bold, otherwise negative) slept well, eating, walking, balance good.  Constitutional: fever, chills, night sweats, malaise, weakness  CV: chest pain, edema, palpitations  Resp: resolved SOB, cough, sputum production  GI:  No changes in appetite, NVDC, pain, melena, hematochezia, GERD, hematemesis  : Dysuria, hematuria, urinary urgency, frequency  MSK: arthralgia/myalgia, joint swelling  Neuro/Psych: anxiety, depression    Inpatient Medications:     amLODIPine  5 mg Oral QHS    apixaban  5 mg Oral BID    atorvastatin  40 mg Oral QHS    cetirizine  10 mg Oral Daily    pantoprazole  40 mg Oral Daily    tacrolimus  1 mg Oral BID     acetaminophen, albuterol **AND** MDI Q6H PRN, benzonatate, bismuth subsalicylate, calcium carbonate, Dextrose 10% Bolus, Dextrose 10% Bolus, glucagon (human recombinant), glucose, glucose, hydrALAZINE, insulin aspart U-100, loperamide, melatonin, ondansetron, promethazine (PHENERGAN) IVPB, sodium chloride 0.9%, sodium chloride 0.9%    Physical Exam:    No intake or output data in the 24 hours ending 04/20/20 0922  Wt Readings from Last 3 Encounters:   04/15/20 94.1 kg (207 lb 6.4 oz)   02/13/20 94.8 kg (208 lb 14.4 oz)   01/20/20 93.2 kg (205 lb 7.5 oz)       BP (!) 165/91 (BP Location: Left leg, Patient Position: Lying)   Pulse 72   Temp 97.2 °F (36.2 °C) (Oral)   Resp 18   Ht 5' 8" (1.727 m)   Wt 94.1 kg (207 lb 6.4 oz)   SpO2 (!) 94%   BMI 31.54 kg/m²     GEN: NAD, conversant  Resp: decreased bilateral breath sounds, no wheezes or rales, normal work of breathing   On 2 L NC  CV: RRR, no m/r/g, no edema  GI: soft, NTND  Skin: no rash    Laboratory:  Lab Results   Component Value Date    [id number] Positive (A) " 04/15/2020       Recent Labs   Lab 04/16/20  0432 04/18/20  0436 04/20/20  0300   WBC 2.98* 3.34* 3.94   LYMPH 10.4*  0.3* 11.1*  0.4* 12.7*  0.5*   HGB 12.5* 11.8* 11.7*   HCT 39.4* 37.7* 37.6*    370* 433*     Recent Labs   Lab 04/16/20  0432  04/18/20  0436 04/19/20  0347 04/20/20  0300   *   < > 137 140 141   K 3.2*   < > 3.7 4.0 4.0      < > 104 105 107   CO2 23   < > 24 23 23   BUN 13   < > 16 17 14   CREATININE 1.0   < > 1.2 1.3 1.2      < > 99 94 102   CALCIUM 8.2*   < > 8.3* 8.4* 8.6*   MG 1.3*   < > 1.8 1.7 1.6   PHOS 3.0  --  4.1  --  3.9    < > = values in this interval not displayed.     Recent Labs   Lab 04/18/20 0436 04/19/20  0347 04/20/20  0300   ALKPHOS 174* 187* 172*   ALT 52* 63* 56*   AST 23 52* 25   ALBUMIN 2.9* 2.8* 2.8*   PROT 6.5 6.6 6.6   BILITOT 0.4 0.4 0.5        Recent Labs     04/18/20 0436 04/20/20  0300   CRP 74.1* 41.4*       All labs within the last 24 hours were reviewed.     Microbiology:    All microbiology within the last 24 hours was reviewed.     Imaging    All imaging within the last 24 hours was reviewed.     Assessment and Plan:    Active Hospital Problems    Diagnosis  POA    *Pneumonia due to COVID-19 virus [U07.1, J12.89]  Yes    Acute hypoxemic respiratory failure [J96.01]  Yes    Lymphopenia [D72.810]  Yes    Hyponatremia [E87.1]  Yes    Hypokalemia [E87.6]  Yes    Hypomagnesemia [E83.42]  Yes    Long-term use of immunosuppressant medication [Z79.899]  Not Applicable    S/P liver transplant [Z94.4]  Not Applicable     Chronic    Prophylactic immunotherapy [Z29.8]  Not Applicable     Chronic    Long term (current) use of anticoagulants [Z79.01]  Not Applicable     Chronic    Essential hypertension [I10]  Yes     Chronic    Type 2 diabetes mellitus, without long-term current use of insulin [E11.9]  Yes     Chronic    Atrial fibrillation [I48.91]  Yes     Chronic    GERD (gastroesophageal reflux disease) [K21.9]  Yes      Chronic      Resolved Hospital Problems   No resolved problems to display.         Acute hypoxemic respiratory failure due to Pneumonia due to COVID-19 virus    - COVID-19 testing: POSITIVE 4/15  - Infection Control notified     - Isolation:   - Airborne and Droplet Precautions  - Surgical mask on patient   - N95 masks must be fit tested, wear eye protection  - 20 second hand hygiene              - Limit visitors per hospital policy              - Consolidate lab draws, nursing care, and interventions     - Diagnostics: (Lymphopenia, hyponatremia, hyperferritinemia, elevated troponin, elevated d-dimer, age, and comorbidities are significant predictors of poor clinical outcome)              - CBC:  WBC 2.92, lymph #0.3, trend Q48hrs  - CMP:  trend Q48hrs  - Procalcitonin:  Pending  - D-dimer:  Pending, repeat prior to discharge  - Ferritin:  441, repeat prior to discharge   - CRP:  62.0, trend Q48hrs  - LDH:  260, repeat prior to discharge   - BNP:  Pending  - Troponin:  0.008              - ECG:  NSR              - rapid Flu:  Pending              - RIP only if BMT/solid transplant:  N/A              - Legionella antigen:  Pending              - Blood culture x2:  Pending              - Sputum culture:  Pending              - CXR:  Bilateral basilar subsegmental atelectasis.  There may be developing parenchymal attenuation projected over the left lower lung zone, changes of infection or inflammation are considerations versus edema.              - UA and culture:  negative               - CPK:  146     - Management:   Bundle care as able to maintain isolation & minimize in/out of room   - Supplemental O2 to maintain SpO2 92%-96%   if requiring 6L NC or higher, place on nonrebreather and discuss case with MICU              - Telemetry & continuous pulse oximetry               - If wheezing   - albuterol inhaler 2-4 puff Q6hr PRN    - ipratropium daily               - acetaminophen 650mg PO Q6hr PRN fever               - loperamide PRN for viral diarrhea  - Empiric antibiotics per likely source & patient allergies                          - CAP: x 5 day course (d/c early if low concern for bacterial co-infection)  Ceftriaxone 1g IV Q24hrs                                      Azithromycin 500mg IV day #1, then 250mg PO daily x4 days   - Investigational Treatment Protocol:                            -start statin: atorvastatin 40mg po daily (if CPK WNL)                          - start HCQ 400mg PO BID x1 day, then 400mg PO daily x 4 days   (check glucose 6 phosphate dehydrogenase (NOT G6PD Quant), ECG at start & 48hrs, and start Qshift POCT glucose)     Safety notes:              - Avoid NIPPV (CPAP/BiPAP) to prevent aerosolization, use on a case-by-case basis if in neg pressure room              - Cautious use of NSAIDS for fever per WHO recommendations (3/16/2020)              - No new ACEi/ARB start or discontinuation of chronic med unless hypotensive (Esler et al. Journal of Hypertension 2020, 38:000-000)              - Careful use of steroids in the absence of other indications (shock, ARDS)              - Fluid sparing resuscitation, avoid maintenance fluids                S/p liver transplant  Propylactic immunotherapy  - AST 41, , AlkPhos 259. Pt. Was to undergo EUS liver biopsy sometime this week according to documentation  - at home on Prograf 2mg daily and 1mg qHS.    - Holding  Cellcept, given active infection   - prograf level of 17 on 4/16- prograf held on 4/16  - prograf restarted on 4/17 at prior home dose - titrated to the dose above  - hepatology consulted for IS management   4/20 - stable     Atrial fibrillation  Long term use of anticoagulants  -Pt. In NSR on admit, no acute issues  - Continue Eliquis 5mg BID for AC and toprol-XL for rate control  4/20- stable, rate controlled     DM II  - Diabetic diet, low dose SSI.  Recent Labs     04/18/20  2122 04/19/20  0744 04/19/20  1113 04/19/20  1610  04/19/20  2134 04/20/20  0744   POCTGLUCOSE 137* 106 142* 116* 101 94          HTN  -metoprolol succinate 50mg daily, pt. Reports PCP has discontinued other blood pressure medicines.  - metop stopped on 4/19 and amlodipine started given poor BP control  4/20 - on amilodipine 5 mg,  Resume metoprolol xl 50 ( home dose).  Discussed w Lela the need to gradually resume home  BP meds.      GERD  - Protonix.  4/20 - stable    Does patient have Capacity? yes  Contact: Kati (wife)  [phone]  Goals/Wishes:  Go home  Code Status- FULL  Pain management-  No pain  Prognosis-  good  Family discussions-  Called Kati:   Functional Status - pt  lives w wife and DTR in [place],  Discussed isolation precaution. Called his wife:      Post acute care plan:   home    MariaD olores Bowles MD  Department of Hospital Medicine   [facility]  [id number]      * No surgery found *      Hospital Course:   No notes on file     Consults:   Consults (From admission, onward)        Status Ordering Provider     Inpatient virtual consult to Hospital Medicine  Once     Provider:  (Not yet assigned)    JOHNNA Mendez new Assessment & Plan notes have been filed under this hospital service since the last note was generated.  Service: Hospital Medicine    Final Active Diagnoses:    Diagnosis Date Noted POA    PRINCIPAL PROBLEM:  Pneumonia due to COVID-19 virus [U07.1, J12.89] 04/15/2020 Yes    Acute hypoxemic respiratory failure [J96.01] 04/18/2020 Yes    Lymphopenia [D72.810] 04/16/2020 Yes    Hyponatremia [E87.1] 04/16/2020 Yes    Hypokalemia [E87.6] 04/16/2020 Yes    Hypomagnesemia [E83.42] 04/16/2020 Yes    Long-term use of immunosuppressant medication [Z79.899] 07/07/2019 Not Applicable    S/P liver transplant [Z94.4] 07/04/2019 Not Applicable     Chronic    Prophylactic immunotherapy [Z29.8] 07/04/2019 Not Applicable     Chronic    Long term (current) use of anticoagulants [Z79.01] 04/26/2019  Not Applicable     Chronic    Essential hypertension [I10] 11/29/2018 Yes     Chronic    Type 2 diabetes mellitus, without long-term current use of insulin [E11.9] 11/29/2018 Yes     Chronic    Atrial fibrillation [I48.91] 11/29/2018 Yes     Chronic    GERD (gastroesophageal reflux disease) [K21.9] 11/29/2018 Yes     Chronic      Problems Resolved During this Admission:       Discharged Condition: good    Disposition:     Follow Up:    Patient Instructions:      COVID-19 Home Symptom Monitoring  - Duration (days): 14     Order Specific Question Answer Comments   Duration (days) 14        Significant Diagnostic Studies: see above    Pending Diagnostic Studies:     Procedure Component Value Units Date/Time    Legionella antigen, urine [[id number]] Collected:  04/15/20 2221    Order Status:  Sent Lab Status:  In process Updated:  04/15/20 2229    Specimen:  Urine, Clean Catch          Medications:  Reconciled Home Medications:      Medication List      CHANGE how you take these medications    blood sugar diagnostic Strp  Test blood glucose 3 (three) times daily.  What changed:    · how much to take  · how to take this  · when to take this  · additional instructions     ergocalciferol 50,000 unit Cap  Commonly known as:  ERGOCALCIFEROL  TAKE 1 CAPSULE BY MOUTH ONE TIME PER WEEK  What changed:  See the new instructions.     lancets Misc  Test blood glucose 3 (three) times daily.  What changed:    · how much to take  · how to take this  · when to take this  · additional instructions     tacrolimus 1 MG Cap  Commonly known as:  PROGRAF  Take 1 capsule (1 mg total) by mouth every 12 (twelve) hours.  What changed:    · how much to take  · how to take this  · when to take this  · additional instructions        CONTINUE taking these medications    amLODIPine 10 MG tablet  Commonly known as:  NORVASC  Take 1 tablet (10 mg total) by mouth once daily.     apixaban 5 mg Tab  Commonly known as:  ELIQUIS  [place] manolo tableta (5 mg  en total) por vía oral 2 veces al día.  (Take 1 tablet (5 mg total) by mouth 2 (two) times daily.)     benzonatate 100 MG capsule  Commonly known as:  TESSALON  Take 1 capsule (100 mg total) by mouth 3 (three) times daily as needed for Cough.     esomeprazole 40 MG capsule  Commonly known as:  NEXIUM  TAKE 1 CAPSULE BY MOUTH EVERY DAY     metoprolol succinate 50 MG 24 hr tablet  Commonly known as:  TOPROL-XL  Take 1 tablet (50 mg total) by mouth once daily.     SITagliptin 100 MG Tab  Commonly known as:  JANUVIA  [place] manolo tableta (100 mg en total) por vía oral diariamente.  (Take 1 tablet (100 mg total) by mouth once daily.)        STOP taking these medications    mycophenolate 250 mg Cap  Commonly known as:  CELLCEPT            Indwelling Lines/Drains at time of discharge:   Lines/Drains/Airways     None                 Time spent on the discharge of patient: 35  minutes  Patient was seen and examined on the date of discharge and determined to be suitable for discharge.         Maria Dolores Bowles MD  Department of Hospital Medicine  [facility]

## 2020-04-20 NOTE — NURSING
Home Oxygen Evaluation    Date Performed: 2020    1) Patient's Home O2 Sat on room air, while at rest: 95%        If O2 sats on room air at rest are 88% or below, patient qualifies. No additional testing needed. Document N/A in steps 2 and 3. If 89% or above, complete steps 2.      2) Patient's O2 Sat on room air while exercisin%        If O2 sats on room air while exercising remain 89% or above patient does not qualify, no further testing needed Document N/A in step 3. If O2 sats on room air while exercising are 88% or below, continue to step 3.      3) Patient's O2 Sat while exercising on O2: N/A         (Must show improvement from #2 for patients to qualify)    If O2 sats improve on oxygen, patient qualifies for portable oxygen. If not, the patient does not qualify.

## 2020-04-20 NOTE — TREATMENT PLAN
Hepatology Treatment Plan    Roman Hodges is a 60 y.o. male admitted to hospital 4/15/2020 (Hospital Day: 6) due to Pneumonia due to COVID-19 virus. Hepatology following for immunosuppression management.        Lab Results   Component Value Date    TACROLIMUS 10.0 04/20/2020    TACROLIMUS 9.0 04/19/2020    TACROLIMUS 11.5 04/18/2020       Lab Results   Component Value Date    CREATININE 1.2 04/20/2020    CREATININE 1.3 04/19/2020    CREATININE 1.2 04/18/2020       Lab Results   Component Value Date    ALT 56 (H) 04/20/2020    AST 25 04/20/2020     (H) 07/06/2019    ALKPHOS 172 (H) 04/20/2020    BILITOT 0.5 04/20/2020    WBC 3.94 04/20/2020    HGB 11.7 (L) 04/20/2020     (H) 04/20/2020     Roman Hodges is a 60 y.o. 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 And Cellcept 250 QID.     Kidney function is stable  Liver enzymes are improving    Plan  Tacrolimus level 10 today. LFTs stable.     Immunosuppression Regimen:  Tacrolimus 1 in am and 1 in PM for discharge today.   Hold Cellcept in setting of active infection    Please notify hepatology on day of discharge to discuss discharge medications and follow up.     Please obtain daily CBC, BMP, LFT, INR, immunosuppressant trough level    Thank you for involving us in the care of Roman Hodges. Please call with any additional concerns or questions.    Due to COVID-19, the Hepatology team is limiting interaction with consult patients unless absolutely necessary in order to limit patient's exposure to the virus. As of now, the Hepatology team will be chart checking this patient and discussing with staff. If the patient has an acute decompensation, change, and/or you believe the patient needs to be examined by a provider, please page Hepatology team on call to discuss the patient with a team member.     Steven Locke MD  Gastroenterology Fellow

## 2020-04-20 NOTE — NURSING
IVs removed x2, tip in tact.  Belongings returned.  Discharge instructions reviewed with patient, who verbalized understanding. Face mask given for transport. Vizi monitor cleaned and returned to front.

## 2020-04-20 NOTE — HPI
Patient Name: Roman Hodges  MRN:  [id number]  Hospital Medicine Team: VIRTUAL HOSPITAL MEDICINE TEAM 10 Maria Dolores Bowles MD  Date of Admission:  4/15/2020     Length of Stay:  LOS: 5 days     This service was provided through telemedicine.  Start time:11:34  Chief complaint: ***  The patient location is: [facility]/6 A  The patient arrived at:   Present with the patient at the time of the telemed/virtual assessment: no one  End time:  11;40, and called wife Lela,   Total time spent with patient: 6 minutes,  Spoke ti Lela and DTR for another 6 minutes.  I have assessed these findings virtually using telemed platform and with assistance of bedside nurse or telemedicine presenter.  The attending portion of this evaluation, treatment, and documentation was performed per Maria Dolores Bowles MD via audio only.    Patient was transferred to the telemedicine service on: 4/20.    Principal Problem:  Pneumonia due to COVID-19 virus      HPI:  60 year old gentleman with a h/o liver transplant (7/2019) on Prograf and Cellcept, MALLIKA, HTN, paroxysmal A Fib on Eliquis, DM II, and GERD.  He presents with SOB for three days. The patient says that the dry cough started first abut a week ago and then over the past couple of days he has had the worsening SOB.  Patient states symptoms are worse with exertion and improves with use of his CPAP.  Denies fevers, chills, nausea, vomiting chest pain, dizziness, or palpations.    Hospital Course:  Patient admitted for evaluation of +COVID-19.    Interval History:     Doing well   Continues to feel better   HDS  No cough   No abd pain   On 2L NC   Transferring to telemedicine patient on 4/20 4/20 -  hos med 6:  94% 2 liters.     Review of Systems:  ROS (Positive in Bold, otherwise negative) slept well, eating, walking, balance good.  Constitutional: fever, chills, night sweats, malaise, weakness  CV: chest pain, edema, palpitations  Resp: resolved SOB, cough, sputum production  GI:  No changes  "in appetite, NVDC, pain, melena, hematochezia, GERD, hematemesis  : Dysuria, hematuria, urinary urgency, frequency  MSK: arthralgia/myalgia, joint swelling  Neuro/Psych: anxiety, depression    Inpatient Medications:     amLODIPine  5 mg Oral QHS    apixaban  5 mg Oral BID    atorvastatin  40 mg Oral QHS    cetirizine  10 mg Oral Daily    pantoprazole  40 mg Oral Daily    tacrolimus  1 mg Oral BID     acetaminophen, albuterol **AND** MDI Q6H PRN, benzonatate, bismuth subsalicylate, calcium carbonate, Dextrose 10% Bolus, Dextrose 10% Bolus, glucagon (human recombinant), glucose, glucose, hydrALAZINE, insulin aspart U-100, loperamide, melatonin, ondansetron, promethazine (PHENERGAN) IVPB, sodium chloride 0.9%, sodium chloride 0.9%    Physical Exam:    No intake or output data in the 24 hours ending 04/20/20 0922  Wt Readings from Last 3 Encounters:   04/15/20 94.1 kg (207 lb 6.4 oz)   02/13/20 94.8 kg (208 lb 14.4 oz)   01/20/20 93.2 kg (205 lb 7.5 oz)       BP (!) 165/91 (BP Location: Left leg, Patient Position: Lying)   Pulse 72   Temp 97.2 °F (36.2 °C) (Oral)   Resp 18   Ht 5' 8" (1.727 m)   Wt 94.1 kg (207 lb 6.4 oz)   SpO2 (!) 94%   BMI 31.54 kg/m²     GEN: NAD, conversant  Resp: decreased bilateral breath sounds, no wheezes or rales, normal work of breathing   On 2 L NC  CV: RRR, no m/r/g, no edema  GI: soft, NTND  Skin: no rash    Laboratory:  Lab Results   Component Value Date    [id number] Positive (A) 04/15/2020       Recent Labs   Lab 04/16/20  0432 04/18/20  0436 04/20/20  0300   WBC 2.98* 3.34* 3.94   LYMPH 10.4*  0.3* 11.1*  0.4* 12.7*  0.5*   HGB 12.5* 11.8* 11.7*   HCT 39.4* 37.7* 37.6*    370* 433*     Recent Labs   Lab 04/16/20  0432  04/18/20  0436 04/19/20  0347 04/20/20  0300   *   < > 137 140 141   K 3.2*   < > 3.7 4.0 4.0      < > 104 105 107   CO2 23   < > 24 23 23   BUN 13   < > 16 17 14   CREATININE 1.0   < > 1.2 1.3 1.2      < > 99 94 102 "   CALCIUM 8.2*   < > 8.3* 8.4* 8.6*   MG 1.3*   < > 1.8 1.7 1.6   PHOS 3.0  --  4.1  --  3.9    < > = values in this interval not displayed.     Recent Labs   Lab 04/18/20  0436 04/19/20  0347 04/20/20  0300   ALKPHOS 174* 187* 172*   ALT 52* 63* 56*   AST 23 52* 25   ALBUMIN 2.9* 2.8* 2.8*   PROT 6.5 6.6 6.6   BILITOT 0.4 0.4 0.5        Recent Labs     04/18/20  0436 04/20/20  0300   CRP 74.1* 41.4*       All labs within the last 24 hours were reviewed.     Microbiology:    All microbiology within the last 24 hours was reviewed.     Imaging    All imaging within the last 24 hours was reviewed.     Assessment and Plan:    Active Hospital Problems    Diagnosis  POA    *Pneumonia due to COVID-19 virus [U07.1, J12.89]  Yes    Acute hypoxemic respiratory failure [J96.01]  Yes    Lymphopenia [D72.810]  Yes    Hyponatremia [E87.1]  Yes    Hypokalemia [E87.6]  Yes    Hypomagnesemia [E83.42]  Yes    Long-term use of immunosuppressant medication [Z79.899]  Not Applicable    S/P liver transplant [Z94.4]  Not Applicable     Chronic    Prophylactic immunotherapy [Z29.8]  Not Applicable     Chronic    Long term (current) use of anticoagulants [Z79.01]  Not Applicable     Chronic    Essential hypertension [I10]  Yes     Chronic    Type 2 diabetes mellitus, without long-term current use of insulin [E11.9]  Yes     Chronic    Atrial fibrillation [I48.91]  Yes     Chronic    GERD (gastroesophageal reflux disease) [K21.9]  Yes     Chronic      Resolved Hospital Problems   No resolved problems to display.         Acute hypoxemic respiratory failure due to Pneumonia due to COVID-19 virus    - COVID-19 testing: POSITIVE 4/15  - Infection Control notified     - Isolation:   - Airborne and Droplet Precautions  - Surgical mask on patient   - N95 masks must be fit tested, wear eye protection  - 20 second hand hygiene              - Limit visitors per hospital policy              - Consolidate lab draws, nursing care, and  interventions     - Diagnostics: (Lymphopenia, hyponatremia, hyperferritinemia, elevated troponin, elevated d-dimer, age, and comorbidities are significant predictors of poor clinical outcome)              - CBC:  WBC 2.92, lymph #0.3, trend Q48hrs  - CMP:  trend Q48hrs  - Procalcitonin:  Pending  - D-dimer:  Pending, repeat prior to discharge  - Ferritin:  441, repeat prior to discharge   - CRP:  62.0, trend Q48hrs  - LDH:  260, repeat prior to discharge   - BNP:  Pending  - Troponin:  0.008              - ECG:  NSR              - rapid Flu:  Pending              - RIP only if BMT/solid transplant:  N/A              - Legionella antigen:  Pending              - Blood culture x2:  Pending              - Sputum culture:  Pending              - CXR:  Bilateral basilar subsegmental atelectasis.  There may be developing parenchymal attenuation projected over the left lower lung zone, changes of infection or inflammation are considerations versus edema.              - UA and culture:  negative               - CPK:  146     - Management:   Bundle care as able to maintain isolation & minimize in/out of room   - Supplemental O2 to maintain SpO2 92%-96%   if requiring 6L NC or higher, place on nonrebreather and discuss case with MICU              - Telemetry & continuous pulse oximetry               - If wheezing   - albuterol inhaler 2-4 puff Q6hr PRN    - ipratropium daily               - acetaminophen 650mg PO Q6hr PRN fever              - loperamide PRN for viral diarrhea  - Empiric antibiotics per likely source & patient allergies                          - CAP: x 5 day course (d/c early if low concern for bacterial co-infection)  Ceftriaxone 1g IV Q24hrs                                      Azithromycin 500mg IV day #1, then 250mg PO daily x4 days   - Investigational Treatment Protocol:                            -start statin: atorvastatin 40mg po daily (if CPK WNL)                          - start HCQ 400mg PO BID x1  day, then 400mg PO daily x 4 days   (check glucose 6 phosphate dehydrogenase (NOT G6PD Quant), ECG at start & 48hrs, and start Qshift POCT glucose)     Safety notes:              - Avoid NIPPV (CPAP/BiPAP) to prevent aerosolization, use on a case-by-case basis if in neg pressure room              - Cautious use of NSAIDS for fever per WHO recommendations (3/16/2020)              - No new ACEi/ARB start or discontinuation of chronic med unless hypotensive (Esler et al. Journal of Hypertension 2020, 38:000-000)              - Careful use of steroids in the absence of other indications (shock, ARDS)              - Fluid sparing resuscitation, avoid maintenance fluids                S/p liver transplant  Propylactic immunotherapy  - AST 41, , AlkPhos 259. Pt. Was to undergo EUS liver biopsy sometime this week according to documentation  - at home on Prograf 2mg daily and 1mg qHS.    - Holding  Cellcept, given active infection   - prograf level of 17 on 4/16- prograf held on 4/16  - prograf restarted on 4/17 at prior home dose - titrated to the dose above  - hepatology consulted for IS management   4/20 - stable     Atrial fibrillation  Long term use of anticoagulants  -Pt. In NSR on admit, no acute issues  - Continue Eliquis 5mg BID for AC and toprol-XL for rate control  4/20- stable, rate controlled     DM II  - Diabetic diet, low dose SSI.  Recent Labs     04/18/20  2122 04/19/20  0744 04/19/20  1113 04/19/20  1610 04/19/20  2134 04/20/20  0744   POCTGLUCOSE 137* 106 142* 116* 101 94          HTN  -metoprolol succinate 50mg daily, pt. Reports PCP has discontinued other blood pressure medicines.  - metop stopped on 4/19 and amlodipine started given poor BP control  4/20 - on amilodipine 5 mg,  Resume metoprolol xl 50 ( home dose).  Discussed naman Vogel the need to gradually resume home  BP meds.      GERD  - Protonix.  4/20 - stable    Does patient have Capacity? yes  Contact: Kati (wife)   [phone]  Goals/Wishes:  Go home  Code Status- FULL  Pain management-  No pain  Prognosis-  good  Family discussions-  Called Kati:   Functional Status - pt  lives w wife and DTR in [place],  Discussed isolation precaution. Called his wife:      Post acute care plan:   home    Maria Dolores Bowles MD  Department of Hospital Medicine   [facility]  [id number]

## 2020-04-21 ENCOUNTER — TELEPHONE (OUTPATIENT)
Dept: TRANSPLANT | Facility: HOSPITAL | Age: 60
End: 2020-04-21

## 2020-04-21 DIAGNOSIS — Z94.4 LIVER REPLACED BY TRANSPLANT: Primary | ICD-10-CM

## 2020-04-21 NOTE — TELEPHONE ENCOUNTER
Late Discharge Note from Transplant:    CoVid-19 precaution - Transplant SW discharge assessment conducted with wife by telephone.   SW spoke with pts wife Kati Forbes via phone today, [phone].   Pts wife reports pt doing well at this time in recovery from positive CoVid 19 and at home resting.   Pts wife denies any coping issues at this time. She voiced she is thankful he is doing better.   Pts wife had sent an epic msg to pts transplant providers last night in regards to pts dc from hospital and she received a msg back from pts post liver nurse coord, Joann Lino Rn this am.    Pts liver nurse coord will be making arrangements for pt to have labs drawn via home health next Monday due to CoVid positive status.    Pts wife denies any further psychosocial concerns at this time.   Pts wife aware of how to contact the transplant social work team if any psychosocial needs arise.  SW Remains available for all transplant resources and psychosocial support.

## 2020-04-22 ENCOUNTER — PATIENT MESSAGE (OUTPATIENT)
Dept: FAMILY MEDICINE | Facility: CLINIC | Age: 60
End: 2020-04-22

## 2020-04-22 ENCOUNTER — TELEPHONE (OUTPATIENT)
Dept: FAMILY MEDICINE | Facility: CLINIC | Age: 60
End: 2020-04-22

## 2020-04-22 ENCOUNTER — PATIENT MESSAGE (OUTPATIENT)
Dept: TRANSPLANT | Facility: CLINIC | Age: 60
End: 2020-04-22

## 2020-04-22 ENCOUNTER — PATIENT OUTREACH (OUTPATIENT)
Dept: ADMINISTRATIVE | Facility: CLINIC | Age: 60
End: 2020-04-22

## 2020-04-22 DIAGNOSIS — I48.0 PAROXYSMAL ATRIAL FIBRILLATION: ICD-10-CM

## 2020-04-22 DIAGNOSIS — J12.82 PNEUMONIA DUE TO COVID-19 VIRUS: Primary | ICD-10-CM

## 2020-04-22 DIAGNOSIS — U07.1 PNEUMONIA DUE TO COVID-19 VIRUS: Primary | ICD-10-CM

## 2020-04-22 DIAGNOSIS — Z79.01 LONG TERM (CURRENT) USE OF ANTICOAGULANTS: Chronic | ICD-10-CM

## 2020-04-22 DIAGNOSIS — Z94.4 S/P LIVER TRANSPLANT: Chronic | ICD-10-CM

## 2020-04-22 DIAGNOSIS — E11.9 TYPE 2 DIABETES MELLITUS WITHOUT COMPLICATION, WITHOUT LONG-TERM CURRENT USE OF INSULIN: Chronic | ICD-10-CM

## 2020-04-22 NOTE — TELEPHONE ENCOUNTER
C3 nurse attempted to contact patient. No answer. The following message was left for the patient to return the call through , Elliot:   I am a nurse calling for Roman Chamberlain please contact us at [phone] for a post discharge call. This is nurse Loera.     The patient does not have a scheduled HOSFU appointment within 7 days post hospital discharge date 4/20/2020. Message sent to Physician staff to assist with HOSFU appointment scheduling.    Meghan accepted NP program

## 2020-04-22 NOTE — PATIENT INSTRUCTIONS
Prevention steps for patients with confirmed COVID-19       Stay home and stay away from family members and friends. The CDC says, you can leave home after these three things have happened: 1) You have had no fever for at least 72 hours (that is three full days of no fever without the use of medicine that reduces fevers) 2) AND other symptoms have improved (for example, when your cough or shortness of breath have improved) 3) AND at least 7 days have passed since your symptoms first appeared.   Separate yourself from other people and animals in your home.   Call ahead before visiting your doctor.   Wear a facemask.   Cover your coughs and sneezes.   Wash your hands often with soap and water; hand  can be used, too.   Avoid sharing personal household items.   Wipe down surfaces used daily.   Monitor your symptoms. Seek prompt medical attention if your illness is worsening (e.g., difficulty breathing).    Before seeking care, call your healthcare provider.   If you have a medical emergency and need to call 911, notify the dispatch personnel that you have, or are being evaluated for COVID-19. If possible, put on a facemask before emergency medical services arrive.        Recommended precautions for household members, intimate partners, and caregivers in a home setting of a patient with symptomatic laboratory-confirmed COVID-19 or a patient under investigation.  Household members, intimate partners, and caregivers in the home setting awaiting tests results have close contact with a person with symptomatic, laboratory-confirmed COVID-19 or a person under investigation. Close contacts should monitor their health; they should call their provider right away if they develop symptoms suggestive of COVID-19 (e.g., fever, cough, shortness of breath).    Close contacts should also follow these recommendations:   Make sure that you understand and can help the patient follow their provider's instructions for  medication(s) and care. You should help the patient with basic needs in the home and provide support for getting groceries, prescriptions, and other personal needs.   Monitor the patient's symptoms. If the patient is getting sicker, call his or her healthcare provider and tell them that the patient has laboratory-confirmed COVID-19. If the patient has a medical emergency and you need to call 911, notify the dispatch personnel that the patient has, or is being evaluated for COVID-19.   Household members should stay in another room or be  from the patient. Household members should use a separate bedroom and bathroom, if available.   Prohibit visitors.   Household members should care for any pets in the home.   Make sure that shared spaces in the home have good air flow, such as by an air conditioner or an opened window, weather permitting.   Perform hand hygiene frequently. Wash your hands often with soap and water for at least 20 seconds or use an alcohol-based hand  (that contains > 60% alcohol) covering all surfaces of your hands and rubbing them together until they feel dry. Soap and water should be used preferentially.   Avoid touching your eyes, nose, and mouth.   The patient should wear a facemask. If the patient is not able to wear a facemask (for example, because it causes trouble breathing), caregivers should wear a mask when they are in the same room as the patient.   Wear a disposable facemask and gloves when you touch or have contact with the patient's blood, stool, or body fluids, such as saliva, sputum, nasal mucus, vomit, urine.  o Throw out disposable facemasks and gloves after using them. Do not reuse.  o When removing personal protective equipment, first remove and dispose of gloves. Then, immediately clean your hands with soap and water or alcohol-based hand . Next, remove and dispose of facemask, and immediately clean your hands again with soap and water or  alcohol-based hand .   You should not share dishes, drinking glasses, cups, eating utensils, towels, bedding, or other items with the patient. After the patient uses these items, you should wash them thoroughly (see below Wash laundry thoroughly).   Clean all high-touch surfaces, such as counters, tabletops, doorknobs, bathroom fixtures, toilets, phones, keyboards, tablets, and bedside tables, every day. Also, clean any surfaces that may have blood, stool, or body fluids on them.   Use a household cleaning spray or wipe, according to the label instructions. Labels contain instructions for safe and effective use of the cleaning product including precautions you should take when applying the product, such as wearing gloves and making sure you have good ventilation during use of the product.   Wash laundry thoroughly.  o Immediately remove and wash clothes or bedding that have blood, stool, or body fluids on them.  o Wear disposable gloves while handling soiled items and keep soiled items away from your body. Clean your hands (with soap and water or an alcohol-based hand ) immediately after removing your gloves.  o Read and follow directions on labels of laundry or clothing items and detergent. In general, using a normal laundry detergent according to washing machine instructions and dry thoroughly using the warmest temperatures recommended on the clothing label.   Place all used disposable gloves, facemasks, and other contaminated items in a lined container before disposing of them with other household waste. Clean your hands (with soap and water or an alcohol-based hand ) immediately after handling these items. Soap and water should be used preferentially if hands are visibly dirty.   Discuss any additional questions with your state or local health department or healthcare provider. Check available hours when contacting your local health department.    For more information see CDC  link below.      https://www.cdc.gov/coronavirus/2019-ncov/hcp/guidance-prevent-spread.html#precautions        Sources:  [facility], [place] Department of Health and Hospitals

## 2020-04-23 ENCOUNTER — TELEPHONE (OUTPATIENT)
Dept: TRANSPLANT | Facility: CLINIC | Age: 60
End: 2020-04-23

## 2020-04-23 NOTE — TELEPHONE ENCOUNTER
Clarified with Joann([facility]); only needs  for lab draw, no other services.  She said [facility] can do that .

## 2020-04-23 NOTE — TELEPHONE ENCOUNTER
Spoke to Darren De La Cruz [facility] , he said he will arrange labs to be drawn for patient on Monday.

## 2020-04-23 NOTE — TELEPHONE ENCOUNTER
----- Message from Perlita Hernandez RN sent at 4/22/2020  8:39 AM CDT -----  Patient wanted to make sure Home Health would be doing labs on Monday.  Please check to make sure they are coming.  I cancelled his appt for labs on Monday.

## 2020-04-23 NOTE — TELEPHONE ENCOUNTER
----- Message from Nick Qureshi sent at 4/23/2020  2:41 PM CDT -----  Contact: Joann florence/[facility]  Calling to speak with pt coordinator    Call back: [phone]

## 2020-04-27 ENCOUNTER — TELEPHONE (OUTPATIENT)
Dept: FAMILY MEDICINE | Facility: CLINIC | Age: 60
End: 2020-04-27

## 2020-04-27 ENCOUNTER — LAB VISIT (OUTPATIENT)
Dept: LAB | Facility: HOSPITAL | Age: 60
End: 2020-04-27
Attending: INTERNAL MEDICINE
Payer: COMMERCIAL

## 2020-04-27 DIAGNOSIS — J11.08 INFLUENZAL BRONCHOPNEUMONIA: Primary | ICD-10-CM

## 2020-04-27 DIAGNOSIS — J12.89 INFLUENZAL BRONCHOPNEUMONIA: Primary | ICD-10-CM

## 2020-04-27 LAB
ALBUMIN SERPL BCP-MCNC: 3.7 G/DL (ref 3.5–5.2)
ALP SERPL-CCNC: 188 U/L (ref 55–135)
ALT SERPL W/O P-5'-P-CCNC: 192 U/L (ref 10–44)
ANION GAP SERPL CALC-SCNC: 9 MMOL/L (ref 8–16)
AST SERPL-CCNC: 55 U/L (ref 10–40)
BASOPHILS # BLD AUTO: 0.05 K/UL (ref 0–0.2)
BASOPHILS NFR BLD: 0.8 % (ref 0–1.9)
BILIRUB SERPL-MCNC: 0.4 MG/DL (ref 0.1–1)
BUN SERPL-MCNC: 18 MG/DL (ref 8–23)
CALCIUM SERPL-MCNC: 9 MG/DL (ref 8.7–10.5)
CHLORIDE SERPL-SCNC: 106 MMOL/L (ref 95–110)
CO2 SERPL-SCNC: 25 MMOL/L (ref 23–29)
CREAT SERPL-MCNC: 1.1 MG/DL (ref 0.5–1.4)
DIFFERENTIAL METHOD: ABNORMAL
EOSINOPHIL # BLD AUTO: 0 K/UL (ref 0–0.5)
EOSINOPHIL NFR BLD: 0.3 % (ref 0–8)
ERYTHROCYTE [DISTWIDTH] IN BLOOD BY AUTOMATED COUNT: 14.9 % (ref 11.5–14.5)
EST. GFR  (AFRICAN AMERICAN): >60 ML/MIN/1.73 M^2
EST. GFR  (NON AFRICAN AMERICAN): >60 ML/MIN/1.73 M^2
GLUCOSE SERPL-MCNC: 97 MG/DL (ref 70–110)
HCT VFR BLD AUTO: 41.1 % (ref 40–54)
HGB BLD-MCNC: 12.7 G/DL (ref 14–18)
IMM GRANULOCYTES # BLD AUTO: 0.1 K/UL (ref 0–0.04)
IMM GRANULOCYTES NFR BLD AUTO: 1.6 % (ref 0–0.5)
LYMPHOCYTES # BLD AUTO: 0.8 K/UL (ref 1–4.8)
LYMPHOCYTES NFR BLD: 12.6 % (ref 18–48)
MCH RBC QN AUTO: 25 PG (ref 27–31)
MCHC RBC AUTO-ENTMCNC: 30.9 G/DL (ref 32–36)
MCV RBC AUTO: 81 FL (ref 82–98)
MONOCYTES # BLD AUTO: 0.8 K/UL (ref 0.3–1)
MONOCYTES NFR BLD: 12.3 % (ref 4–15)
NEUTROPHILS # BLD AUTO: 4.6 K/UL (ref 1.8–7.7)
NEUTROPHILS NFR BLD: 72.4 % (ref 38–73)
NRBC BLD-RTO: 0 /100 WBC
PLATELET # BLD AUTO: 501 K/UL (ref 150–350)
PMV BLD AUTO: 9.1 FL (ref 9.2–12.9)
POTASSIUM SERPL-SCNC: 4 MMOL/L (ref 3.5–5.1)
PROT SERPL-MCNC: 6.7 G/DL (ref 6–8.4)
RBC # BLD AUTO: 5.08 M/UL (ref 4.6–6.2)
SODIUM SERPL-SCNC: 140 MMOL/L (ref 136–145)
WBC # BLD AUTO: 6.28 K/UL (ref 3.9–12.7)

## 2020-04-27 PROCEDURE — G0180 PR HOME HEALTH MD CERTIFICATION: ICD-10-PCS | Mod: ,,, | Performed by: INTERNAL MEDICINE

## 2020-04-27 PROCEDURE — G0180 MD CERTIFICATION HHA PATIENT: HCPCS | Mod: ,,, | Performed by: INTERNAL MEDICINE

## 2020-04-27 PROCEDURE — 85025 COMPLETE CBC W/AUTO DIFF WBC: CPT

## 2020-04-27 PROCEDURE — 80197 ASSAY OF TACROLIMUS: CPT

## 2020-04-27 PROCEDURE — 80053 COMPREHEN METABOLIC PANEL: CPT

## 2020-04-27 NOTE — TELEPHONE ENCOUNTER
----- Message from Roseanne Kan sent at 4/27/2020  2:59 PM CDT -----  Contact: Angelica - nurse   CALL TYPE: OUTSIDE REQUEST    Contact/Name of Who is Calling: Margie  What is the request in detail: introducing herself as the nurse  for patient with COVID  Call Back Number: [phone] ext [phone]  Or -[phone] (confidential)

## 2020-04-28 ENCOUNTER — OUTPATIENT CASE MANAGEMENT (OUTPATIENT)
Dept: ADMINISTRATIVE | Facility: OTHER | Age: 60
End: 2020-04-28

## 2020-04-28 DIAGNOSIS — Z94.4 LIVER REPLACED BY TRANSPLANT: Primary | ICD-10-CM

## 2020-04-28 LAB — TACROLIMUS BLD-MCNC: 5.6 NG/ML (ref 5–15)

## 2020-04-28 RX ORDER — TACROLIMUS 1 MG/1
2 CAPSULE ORAL EVERY 12 HOURS
Qty: 120 CAPSULE | Refills: 11 | Status: SHIPPED | OUTPATIENT
Start: 2020-04-28 | End: 2020-06-23 | Stop reason: DRUGHIGH

## 2020-04-28 NOTE — TELEPHONE ENCOUNTER
Labs reviewed by Dr. Damon  Called patient to let him know labs are elevated please increase Prograf to 2 mg twice a day and repeat labs on Thursday.

## 2020-04-28 NOTE — TELEPHONE ENCOUNTER
----- Message from Fab Damon MD sent at 4/28/2020  1:24 PM CDT -----  Increase tac to 2/2 and repeat labs in 2 days

## 2020-04-29 ENCOUNTER — TELEPHONE (OUTPATIENT)
Dept: TRANSPLANT | Facility: CLINIC | Age: 60
End: 2020-04-29

## 2020-04-29 ENCOUNTER — PATIENT MESSAGE (OUTPATIENT)
Dept: FAMILY MEDICINE | Facility: CLINIC | Age: 60
End: 2020-04-29

## 2020-04-29 ENCOUNTER — OFFICE VISIT (OUTPATIENT)
Dept: FAMILY MEDICINE | Facility: CLINIC | Age: 60
End: 2020-04-29
Payer: COMMERCIAL

## 2020-04-29 VITALS
WEIGHT: 207 LBS | DIASTOLIC BLOOD PRESSURE: 83 MMHG | TEMPERATURE: 98 F | BODY MASS INDEX: 31.47 KG/M2 | SYSTOLIC BLOOD PRESSURE: 119 MMHG

## 2020-04-29 DIAGNOSIS — Z94.4 LIVER REPLACED BY TRANSPLANT: Primary | ICD-10-CM

## 2020-04-29 DIAGNOSIS — J12.82 PNEUMONIA DUE TO COVID-19 VIRUS: Primary | ICD-10-CM

## 2020-04-29 DIAGNOSIS — R05.9 COUGH: ICD-10-CM

## 2020-04-29 DIAGNOSIS — U07.1 PNEUMONIA DUE TO COVID-19 VIRUS: Primary | ICD-10-CM

## 2020-04-29 PROCEDURE — 99442 PR PHYSICIAN TELEPHONE EVALUATION 11-20 MIN: CPT | Mod: 95,,, | Performed by: FAMILY MEDICINE

## 2020-04-29 PROCEDURE — 99442 PR PHYSICIAN TELEPHONE EVALUATION 11-20 MIN: ICD-10-PCS | Mod: 95,,, | Performed by: FAMILY MEDICINE

## 2020-04-29 NOTE — PROGRESS NOTES
The patient location is:  [place].  The chief complaint leading to consultation is:  COVID-19 pneumonia  Visit type: audiovisual  Total time spent with patient: 12 min  Each patient to whom he or she provides medical services by telemedicine is:  (1) informed of the relationship between the physician and patient and the respective role of any other health care provider with respect to management of the patient; and (2) notified that he or she may decline to receive medical services by telemedicine and may withdraw from such care at any time.    Notes:  60 years old male who was evaluated by telemedicine after previous hospitalization secondary to COVID-19 pneumonia.  Patient still with dry cough.  He denies shortness of breath, fever or muscle aches.  Patient is significantly better.  Patient still with contact precautions at home.  Patient with good compliance with medical regimen.    Diagnoses and all orders for this visit:    Pneumonia due to COVID-19 virus    Cough     ER and contact precautions were given.    Follow-up in 3 months.

## 2020-04-30 ENCOUNTER — LAB VISIT (OUTPATIENT)
Dept: LAB | Facility: HOSPITAL | Age: 60
End: 2020-04-30
Attending: INTERNAL MEDICINE
Payer: COMMERCIAL

## 2020-04-30 DIAGNOSIS — J12.89 INFLUENZAL BRONCHOPNEUMONIA: Primary | ICD-10-CM

## 2020-04-30 DIAGNOSIS — J11.08 INFLUENZAL BRONCHOPNEUMONIA: Primary | ICD-10-CM

## 2020-04-30 LAB
ALBUMIN SERPL BCP-MCNC: 3.8 G/DL (ref 3.5–5.2)
ALP SERPL-CCNC: 157 U/L (ref 55–135)
ALT SERPL W/O P-5'-P-CCNC: 229 U/L (ref 10–44)
ANION GAP SERPL CALC-SCNC: 9 MMOL/L (ref 8–16)
AST SERPL-CCNC: 219 U/L (ref 10–40)
BASOPHILS # BLD AUTO: 0.04 K/UL (ref 0–0.2)
BASOPHILS NFR BLD: 0.7 % (ref 0–1.9)
BILIRUB SERPL-MCNC: 0.6 MG/DL (ref 0.1–1)
BUN SERPL-MCNC: 17 MG/DL (ref 8–23)
CALCIUM SERPL-MCNC: 9.3 MG/DL (ref 8.7–10.5)
CHLORIDE SERPL-SCNC: 105 MMOL/L (ref 95–110)
CO2 SERPL-SCNC: 27 MMOL/L (ref 23–29)
CREAT SERPL-MCNC: 1.2 MG/DL (ref 0.5–1.4)
DIFFERENTIAL METHOD: ABNORMAL
EOSINOPHIL # BLD AUTO: 0 K/UL (ref 0–0.5)
EOSINOPHIL NFR BLD: 0.3 % (ref 0–8)
ERYTHROCYTE [DISTWIDTH] IN BLOOD BY AUTOMATED COUNT: 15.4 % (ref 11.5–14.5)
EST. GFR  (AFRICAN AMERICAN): >60 ML/MIN/1.73 M^2
EST. GFR  (NON AFRICAN AMERICAN): >60 ML/MIN/1.73 M^2
GLUCOSE SERPL-MCNC: 104 MG/DL (ref 70–110)
HCT VFR BLD AUTO: 42.1 % (ref 40–54)
HGB BLD-MCNC: 12.7 G/DL (ref 14–18)
IMM GRANULOCYTES # BLD AUTO: 0.09 K/UL (ref 0–0.04)
IMM GRANULOCYTES NFR BLD AUTO: 1.5 % (ref 0–0.5)
LYMPHOCYTES # BLD AUTO: 0.8 K/UL (ref 1–4.8)
LYMPHOCYTES NFR BLD: 13.9 % (ref 18–48)
MCH RBC QN AUTO: 25.3 PG (ref 27–31)
MCHC RBC AUTO-ENTMCNC: 30.2 G/DL (ref 32–36)
MCV RBC AUTO: 84 FL (ref 82–98)
MONOCYTES # BLD AUTO: 0.7 K/UL (ref 0.3–1)
MONOCYTES NFR BLD: 11.7 % (ref 4–15)
NEUTROPHILS # BLD AUTO: 4.3 K/UL (ref 1.8–7.7)
NEUTROPHILS NFR BLD: 71.9 % (ref 38–73)
NRBC BLD-RTO: 0 /100 WBC
PLATELET # BLD AUTO: 397 K/UL (ref 150–350)
PMV BLD AUTO: 9.5 FL (ref 9.2–12.9)
POTASSIUM SERPL-SCNC: 4 MMOL/L (ref 3.5–5.1)
PROT SERPL-MCNC: 7.2 G/DL (ref 6–8.4)
RBC # BLD AUTO: 5.01 M/UL (ref 4.6–6.2)
SODIUM SERPL-SCNC: 141 MMOL/L (ref 136–145)
WBC # BLD AUTO: 5.91 K/UL (ref 3.9–12.7)

## 2020-04-30 PROCEDURE — 85025 COMPLETE CBC W/AUTO DIFF WBC: CPT

## 2020-04-30 PROCEDURE — 80053 COMPREHEN METABOLIC PANEL: CPT

## 2020-04-30 PROCEDURE — 80197 ASSAY OF TACROLIMUS: CPT

## 2020-04-30 RX ORDER — MYCOPHENOLATE MOFETIL 250 MG/1
CAPSULE ORAL
Qty: 240 CAPSULE | Refills: 0 | Status: SHIPPED | OUTPATIENT
Start: 2020-04-30 | End: 2020-05-28

## 2020-04-30 NOTE — PROGRESS NOTES
Outpatient Care Management  COVID-19 Patient Assessment    Patient: Roman Hodges  MRN: [id number]  Date of Service: 04/28/2020  Completed by: Charmaine Camargo RN  Program: COVID-19    Reason for Visit   Patient presents with    COVID-19 Concerns     4/28/2020    OPCM Chart Review    Initial Assessment     4/30/2020- All needs address.        Brief Summary:   Assessment of post-hosp COVID-19 patient recovery.   Contact: Spoke by phone with Mr. Hodges's spouse, Kati.     COVID-19 Onset Date: 4/15/2020  Initial Symptoms: Presented with SOB x 3 days that worsened and dry cough. Kati reports pt had a fever x 2 wks prior to hosp.   Hosp: 4/15-4/20/2020  Pertinent Dxs: Liver tx 7/20/19 on Prograf. [facility] on hold pending f/u labs. HH collected labs today. DM II (Today's AM FM=534), MALLIKA, HTN, Afib on Eliquis, GERD.  Current Symptoms:  Occas dry cough, non productive; has not needed to take prescribed Tessalon Perles.   Current Activities: Reported to be back at baseline level of function.   Oxygen/DME: N/A  O2 Sats:  N/A  Patient /Caregiver Concerns:  No concerns expressed by spouse. Mr. Hodges is in communication with Dr. Musnon, PCP via Patient Portal, has been contacted by Transplant SW and is receiving  services (Agency Name not known to spouse).     Other---  Med Rec: Completed with no discrepancies or barriers. Cellcept on hold.   PHQ2: 0  Living arrangements:  Pt lives with spouse.   Support: Family support meeting pt needs.   Referrals: None.   Infection Control: Instruction provided on following CDC Prevention of the Spread of COVID-19 Guidelines. Care plan opened and closed.   Mr. oHdges's spouse, Kati verbalizes that pt and family are following guidelines to prevent spread of COVID-19 ie washing hands, covering cough, keeping distance from family members, has face mask and gloves available for future outings past 5/4 end of strict quarantine date.     No needs identified that would require f/u  phone call to patient/spouse. Pt was working at [employer] prior to illness and filed for unemployment.  Provided contacts for this OPCM-RN and [facility] in case of future questions/concerns. Pt and spouse agrees that no further contact necessary.       Assessment Documentation     COVID-19 Assessment    Nurse Assessment via Chart Review:  Was patient on a ventilator while hospitalized?:  No  Nurse Assessment with Patient:  Are you currently experiencing any of the following symptoms?:  Coughing (Comment: 2 wks ago had fever. )  For Fever:  For Coughing:  Are you taking any medications to treat your cough?:  Yes  If yes, which medications are you taking?:  Other (use comment) (Comment: Dry cough, non productive)  On a scale of 1-10, how bad is your Cough?:  1  For Difficulty Breathing:  Are you taking any medications to help with your breathing?:  No  Any medical equipment in use?:  No  Oxygen?:  No  Nebulizer?:  No  Other? (use comment):  No  On a normal day, what is your energy level?:  10  What is your current energy level?:  10  Psycho/Social Assessment:  Do you have a support person/caregiver?:  Yes  Are you and/or your caregiver able to access food?:  Yes  Are you and/or your caregiver able to access medications?:  Yes  Are you able to isolate yourself from other family members?:  Yes  Is anyone else in your home ill with COVID-19?:  No  Does patient need any mental health/emotional support resources?:  No  Were you working prior to the COVID-19 illness?:  Yes  Are you still employed?:  Yes  Are you able to work from home?:  No  Are you experiencing financial difficulties related to the COVID-19 pandemic?:  No  Do you need access to Community Resources?:  No  COVID-19 Patient Assessment Complete (jake Yes only if assessment is finished):  Yes         Problem List and History     Patient Active Problem List   Diagnosis    MALLIKA (obstructive sleep apnea)    Obesity (BMI 30.0-34.9)    Atrial fibrillation     Essential hypertension    Type 2 diabetes mellitus, without long-term current use of insulin    GERD (gastroesophageal reflux disease)    Open angle with borderline findings and low 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 eyes    Long term (current) use of anticoagulants    S/P liver transplant    Prophylactic immunotherapy    Adrenal cortical steroids causing adverse effect in therapeutic use    Hypophosphatemia    Long-term use of immunosuppressant medication    At risk for opportunistic infections    Prolonged Q-T interval on ECG    Paroxysmal atrial fibrillation    Pneumonia due to COVID-19 virus    Lymphopenia    Hyponatremia    Hypokalemia    Hypomagnesemia    Acute hypoxemic respiratory failure       Reviewed Active Problem List with patient and/or Caregiver. The following were identified as areas of need: Positive Covid-19    Medical History:  Reviewed medical history with patient and/or caregiver    Social History:  Reviewed social history with patient and/or caregiver    Complex Care Plan    Care plan was discussed and completed today with input from patient and/or caregiver.    Patient Instructions     Instructions were provided via the [employer] patient resources and are available for the patient to view on the patient portal, if active.    Next steps:  No further patient follow up found necessary. Mr. Hodges and spouse of knowledgeable of infection control practices and pt has returned to baseline functioning.       Todays OPCM Self-Management Care Plan was developed with the patients/caregivers input and was based on identified barriers from todays assessment.  Goals were written today with the patient/caregiver and the patient has agreed to work towards these goals to improve his/her overall well-being. Patient verbalized understanding of the care plan, goals, and all of today's instructions. Encouraged patient/caregiver to communicate with his/her physician and health care team about health  conditions and the treatment plan.  Provided my contact information today and encouraged patient/caregiver to call me with any questions as needed.

## 2020-05-01 LAB
EXT ALBUMIN: 3.8
EXT ALKALINE PHOSPHATASE: 157
EXT ALT: 229
EXT AST: 219
EXT BILIRUBIN TOTAL: 0.6
EXT BUN: 17
EXT CALCIUM: 9.3
EXT CHLORIDE: 105
EXT CO2: 27
EXT CREATININE: 1.2 MG/DL
EXT EOSINOPHIL%: 0.3
EXT GFR MDRD NON AF AMER: >60
EXT GLUCOSE: 104
EXT HEMATOCRIT: 42.1
EXT HEMOGLOBIN: 12.7
EXT LYMPH%: 13.9
EXT MONOCYTES%: 11.7
EXT PLATELETS: 397
EXT POTASSIUM: 4
EXT PROTEIN TOTAL: 7.2
EXT SEGS%: 71.9
EXT SODIUM: 141 MMOL/L
EXT TACROLIMUS LVL: 9.3
EXT WBC: 5.9
TACROLIMUS BLD-MCNC: 9.3 NG/ML (ref 5–15)

## 2020-05-04 ENCOUNTER — TELEPHONE (OUTPATIENT)
Dept: TRANSPLANT | Facility: CLINIC | Age: 60
End: 2020-05-04

## 2020-05-04 ENCOUNTER — LAB VISIT (OUTPATIENT)
Dept: LAB | Facility: HOSPITAL | Age: 60
End: 2020-05-04
Attending: INTERNAL MEDICINE
Payer: COMMERCIAL

## 2020-05-04 ENCOUNTER — PATIENT MESSAGE (OUTPATIENT)
Dept: TRANSPLANT | Facility: CLINIC | Age: 60
End: 2020-05-04

## 2020-05-04 DIAGNOSIS — J11.08 INFLUENZAL BRONCHOPNEUMONIA: Primary | ICD-10-CM

## 2020-05-04 DIAGNOSIS — Z94.4 LIVER REPLACED BY TRANSPLANT: ICD-10-CM

## 2020-05-04 DIAGNOSIS — J12.89 INFLUENZAL BRONCHOPNEUMONIA: Primary | ICD-10-CM

## 2020-05-04 LAB
ALBUMIN SERPL BCP-MCNC: 3.7 G/DL (ref 3.5–5.2)
ALP SERPL-CCNC: 118 U/L (ref 55–135)
ALT SERPL W/O P-5'-P-CCNC: 78 U/L (ref 10–44)
ANION GAP SERPL CALC-SCNC: 12 MMOL/L (ref 8–16)
AST SERPL-CCNC: 21 U/L (ref 10–40)
BASOPHILS # BLD AUTO: 0.05 K/UL (ref 0–0.2)
BASOPHILS NFR BLD: 0.9 % (ref 0–1.9)
BILIRUB SERPL-MCNC: 0.5 MG/DL (ref 0.1–1)
BUN SERPL-MCNC: 16 MG/DL (ref 8–23)
CALCIUM SERPL-MCNC: 9 MG/DL (ref 8.7–10.5)
CHLORIDE SERPL-SCNC: 106 MMOL/L (ref 95–110)
CO2 SERPL-SCNC: 24 MMOL/L (ref 23–29)
CREAT SERPL-MCNC: 1.2 MG/DL (ref 0.5–1.4)
DIFFERENTIAL METHOD: ABNORMAL
EOSINOPHIL # BLD AUTO: 0.1 K/UL (ref 0–0.5)
EOSINOPHIL NFR BLD: 2.1 % (ref 0–8)
ERYTHROCYTE [DISTWIDTH] IN BLOOD BY AUTOMATED COUNT: 15.2 % (ref 11.5–14.5)
EST. GFR  (AFRICAN AMERICAN): >60 ML/MIN/1.73 M^2
EST. GFR  (NON AFRICAN AMERICAN): >60 ML/MIN/1.73 M^2
GLUCOSE SERPL-MCNC: 99 MG/DL (ref 70–110)
HCT VFR BLD AUTO: 40.7 % (ref 40–54)
HGB BLD-MCNC: 12.7 G/DL (ref 14–18)
IMM GRANULOCYTES # BLD AUTO: 0.05 K/UL (ref 0–0.04)
IMM GRANULOCYTES NFR BLD AUTO: 0.9 % (ref 0–0.5)
LYMPHOCYTES # BLD AUTO: 1 K/UL (ref 1–4.8)
LYMPHOCYTES NFR BLD: 16.4 % (ref 18–48)
MCH RBC QN AUTO: 25.5 PG (ref 27–31)
MCHC RBC AUTO-ENTMCNC: 31.2 G/DL (ref 32–36)
MCV RBC AUTO: 82 FL (ref 82–98)
MONOCYTES # BLD AUTO: 0.7 K/UL (ref 0.3–1)
MONOCYTES NFR BLD: 11.4 % (ref 4–15)
NEUTROPHILS # BLD AUTO: 4 K/UL (ref 1.8–7.7)
NEUTROPHILS NFR BLD: 68.3 % (ref 38–73)
NRBC BLD-RTO: 0 /100 WBC
PLATELET # BLD AUTO: 319 K/UL (ref 150–350)
PMV BLD AUTO: 9.8 FL (ref 9.2–12.9)
POTASSIUM SERPL-SCNC: 3.7 MMOL/L (ref 3.5–5.1)
PROT SERPL-MCNC: 6.9 G/DL (ref 6–8.4)
RBC # BLD AUTO: 4.99 M/UL (ref 4.6–6.2)
SODIUM SERPL-SCNC: 142 MMOL/L (ref 136–145)
TACROLIMUS BLD-MCNC: 13 NG/ML (ref 5–15)
WBC # BLD AUTO: 5.8 K/UL (ref 3.9–12.7)

## 2020-05-04 PROCEDURE — 80197 ASSAY OF TACROLIMUS: CPT

## 2020-05-04 PROCEDURE — 85025 COMPLETE CBC W/AUTO DIFF WBC: CPT

## 2020-05-04 PROCEDURE — 80053 COMPREHEN METABOLIC PANEL: CPT

## 2020-05-04 NOTE — TELEPHONE ENCOUNTER
----- Message from Fab Damon MD sent at 5/4/2020  8:19 AM CDT -----  Needs EUS and EUS guided liver biopsy

## 2020-05-04 NOTE — TELEPHONE ENCOUNTER
Message sent to patient via [facility]:    Your recent labs have been reviewed by  and your liver tests are elevated, he recommends you to have a liver biopsy called EUS liver biopsy.  The GI nurse will be calling you to set this up. Thanks.

## 2020-05-05 ENCOUNTER — TELEPHONE (OUTPATIENT)
Dept: ENDOSCOPY | Facility: HOSPITAL | Age: 60
End: 2020-05-05

## 2020-05-05 DIAGNOSIS — K83.1 BILIARY STRICTURE: Primary | ICD-10-CM

## 2020-05-05 NOTE — TELEPHONE ENCOUNTER
Spoke with patient's wife about instructions for UEUS/Liver Bx/ERCP scheduled 5/11/20 at 1100.  Patient had positive covid-19 test on 4/15/20, so pre-op covid-19 test #1 scheduled 5/8/20 at 0900 and test#2 scheduled 5/9/20 at 1150.  Both tests scheduled at [facility].  Patient will hold Eliquis for 2 days prior to procedure with permission from Dr Jose Angel Munson.

## 2020-05-05 NOTE — TELEPHONE ENCOUNTER
Dr Jose Angel Munson, may we hold Eliquis for 2 days prior to UEUS/Liver Bx/ERCP scheduled 5/11/20?  Thank you.

## 2020-05-07 ENCOUNTER — TELEPHONE (OUTPATIENT)
Dept: TRANSPLANT | Facility: CLINIC | Age: 60
End: 2020-05-07

## 2020-05-07 ENCOUNTER — PATIENT MESSAGE (OUTPATIENT)
Dept: TRANSPLANT | Facility: CLINIC | Age: 60
End: 2020-05-07

## 2020-05-07 NOTE — TELEPHONE ENCOUNTER
----- Message from Fab Damon MD sent at 5/4/2020  6:52 PM CDT -----  Results reviewed. Please advise labs are stable.

## 2020-05-07 NOTE — TELEPHONE ENCOUNTER
Patient notified and instructed via [facility]:    Your recent labs have been received and reviewed by  , he said they are stable (noted improvement in liver numbers). No medicine changes made. Please have repeat labs on Monday 5/11/20. Not sure if you will still need the procedure on Tuesday, will let you know once he answers me. Thanks.

## 2020-05-08 ENCOUNTER — TELEPHONE (OUTPATIENT)
Dept: ENDOSCOPY | Facility: HOSPITAL | Age: 60
End: 2020-05-08

## 2020-05-08 ENCOUNTER — TELEPHONE (OUTPATIENT)
Dept: SURGERY | Facility: CLINIC | Age: 60
End: 2020-05-08

## 2020-05-08 ENCOUNTER — DOCUMENT SCAN (OUTPATIENT)
Dept: HOME HEALTH SERVICES | Facility: HOSPITAL | Age: 60
End: 2020-05-08
Payer: COMMERCIAL

## 2020-05-08 ENCOUNTER — LAB VISIT (OUTPATIENT)
Dept: INTERNAL MEDICINE | Facility: CLINIC | Age: 60
End: 2020-05-08
Payer: COMMERCIAL

## 2020-05-08 DIAGNOSIS — K83.1 BILIARY STRICTURE: ICD-10-CM

## 2020-05-08 LAB — SARS-COV-2 RNA RESP QL NAA+PROBE: DETECTED

## 2020-05-08 PROCEDURE — U0002 COVID-19 LAB TEST NON-CDC: HCPCS

## 2020-05-08 NOTE — TELEPHONE ENCOUNTER
Contacted pt and pt's wife on the phone together and informed of continued Covid + result. Instructed not to have additional Covid test done tomorrow. Informed them scope scheduled for Monday will be rescheduled and that the endo schedulers will reach out to them with a date once it has been decided. They verbalized understanding.

## 2020-05-11 ENCOUNTER — EXTERNAL HOME HEALTH (OUTPATIENT)
Dept: HOME HEALTH SERVICES | Facility: HOSPITAL | Age: 60
End: 2020-05-11
Payer: COMMERCIAL

## 2020-05-11 ENCOUNTER — LAB VISIT (OUTPATIENT)
Dept: LAB | Facility: HOSPITAL | Age: 60
End: 2020-05-11
Attending: INTERNAL MEDICINE
Payer: COMMERCIAL

## 2020-05-11 DIAGNOSIS — U07.1 INFECTION DUE TO 2019-NCOV: Primary | ICD-10-CM

## 2020-05-11 DIAGNOSIS — Z94.4 LIVER REPLACED BY TRANSPLANT: ICD-10-CM

## 2020-05-11 LAB
ALBUMIN SERPL BCP-MCNC: 4.2 G/DL (ref 3.5–5.2)
ALP SERPL-CCNC: 98 U/L (ref 55–135)
ALT SERPL W/O P-5'-P-CCNC: 55 U/L (ref 10–44)
ANION GAP SERPL CALC-SCNC: 11 MMOL/L (ref 8–16)
AST SERPL-CCNC: 25 U/L (ref 10–40)
BASOPHILS # BLD AUTO: 0.05 K/UL (ref 0–0.2)
BASOPHILS NFR BLD: 0.8 % (ref 0–1.9)
BILIRUB SERPL-MCNC: 0.5 MG/DL (ref 0.1–1)
BUN SERPL-MCNC: 15 MG/DL (ref 8–23)
CALCIUM SERPL-MCNC: 9 MG/DL (ref 8.7–10.5)
CHLORIDE SERPL-SCNC: 103 MMOL/L (ref 95–110)
CO2 SERPL-SCNC: 26 MMOL/L (ref 23–29)
CREAT SERPL-MCNC: 1.2 MG/DL (ref 0.5–1.4)
DIFFERENTIAL METHOD: ABNORMAL
EOSINOPHIL # BLD AUTO: 0.2 K/UL (ref 0–0.5)
EOSINOPHIL NFR BLD: 2.4 % (ref 0–8)
ERYTHROCYTE [DISTWIDTH] IN BLOOD BY AUTOMATED COUNT: 16.1 % (ref 11.5–14.5)
EST. GFR  (AFRICAN AMERICAN): >60 ML/MIN/1.73 M^2
EST. GFR  (NON AFRICAN AMERICAN): >60 ML/MIN/1.73 M^2
GLUCOSE SERPL-MCNC: 92 MG/DL (ref 70–110)
HCT VFR BLD AUTO: 42.6 % (ref 40–54)
HGB BLD-MCNC: 13.2 G/DL (ref 14–18)
IMM GRANULOCYTES # BLD AUTO: 0.03 K/UL (ref 0–0.04)
IMM GRANULOCYTES NFR BLD AUTO: 0.5 % (ref 0–0.5)
LYMPHOCYTES # BLD AUTO: 1.1 K/UL (ref 1–4.8)
LYMPHOCYTES NFR BLD: 17.1 % (ref 18–48)
MCH RBC QN AUTO: 25.6 PG (ref 27–31)
MCHC RBC AUTO-ENTMCNC: 31 G/DL (ref 32–36)
MCV RBC AUTO: 83 FL (ref 82–98)
MONOCYTES # BLD AUTO: 0.5 K/UL (ref 0.3–1)
MONOCYTES NFR BLD: 7.6 % (ref 4–15)
NEUTROPHILS # BLD AUTO: 4.5 K/UL (ref 1.8–7.7)
NEUTROPHILS NFR BLD: 71.6 % (ref 38–73)
NRBC BLD-RTO: 0 /100 WBC
PLATELET # BLD AUTO: 283 K/UL (ref 150–350)
PMV BLD AUTO: 10.1 FL (ref 9.2–12.9)
POTASSIUM SERPL-SCNC: 3.8 MMOL/L (ref 3.5–5.1)
PROT SERPL-MCNC: 7.5 G/DL (ref 6–8.4)
RBC # BLD AUTO: 5.16 M/UL (ref 4.6–6.2)
SODIUM SERPL-SCNC: 140 MMOL/L (ref 136–145)
WBC # BLD AUTO: 6.31 K/UL (ref 3.9–12.7)

## 2020-05-11 PROCEDURE — 85025 COMPLETE CBC W/AUTO DIFF WBC: CPT

## 2020-05-11 PROCEDURE — 80197 ASSAY OF TACROLIMUS: CPT

## 2020-05-11 PROCEDURE — 80053 COMPREHEN METABOLIC PANEL: CPT

## 2020-05-12 ENCOUNTER — TELEPHONE (OUTPATIENT)
Dept: TRANSPLANT | Facility: CLINIC | Age: 60
End: 2020-05-12

## 2020-05-12 ENCOUNTER — TELEPHONE (OUTPATIENT)
Dept: FAMILY MEDICINE | Facility: CLINIC | Age: 60
End: 2020-05-12

## 2020-05-12 ENCOUNTER — PATIENT MESSAGE (OUTPATIENT)
Dept: TRANSPLANT | Facility: CLINIC | Age: 60
End: 2020-05-12

## 2020-05-12 DIAGNOSIS — Z94.4 LIVER REPLACED BY TRANSPLANT: Primary | ICD-10-CM

## 2020-05-12 LAB — TACROLIMUS BLD-MCNC: 12 NG/ML (ref 5–15)

## 2020-05-12 NOTE — TELEPHONE ENCOUNTER
Patient notified via [facility]: EUS/Biopsy cancelled due to labs improved.    Also notified Sharona in GI department to cancel procedure.

## 2020-05-12 NOTE — TELEPHONE ENCOUNTER
----- Message from Shikha Dockery sent at 5/12/2020  3:41 PM CDT -----  Contact: Wife [phone]  Patient would like to speak with you about having a virtual visit asap for Gout. Please advise

## 2020-05-12 NOTE — TELEPHONE ENCOUNTER
----- Message from Fab Damon MD sent at 5/12/2020  9:46 AM CDT -----  Results reviewed. Please advise labs are stable.

## 2020-05-12 NOTE — TELEPHONE ENCOUNTER
Patient notified and instructed via [facility]: labs stable, no changes made. Repeat labs on next week. (5/18/20).

## 2020-05-13 ENCOUNTER — OFFICE VISIT (OUTPATIENT)
Dept: FAMILY MEDICINE | Facility: CLINIC | Age: 60
End: 2020-05-13
Payer: COMMERCIAL

## 2020-05-13 DIAGNOSIS — M79.674 TOE PAIN, RIGHT: ICD-10-CM

## 2020-05-13 DIAGNOSIS — M10.9 ACUTE GOUT OF RIGHT FOOT, UNSPECIFIED CAUSE: Primary | ICD-10-CM

## 2020-05-13 PROCEDURE — 99442 PR PHYSICIAN TELEPHONE EVALUATION 11-20 MIN: CPT | Mod: 95,,, | Performed by: FAMILY MEDICINE

## 2020-05-13 PROCEDURE — 99442 PR PHYSICIAN TELEPHONE EVALUATION 11-20 MIN: ICD-10-PCS | Mod: 95,,, | Performed by: FAMILY MEDICINE

## 2020-05-13 RX ORDER — COLCHICINE 0.6 MG/1
0.6 TABLET ORAL 2 TIMES DAILY
Qty: 20 TABLET | Refills: 0 | Status: SHIPPED | OUTPATIENT
Start: 2020-05-13 | End: 2020-12-14

## 2020-05-13 NOTE — PROGRESS NOTES
The patient location is:  [place]  The chief complaint leading to consultation is:  Right toe pain  Visit type: audiovisual  Total time spent with patient: 15 min  Each patient to whom he or she provides medical services by telemedicine is:  (1) informed of the relationship between the physician and patient and the respective role of any other health care provider with respect to management of the patient; and (2) notified that he or she may decline to receive medical services by telemedicine and may withdraw from such care at any time.    Notes:  60 years old male who was evaluated by telemedicine.  Patient with right foot firs toe pain for the last couple of weeks.  Patient is only using Tylenol for pain.  He was previously diagnosed with gout before.  The pain is 6/10 of intensity on and off aggravated with activity and better with rest.  No fever or chills.      Diagnoses and all orders for this visit:    Acute gout of right foot, unspecified cause  -     colchicine (COLCRYS) 0.6 mg tablet; Take 1 tablet (0.6 mg total) by mouth 2 (two) times daily. for 10 days  -     Uric acid; Future    Toe pain, right  -     colchicine (COLCRYS) 0.6 mg tablet; Take 1 tablet (0.6 mg total) by mouth 2 (two) times daily. for 10 days  -     Uric acid; Future      Answers for HPI/ROS submitted by the patient on 5/12/2020   activity change: No  unexpected weight change: No  neck pain: No  hearing loss: No  rhinorrhea: No  trouble swallowing: No  eye discharge: No  visual disturbance: No  chest tightness: No  wheezing: No  chest pain: No  palpitations: No  blood in stool: No  constipation: No  vomiting: No  diarrhea: No  polydipsia: No  polyuria: No  difficulty urinating: No  urgency: No  hematuria: No  joint swelling: No  arthralgias: No  headaches: No  weakness: No  confusion: No  dysphoric mood: No

## 2020-05-18 ENCOUNTER — LAB VISIT (OUTPATIENT)
Dept: LAB | Facility: HOSPITAL | Age: 60
End: 2020-05-18
Attending: INTERNAL MEDICINE
Payer: COMMERCIAL

## 2020-05-18 DIAGNOSIS — Z94.4 LIVER REPLACED BY TRANSPLANT: ICD-10-CM

## 2020-05-18 LAB
ALBUMIN SERPL BCP-MCNC: 4.3 G/DL (ref 3.5–5.2)
ALP SERPL-CCNC: 88 U/L (ref 55–135)
ALT SERPL W/O P-5'-P-CCNC: 31 U/L (ref 10–44)
ANION GAP SERPL CALC-SCNC: 10 MMOL/L (ref 8–16)
AST SERPL-CCNC: 17 U/L (ref 10–40)
BASOPHILS # BLD AUTO: 0.05 K/UL (ref 0–0.2)
BASOPHILS NFR BLD: 0.8 % (ref 0–1.9)
BILIRUB SERPL-MCNC: 0.4 MG/DL (ref 0.1–1)
BUN SERPL-MCNC: 16 MG/DL (ref 8–23)
CALCIUM SERPL-MCNC: 9.1 MG/DL (ref 8.7–10.5)
CHLORIDE SERPL-SCNC: 104 MMOL/L (ref 95–110)
CO2 SERPL-SCNC: 26 MMOL/L (ref 23–29)
CREAT SERPL-MCNC: 1.2 MG/DL (ref 0.5–1.4)
DIFFERENTIAL METHOD: ABNORMAL
EOSINOPHIL # BLD AUTO: 0.2 K/UL (ref 0–0.5)
EOSINOPHIL NFR BLD: 3.5 % (ref 0–8)
ERYTHROCYTE [DISTWIDTH] IN BLOOD BY AUTOMATED COUNT: 15.9 % (ref 11.5–14.5)
EST. GFR  (AFRICAN AMERICAN): >60 ML/MIN/1.73 M^2
EST. GFR  (NON AFRICAN AMERICAN): >60 ML/MIN/1.73 M^2
GLUCOSE SERPL-MCNC: 107 MG/DL (ref 70–110)
HCT VFR BLD AUTO: 41.9 % (ref 40–54)
HGB BLD-MCNC: 13.1 G/DL (ref 14–18)
IMM GRANULOCYTES # BLD AUTO: 0.03 K/UL (ref 0–0.04)
IMM GRANULOCYTES NFR BLD AUTO: 0.5 % (ref 0–0.5)
LYMPHOCYTES # BLD AUTO: 1.5 K/UL (ref 1–4.8)
LYMPHOCYTES NFR BLD: 23.8 % (ref 18–48)
MCH RBC QN AUTO: 25.7 PG (ref 27–31)
MCHC RBC AUTO-ENTMCNC: 31.3 G/DL (ref 32–36)
MCV RBC AUTO: 82 FL (ref 82–98)
MONOCYTES # BLD AUTO: 0.6 K/UL (ref 0.3–1)
MONOCYTES NFR BLD: 9.8 % (ref 4–15)
NEUTROPHILS # BLD AUTO: 3.8 K/UL (ref 1.8–7.7)
NEUTROPHILS NFR BLD: 61.6 % (ref 38–73)
NRBC BLD-RTO: 0 /100 WBC
PLATELET # BLD AUTO: 302 K/UL (ref 150–350)
PMV BLD AUTO: 9.8 FL (ref 9.2–12.9)
POTASSIUM SERPL-SCNC: 4.1 MMOL/L (ref 3.5–5.1)
PROT SERPL-MCNC: 7.5 G/DL (ref 6–8.4)
RBC # BLD AUTO: 5.09 M/UL (ref 4.6–6.2)
SODIUM SERPL-SCNC: 140 MMOL/L (ref 136–145)
WBC # BLD AUTO: 6.22 K/UL (ref 3.9–12.7)

## 2020-05-18 PROCEDURE — 80197 ASSAY OF TACROLIMUS: CPT

## 2020-05-18 PROCEDURE — 36415 COLL VENOUS BLD VENIPUNCTURE: CPT | Mod: PO

## 2020-05-18 PROCEDURE — 80053 COMPREHEN METABOLIC PANEL: CPT

## 2020-05-18 PROCEDURE — 85025 COMPLETE CBC W/AUTO DIFF WBC: CPT

## 2020-05-19 LAB — TACROLIMUS BLD-MCNC: 11.3 NG/ML (ref 5–15)

## 2020-05-20 ENCOUNTER — TELEPHONE (OUTPATIENT)
Dept: TRANSPLANT | Facility: CLINIC | Age: 60
End: 2020-05-20

## 2020-05-20 ENCOUNTER — PATIENT MESSAGE (OUTPATIENT)
Dept: TRANSPLANT | Facility: CLINIC | Age: 60
End: 2020-05-20

## 2020-05-20 ENCOUNTER — PATIENT MESSAGE (OUTPATIENT)
Dept: FAMILY MEDICINE | Facility: CLINIC | Age: 60
End: 2020-05-20

## 2020-05-20 DIAGNOSIS — Z94.4 LIVER REPLACED BY TRANSPLANT: Primary | ICD-10-CM

## 2020-05-20 NOTE — TELEPHONE ENCOUNTER
----- Message from Fab Damon MD sent at 5/19/2020 12:42 PM CDT -----  Results reviewed. Please advise labs are stable.

## 2020-05-20 NOTE — TELEPHONE ENCOUNTER
Patient notified and instructed via [facility]:    Per : labs stable. No changes made. Repeat labs due on Monday 5/25/20. Thanks.

## 2020-05-21 ENCOUNTER — PATIENT MESSAGE (OUTPATIENT)
Dept: FAMILY MEDICINE | Facility: CLINIC | Age: 60
End: 2020-05-21

## 2020-05-26 ENCOUNTER — LAB VISIT (OUTPATIENT)
Dept: LAB | Facility: HOSPITAL | Age: 60
End: 2020-05-26
Attending: INTERNAL MEDICINE
Payer: COMMERCIAL

## 2020-05-26 DIAGNOSIS — Z94.4 LIVER REPLACED BY TRANSPLANT: ICD-10-CM

## 2020-05-26 LAB
ALBUMIN SERPL BCP-MCNC: 4.5 G/DL (ref 3.5–5.2)
ALP SERPL-CCNC: 82 U/L (ref 55–135)
ALT SERPL W/O P-5'-P-CCNC: 29 U/L (ref 10–44)
ANION GAP SERPL CALC-SCNC: 12 MMOL/L (ref 8–16)
AST SERPL-CCNC: 17 U/L (ref 10–40)
BASOPHILS # BLD AUTO: 0.05 K/UL (ref 0–0.2)
BASOPHILS NFR BLD: 0.8 % (ref 0–1.9)
BILIRUB SERPL-MCNC: 0.5 MG/DL (ref 0.1–1)
BUN SERPL-MCNC: 18 MG/DL (ref 8–23)
CALCIUM SERPL-MCNC: 9.2 MG/DL (ref 8.7–10.5)
CHLORIDE SERPL-SCNC: 104 MMOL/L (ref 95–110)
CO2 SERPL-SCNC: 25 MMOL/L (ref 23–29)
CREAT SERPL-MCNC: 1.2 MG/DL (ref 0.5–1.4)
DIFFERENTIAL METHOD: ABNORMAL
EOSINOPHIL # BLD AUTO: 0.2 K/UL (ref 0–0.5)
EOSINOPHIL NFR BLD: 2.8 % (ref 0–8)
ERYTHROCYTE [DISTWIDTH] IN BLOOD BY AUTOMATED COUNT: 15.4 % (ref 11.5–14.5)
EST. GFR  (AFRICAN AMERICAN): >60 ML/MIN/1.73 M^2
EST. GFR  (NON AFRICAN AMERICAN): >60 ML/MIN/1.73 M^2
GLUCOSE SERPL-MCNC: 106 MG/DL (ref 70–110)
HCT VFR BLD AUTO: 42.3 % (ref 40–54)
HGB BLD-MCNC: 13.4 G/DL (ref 14–18)
IMM GRANULOCYTES # BLD AUTO: 0.02 K/UL (ref 0–0.04)
IMM GRANULOCYTES NFR BLD AUTO: 0.3 % (ref 0–0.5)
LYMPHOCYTES # BLD AUTO: 1.6 K/UL (ref 1–4.8)
LYMPHOCYTES NFR BLD: 25.1 % (ref 18–48)
MCH RBC QN AUTO: 25.8 PG (ref 27–31)
MCHC RBC AUTO-ENTMCNC: 31.7 G/DL (ref 32–36)
MCV RBC AUTO: 82 FL (ref 82–98)
MONOCYTES # BLD AUTO: 0.6 K/UL (ref 0.3–1)
MONOCYTES NFR BLD: 8.9 % (ref 4–15)
NEUTROPHILS # BLD AUTO: 4 K/UL (ref 1.8–7.7)
NEUTROPHILS NFR BLD: 62.1 % (ref 38–73)
NRBC BLD-RTO: 0 /100 WBC
PLATELET # BLD AUTO: 315 K/UL (ref 150–350)
PMV BLD AUTO: 10.2 FL (ref 9.2–12.9)
POTASSIUM SERPL-SCNC: 4 MMOL/L (ref 3.5–5.1)
PROT SERPL-MCNC: 7.8 G/DL (ref 6–8.4)
RBC # BLD AUTO: 5.19 M/UL (ref 4.6–6.2)
SODIUM SERPL-SCNC: 141 MMOL/L (ref 136–145)
TACROLIMUS BLD-MCNC: 11.8 NG/ML (ref 5–15)
WBC # BLD AUTO: 6.38 K/UL (ref 3.9–12.7)

## 2020-05-26 PROCEDURE — 80197 ASSAY OF TACROLIMUS: CPT

## 2020-05-26 PROCEDURE — 80053 COMPREHEN METABOLIC PANEL: CPT

## 2020-05-26 PROCEDURE — 36415 COLL VENOUS BLD VENIPUNCTURE: CPT | Mod: PO

## 2020-05-26 PROCEDURE — 85025 COMPLETE CBC W/AUTO DIFF WBC: CPT

## 2020-05-27 ENCOUNTER — PATIENT MESSAGE (OUTPATIENT)
Dept: TRANSPLANT | Facility: CLINIC | Age: 60
End: 2020-05-27

## 2020-05-27 ENCOUNTER — TELEPHONE (OUTPATIENT)
Dept: TRANSPLANT | Facility: CLINIC | Age: 60
End: 2020-05-27

## 2020-05-27 DIAGNOSIS — Z94.4 LIVER REPLACED BY TRANSPLANT: Primary | ICD-10-CM

## 2020-05-27 LAB
LEFT EYE DM RETINOPATHY: NEGATIVE
RIGHT EYE DM RETINOPATHY: NEGATIVE

## 2020-05-27 NOTE — TELEPHONE ENCOUNTER
----- Message from Fab Damon MD sent at 5/27/2020 12:23 PM CDT -----  Results reviewed. Please advise labs are stable.

## 2020-05-28 ENCOUNTER — PATIENT MESSAGE (OUTPATIENT)
Dept: TRANSPLANT | Facility: CLINIC | Age: 60
End: 2020-05-28

## 2020-05-28 ENCOUNTER — PATIENT OUTREACH (OUTPATIENT)
Dept: ADMINISTRATIVE | Facility: OTHER | Age: 60
End: 2020-05-28

## 2020-05-28 RX ORDER — MYCOPHENOLATE MOFETIL 250 MG/1
1000 CAPSULE ORAL 2 TIMES DAILY
Qty: 240 CAPSULE | Refills: 3 | Status: SHIPPED | OUTPATIENT
Start: 2020-05-28 | End: 2020-10-09

## 2020-05-28 NOTE — TELEPHONE ENCOUNTER
----- Message from Fab Damon MD sent at 5/27/2020 11:29 PM CDT -----  Regarding: RE:  Ok. Restart cellcept  ----- Message -----  From: Joann Lino  Sent: 5/27/2020   2:27 PM CDT  To: Fab Damon MD    Patient asking if to re-start the Cellcept(it has been on hold since he had Covid in hospital).

## 2020-05-30 DIAGNOSIS — E55.9 VITAMIN D DEFICIENCY DISEASE: ICD-10-CM

## 2020-06-01 ENCOUNTER — TELEPHONE (OUTPATIENT)
Dept: FAMILY MEDICINE | Facility: CLINIC | Age: 60
End: 2020-06-01

## 2020-06-01 ENCOUNTER — PATIENT MESSAGE (OUTPATIENT)
Dept: FAMILY MEDICINE | Facility: CLINIC | Age: 60
End: 2020-06-01

## 2020-06-01 RX ORDER — ERGOCALCIFEROL 1.25 MG/1
CAPSULE ORAL
Qty: 12 CAPSULE | Refills: 0 | Status: SHIPPED | OUTPATIENT
Start: 2020-06-01 | End: 2020-08-20

## 2020-06-01 NOTE — TELEPHONE ENCOUNTER
----- Message from Shikha Dockery sent at 6/1/2020  1:17 PM CDT -----  Contact: [phone]  Pharmacy would like to receive an authorization on a refill for Diabetic meter and supplies and test 3 times a day. Please advise.

## 2020-06-02 ENCOUNTER — LAB VISIT (OUTPATIENT)
Dept: LAB | Facility: HOSPITAL | Age: 60
End: 2020-06-02
Attending: FAMILY MEDICINE
Payer: COMMERCIAL

## 2020-06-02 DIAGNOSIS — M10.9 ACUTE GOUT OF RIGHT FOOT, UNSPECIFIED CAUSE: ICD-10-CM

## 2020-06-02 DIAGNOSIS — M79.674 TOE PAIN, RIGHT: ICD-10-CM

## 2020-06-02 LAB — URATE SERPL-MCNC: 7.3 MG/DL (ref 3.4–7)

## 2020-06-02 PROCEDURE — 36415 COLL VENOUS BLD VENIPUNCTURE: CPT | Mod: PO

## 2020-06-02 PROCEDURE — 84550 ASSAY OF BLOOD/URIC ACID: CPT

## 2020-06-05 ENCOUNTER — PATIENT OUTREACH (OUTPATIENT)
Dept: ADMINISTRATIVE | Facility: OTHER | Age: 60
End: 2020-06-05

## 2020-06-05 NOTE — PROGRESS NOTES
Patient's chart was reviewed.   Requested updates within Care Everywhere.  Health Maintenance was updated.

## 2020-06-08 DIAGNOSIS — C22.0 HCC (HEPATOCELLULAR CARCINOMA): Primary | ICD-10-CM

## 2020-06-08 DIAGNOSIS — M89.9 BONE DISORDER: ICD-10-CM

## 2020-06-08 DIAGNOSIS — Z94.4 LIVER REPLACED BY TRANSPLANT: ICD-10-CM

## 2020-06-10 ENCOUNTER — OFFICE VISIT (OUTPATIENT)
Dept: PODIATRY | Facility: CLINIC | Age: 60
End: 2020-06-10
Payer: COMMERCIAL

## 2020-06-10 ENCOUNTER — TELEPHONE (OUTPATIENT)
Dept: FAMILY MEDICINE | Facility: CLINIC | Age: 60
End: 2020-06-10

## 2020-06-10 VITALS — BODY MASS INDEX: 31.37 KG/M2 | WEIGHT: 207 LBS | HEIGHT: 68 IN

## 2020-06-10 DIAGNOSIS — U07.1 COVID-19 VIRUS INFECTION: Primary | ICD-10-CM

## 2020-06-10 DIAGNOSIS — L60.0 ONYCHOCRYPTOSIS: Primary | ICD-10-CM

## 2020-06-10 DIAGNOSIS — Z20.822 EXPOSURE TO COVID-19 VIRUS: Primary | ICD-10-CM

## 2020-06-10 DIAGNOSIS — E11.9 ENCOUNTER FOR DIABETIC FOOT EXAM: ICD-10-CM

## 2020-06-10 PROCEDURE — 3008F BODY MASS INDEX DOCD: CPT | Mod: CPTII,S$GLB,, | Performed by: STUDENT IN AN ORGANIZED HEALTH CARE EDUCATION/TRAINING PROGRAM

## 2020-06-10 PROCEDURE — 3044F HG A1C LEVEL LT 7.0%: CPT | Mod: CPTII,S$GLB,, | Performed by: STUDENT IN AN ORGANIZED HEALTH CARE EDUCATION/TRAINING PROGRAM

## 2020-06-10 PROCEDURE — 99999 PR PBB SHADOW E&M-EST. PATIENT-LVL III: ICD-10-PCS | Mod: PBBFAC,,, | Performed by: STUDENT IN AN ORGANIZED HEALTH CARE EDUCATION/TRAINING PROGRAM

## 2020-06-10 PROCEDURE — 99203 OFFICE O/P NEW LOW 30 MIN: CPT | Mod: 25,S$GLB,, | Performed by: STUDENT IN AN ORGANIZED HEALTH CARE EDUCATION/TRAINING PROGRAM

## 2020-06-10 PROCEDURE — 11730 PR REMOVAL OF NAIL PLATE: ICD-10-PCS | Mod: TA,S$GLB,, | Performed by: STUDENT IN AN ORGANIZED HEALTH CARE EDUCATION/TRAINING PROGRAM

## 2020-06-10 PROCEDURE — 3044F PR MOST RECENT HEMOGLOBIN A1C LEVEL <7.0%: ICD-10-PCS | Mod: CPTII,S$GLB,, | Performed by: STUDENT IN AN ORGANIZED HEALTH CARE EDUCATION/TRAINING PROGRAM

## 2020-06-10 PROCEDURE — 99203 PR OFFICE/OUTPT VISIT, NEW, LEVL III, 30-44 MIN: ICD-10-PCS | Mod: 25,S$GLB,, | Performed by: STUDENT IN AN ORGANIZED HEALTH CARE EDUCATION/TRAINING PROGRAM

## 2020-06-10 PROCEDURE — 3008F PR BODY MASS INDEX (BMI) DOCUMENTED: ICD-10-PCS | Mod: CPTII,S$GLB,, | Performed by: STUDENT IN AN ORGANIZED HEALTH CARE EDUCATION/TRAINING PROGRAM

## 2020-06-10 PROCEDURE — 99999 PR PBB SHADOW E&M-EST. PATIENT-LVL III: CPT | Mod: PBBFAC,,, | Performed by: STUDENT IN AN ORGANIZED HEALTH CARE EDUCATION/TRAINING PROGRAM

## 2020-06-10 PROCEDURE — 11730 AVULSION NAIL PLATE SIMPLE 1: CPT | Mod: TA,S$GLB,, | Performed by: STUDENT IN AN ORGANIZED HEALTH CARE EDUCATION/TRAINING PROGRAM

## 2020-06-10 RX ORDER — DOXYCYCLINE 100 MG/1
100 CAPSULE ORAL EVERY 12 HOURS
Qty: 20 CAPSULE | Refills: 0 | Status: SHIPPED | OUTPATIENT
Start: 2020-06-10 | End: 2020-12-14

## 2020-06-10 RX ORDER — NIFEDIPINE 30 MG/1
TABLET, EXTENDED RELEASE ORAL
COMMUNITY
Start: 2020-04-21 | End: 2020-12-03

## 2020-06-10 NOTE — TELEPHONE ENCOUNTER
Dr Sahni can you please add an order for Covid Testing for patient.  Patient is repeating or want to do the antibodies Covid testing since he is a transplant patient and also because patient's wife is doing a Endo and physician want to know if she will be expose to this virus.  Please advise.

## 2020-06-10 NOTE — PROGRESS NOTES
Subjective:      Patient ID: Roman Hodges is a 60 y.o. male.    Chief Complaint: Diabetes Mellitus (5/13/2020 office visit with PCP-LAYLA Angel); Ingrown Toenail (left foot, great toe ); and Diabetic Foot Exam    Roman is a 60 y.o. male  has a past medical history of A-fib, Allergy, Anticoagulant long-term use, Diabetes mellitus, type 2, GERD (gastroesophageal reflux disease), Hepatocellular carcinoma, History of 2019 novel coronavirus disease (COVID-19), History of primary liver cancer, Hyperlipidemia, and Hypertension. who presents to the clinic complaining of painful ingrown toenail on the left foot. States has a hx of ingrown toe nails on this side. No other pedal complaints.     Review of Systems   Constitution: Negative for chills, decreased appetite, diaphoresis and fever.   HENT: Negative for congestion and hearing loss.    Cardiovascular: Negative for chest pain, claudication, leg swelling and syncope.   Respiratory: Negative for cough and shortness of breath.    Skin: Positive for nail changes. Negative for color change, dry skin, flushing, itching, poor wound healing, rash, suspicious lesions and unusual hair distribution.   Musculoskeletal: Positive for joint pain and joint swelling.   Gastrointestinal: Negative for nausea and vomiting.   Neurological: Positive for numbness. Negative for loss of balance, paresthesias and sensory change.   Psychiatric/Behavioral: Negative for altered mental status, suicidal ideas and thoughts of violence. The patient is not nervous/anxious.            Objective:      Physical Exam   Constitutional: He is oriented to person, place, and time. He appears well-developed and well-nourished. No distress.   Cardiovascular: Intact distal pulses.   Dorsalis pedis and posterior tibial pulses are within normal limits. Skin temperature is within normal limits. Toes are cool to touch and feet are warm proximally. Hair growth is within normal limits. Skin is normotrophic and  without hyperpigmentation. No edema noted. No varicosities or spider veins noted, marleen.        Musculoskeletal: He exhibits no edema, tenderness or deformity.        Right foot: There is decreased range of motion.        Left foot: There is decreased range of motion.   Adequate joint range of motion without pain, limitation, nor crepitation to bilateral feet and ankle joints. Muscle strength is 5/5 in all groups bilaterally.    No POP noted, marleen   Feet:   Right Foot:   Skin Integrity: Positive for dry skin. Negative for blister, erythema or warmth.   Left Foot:   Skin Integrity: Positive for dry skin. Negative for blister, erythema or warmth.   Lymphadenopathy:   Negative lymphadenopathy bilateral popliteal fossa and tarsal tunnel.    Neurological: He is alert and oriented to person, place, and time. No sensory deficit. He exhibits normal muscle tone.   Charlotte-Dain 5.07 monofilamant testing is within normal limits. Sharp/dull sensation is within normal limits bilaterally. Light touch within normal limits bilaterally. Vibratory sensation within normal limits, marleen.    Skin: Skin is warm and dry. Capillary refill takes less than 2 seconds. No abrasion, no bruising, no burn, no laceration, no petechiae and no rash noted. He is not diaphoretic. No cyanosis or erythema. No pallor. Nails show no clubbing.   Skin is warm and dry, bilaterally, no acute SOI noted, bilaterally, appears stable.     Nails are well trimmed and normotrophic    Cryptotic nail border noted to lateral border of left hallux toe nail with periwound edema and erythema noted.            Psychiatric: He has a normal mood and affect. His behavior is normal. Judgment and thought content normal.             Assessment:       Encounter Diagnoses   Name Primary?    Onychocryptosis Yes    Encounter for diabetic foot exam          Plan:       Roman was seen today for diabetes mellitus, ingrown toenail and diabetic foot exam.    Diagnoses and all orders for  this visit:    Onychocryptosis  -     Airborne and Contact and Droplet Isolation Status; Standing  -     Airborne and Contact and Droplet Isolation Status    Encounter for diabetic foot exam    Other orders  -     doxycycline (MONODOX) 100 MG capsule; Take 1 capsule (100 mg total) by mouth every 12 (twelve) hours.      I counseled the patient on his conditions, their implications and medical management.    Performed temporary lateral border border nail avulsion of left hallux toenail today, dressed with triple abx oitnment, 2x2 and loose coban. Pt to change daily with neosporin and bandage at home. Pt with periwound erythema/edema noted, script for ABX dispensed.     Recommend supportive tennis shoes for foot pain. Recommend shoes with small heel, rigid arch support and wide toe box to accommodate foot type.     Shoe inspection. Diabetic Foot Education. Patient reminded of the importance of good nutrition and blood sugar control to help prevent podiatric complications of diabetes. Patient instructed on proper foot hygeine. We discussed wearing proper shoe gear, daily foot inspections, never walking without protective shoe gear, never putting sharp instruments to feet, routine podiatric nail visits      Discussed general foot care:  Wear comfortable, proper fitting shoes. Wash feet daily. Dry well. After drying, apply moisturizer to feet (no lotion to webspaces). Inspect feet daily for skin breaks, blisters, swelling, or redness. Wear cotton socks (preferably white)  Change socks every day. Do NOT walk barefoot. Do NOT use heating pads or warm/hot water soaks. I discussed with the  patient  signs and symptoms of infection including redness, drainage, purulence, odor, pain, elevated BS, streaking, fever, chills, etc . Patient is to seek medical attention (ER or urgent care) if these symptoms occur      RTC in 2 weeks, sooner PRN

## 2020-06-10 NOTE — PROCEDURES
Nail Removal  Date/Time: 6/10/2020 1:00 PM  Performed by: Caridad Crisostomo DPM  Authorized by: Caridad Crisostomo DPM       Location:  Left foot  Anesthesia:  Digital block  Local anesthetic: lidocaine 1% without epinephrine  Anesthetic total (ml):  3  Preparation:  Skin prepped with alcohol and skin prepped with Betadine    Amount removed:  Partial  Side:  Ulnar  Wedge excision of skin of nail fold: No    Nail bed sutured?: No    Nail matrix removed:  None  Dressing applied:  4x4, gauze roll, antibiotic ointment and dressing applied     After written and verbal consent was obtained, anesthetic was administered as above and skin was prepped with betadine. A time out was performed. Attention was directed to the lateral border of the left hallux toenail. A penrose drain was applied and secured with a hemostat. A freer was used to release the nail plate from the underlying nail bed and it was removed using an english anvil. The site was dressed with triple abx ointment and 2x2 with loose coban. The penrose drain was removed and the foot was secured in a open toed shoe.

## 2020-06-10 NOTE — TELEPHONE ENCOUNTER
----- Message from Marianna Bautista sent at 6/10/2020  9:59 AM CDT -----  Contact: Ms Hodges  Type:  Patient Returning Call    Who Called:patient returning call  Patient Who Left Message for Patient:  Does the patient know what this is regarding?  Would the patient rather a call back or a response via [facility]?call  Best Call Back Number:[phone]  Additional Information:

## 2020-06-11 ENCOUNTER — LAB VISIT (OUTPATIENT)
Dept: LAB | Facility: HOSPITAL | Age: 60
End: 2020-06-11
Attending: INTERNAL MEDICINE
Payer: COMMERCIAL

## 2020-06-11 DIAGNOSIS — Z20.822 EXPOSURE TO COVID-19 VIRUS: ICD-10-CM

## 2020-06-11 LAB — SARS-COV-2 IGG SERPLBLD QL IA.RAPID: POSITIVE

## 2020-06-11 PROCEDURE — 86769 SARS-COV-2 COVID-19 ANTIBODY: CPT

## 2020-06-11 PROCEDURE — 36415 COLL VENOUS BLD VENIPUNCTURE: CPT | Mod: PO

## 2020-06-11 NOTE — TELEPHONE ENCOUNTER
Patient was scheduled for blood draw for Covid 19 antibodies for today.  Patient verbalized understanding.

## 2020-06-12 ENCOUNTER — PATIENT MESSAGE (OUTPATIENT)
Dept: FAMILY MEDICINE | Facility: CLINIC | Age: 60
End: 2020-06-12

## 2020-06-12 DIAGNOSIS — M89.9 BONE DISORDER: Primary | ICD-10-CM

## 2020-06-22 ENCOUNTER — LAB VISIT (OUTPATIENT)
Dept: LAB | Facility: HOSPITAL | Age: 60
End: 2020-06-22
Attending: INTERNAL MEDICINE
Payer: COMMERCIAL

## 2020-06-22 DIAGNOSIS — Z94.4 LIVER REPLACED BY TRANSPLANT: ICD-10-CM

## 2020-06-22 LAB
ALBUMIN SERPL BCP-MCNC: 4.4 G/DL (ref 3.5–5.2)
ALP SERPL-CCNC: 71 U/L (ref 55–135)
ALT SERPL W/O P-5'-P-CCNC: 32 U/L (ref 10–44)
ANION GAP SERPL CALC-SCNC: 11 MMOL/L (ref 8–16)
AST SERPL-CCNC: 18 U/L (ref 10–40)
BASOPHILS # BLD AUTO: 0.05 K/UL (ref 0–0.2)
BASOPHILS NFR BLD: 0.7 % (ref 0–1.9)
BILIRUB SERPL-MCNC: 0.6 MG/DL (ref 0.1–1)
BUN SERPL-MCNC: 17 MG/DL (ref 8–23)
CALCIUM SERPL-MCNC: 8.8 MG/DL (ref 8.7–10.5)
CHLORIDE SERPL-SCNC: 106 MMOL/L (ref 95–110)
CO2 SERPL-SCNC: 25 MMOL/L (ref 23–29)
CREAT SERPL-MCNC: 1.2 MG/DL (ref 0.5–1.4)
DIFFERENTIAL METHOD: ABNORMAL
EOSINOPHIL # BLD AUTO: 0.2 K/UL (ref 0–0.5)
EOSINOPHIL NFR BLD: 2.1 % (ref 0–8)
ERYTHROCYTE [DISTWIDTH] IN BLOOD BY AUTOMATED COUNT: 15.4 % (ref 11.5–14.5)
EST. GFR  (AFRICAN AMERICAN): >60 ML/MIN/1.73 M^2
EST. GFR  (NON AFRICAN AMERICAN): >60 ML/MIN/1.73 M^2
GLUCOSE SERPL-MCNC: 115 MG/DL (ref 70–110)
HCT VFR BLD AUTO: 41.2 % (ref 40–54)
HGB BLD-MCNC: 12.9 G/DL (ref 14–18)
IMM GRANULOCYTES # BLD AUTO: 0.02 K/UL (ref 0–0.04)
IMM GRANULOCYTES NFR BLD AUTO: 0.3 % (ref 0–0.5)
LYMPHOCYTES # BLD AUTO: 1.5 K/UL (ref 1–4.8)
LYMPHOCYTES NFR BLD: 21.8 % (ref 18–48)
MCH RBC QN AUTO: 25.1 PG (ref 27–31)
MCHC RBC AUTO-ENTMCNC: 31.3 G/DL (ref 32–36)
MCV RBC AUTO: 80 FL (ref 82–98)
MONOCYTES # BLD AUTO: 0.6 K/UL (ref 0.3–1)
MONOCYTES NFR BLD: 8.5 % (ref 4–15)
NEUTROPHILS # BLD AUTO: 4.7 K/UL (ref 1.8–7.7)
NEUTROPHILS NFR BLD: 66.6 % (ref 38–73)
NRBC BLD-RTO: 0 /100 WBC
PLATELET # BLD AUTO: 328 K/UL (ref 150–350)
PMV BLD AUTO: 10.3 FL (ref 9.2–12.9)
POTASSIUM SERPL-SCNC: 4.2 MMOL/L (ref 3.5–5.1)
PROT SERPL-MCNC: 7.3 G/DL (ref 6–8.4)
RBC # BLD AUTO: 5.13 M/UL (ref 4.6–6.2)
SODIUM SERPL-SCNC: 142 MMOL/L (ref 136–145)
WBC # BLD AUTO: 7.02 K/UL (ref 3.9–12.7)

## 2020-06-22 PROCEDURE — 36415 COLL VENOUS BLD VENIPUNCTURE: CPT | Mod: PO

## 2020-06-22 PROCEDURE — 80053 COMPREHEN METABOLIC PANEL: CPT

## 2020-06-22 PROCEDURE — 80197 ASSAY OF TACROLIMUS: CPT

## 2020-06-22 PROCEDURE — 85025 COMPLETE CBC W/AUTO DIFF WBC: CPT

## 2020-06-23 DIAGNOSIS — Z94.4 LIVER REPLACED BY TRANSPLANT: ICD-10-CM

## 2020-06-23 LAB — TACROLIMUS BLD-MCNC: 10.8 NG/ML (ref 5–15)

## 2020-06-23 NOTE — TELEPHONE ENCOUNTER
Labs reviewed by Dr Damon.. Decrease prograf to 2 mg in Am and 1 mg in pm. Repeat labs Monday 6/29

## 2020-06-23 NOTE — TELEPHONE ENCOUNTER
----- Message from Fab Damon MD sent at 6/23/2020 10:45 AM CDT -----  Results reviewed.  Reduced tacrolimus to 2 mg in the morning and 1 mg at night

## 2020-06-24 ENCOUNTER — HOSPITAL ENCOUNTER (OUTPATIENT)
Dept: RADIOLOGY | Facility: HOSPITAL | Age: 60
Discharge: HOME | End: 2020-06-24
Attending: INTERNAL MEDICINE
Payer: COMMERCIAL

## 2020-06-24 ENCOUNTER — HOSPITAL ENCOUNTER (OUTPATIENT)
Dept: RADIOLOGY | Facility: CLINIC | Age: 60
Discharge: HOME | End: 2020-06-24
Attending: INTERNAL MEDICINE
Payer: COMMERCIAL

## 2020-06-24 DIAGNOSIS — C22.0 HCC (HEPATOCELLULAR CARCINOMA): ICD-10-CM

## 2020-06-24 DIAGNOSIS — M89.9 BONE DISORDER: ICD-10-CM

## 2020-06-24 PROCEDURE — 74177 CT ABD & PELVIS W/CONTRAST: CPT | Mod: TC

## 2020-06-24 PROCEDURE — 74177 CT CHEST ABDOMEN PELVIS WITH CONTRAST (XPD): ICD-10-PCS | Mod: 26,,, | Performed by: RADIOLOGY

## 2020-06-24 PROCEDURE — 77080 DEXA BONE DENSITY SPINE HIP: ICD-10-PCS | Mod: 26,,, | Performed by: INTERNAL MEDICINE

## 2020-06-24 PROCEDURE — 77080 DXA BONE DENSITY AXIAL: CPT | Mod: 26,,, | Performed by: INTERNAL MEDICINE

## 2020-06-24 PROCEDURE — 77080 DXA BONE DENSITY AXIAL: CPT | Mod: TC

## 2020-06-24 PROCEDURE — 25500020 PHARM REV CODE 255: Performed by: INTERNAL MEDICINE

## 2020-06-24 PROCEDURE — 74177 CT ABD & PELVIS W/CONTRAST: CPT | Mod: 26,,, | Performed by: RADIOLOGY

## 2020-06-24 PROCEDURE — 71260 CT THORAX DX C+: CPT | Mod: 26,,, | Performed by: RADIOLOGY

## 2020-06-24 PROCEDURE — 71260 CT CHEST ABDOMEN PELVIS WITH CONTRAST (XPD): ICD-10-PCS | Mod: 26,,, | Performed by: RADIOLOGY

## 2020-06-24 RX ADMIN — IOHEXOL 100 ML: 350 INJECTION, SOLUTION INTRAVENOUS at 01:06

## 2020-06-26 ENCOUNTER — TELEPHONE (OUTPATIENT)
Dept: TRANSPLANT | Facility: CLINIC | Age: 60
End: 2020-06-26

## 2020-06-26 NOTE — TELEPHONE ENCOUNTER
Ct Reviewed and stable ----- Message from Fab Damon MD sent at 6/26/2020  8:40 AM CDT -----  Results reviewed. Please advise CT stable.

## 2020-06-29 ENCOUNTER — LAB VISIT (OUTPATIENT)
Dept: LAB | Facility: HOSPITAL | Age: 60
End: 2020-06-29
Attending: INTERNAL MEDICINE
Payer: COMMERCIAL

## 2020-06-29 DIAGNOSIS — Z94.4 LIVER REPLACED BY TRANSPLANT: ICD-10-CM

## 2020-06-29 LAB
ALBUMIN SERPL BCP-MCNC: 4.3 G/DL (ref 3.5–5.2)
ALP SERPL-CCNC: 71 U/L (ref 55–135)
ALT SERPL W/O P-5'-P-CCNC: 25 U/L (ref 10–44)
ANION GAP SERPL CALC-SCNC: 9 MMOL/L (ref 8–16)
AST SERPL-CCNC: 18 U/L (ref 10–40)
BASOPHILS # BLD AUTO: 0.03 K/UL (ref 0–0.2)
BASOPHILS NFR BLD: 0.5 % (ref 0–1.9)
BILIRUB SERPL-MCNC: 0.5 MG/DL (ref 0.1–1)
BUN SERPL-MCNC: 14 MG/DL (ref 8–23)
CALCIUM SERPL-MCNC: 9.3 MG/DL (ref 8.7–10.5)
CHLORIDE SERPL-SCNC: 105 MMOL/L (ref 95–110)
CO2 SERPL-SCNC: 26 MMOL/L (ref 23–29)
CREAT SERPL-MCNC: 1.2 MG/DL (ref 0.5–1.4)
DIFFERENTIAL METHOD: ABNORMAL
EOSINOPHIL # BLD AUTO: 0.2 K/UL (ref 0–0.5)
EOSINOPHIL NFR BLD: 2.6 % (ref 0–8)
ERYTHROCYTE [DISTWIDTH] IN BLOOD BY AUTOMATED COUNT: 15.3 % (ref 11.5–14.5)
EST. GFR  (AFRICAN AMERICAN): >60 ML/MIN/1.73 M^2
EST. GFR  (NON AFRICAN AMERICAN): >60 ML/MIN/1.73 M^2
GLUCOSE SERPL-MCNC: 105 MG/DL (ref 70–110)
HCT VFR BLD AUTO: 43.4 % (ref 40–54)
HGB BLD-MCNC: 13.5 G/DL (ref 14–18)
IMM GRANULOCYTES # BLD AUTO: 0.02 K/UL (ref 0–0.04)
IMM GRANULOCYTES NFR BLD AUTO: 0.3 % (ref 0–0.5)
LYMPHOCYTES # BLD AUTO: 1 K/UL (ref 1–4.8)
LYMPHOCYTES NFR BLD: 16.3 % (ref 18–48)
MCH RBC QN AUTO: 25.6 PG (ref 27–31)
MCHC RBC AUTO-ENTMCNC: 31.1 G/DL (ref 32–36)
MCV RBC AUTO: 82 FL (ref 82–98)
MONOCYTES # BLD AUTO: 0.5 K/UL (ref 0.3–1)
MONOCYTES NFR BLD: 8.7 % (ref 4–15)
NEUTROPHILS # BLD AUTO: 4.2 K/UL (ref 1.8–7.7)
NEUTROPHILS NFR BLD: 71.6 % (ref 38–73)
NRBC BLD-RTO: 0 /100 WBC
PLATELET # BLD AUTO: 325 K/UL (ref 150–350)
PMV BLD AUTO: 10 FL (ref 9.2–12.9)
POTASSIUM SERPL-SCNC: 3.7 MMOL/L (ref 3.5–5.1)
PROT SERPL-MCNC: 7.6 G/DL (ref 6–8.4)
RBC # BLD AUTO: 5.28 M/UL (ref 4.6–6.2)
SODIUM SERPL-SCNC: 140 MMOL/L (ref 136–145)
WBC # BLD AUTO: 5.83 K/UL (ref 3.9–12.7)

## 2020-06-29 PROCEDURE — 80053 COMPREHEN METABOLIC PANEL: CPT

## 2020-06-29 PROCEDURE — 36415 COLL VENOUS BLD VENIPUNCTURE: CPT | Mod: PO

## 2020-06-29 PROCEDURE — 80197 ASSAY OF TACROLIMUS: CPT

## 2020-06-29 PROCEDURE — 85025 COMPLETE CBC W/AUTO DIFF WBC: CPT

## 2020-06-29 RX ORDER — TACROLIMUS 1 MG/1
CAPSULE ORAL
Qty: 90 CAPSULE | Refills: 11 | Status: SHIPPED | OUTPATIENT
Start: 2020-06-29 | End: 2020-08-06

## 2020-06-30 LAB — TACROLIMUS BLD-MCNC: 7.1 NG/ML (ref 5–15)

## 2020-07-01 ENCOUNTER — TELEPHONE (OUTPATIENT)
Dept: TRANSPLANT | Facility: CLINIC | Age: 60
End: 2020-07-01

## 2020-07-01 NOTE — TELEPHONE ENCOUNTER
Labs reviewed. Labs are stable with no medications changes please repeat labs 7/27/20    ----- Message from Fab Damon MD sent at 6/30/2020 10:35 AM CDT -----  Results reviewed. Please advise labs are stable.

## 2020-07-06 ENCOUNTER — PATIENT OUTREACH (OUTPATIENT)
Dept: ADMINISTRATIVE | Facility: OTHER | Age: 60
End: 2020-07-06

## 2020-07-07 ENCOUNTER — OFFICE VISIT (OUTPATIENT)
Dept: CARDIOLOGY | Facility: CLINIC | Age: 60
End: 2020-07-07
Payer: COMMERCIAL

## 2020-07-07 VITALS
WEIGHT: 218 LBS | BODY MASS INDEX: 33.04 KG/M2 | HEART RATE: 73 BPM | DIASTOLIC BLOOD PRESSURE: 87 MMHG | SYSTOLIC BLOOD PRESSURE: 131 MMHG | HEIGHT: 68 IN

## 2020-07-07 DIAGNOSIS — I10 ESSENTIAL HYPERTENSION: Chronic | ICD-10-CM

## 2020-07-07 DIAGNOSIS — I49.8 OTHER SPECIFIED CARDIAC ARRHYTHMIAS: ICD-10-CM

## 2020-07-07 DIAGNOSIS — Z79.60 LONG-TERM USE OF IMMUNOSUPPRESSANT MEDICATION: ICD-10-CM

## 2020-07-07 DIAGNOSIS — J96.01 ACUTE HYPOXEMIC RESPIRATORY FAILURE: ICD-10-CM

## 2020-07-07 DIAGNOSIS — Z79.01 LONG TERM (CURRENT) USE OF ANTICOAGULANTS: Chronic | ICD-10-CM

## 2020-07-07 DIAGNOSIS — J12.82 PNEUMONIA DUE TO COVID-19 VIRUS: ICD-10-CM

## 2020-07-07 DIAGNOSIS — R94.31 PROLONGED Q-T INTERVAL ON ECG: ICD-10-CM

## 2020-07-07 DIAGNOSIS — I48.0 PAROXYSMAL ATRIAL FIBRILLATION: Primary | ICD-10-CM

## 2020-07-07 DIAGNOSIS — E66.9 OBESITY (BMI 30.0-34.9): ICD-10-CM

## 2020-07-07 DIAGNOSIS — K21.00 GASTROESOPHAGEAL REFLUX DISEASE WITH ESOPHAGITIS: Chronic | ICD-10-CM

## 2020-07-07 DIAGNOSIS — Z94.4 S/P LIVER TRANSPLANT: Chronic | ICD-10-CM

## 2020-07-07 DIAGNOSIS — E11.9 TYPE 2 DIABETES MELLITUS WITHOUT COMPLICATION, WITHOUT LONG-TERM CURRENT USE OF INSULIN: Chronic | ICD-10-CM

## 2020-07-07 DIAGNOSIS — G47.33 OSA (OBSTRUCTIVE SLEEP APNEA): Chronic | ICD-10-CM

## 2020-07-07 DIAGNOSIS — U07.1 PNEUMONIA DUE TO COVID-19 VIRUS: ICD-10-CM

## 2020-07-07 PROCEDURE — 99214 OFFICE O/P EST MOD 30 MIN: CPT | Mod: S$GLB,,, | Performed by: INTERNAL MEDICINE

## 2020-07-07 PROCEDURE — 3008F BODY MASS INDEX DOCD: CPT | Mod: CPTII,S$GLB,, | Performed by: INTERNAL MEDICINE

## 2020-07-07 PROCEDURE — 3044F HG A1C LEVEL LT 7.0%: CPT | Mod: CPTII,S$GLB,, | Performed by: INTERNAL MEDICINE

## 2020-07-07 PROCEDURE — 93005 ELECTROCARDIOGRAM TRACING: CPT | Mod: S$GLB,,, | Performed by: INTERNAL MEDICINE

## 2020-07-07 PROCEDURE — 93005 RHYTHM STRIP: ICD-10-PCS | Mod: S$GLB,,, | Performed by: INTERNAL MEDICINE

## 2020-07-07 PROCEDURE — 3075F PR MOST RECENT SYSTOLIC BLOOD PRESS GE 130-139MM HG: ICD-10-PCS | Mod: CPTII,S$GLB,, | Performed by: INTERNAL MEDICINE

## 2020-07-07 PROCEDURE — 3075F SYST BP GE 130 - 139MM HG: CPT | Mod: CPTII,S$GLB,, | Performed by: INTERNAL MEDICINE

## 2020-07-07 PROCEDURE — 3079F DIAST BP 80-89 MM HG: CPT | Mod: CPTII,S$GLB,, | Performed by: INTERNAL MEDICINE

## 2020-07-07 PROCEDURE — 3079F PR MOST RECENT DIASTOLIC BLOOD PRESSURE 80-89 MM HG: ICD-10-PCS | Mod: CPTII,S$GLB,, | Performed by: INTERNAL MEDICINE

## 2020-07-07 PROCEDURE — 93010 ELECTROCARDIOGRAM REPORT: CPT | Mod: S$GLB,,, | Performed by: INTERNAL MEDICINE

## 2020-07-07 PROCEDURE — 3044F PR MOST RECENT HEMOGLOBIN A1C LEVEL <7.0%: ICD-10-PCS | Mod: CPTII,S$GLB,, | Performed by: INTERNAL MEDICINE

## 2020-07-07 PROCEDURE — 93010 RHYTHM STRIP: ICD-10-PCS | Mod: S$GLB,,, | Performed by: INTERNAL MEDICINE

## 2020-07-07 PROCEDURE — 99999 PR PBB SHADOW E&M-EST. PATIENT-LVL IV: ICD-10-PCS | Mod: PBBFAC,,, | Performed by: INTERNAL MEDICINE

## 2020-07-07 PROCEDURE — 99214 PR OFFICE/OUTPT VISIT, EST, LEVL IV, 30-39 MIN: ICD-10-PCS | Mod: S$GLB,,, | Performed by: INTERNAL MEDICINE

## 2020-07-07 PROCEDURE — 99999 PR PBB SHADOW E&M-EST. PATIENT-LVL IV: CPT | Mod: PBBFAC,,, | Performed by: INTERNAL MEDICINE

## 2020-07-07 PROCEDURE — 3008F PR BODY MASS INDEX (BMI) DOCUMENTED: ICD-10-PCS | Mod: CPTII,S$GLB,, | Performed by: INTERNAL MEDICINE

## 2020-07-07 NOTE — PROGRESS NOTES
Subjective:    Patient ID:  Roman Hodges is a 60 y.o. male who presents for follow-up of Atrial Fibrillation      HPI    59 y/o Slovenian speaking male with hx of HTN, DM, PAFib, HFrEF with return to normal function (previously 30%, last 2DE with 55%) and HCC s/p recent OLTx. Patient was initially admitted with AF RVR after presenting with near syncopal episode and spontaneously converted. At that time, 2DE with EF 30% with akinetic: mid anteroseptal, apical anterior, apical septal, apical lateral and apex and hypokinetic: mid inferoseptal and apical inferior walls. Again presented with similar symptoms, started on BB and DOAC and discharged home. Seen by Dr Sargent and was complaining of exertional epigastric/substernal pain and was admitted for ACS r/o. Had LHC with nonobstructive CAD and EF on V-gram normal without WMA's. Clinically improved and discharged home. Repeat 2DE with normalization of function to 55%. Did well post discharge, but then began having episodes of HTN urgency and had presented multiple times to ED with SBP >200. Had Oltx successfully and doing well. BP meds had been changed and had been having elevated readings. Changed BP regimen and BP now controlled.  Was hospitalized at [facility] for 8 days and had full recovery and doing well. Has gained weight. Denies regular CP, SOB, palps, orthopnea, PND, syncope, palps. Tolerating meds well and compliant.    Review of Systems   Constitution: Negative for malaise/fatigue.   HENT: Negative for congestion.    Eyes: Negative for blurred vision.   Cardiovascular: Negative for chest pain, claudication, cyanosis, dyspnea on exertion, irregular heartbeat, leg swelling, near-syncope, orthopnea, palpitations, paroxysmal nocturnal dyspnea and syncope.   Respiratory: Negative for shortness of breath.    Endocrine: Negative for polyuria.   Hematologic/Lymphatic: Negative for bleeding problem.   Skin: Negative for itching and rash.    Musculoskeletal: Negative for joint swelling, muscle cramps and muscle weakness.   Gastrointestinal: Negative for abdominal pain, hematemesis, hematochezia, melena, nausea and vomiting.   Genitourinary: Negative for dysuria and hematuria.   Neurological: Negative for dizziness, focal weakness, headaches, light-headedness, loss of balance and weakness.   Psychiatric/Behavioral: Negative for depression. The patient is not nervous/anxious.         Objective:    Physical Exam   Constitutional: He is oriented to person, place, and time. He appears well-developed and well-nourished.   HENT:   Head: Normocephalic and atraumatic.   Neck: Neck supple. No JVD present.   Cardiovascular: Normal rate, regular rhythm and normal heart sounds.   Pulses:       Carotid pulses are 2+ on the right side and 2+ on the left side.       Radial pulses are 2+ on the right side and 2+ on the left side.        Femoral pulses are 2+ on the right side and 2+ on the left side.       Dorsalis pedis pulses are 2+ on the right side and 2+ on the left side.        Posterior tibial pulses are 2+ on the right side and 2+ on the left side.   Pulmonary/Chest: Effort normal and breath sounds normal.   Abdominal: Soft. Bowel sounds are normal.   Musculoskeletal:         General: No edema.   Neurological: He is alert and oriented to person, place, and time.   Skin: Skin is warm and dry.   Psychiatric: He has a normal mood and affect. His behavior is normal. Thought content normal.         Assessment:       1. Paroxysmal atrial fibrillation    2. Essential hypertension    3. Prolonged Q-T interval on ECG    4. Acute hypoxemic respiratory failure    5. Long term (current) use of anticoagulants    6. Obesity (BMI 30.0-34.9)    7. Type 2 diabetes mellitus without complication, without long-term current use of insulin    8. Gastroesophageal reflux disease with esophagitis    9. S/P liver transplant    10. Long-term use of immunosuppressant medication    11.  MALLIKA (obstructive sleep apnea)    12. Pneumonia due to COVID-19 virus      61 y/o pt with hx and presentation as above. Doing well from a cardiac perspective and compensated from a HF perspective. BP controlled and doing well from a cardiac perspective. Needs to lose weight and stay active. Discussed the etiology, evaluation, and management of HTN, afib, CHF. Discussed the importance of med compliance, heart healthy diet, and regular exercise.      Plan:       -Continue current medical management  -f/u in 6 months

## 2020-07-08 ENCOUNTER — OFFICE VISIT (OUTPATIENT)
Dept: PODIATRY | Facility: CLINIC | Age: 60
End: 2020-07-08
Payer: COMMERCIAL

## 2020-07-08 VITALS — WEIGHT: 218.06 LBS | BODY MASS INDEX: 33.05 KG/M2 | HEIGHT: 68 IN

## 2020-07-08 DIAGNOSIS — L60.0 ONYCHOCRYPTOSIS: ICD-10-CM

## 2020-07-08 DIAGNOSIS — B35.1 ONYCHOMYCOSIS DUE TO DERMATOPHYTE: Primary | ICD-10-CM

## 2020-07-08 PROCEDURE — 3008F BODY MASS INDEX DOCD: CPT | Mod: CPTII,S$GLB,, | Performed by: STUDENT IN AN ORGANIZED HEALTH CARE EDUCATION/TRAINING PROGRAM

## 2020-07-08 PROCEDURE — 99214 PR OFFICE/OUTPT VISIT, EST, LEVL IV, 30-39 MIN: ICD-10-PCS | Mod: S$GLB,,, | Performed by: STUDENT IN AN ORGANIZED HEALTH CARE EDUCATION/TRAINING PROGRAM

## 2020-07-08 PROCEDURE — 99999 PR PBB SHADOW E&M-EST. PATIENT-LVL III: ICD-10-PCS | Mod: PBBFAC,,, | Performed by: STUDENT IN AN ORGANIZED HEALTH CARE EDUCATION/TRAINING PROGRAM

## 2020-07-08 PROCEDURE — 99999 PR PBB SHADOW E&M-EST. PATIENT-LVL III: CPT | Mod: PBBFAC,,, | Performed by: STUDENT IN AN ORGANIZED HEALTH CARE EDUCATION/TRAINING PROGRAM

## 2020-07-08 PROCEDURE — 3008F PR BODY MASS INDEX (BMI) DOCUMENTED: ICD-10-PCS | Mod: CPTII,S$GLB,, | Performed by: STUDENT IN AN ORGANIZED HEALTH CARE EDUCATION/TRAINING PROGRAM

## 2020-07-08 PROCEDURE — 99214 OFFICE O/P EST MOD 30 MIN: CPT | Mod: S$GLB,,, | Performed by: STUDENT IN AN ORGANIZED HEALTH CARE EDUCATION/TRAINING PROGRAM

## 2020-07-08 RX ORDER — CICLOPIROX 80 MG/ML
SOLUTION TOPICAL NIGHTLY
Qty: 1 BOTTLE | Refills: 0 | Status: SHIPPED | OUTPATIENT
Start: 2020-07-08 | End: 2020-07-29

## 2020-07-08 NOTE — PROGRESS NOTES
Subjective:      Patient ID: Roman Hodges is a 60 y.o. male.    Chief Complaint: Follow-up (toe nails )    Roman is a 60 y.o. male  has a past medical history of A-fib, Allergy, Anticoagulant long-term use, Diabetes mellitus, type 2, GERD (gastroesophageal reflux disease), Hepatocellular carcinoma, History of 2019 novel coronavirus disease (COVID-19), History of primary liver cancer, Hyperlipidemia, and Hypertension. who presents to the clinic complaining of painful ingrown toenail on the left foot. States has a hx of ingrown toe nails on this side. No other pedal complaints.     7/8/20: Pt presents for follow up for temporary ingrown toenail removal. States no longer has pain, states his wound has healed and denies signs of infection. States he is concerned about his toenail fungus. No other pedal complaints.      Review of Systems   Constitution: Negative for chills, decreased appetite, diaphoresis and fever.   HENT: Negative for congestion and hearing loss.    Cardiovascular: Negative for chest pain, claudication, leg swelling and syncope.   Respiratory: Negative for cough and shortness of breath.    Skin: Positive for nail changes. Negative for color change, dry skin, flushing, itching, poor wound healing, rash, suspicious lesions and unusual hair distribution.   Musculoskeletal: Positive for joint pain and joint swelling.   Gastrointestinal: Negative for nausea and vomiting.   Neurological: Positive for numbness. Negative for loss of balance, paresthesias and sensory change.   Psychiatric/Behavioral: Negative for altered mental status, suicidal ideas and thoughts of violence. The patient is not nervous/anxious.            Objective:      Physical Exam  Constitutional:       General: He is not in acute distress.     Appearance: He is well-developed. He is not diaphoretic.   Cardiovascular:      Comments: Dorsalis pedis and posterior tibial pulses are within normal limits. Skin temperature is within normal  limits. Toes are cool to touch and feet are warm proximally. Hair growth is within normal limits. Skin is normotrophic and without hyperpigmentation. No edema noted. No varicosities or spider veins noted, marleen.       Musculoskeletal:         General: No tenderness or deformity.      Right foot: Decreased range of motion.      Left foot: Decreased range of motion.      Comments: Adequate joint range of motion without pain, limitation, nor crepitation to bilateral feet and ankle joints. Muscle strength is 5/5 in all groups bilaterally.    No POP noted, marleen   Feet:      Right foot:      Skin integrity: Dry skin present. No blister, erythema or warmth.      Left foot:      Skin integrity: Dry skin present. No blister, erythema or warmth.   Lymphadenopathy:      Comments: Negative lymphadenopathy bilateral popliteal fossa and tarsal tunnel.    Skin:     General: Skin is warm and dry.      Capillary Refill: Capillary refill takes less than 2 seconds.      Coloration: Skin is not pale.      Findings: No abrasion, bruising, burn, erythema, laceration, petechiae or rash.      Nails: There is no clubbing.        Comments: Skin is warm and dry, bilaterally, no acute SOI noted, bilaterally, appears stable.     Bilateral hallux, 2nd and 5th toe nails are thickened, discolored and dystrophic, marleen. Remainder of toenails are well trimmed and normotrophic    Procedure site to lateral border of left hallux toe nail well healed with no acute signs of infection noted.        Neurological:      Mental Status: He is alert and oriented to person, place, and time.      Sensory: No sensory deficit.      Motor: No abnormal muscle tone.      Comments: Jordan-Dain 5.07 monofilamant testing is within normal limits. Sharp/dull sensation is within normal limits bilaterally. Light touch within normal limits bilaterally. Vibratory sensation within normal limits, marleen.    Psychiatric:         Behavior: Behavior normal.         Thought Content:  Thought content normal.         Judgment: Judgment normal.               Assessment:       Encounter Diagnoses   Name Primary?    Onychomycosis due to dermatophyte Yes    Onychocryptosis          Plan:       Roman was seen today for follow-up.    Diagnoses and all orders for this visit:    Onychomycosis due to dermatophyte    Onychocryptosis      I counseled the patient on his conditions, their implications and medical management.    Temporary lateral border border nail avulsion of left hallux toenail is well healed with no SOI noted, appears stable.     Recommend supportive tennis shoes for foot pain. Recommend shoes with small heel, rigid arch support and wide toe box to accommodate foot type.     Discussed importance of keeping feet dry and changing socks and shoes on a regular basis to prevent spread of toenail fungus. Discussed treatment options for fungal toenails including topical home remedies, topical OTC and Rx medications as well as oral Rx medications and laser treatment. All pros and cons discussed of each treatment. Patient elects for topical tx. Script dispensed.     Shoe inspection. Diabetic Foot Education. Patient reminded of the importance of good nutrition and blood sugar control to help prevent podiatric complications of diabetes. Patient instructed on proper foot hygeine. We discussed wearing proper shoe gear, daily foot inspections, never walking without protective shoe gear, never putting sharp instruments to feet, routine podiatric nail visits      Discussed general foot care:  Wear comfortable, proper fitting shoes. Wash feet daily. Dry well. After drying, apply moisturizer to feet (no lotion to webspaces). Inspect feet daily for skin breaks, blisters, swelling, or redness. Wear cotton socks (preferably white)  Change socks every day. Do NOT walk barefoot. Do NOT use heating pads or warm/hot water soaks. I discussed with the  patient  signs and symptoms of infection including redness,  drainage, purulence, odor, pain, elevated BS, streaking, fever, chills, etc . Patient is to seek medical attention (ER or urgent care) if these symptoms occur      RTC PRN

## 2020-07-10 ENCOUNTER — TELEPHONE (OUTPATIENT)
Dept: TRANSPLANT | Facility: CLINIC | Age: 60
End: 2020-07-10

## 2020-07-10 NOTE — TELEPHONE ENCOUNTER
Imaging reviewed. No action needed.----- Message from Fab Damon MD sent at 7/5/2020  9:51 AM CDT -----  Results reviewed. Please advise DEXA stable.

## 2020-07-21 RX ORDER — APIXABAN 5 MG/1
TABLET, FILM COATED ORAL
Qty: 60 TABLET | Refills: 0 | Status: SHIPPED | OUTPATIENT
Start: 2020-07-21 | End: 2020-08-26

## 2020-07-27 ENCOUNTER — LAB VISIT (OUTPATIENT)
Dept: LAB | Facility: HOSPITAL | Age: 60
End: 2020-07-27
Attending: INTERNAL MEDICINE
Payer: COMMERCIAL

## 2020-07-27 DIAGNOSIS — Z94.4 LIVER REPLACED BY TRANSPLANT: ICD-10-CM

## 2020-07-27 LAB
ALBUMIN SERPL BCP-MCNC: 4.3 G/DL (ref 3.5–5.2)
ALP SERPL-CCNC: 78 U/L (ref 55–135)
ALT SERPL W/O P-5'-P-CCNC: 32 U/L (ref 10–44)
ANION GAP SERPL CALC-SCNC: 8 MMOL/L (ref 8–16)
AST SERPL-CCNC: 18 U/L (ref 10–40)
BASOPHILS # BLD AUTO: 0.05 K/UL (ref 0–0.2)
BASOPHILS NFR BLD: 0.8 % (ref 0–1.9)
BILIRUB SERPL-MCNC: 0.4 MG/DL (ref 0.1–1)
BUN SERPL-MCNC: 17 MG/DL (ref 8–23)
CALCIUM SERPL-MCNC: 9.1 MG/DL (ref 8.7–10.5)
CHLORIDE SERPL-SCNC: 104 MMOL/L (ref 95–110)
CO2 SERPL-SCNC: 28 MMOL/L (ref 23–29)
CREAT SERPL-MCNC: 1.2 MG/DL (ref 0.5–1.4)
DIFFERENTIAL METHOD: ABNORMAL
EOSINOPHIL # BLD AUTO: 0.2 K/UL (ref 0–0.5)
EOSINOPHIL NFR BLD: 2.7 % (ref 0–8)
ERYTHROCYTE [DISTWIDTH] IN BLOOD BY AUTOMATED COUNT: 14.5 % (ref 11.5–14.5)
EST. GFR  (AFRICAN AMERICAN): >60 ML/MIN/1.73 M^2
EST. GFR  (NON AFRICAN AMERICAN): >60 ML/MIN/1.73 M^2
GLUCOSE SERPL-MCNC: 114 MG/DL (ref 70–110)
HCT VFR BLD AUTO: 41.4 % (ref 40–54)
HGB BLD-MCNC: 13.5 G/DL (ref 14–18)
IMM GRANULOCYTES # BLD AUTO: 0.03 K/UL (ref 0–0.04)
IMM GRANULOCYTES NFR BLD AUTO: 0.5 % (ref 0–0.5)
LYMPHOCYTES # BLD AUTO: 1.4 K/UL (ref 1–4.8)
LYMPHOCYTES NFR BLD: 23.4 % (ref 18–48)
MCH RBC QN AUTO: 26.2 PG (ref 27–31)
MCHC RBC AUTO-ENTMCNC: 32.6 G/DL (ref 32–36)
MCV RBC AUTO: 80 FL (ref 82–98)
MONOCYTES # BLD AUTO: 0.6 K/UL (ref 0.3–1)
MONOCYTES NFR BLD: 10.8 % (ref 4–15)
NEUTROPHILS # BLD AUTO: 3.7 K/UL (ref 1.8–7.7)
NEUTROPHILS NFR BLD: 61.8 % (ref 38–73)
NRBC BLD-RTO: 0 /100 WBC
PLATELET # BLD AUTO: 310 K/UL (ref 150–350)
PMV BLD AUTO: 10.4 FL (ref 9.2–12.9)
POTASSIUM SERPL-SCNC: 3.7 MMOL/L (ref 3.5–5.1)
PROT SERPL-MCNC: 7.4 G/DL (ref 6–8.4)
RBC # BLD AUTO: 5.16 M/UL (ref 4.6–6.2)
SODIUM SERPL-SCNC: 140 MMOL/L (ref 136–145)
WBC # BLD AUTO: 5.95 K/UL (ref 3.9–12.7)

## 2020-07-27 PROCEDURE — 80053 COMPREHEN METABOLIC PANEL: CPT

## 2020-07-27 PROCEDURE — 85025 COMPLETE CBC W/AUTO DIFF WBC: CPT

## 2020-07-27 PROCEDURE — 36415 COLL VENOUS BLD VENIPUNCTURE: CPT | Mod: PO

## 2020-07-27 PROCEDURE — 80197 ASSAY OF TACROLIMUS: CPT

## 2020-07-28 ENCOUNTER — TELEPHONE (OUTPATIENT)
Dept: FAMILY MEDICINE | Facility: CLINIC | Age: 60
End: 2020-07-28

## 2020-07-28 LAB — TACROLIMUS BLD-MCNC: 8.7 NG/ML (ref 5–15)

## 2020-07-28 NOTE — TELEPHONE ENCOUNTER
Spoke with pt. Wife  To inform her  is out the office but that it is  Best to wait until he is able to review medication list with pt. Before taking new vitamins.

## 2020-07-29 RX ORDER — CICLOPIROX 80 MG/ML
SOLUTION TOPICAL NIGHTLY
Qty: 1 BOTTLE | Refills: 0 | Status: SHIPPED | OUTPATIENT
Start: 2020-07-29 | End: 2021-03-15

## 2020-07-30 ENCOUNTER — TELEPHONE (OUTPATIENT)
Dept: TRANSPLANT | Facility: CLINIC | Age: 60
End: 2020-07-30

## 2020-07-30 NOTE — TELEPHONE ENCOUNTER
Labs reviewed. No medication changes. Please repeat labs 8/24/20----- Message from Fab Damon MD sent at 7/27/2020  6:37 PM CDT -----  Results reviewed. Please advise labs are stable.

## 2020-08-15 NOTE — PROGRESS NOTES
Pt arrive to the IR Dept for  y90 radio-embolization.         
Pt arrives to [facility], report received from Monika MUÑOZ.   
Pt given discharge instructions and handout.  Family at bedside. Verbalized understanding of instructions. Dsg to right groin CDI.  IV d/c'd with cath tip intact.  NAD noted.  Pt to be escorted to garage via wheelchair pending patient transport.   
patient remained hemodynamically stable, results of the labs, imaging findings reviewed and discussed with patient, Patient was evaluated by neurology, followed recommendations, discussed with admitting physician and MAR, patient is admitted to Medicine for further evaluation and care.

## 2020-08-21 DIAGNOSIS — E11.9 TYPE 2 DIABETES MELLITUS WITHOUT COMPLICATION: ICD-10-CM

## 2020-08-24 ENCOUNTER — LAB VISIT (OUTPATIENT)
Dept: LAB | Facility: HOSPITAL | Age: 60
End: 2020-08-24
Attending: INTERNAL MEDICINE
Payer: COMMERCIAL

## 2020-08-24 DIAGNOSIS — Z94.4 LIVER REPLACED BY TRANSPLANT: ICD-10-CM

## 2020-08-24 DIAGNOSIS — U07.1 INFECTION DUE TO 2019-NCOV: ICD-10-CM

## 2020-08-24 LAB
ALBUMIN SERPL BCP-MCNC: 4.4 G/DL (ref 3.5–5.2)
ALP SERPL-CCNC: 75 U/L (ref 55–135)
ALT SERPL W/O P-5'-P-CCNC: 31 U/L (ref 10–44)
ANION GAP SERPL CALC-SCNC: 8 MMOL/L (ref 8–16)
AST SERPL-CCNC: 19 U/L (ref 10–40)
BASOPHILS # BLD AUTO: 0.03 K/UL (ref 0–0.2)
BASOPHILS NFR BLD: 0.5 % (ref 0–1.9)
BILIRUB SERPL-MCNC: 0.4 MG/DL (ref 0.1–1)
BUN SERPL-MCNC: 14 MG/DL (ref 8–23)
CALCIUM SERPL-MCNC: 8.7 MG/DL (ref 8.7–10.5)
CHLORIDE SERPL-SCNC: 105 MMOL/L (ref 95–110)
CO2 SERPL-SCNC: 26 MMOL/L (ref 23–29)
CREAT SERPL-MCNC: 1.2 MG/DL (ref 0.5–1.4)
DIFFERENTIAL METHOD: ABNORMAL
EOSINOPHIL # BLD AUTO: 0.2 K/UL (ref 0–0.5)
EOSINOPHIL NFR BLD: 2.6 % (ref 0–8)
ERYTHROCYTE [DISTWIDTH] IN BLOOD BY AUTOMATED COUNT: 14.1 % (ref 11.5–14.5)
EST. GFR  (AFRICAN AMERICAN): >60 ML/MIN/1.73 M^2
EST. GFR  (NON AFRICAN AMERICAN): >60 ML/MIN/1.73 M^2
GLUCOSE SERPL-MCNC: 111 MG/DL (ref 70–110)
HCT VFR BLD AUTO: 42 % (ref 40–54)
HGB BLD-MCNC: 13.4 G/DL (ref 14–18)
IMM GRANULOCYTES # BLD AUTO: 0.03 K/UL (ref 0–0.04)
IMM GRANULOCYTES NFR BLD AUTO: 0.5 % (ref 0–0.5)
LYMPHOCYTES # BLD AUTO: 1.5 K/UL (ref 1–4.8)
LYMPHOCYTES NFR BLD: 23.8 % (ref 18–48)
MCH RBC QN AUTO: 25.6 PG (ref 27–31)
MCHC RBC AUTO-ENTMCNC: 31.9 G/DL (ref 32–36)
MCV RBC AUTO: 80 FL (ref 82–98)
MONOCYTES # BLD AUTO: 0.6 K/UL (ref 0.3–1)
MONOCYTES NFR BLD: 10.2 % (ref 4–15)
NEUTROPHILS # BLD AUTO: 3.9 K/UL (ref 1.8–7.7)
NEUTROPHILS NFR BLD: 62.4 % (ref 38–73)
NRBC BLD-RTO: 0 /100 WBC
PLATELET # BLD AUTO: 312 K/UL (ref 150–350)
PMV BLD AUTO: 10.3 FL (ref 9.2–12.9)
POTASSIUM SERPL-SCNC: 3.4 MMOL/L (ref 3.5–5.1)
PROT SERPL-MCNC: 7.4 G/DL (ref 6–8.4)
RBC # BLD AUTO: 5.23 M/UL (ref 4.6–6.2)
SODIUM SERPL-SCNC: 139 MMOL/L (ref 136–145)
WBC # BLD AUTO: 6.18 K/UL (ref 3.9–12.7)

## 2020-08-24 PROCEDURE — 80197 ASSAY OF TACROLIMUS: CPT

## 2020-08-24 PROCEDURE — 85025 COMPLETE CBC W/AUTO DIFF WBC: CPT

## 2020-08-24 PROCEDURE — 36415 COLL VENOUS BLD VENIPUNCTURE: CPT | Mod: PO

## 2020-08-24 PROCEDURE — 80053 COMPREHEN METABOLIC PANEL: CPT

## 2020-08-25 DIAGNOSIS — Z94.4 LIVER REPLACED BY TRANSPLANT: ICD-10-CM

## 2020-08-25 LAB
TACROLIMUS BLD-MCNC: 9.8 NG/ML (ref 5–15)
TACROLIMUS BLD-MCNC: 9.8 NG/ML (ref 5–15)

## 2020-08-26 RX ORDER — TACROLIMUS 1 MG/1
CAPSULE ORAL
Qty: 90 CAPSULE | Refills: 10 | Status: SHIPPED | OUTPATIENT
Start: 2020-08-26 | End: 2020-12-02 | Stop reason: DRUGHIGH

## 2020-08-27 ENCOUNTER — OFFICE VISIT (OUTPATIENT)
Dept: TRANSPLANT | Facility: CLINIC | Age: 60
End: 2020-08-27
Attending: INTERNAL MEDICINE
Payer: COMMERCIAL

## 2020-08-27 ENCOUNTER — TELEPHONE (OUTPATIENT)
Dept: TRANSPLANT | Facility: CLINIC | Age: 60
End: 2020-08-27

## 2020-08-27 ENCOUNTER — HOSPITAL ENCOUNTER (OUTPATIENT)
Dept: RADIOLOGY | Facility: HOSPITAL | Age: 60
Discharge: HOME | End: 2020-08-27
Attending: INTERNAL MEDICINE
Payer: COMMERCIAL

## 2020-08-27 VITALS
HEIGHT: 67 IN | BODY MASS INDEX: 34.39 KG/M2 | TEMPERATURE: 99 F | DIASTOLIC BLOOD PRESSURE: 101 MMHG | OXYGEN SATURATION: 98 % | HEART RATE: 81 BPM | SYSTOLIC BLOOD PRESSURE: 147 MMHG | RESPIRATION RATE: 18 BRPM | WEIGHT: 219.13 LBS

## 2020-08-27 DIAGNOSIS — Z94.4 S/P LIVER TRANSPLANT: Chronic | ICD-10-CM

## 2020-08-27 DIAGNOSIS — Z29.89 PROPHYLACTIC IMMUNOTHERAPY: Primary | Chronic | ICD-10-CM

## 2020-08-27 DIAGNOSIS — Z94.4 LIVER REPLACED BY TRANSPLANT: ICD-10-CM

## 2020-08-27 PROCEDURE — 93975 VASCULAR STUDY: CPT | Mod: 26,,, | Performed by: RADIOLOGY

## 2020-08-27 PROCEDURE — 99214 PR OFFICE/OUTPT VISIT, EST, LEVL IV, 30-39 MIN: ICD-10-PCS | Mod: S$GLB,,, | Performed by: INTERNAL MEDICINE

## 2020-08-27 PROCEDURE — 93975 US LIVER TRANSPLANT POST: ICD-10-PCS | Mod: 26,,, | Performed by: RADIOLOGY

## 2020-08-27 PROCEDURE — 93975 VASCULAR STUDY: CPT | Mod: TC

## 2020-08-27 PROCEDURE — 76705 US LIVER TRANSPLANT POST: ICD-10-PCS | Mod: 26,59,, | Performed by: RADIOLOGY

## 2020-08-27 PROCEDURE — 3077F PR MOST RECENT SYSTOLIC BLOOD PRESSURE >= 140 MM HG: ICD-10-PCS | Mod: CPTII,S$GLB,, | Performed by: INTERNAL MEDICINE

## 2020-08-27 PROCEDURE — 99999 PR PBB SHADOW E&M-EST. PATIENT-LVL IV: CPT | Mod: PBBFAC,,, | Performed by: INTERNAL MEDICINE

## 2020-08-27 PROCEDURE — 3080F DIAST BP >= 90 MM HG: CPT | Mod: CPTII,S$GLB,, | Performed by: INTERNAL MEDICINE

## 2020-08-27 PROCEDURE — 3077F SYST BP >= 140 MM HG: CPT | Mod: CPTII,S$GLB,, | Performed by: INTERNAL MEDICINE

## 2020-08-27 PROCEDURE — 76705 ECHO EXAM OF ABDOMEN: CPT | Mod: 26,59,, | Performed by: RADIOLOGY

## 2020-08-27 PROCEDURE — 76705 ECHO EXAM OF ABDOMEN: CPT | Mod: TC

## 2020-08-27 PROCEDURE — 3008F BODY MASS INDEX DOCD: CPT | Mod: CPTII,S$GLB,, | Performed by: INTERNAL MEDICINE

## 2020-08-27 PROCEDURE — 3008F PR BODY MASS INDEX (BMI) DOCUMENTED: ICD-10-PCS | Mod: CPTII,S$GLB,, | Performed by: INTERNAL MEDICINE

## 2020-08-27 PROCEDURE — 99214 OFFICE O/P EST MOD 30 MIN: CPT | Mod: S$GLB,,, | Performed by: INTERNAL MEDICINE

## 2020-08-27 PROCEDURE — 3080F PR MOST RECENT DIASTOLIC BLOOD PRESSURE >= 90 MM HG: ICD-10-PCS | Mod: CPTII,S$GLB,, | Performed by: INTERNAL MEDICINE

## 2020-08-27 PROCEDURE — 99999 PR PBB SHADOW E&M-EST. PATIENT-LVL IV: ICD-10-PCS | Mod: PBBFAC,,, | Performed by: INTERNAL MEDICINE

## 2020-08-27 NOTE — TELEPHONE ENCOUNTER
Labs reviewed by Dr Damon. Med change to 2/1 and pt states he already is taking that dosage. MD notified. Md orders fk 1mg Bid. Message sent to pt

## 2020-08-27 NOTE — LETTER
August 27, 2020        Jose Angel Munson  [address]  [place] [address]  Phone: [phone]  Fax: [phone]             [facility]  [address]  [facility] [address]  Phone: [phone]   Patient: Roman Hodges   MR Number: [id number]   YOB: 1960   Date of Visit: 8/27/2020       Dear Dr. Jose Angel Munson    Thank you for referring Roman Hodges to me for evaluation. Attached you will find relevant portions of my assessment and plan of care.    If you have questions, please do not hesitate to call me. I look forward to following Roman Hodges along with you.    Sincerely,    Fab Damon MD    Enclosure    If you would like to receive this communication electronically, please contact [email or web address] or [phone] to request [employer] access.    [employer] is a tool which provides read-only access to select patient information with whom you have a relationship. Its easy to use and provides real time access to review your patients record including encounter summaries, notes, results, and demographic information.    If you feel you have received this communication in error or would no longer like to receive these types of communications, please e-mail [email or web address]

## 2020-08-27 NOTE — PROGRESS NOTES
Transplant Hepatology  Liver Transplant Recipient Follow-up    Transplant Date: 7/4/2019  UNOS Native Liver Dx: Primary Liver Malignancy: Hepatoma (HCC) and Cirrhosis    Roman is here for follow up of his liver transplant.    ORGAN: LIVER  Whole or Partial: whole liver  Donor Type: donation after brain death  [facility] High Risk: no  Donor CMV Status: Negative  Donor HCV Status: Negative  Donor HBcAb: Negative  Donor HBV MARIA TERESA: Negative  Donor HCV MARIA TERESA: Negative  Biliary Anastomosis: end to end  Arterial Anatomy: replaced right hepatic from sma  IVC reconstruction: end to end ivc  Portal vein status: patent    He has had the following complications since transplant: wound infection. The noted complications is well controlled.    Subjective:     Interval History:   This is a 60-year-old gentleman had a liver transplant in July of 2019.  He is doing well.  Since his last seen in clinic he was hospitalized with COVID-19 for 6 days at [facility].  He has recovered fully.  He enjoys excellent allograft function.  His tacrolimus level is a little high and I will be reducing it to 1 mg twice daily.  He is due for radiological surveillance for his history of hepatoma.  Review of Systems   Constitutional: Negative for activity change, appetite change, chills, fatigue and unexpected weight change.   HENT: Negative for congestion, facial swelling and tinnitus.    Eyes: Negative for visual disturbance.   Respiratory: Negative for cough, shortness of breath and wheezing.    Cardiovascular: Negative for chest pain and palpitations.   Gastrointestinal: Negative for abdominal distention.        Reflux   Genitourinary: Negative for dysuria.   Musculoskeletal: Negative for arthralgias, joint swelling and myalgias.   Neurological: Negative for syncope and headaches.   Hematological: Does not bruise/bleed easily.   Psychiatric/Behavioral: Negative for confusion.       Objective:     Physical Exam  Constitutional:        Appearance: He is well-developed.   Eyes:      General: No scleral icterus.  Cardiovascular:      Rate and Rhythm: Normal rate and regular rhythm.      Heart sounds: Normal heart sounds.   Pulmonary:      Effort: Pulmonary effort is normal. No respiratory distress.      Breath sounds: Normal breath sounds. No wheezing.   Abdominal:      General: Bowel sounds are normal. There is no distension.      Palpations: Abdomen is soft. There is no mass.      Tenderness: There is no abdominal tenderness. There is no rebound.       Musculoskeletal: Normal range of motion.   Lymphadenopathy:      Cervical: No cervical adenopathy.   Skin:     General: Skin is warm and dry.   Neurological:      Mental Status: He is alert and oriented to person, place, and time.       Lab Results   Component Value Date    BILITOT 0.4 08/24/2020    AST 19 08/24/2020    ALT 31 08/24/2020    ALKPHOS 75 08/24/2020    CREATININE 1.2 08/24/2020    ALBUMIN 4.4 08/24/2020     Lab Results   Component Value Date    WBC 6.18 08/24/2020    HGB 13.4 (L) 08/24/2020    HCT 42.0 08/24/2020    HCT 41 07/04/2019     08/24/2020     Lab Results   Component Value Date    TACROLIMUS 9.8 08/24/2020    TACROLIMUS 9.8 08/24/2020       Assessment/Plan:     1. Prophylactic immunotherapy    2. S/P liver transplant      Reduce tacrolimus to 1 mg bid.  RTC in 12 months.    Fab Damon MD           [facility] Patient Status  Functional Status: 100%  Physical Capacity: No Limitations

## 2020-08-31 ENCOUNTER — PATIENT MESSAGE (OUTPATIENT)
Dept: FAMILY MEDICINE | Facility: CLINIC | Age: 60
End: 2020-08-31

## 2020-08-31 ENCOUNTER — TELEPHONE (OUTPATIENT)
Dept: TRANSPLANT | Facility: CLINIC | Age: 60
End: 2020-08-31

## 2020-08-31 NOTE — TELEPHONE ENCOUNTER
US stable. Message sent. ----- Message from Fab Damon MD sent at 8/27/2020  1:55 PM CDT -----  Results reviewed. Please advise doppler u/s wai.

## 2020-09-01 ENCOUNTER — OFFICE VISIT (OUTPATIENT)
Dept: FAMILY MEDICINE | Facility: CLINIC | Age: 60
End: 2020-09-01
Payer: COMMERCIAL

## 2020-09-01 ENCOUNTER — PATIENT OUTREACH (OUTPATIENT)
Dept: ADMINISTRATIVE | Facility: OTHER | Age: 60
End: 2020-09-01

## 2020-09-01 ENCOUNTER — OFFICE VISIT (OUTPATIENT)
Dept: CARDIOLOGY | Facility: CLINIC | Age: 60
End: 2020-09-01
Payer: COMMERCIAL

## 2020-09-01 VITALS
BODY MASS INDEX: 34.44 KG/M2 | DIASTOLIC BLOOD PRESSURE: 86 MMHG | SYSTOLIC BLOOD PRESSURE: 130 MMHG | WEIGHT: 219.94 LBS | OXYGEN SATURATION: 97 % | HEART RATE: 83 BPM

## 2020-09-01 VITALS — WEIGHT: 216 LBS | DIASTOLIC BLOOD PRESSURE: 84 MMHG | BODY MASS INDEX: 33.83 KG/M2 | SYSTOLIC BLOOD PRESSURE: 129 MMHG

## 2020-09-01 DIAGNOSIS — K29.50 CHRONIC GASTRITIS WITHOUT BLEEDING, UNSPECIFIED GASTRITIS TYPE: ICD-10-CM

## 2020-09-01 DIAGNOSIS — E66.9 OBESITY (BMI 30.0-34.9): ICD-10-CM

## 2020-09-01 DIAGNOSIS — Z79.60 LONG-TERM USE OF IMMUNOSUPPRESSANT MEDICATION: ICD-10-CM

## 2020-09-01 DIAGNOSIS — I10 ESSENTIAL HYPERTENSION: ICD-10-CM

## 2020-09-01 DIAGNOSIS — K21.00 GASTROESOPHAGEAL REFLUX DISEASE WITH ESOPHAGITIS: Chronic | ICD-10-CM

## 2020-09-01 DIAGNOSIS — K44.9 HIATAL HERNIA: ICD-10-CM

## 2020-09-01 DIAGNOSIS — I10 ESSENTIAL HYPERTENSION: Chronic | ICD-10-CM

## 2020-09-01 DIAGNOSIS — Z94.4 S/P LIVER TRANSPLANT: Chronic | ICD-10-CM

## 2020-09-01 DIAGNOSIS — Z79.01 LONG TERM (CURRENT) USE OF ANTICOAGULANTS: Chronic | ICD-10-CM

## 2020-09-01 DIAGNOSIS — G47.33 OSA (OBSTRUCTIVE SLEEP APNEA): Chronic | ICD-10-CM

## 2020-09-01 DIAGNOSIS — E11.9 TYPE 2 DIABETES MELLITUS WITHOUT COMPLICATION, WITHOUT LONG-TERM CURRENT USE OF INSULIN: Chronic | ICD-10-CM

## 2020-09-01 DIAGNOSIS — K21.9 GASTROESOPHAGEAL REFLUX DISEASE WITHOUT ESOPHAGITIS: Primary | ICD-10-CM

## 2020-09-01 DIAGNOSIS — R94.31 PROLONGED Q-T INTERVAL ON ECG: ICD-10-CM

## 2020-09-01 DIAGNOSIS — I48.0 PAROXYSMAL ATRIAL FIBRILLATION: Primary | ICD-10-CM

## 2020-09-01 PROCEDURE — 93005 EKG 12-LEAD: ICD-10-PCS | Mod: S$GLB,,, | Performed by: INTERNAL MEDICINE

## 2020-09-01 PROCEDURE — 3044F PR MOST RECENT HEMOGLOBIN A1C LEVEL <7.0%: ICD-10-PCS | Mod: CPTII,S$GLB,, | Performed by: INTERNAL MEDICINE

## 2020-09-01 PROCEDURE — 99442 PR PHYSICIAN TELEPHONE EVALUATION 11-20 MIN: ICD-10-PCS | Mod: 95,,, | Performed by: FAMILY MEDICINE

## 2020-09-01 PROCEDURE — 3075F PR MOST RECENT SYSTOLIC BLOOD PRESS GE 130-139MM HG: ICD-10-PCS | Mod: CPTII,S$GLB,, | Performed by: INTERNAL MEDICINE

## 2020-09-01 PROCEDURE — 3079F DIAST BP 80-89 MM HG: CPT | Mod: CPTII,S$GLB,, | Performed by: INTERNAL MEDICINE

## 2020-09-01 PROCEDURE — 3008F BODY MASS INDEX DOCD: CPT | Mod: CPTII,S$GLB,, | Performed by: INTERNAL MEDICINE

## 2020-09-01 PROCEDURE — 99214 OFFICE O/P EST MOD 30 MIN: CPT | Mod: S$GLB,,, | Performed by: INTERNAL MEDICINE

## 2020-09-01 PROCEDURE — 93010 EKG 12-LEAD: ICD-10-PCS | Mod: S$GLB,,, | Performed by: INTERNAL MEDICINE

## 2020-09-01 PROCEDURE — 93010 ELECTROCARDIOGRAM REPORT: CPT | Mod: S$GLB,,, | Performed by: INTERNAL MEDICINE

## 2020-09-01 PROCEDURE — 99214 PR OFFICE/OUTPT VISIT, EST, LEVL IV, 30-39 MIN: ICD-10-PCS | Mod: S$GLB,,, | Performed by: INTERNAL MEDICINE

## 2020-09-01 PROCEDURE — 3079F PR MOST RECENT DIASTOLIC BLOOD PRESSURE 80-89 MM HG: ICD-10-PCS | Mod: CPTII,S$GLB,, | Performed by: INTERNAL MEDICINE

## 2020-09-01 PROCEDURE — 93005 ELECTROCARDIOGRAM TRACING: CPT | Mod: S$GLB,,, | Performed by: INTERNAL MEDICINE

## 2020-09-01 PROCEDURE — 99999 PR PBB SHADOW E&M-EST. PATIENT-LVL IV: ICD-10-PCS | Mod: PBBFAC,,, | Performed by: INTERNAL MEDICINE

## 2020-09-01 PROCEDURE — 99442 PR PHYSICIAN TELEPHONE EVALUATION 11-20 MIN: CPT | Mod: 95,,, | Performed by: FAMILY MEDICINE

## 2020-09-01 PROCEDURE — 99999 PR PBB SHADOW E&M-EST. PATIENT-LVL IV: CPT | Mod: PBBFAC,,, | Performed by: INTERNAL MEDICINE

## 2020-09-01 PROCEDURE — 3008F PR BODY MASS INDEX (BMI) DOCUMENTED: ICD-10-PCS | Mod: CPTII,S$GLB,, | Performed by: INTERNAL MEDICINE

## 2020-09-01 PROCEDURE — 3075F SYST BP GE 130 - 139MM HG: CPT | Mod: CPTII,S$GLB,, | Performed by: INTERNAL MEDICINE

## 2020-09-01 PROCEDURE — 3044F HG A1C LEVEL LT 7.0%: CPT | Mod: CPTII,S$GLB,, | Performed by: INTERNAL MEDICINE

## 2020-09-01 RX ORDER — SUCRALFATE 1 G/1
1 TABLET ORAL
Qty: 270 TABLET | Refills: 0 | Status: SHIPPED | OUTPATIENT
Start: 2020-09-01 | End: 2020-11-23

## 2020-09-01 NOTE — PROGRESS NOTES
"Subjective:    Patient ID:  Roman Hodges is a 60 y.o. male who presents for evaluation of Chest Pain      HPI    61 y/o Macanese speaking male with hx of HTN, DM, PAFib, HFrEF with return to normal function (previously 30%, last 2DE with 55%) and HCC s/p OLTx. Patient was initially admitted with AF RVR after presenting with near syncopal episode and spontaneously converted. At that time, 2DE with EF 30% with akinetic: mid anteroseptal, apical anterior, apical septal, apical lateral and apex and hypokinetic: mid inferoseptal and apical inferior walls. Again presented with similar symptoms, started on BB and DOAC and discharged home. Seen by Dr Sargent and was complaining of exertional epigastric/substernal pain and was admitted for ACS r/o. Had LHC with nonobstructive CAD and EF on V-gram normal without WMA's. Clinically improved and discharged home. Repeat 2DE with normalization of function to 55%. Did well post discharge, but then began having episodes of HTN urgency and had presented multiple times to ED with SBP >200. Had Oltx successfully and has done well. BP meds had been changed and had been having elevated readings. Changed BP regimen and BP now controlled. Was hospitalized at [facility] for Covid for 8 days and had full recovery.   Recently had central sharp, CP, brief, not severe for 2 days intermittently. States that he felt like he had "gas" and sharp pain resolved with eructation. Was worried about his heart and presents for eval.   Denies regular CP, SOB, palps, orthopnea, PND, syncope, palps. Tolerating meds well and compliant. Exercises regularly without cardiac symptoms. ECG in clinic today, per my interpretation shows NSR, with LVH, unchanged from previous.     Review of Systems   Constitution: Negative for malaise/fatigue.   HENT: Negative for congestion.    Eyes: Negative for blurred vision.   Cardiovascular: Positive for chest pain. Negative for claudication, cyanosis, dyspnea on exertion, " irregular heartbeat, leg swelling, near-syncope, orthopnea, palpitations, paroxysmal nocturnal dyspnea and syncope.   Respiratory: Negative for shortness of breath.    Endocrine: Negative for polyuria.   Hematologic/Lymphatic: Negative for bleeding problem.   Skin: Negative for itching and rash.   Musculoskeletal: Negative for joint swelling, muscle cramps and muscle weakness.   Gastrointestinal: Negative for abdominal pain, hematemesis, hematochezia, melena, nausea and vomiting.   Genitourinary: Negative for dysuria and hematuria.   Neurological: Negative for dizziness, focal weakness, headaches, light-headedness, loss of balance and weakness.   Psychiatric/Behavioral: Negative for depression. The patient is not nervous/anxious.         Objective:    Physical Exam   Constitutional: He is oriented to person, place, and time. He appears well-developed and well-nourished.   HENT:   Head: Normocephalic and atraumatic.   Neck: Neck supple. No JVD present.   Cardiovascular: Normal rate, regular rhythm and normal heart sounds.   Pulses:       Carotid pulses are 2+ on the right side and 2+ on the left side.       Radial pulses are 2+ on the right side and 2+ on the left side.        Femoral pulses are 2+ on the right side and 2+ on the left side.       Dorsalis pedis pulses are 2+ on the right side and 2+ on the left side.        Posterior tibial pulses are 2+ on the right side and 2+ on the left side.   Pulmonary/Chest: Effort normal and breath sounds normal.   Abdominal: Soft. Bowel sounds are normal.   Musculoskeletal:         General: No edema.   Neurological: He is alert and oriented to person, place, and time.   Skin: Skin is warm and dry.   Psychiatric: He has a normal mood and affect. His behavior is normal. Thought content normal.         Assessment:       1. Paroxysmal atrial fibrillation    2. Essential hypertension    3. Prolonged Q-T interval on ECG    4. Long term (current) use of anticoagulants    5. Obesity  (BMI 30.0-34.9)    6. Type 2 diabetes mellitus without complication, without long-term current use of insulin    7. Gastroesophageal reflux disease with esophagitis    8. S/P liver transplant    9. MALLIKA (obstructive sleep apnea)    10. Long-term use of immunosuppressant medication      59 y/o pt with hx and presentation as above. Doing well from a cardiac perspective and compensated from a HF perspective. BP controlled and doing well from a cardiac perspective. CP atypical and reassurance given. Needs to lose weight and stay active. Discussed the etiology, evaluation, and management of CP, HTN, afib, CHF, obesity, MALLIKA. Discussed the importance of med compliance, heart healthy diet, and regular exercise.      Plan:       -Continue current medical management  -f/u in 6 months

## 2020-09-01 NOTE — PROGRESS NOTES
The patient location is:  [place].  The chief complaint leading to consultation is:  Reflux/gastritis/hiatal hernia/hypertension    Visit type: audiovisual    Face to Face time with patient: 15 min  5 minutes of total time spent on the encounter, which includes face to face time and non-face to face time preparing to see the patient (eg, review of tests), Obtaining and/or reviewing separately obtained history, Documenting clinical information in the electronic or other health record, Independently interpreting results (not separately reported) and communicating results to the patient/family/caregiver, or Care coordination (not separately reported).         Each patient to whom he or she provides medical services by telemedicine is:  (1) informed of the relationship between the physician and patient and the respective role of any other health care provider with respect to management of the patient; and (2) notified that he or she may decline to receive medical services by telemedicine and may withdraw from such care at any time.    Notes:  60 years old male who was evaluated by telemedicine patient blood pressure today stable .  No chest pain, palpitation, orthopnea or PND.  Patient still sometimes with mild chest discomfort arm associated with reflux.  No nausea, vomiting or diarrhea.  Patient currently on Nexium.  He was previously diagnosed with hiatal hernia and gastritis.  Patient with a BMI of 33 currently trying to lose weight .    Diagnoses and all orders for this visit:    Gastroesophageal reflux disease without esophagitis  -     sucralfate (CARAFATE) 1 gram tablet; Take 1 tablet (1 g total) by mouth 3 (three) times daily with meals.    Hiatal hernia  -     sucralfate (CARAFATE) 1 gram tablet; Take 1 tablet (1 g total) by mouth 3 (three) times daily with meals.    Chronic gastritis without bleeding, unspecified gastritis type  -     sucralfate (CARAFATE) 1 gram tablet; Take 1 tablet (1 g total) by mouth 3  (three) times daily with meals.    Essential hypertension    BMI 33.0-33.9,adult        Anti reflux measures.    Continue monitoring blood pressure at home, low sodium diet.

## 2020-09-01 NOTE — PROGRESS NOTES
Health Maintenance Due   Topic Date Due    Foot Exam  11/02/2019    Lipid Panel  12/14/2019    Eye Exam  12/25/2019    PROSTATE-SPECIFIC ANTIGEN  05/09/2020    Influenza Vaccine (1) 09/01/2020     Updates were requested from care everywhere.  Chart was reviewed for overdue Proactive [facility] Encounters (BETTY) topics (CRS, Breast Cancer Screening, Eye exam)  Health Maintenance has been updated.  LINKS immunization registry triggered.  Immunizations were reconciled.

## 2020-09-08 ENCOUNTER — LAB VISIT (OUTPATIENT)
Dept: LAB | Facility: HOSPITAL | Age: 60
End: 2020-09-08
Attending: INTERNAL MEDICINE
Payer: COMMERCIAL

## 2020-09-08 ENCOUNTER — PATIENT MESSAGE (OUTPATIENT)
Dept: FAMILY MEDICINE | Facility: CLINIC | Age: 60
End: 2020-09-08

## 2020-09-08 DIAGNOSIS — Z94.4 LIVER REPLACED BY TRANSPLANT: ICD-10-CM

## 2020-09-08 LAB
ALBUMIN SERPL BCP-MCNC: 4.3 G/DL (ref 3.5–5.2)
ALP SERPL-CCNC: 83 U/L (ref 55–135)
ALT SERPL W/O P-5'-P-CCNC: 33 U/L (ref 10–44)
ANION GAP SERPL CALC-SCNC: 9 MMOL/L (ref 8–16)
AST SERPL-CCNC: 15 U/L (ref 10–40)
BASOPHILS # BLD AUTO: 0.04 K/UL (ref 0–0.2)
BASOPHILS NFR BLD: 0.6 % (ref 0–1.9)
BILIRUB SERPL-MCNC: 0.3 MG/DL (ref 0.1–1)
BUN SERPL-MCNC: 17 MG/DL (ref 8–23)
CALCIUM SERPL-MCNC: 9.3 MG/DL (ref 8.7–10.5)
CHLORIDE SERPL-SCNC: 106 MMOL/L (ref 95–110)
CO2 SERPL-SCNC: 27 MMOL/L (ref 23–29)
CREAT SERPL-MCNC: 1.3 MG/DL (ref 0.5–1.4)
DIFFERENTIAL METHOD: ABNORMAL
EOSINOPHIL # BLD AUTO: 0.2 K/UL (ref 0–0.5)
EOSINOPHIL NFR BLD: 2.5 % (ref 0–8)
ERYTHROCYTE [DISTWIDTH] IN BLOOD BY AUTOMATED COUNT: 14.5 % (ref 11.5–14.5)
EST. GFR  (AFRICAN AMERICAN): >60 ML/MIN/1.73 M^2
EST. GFR  (NON AFRICAN AMERICAN): 58.9 ML/MIN/1.73 M^2
GLUCOSE SERPL-MCNC: 108 MG/DL (ref 70–110)
HCT VFR BLD AUTO: 42.7 % (ref 40–54)
HGB BLD-MCNC: 13.6 G/DL (ref 14–18)
IMM GRANULOCYTES # BLD AUTO: 0.03 K/UL (ref 0–0.04)
IMM GRANULOCYTES NFR BLD AUTO: 0.5 % (ref 0–0.5)
LYMPHOCYTES # BLD AUTO: 1.2 K/UL (ref 1–4.8)
LYMPHOCYTES NFR BLD: 18.2 % (ref 18–48)
MCH RBC QN AUTO: 25.1 PG (ref 27–31)
MCHC RBC AUTO-ENTMCNC: 31.9 G/DL (ref 32–36)
MCV RBC AUTO: 79 FL (ref 82–98)
MONOCYTES # BLD AUTO: 0.6 K/UL (ref 0.3–1)
MONOCYTES NFR BLD: 8.9 % (ref 4–15)
NEUTROPHILS # BLD AUTO: 4.5 K/UL (ref 1.8–7.7)
NEUTROPHILS NFR BLD: 69.3 % (ref 38–73)
NRBC BLD-RTO: 0 /100 WBC
PLATELET # BLD AUTO: 329 K/UL (ref 150–350)
PMV BLD AUTO: 10 FL (ref 9.2–12.9)
POTASSIUM SERPL-SCNC: 3.9 MMOL/L (ref 3.5–5.1)
PROT SERPL-MCNC: 7.3 G/DL (ref 6–8.4)
RBC # BLD AUTO: 5.42 M/UL (ref 4.6–6.2)
SODIUM SERPL-SCNC: 142 MMOL/L (ref 136–145)
WBC # BLD AUTO: 6.5 K/UL (ref 3.9–12.7)

## 2020-09-08 PROCEDURE — 80197 ASSAY OF TACROLIMUS: CPT

## 2020-09-08 PROCEDURE — 36415 COLL VENOUS BLD VENIPUNCTURE: CPT | Mod: PO

## 2020-09-08 PROCEDURE — 80053 COMPREHEN METABOLIC PANEL: CPT

## 2020-09-08 PROCEDURE — 85025 COMPLETE CBC W/AUTO DIFF WBC: CPT

## 2020-09-09 LAB — TACROLIMUS BLD-MCNC: 7.7 NG/ML (ref 5–15)

## 2020-09-11 ENCOUNTER — TELEPHONE (OUTPATIENT)
Dept: TRANSPLANT | Facility: CLINIC | Age: 60
End: 2020-09-11

## 2020-09-11 NOTE — TELEPHONE ENCOUNTER
Labs are stable. ----- Message from Fab Damon MD sent at 9/9/2020 10:53 AM CDT -----  Results reviewed. Please advise labs are stable.

## 2020-09-30 ENCOUNTER — PATIENT MESSAGE (OUTPATIENT)
Dept: TRANSPLANT | Facility: CLINIC | Age: 60
End: 2020-09-30

## 2020-10-01 ENCOUNTER — PATIENT MESSAGE (OUTPATIENT)
Dept: FAMILY MEDICINE | Facility: CLINIC | Age: 60
End: 2020-10-01

## 2020-10-02 ENCOUNTER — PATIENT MESSAGE (OUTPATIENT)
Dept: FAMILY MEDICINE | Facility: CLINIC | Age: 60
End: 2020-10-02

## 2020-10-03 ENCOUNTER — TELEPHONE (OUTPATIENT)
Dept: FAMILY MEDICINE | Facility: CLINIC | Age: 60
End: 2020-10-03

## 2020-10-03 DIAGNOSIS — Z23 NEEDS FLU SHOT: Primary | ICD-10-CM

## 2020-10-05 ENCOUNTER — PATIENT MESSAGE (OUTPATIENT)
Dept: ADMINISTRATIVE | Facility: HOSPITAL | Age: 60
End: 2020-10-05

## 2020-10-07 ENCOUNTER — IMMUNIZATION (OUTPATIENT)
Dept: FAMILY MEDICINE | Facility: CLINIC | Age: 60
End: 2020-10-07
Payer: COMMERCIAL

## 2020-10-07 PROCEDURE — 90694 FLU VACCINE - QUADRIVALENT - ADJUVANTED: ICD-10-PCS | Mod: S$GLB,,, | Performed by: FAMILY MEDICINE

## 2020-10-07 PROCEDURE — 90471 IMMUNIZATION ADMIN: CPT | Mod: S$GLB,,, | Performed by: FAMILY MEDICINE

## 2020-10-07 PROCEDURE — 90694 VACC AIIV4 NO PRSRV 0.5ML IM: CPT | Mod: S$GLB,,, | Performed by: FAMILY MEDICINE

## 2020-10-07 PROCEDURE — 90471 FLU VACCINE - QUADRIVALENT - ADJUVANTED: ICD-10-PCS | Mod: S$GLB,,, | Performed by: FAMILY MEDICINE

## 2020-10-24 ENCOUNTER — PATIENT MESSAGE (OUTPATIENT)
Dept: ADMINISTRATIVE | Facility: HOSPITAL | Age: 60
End: 2020-10-24

## 2020-10-26 ENCOUNTER — LAB VISIT (OUTPATIENT)
Dept: LAB | Facility: HOSPITAL | Age: 60
End: 2020-10-26
Attending: INTERNAL MEDICINE
Payer: COMMERCIAL

## 2020-10-26 DIAGNOSIS — Z94.4 LIVER REPLACED BY TRANSPLANT: ICD-10-CM

## 2020-10-26 LAB
ALBUMIN SERPL BCP-MCNC: 4.2 G/DL (ref 3.5–5.2)
ALP SERPL-CCNC: 75 U/L (ref 55–135)
ALT SERPL W/O P-5'-P-CCNC: 27 U/L (ref 10–44)
ANION GAP SERPL CALC-SCNC: 12 MMOL/L (ref 8–16)
AST SERPL-CCNC: 18 U/L (ref 10–40)
BASOPHILS # BLD AUTO: 0.03 K/UL (ref 0–0.2)
BASOPHILS NFR BLD: 0.5 % (ref 0–1.9)
BILIRUB SERPL-MCNC: 0.4 MG/DL (ref 0.1–1)
BUN SERPL-MCNC: 13 MG/DL (ref 8–23)
CALCIUM SERPL-MCNC: 8.7 MG/DL (ref 8.7–10.5)
CHLORIDE SERPL-SCNC: 106 MMOL/L (ref 95–110)
CO2 SERPL-SCNC: 25 MMOL/L (ref 23–29)
CREAT SERPL-MCNC: 1.2 MG/DL (ref 0.5–1.4)
DIFFERENTIAL METHOD: ABNORMAL
EOSINOPHIL # BLD AUTO: 0.2 K/UL (ref 0–0.5)
EOSINOPHIL NFR BLD: 2.4 % (ref 0–8)
ERYTHROCYTE [DISTWIDTH] IN BLOOD BY AUTOMATED COUNT: 15 % (ref 11.5–14.5)
EST. GFR  (AFRICAN AMERICAN): >60 ML/MIN/1.73 M^2
EST. GFR  (NON AFRICAN AMERICAN): >60 ML/MIN/1.73 M^2
GLUCOSE SERPL-MCNC: 110 MG/DL (ref 70–110)
HCT VFR BLD AUTO: 43.2 % (ref 40–54)
HGB BLD-MCNC: 13 G/DL (ref 14–18)
IMM GRANULOCYTES # BLD AUTO: 0.03 K/UL (ref 0–0.04)
IMM GRANULOCYTES NFR BLD AUTO: 0.5 % (ref 0–0.5)
LYMPHOCYTES # BLD AUTO: 1.3 K/UL (ref 1–4.8)
LYMPHOCYTES NFR BLD: 19.5 % (ref 18–48)
MCH RBC QN AUTO: 24.4 PG (ref 27–31)
MCHC RBC AUTO-ENTMCNC: 30.1 G/DL (ref 32–36)
MCV RBC AUTO: 81 FL (ref 82–98)
MONOCYTES # BLD AUTO: 0.6 K/UL (ref 0.3–1)
MONOCYTES NFR BLD: 9 % (ref 4–15)
NEUTROPHILS # BLD AUTO: 4.5 K/UL (ref 1.8–7.7)
NEUTROPHILS NFR BLD: 68.1 % (ref 38–73)
NRBC BLD-RTO: 0 /100 WBC
PLATELET # BLD AUTO: 324 K/UL (ref 150–350)
PMV BLD AUTO: 10.4 FL (ref 9.2–12.9)
POTASSIUM SERPL-SCNC: 4 MMOL/L (ref 3.5–5.1)
PROT SERPL-MCNC: 7.2 G/DL (ref 6–8.4)
RBC # BLD AUTO: 5.33 M/UL (ref 4.6–6.2)
SODIUM SERPL-SCNC: 143 MMOL/L (ref 136–145)
WBC # BLD AUTO: 6.58 K/UL (ref 3.9–12.7)

## 2020-10-26 PROCEDURE — 80053 COMPREHEN METABOLIC PANEL: CPT

## 2020-10-26 PROCEDURE — 85025 COMPLETE CBC W/AUTO DIFF WBC: CPT

## 2020-10-26 PROCEDURE — 80197 ASSAY OF TACROLIMUS: CPT

## 2020-10-26 PROCEDURE — 36415 COLL VENOUS BLD VENIPUNCTURE: CPT | Mod: PO

## 2020-10-27 LAB — TACROLIMUS BLD-MCNC: 7.5 NG/ML (ref 5–15)

## 2020-10-30 DIAGNOSIS — E11.9 TYPE 2 DIABETES MELLITUS WITHOUT COMPLICATION: ICD-10-CM

## 2020-11-03 ENCOUNTER — TELEPHONE (OUTPATIENT)
Dept: TRANSPLANT | Facility: CLINIC | Age: 60
End: 2020-11-03

## 2020-11-03 DIAGNOSIS — C22.0 HCC (HEPATOCELLULAR CARCINOMA): Primary | ICD-10-CM

## 2020-11-03 NOTE — TELEPHONE ENCOUNTER
Labs are stable. Please repeat labs 12/14 ----- Message from Fab Damon MD sent at 10/28/2020 10:32 AM CDT -----/  Results reviewed. Please advise labs are stable.

## 2020-11-04 ENCOUNTER — PATIENT MESSAGE (OUTPATIENT)
Dept: TRANSPLANT | Facility: CLINIC | Age: 60
End: 2020-11-04

## 2020-11-04 NOTE — PROGRESS NOTES
Dr. Carty and Dr. Martin @ bedside. Aware of all vitals, UOP, drain characteristics and current pt condition. 500 albumin ordered for CVP 3.    Michele Oliveira Khurram Momin

## 2020-11-19 ENCOUNTER — PATIENT MESSAGE (OUTPATIENT)
Dept: TRANSPLANT | Facility: CLINIC | Age: 60
End: 2020-11-19

## 2020-11-20 ENCOUNTER — PATIENT OUTREACH (OUTPATIENT)
Dept: ADMINISTRATIVE | Facility: HOSPITAL | Age: 60
End: 2020-11-20

## 2020-11-23 DIAGNOSIS — K44.9 HIATAL HERNIA: ICD-10-CM

## 2020-11-23 DIAGNOSIS — K21.9 GASTROESOPHAGEAL REFLUX DISEASE WITHOUT ESOPHAGITIS: ICD-10-CM

## 2020-11-23 DIAGNOSIS — K29.50 CHRONIC GASTRITIS WITHOUT BLEEDING, UNSPECIFIED GASTRITIS TYPE: ICD-10-CM

## 2020-11-23 RX ORDER — SUCRALFATE 1 G/1
1 TABLET ORAL
Qty: 270 TABLET | Refills: 0 | Status: SHIPPED | OUTPATIENT
Start: 2020-11-23 | End: 2021-02-21

## 2020-11-24 ENCOUNTER — LAB VISIT (OUTPATIENT)
Dept: LAB | Facility: HOSPITAL | Age: 60
End: 2020-11-24
Attending: FAMILY MEDICINE
Payer: COMMERCIAL

## 2020-11-24 ENCOUNTER — TELEPHONE (OUTPATIENT)
Dept: FAMILY MEDICINE | Facility: CLINIC | Age: 60
End: 2020-11-24

## 2020-11-24 DIAGNOSIS — E11.9 TYPE 2 DIABETES MELLITUS WITHOUT COMPLICATION: ICD-10-CM

## 2020-11-24 LAB
CHOLEST SERPL-MCNC: 155 MG/DL (ref 120–199)
CHOLEST/HDLC SERPL: 5 {RATIO} (ref 2–5)
ESTIMATED AVG GLUCOSE: 128 MG/DL (ref 68–131)
HBA1C MFR BLD HPLC: 6.1 % (ref 4–5.6)
HDLC SERPL-MCNC: 31 MG/DL (ref 40–75)
HDLC SERPL: 20 % (ref 20–50)
LDLC SERPL CALC-MCNC: 95 MG/DL (ref 63–159)
NONHDLC SERPL-MCNC: 124 MG/DL
TRIGL SERPL-MCNC: 145 MG/DL (ref 30–150)

## 2020-11-24 PROCEDURE — 83036 HEMOGLOBIN GLYCOSYLATED A1C: CPT

## 2020-11-24 PROCEDURE — 36415 COLL VENOUS BLD VENIPUNCTURE: CPT | Mod: PO

## 2020-11-24 PROCEDURE — 80061 LIPID PANEL: CPT

## 2020-11-24 NOTE — TELEPHONE ENCOUNTER
----- Message from Chrissy Haider sent at 11/24/2020  8:14 AM CST -----  Patient wife would like to get a call back to reschedule appointment    Please call [phone]

## 2020-12-01 ENCOUNTER — HOSPITAL ENCOUNTER (OUTPATIENT)
Dept: CARDIOLOGY | Facility: HOSPITAL | Age: 60
Discharge: HOME | End: 2020-12-01
Attending: INTERNAL MEDICINE
Payer: COMMERCIAL

## 2020-12-01 ENCOUNTER — PATIENT OUTREACH (OUTPATIENT)
Dept: ADMINISTRATIVE | Facility: OTHER | Age: 60
End: 2020-12-01

## 2020-12-01 VITALS — WEIGHT: 216 LBS | HEIGHT: 67 IN | BODY MASS INDEX: 33.9 KG/M2

## 2020-12-01 DIAGNOSIS — I48.0 PAROXYSMAL ATRIAL FIBRILLATION: ICD-10-CM

## 2020-12-01 DIAGNOSIS — I10 ESSENTIAL HYPERTENSION: ICD-10-CM

## 2020-12-01 DIAGNOSIS — Z79.01 LONG TERM (CURRENT) USE OF ANTICOAGULANTS: ICD-10-CM

## 2020-12-01 LAB
AORTIC ROOT ANNULUS: 3 CM
AORTIC VALVE CUSP SEPERATION: 1.9 CM
AV INDEX (PROSTH): 0.88
AV MEAN GRADIENT: 5 MMHG
AV PEAK GRADIENT: 8 MMHG
AV VALVE AREA: 2.83 CM2
AV VELOCITY RATIO: 0.78
BSA FOR ECHO PROCEDURE: 2.15 M2
CV ECHO LV RWT: 0.43 CM
DOP CALC AO PEAK VEL: 1.42 M/S
DOP CALC AO VTI: 29.35 CM
DOP CALC LVOT AREA: 3.2 CM2
DOP CALC LVOT DIAMETER: 2.02 CM
DOP CALC LVOT PEAK VEL: 1.11 M/S
DOP CALC LVOT STROKE VOLUME: 83.09 CM3
DOP CALCLVOT PEAK VEL VTI: 25.94 CM
E WAVE DECELERATION TIME: 220.23 MSEC
E/A RATIO: 0.76
E/E' RATIO: 8.89 M/S
ECHO LV POSTERIOR WALL: 0.9 CM (ref 0.6–1.1)
FRACTIONAL SHORTENING: 57 % (ref 28–44)
INTERVENTRICULAR SEPTUM: 0.9 CM (ref 0.6–1.1)
IVRT: 72.31 MSEC
LA MAJOR: 6.01 CM
LA MINOR: 4 CM
LA WIDTH: 3.3 CM
LEFT ATRIUM SIZE: 3.3 CM
LEFT ATRIUM VOLUME INDEX: 21.3 ML/M2
LEFT ATRIUM VOLUME: 44.46 CM3
LEFT INTERNAL DIMENSION IN SYSTOLE: 1.8 CM (ref 2.1–4)
LEFT VENTRICLE DIASTOLIC VOLUME INDEX: 36.5 ML/M2
LEFT VENTRICLE DIASTOLIC VOLUME: 76.26 ML
LEFT VENTRICLE MASS INDEX: 56 G/M2
LEFT VENTRICLE SYSTOLIC VOLUME INDEX: 19.8 ML/M2
LEFT VENTRICLE SYSTOLIC VOLUME: 41.33 ML
LEFT VENTRICULAR INTERNAL DIMENSION IN DIASTOLE: 4.15 CM (ref 3.5–6)
LEFT VENTRICULAR MASS: 116.39 G
LV LATERAL E/E' RATIO: 8 M/S
LV SEPTAL E/E' RATIO: 10 M/S
MV PEAK A VEL: 1.05 M/S
MV PEAK E VEL: 0.8 M/S
MV STENOSIS PRESSURE HALF TIME: 63.87 MS
MV VALVE AREA P 1/2 METHOD: 3.44 CM2
PISA TR MAX VEL: 1.49 M/S
PULM VEIN S/D RATIO: 1.92
PV PEAK D VEL: 0.26 M/S
PV PEAK S VEL: 0.5 M/S
PV PEAK VELOCITY: 1.36 CM/S
RA MAJOR: 4 CM
RA PRESSURE: 3 MMHG
RA WIDTH: 3.8 CM
RIGHT VENTRICULAR END-DIASTOLIC DIMENSION: 2.9 CM
RIGHT VENTRICULAR LENGTH IN DIASTOLE (APICAL 4-CHAMBER VIEW): 4.6 CM
TDI LATERAL: 0.1 M/S
TDI SEPTAL: 0.08 M/S
TDI: 0.09 M/S
TR MAX PG: 9 MMHG
TRICUSPID ANNULAR PLANE SYSTOLIC EXCURSION: 2.2 CM
TV REST PULMONARY ARTERY PRESSURE: 12 MMHG

## 2020-12-01 PROCEDURE — 93306 TTE W/DOPPLER COMPLETE: CPT | Mod: 26,,, | Performed by: INTERNAL MEDICINE

## 2020-12-01 PROCEDURE — 93306 TTE W/DOPPLER COMPLETE: CPT

## 2020-12-01 PROCEDURE — 93306 ECHO (CUPID ONLY): ICD-10-PCS | Mod: 26,,, | Performed by: INTERNAL MEDICINE

## 2020-12-01 NOTE — PROGRESS NOTES
Health Maintenance Due   Topic Date Due    Foot Exam  11/02/2019    Eye Exam  12/25/2019    PROSTATE-SPECIFIC ANTIGEN  05/09/2020    Urine Microalbumin  11/07/2020     Updates were requested from care everywhere.  Chart was reviewed for overdue [facility]active [facility] Encounters (BETTY) topics (CRS, Breast Cancer Screening, Eye exam)  Health Maintenance has been updated.

## 2020-12-02 DIAGNOSIS — Z94.4 LIVER REPLACED BY TRANSPLANT: ICD-10-CM

## 2020-12-02 NOTE — TELEPHONE ENCOUNTER
Fk dosage verifi----- Message from Blanca Campbell sent at 12/2/2020  1:37 PM CST -----  Regarding: New Rx  Contact: [facility]  [facility] for pt tacrolimus (PROGRAF) 1 MG Cap.        [facility] [place] Sanchez  [address] [place] [address]  Phone: [phone] Fax: [phone]

## 2020-12-03 ENCOUNTER — OFFICE VISIT (OUTPATIENT)
Dept: CARDIOLOGY | Facility: CLINIC | Age: 60
End: 2020-12-03
Payer: COMMERCIAL

## 2020-12-03 VITALS
WEIGHT: 217.81 LBS | HEIGHT: 67 IN | DIASTOLIC BLOOD PRESSURE: 80 MMHG | BODY MASS INDEX: 34.19 KG/M2 | SYSTOLIC BLOOD PRESSURE: 128 MMHG | HEART RATE: 85 BPM

## 2020-12-03 DIAGNOSIS — Z79.01 LONG TERM (CURRENT) USE OF ANTICOAGULANTS: Chronic | ICD-10-CM

## 2020-12-03 DIAGNOSIS — I49.8 OTHER SPECIFIED CARDIAC ARRHYTHMIAS: ICD-10-CM

## 2020-12-03 DIAGNOSIS — I10 ESSENTIAL HYPERTENSION: Chronic | ICD-10-CM

## 2020-12-03 DIAGNOSIS — I48.0 PAROXYSMAL ATRIAL FIBRILLATION: Primary | Chronic | ICD-10-CM

## 2020-12-03 DIAGNOSIS — G47.33 OSA (OBSTRUCTIVE SLEEP APNEA): Chronic | ICD-10-CM

## 2020-12-03 PROCEDURE — 93005 ELECTROCARDIOGRAM TRACING: CPT | Mod: S$GLB,,, | Performed by: INTERNAL MEDICINE

## 2020-12-03 PROCEDURE — 1126F PR PAIN SEVERITY QUANTIFIED, NO PAIN PRESENT: ICD-10-PCS | Mod: S$GLB,,, | Performed by: INTERNAL MEDICINE

## 2020-12-03 PROCEDURE — 99215 PR OFFICE/OUTPT VISIT, EST, LEVL V, 40-54 MIN: ICD-10-PCS | Mod: S$GLB,,, | Performed by: INTERNAL MEDICINE

## 2020-12-03 PROCEDURE — 3008F BODY MASS INDEX DOCD: CPT | Mod: CPTII,S$GLB,, | Performed by: INTERNAL MEDICINE

## 2020-12-03 PROCEDURE — 93005 RHYTHM STRIP: ICD-10-PCS | Mod: S$GLB,,, | Performed by: INTERNAL MEDICINE

## 2020-12-03 PROCEDURE — 99215 OFFICE O/P EST HI 40 MIN: CPT | Mod: S$GLB,,, | Performed by: INTERNAL MEDICINE

## 2020-12-03 PROCEDURE — 93010 RHYTHM STRIP: ICD-10-PCS | Mod: S$GLB,,, | Performed by: INTERNAL MEDICINE

## 2020-12-03 PROCEDURE — 99999 PR PBB SHADOW E&M-EST. PATIENT-LVL IV: CPT | Mod: PBBFAC,,, | Performed by: INTERNAL MEDICINE

## 2020-12-03 PROCEDURE — 3079F DIAST BP 80-89 MM HG: CPT | Mod: CPTII,S$GLB,, | Performed by: INTERNAL MEDICINE

## 2020-12-03 PROCEDURE — 3074F SYST BP LT 130 MM HG: CPT | Mod: CPTII,S$GLB,, | Performed by: INTERNAL MEDICINE

## 2020-12-03 PROCEDURE — 3008F PR BODY MASS INDEX (BMI) DOCUMENTED: ICD-10-PCS | Mod: CPTII,S$GLB,, | Performed by: INTERNAL MEDICINE

## 2020-12-03 PROCEDURE — 93010 ELECTROCARDIOGRAM REPORT: CPT | Mod: S$GLB,,, | Performed by: INTERNAL MEDICINE

## 2020-12-03 PROCEDURE — 1126F AMNT PAIN NOTED NONE PRSNT: CPT | Mod: S$GLB,,, | Performed by: INTERNAL MEDICINE

## 2020-12-03 PROCEDURE — 99999 PR PBB SHADOW E&M-EST. PATIENT-LVL IV: ICD-10-PCS | Mod: PBBFAC,,, | Performed by: INTERNAL MEDICINE

## 2020-12-03 PROCEDURE — 3079F PR MOST RECENT DIASTOLIC BLOOD PRESSURE 80-89 MM HG: ICD-10-PCS | Mod: CPTII,S$GLB,, | Performed by: INTERNAL MEDICINE

## 2020-12-03 PROCEDURE — 3074F PR MOST RECENT SYSTOLIC BLOOD PRESSURE < 130 MM HG: ICD-10-PCS | Mod: CPTII,S$GLB,, | Performed by: INTERNAL MEDICINE

## 2020-12-03 RX ORDER — TACROLIMUS 1 MG/1
1 CAPSULE ORAL EVERY 12 HOURS
Qty: 60 CAPSULE | Refills: 11 | Status: SHIPPED | OUTPATIENT
Start: 2020-12-03 | End: 2021-03-19 | Stop reason: DRUGHIGH

## 2020-12-03 NOTE — PROGRESS NOTES
Subjective:    Patient ID:  Roman Hodges is a 60 y.o. male who presents for evaluation of No chief complaint on file.      Atrial Fibrillation  Symptoms are negative for chest pain, dizziness, palpitations, shortness of breath, syncope and weakness. Past medical history includes atrial fibrillation.      60 y.o. M  HTN on meds  DM2 on meds  MALLIKA, on CPAP  Hepatocellular CA, s/p OLT (5/2019)    During several recent visits to ER, found in AF with RVR 11/29. Rx diltiazem, with return to NSR.  Also has c/o severe epigastric pain, diarrhea. Pain increases with exertion but is also present at rest.  echo during that eval: 30% LVEF, with RWMAs... on background of LVEF >55% 10/15/17 (Dr. Patterson's office).    At first visit, pt c/o ongoing CP; sent to ER. Subsequent cath showed nonobstructive CAD. Echo thereafter showed LVEF back to 55%.  Has struggled with HTN urgencies. Medical regimen improved; now .  No palpitations c/w prior AF episode.    He's been feeling well. Recovering nicely from OLT. He feels great.    My interpretation of today's ECG is NSR 71. IVCD.      Review of Systems   Constitution: Negative. Negative for malaise/fatigue.   HENT: Negative.  Negative for ear pain and tinnitus.    Eyes: Negative for blurred vision.   Cardiovascular: Negative.  Negative for chest pain, dyspnea on exertion, near-syncope, palpitations and syncope.   Respiratory: Negative.  Negative for shortness of breath.    Endocrine: Negative.  Negative for polyuria.   Hematologic/Lymphatic: Does not bruise/bleed easily.   Skin: Negative.  Negative for rash.   Musculoskeletal: Negative.  Negative for joint pain and muscle weakness.   Gastrointestinal: Negative.  Negative for abdominal pain and change in bowel habit.   Genitourinary: Negative for frequency.   Neurological: Negative.  Negative for dizziness and weakness.   Psychiatric/Behavioral: Negative.  Negative for depression. The patient is not nervous/anxious.     Allergic/Immunologic: Negative for environmental allergies.        Objective:    Physical Exam   Constitutional: He is oriented to person, place, and time. He appears well-developed and well-nourished.   HENT:   Head: Normocephalic and atraumatic.   Eyes: Conjunctivae, EOM and lids are normal. No scleral icterus.   Neck: Normal range of motion. No JVD present. No tracheal deviation present. No thyromegaly present.   Cardiovascular: Normal rate and regular rhythm.   Pulses:       Radial pulses are 2+ on the right side and 2+ on the left side.   Pulmonary/Chest: Effort normal and breath sounds normal. No accessory muscle usage. No tachypnea. No respiratory distress. He has no wheezes.   Abdominal: He exhibits no distension. There is no hepatosplenomegaly.   Musculoskeletal: Normal range of motion.         General: No edema.   Neurological: He is alert and oriented to person, place, and time. He has normal reflexes. He exhibits normal muscle tone.   Skin: Skin is warm and dry. No rash noted.   Psychiatric: He has a normal mood and affect. His behavior is normal.   Nursing note and vitals reviewed.        Assessment:       1. Paroxysmal atrial fibrillation    2. Essential hypertension    3. Long term (current) use of anticoagulants    4. MALLIKA (obstructive sleep apnea)    Epigastric pain, r/o UA     Plan:       1. AF  One episode, resolved post diltiazem.  Continue BB, eliquis.    Pt would like to d/c NOAC if possible. Will place ILR, and if no AF seen after extended monitoring, can consider d/c NOAC.  I discussed with the patient the risks and benefits of ILR placement. Our discussion of risks included (but was not limited to) the possibility of infection, bleeding, medication reaction, scar.  I discussed with patient risks, indications, benefits, and alternatives of the planned procedure. All questions were answered. Patient understands and wishes to proceed.  No Eliquis x 2 days pre-ILR-placement.

## 2020-12-08 ENCOUNTER — PATIENT MESSAGE (OUTPATIENT)
Dept: TRANSPLANT | Facility: CLINIC | Age: 60
End: 2020-12-08

## 2020-12-14 ENCOUNTER — LAB VISIT (OUTPATIENT)
Dept: LAB | Facility: HOSPITAL | Age: 60
End: 2020-12-14
Attending: INTERNAL MEDICINE
Payer: COMMERCIAL

## 2020-12-14 ENCOUNTER — OFFICE VISIT (OUTPATIENT)
Dept: FAMILY MEDICINE | Facility: CLINIC | Age: 60
End: 2020-12-14
Payer: COMMERCIAL

## 2020-12-14 VITALS
SYSTOLIC BLOOD PRESSURE: 130 MMHG | TEMPERATURE: 98 F | HEIGHT: 67 IN | BODY MASS INDEX: 34.67 KG/M2 | HEART RATE: 70 BPM | WEIGHT: 220.88 LBS | DIASTOLIC BLOOD PRESSURE: 90 MMHG | OXYGEN SATURATION: 95 %

## 2020-12-14 DIAGNOSIS — I10 ESSENTIAL HYPERTENSION: ICD-10-CM

## 2020-12-14 DIAGNOSIS — E66.9 OBESITY (BMI 30.0-34.9): ICD-10-CM

## 2020-12-14 DIAGNOSIS — E11.65 TYPE 2 DIABETES MELLITUS WITH HYPERGLYCEMIA, WITHOUT LONG-TERM CURRENT USE OF INSULIN: ICD-10-CM

## 2020-12-14 DIAGNOSIS — Z00.00 ANNUAL PHYSICAL EXAM: Primary | ICD-10-CM

## 2020-12-14 DIAGNOSIS — H92.01 CHRONIC RIGHT EAR PAIN: ICD-10-CM

## 2020-12-14 DIAGNOSIS — Z94.4 LIVER REPLACED BY TRANSPLANT: ICD-10-CM

## 2020-12-14 DIAGNOSIS — H61.22 EXCESSIVE CERUMEN IN EAR CANAL, LEFT: ICD-10-CM

## 2020-12-14 DIAGNOSIS — G89.29 CHRONIC RIGHT EAR PAIN: ICD-10-CM

## 2020-12-14 DIAGNOSIS — Z94.4 S/P LIVER TRANSPLANT: ICD-10-CM

## 2020-12-14 DIAGNOSIS — G47.33 OSA (OBSTRUCTIVE SLEEP APNEA): ICD-10-CM

## 2020-12-14 LAB
AFP SERPL-MCNC: 1.7 NG/ML (ref 0–8.4)
ALBUMIN SERPL BCP-MCNC: 4.1 G/DL (ref 3.5–5.2)
ALP SERPL-CCNC: 77 U/L (ref 55–135)
ALT SERPL W/O P-5'-P-CCNC: 42 U/L (ref 10–44)
ANION GAP SERPL CALC-SCNC: 9 MMOL/L (ref 8–16)
AST SERPL-CCNC: 20 U/L (ref 10–40)
BASOPHILS # BLD AUTO: 0.04 K/UL (ref 0–0.2)
BASOPHILS NFR BLD: 0.6 % (ref 0–1.9)
BILIRUB SERPL-MCNC: 0.5 MG/DL (ref 0.1–1)
BUN SERPL-MCNC: 16 MG/DL (ref 8–23)
CALCIUM SERPL-MCNC: 9.1 MG/DL (ref 8.7–10.5)
CHLORIDE SERPL-SCNC: 104 MMOL/L (ref 95–110)
CO2 SERPL-SCNC: 29 MMOL/L (ref 23–29)
CREAT SERPL-MCNC: 1.2 MG/DL (ref 0.5–1.4)
DIFFERENTIAL METHOD: ABNORMAL
EOSINOPHIL # BLD AUTO: 0.1 K/UL (ref 0–0.5)
EOSINOPHIL NFR BLD: 1.8 % (ref 0–8)
ERYTHROCYTE [DISTWIDTH] IN BLOOD BY AUTOMATED COUNT: 15.4 % (ref 11.5–14.5)
EST. GFR  (AFRICAN AMERICAN): >60 ML/MIN/1.73 M^2
EST. GFR  (NON AFRICAN AMERICAN): >60 ML/MIN/1.73 M^2
GLUCOSE SERPL-MCNC: 112 MG/DL (ref 70–110)
HCT VFR BLD AUTO: 42.3 % (ref 40–54)
HGB BLD-MCNC: 13.2 G/DL (ref 14–18)
IMM GRANULOCYTES # BLD AUTO: 0.04 K/UL (ref 0–0.04)
IMM GRANULOCYTES NFR BLD AUTO: 0.6 % (ref 0–0.5)
LYMPHOCYTES # BLD AUTO: 1.4 K/UL (ref 1–4.8)
LYMPHOCYTES NFR BLD: 19.3 % (ref 18–48)
MCH RBC QN AUTO: 24.4 PG (ref 27–31)
MCHC RBC AUTO-ENTMCNC: 31.2 G/DL (ref 32–36)
MCV RBC AUTO: 78 FL (ref 82–98)
MONOCYTES # BLD AUTO: 0.7 K/UL (ref 0.3–1)
MONOCYTES NFR BLD: 9.1 % (ref 4–15)
NEUTROPHILS # BLD AUTO: 4.9 K/UL (ref 1.8–7.7)
NEUTROPHILS NFR BLD: 68.6 % (ref 38–73)
NRBC BLD-RTO: 0 /100 WBC
PLATELET # BLD AUTO: 315 K/UL (ref 150–350)
PMV BLD AUTO: 10.3 FL (ref 9.2–12.9)
POTASSIUM SERPL-SCNC: 4.1 MMOL/L (ref 3.5–5.1)
PROT SERPL-MCNC: 7.4 G/DL (ref 6–8.4)
RBC # BLD AUTO: 5.4 M/UL (ref 4.6–6.2)
SODIUM SERPL-SCNC: 142 MMOL/L (ref 136–145)
WBC # BLD AUTO: 7.11 K/UL (ref 3.9–12.7)

## 2020-12-14 PROCEDURE — 80197 ASSAY OF TACROLIMUS: CPT

## 2020-12-14 PROCEDURE — 3044F PR MOST RECENT HEMOGLOBIN A1C LEVEL <7.0%: ICD-10-PCS | Mod: CPTII,S$GLB,, | Performed by: NURSE PRACTITIONER

## 2020-12-14 PROCEDURE — 85025 COMPLETE CBC W/AUTO DIFF WBC: CPT

## 2020-12-14 PROCEDURE — 82105 ALPHA-FETOPROTEIN SERUM: CPT

## 2020-12-14 PROCEDURE — 3075F PR MOST RECENT SYSTOLIC BLOOD PRESS GE 130-139MM HG: ICD-10-PCS | Mod: CPTII,S$GLB,, | Performed by: NURSE PRACTITIONER

## 2020-12-14 PROCEDURE — 99214 OFFICE O/P EST MOD 30 MIN: CPT | Mod: S$GLB,,, | Performed by: NURSE PRACTITIONER

## 2020-12-14 PROCEDURE — 3075F SYST BP GE 130 - 139MM HG: CPT | Mod: CPTII,S$GLB,, | Performed by: NURSE PRACTITIONER

## 2020-12-14 PROCEDURE — 3080F PR MOST RECENT DIASTOLIC BLOOD PRESSURE >= 90 MM HG: ICD-10-PCS | Mod: CPTII,S$GLB,, | Performed by: NURSE PRACTITIONER

## 2020-12-14 PROCEDURE — 3080F DIAST BP >= 90 MM HG: CPT | Mod: CPTII,S$GLB,, | Performed by: NURSE PRACTITIONER

## 2020-12-14 PROCEDURE — 99999 PR PBB SHADOW E&M-EST. PATIENT-LVL V: ICD-10-PCS | Mod: PBBFAC,,, | Performed by: NURSE PRACTITIONER

## 2020-12-14 PROCEDURE — 3008F PR BODY MASS INDEX (BMI) DOCUMENTED: ICD-10-PCS | Mod: CPTII,S$GLB,, | Performed by: NURSE PRACTITIONER

## 2020-12-14 PROCEDURE — 36415 COLL VENOUS BLD VENIPUNCTURE: CPT | Mod: PO

## 2020-12-14 PROCEDURE — 99999 PR PBB SHADOW E&M-EST. PATIENT-LVL V: CPT | Mod: PBBFAC,,, | Performed by: NURSE PRACTITIONER

## 2020-12-14 PROCEDURE — 3008F BODY MASS INDEX DOCD: CPT | Mod: CPTII,S$GLB,, | Performed by: NURSE PRACTITIONER

## 2020-12-14 PROCEDURE — 99214 PR OFFICE/OUTPT VISIT, EST, LEVL IV, 30-39 MIN: ICD-10-PCS | Mod: S$GLB,,, | Performed by: NURSE PRACTITIONER

## 2020-12-14 PROCEDURE — 80053 COMPREHEN METABOLIC PANEL: CPT

## 2020-12-14 PROCEDURE — 1126F AMNT PAIN NOTED NONE PRSNT: CPT | Mod: S$GLB,,, | Performed by: NURSE PRACTITIONER

## 2020-12-14 PROCEDURE — 1126F PR PAIN SEVERITY QUANTIFIED, NO PAIN PRESENT: ICD-10-PCS | Mod: S$GLB,,, | Performed by: NURSE PRACTITIONER

## 2020-12-14 PROCEDURE — 3044F HG A1C LEVEL LT 7.0%: CPT | Mod: CPTII,S$GLB,, | Performed by: NURSE PRACTITIONER

## 2020-12-14 NOTE — PROGRESS NOTES
Subjective:       Patient ID: Roman Hodges is a 60 y.o. male.    Chief Complaint: Hypertension and Diabetes    This is a 61 yo  male patient of Dr. Angel who is new to me. He presents today for an annual physical exam. PMH includes    Patient Active Problem List:     MALLIKA (obstructive sleep apnea)     Obesity (BMI 30.0-34.9)     Atrial fibrillation     Essential hypertension     Type 2 diabetes mellitus, without long-term current use of insulin     GERD (gastroesophageal reflux disease)     Open angle with borderline findings and low 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 eyes     Long term (current) use of anticoagulants     S/P liver transplant     Prophylactic immunotherapy     Adrenal cortical steroids causing adverse effect in therapeutic use     Hypophosphatemia     Long-term use of immunosuppressant medication     Paroxysmal atrial fibrillation     Pneumonia due to COVID-19 virus     Lymphopenia     Hyponatremia     Hypokalemia     Hypomagnesemia    BP elevated today, about the same with recheck. Denies chest pain, SOB, dyspnea, palpitations, visual changes, or N/V/D. Compliant with medication regimen. Says he lost his job with the [employer] d/t COVID-19 and has been home without work for a while. Admits that his diet has not been good and he has not done as much exercise as he should. When he can, he walks for 20-40 minutes a few days per week. Has gained weight recently. Drinks plenty water daily. Does not drink alcohol or smoke. Only drinks decaf coffee. Followed by Cardiology with last visit on 12/3/2020, and Hepatology with last visit on 8/27/2020. Patient reports he was diagnosed with Bell's Palsy in 2009 and has suffered with mild hearing loss and right ear pain since. Feels these symptoms worsen with cold weather and wants to be evaluated for this again. No other issues or complaints today.    Review of Systems   Constitutional: Negative for appetite change, chills, fatigue and fever.   HENT: Positive  for ear pain (chronic) and hearing loss (chronic). Negative for ear discharge, sore throat and trouble swallowing.    Eyes: Negative for visual disturbance.   Respiratory: Negative for cough, chest tightness and shortness of breath.    Cardiovascular: Negative for chest pain, palpitations and leg swelling.   Gastrointestinal: Negative for abdominal pain, constipation, diarrhea, nausea and vomiting.   Endocrine: Negative for polydipsia, polyphagia and polyuria.   Genitourinary: Negative for difficulty urinating.   Musculoskeletal: Negative for gait problem.   Neurological: Negative for dizziness, weakness, numbness, headaches, disturbances in coordination and coordination difficulties.   Psychiatric/Behavioral: The patient is not nervous/anxious.          Objective:      Physical Exam  Vitals signs reviewed.   Constitutional:       General: He is awake. He is not in acute distress.     Appearance: Normal appearance. He is well-developed and well-groomed. He is obese.   HENT:      Head: Normocephalic and atraumatic.      Right Ear: Tympanic membrane, ear canal and external ear normal. Decreased hearing noted. No drainage, swelling or tenderness. Tympanic membrane is not erythematous or bulging.      Left Ear: Hearing and external ear normal.      Ears:      Comments: Excessive cerumen noted to left ear canal, ear wash ordered     Nose: Nose normal.      Mouth/Throat:      Mouth: Mucous membranes are moist.      Pharynx: Oropharynx is clear. Uvula midline. No oropharyngeal exudate or posterior oropharyngeal erythema.   Eyes:      General: Lids are normal.         Right eye: No discharge.         Left eye: No discharge.      Extraocular Movements: Extraocular movements intact.      Pupils: Pupils are equal, round, and reactive to light.      Comments: Wearing glasses   Neck:      Musculoskeletal: Full passive range of motion without pain and normal range of motion.      Vascular: No carotid bruit.      Trachea: Trachea  normal.   Cardiovascular:      Rate and Rhythm: Normal rate and regular rhythm.      Pulses: Normal pulses.           Carotid pulses are 2+ on the right side and 2+ on the left side.       Radial pulses are 2+ on the right side and 2+ on the left side.        Dorsalis pedis pulses are 2+ on the right side and 2+ on the left side.      Heart sounds: Normal heart sounds, S1 normal and S2 normal. No murmur.   Pulmonary:      Effort: Pulmonary effort is normal. No respiratory distress.      Breath sounds: Normal breath sounds. No wheezing or rhonchi.   Abdominal:      General: Bowel sounds are normal. There is no distension.      Palpations: Abdomen is soft.      Tenderness: There is no abdominal tenderness.   Musculoskeletal:      Right lower leg: No edema.      Left lower leg: No edema.   Skin:     General: Skin is warm and dry.      Capillary Refill: Capillary refill takes less than 2 seconds.      Coloration: Skin is not pale.   Neurological:      Mental Status: He is alert and oriented to person, place, and time.      Coordination: Coordination normal.      Gait: Gait normal.      Deep Tendon Reflexes: Reflexes are normal and symmetric.   Psychiatric:         Mood and Affect: Mood normal.         Speech: Speech normal.         Behavior: Behavior normal. Behavior is cooperative.         Thought Content: Thought content normal.         Assessment:       1. Annual physical exam    2. Essential hypertension    3. Type 2 diabetes mellitus with hyperglycemia, without long-term current use of insulin    4. MALLIKA (obstructive sleep apnea)    5. S/P liver transplant    6. Excessive cerumen in ear canal, left    7. Chronic right ear pain    8. Obesity (BMI 30.0-34.9)        Plan:       Roman was seen today for hypertension and diabetes.    Diagnoses and all orders for this visit:    Annual physical exam    Essential hypertension    Type 2 diabetes mellitus with hyperglycemia, without long-term current use of insulin    MALLIKA  (obstructive sleep apnea)    S/P liver transplant    Excessive cerumen in ear canal, left  -     Ear wax removal    Chronic right ear pain  -     Ambulatory referral/consult to ENT; Future    Obesity (BMI 30.0-34.9)          - Reviewed recent labs, other labs still in process  - ENT exam unremarkable; Follow up with ENT for ear problem  - Encouraged patient to continue to eat a low salt/low fat ADA diet and discussed importance of engaging in physical activity at least 5x/week for a minimum of 30 min/day  - Warning signs discussed  - RTC in 2 weeks for a BP check, and again in 3 months for a HTN follow-up, or sooner if needed

## 2020-12-15 LAB — TACROLIMUS BLD-MCNC: 6.1 NG/ML (ref 5–15)

## 2020-12-16 ENCOUNTER — TELEPHONE (OUTPATIENT)
Dept: OTOLARYNGOLOGY | Facility: CLINIC | Age: 60
End: 2020-12-16

## 2020-12-16 NOTE — TELEPHONE ENCOUNTER
Contacted patient to get him added with audiology prior to his appointment with ENT. Offered patient an appointment for 11am which is first available on the day he was scheduled. Patients wife opted for another day where it would be closer together and is now scheduled for 1/6 with both departments. Patients wife thanked me for calling.

## 2020-12-18 ENCOUNTER — TELEPHONE (OUTPATIENT)
Dept: TRANSPLANT | Facility: CLINIC | Age: 60
End: 2020-12-18

## 2020-12-18 ENCOUNTER — PATIENT MESSAGE (OUTPATIENT)
Dept: FAMILY MEDICINE | Facility: CLINIC | Age: 60
End: 2020-12-18

## 2020-12-18 ENCOUNTER — PATIENT MESSAGE (OUTPATIENT)
Dept: CARDIOLOGY | Facility: CLINIC | Age: 60
End: 2020-12-18

## 2020-12-18 DIAGNOSIS — Z94.4 LIVER REPLACED BY TRANSPLANT: Primary | ICD-10-CM

## 2020-12-18 NOTE — TELEPHONE ENCOUNTER
Labs are stable. No changes Message sent.----- Message from Fab Damon MD sent at 12/15/2020  7:48 AM CST -----  Results reviewed. Please advise labs are stable.

## 2020-12-20 ENCOUNTER — PATIENT MESSAGE (OUTPATIENT)
Dept: ELECTROPHYSIOLOGY | Facility: CLINIC | Age: 60
End: 2020-12-20

## 2020-12-20 DIAGNOSIS — I48.0 PAROXYSMAL ATRIAL FIBRILLATION: Primary | Chronic | ICD-10-CM

## 2020-12-24 ENCOUNTER — TELEPHONE (OUTPATIENT)
Dept: FAMILY MEDICINE | Facility: CLINIC | Age: 60
End: 2020-12-24

## 2020-12-24 ENCOUNTER — TELEPHONE (OUTPATIENT)
Dept: ELECTROPHYSIOLOGY | Facility: CLINIC | Age: 60
End: 2020-12-24

## 2020-12-24 NOTE — TELEPHONE ENCOUNTER
Spoke to Mrs. Hodges, patient's wife    CONFIRMED procedure arrival time of 12PM    Reiterated instructions including:  -Directions to check in desk  -Pre-procedure LABS n/a  -COVID test n/a  -Confirmed no fever, cough, or shortness of breath in the past 30 days  -Bathe night prior and morning prior to procedure with Hibiclens solution or an antibacterial soap  -Reviewed current visitor policy    I let her know that we are still waiting on insurance approval and that we would call them on Monday morning if his procedure was still not approved and we need to reschedule.     Patient verbalizes understanding of above and appreciates call.

## 2020-12-24 NOTE — TELEPHONE ENCOUNTER
----- Message from Victoria Alvarado sent at 12/24/2020 10:23 AM CST -----  Regarding: reschedule  Type:  Patient Returning Call    Who Called:patient   Who Left Message for Patient:n/a  Does the patient know what this is regarding?:reschedule injection appt   Would the patient rather a call back or a response via [facility]? Call back   Best Call Back Number:[phone]  Additional Information: n/a

## 2020-12-27 ENCOUNTER — PATIENT MESSAGE (OUTPATIENT)
Dept: CARDIOLOGY | Facility: CLINIC | Age: 60
End: 2020-12-27

## 2020-12-28 ENCOUNTER — PATIENT MESSAGE (OUTPATIENT)
Dept: FAMILY MEDICINE | Facility: CLINIC | Age: 60
End: 2020-12-28

## 2020-12-28 ENCOUNTER — TELEPHONE (OUTPATIENT)
Dept: ELECTROPHYSIOLOGY | Facility: CLINIC | Age: 60
End: 2020-12-28

## 2020-12-28 ENCOUNTER — PATIENT MESSAGE (OUTPATIENT)
Dept: ELECTROPHYSIOLOGY | Facility: CLINIC | Age: 60
End: 2020-12-28

## 2020-12-28 ENCOUNTER — PATIENT MESSAGE (OUTPATIENT)
Dept: CARDIOLOGY | Facility: CLINIC | Age: 60
End: 2020-12-28

## 2020-12-28 NOTE — TELEPHONE ENCOUNTER
Pt advised.     ----- Message from Lang Sargent MD sent at 12/28/2020  5:09 PM CST -----  That's too bad.  Well, we just continue anticoagulation then. Patient and I will discuss pros/cons of ongoing anticoagulation at each visit.  ----- Message -----  From: Khadijah Chowdary RN  Sent: 12/28/2020   2:57 PM CST  To: Lang Sargent MD    Signa denied loop recorder. Will only be approved for syncope or prior cva/tia's. I will notify pt.but he will ask what is next??

## 2020-12-28 NOTE — TELEPHONE ENCOUNTER
Pt notified insurance did not approve loop procedure for today. Will have NP do peer to peer and reschedule once approved.    Peer to peer completed. Insurance . She was advised that a loop would only be approved for Syncope or prior history of CVA/TIA's.      Called pt to advise and message sent to Dr Sargent for further recommendations.

## 2020-12-29 ENCOUNTER — HOSPITAL ENCOUNTER (OUTPATIENT)
Dept: RADIOLOGY | Facility: HOSPITAL | Age: 60
Discharge: HOME | End: 2020-12-29
Attending: INTERNAL MEDICINE
Payer: COMMERCIAL

## 2020-12-29 DIAGNOSIS — C22.0 HCC (HEPATOCELLULAR CARCINOMA): ICD-10-CM

## 2020-12-29 PROCEDURE — 74177 CT CHEST ABDOMEN PELVIS WITH CONTRAST (XPD): ICD-10-PCS | Mod: 26,,, | Performed by: RADIOLOGY

## 2020-12-29 PROCEDURE — 74177 CT ABD & PELVIS W/CONTRAST: CPT | Mod: 26,,, | Performed by: RADIOLOGY

## 2020-12-29 PROCEDURE — 25500020 PHARM REV CODE 255: Performed by: INTERNAL MEDICINE

## 2020-12-29 PROCEDURE — 74177 CT ABD & PELVIS W/CONTRAST: CPT | Mod: TC

## 2020-12-29 PROCEDURE — 71260 CT CHEST ABDOMEN PELVIS WITH CONTRAST (XPD): ICD-10-PCS | Mod: 26,,, | Performed by: RADIOLOGY

## 2020-12-29 PROCEDURE — 71260 CT THORAX DX C+: CPT | Mod: 26,,, | Performed by: RADIOLOGY

## 2020-12-29 RX ADMIN — IOHEXOL 100 ML: 350 INJECTION, SOLUTION INTRAVENOUS at 02:12

## 2021-01-06 ENCOUNTER — OFFICE VISIT (OUTPATIENT)
Dept: OTOLARYNGOLOGY | Facility: CLINIC | Age: 61
End: 2021-01-06
Payer: COMMERCIAL

## 2021-01-06 ENCOUNTER — CLINICAL SUPPORT (OUTPATIENT)
Dept: OTOLARYNGOLOGY | Facility: CLINIC | Age: 61
End: 2021-01-06
Payer: COMMERCIAL

## 2021-01-06 ENCOUNTER — PATIENT MESSAGE (OUTPATIENT)
Dept: TRANSPLANT | Facility: CLINIC | Age: 61
End: 2021-01-06

## 2021-01-06 VITALS
WEIGHT: 219.44 LBS | SYSTOLIC BLOOD PRESSURE: 149 MMHG | HEART RATE: 70 BPM | HEIGHT: 67 IN | BODY MASS INDEX: 34.44 KG/M2 | DIASTOLIC BLOOD PRESSURE: 92 MMHG

## 2021-01-06 DIAGNOSIS — H90.3 ASYMMETRIC SNHL (SENSORINEURAL HEARING LOSS): Primary | ICD-10-CM

## 2021-01-06 DIAGNOSIS — H93.11 TINNITUS, RIGHT: ICD-10-CM

## 2021-01-06 PROCEDURE — 1126F AMNT PAIN NOTED NONE PRSNT: CPT | Mod: S$GLB,,, | Performed by: OTOLARYNGOLOGY

## 2021-01-06 PROCEDURE — 99204 OFFICE O/P NEW MOD 45 MIN: CPT | Mod: S$GLB,,, | Performed by: OTOLARYNGOLOGY

## 2021-01-06 PROCEDURE — 92557 PR COMPREHENSIVE HEARING TEST: ICD-10-PCS | Mod: S$GLB,,, | Performed by: AUDIOLOGIST-HEARING AID FITTER

## 2021-01-06 PROCEDURE — 1126F PR PAIN SEVERITY QUANTIFIED, NO PAIN PRESENT: ICD-10-PCS | Mod: S$GLB,,, | Performed by: OTOLARYNGOLOGY

## 2021-01-06 PROCEDURE — 3008F BODY MASS INDEX DOCD: CPT | Mod: CPTII,S$GLB,, | Performed by: OTOLARYNGOLOGY

## 2021-01-06 PROCEDURE — 99999 PR PBB SHADOW E&M-EST. PATIENT-LVL IV: CPT | Mod: PBBFAC,,, | Performed by: OTOLARYNGOLOGY

## 2021-01-06 PROCEDURE — 3080F PR MOST RECENT DIASTOLIC BLOOD PRESSURE >= 90 MM HG: ICD-10-PCS | Mod: CPTII,S$GLB,, | Performed by: OTOLARYNGOLOGY

## 2021-01-06 PROCEDURE — 99204 PR OFFICE/OUTPT VISIT, NEW, LEVL IV, 45-59 MIN: ICD-10-PCS | Mod: S$GLB,,, | Performed by: OTOLARYNGOLOGY

## 2021-01-06 PROCEDURE — 3077F PR MOST RECENT SYSTOLIC BLOOD PRESSURE >= 140 MM HG: ICD-10-PCS | Mod: CPTII,S$GLB,, | Performed by: OTOLARYNGOLOGY

## 2021-01-06 PROCEDURE — 99999 PR PBB SHADOW E&M-EST. PATIENT-LVL IV: ICD-10-PCS | Mod: PBBFAC,,, | Performed by: OTOLARYNGOLOGY

## 2021-01-06 PROCEDURE — 3077F SYST BP >= 140 MM HG: CPT | Mod: CPTII,S$GLB,, | Performed by: OTOLARYNGOLOGY

## 2021-01-06 PROCEDURE — 99999 PR PBB SHADOW E&M-EST. PATIENT-LVL I: ICD-10-PCS | Mod: PBBFAC,,, | Performed by: AUDIOLOGIST-HEARING AID FITTER

## 2021-01-06 PROCEDURE — 92557 COMPREHENSIVE HEARING TEST: CPT | Mod: S$GLB,,, | Performed by: AUDIOLOGIST-HEARING AID FITTER

## 2021-01-06 PROCEDURE — 3008F PR BODY MASS INDEX (BMI) DOCUMENTED: ICD-10-PCS | Mod: CPTII,S$GLB,, | Performed by: OTOLARYNGOLOGY

## 2021-01-06 PROCEDURE — 3080F DIAST BP >= 90 MM HG: CPT | Mod: CPTII,S$GLB,, | Performed by: OTOLARYNGOLOGY

## 2021-01-06 PROCEDURE — 92567 PR TYMPA2METRY: ICD-10-PCS | Mod: S$GLB,,, | Performed by: AUDIOLOGIST-HEARING AID FITTER

## 2021-01-06 PROCEDURE — 92567 TYMPANOMETRY: CPT | Mod: S$GLB,,, | Performed by: AUDIOLOGIST-HEARING AID FITTER

## 2021-01-06 PROCEDURE — 99999 PR PBB SHADOW E&M-EST. PATIENT-LVL I: CPT | Mod: PBBFAC,,, | Performed by: AUDIOLOGIST-HEARING AID FITTER

## 2021-01-08 ENCOUNTER — PATIENT MESSAGE (OUTPATIENT)
Dept: OTOLARYNGOLOGY | Facility: CLINIC | Age: 61
End: 2021-01-08

## 2021-01-08 ENCOUNTER — PATIENT MESSAGE (OUTPATIENT)
Dept: TRANSPLANT | Facility: CLINIC | Age: 61
End: 2021-01-08

## 2021-01-13 RX ORDER — APIXABAN 5 MG/1
TABLET, FILM COATED ORAL
Qty: 60 TABLET | Refills: 0 | Status: SHIPPED | OUTPATIENT
Start: 2021-01-13 | End: 2021-02-08

## 2021-01-15 ENCOUNTER — TELEPHONE (OUTPATIENT)
Dept: TRANSPLANT | Facility: CLINIC | Age: 61
End: 2021-01-15

## 2021-01-15 ENCOUNTER — PATIENT MESSAGE (OUTPATIENT)
Dept: TRANSPLANT | Facility: CLINIC | Age: 61
End: 2021-01-15

## 2021-01-20 DIAGNOSIS — E11.9 TYPE 2 DIABETES MELLITUS WITHOUT COMPLICATION: ICD-10-CM

## 2021-01-25 ENCOUNTER — HOSPITAL ENCOUNTER (OUTPATIENT)
Dept: RADIOLOGY | Facility: HOSPITAL | Age: 61
Discharge: HOME | End: 2021-01-25
Attending: OTOLARYNGOLOGY
Payer: COMMERCIAL

## 2021-01-25 ENCOUNTER — PATIENT MESSAGE (OUTPATIENT)
Dept: TRANSPLANT | Facility: CLINIC | Age: 61
End: 2021-01-25

## 2021-01-25 DIAGNOSIS — H90.3 ASYMMETRIC SNHL (SENSORINEURAL HEARING LOSS): ICD-10-CM

## 2021-01-25 DIAGNOSIS — H93.11 TINNITUS, RIGHT: ICD-10-CM

## 2021-01-25 LAB
CREAT SERPL-MCNC: 1.4 MG/DL (ref 0.5–1.4)
SAMPLE: NORMAL

## 2021-01-25 PROCEDURE — A9585 GADOBUTROL INJECTION: HCPCS | Performed by: OTOLARYNGOLOGY

## 2021-01-25 PROCEDURE — 70553 MRI BRAIN STEM W/O & W/DYE: CPT | Mod: 26,,, | Performed by: RADIOLOGY

## 2021-01-25 PROCEDURE — 70553 MRI BRAIN STEM W/O & W/DYE: CPT | Mod: TC

## 2021-01-25 PROCEDURE — 70553 MRI IAC/TEMPORAL BONES W W/O CONTRAST: ICD-10-PCS | Mod: 26,,, | Performed by: RADIOLOGY

## 2021-01-25 PROCEDURE — 25500020 PHARM REV CODE 255: Performed by: OTOLARYNGOLOGY

## 2021-01-25 RX ORDER — GADOBUTROL 604.72 MG/ML
10 INJECTION INTRAVENOUS
Status: COMPLETED | OUTPATIENT
Start: 2021-01-25 | End: 2021-01-25

## 2021-01-25 RX ADMIN — GADOBUTROL 10 ML: 604.72 INJECTION INTRAVENOUS at 06:01

## 2021-02-01 ENCOUNTER — LAB VISIT (OUTPATIENT)
Dept: LAB | Facility: HOSPITAL | Age: 61
End: 2021-02-01
Attending: INTERNAL MEDICINE
Payer: COMMERCIAL

## 2021-02-01 DIAGNOSIS — Z94.4 LIVER REPLACED BY TRANSPLANT: ICD-10-CM

## 2021-02-01 LAB
BASOPHILS # BLD AUTO: 0.03 K/UL (ref 0–0.2)
BASOPHILS NFR BLD: 0.5 % (ref 0–1.9)
DIFFERENTIAL METHOD: ABNORMAL
EOSINOPHIL # BLD AUTO: 0.2 K/UL (ref 0–0.5)
EOSINOPHIL NFR BLD: 2.7 % (ref 0–8)
ERYTHROCYTE [DISTWIDTH] IN BLOOD BY AUTOMATED COUNT: 15.9 % (ref 11.5–14.5)
HCT VFR BLD AUTO: 43 % (ref 40–54)
HGB BLD-MCNC: 13.4 G/DL (ref 14–18)
IMM GRANULOCYTES # BLD AUTO: 0.04 K/UL (ref 0–0.04)
IMM GRANULOCYTES NFR BLD AUTO: 0.6 % (ref 0–0.5)
LYMPHOCYTES # BLD AUTO: 1.3 K/UL (ref 1–4.8)
LYMPHOCYTES NFR BLD: 20.1 % (ref 18–48)
MCH RBC QN AUTO: 24.5 PG (ref 27–31)
MCHC RBC AUTO-ENTMCNC: 31.2 G/DL (ref 32–36)
MCV RBC AUTO: 79 FL (ref 82–98)
MONOCYTES # BLD AUTO: 0.5 K/UL (ref 0.3–1)
MONOCYTES NFR BLD: 8.3 % (ref 4–15)
NEUTROPHILS # BLD AUTO: 4.3 K/UL (ref 1.8–7.7)
NEUTROPHILS NFR BLD: 67.8 % (ref 38–73)
NRBC BLD-RTO: 0 /100 WBC
PLATELET # BLD AUTO: 304 K/UL (ref 150–350)
PMV BLD AUTO: 10.2 FL (ref 9.2–12.9)
RBC # BLD AUTO: 5.46 M/UL (ref 4.6–6.2)
WBC # BLD AUTO: 6.28 K/UL (ref 3.9–12.7)

## 2021-02-01 PROCEDURE — 80053 COMPREHEN METABOLIC PANEL: CPT

## 2021-02-01 PROCEDURE — 85025 COMPLETE CBC W/AUTO DIFF WBC: CPT

## 2021-02-01 PROCEDURE — 80197 ASSAY OF TACROLIMUS: CPT

## 2021-02-01 PROCEDURE — 36415 COLL VENOUS BLD VENIPUNCTURE: CPT | Mod: PO

## 2021-02-02 ENCOUNTER — PATIENT MESSAGE (OUTPATIENT)
Dept: TRANSPLANT | Facility: CLINIC | Age: 61
End: 2021-02-02

## 2021-02-02 LAB
ALBUMIN SERPL BCP-MCNC: 4.5 G/DL (ref 3.5–5.2)
ALP SERPL-CCNC: 79 U/L (ref 55–135)
ALT SERPL W/O P-5'-P-CCNC: 28 U/L (ref 10–44)
ANION GAP SERPL CALC-SCNC: 11 MMOL/L (ref 8–16)
AST SERPL-CCNC: 28 U/L (ref 10–40)
BILIRUB SERPL-MCNC: 0.6 MG/DL (ref 0.1–1)
BUN SERPL-MCNC: 15 MG/DL (ref 8–23)
CALCIUM SERPL-MCNC: 8.7 MG/DL (ref 8.7–10.5)
CHLORIDE SERPL-SCNC: 104 MMOL/L (ref 95–110)
CO2 SERPL-SCNC: 26 MMOL/L (ref 23–29)
CREAT SERPL-MCNC: 1.1 MG/DL (ref 0.5–1.4)
EST. GFR  (AFRICAN AMERICAN): >60 ML/MIN/1.73 M^2
EST. GFR  (NON AFRICAN AMERICAN): >60 ML/MIN/1.73 M^2
GLUCOSE SERPL-MCNC: 115 MG/DL (ref 70–110)
POTASSIUM SERPL-SCNC: 3.9 MMOL/L (ref 3.5–5.1)
PROT SERPL-MCNC: 7.3 G/DL (ref 6–8.4)
SODIUM SERPL-SCNC: 141 MMOL/L (ref 136–145)
TACROLIMUS BLD-MCNC: 5.2 NG/ML (ref 5–15)

## 2021-02-05 ENCOUNTER — TELEPHONE (OUTPATIENT)
Dept: TRANSPLANT | Facility: CLINIC | Age: 61
End: 2021-02-05

## 2021-02-17 ENCOUNTER — NURSE TRIAGE (OUTPATIENT)
Dept: ADMINISTRATIVE | Facility: CLINIC | Age: 61
End: 2021-02-17

## 2021-02-17 ENCOUNTER — PATIENT MESSAGE (OUTPATIENT)
Dept: TRANSPLANT | Facility: CLINIC | Age: 61
End: 2021-02-17

## 2021-02-17 RX ORDER — MYCOPHENOLATE MOFETIL 250 MG/1
CAPSULE ORAL
Qty: 240 CAPSULE | Refills: 2 | Status: SHIPPED | OUTPATIENT
Start: 2021-02-17 | End: 2021-02-18 | Stop reason: SDUPTHER

## 2021-02-18 RX ORDER — MYCOPHENOLATE MOFETIL 250 MG/1
CAPSULE ORAL
Qty: 240 CAPSULE | Refills: 0 | Status: SHIPPED | OUTPATIENT
Start: 2021-02-18 | End: 2021-04-15 | Stop reason: SDUPTHER

## 2021-03-02 ENCOUNTER — OFFICE VISIT (OUTPATIENT)
Dept: CARDIOLOGY | Facility: CLINIC | Age: 61
End: 2021-03-02
Payer: COMMERCIAL

## 2021-03-02 ENCOUNTER — PATIENT MESSAGE (OUTPATIENT)
Dept: OTOLARYNGOLOGY | Facility: CLINIC | Age: 61
End: 2021-03-02

## 2021-03-02 VITALS
DIASTOLIC BLOOD PRESSURE: 96 MMHG | BODY MASS INDEX: 33.84 KG/M2 | HEART RATE: 65 BPM | OXYGEN SATURATION: 97 % | SYSTOLIC BLOOD PRESSURE: 146 MMHG | WEIGHT: 216.06 LBS

## 2021-03-02 DIAGNOSIS — E66.9 OBESITY (BMI 30.0-34.9): ICD-10-CM

## 2021-03-02 DIAGNOSIS — E11.9 TYPE 2 DIABETES MELLITUS WITHOUT COMPLICATION, WITHOUT LONG-TERM CURRENT USE OF INSULIN: Chronic | ICD-10-CM

## 2021-03-02 DIAGNOSIS — I48.0 PAROXYSMAL ATRIAL FIBRILLATION: Primary | ICD-10-CM

## 2021-03-02 DIAGNOSIS — K21.9 GASTROESOPHAGEAL REFLUX DISEASE WITHOUT ESOPHAGITIS: Chronic | ICD-10-CM

## 2021-03-02 DIAGNOSIS — Z79.01 LONG TERM (CURRENT) USE OF ANTICOAGULANTS: Chronic | ICD-10-CM

## 2021-03-02 DIAGNOSIS — I10 ESSENTIAL HYPERTENSION: Chronic | ICD-10-CM

## 2021-03-02 DIAGNOSIS — G47.33 OSA (OBSTRUCTIVE SLEEP APNEA): Chronic | ICD-10-CM

## 2021-03-02 DIAGNOSIS — Z94.4 S/P LIVER TRANSPLANT: Chronic | ICD-10-CM

## 2021-03-02 DIAGNOSIS — Z79.60 LONG-TERM USE OF IMMUNOSUPPRESSANT MEDICATION: ICD-10-CM

## 2021-03-02 DIAGNOSIS — Z29.89 PROPHYLACTIC IMMUNOTHERAPY: Chronic | ICD-10-CM

## 2021-03-02 PROCEDURE — 1126F PR PAIN SEVERITY QUANTIFIED, NO PAIN PRESENT: ICD-10-PCS | Mod: S$GLB,,, | Performed by: INTERNAL MEDICINE

## 2021-03-02 PROCEDURE — 3074F SYST BP LT 130 MM HG: CPT | Mod: CPTII,S$GLB,, | Performed by: INTERNAL MEDICINE

## 2021-03-02 PROCEDURE — 99214 PR OFFICE/OUTPT VISIT, EST, LEVL IV, 30-39 MIN: ICD-10-PCS | Mod: S$GLB,,, | Performed by: INTERNAL MEDICINE

## 2021-03-02 PROCEDURE — 99999 PR PBB SHADOW E&M-EST. PATIENT-LVL IV: ICD-10-PCS | Mod: PBBFAC,,, | Performed by: INTERNAL MEDICINE

## 2021-03-02 PROCEDURE — 3080F PR MOST RECENT DIASTOLIC BLOOD PRESSURE >= 90 MM HG: ICD-10-PCS | Mod: CPTII,S$GLB,, | Performed by: INTERNAL MEDICINE

## 2021-03-02 PROCEDURE — 1126F AMNT PAIN NOTED NONE PRSNT: CPT | Mod: S$GLB,,, | Performed by: INTERNAL MEDICINE

## 2021-03-02 PROCEDURE — 3074F PR MOST RECENT SYSTOLIC BLOOD PRESSURE < 130 MM HG: ICD-10-PCS | Mod: CPTII,S$GLB,, | Performed by: INTERNAL MEDICINE

## 2021-03-02 PROCEDURE — 99999 PR PBB SHADOW E&M-EST. PATIENT-LVL IV: CPT | Mod: PBBFAC,,, | Performed by: INTERNAL MEDICINE

## 2021-03-02 PROCEDURE — 3044F PR MOST RECENT HEMOGLOBIN A1C LEVEL <7.0%: ICD-10-PCS | Mod: CPTII,S$GLB,, | Performed by: INTERNAL MEDICINE

## 2021-03-02 PROCEDURE — 99214 OFFICE O/P EST MOD 30 MIN: CPT | Mod: S$GLB,,, | Performed by: INTERNAL MEDICINE

## 2021-03-02 PROCEDURE — 3008F PR BODY MASS INDEX (BMI) DOCUMENTED: ICD-10-PCS | Mod: CPTII,S$GLB,, | Performed by: INTERNAL MEDICINE

## 2021-03-02 PROCEDURE — 3008F BODY MASS INDEX DOCD: CPT | Mod: CPTII,S$GLB,, | Performed by: INTERNAL MEDICINE

## 2021-03-02 PROCEDURE — 3044F HG A1C LEVEL LT 7.0%: CPT | Mod: CPTII,S$GLB,, | Performed by: INTERNAL MEDICINE

## 2021-03-02 PROCEDURE — 3080F DIAST BP >= 90 MM HG: CPT | Mod: CPTII,S$GLB,, | Performed by: INTERNAL MEDICINE

## 2021-03-08 ENCOUNTER — LAB VISIT (OUTPATIENT)
Dept: LAB | Facility: HOSPITAL | Age: 61
End: 2021-03-08
Attending: INTERNAL MEDICINE
Payer: COMMERCIAL

## 2021-03-08 DIAGNOSIS — Z94.4 LIVER REPLACED BY TRANSPLANT: ICD-10-CM

## 2021-03-08 LAB
ALBUMIN SERPL BCP-MCNC: 4 G/DL (ref 3.5–5.2)
ALP SERPL-CCNC: 83 U/L (ref 55–135)
ALT SERPL W/O P-5'-P-CCNC: 51 U/L (ref 10–44)
ANION GAP SERPL CALC-SCNC: 13 MMOL/L (ref 8–16)
AST SERPL-CCNC: 17 U/L (ref 10–40)
BASOPHILS # BLD AUTO: 0.03 K/UL (ref 0–0.2)
BASOPHILS NFR BLD: 0.5 % (ref 0–1.9)
BILIRUB SERPL-MCNC: 0.5 MG/DL (ref 0.1–1)
BUN SERPL-MCNC: 13 MG/DL (ref 8–23)
CALCIUM SERPL-MCNC: 9.1 MG/DL (ref 8.7–10.5)
CHLORIDE SERPL-SCNC: 104 MMOL/L (ref 95–110)
CO2 SERPL-SCNC: 23 MMOL/L (ref 23–29)
CREAT SERPL-MCNC: 1.1 MG/DL (ref 0.5–1.4)
DIFFERENTIAL METHOD: ABNORMAL
EOSINOPHIL # BLD AUTO: 0.3 K/UL (ref 0–0.5)
EOSINOPHIL NFR BLD: 4.5 % (ref 0–8)
ERYTHROCYTE [DISTWIDTH] IN BLOOD BY AUTOMATED COUNT: 16.3 % (ref 11.5–14.5)
EST. GFR  (AFRICAN AMERICAN): >60 ML/MIN/1.73 M^2
EST. GFR  (NON AFRICAN AMERICAN): >60 ML/MIN/1.73 M^2
GLUCOSE SERPL-MCNC: 113 MG/DL (ref 70–110)
HCT VFR BLD AUTO: 43.5 % (ref 40–54)
HGB BLD-MCNC: 13.7 G/DL (ref 14–18)
IMM GRANULOCYTES # BLD AUTO: 0.03 K/UL (ref 0–0.04)
IMM GRANULOCYTES NFR BLD AUTO: 0.5 % (ref 0–0.5)
LYMPHOCYTES # BLD AUTO: 1 K/UL (ref 1–4.8)
LYMPHOCYTES NFR BLD: 15.9 % (ref 18–48)
MCH RBC QN AUTO: 24.9 PG (ref 27–31)
MCHC RBC AUTO-ENTMCNC: 31.5 G/DL (ref 32–36)
MCV RBC AUTO: 79 FL (ref 82–98)
MONOCYTES # BLD AUTO: 0.7 K/UL (ref 0.3–1)
MONOCYTES NFR BLD: 11.6 % (ref 4–15)
NEUTROPHILS # BLD AUTO: 4.2 K/UL (ref 1.8–7.7)
NEUTROPHILS NFR BLD: 67 % (ref 38–73)
NRBC BLD-RTO: 0 /100 WBC
PLATELET # BLD AUTO: 264 K/UL (ref 150–350)
PMV BLD AUTO: 10.2 FL (ref 9.2–12.9)
POTASSIUM SERPL-SCNC: 3.6 MMOL/L (ref 3.5–5.1)
PROT SERPL-MCNC: 7.1 G/DL (ref 6–8.4)
RBC # BLD AUTO: 5.51 M/UL (ref 4.6–6.2)
SODIUM SERPL-SCNC: 140 MMOL/L (ref 136–145)
WBC # BLD AUTO: 6.22 K/UL (ref 3.9–12.7)

## 2021-03-08 PROCEDURE — 85025 COMPLETE CBC W/AUTO DIFF WBC: CPT | Performed by: INTERNAL MEDICINE

## 2021-03-08 PROCEDURE — 36415 COLL VENOUS BLD VENIPUNCTURE: CPT | Mod: PO | Performed by: INTERNAL MEDICINE

## 2021-03-08 PROCEDURE — 80197 ASSAY OF TACROLIMUS: CPT | Performed by: INTERNAL MEDICINE

## 2021-03-08 PROCEDURE — 80053 COMPREHEN METABOLIC PANEL: CPT | Performed by: INTERNAL MEDICINE

## 2021-03-09 LAB — TACROLIMUS BLD-MCNC: 9.4 NG/ML (ref 5–15)

## 2021-03-10 ENCOUNTER — TELEPHONE (OUTPATIENT)
Dept: TRANSPLANT | Facility: CLINIC | Age: 61
End: 2021-03-10

## 2021-03-15 ENCOUNTER — OFFICE VISIT (OUTPATIENT)
Dept: FAMILY MEDICINE | Facility: CLINIC | Age: 61
End: 2021-03-15
Payer: COMMERCIAL

## 2021-03-15 ENCOUNTER — LAB VISIT (OUTPATIENT)
Dept: LAB | Facility: HOSPITAL | Age: 61
End: 2021-03-15
Attending: FAMILY MEDICINE
Payer: COMMERCIAL

## 2021-03-15 VITALS
DIASTOLIC BLOOD PRESSURE: 88 MMHG | TEMPERATURE: 97 F | SYSTOLIC BLOOD PRESSURE: 136 MMHG | HEIGHT: 67 IN | RESPIRATION RATE: 18 BRPM | HEART RATE: 72 BPM | BODY MASS INDEX: 34.22 KG/M2 | WEIGHT: 218.06 LBS | OXYGEN SATURATION: 98 %

## 2021-03-15 DIAGNOSIS — E11.65 TYPE 2 DIABETES MELLITUS WITH HYPERGLYCEMIA, WITHOUT LONG-TERM CURRENT USE OF INSULIN: ICD-10-CM

## 2021-03-15 DIAGNOSIS — I10 ESSENTIAL HYPERTENSION: ICD-10-CM

## 2021-03-15 DIAGNOSIS — Z12.5 SCREENING FOR PROSTATE CANCER: ICD-10-CM

## 2021-03-15 DIAGNOSIS — I10 ESSENTIAL HYPERTENSION: Primary | ICD-10-CM

## 2021-03-15 LAB
CHOLEST SERPL-MCNC: 135 MG/DL (ref 120–199)
CHOLEST/HDLC SERPL: 5 {RATIO} (ref 2–5)
COMPLEXED PSA SERPL-MCNC: 0.74 NG/ML (ref 0–4)
HDLC SERPL-MCNC: 27 MG/DL (ref 40–75)
HDLC SERPL: 20 % (ref 20–50)
LDLC SERPL CALC-MCNC: 70.2 MG/DL (ref 63–159)
NONHDLC SERPL-MCNC: 108 MG/DL
TRIGL SERPL-MCNC: 189 MG/DL (ref 30–150)

## 2021-03-15 PROCEDURE — 3044F PR MOST RECENT HEMOGLOBIN A1C LEVEL <7.0%: ICD-10-PCS | Mod: CPTII,S$GLB,, | Performed by: FAMILY MEDICINE

## 2021-03-15 PROCEDURE — 3079F DIAST BP 80-89 MM HG: CPT | Mod: CPTII,S$GLB,, | Performed by: FAMILY MEDICINE

## 2021-03-15 PROCEDURE — 3008F BODY MASS INDEX DOCD: CPT | Mod: CPTII,S$GLB,, | Performed by: FAMILY MEDICINE

## 2021-03-15 PROCEDURE — 3075F SYST BP GE 130 - 139MM HG: CPT | Mod: CPTII,S$GLB,, | Performed by: FAMILY MEDICINE

## 2021-03-15 PROCEDURE — 3008F PR BODY MASS INDEX (BMI) DOCUMENTED: ICD-10-PCS | Mod: CPTII,S$GLB,, | Performed by: FAMILY MEDICINE

## 2021-03-15 PROCEDURE — 3079F PR MOST RECENT DIASTOLIC BLOOD PRESSURE 80-89 MM HG: ICD-10-PCS | Mod: CPTII,S$GLB,, | Performed by: FAMILY MEDICINE

## 2021-03-15 PROCEDURE — 80061 LIPID PANEL: CPT | Performed by: FAMILY MEDICINE

## 2021-03-15 PROCEDURE — 84153 ASSAY OF PSA TOTAL: CPT | Performed by: FAMILY MEDICINE

## 2021-03-15 PROCEDURE — 83036 HEMOGLOBIN GLYCOSYLATED A1C: CPT | Performed by: FAMILY MEDICINE

## 2021-03-15 PROCEDURE — 1126F AMNT PAIN NOTED NONE PRSNT: CPT | Mod: S$GLB,,, | Performed by: FAMILY MEDICINE

## 2021-03-15 PROCEDURE — 36415 COLL VENOUS BLD VENIPUNCTURE: CPT | Mod: PO | Performed by: FAMILY MEDICINE

## 2021-03-15 PROCEDURE — 1126F PR PAIN SEVERITY QUANTIFIED, NO PAIN PRESENT: ICD-10-PCS | Mod: S$GLB,,, | Performed by: FAMILY MEDICINE

## 2021-03-15 PROCEDURE — 99214 OFFICE O/P EST MOD 30 MIN: CPT | Mod: S$GLB,,, | Performed by: FAMILY MEDICINE

## 2021-03-15 PROCEDURE — 3044F HG A1C LEVEL LT 7.0%: CPT | Mod: CPTII,S$GLB,, | Performed by: FAMILY MEDICINE

## 2021-03-15 PROCEDURE — 99999 PR PBB SHADOW E&M-EST. PATIENT-LVL IV: CPT | Mod: PBBFAC,,, | Performed by: FAMILY MEDICINE

## 2021-03-15 PROCEDURE — 3075F PR MOST RECENT SYSTOLIC BLOOD PRESS GE 130-139MM HG: ICD-10-PCS | Mod: CPTII,S$GLB,, | Performed by: FAMILY MEDICINE

## 2021-03-15 PROCEDURE — 99214 PR OFFICE/OUTPT VISIT, EST, LEVL IV, 30-39 MIN: ICD-10-PCS | Mod: S$GLB,,, | Performed by: FAMILY MEDICINE

## 2021-03-15 PROCEDURE — 99999 PR PBB SHADOW E&M-EST. PATIENT-LVL IV: ICD-10-PCS | Mod: PBBFAC,,, | Performed by: FAMILY MEDICINE

## 2021-03-16 LAB
ESTIMATED AVG GLUCOSE: 126 MG/DL (ref 68–131)
HBA1C MFR BLD: 6 % (ref 4–5.6)

## 2021-03-17 ENCOUNTER — TELEPHONE (OUTPATIENT)
Dept: OTOLARYNGOLOGY | Facility: CLINIC | Age: 61
End: 2021-03-17

## 2021-03-19 ENCOUNTER — PATIENT MESSAGE (OUTPATIENT)
Dept: FAMILY MEDICINE | Facility: CLINIC | Age: 61
End: 2021-03-19

## 2021-03-19 DIAGNOSIS — Z94.4 LIVER REPLACED BY TRANSPLANT: Primary | ICD-10-CM

## 2021-03-19 DIAGNOSIS — Z94.4 LIVER REPLACED BY TRANSPLANT: ICD-10-CM

## 2021-03-19 DIAGNOSIS — C22.0 HCC (HEPATOCELLULAR CARCINOMA): ICD-10-CM

## 2021-03-19 RX ORDER — TACROLIMUS 0.5 MG/1
CAPSULE ORAL
Qty: 90 CAPSULE | Refills: 11 | Status: SHIPPED | OUTPATIENT
Start: 2021-03-19 | End: 2022-03-31

## 2021-03-24 ENCOUNTER — TELEPHONE (OUTPATIENT)
Dept: OTOLARYNGOLOGY | Facility: CLINIC | Age: 61
End: 2021-03-24

## 2021-04-01 ENCOUNTER — PATIENT MESSAGE (OUTPATIENT)
Dept: TRANSPLANT | Facility: CLINIC | Age: 61
End: 2021-04-01

## 2021-04-01 ENCOUNTER — CLINICAL SUPPORT (OUTPATIENT)
Dept: OTOLARYNGOLOGY | Facility: CLINIC | Age: 61
End: 2021-04-01
Payer: COMMERCIAL

## 2021-04-01 DIAGNOSIS — H90.3 ASYMMETRIC SNHL (SENSORINEURAL HEARING LOSS): Primary | ICD-10-CM

## 2021-04-01 PROCEDURE — 99499 NO LOS: ICD-10-PCS | Mod: S$GLB,,, | Performed by: AUDIOLOGIST-HEARING AID FITTER

## 2021-04-01 PROCEDURE — 99499 UNLISTED E&M SERVICE: CPT | Mod: S$GLB,,, | Performed by: AUDIOLOGIST-HEARING AID FITTER

## 2021-04-03 ENCOUNTER — LAB VISIT (OUTPATIENT)
Dept: LAB | Facility: HOSPITAL | Age: 61
End: 2021-04-03
Attending: INTERNAL MEDICINE
Payer: COMMERCIAL

## 2021-04-03 DIAGNOSIS — C22.0 HCC (HEPATOCELLULAR CARCINOMA): ICD-10-CM

## 2021-04-03 DIAGNOSIS — Z94.4 LIVER REPLACED BY TRANSPLANT: ICD-10-CM

## 2021-04-03 LAB
AFP SERPL-MCNC: 1.8 NG/ML (ref 0–8.4)
ALBUMIN SERPL BCP-MCNC: 4.2 G/DL (ref 3.5–5.2)
ALP SERPL-CCNC: 69 U/L (ref 55–135)
ALT SERPL W/O P-5'-P-CCNC: 23 U/L (ref 10–44)
ANION GAP SERPL CALC-SCNC: 10 MMOL/L (ref 8–16)
AST SERPL-CCNC: 18 U/L (ref 10–40)
BASOPHILS # BLD AUTO: 0.03 K/UL (ref 0–0.2)
BASOPHILS NFR BLD: 0.5 % (ref 0–1.9)
BILIRUB SERPL-MCNC: 0.6 MG/DL (ref 0.1–1)
BUN SERPL-MCNC: 21 MG/DL (ref 8–23)
CALCIUM SERPL-MCNC: 8.7 MG/DL (ref 8.7–10.5)
CHLORIDE SERPL-SCNC: 105 MMOL/L (ref 95–110)
CO2 SERPL-SCNC: 27 MMOL/L (ref 23–29)
CREAT SERPL-MCNC: 1.3 MG/DL (ref 0.5–1.4)
DIFFERENTIAL METHOD: ABNORMAL
EOSINOPHIL # BLD AUTO: 0.2 K/UL (ref 0–0.5)
EOSINOPHIL NFR BLD: 2.5 % (ref 0–8)
ERYTHROCYTE [DISTWIDTH] IN BLOOD BY AUTOMATED COUNT: 15.9 % (ref 11.5–14.5)
EST. GFR  (AFRICAN AMERICAN): >60 ML/MIN/1.73 M^2
EST. GFR  (NON AFRICAN AMERICAN): 58.5 ML/MIN/1.73 M^2
GLUCOSE SERPL-MCNC: 113 MG/DL (ref 70–110)
HCT VFR BLD AUTO: 41.4 % (ref 40–54)
HGB BLD-MCNC: 13.2 G/DL (ref 14–18)
IMM GRANULOCYTES # BLD AUTO: 0.03 K/UL (ref 0–0.04)
IMM GRANULOCYTES NFR BLD AUTO: 0.5 % (ref 0–0.5)
LYMPHOCYTES # BLD AUTO: 1.3 K/UL (ref 1–4.8)
LYMPHOCYTES NFR BLD: 21.7 % (ref 18–48)
MCH RBC QN AUTO: 24.8 PG (ref 27–31)
MCHC RBC AUTO-ENTMCNC: 31.9 G/DL (ref 32–36)
MCV RBC AUTO: 78 FL (ref 82–98)
MONOCYTES # BLD AUTO: 0.6 K/UL (ref 0.3–1)
MONOCYTES NFR BLD: 10.2 % (ref 4–15)
NEUTROPHILS # BLD AUTO: 3.9 K/UL (ref 1.8–7.7)
NEUTROPHILS NFR BLD: 64.6 % (ref 38–73)
NRBC BLD-RTO: 0 /100 WBC
PLATELET # BLD AUTO: 284 K/UL (ref 150–450)
PMV BLD AUTO: 9.5 FL (ref 9.2–12.9)
POTASSIUM SERPL-SCNC: 4.1 MMOL/L (ref 3.5–5.1)
PROT SERPL-MCNC: 7.3 G/DL (ref 6–8.4)
RBC # BLD AUTO: 5.33 M/UL (ref 4.6–6.2)
SODIUM SERPL-SCNC: 142 MMOL/L (ref 136–145)
TACROLIMUS BLD-MCNC: 3.7 NG/ML (ref 5–15)
WBC # BLD AUTO: 6.05 K/UL (ref 3.9–12.7)

## 2021-04-03 PROCEDURE — 80053 COMPREHEN METABOLIC PANEL: CPT | Performed by: INTERNAL MEDICINE

## 2021-04-03 PROCEDURE — 36415 COLL VENOUS BLD VENIPUNCTURE: CPT | Performed by: INTERNAL MEDICINE

## 2021-04-03 PROCEDURE — 85025 COMPLETE CBC W/AUTO DIFF WBC: CPT | Performed by: INTERNAL MEDICINE

## 2021-04-03 PROCEDURE — 80197 ASSAY OF TACROLIMUS: CPT | Performed by: INTERNAL MEDICINE

## 2021-04-03 PROCEDURE — 82105 ALPHA-FETOPROTEIN SERUM: CPT | Performed by: INTERNAL MEDICINE

## 2021-04-05 ENCOUNTER — TELEPHONE (OUTPATIENT)
Dept: TRANSPLANT | Facility: CLINIC | Age: 61
End: 2021-04-05

## 2021-04-08 ENCOUNTER — TELEPHONE (OUTPATIENT)
Dept: OTOLARYNGOLOGY | Facility: CLINIC | Age: 61
End: 2021-04-08

## 2021-04-16 ENCOUNTER — PATIENT MESSAGE (OUTPATIENT)
Dept: RESEARCH | Facility: HOSPITAL | Age: 61
End: 2021-04-16

## 2021-04-16 ENCOUNTER — PATIENT MESSAGE (OUTPATIENT)
Dept: TRANSPLANT | Facility: CLINIC | Age: 61
End: 2021-04-16

## 2021-04-16 RX ORDER — MYCOPHENOLATE MOFETIL 250 MG/1
CAPSULE ORAL
Qty: 240 CAPSULE | Refills: 11 | Status: SHIPPED | OUTPATIENT
Start: 2021-04-16 | End: 2022-03-23

## 2021-04-28 ENCOUNTER — TELEPHONE (OUTPATIENT)
Dept: OTOLARYNGOLOGY | Facility: CLINIC | Age: 61
End: 2021-04-28

## 2021-04-29 ENCOUNTER — PATIENT MESSAGE (OUTPATIENT)
Dept: OTOLARYNGOLOGY | Facility: CLINIC | Age: 61
End: 2021-04-29

## 2021-05-06 ENCOUNTER — PATIENT OUTREACH (OUTPATIENT)
Dept: ADMINISTRATIVE | Facility: HOSPITAL | Age: 61
End: 2021-05-06

## 2021-05-12 ENCOUNTER — PATIENT OUTREACH (OUTPATIENT)
Dept: ADMINISTRATIVE | Facility: HOSPITAL | Age: 61
End: 2021-05-12

## 2021-05-12 ENCOUNTER — PATIENT MESSAGE (OUTPATIENT)
Dept: ADMINISTRATIVE | Facility: HOSPITAL | Age: 61
End: 2021-05-12

## 2021-05-24 DIAGNOSIS — C22.0 HCC (HEPATOCELLULAR CARCINOMA): ICD-10-CM

## 2021-05-24 DIAGNOSIS — Z00.6 RESEARCH STUDY PATIENT: Primary | ICD-10-CM

## 2021-05-27 ENCOUNTER — PATIENT MESSAGE (OUTPATIENT)
Dept: TRANSPLANT | Facility: CLINIC | Age: 61
End: 2021-05-27

## 2021-05-27 ENCOUNTER — PATIENT MESSAGE (OUTPATIENT)
Dept: ADMINISTRATIVE | Facility: HOSPITAL | Age: 61
End: 2021-05-27

## 2021-06-01 ENCOUNTER — LAB VISIT (OUTPATIENT)
Dept: LAB | Facility: HOSPITAL | Age: 61
End: 2021-06-01
Attending: INTERNAL MEDICINE
Payer: COMMERCIAL

## 2021-06-01 DIAGNOSIS — Z94.4 LIVER REPLACED BY TRANSPLANT: ICD-10-CM

## 2021-06-01 LAB
ALBUMIN SERPL BCP-MCNC: 4 G/DL (ref 3.5–5.2)
ALP SERPL-CCNC: 76 U/L (ref 55–135)
ALT SERPL W/O P-5'-P-CCNC: 41 U/L (ref 10–44)
ANION GAP SERPL CALC-SCNC: 14 MMOL/L (ref 8–16)
AST SERPL-CCNC: 31 U/L (ref 10–40)
BASOPHILS # BLD AUTO: 0.04 K/UL (ref 0–0.2)
BASOPHILS NFR BLD: 0.5 % (ref 0–1.9)
BILIRUB SERPL-MCNC: 0.5 MG/DL (ref 0.1–1)
BUN SERPL-MCNC: 16 MG/DL (ref 8–23)
CALCIUM SERPL-MCNC: 9.2 MG/DL (ref 8.7–10.5)
CHLORIDE SERPL-SCNC: 104 MMOL/L (ref 95–110)
CO2 SERPL-SCNC: 23 MMOL/L (ref 23–29)
CREAT SERPL-MCNC: 1.2 MG/DL (ref 0.5–1.4)
DIFFERENTIAL METHOD: ABNORMAL
EOSINOPHIL # BLD AUTO: 0.2 K/UL (ref 0–0.5)
EOSINOPHIL NFR BLD: 1.9 % (ref 0–8)
ERYTHROCYTE [DISTWIDTH] IN BLOOD BY AUTOMATED COUNT: 16.3 % (ref 11.5–14.5)
EST. GFR  (AFRICAN AMERICAN): >60 ML/MIN/1.73 M^2
EST. GFR  (NON AFRICAN AMERICAN): >60 ML/MIN/1.73 M^2
GLUCOSE SERPL-MCNC: 114 MG/DL (ref 70–110)
HCT VFR BLD AUTO: 42.4 % (ref 40–54)
HGB BLD-MCNC: 13.6 G/DL (ref 14–18)
IMM GRANULOCYTES # BLD AUTO: 0.03 K/UL (ref 0–0.04)
IMM GRANULOCYTES NFR BLD AUTO: 0.4 % (ref 0–0.5)
LYMPHOCYTES # BLD AUTO: 1.4 K/UL (ref 1–4.8)
LYMPHOCYTES NFR BLD: 17.1 % (ref 18–48)
MCH RBC QN AUTO: 24.8 PG (ref 27–31)
MCHC RBC AUTO-ENTMCNC: 32.1 G/DL (ref 32–36)
MCV RBC AUTO: 77 FL (ref 82–98)
MONOCYTES # BLD AUTO: 0.6 K/UL (ref 0.3–1)
MONOCYTES NFR BLD: 7.8 % (ref 4–15)
NEUTROPHILS # BLD AUTO: 6 K/UL (ref 1.8–7.7)
NEUTROPHILS NFR BLD: 72.3 % (ref 38–73)
NRBC BLD-RTO: 0 /100 WBC
PLATELET # BLD AUTO: 320 K/UL (ref 150–450)
PMV BLD AUTO: 9.8 FL (ref 9.2–12.9)
POTASSIUM SERPL-SCNC: 3.6 MMOL/L (ref 3.5–5.1)
PROT SERPL-MCNC: 7.2 G/DL (ref 6–8.4)
RBC # BLD AUTO: 5.49 M/UL (ref 4.6–6.2)
SODIUM SERPL-SCNC: 141 MMOL/L (ref 136–145)
WBC # BLD AUTO: 8.25 K/UL (ref 3.9–12.7)

## 2021-06-01 PROCEDURE — 36415 COLL VENOUS BLD VENIPUNCTURE: CPT | Mod: PO | Performed by: INTERNAL MEDICINE

## 2021-06-01 PROCEDURE — 80053 COMPREHEN METABOLIC PANEL: CPT | Performed by: INTERNAL MEDICINE

## 2021-06-01 PROCEDURE — 80197 ASSAY OF TACROLIMUS: CPT | Performed by: INTERNAL MEDICINE

## 2021-06-01 PROCEDURE — 85025 COMPLETE CBC W/AUTO DIFF WBC: CPT | Performed by: INTERNAL MEDICINE

## 2021-06-02 ENCOUNTER — TELEPHONE (OUTPATIENT)
Dept: TRANSPLANT | Facility: CLINIC | Age: 61
End: 2021-06-02

## 2021-06-02 ENCOUNTER — PATIENT MESSAGE (OUTPATIENT)
Dept: TRANSPLANT | Facility: CLINIC | Age: 61
End: 2021-06-02

## 2021-06-02 LAB
TACROLIMUS BLD-MCNC: 6.4 NG/ML (ref 5–15)
TACROLIMUS, NORMALIZED: 5.8 NG/ML (ref 5–15)

## 2021-06-03 ENCOUNTER — TELEPHONE (OUTPATIENT)
Dept: TRANSPLANT | Facility: CLINIC | Age: 61
End: 2021-06-03

## 2021-06-03 ENCOUNTER — PATIENT MESSAGE (OUTPATIENT)
Dept: TRANSPLANT | Facility: CLINIC | Age: 61
End: 2021-06-03

## 2021-06-11 ENCOUNTER — PATIENT MESSAGE (OUTPATIENT)
Dept: TRANSPLANT | Facility: CLINIC | Age: 61
End: 2021-06-11

## 2021-06-11 DIAGNOSIS — C22.0 HCC (HEPATOCELLULAR CARCINOMA): Primary | ICD-10-CM

## 2021-06-11 DIAGNOSIS — Z94.4 LIVER REPLACED BY TRANSPLANT: ICD-10-CM

## 2021-06-17 ENCOUNTER — PATIENT MESSAGE (OUTPATIENT)
Dept: TRANSPLANT | Facility: CLINIC | Age: 61
End: 2021-06-17

## 2021-06-25 ENCOUNTER — HOSPITAL ENCOUNTER (OUTPATIENT)
Dept: RADIOLOGY | Facility: HOSPITAL | Age: 61
Discharge: HOME | End: 2021-06-25
Attending: INTERNAL MEDICINE
Payer: COMMERCIAL

## 2021-06-25 ENCOUNTER — TELEPHONE (OUTPATIENT)
Dept: TRANSPLANT | Facility: CLINIC | Age: 61
End: 2021-06-25

## 2021-06-25 DIAGNOSIS — Z94.4 LIVER REPLACED BY TRANSPLANT: ICD-10-CM

## 2021-06-25 DIAGNOSIS — C22.0 HCC (HEPATOCELLULAR CARCINOMA): ICD-10-CM

## 2021-06-25 PROCEDURE — 71250 CT THORAX DX C-: CPT | Mod: 26,,, | Performed by: RADIOLOGY

## 2021-06-25 PROCEDURE — A9698 NON-RAD CONTRAST MATERIALNOC: HCPCS | Performed by: INTERNAL MEDICINE

## 2021-06-25 PROCEDURE — 71250 CT CHEST WITHOUT CONTRAST: ICD-10-PCS | Mod: 26,,, | Performed by: RADIOLOGY

## 2021-06-25 PROCEDURE — 25500020 PHARM REV CODE 255: Performed by: INTERNAL MEDICINE

## 2021-06-25 PROCEDURE — 74178 CT ABD&PLV WO CNTR FLWD CNTR: CPT | Mod: TC

## 2021-06-25 PROCEDURE — 74178 CT ABD&PLV WO CNTR FLWD CNTR: CPT | Mod: 26,,, | Performed by: RADIOLOGY

## 2021-06-25 PROCEDURE — 71250 CT THORAX DX C-: CPT | Mod: TC

## 2021-06-25 PROCEDURE — 74178 CT ABDOMEN PELVIS W WO CONTRAST: ICD-10-PCS | Mod: 26,,, | Performed by: RADIOLOGY

## 2021-06-25 RX ADMIN — IOHEXOL 1000 ML: 12 SOLUTION ORAL at 09:06

## 2021-06-25 RX ADMIN — IOHEXOL 100 ML: 350 INJECTION, SOLUTION INTRAVENOUS at 10:06

## 2021-06-28 ENCOUNTER — OFFICE VISIT (OUTPATIENT)
Dept: TRANSPLANT | Facility: CLINIC | Age: 61
End: 2021-06-28
Attending: INTERNAL MEDICINE
Payer: COMMERCIAL

## 2021-06-28 VITALS
RESPIRATION RATE: 16 BRPM | WEIGHT: 222.88 LBS | DIASTOLIC BLOOD PRESSURE: 87 MMHG | HEIGHT: 67 IN | SYSTOLIC BLOOD PRESSURE: 136 MMHG | OXYGEN SATURATION: 98 % | HEART RATE: 77 BPM | TEMPERATURE: 98 F | BODY MASS INDEX: 34.98 KG/M2

## 2021-06-28 DIAGNOSIS — Z94.4 S/P LIVER TRANSPLANT: Primary | Chronic | ICD-10-CM

## 2021-06-28 DIAGNOSIS — Z79.60 LONG-TERM USE OF IMMUNOSUPPRESSANT MEDICATION: ICD-10-CM

## 2021-06-28 PROCEDURE — 99999 PR PBB SHADOW E&M-EST. PATIENT-LVL IV: ICD-10-PCS | Mod: PBBFAC,,, | Performed by: INTERNAL MEDICINE

## 2021-06-28 PROCEDURE — 99213 PR OFFICE/OUTPT VISIT, EST, LEVL III, 20-29 MIN: ICD-10-PCS | Mod: S$GLB,,, | Performed by: INTERNAL MEDICINE

## 2021-06-28 PROCEDURE — 1126F AMNT PAIN NOTED NONE PRSNT: CPT | Mod: S$GLB,,, | Performed by: INTERNAL MEDICINE

## 2021-06-28 PROCEDURE — 3008F PR BODY MASS INDEX (BMI) DOCUMENTED: ICD-10-PCS | Mod: CPTII,S$GLB,, | Performed by: INTERNAL MEDICINE

## 2021-06-28 PROCEDURE — 1126F PR PAIN SEVERITY QUANTIFIED, NO PAIN PRESENT: ICD-10-PCS | Mod: S$GLB,,, | Performed by: INTERNAL MEDICINE

## 2021-06-28 PROCEDURE — 99999 PR PBB SHADOW E&M-EST. PATIENT-LVL IV: CPT | Mod: PBBFAC,,, | Performed by: INTERNAL MEDICINE

## 2021-06-28 PROCEDURE — 3008F BODY MASS INDEX DOCD: CPT | Mod: CPTII,S$GLB,, | Performed by: INTERNAL MEDICINE

## 2021-06-28 PROCEDURE — 99213 OFFICE O/P EST LOW 20 MIN: CPT | Mod: S$GLB,,, | Performed by: INTERNAL MEDICINE

## 2021-06-28 RX ORDER — AMOXICILLIN 500 MG
1 CAPSULE ORAL DAILY
COMMUNITY

## 2021-07-01 ENCOUNTER — PATIENT MESSAGE (OUTPATIENT)
Dept: TRANSPLANT | Facility: CLINIC | Age: 61
End: 2021-07-01

## 2021-07-06 ENCOUNTER — OFFICE VISIT (OUTPATIENT)
Dept: CARDIOLOGY | Facility: CLINIC | Age: 61
End: 2021-07-06
Payer: COMMERCIAL

## 2021-07-06 VITALS
SYSTOLIC BLOOD PRESSURE: 127 MMHG | BODY MASS INDEX: 34.78 KG/M2 | HEART RATE: 68 BPM | WEIGHT: 221.56 LBS | OXYGEN SATURATION: 98 % | HEIGHT: 67 IN | DIASTOLIC BLOOD PRESSURE: 82 MMHG

## 2021-07-06 DIAGNOSIS — I10 ESSENTIAL HYPERTENSION: Chronic | ICD-10-CM

## 2021-07-06 DIAGNOSIS — E66.9 OBESITY (BMI 30.0-34.9): ICD-10-CM

## 2021-07-06 DIAGNOSIS — J12.82 PNEUMONIA DUE TO COVID-19 VIRUS: ICD-10-CM

## 2021-07-06 DIAGNOSIS — Z79.01 LONG TERM (CURRENT) USE OF ANTICOAGULANTS: Chronic | ICD-10-CM

## 2021-07-06 DIAGNOSIS — I48.0 PAROXYSMAL ATRIAL FIBRILLATION: ICD-10-CM

## 2021-07-06 DIAGNOSIS — Z94.4 S/P LIVER TRANSPLANT: Chronic | ICD-10-CM

## 2021-07-06 DIAGNOSIS — G47.33 OSA (OBSTRUCTIVE SLEEP APNEA): Chronic | ICD-10-CM

## 2021-07-06 DIAGNOSIS — U07.1 PNEUMONIA DUE TO COVID-19 VIRUS: ICD-10-CM

## 2021-07-06 DIAGNOSIS — K21.9 GASTROESOPHAGEAL REFLUX DISEASE WITHOUT ESOPHAGITIS: Chronic | ICD-10-CM

## 2021-07-06 DIAGNOSIS — E11.9 TYPE 2 DIABETES MELLITUS WITHOUT COMPLICATION, WITHOUT LONG-TERM CURRENT USE OF INSULIN: Chronic | ICD-10-CM

## 2021-07-06 DIAGNOSIS — R00.2 PALPITATIONS: Primary | ICD-10-CM

## 2021-07-06 PROCEDURE — 99999 PR PBB SHADOW E&M-EST. PATIENT-LVL IV: ICD-10-PCS | Mod: PBBFAC,,, | Performed by: INTERNAL MEDICINE

## 2021-07-06 PROCEDURE — 1126F PR PAIN SEVERITY QUANTIFIED, NO PAIN PRESENT: ICD-10-PCS | Mod: S$GLB,,, | Performed by: INTERNAL MEDICINE

## 2021-07-06 PROCEDURE — 1126F AMNT PAIN NOTED NONE PRSNT: CPT | Mod: S$GLB,,, | Performed by: INTERNAL MEDICINE

## 2021-07-06 PROCEDURE — 3008F BODY MASS INDEX DOCD: CPT | Mod: CPTII,S$GLB,, | Performed by: INTERNAL MEDICINE

## 2021-07-06 PROCEDURE — 99999 PR PBB SHADOW E&M-EST. PATIENT-LVL IV: CPT | Mod: PBBFAC,,, | Performed by: INTERNAL MEDICINE

## 2021-07-06 PROCEDURE — 93000 ELECTROCARDIOGRAM COMPLETE: CPT | Mod: S$GLB,,, | Performed by: INTERNAL MEDICINE

## 2021-07-06 PROCEDURE — 99214 PR OFFICE/OUTPT VISIT, EST, LEVL IV, 30-39 MIN: ICD-10-PCS | Mod: S$GLB,,, | Performed by: INTERNAL MEDICINE

## 2021-07-06 PROCEDURE — 3008F PR BODY MASS INDEX (BMI) DOCUMENTED: ICD-10-PCS | Mod: CPTII,S$GLB,, | Performed by: INTERNAL MEDICINE

## 2021-07-06 PROCEDURE — 99214 OFFICE O/P EST MOD 30 MIN: CPT | Mod: S$GLB,,, | Performed by: INTERNAL MEDICINE

## 2021-07-06 PROCEDURE — 93000 EKG 12-LEAD: ICD-10-PCS | Mod: S$GLB,,, | Performed by: INTERNAL MEDICINE

## 2021-07-15 ENCOUNTER — OFFICE VISIT (OUTPATIENT)
Dept: FAMILY MEDICINE | Facility: CLINIC | Age: 61
End: 2021-07-15
Payer: COMMERCIAL

## 2021-07-15 VITALS
HEIGHT: 67 IN | DIASTOLIC BLOOD PRESSURE: 80 MMHG | WEIGHT: 221.31 LBS | HEART RATE: 71 BPM | SYSTOLIC BLOOD PRESSURE: 132 MMHG | OXYGEN SATURATION: 98 % | BODY MASS INDEX: 34.73 KG/M2

## 2021-07-15 DIAGNOSIS — R91.1 LUNG NODULE: ICD-10-CM

## 2021-07-15 DIAGNOSIS — I70.0 AORTIC ARCH ATHEROSCLEROSIS: Primary | ICD-10-CM

## 2021-07-15 DIAGNOSIS — K29.30 CHRONIC SUPERFICIAL GASTRITIS WITHOUT BLEEDING: ICD-10-CM

## 2021-07-15 DIAGNOSIS — K44.9 HIATAL HERNIA: ICD-10-CM

## 2021-07-15 DIAGNOSIS — N20.0 CALCULUS OF KIDNEY: ICD-10-CM

## 2021-07-15 PROCEDURE — 1125F PR PAIN SEVERITY QUANTIFIED, PAIN PRESENT: ICD-10-PCS | Mod: S$GLB,,, | Performed by: FAMILY MEDICINE

## 2021-07-15 PROCEDURE — 99999 PR PBB SHADOW E&M-EST. PATIENT-LVL IV: CPT | Mod: PBBFAC,,, | Performed by: FAMILY MEDICINE

## 2021-07-15 PROCEDURE — 3044F PR MOST RECENT HEMOGLOBIN A1C LEVEL <7.0%: ICD-10-PCS | Mod: CPTII,S$GLB,, | Performed by: FAMILY MEDICINE

## 2021-07-15 PROCEDURE — 99214 OFFICE O/P EST MOD 30 MIN: CPT | Mod: S$GLB,,, | Performed by: FAMILY MEDICINE

## 2021-07-15 PROCEDURE — 99999 PR PBB SHADOW E&M-EST. PATIENT-LVL IV: ICD-10-PCS | Mod: PBBFAC,,, | Performed by: FAMILY MEDICINE

## 2021-07-15 PROCEDURE — 3008F BODY MASS INDEX DOCD: CPT | Mod: CPTII,S$GLB,, | Performed by: FAMILY MEDICINE

## 2021-07-15 PROCEDURE — 1125F AMNT PAIN NOTED PAIN PRSNT: CPT | Mod: S$GLB,,, | Performed by: FAMILY MEDICINE

## 2021-07-15 PROCEDURE — 3008F PR BODY MASS INDEX (BMI) DOCUMENTED: ICD-10-PCS | Mod: CPTII,S$GLB,, | Performed by: FAMILY MEDICINE

## 2021-07-15 PROCEDURE — 3044F HG A1C LEVEL LT 7.0%: CPT | Mod: CPTII,S$GLB,, | Performed by: FAMILY MEDICINE

## 2021-07-15 PROCEDURE — 99214 PR OFFICE/OUTPT VISIT, EST, LEVL IV, 30-39 MIN: ICD-10-PCS | Mod: S$GLB,,, | Performed by: FAMILY MEDICINE

## 2021-07-15 RX ORDER — SUCRALFATE 1 G/1
1 TABLET ORAL
Qty: 90 TABLET | Refills: 0 | Status: SHIPPED | OUTPATIENT
Start: 2021-07-15 | End: 2021-08-14

## 2021-07-19 ENCOUNTER — HOSPITAL ENCOUNTER (OUTPATIENT)
Dept: CARDIOLOGY | Facility: HOSPITAL | Age: 61
Discharge: HOME | End: 2021-07-19
Attending: INTERNAL MEDICINE
Payer: COMMERCIAL

## 2021-07-19 VITALS — BODY MASS INDEX: 34.69 KG/M2 | WEIGHT: 221 LBS | HEIGHT: 67 IN

## 2021-07-19 DIAGNOSIS — R00.2 PALPITATIONS: ICD-10-CM

## 2021-07-19 LAB
AORTIC ROOT ANNULUS: 3.62 CM
ASCENDING AORTA: 3.21 CM
AV INDEX (PROSTH): 0.94
AV MEAN GRADIENT: 5 MMHG
AV PEAK GRADIENT: 8 MMHG
AV VALVE AREA: 2.83 CM2
AV VELOCITY RATIO: 0.91
BSA FOR ECHO PROCEDURE: 2.18 M2
CV ECHO LV RWT: 0.45 CM
DOP CALC AO PEAK VEL: 1.38 M/S
DOP CALC AO VTI: 27.38 CM
DOP CALC LVOT AREA: 3 CM2
DOP CALC LVOT DIAMETER: 1.96 CM
DOP CALC LVOT PEAK VEL: 1.26 M/S
DOP CALC LVOT STROKE VOLUME: 77.47 CM3
DOP CALCLVOT PEAK VEL VTI: 25.69 CM
E WAVE DECELERATION TIME: 230.02 MSEC
E/A RATIO: 0.8
E/E' RATIO: 8.8 M/S
ECHO LV POSTERIOR WALL: 0.88 CM (ref 0.6–1.1)
EJECTION FRACTION: 65 %
FRACTIONAL SHORTENING: 30 % (ref 28–44)
INTERVENTRICULAR SEPTUM: 0.9 CM (ref 0.6–1.1)
IVRT: 105.61 MSEC
LA MAJOR: 5.07 CM
LA MINOR: 4.68 CM
LA WIDTH: 3.89 CM
LEFT ATRIUM SIZE: 3.57 CM
LEFT ATRIUM VOLUME INDEX MOD: 21.9 ML/M2
LEFT ATRIUM VOLUME INDEX: 27.2 ML/M2
LEFT ATRIUM VOLUME MOD: 46.24 CM3
LEFT ATRIUM VOLUME: 57.45 CM3
LEFT INTERNAL DIMENSION IN SYSTOLE: 2.75 CM (ref 2.1–4)
LEFT VENTRICLE DIASTOLIC VOLUME INDEX: 32.27 ML/M2
LEFT VENTRICLE DIASTOLIC VOLUME: 68.1 ML
LEFT VENTRICLE MASS INDEX: 50 G/M2
LEFT VENTRICLE SYSTOLIC VOLUME INDEX: 13.5 ML/M2
LEFT VENTRICLE SYSTOLIC VOLUME: 28.39 ML
LEFT VENTRICULAR INTERNAL DIMENSION IN DIASTOLE: 3.95 CM (ref 3.5–6)
LEFT VENTRICULAR MASS: 105.85 G
LV LATERAL E/E' RATIO: 6.6 M/S
LV SEPTAL E/E' RATIO: 13.2 M/S
MV A" WAVE DURATION": 12.84 MSEC
MV PEAK A VEL: 0.82 M/S
MV PEAK E VEL: 0.66 M/S
MV STENOSIS PRESSURE HALF TIME: 66.71 MS
MV VALVE AREA P 1/2 METHOD: 3.3 CM2
PISA TR MAX VEL: 2.1 M/S
PULM VEIN S/D RATIO: 1.37
PV PEAK D VEL: 0.19 M/S
PV PEAK S VEL: 0.26 M/S
RA MAJOR: 4.41 CM
RA WIDTH: 3.48 CM
RIGHT VENTRICULAR END-DIASTOLIC DIMENSION: 2.58 CM
RV TISSUE DOPPLER FREE WALL SYSTOLIC VELOCITY 1 (APICAL 4 CHAMBER VIEW): 8.12 CM/S
STJ: 3.17 CM
TDI LATERAL: 0.1 M/S
TDI SEPTAL: 0.05 M/S
TDI: 0.08 M/S
TR MAX PG: 18 MMHG
TRICUSPID ANNULAR PLANE SYSTOLIC EXCURSION: 2.31 CM

## 2021-07-19 PROCEDURE — 93306 TTE W/DOPPLER COMPLETE: CPT

## 2021-07-19 PROCEDURE — 93227 XTRNL ECG REC<48 HR R&I: CPT | Mod: ,,, | Performed by: INTERNAL MEDICINE

## 2021-07-19 PROCEDURE — 93306 ECHO (CUPID ONLY): ICD-10-PCS | Mod: 26,,, | Performed by: INTERNAL MEDICINE

## 2021-07-19 PROCEDURE — 93227 HOLTER MONITOR - 48 HOUR (CUPID ONLY): ICD-10-PCS | Mod: ,,, | Performed by: INTERNAL MEDICINE

## 2021-07-19 PROCEDURE — 93306 TTE W/DOPPLER COMPLETE: CPT | Mod: 26,,, | Performed by: INTERNAL MEDICINE

## 2021-07-19 PROCEDURE — 93225 XTRNL ECG REC<48 HRS REC: CPT

## 2021-07-20 ENCOUNTER — PATIENT MESSAGE (OUTPATIENT)
Dept: SLEEP MEDICINE | Facility: CLINIC | Age: 61
End: 2021-07-20

## 2021-07-22 LAB
OHS CV EVENT MONITOR DAY: 0
OHS CV HOLTER LENGTH DECIMAL HOURS: 47.98
OHS CV HOLTER LENGTH HOURS: 47
OHS CV HOLTER LENGTH MINUTES: 59

## 2021-07-26 ENCOUNTER — PATIENT MESSAGE (OUTPATIENT)
Dept: FAMILY MEDICINE | Facility: CLINIC | Age: 61
End: 2021-07-26

## 2021-07-26 ENCOUNTER — LAB VISIT (OUTPATIENT)
Dept: LAB | Facility: HOSPITAL | Age: 61
End: 2021-07-26
Attending: INTERNAL MEDICINE
Payer: COMMERCIAL

## 2021-07-26 ENCOUNTER — PATIENT MESSAGE (OUTPATIENT)
Dept: TRANSPLANT | Facility: CLINIC | Age: 61
End: 2021-07-26

## 2021-07-26 DIAGNOSIS — Z94.4 LIVER REPLACED BY TRANSPLANT: ICD-10-CM

## 2021-07-26 DIAGNOSIS — C22.0 HCC (HEPATOCELLULAR CARCINOMA): ICD-10-CM

## 2021-07-26 LAB
AFP SERPL-MCNC: 1.9 NG/ML (ref 0–8.4)
ALBUMIN SERPL BCP-MCNC: 4 G/DL (ref 3.5–5.2)
ALP SERPL-CCNC: 63 U/L (ref 55–135)
ALT SERPL W/O P-5'-P-CCNC: 28 U/L (ref 10–44)
ANION GAP SERPL CALC-SCNC: 9 MMOL/L (ref 8–16)
AST SERPL-CCNC: 17 U/L (ref 10–40)
BASOPHILS # BLD AUTO: 0.03 K/UL (ref 0–0.2)
BASOPHILS NFR BLD: 0.6 % (ref 0–1.9)
BILIRUB SERPL-MCNC: 0.5 MG/DL (ref 0.1–1)
BUN SERPL-MCNC: 13 MG/DL (ref 8–23)
CALCIUM SERPL-MCNC: 8.8 MG/DL (ref 8.7–10.5)
CHLORIDE SERPL-SCNC: 107 MMOL/L (ref 95–110)
CO2 SERPL-SCNC: 26 MMOL/L (ref 23–29)
CREAT SERPL-MCNC: 1.1 MG/DL (ref 0.5–1.4)
DIFFERENTIAL METHOD: ABNORMAL
EOSINOPHIL # BLD AUTO: 0.2 K/UL (ref 0–0.5)
EOSINOPHIL NFR BLD: 2.8 % (ref 0–8)
ERYTHROCYTE [DISTWIDTH] IN BLOOD BY AUTOMATED COUNT: 15.8 % (ref 11.5–14.5)
EST. GFR  (AFRICAN AMERICAN): >60 ML/MIN/1.73 M^2
EST. GFR  (NON AFRICAN AMERICAN): >60 ML/MIN/1.73 M^2
GLUCOSE SERPL-MCNC: 103 MG/DL (ref 70–110)
HCT VFR BLD AUTO: 42.3 % (ref 40–54)
HGB BLD-MCNC: 13.4 G/DL (ref 14–18)
IMM GRANULOCYTES # BLD AUTO: 0.02 K/UL (ref 0–0.04)
IMM GRANULOCYTES NFR BLD AUTO: 0.4 % (ref 0–0.5)
LYMPHOCYTES # BLD AUTO: 1.4 K/UL (ref 1–4.8)
LYMPHOCYTES NFR BLD: 25.1 % (ref 18–48)
MCH RBC QN AUTO: 24.7 PG (ref 27–31)
MCHC RBC AUTO-ENTMCNC: 31.7 G/DL (ref 32–36)
MCV RBC AUTO: 78 FL (ref 82–98)
MONOCYTES # BLD AUTO: 0.5 K/UL (ref 0.3–1)
MONOCYTES NFR BLD: 9.7 % (ref 4–15)
NEUTROPHILS # BLD AUTO: 3.3 K/UL (ref 1.8–7.7)
NEUTROPHILS NFR BLD: 61.4 % (ref 38–73)
NRBC BLD-RTO: 0 /100 WBC
PLATELET # BLD AUTO: 307 K/UL (ref 150–450)
PMV BLD AUTO: 9.9 FL (ref 9.2–12.9)
POTASSIUM SERPL-SCNC: 3.7 MMOL/L (ref 3.5–5.1)
PROT SERPL-MCNC: 7 G/DL (ref 6–8.4)
RBC # BLD AUTO: 5.42 M/UL (ref 4.6–6.2)
SODIUM SERPL-SCNC: 142 MMOL/L (ref 136–145)
WBC # BLD AUTO: 5.37 K/UL (ref 3.9–12.7)

## 2021-07-26 PROCEDURE — 82105 ALPHA-FETOPROTEIN SERUM: CPT | Performed by: INTERNAL MEDICINE

## 2021-07-26 PROCEDURE — 85025 COMPLETE CBC W/AUTO DIFF WBC: CPT | Performed by: INTERNAL MEDICINE

## 2021-07-26 PROCEDURE — 80197 ASSAY OF TACROLIMUS: CPT | Performed by: INTERNAL MEDICINE

## 2021-07-26 PROCEDURE — 36415 COLL VENOUS BLD VENIPUNCTURE: CPT | Mod: PO | Performed by: INTERNAL MEDICINE

## 2021-07-26 PROCEDURE — 80053 COMPREHEN METABOLIC PANEL: CPT | Performed by: INTERNAL MEDICINE

## 2021-07-27 ENCOUNTER — TELEPHONE (OUTPATIENT)
Dept: TRANSPLANT | Facility: CLINIC | Age: 61
End: 2021-07-27

## 2021-07-27 ENCOUNTER — TELEPHONE (OUTPATIENT)
Dept: FAMILY MEDICINE | Facility: CLINIC | Age: 61
End: 2021-07-27

## 2021-07-27 ENCOUNTER — LAB VISIT (OUTPATIENT)
Dept: LAB | Facility: HOSPITAL | Age: 61
End: 2021-07-27
Attending: FAMILY MEDICINE
Payer: COMMERCIAL

## 2021-07-27 DIAGNOSIS — Z00.00 ROUTINE GENERAL MEDICAL EXAMINATION AT A HEALTH CARE FACILITY: Primary | ICD-10-CM

## 2021-07-27 DIAGNOSIS — Z00.00 ROUTINE GENERAL MEDICAL EXAMINATION AT A HEALTH CARE FACILITY: ICD-10-CM

## 2021-07-27 LAB
TACROLIMUS BLD-MCNC: 4.8 NG/ML (ref 5–15)
TACROLIMUS, NORMALIZED: 4.4 NG/ML (ref 5–15)

## 2021-07-27 PROCEDURE — 36415 COLL VENOUS BLD VENIPUNCTURE: CPT | Mod: PO | Performed by: FAMILY MEDICINE

## 2021-07-27 PROCEDURE — 86769 SARS-COV-2 COVID-19 ANTIBODY: CPT | Performed by: FAMILY MEDICINE

## 2021-07-29 LAB
SARS-COV-2 IGG SERPL IA-ACNC: ABNORMAL AU/ML
SARS-COV-2 IGG SERPL QL IA: POSITIVE

## 2021-08-06 ENCOUNTER — TELEPHONE (OUTPATIENT)
Dept: CARDIOLOGY | Facility: CLINIC | Age: 61
End: 2021-08-06

## 2021-08-12 ENCOUNTER — PATIENT MESSAGE (OUTPATIENT)
Dept: FAMILY MEDICINE | Facility: CLINIC | Age: 61
End: 2021-08-12

## 2021-08-16 ENCOUNTER — PATIENT MESSAGE (OUTPATIENT)
Dept: FAMILY MEDICINE | Facility: CLINIC | Age: 61
End: 2021-08-16

## 2021-08-23 ENCOUNTER — TELEPHONE (OUTPATIENT)
Dept: CARDIOLOGY | Facility: CLINIC | Age: 61
End: 2021-08-23

## 2021-08-23 ENCOUNTER — PATIENT MESSAGE (OUTPATIENT)
Dept: TRANSPLANT | Facility: CLINIC | Age: 61
End: 2021-08-23

## 2021-08-24 ENCOUNTER — OFFICE VISIT (OUTPATIENT)
Dept: CARDIOLOGY | Facility: CLINIC | Age: 61
End: 2021-08-24
Payer: COMMERCIAL

## 2021-08-24 ENCOUNTER — PATIENT OUTREACH (OUTPATIENT)
Dept: ADMINISTRATIVE | Facility: OTHER | Age: 61
End: 2021-08-24

## 2021-08-24 ENCOUNTER — TELEPHONE (OUTPATIENT)
Dept: GASTROENTEROLOGY | Facility: CLINIC | Age: 61
End: 2021-08-24

## 2021-08-24 VITALS
HEIGHT: 67 IN | HEART RATE: 82 BPM | DIASTOLIC BLOOD PRESSURE: 83 MMHG | OXYGEN SATURATION: 98 % | BODY MASS INDEX: 34.52 KG/M2 | WEIGHT: 219.94 LBS | SYSTOLIC BLOOD PRESSURE: 137 MMHG

## 2021-08-24 DIAGNOSIS — K21.9 GASTROESOPHAGEAL REFLUX DISEASE WITHOUT ESOPHAGITIS: Chronic | ICD-10-CM

## 2021-08-24 DIAGNOSIS — Z94.4 S/P LIVER TRANSPLANT: Chronic | ICD-10-CM

## 2021-08-24 DIAGNOSIS — I48.0 PAROXYSMAL ATRIAL FIBRILLATION: Primary | ICD-10-CM

## 2021-08-24 DIAGNOSIS — E66.9 OBESITY (BMI 30.0-34.9): ICD-10-CM

## 2021-08-24 DIAGNOSIS — Z79.01 LONG TERM (CURRENT) USE OF ANTICOAGULANTS: Chronic | ICD-10-CM

## 2021-08-24 DIAGNOSIS — G47.33 OSA (OBSTRUCTIVE SLEEP APNEA): Chronic | ICD-10-CM

## 2021-08-24 DIAGNOSIS — I10 ESSENTIAL HYPERTENSION: Chronic | ICD-10-CM

## 2021-08-24 DIAGNOSIS — E11.9 TYPE 2 DIABETES MELLITUS WITHOUT COMPLICATION, WITHOUT LONG-TERM CURRENT USE OF INSULIN: Chronic | ICD-10-CM

## 2021-08-24 PROCEDURE — 99999 PR PBB SHADOW E&M-EST. PATIENT-LVL III: CPT | Mod: PBBFAC,,, | Performed by: INTERNAL MEDICINE

## 2021-08-24 PROCEDURE — 1160F RVW MEDS BY RX/DR IN RCRD: CPT | Mod: CPTII,S$GLB,, | Performed by: INTERNAL MEDICINE

## 2021-08-24 PROCEDURE — 3008F PR BODY MASS INDEX (BMI) DOCUMENTED: ICD-10-PCS | Mod: CPTII,S$GLB,, | Performed by: INTERNAL MEDICINE

## 2021-08-24 PROCEDURE — 99999 PR PBB SHADOW E&M-EST. PATIENT-LVL III: ICD-10-PCS | Mod: PBBFAC,,, | Performed by: INTERNAL MEDICINE

## 2021-08-24 PROCEDURE — 99214 OFFICE O/P EST MOD 30 MIN: CPT | Mod: S$GLB,,, | Performed by: INTERNAL MEDICINE

## 2021-08-24 PROCEDURE — 3075F SYST BP GE 130 - 139MM HG: CPT | Mod: CPTII,S$GLB,, | Performed by: INTERNAL MEDICINE

## 2021-08-24 PROCEDURE — 3008F BODY MASS INDEX DOCD: CPT | Mod: CPTII,S$GLB,, | Performed by: INTERNAL MEDICINE

## 2021-08-24 PROCEDURE — 1159F PR MEDICATION LIST DOCUMENTED IN MEDICAL RECORD: ICD-10-PCS | Mod: CPTII,S$GLB,, | Performed by: INTERNAL MEDICINE

## 2021-08-24 PROCEDURE — 1126F PR PAIN SEVERITY QUANTIFIED, NO PAIN PRESENT: ICD-10-PCS | Mod: CPTII,S$GLB,, | Performed by: INTERNAL MEDICINE

## 2021-08-24 PROCEDURE — 1160F PR REVIEW ALL MEDS BY PRESCRIBER/CLIN PHARMACIST DOCUMENTED: ICD-10-PCS | Mod: CPTII,S$GLB,, | Performed by: INTERNAL MEDICINE

## 2021-08-24 PROCEDURE — 1126F AMNT PAIN NOTED NONE PRSNT: CPT | Mod: CPTII,S$GLB,, | Performed by: INTERNAL MEDICINE

## 2021-08-24 PROCEDURE — 3079F PR MOST RECENT DIASTOLIC BLOOD PRESSURE 80-89 MM HG: ICD-10-PCS | Mod: CPTII,S$GLB,, | Performed by: INTERNAL MEDICINE

## 2021-08-24 PROCEDURE — 99214 PR OFFICE/OUTPT VISIT, EST, LEVL IV, 30-39 MIN: ICD-10-PCS | Mod: S$GLB,,, | Performed by: INTERNAL MEDICINE

## 2021-08-24 PROCEDURE — 3044F HG A1C LEVEL LT 7.0%: CPT | Mod: CPTII,S$GLB,, | Performed by: INTERNAL MEDICINE

## 2021-08-24 PROCEDURE — 3044F PR MOST RECENT HEMOGLOBIN A1C LEVEL <7.0%: ICD-10-PCS | Mod: CPTII,S$GLB,, | Performed by: INTERNAL MEDICINE

## 2021-08-24 PROCEDURE — 1159F MED LIST DOCD IN RCRD: CPT | Mod: CPTII,S$GLB,, | Performed by: INTERNAL MEDICINE

## 2021-08-24 PROCEDURE — 3079F DIAST BP 80-89 MM HG: CPT | Mod: CPTII,S$GLB,, | Performed by: INTERNAL MEDICINE

## 2021-08-24 PROCEDURE — 3075F PR MOST RECENT SYSTOLIC BLOOD PRESS GE 130-139MM HG: ICD-10-PCS | Mod: CPTII,S$GLB,, | Performed by: INTERNAL MEDICINE

## 2021-08-30 NOTE — Clinical Note
Bed: B16-16  Expected date:   Expected time:   Means of arrival:   Comments:  [address] [place] [address]/[employer], RN  08/30/21 1573 Verified procedural consent signed and complete H&P in chart.

## 2021-09-10 ENCOUNTER — PATIENT MESSAGE (OUTPATIENT)
Dept: FAMILY MEDICINE | Facility: CLINIC | Age: 61
End: 2021-09-10

## 2021-09-14 ENCOUNTER — TELEPHONE (OUTPATIENT)
Dept: CARDIOLOGY | Facility: CLINIC | Age: 61
End: 2021-09-14

## 2021-09-29 DIAGNOSIS — E11.9 TYPE 2 DIABETES MELLITUS WITHOUT COMPLICATION: ICD-10-CM

## 2021-09-30 DIAGNOSIS — I48.0 PAROXYSMAL ATRIAL FIBRILLATION: Primary | ICD-10-CM

## 2021-10-04 ENCOUNTER — PATIENT MESSAGE (OUTPATIENT)
Dept: ADMINISTRATIVE | Facility: HOSPITAL | Age: 61
End: 2021-10-04

## 2021-10-05 ENCOUNTER — PATIENT MESSAGE (OUTPATIENT)
Dept: CARDIOLOGY | Facility: CLINIC | Age: 61
End: 2021-10-05

## 2021-10-06 ENCOUNTER — PATIENT MESSAGE (OUTPATIENT)
Dept: CARDIOLOGY | Facility: CLINIC | Age: 61
End: 2021-10-06

## 2021-10-13 RX ORDER — APIXABAN 5 MG/1
TABLET, FILM COATED ORAL
Qty: 60 TABLET | Refills: 0 | Status: SHIPPED | OUTPATIENT
Start: 2021-10-13 | End: 2021-11-08

## 2021-10-16 ENCOUNTER — IMMUNIZATION (OUTPATIENT)
Dept: FAMILY MEDICINE | Facility: CLINIC | Age: 61
End: 2021-10-16
Payer: COMMERCIAL

## 2021-10-16 PROCEDURE — 90471 FLU VACCINE - QUADRIVALENT - ADJUVANTED: ICD-10-PCS | Mod: S$GLB,,, | Performed by: FAMILY MEDICINE

## 2021-10-16 PROCEDURE — 90694 VACC AIIV4 NO PRSRV 0.5ML IM: CPT | Mod: S$GLB,,, | Performed by: FAMILY MEDICINE

## 2021-10-16 PROCEDURE — 90694 FLU VACCINE - QUADRIVALENT - ADJUVANTED: ICD-10-PCS | Mod: S$GLB,,, | Performed by: FAMILY MEDICINE

## 2021-10-16 PROCEDURE — 90471 IMMUNIZATION ADMIN: CPT | Mod: S$GLB,,, | Performed by: FAMILY MEDICINE

## 2021-10-19 DIAGNOSIS — C22.0 HCC (HEPATOCELLULAR CARCINOMA): ICD-10-CM

## 2021-10-19 DIAGNOSIS — Z00.6 RESEARCH STUDY PATIENT: Primary | ICD-10-CM

## 2021-10-25 ENCOUNTER — TELEPHONE (OUTPATIENT)
Dept: TRANSPLANT | Facility: CLINIC | Age: 61
End: 2021-10-25
Payer: COMMERCIAL

## 2021-10-25 ENCOUNTER — RESEARCH ENCOUNTER (OUTPATIENT)
Dept: RESEARCH | Facility: HOSPITAL | Age: 61
End: 2021-10-25
Payer: COMMERCIAL

## 2021-10-25 ENCOUNTER — LAB VISIT (OUTPATIENT)
Dept: LAB | Facility: HOSPITAL | Age: 61
End: 2021-10-25
Attending: INTERNAL MEDICINE
Payer: COMMERCIAL

## 2021-10-25 DIAGNOSIS — E11.9 TYPE 2 DIABETES MELLITUS WITHOUT COMPLICATION: ICD-10-CM

## 2021-10-25 DIAGNOSIS — Z00.6 RESEARCH STUDY PATIENT: ICD-10-CM

## 2021-10-25 DIAGNOSIS — Z94.4 LIVER REPLACED BY TRANSPLANT: ICD-10-CM

## 2021-10-25 DIAGNOSIS — C22.0 HCC (HEPATOCELLULAR CARCINOMA): ICD-10-CM

## 2021-10-25 LAB
AFP SERPL-MCNC: <2 NG/ML (ref 0–8.4)
ALBUMIN SERPL BCP-MCNC: 4.1 G/DL (ref 3.5–5.2)
ALP SERPL-CCNC: 72 U/L (ref 55–135)
ALT SERPL W/O P-5'-P-CCNC: 28 U/L (ref 10–44)
ANION GAP SERPL CALC-SCNC: 11 MMOL/L (ref 8–16)
AST SERPL-CCNC: 14 U/L (ref 10–40)
BASOPHILS # BLD AUTO: 0.05 K/UL (ref 0–0.2)
BASOPHILS NFR BLD: 0.6 % (ref 0–1.9)
BILIRUB SERPL-MCNC: 0.5 MG/DL (ref 0.1–1)
BUN SERPL-MCNC: 17 MG/DL (ref 8–23)
CALCIUM SERPL-MCNC: 9 MG/DL (ref 8.7–10.5)
CHLORIDE SERPL-SCNC: 104 MMOL/L (ref 95–110)
CO2 SERPL-SCNC: 24 MMOL/L (ref 23–29)
CREAT SERPL-MCNC: 1.2 MG/DL (ref 0.5–1.4)
DIFFERENTIAL METHOD: ABNORMAL
EOSINOPHIL # BLD AUTO: 0.2 K/UL (ref 0–0.5)
EOSINOPHIL NFR BLD: 2.1 % (ref 0–8)
ERYTHROCYTE [DISTWIDTH] IN BLOOD BY AUTOMATED COUNT: 15.5 % (ref 11.5–14.5)
EST. GFR  (AFRICAN AMERICAN): >60 ML/MIN/1.73 M^2
EST. GFR  (NON AFRICAN AMERICAN): >60 ML/MIN/1.73 M^2
ESTIMATED AVG GLUCOSE: 128 MG/DL (ref 68–131)
GLUCOSE SERPL-MCNC: 121 MG/DL (ref 70–110)
HBA1C MFR BLD: 6.1 % (ref 4–5.6)
HCT VFR BLD AUTO: 44 % (ref 40–54)
HGB BLD-MCNC: 14 G/DL (ref 14–18)
IMM GRANULOCYTES # BLD AUTO: 0.05 K/UL (ref 0–0.04)
IMM GRANULOCYTES NFR BLD AUTO: 0.6 % (ref 0–0.5)
LYMPHOCYTES # BLD AUTO: 1.5 K/UL (ref 1–4.8)
LYMPHOCYTES NFR BLD: 17.6 % (ref 18–48)
MCH RBC QN AUTO: 24.6 PG (ref 27–31)
MCHC RBC AUTO-ENTMCNC: 31.8 G/DL (ref 32–36)
MCV RBC AUTO: 77 FL (ref 82–98)
MONOCYTES # BLD AUTO: 0.8 K/UL (ref 0.3–1)
MONOCYTES NFR BLD: 9.5 % (ref 4–15)
NEUTROPHILS # BLD AUTO: 6 K/UL (ref 1.8–7.7)
NEUTROPHILS NFR BLD: 69.6 % (ref 38–73)
NRBC BLD-RTO: 0 /100 WBC
PLATELET # BLD AUTO: 333 K/UL (ref 150–450)
PMV BLD AUTO: 10.1 FL (ref 9.2–12.9)
POTASSIUM SERPL-SCNC: 3.8 MMOL/L (ref 3.5–5.1)
PROT SERPL-MCNC: 7.4 G/DL (ref 6–8.4)
RBC # BLD AUTO: 5.7 M/UL (ref 4.6–6.2)
RESEARCH LAB: NORMAL
SODIUM SERPL-SCNC: 139 MMOL/L (ref 136–145)
TACROLIMUS BLD-MCNC: 6.2 NG/ML (ref 5–15)
WBC # BLD AUTO: 8.54 K/UL (ref 3.9–12.7)

## 2021-10-25 PROCEDURE — 80197 ASSAY OF TACROLIMUS: CPT | Performed by: INTERNAL MEDICINE

## 2021-10-25 PROCEDURE — 80053 COMPREHEN METABOLIC PANEL: CPT | Performed by: INTERNAL MEDICINE

## 2021-10-25 PROCEDURE — 36415 COLL VENOUS BLD VENIPUNCTURE: CPT | Mod: PO | Performed by: INTERNAL MEDICINE

## 2021-10-25 PROCEDURE — 82105 ALPHA-FETOPROTEIN SERUM: CPT | Performed by: INTERNAL MEDICINE

## 2021-10-25 PROCEDURE — 83036 HEMOGLOBIN GLYCOSYLATED A1C: CPT | Performed by: FAMILY MEDICINE

## 2021-10-25 PROCEDURE — 85025 COMPLETE CBC W/AUTO DIFF WBC: CPT | Performed by: INTERNAL MEDICINE

## 2021-10-27 NOTE — HPI
Roman Hodges is a 57 yo male with HTN, Type 2 Diabetes Mellitus and HCC. Patient presented to the ED with report of dizziness/lightheadedness. He reports waking up at 3 am with reflux relieved with omeprazole. He was able to return to sleep for 1 hour after which he woke up feeling dizzy and lightheaded. He reports associated nausea. Denies ever having chest pain, SOB or palpitations.  EKG in ED shows afib RVR rate 138 bpm. The patient received diltiazem with improvement in rate and has since converted to NSR. Repeat EKG is pending. Labs and imaging unremarkable. CHADSVASC 3 points. Eliquis has been initiated. LVEF 30% with akinetic: mid anteroseptal, apical anterior, apical septal, apical lateral and apex and hypokinetic: mid inferoseptal and apical inferior walls.      0 = swallows foods/liquids without difficulty

## 2021-10-28 ENCOUNTER — OFFICE VISIT (OUTPATIENT)
Dept: FAMILY MEDICINE | Facility: CLINIC | Age: 61
End: 2021-10-28
Payer: COMMERCIAL

## 2021-10-28 VITALS
RESPIRATION RATE: 18 BRPM | WEIGHT: 218.69 LBS | BODY MASS INDEX: 34.33 KG/M2 | SYSTOLIC BLOOD PRESSURE: 140 MMHG | OXYGEN SATURATION: 97 % | HEART RATE: 73 BPM | HEIGHT: 67 IN | TEMPERATURE: 99 F | DIASTOLIC BLOOD PRESSURE: 90 MMHG

## 2021-10-28 DIAGNOSIS — B96.89 ACUTE BACTERIAL SINUSITIS: Primary | ICD-10-CM

## 2021-10-28 DIAGNOSIS — Z20.822 SUSPECTED COVID-19 VIRUS INFECTION: ICD-10-CM

## 2021-10-28 DIAGNOSIS — R05.9 COUGH: ICD-10-CM

## 2021-10-28 DIAGNOSIS — J01.90 ACUTE BACTERIAL SINUSITIS: Primary | ICD-10-CM

## 2021-10-28 DIAGNOSIS — E11.65 TYPE 2 DIABETES MELLITUS WITH HYPERGLYCEMIA, WITHOUT LONG-TERM CURRENT USE OF INSULIN: ICD-10-CM

## 2021-10-28 PROCEDURE — 3080F PR MOST RECENT DIASTOLIC BLOOD PRESSURE >= 90 MM HG: ICD-10-PCS | Mod: CPTII,S$GLB,, | Performed by: NURSE PRACTITIONER

## 2021-10-28 PROCEDURE — 3008F BODY MASS INDEX DOCD: CPT | Mod: CPTII,S$GLB,, | Performed by: NURSE PRACTITIONER

## 2021-10-28 PROCEDURE — 3044F HG A1C LEVEL LT 7.0%: CPT | Mod: CPTII,S$GLB,, | Performed by: NURSE PRACTITIONER

## 2021-10-28 PROCEDURE — 3066F NEPHROPATHY DOC TX: CPT | Mod: CPTII,S$GLB,, | Performed by: NURSE PRACTITIONER

## 2021-10-28 PROCEDURE — 1160F PR REVIEW ALL MEDS BY PRESCRIBER/CLIN PHARMACIST DOCUMENTED: ICD-10-PCS | Mod: CPTII,S$GLB,, | Performed by: NURSE PRACTITIONER

## 2021-10-28 PROCEDURE — 3061F NEG MICROALBUMINURIA REV: CPT | Mod: CPTII,S$GLB,, | Performed by: NURSE PRACTITIONER

## 2021-10-28 PROCEDURE — 3061F PR NEG MICROALBUMINURIA RESULT DOCUMENTED/REVIEW: ICD-10-PCS | Mod: CPTII,S$GLB,, | Performed by: NURSE PRACTITIONER

## 2021-10-28 PROCEDURE — 3044F PR MOST RECENT HEMOGLOBIN A1C LEVEL <7.0%: ICD-10-PCS | Mod: CPTII,S$GLB,, | Performed by: NURSE PRACTITIONER

## 2021-10-28 PROCEDURE — 1160F RVW MEDS BY RX/DR IN RCRD: CPT | Mod: CPTII,S$GLB,, | Performed by: NURSE PRACTITIONER

## 2021-10-28 PROCEDURE — 99214 PR OFFICE/OUTPT VISIT, EST, LEVL IV, 30-39 MIN: ICD-10-PCS | Mod: S$GLB,,, | Performed by: NURSE PRACTITIONER

## 2021-10-28 PROCEDURE — 3008F PR BODY MASS INDEX (BMI) DOCUMENTED: ICD-10-PCS | Mod: CPTII,S$GLB,, | Performed by: NURSE PRACTITIONER

## 2021-10-28 PROCEDURE — 3077F PR MOST RECENT SYSTOLIC BLOOD PRESSURE >= 140 MM HG: ICD-10-PCS | Mod: CPTII,S$GLB,, | Performed by: NURSE PRACTITIONER

## 2021-10-28 PROCEDURE — 99214 OFFICE O/P EST MOD 30 MIN: CPT | Mod: S$GLB,,, | Performed by: NURSE PRACTITIONER

## 2021-10-28 PROCEDURE — 1159F MED LIST DOCD IN RCRD: CPT | Mod: CPTII,S$GLB,, | Performed by: NURSE PRACTITIONER

## 2021-10-28 PROCEDURE — 3080F DIAST BP >= 90 MM HG: CPT | Mod: CPTII,S$GLB,, | Performed by: NURSE PRACTITIONER

## 2021-10-28 PROCEDURE — U0005 INFEC AGEN DETEC AMPLI PROBE: HCPCS | Performed by: NURSE PRACTITIONER

## 2021-10-28 PROCEDURE — 3077F SYST BP >= 140 MM HG: CPT | Mod: CPTII,S$GLB,, | Performed by: NURSE PRACTITIONER

## 2021-10-28 PROCEDURE — 3066F PR DOCUMENTATION OF TREATMENT FOR NEPHROPATHY: ICD-10-PCS | Mod: CPTII,S$GLB,, | Performed by: NURSE PRACTITIONER

## 2021-10-28 PROCEDURE — 99999 PR PBB SHADOW E&M-EST. PATIENT-LVL V: CPT | Mod: PBBFAC,,, | Performed by: NURSE PRACTITIONER

## 2021-10-28 PROCEDURE — 99999 PR PBB SHADOW E&M-EST. PATIENT-LVL V: ICD-10-PCS | Mod: PBBFAC,,, | Performed by: NURSE PRACTITIONER

## 2021-10-28 PROCEDURE — 1159F PR MEDICATION LIST DOCUMENTED IN MEDICAL RECORD: ICD-10-PCS | Mod: CPTII,S$GLB,, | Performed by: NURSE PRACTITIONER

## 2021-10-28 PROCEDURE — U0003 INFECTIOUS AGENT DETECTION BY NUCLEIC ACID (DNA OR RNA); SEVERE ACUTE RESPIRATORY SYNDROME CORONAVIRUS 2 (SARS-COV-2) (CORONAVIRUS DISEASE [COVID-19]), AMPLIFIED PROBE TECHNIQUE, MAKING USE OF HIGH THROUGHPUT TECHNOLOGIES AS DESCRIBED BY CMS-2020-01-R: HCPCS | Performed by: NURSE PRACTITIONER

## 2021-10-28 RX ORDER — AMOXICILLIN AND CLAVULANATE POTASSIUM 875; 125 MG/1; MG/1
1 TABLET, FILM COATED ORAL EVERY 12 HOURS
Qty: 14 TABLET | Refills: 0 | Status: SHIPPED | OUTPATIENT
Start: 2021-10-28 | End: 2021-11-04

## 2021-10-29 LAB
SARS-COV-2 RNA RESP QL NAA+PROBE: NOT DETECTED
SARS-COV-2- CYCLE NUMBER: NORMAL

## 2021-11-16 ENCOUNTER — PATIENT OUTREACH (OUTPATIENT)
Dept: ADMINISTRATIVE | Facility: OTHER | Age: 61
End: 2021-11-16
Payer: COMMERCIAL

## 2021-11-16 DIAGNOSIS — E11.9 DIABETES MELLITUS WITHOUT COMPLICATION: Primary | ICD-10-CM

## 2021-11-17 ENCOUNTER — TELEPHONE (OUTPATIENT)
Dept: GASTROENTEROLOGY | Facility: CLINIC | Age: 61
End: 2021-11-17

## 2021-11-17 ENCOUNTER — OFFICE VISIT (OUTPATIENT)
Dept: GASTROENTEROLOGY | Facility: CLINIC | Age: 61
End: 2021-11-17
Payer: COMMERCIAL

## 2021-11-17 VITALS — HEIGHT: 67 IN | WEIGHT: 218.25 LBS | BODY MASS INDEX: 34.26 KG/M2

## 2021-11-17 DIAGNOSIS — R10.12 LUQ PAIN: Primary | ICD-10-CM

## 2021-11-17 DIAGNOSIS — K29.30 CHRONIC SUPERFICIAL GASTRITIS WITHOUT BLEEDING: ICD-10-CM

## 2021-11-17 DIAGNOSIS — K44.9 HIATAL HERNIA: ICD-10-CM

## 2021-11-17 DIAGNOSIS — Z12.11 SCREENING FOR MALIGNANT NEOPLASM OF COLON: ICD-10-CM

## 2021-11-17 PROCEDURE — 3008F BODY MASS INDEX DOCD: CPT | Mod: CPTII,S$GLB,, | Performed by: INTERNAL MEDICINE

## 2021-11-17 PROCEDURE — 1159F MED LIST DOCD IN RCRD: CPT | Mod: CPTII,S$GLB,, | Performed by: INTERNAL MEDICINE

## 2021-11-17 PROCEDURE — 99999 PR PBB SHADOW E&M-EST. PATIENT-LVL IV: ICD-10-PCS | Mod: PBBFAC,,, | Performed by: INTERNAL MEDICINE

## 2021-11-17 PROCEDURE — 3061F NEG MICROALBUMINURIA REV: CPT | Mod: CPTII,S$GLB,, | Performed by: INTERNAL MEDICINE

## 2021-11-17 PROCEDURE — 99999 PR PBB SHADOW E&M-EST. PATIENT-LVL IV: CPT | Mod: PBBFAC,,, | Performed by: INTERNAL MEDICINE

## 2021-11-17 PROCEDURE — 1159F PR MEDICATION LIST DOCUMENTED IN MEDICAL RECORD: ICD-10-PCS | Mod: CPTII,S$GLB,, | Performed by: INTERNAL MEDICINE

## 2021-11-17 PROCEDURE — 3008F PR BODY MASS INDEX (BMI) DOCUMENTED: ICD-10-PCS | Mod: CPTII,S$GLB,, | Performed by: INTERNAL MEDICINE

## 2021-11-17 PROCEDURE — 3066F PR DOCUMENTATION OF TREATMENT FOR NEPHROPATHY: ICD-10-PCS | Mod: CPTII,S$GLB,, | Performed by: INTERNAL MEDICINE

## 2021-11-17 PROCEDURE — 3066F NEPHROPATHY DOC TX: CPT | Mod: CPTII,S$GLB,, | Performed by: INTERNAL MEDICINE

## 2021-11-17 PROCEDURE — 3061F PR NEG MICROALBUMINURIA RESULT DOCUMENTED/REVIEW: ICD-10-PCS | Mod: CPTII,S$GLB,, | Performed by: INTERNAL MEDICINE

## 2021-11-17 PROCEDURE — 3044F PR MOST RECENT HEMOGLOBIN A1C LEVEL <7.0%: ICD-10-PCS | Mod: CPTII,S$GLB,, | Performed by: INTERNAL MEDICINE

## 2021-11-17 PROCEDURE — 1160F RVW MEDS BY RX/DR IN RCRD: CPT | Mod: CPTII,S$GLB,, | Performed by: INTERNAL MEDICINE

## 2021-11-17 PROCEDURE — 99214 OFFICE O/P EST MOD 30 MIN: CPT | Mod: S$GLB,,, | Performed by: INTERNAL MEDICINE

## 2021-11-17 PROCEDURE — 1160F PR REVIEW ALL MEDS BY PRESCRIBER/CLIN PHARMACIST DOCUMENTED: ICD-10-PCS | Mod: CPTII,S$GLB,, | Performed by: INTERNAL MEDICINE

## 2021-11-17 PROCEDURE — 3044F HG A1C LEVEL LT 7.0%: CPT | Mod: CPTII,S$GLB,, | Performed by: INTERNAL MEDICINE

## 2021-11-17 PROCEDURE — 99214 PR OFFICE/OUTPT VISIT, EST, LEVL IV, 30-39 MIN: ICD-10-PCS | Mod: S$GLB,,, | Performed by: INTERNAL MEDICINE

## 2021-11-29 ENCOUNTER — CLINICAL SUPPORT (OUTPATIENT)
Dept: LAB | Facility: HOSPITAL | Age: 61
End: 2021-11-29
Attending: INTERNAL MEDICINE
Payer: COMMERCIAL

## 2021-11-29 DIAGNOSIS — I48.0 PAROXYSMAL ATRIAL FIBRILLATION: ICD-10-CM

## 2021-11-29 PROCEDURE — 93010 RHYTHM STRIP: ICD-10-PCS | Mod: ,,, | Performed by: INTERNAL MEDICINE

## 2021-11-29 PROCEDURE — 93010 ELECTROCARDIOGRAM REPORT: CPT | Mod: ,,, | Performed by: INTERNAL MEDICINE

## 2021-11-29 PROCEDURE — 93005 ELECTROCARDIOGRAM TRACING: CPT

## 2021-12-02 ENCOUNTER — OFFICE VISIT (OUTPATIENT)
Dept: FAMILY MEDICINE | Facility: CLINIC | Age: 61
End: 2021-12-02
Payer: COMMERCIAL

## 2021-12-02 ENCOUNTER — OFFICE VISIT (OUTPATIENT)
Dept: CARDIOLOGY | Facility: CLINIC | Age: 61
End: 2021-12-02
Payer: COMMERCIAL

## 2021-12-02 VITALS
SYSTOLIC BLOOD PRESSURE: 132 MMHG | DIASTOLIC BLOOD PRESSURE: 88 MMHG | HEIGHT: 67 IN | HEART RATE: 70 BPM | BODY MASS INDEX: 34.81 KG/M2 | OXYGEN SATURATION: 98 % | WEIGHT: 221.81 LBS

## 2021-12-02 VITALS
SYSTOLIC BLOOD PRESSURE: 141 MMHG | BODY MASS INDEX: 34.26 KG/M2 | HEIGHT: 67 IN | WEIGHT: 218.25 LBS | DIASTOLIC BLOOD PRESSURE: 80 MMHG | HEART RATE: 71 BPM

## 2021-12-02 DIAGNOSIS — I10 ESSENTIAL HYPERTENSION: Primary | ICD-10-CM

## 2021-12-02 DIAGNOSIS — Z94.4 S/P LIVER TRANSPLANT: Chronic | ICD-10-CM

## 2021-12-02 DIAGNOSIS — I10 ESSENTIAL HYPERTENSION: Chronic | ICD-10-CM

## 2021-12-02 DIAGNOSIS — I48.0 PAROXYSMAL ATRIAL FIBRILLATION: Primary | Chronic | ICD-10-CM

## 2021-12-02 DIAGNOSIS — G47.33 OSA (OBSTRUCTIVE SLEEP APNEA): Chronic | ICD-10-CM

## 2021-12-02 DIAGNOSIS — L29.9 ITCHING: ICD-10-CM

## 2021-12-02 DIAGNOSIS — E11.9 TYPE 2 DIABETES MELLITUS WITHOUT COMPLICATION, WITHOUT LONG-TERM CURRENT USE OF INSULIN: ICD-10-CM

## 2021-12-02 PROCEDURE — 3066F NEPHROPATHY DOC TX: CPT | Mod: CPTII,S$GLB,, | Performed by: FAMILY MEDICINE

## 2021-12-02 PROCEDURE — 3061F NEG MICROALBUMINURIA REV: CPT | Mod: CPTII,S$GLB,, | Performed by: INTERNAL MEDICINE

## 2021-12-02 PROCEDURE — 3066F NEPHROPATHY DOC TX: CPT | Mod: CPTII,S$GLB,, | Performed by: INTERNAL MEDICINE

## 2021-12-02 PROCEDURE — 99214 PR OFFICE/OUTPT VISIT, EST, LEVL IV, 30-39 MIN: ICD-10-PCS | Mod: S$GLB,,, | Performed by: INTERNAL MEDICINE

## 2021-12-02 PROCEDURE — 99999 PR PBB SHADOW E&M-EST. PATIENT-LVL III: ICD-10-PCS | Mod: PBBFAC,,, | Performed by: INTERNAL MEDICINE

## 2021-12-02 PROCEDURE — 99999 PR PBB SHADOW E&M-EST. PATIENT-LVL III: CPT | Mod: PBBFAC,,, | Performed by: INTERNAL MEDICINE

## 2021-12-02 PROCEDURE — 99214 PR OFFICE/OUTPT VISIT, EST, LEVL IV, 30-39 MIN: ICD-10-PCS | Mod: S$GLB,,, | Performed by: FAMILY MEDICINE

## 2021-12-02 PROCEDURE — 3061F PR NEG MICROALBUMINURIA RESULT DOCUMENTED/REVIEW: ICD-10-PCS | Mod: CPTII,S$GLB,, | Performed by: INTERNAL MEDICINE

## 2021-12-02 PROCEDURE — 3066F PR DOCUMENTATION OF TREATMENT FOR NEPHROPATHY: ICD-10-PCS | Mod: CPTII,S$GLB,, | Performed by: FAMILY MEDICINE

## 2021-12-02 PROCEDURE — 3061F PR NEG MICROALBUMINURIA RESULT DOCUMENTED/REVIEW: ICD-10-PCS | Mod: CPTII,S$GLB,, | Performed by: FAMILY MEDICINE

## 2021-12-02 PROCEDURE — 3066F PR DOCUMENTATION OF TREATMENT FOR NEPHROPATHY: ICD-10-PCS | Mod: CPTII,S$GLB,, | Performed by: INTERNAL MEDICINE

## 2021-12-02 PROCEDURE — 99214 OFFICE O/P EST MOD 30 MIN: CPT | Mod: S$GLB,,, | Performed by: INTERNAL MEDICINE

## 2021-12-02 PROCEDURE — 99999 PR PBB SHADOW E&M-EST. PATIENT-LVL IV: CPT | Mod: PBBFAC,,, | Performed by: FAMILY MEDICINE

## 2021-12-02 PROCEDURE — 3061F NEG MICROALBUMINURIA REV: CPT | Mod: CPTII,S$GLB,, | Performed by: FAMILY MEDICINE

## 2021-12-02 PROCEDURE — 99214 OFFICE O/P EST MOD 30 MIN: CPT | Mod: S$GLB,,, | Performed by: FAMILY MEDICINE

## 2021-12-02 PROCEDURE — 99999 PR PBB SHADOW E&M-EST. PATIENT-LVL IV: ICD-10-PCS | Mod: PBBFAC,,, | Performed by: FAMILY MEDICINE

## 2021-12-20 ENCOUNTER — TELEPHONE (OUTPATIENT)
Dept: ENDOSCOPY | Facility: HOSPITAL | Age: 61
End: 2021-12-20
Payer: COMMERCIAL

## 2021-12-23 ENCOUNTER — PATIENT MESSAGE (OUTPATIENT)
Dept: ELECTROPHYSIOLOGY | Facility: CLINIC | Age: 61
End: 2021-12-23
Payer: COMMERCIAL

## 2021-12-23 ENCOUNTER — ANESTHESIA (OUTPATIENT)
Dept: ENDOSCOPY | Facility: HOSPITAL | Age: 61
End: 2021-12-23
Payer: COMMERCIAL

## 2021-12-23 ENCOUNTER — HOSPITAL ENCOUNTER (OUTPATIENT)
Facility: HOSPITAL | Age: 61
Discharge: HOME | End: 2021-12-23
Attending: INTERNAL MEDICINE | Admitting: INTERNAL MEDICINE
Payer: COMMERCIAL

## 2021-12-23 ENCOUNTER — ANESTHESIA EVENT (OUTPATIENT)
Dept: ENDOSCOPY | Facility: HOSPITAL | Age: 61
End: 2021-12-23
Payer: COMMERCIAL

## 2021-12-23 VITALS
TEMPERATURE: 98 F | RESPIRATION RATE: 14 BRPM | DIASTOLIC BLOOD PRESSURE: 81 MMHG | OXYGEN SATURATION: 97 % | SYSTOLIC BLOOD PRESSURE: 116 MMHG | HEART RATE: 77 BPM

## 2021-12-23 DIAGNOSIS — Z12.11 SCREENING FOR MALIGNANT NEOPLASM OF COLON: ICD-10-CM

## 2021-12-23 DIAGNOSIS — I48.0 PAROXYSMAL ATRIAL FIBRILLATION: Primary | ICD-10-CM

## 2021-12-23 LAB — GLUCOSE SERPL-MCNC: 99 MG/DL (ref 70–110)

## 2021-12-23 PROCEDURE — 27201012 HC FORCEPS, HOT/COLD, DISP: Performed by: INTERNAL MEDICINE

## 2021-12-23 PROCEDURE — 37000008 HC ANESTHESIA 1ST 15 MINUTES: Performed by: INTERNAL MEDICINE

## 2021-12-23 PROCEDURE — 43239 EGD BIOPSY SINGLE/MULTIPLE: CPT | Mod: 51,,, | Performed by: INTERNAL MEDICINE

## 2021-12-23 PROCEDURE — 43239 PR EGD, FLEX, W/BIOPSY, SGL/MULTI: ICD-10-PCS | Mod: 51,,, | Performed by: INTERNAL MEDICINE

## 2021-12-23 PROCEDURE — 37000009 HC ANESTHESIA EA ADD 15 MINS: Performed by: INTERNAL MEDICINE

## 2021-12-23 PROCEDURE — 88305 TISSUE EXAM BY PATHOLOGIST: ICD-10-PCS | Mod: 26,,, | Performed by: PATHOLOGY

## 2021-12-23 PROCEDURE — 27201089 HC SNARE, DISP (ANY): Performed by: INTERNAL MEDICINE

## 2021-12-23 PROCEDURE — 45385 PR COLONOSCOPY,REMV LESN,SNARE: ICD-10-PCS | Mod: 33,,, | Performed by: INTERNAL MEDICINE

## 2021-12-23 PROCEDURE — 88305 TISSUE EXAM BY PATHOLOGIST: CPT | Performed by: PATHOLOGY

## 2021-12-23 PROCEDURE — 45385 COLONOSCOPY W/LESION REMOVAL: CPT | Mod: PT | Performed by: INTERNAL MEDICINE

## 2021-12-23 PROCEDURE — 45385 COLONOSCOPY W/LESION REMOVAL: CPT | Mod: 33,,, | Performed by: INTERNAL MEDICINE

## 2021-12-23 PROCEDURE — 63600175 PHARM REV CODE 636 W HCPCS: Performed by: NURSE ANESTHETIST, CERTIFIED REGISTERED

## 2021-12-23 PROCEDURE — 25000003 PHARM REV CODE 250: Performed by: INTERNAL MEDICINE

## 2021-12-23 PROCEDURE — 25000003 PHARM REV CODE 250: Performed by: NURSE ANESTHETIST, CERTIFIED REGISTERED

## 2021-12-23 PROCEDURE — 88305 TISSUE EXAM BY PATHOLOGIST: CPT | Mod: 26,,, | Performed by: PATHOLOGY

## 2021-12-23 PROCEDURE — 43239 EGD BIOPSY SINGLE/MULTIPLE: CPT | Performed by: INTERNAL MEDICINE

## 2021-12-23 RX ORDER — PROPOFOL 10 MG/ML
VIAL (ML) INTRAVENOUS
Status: DISCONTINUED | OUTPATIENT
Start: 2021-12-23 | End: 2021-12-23

## 2021-12-23 RX ORDER — SODIUM CHLORIDE 0.9 % (FLUSH) 0.9 %
10 SYRINGE (ML) INJECTION
Status: DISCONTINUED | OUTPATIENT
Start: 2021-12-23 | End: 2021-12-23 | Stop reason: HOSPADM

## 2021-12-23 RX ORDER — LIDOCAINE HYDROCHLORIDE 20 MG/ML
INJECTION, SOLUTION EPIDURAL; INFILTRATION; INTRACAUDAL; PERINEURAL
Status: DISCONTINUED | OUTPATIENT
Start: 2021-12-23 | End: 2021-12-23

## 2021-12-23 RX ORDER — PROPOFOL 10 MG/ML
VIAL (ML) INTRAVENOUS CONTINUOUS PRN
Status: DISCONTINUED | OUTPATIENT
Start: 2021-12-23 | End: 2021-12-23

## 2021-12-23 RX ORDER — DEXMEDETOMIDINE HYDROCHLORIDE 100 UG/ML
INJECTION, SOLUTION INTRAVENOUS
Status: DISCONTINUED | OUTPATIENT
Start: 2021-12-23 | End: 2021-12-23

## 2021-12-23 RX ORDER — SODIUM CHLORIDE 9 MG/ML
INJECTION, SOLUTION INTRAVENOUS CONTINUOUS
Status: DISCONTINUED | OUTPATIENT
Start: 2021-12-23 | End: 2021-12-23 | Stop reason: HOSPADM

## 2021-12-23 RX ADMIN — PROPOFOL 50 MG: 10 INJECTION, EMULSION INTRAVENOUS at 01:12

## 2021-12-23 RX ADMIN — DEXMEDETOMIDINE HCL 8 MCG: 100 INJECTION INTRAVENOUS at 01:12

## 2021-12-23 RX ADMIN — PROPOFOL 125 MCG/KG/MIN: 10 INJECTION, EMULSION INTRAVENOUS at 01:12

## 2021-12-23 RX ADMIN — LIDOCAINE HYDROCHLORIDE 50 MG: 20 INJECTION, SOLUTION EPIDURAL; INFILTRATION; INTRACAUDAL; PERINEURAL at 01:12

## 2021-12-23 RX ADMIN — PROPOFOL 20 MG: 10 INJECTION, EMULSION INTRAVENOUS at 01:12

## 2021-12-23 RX ADMIN — SODIUM CHLORIDE: 0.9 INJECTION, SOLUTION INTRAVENOUS at 01:12

## 2021-12-23 RX ADMIN — GLYCOPYRROLATE 0.2 MG: 0.2 INJECTION, SOLUTION INTRAMUSCULAR; INTRAVITREAL at 01:12

## 2021-12-23 RX ADMIN — TOPICAL ANESTHETIC 1 EACH: 200 SPRAY DENTAL; PERIODONTAL at 01:12

## 2021-12-27 ENCOUNTER — TELEPHONE (OUTPATIENT)
Dept: ENDOSCOPY | Facility: HOSPITAL | Age: 61
End: 2021-12-27
Payer: COMMERCIAL

## 2021-12-28 LAB
FINAL PATHOLOGIC DIAGNOSIS: NORMAL
GROSS: NORMAL
Lab: NORMAL

## 2022-01-04 ENCOUNTER — TELEPHONE (OUTPATIENT)
Dept: ELECTROPHYSIOLOGY | Facility: CLINIC | Age: 62
End: 2022-01-04
Payer: COMMERCIAL

## 2022-01-04 NOTE — TELEPHONE ENCOUNTER
Per Margie fax auth denied it doesn't meet medical necessity a p2p can be done by calling [phone]    ILR no longer scheduled for 1/14/2021. Patient requesting March.

## 2022-01-04 NOTE — TELEPHONE ENCOUNTER
----- Message from Moris Lainez MA sent at 1/4/2022  4:13 PM CST -----    ----- Message -----  From: Anne Beckham  Sent: 1/4/2022   4:11 PM CST  To: [facility]OOKS Staff    Per Margie fax auth denied it doesn't meet medical necessity a p2p can be done by calling [phone]

## 2022-01-07 ENCOUNTER — PATIENT MESSAGE (OUTPATIENT)
Dept: FAMILY MEDICINE | Facility: CLINIC | Age: 62
End: 2022-01-07
Payer: COMMERCIAL

## 2022-01-11 ENCOUNTER — PATIENT OUTREACH (OUTPATIENT)
Dept: ADMINISTRATIVE | Facility: OTHER | Age: 62
End: 2022-01-11
Payer: COMMERCIAL

## 2022-01-17 ENCOUNTER — PATIENT MESSAGE (OUTPATIENT)
Dept: TRANSPLANT | Facility: CLINIC | Age: 62
End: 2022-01-17
Payer: COMMERCIAL

## 2022-01-21 ENCOUNTER — LAB VISIT (OUTPATIENT)
Dept: LAB | Facility: HOSPITAL | Age: 62
End: 2022-01-21
Attending: INTERNAL MEDICINE
Payer: COMMERCIAL

## 2022-01-21 DIAGNOSIS — Z94.4 LIVER REPLACED BY TRANSPLANT: ICD-10-CM

## 2022-01-21 DIAGNOSIS — C22.0 HCC (HEPATOCELLULAR CARCINOMA): ICD-10-CM

## 2022-01-21 LAB
AFP SERPL-MCNC: 2.4 NG/ML (ref 0–8.4)
ALBUMIN SERPL BCP-MCNC: 4.3 G/DL (ref 3.5–5.2)
ALP SERPL-CCNC: 84 U/L (ref 55–135)
ALT SERPL W/O P-5'-P-CCNC: 21 U/L (ref 10–44)
ANION GAP SERPL CALC-SCNC: 9 MMOL/L (ref 8–16)
AST SERPL-CCNC: 15 U/L (ref 10–40)
BASOPHILS # BLD AUTO: 0.03 K/UL (ref 0–0.2)
BASOPHILS NFR BLD: 0.6 % (ref 0–1.9)
BILIRUB SERPL-MCNC: 0.3 MG/DL (ref 0.1–1)
BUN SERPL-MCNC: 18 MG/DL (ref 8–23)
CALCIUM SERPL-MCNC: 9.2 MG/DL (ref 8.7–10.5)
CHLORIDE SERPL-SCNC: 104 MMOL/L (ref 95–110)
CO2 SERPL-SCNC: 24 MMOL/L (ref 23–29)
CREAT SERPL-MCNC: 1.2 MG/DL (ref 0.5–1.4)
DIFFERENTIAL METHOD: ABNORMAL
EOSINOPHIL # BLD AUTO: 0.1 K/UL (ref 0–0.5)
EOSINOPHIL NFR BLD: 2 % (ref 0–8)
ERYTHROCYTE [DISTWIDTH] IN BLOOD BY AUTOMATED COUNT: 16.2 % (ref 11.5–14.5)
EST. GFR  (AFRICAN AMERICAN): >60 ML/MIN/1.73 M^2
EST. GFR  (NON AFRICAN AMERICAN): >60 ML/MIN/1.73 M^2
GLUCOSE SERPL-MCNC: 112 MG/DL (ref 70–110)
HCT VFR BLD AUTO: 43.4 % (ref 40–54)
HGB BLD-MCNC: 13.6 G/DL (ref 14–18)
IMM GRANULOCYTES # BLD AUTO: 0.01 K/UL (ref 0–0.04)
IMM GRANULOCYTES NFR BLD AUTO: 0.2 % (ref 0–0.5)
LYMPHOCYTES # BLD AUTO: 1.3 K/UL (ref 1–4.8)
LYMPHOCYTES NFR BLD: 23.5 % (ref 18–48)
MCH RBC QN AUTO: 25 PG (ref 27–31)
MCHC RBC AUTO-ENTMCNC: 31.3 G/DL (ref 32–36)
MCV RBC AUTO: 80 FL (ref 82–98)
MONOCYTES # BLD AUTO: 0.5 K/UL (ref 0.3–1)
MONOCYTES NFR BLD: 8.8 % (ref 4–15)
NEUTROPHILS # BLD AUTO: 3.5 K/UL (ref 1.8–7.7)
NEUTROPHILS NFR BLD: 64.9 % (ref 38–73)
NRBC BLD-RTO: 0 /100 WBC
PLATELET # BLD AUTO: 300 K/UL (ref 150–450)
PMV BLD AUTO: 10.2 FL (ref 9.2–12.9)
POTASSIUM SERPL-SCNC: 3.8 MMOL/L (ref 3.5–5.1)
PROT SERPL-MCNC: 7.4 G/DL (ref 6–8.4)
RBC # BLD AUTO: 5.45 M/UL (ref 4.6–6.2)
SODIUM SERPL-SCNC: 137 MMOL/L (ref 136–145)
WBC # BLD AUTO: 5.45 K/UL (ref 3.9–12.7)

## 2022-01-21 PROCEDURE — 82105 ALPHA-FETOPROTEIN SERUM: CPT | Performed by: INTERNAL MEDICINE

## 2022-01-21 PROCEDURE — 80053 COMPREHEN METABOLIC PANEL: CPT | Performed by: INTERNAL MEDICINE

## 2022-01-21 PROCEDURE — 80197 ASSAY OF TACROLIMUS: CPT | Performed by: INTERNAL MEDICINE

## 2022-01-21 PROCEDURE — 36415 COLL VENOUS BLD VENIPUNCTURE: CPT | Mod: PO | Performed by: INTERNAL MEDICINE

## 2022-01-21 PROCEDURE — 85025 COMPLETE CBC W/AUTO DIFF WBC: CPT | Performed by: INTERNAL MEDICINE

## 2022-01-22 LAB — TACROLIMUS BLD-MCNC: 5.1 NG/ML (ref 5–15)

## 2022-01-24 ENCOUNTER — TELEPHONE (OUTPATIENT)
Dept: TRANSPLANT | Facility: CLINIC | Age: 62
End: 2022-01-24
Payer: COMMERCIAL

## 2022-01-24 NOTE — TELEPHONE ENCOUNTER
Letter sent to patient stating: Your labs have been reviewed by your Transplant physician, no action required. Next labs due 4/25/2022        ----- Message from Fab Damon MD sent at 1/23/2022  7:52 AM CST -----  Results reviewed. No action.

## 2022-01-24 NOTE — LETTER
January 24, 2022    Roman Hodges  [address] [facility] [facility] [address]          Dear Roman Hodges:  MRN: [id number]    This is a follow up to your recent labs, your lab results were stable.  There are no medicine changes.  Please have your labs drawn again on 4/25/2022.      If you cannot have your labs drawn on the scheduled date, it is your responsibility to call the transplant department to reschedule.  Please call [phone] and ask to speak to Jodee CESAR   for all scheduling requests.     Sincerely,  Charmaine PENNN, RN          Your Liver Transplant Coordinator    [facility]  [address] [place] [id number]  [phone]

## 2022-01-25 DIAGNOSIS — Z94.4 S/P LIVER TRANSPLANT: Primary | ICD-10-CM

## 2022-02-18 ENCOUNTER — PATIENT OUTREACH (OUTPATIENT)
Dept: ADMINISTRATIVE | Facility: HOSPITAL | Age: 62
End: 2022-02-18
Payer: COMMERCIAL

## 2022-02-18 NOTE — PROGRESS NOTES
Chart audit performed Immunizations triggered / care everywhere updated.Outreach to patient regarding scheduling labs and Efax sent to [facility] care [facility]oc for DM eye exam

## 2022-02-21 ENCOUNTER — LAB VISIT (OUTPATIENT)
Dept: LAB | Facility: HOSPITAL | Age: 62
End: 2022-02-21
Attending: FAMILY MEDICINE
Payer: COMMERCIAL

## 2022-02-21 DIAGNOSIS — I10 ESSENTIAL HYPERTENSION: ICD-10-CM

## 2022-02-21 DIAGNOSIS — E11.9 TYPE 2 DIABETES MELLITUS WITHOUT COMPLICATION, WITHOUT LONG-TERM CURRENT USE OF INSULIN: ICD-10-CM

## 2022-02-21 LAB
CHOLEST SERPL-MCNC: 153 MG/DL (ref 120–199)
CHOLEST/HDLC SERPL: 5.3 {RATIO} (ref 2–5)
ESTIMATED AVG GLUCOSE: 134 MG/DL (ref 68–131)
HBA1C MFR BLD: 6.3 % (ref 4–5.6)
HDLC SERPL-MCNC: 29 MG/DL (ref 40–75)
HDLC SERPL: 19 % (ref 20–50)
LDLC SERPL CALC-MCNC: 98.6 MG/DL (ref 63–159)
NONHDLC SERPL-MCNC: 124 MG/DL
TRIGL SERPL-MCNC: 127 MG/DL (ref 30–150)

## 2022-02-21 PROCEDURE — 83036 HEMOGLOBIN GLYCOSYLATED A1C: CPT | Performed by: FAMILY MEDICINE

## 2022-02-21 PROCEDURE — 80061 LIPID PANEL: CPT | Performed by: FAMILY MEDICINE

## 2022-02-21 PROCEDURE — 36415 COLL VENOUS BLD VENIPUNCTURE: CPT | Mod: PO | Performed by: FAMILY MEDICINE

## 2022-03-07 ENCOUNTER — PATIENT OUTREACH (OUTPATIENT)
Dept: ADMINISTRATIVE | Facility: OTHER | Age: 62
End: 2022-03-07
Payer: COMMERCIAL

## 2022-03-22 ENCOUNTER — PATIENT MESSAGE (OUTPATIENT)
Dept: FAMILY MEDICINE | Facility: CLINIC | Age: 62
End: 2022-03-22
Payer: COMMERCIAL

## 2022-03-24 ENCOUNTER — LAB VISIT (OUTPATIENT)
Dept: LAB | Facility: HOSPITAL | Age: 62
End: 2022-03-24
Attending: FAMILY MEDICINE
Payer: COMMERCIAL

## 2022-03-24 ENCOUNTER — OFFICE VISIT (OUTPATIENT)
Dept: FAMILY MEDICINE | Facility: CLINIC | Age: 62
End: 2022-03-24
Payer: COMMERCIAL

## 2022-03-24 VITALS
WEIGHT: 222 LBS | OXYGEN SATURATION: 98 % | HEIGHT: 67 IN | HEART RATE: 80 BPM | SYSTOLIC BLOOD PRESSURE: 132 MMHG | DIASTOLIC BLOOD PRESSURE: 84 MMHG | BODY MASS INDEX: 34.84 KG/M2

## 2022-03-24 DIAGNOSIS — Z12.5 SCREENING FOR PROSTATE CANCER: ICD-10-CM

## 2022-03-24 DIAGNOSIS — L29.9 ITCHING: ICD-10-CM

## 2022-03-24 DIAGNOSIS — E78.6 LOW HDL (UNDER 40): ICD-10-CM

## 2022-03-24 DIAGNOSIS — I10 ESSENTIAL HYPERTENSION: ICD-10-CM

## 2022-03-24 DIAGNOSIS — G56.03 BILATERAL CARPAL TUNNEL SYNDROME: ICD-10-CM

## 2022-03-24 DIAGNOSIS — Z91.09 MITE ALLERGY: Primary | ICD-10-CM

## 2022-03-24 LAB — COMPLEXED PSA SERPL-MCNC: 1 NG/ML (ref 0–4)

## 2022-03-24 PROCEDURE — 3044F HG A1C LEVEL LT 7.0%: CPT | Mod: CPTII,S$GLB,, | Performed by: FAMILY MEDICINE

## 2022-03-24 PROCEDURE — 99999 PR PBB SHADOW E&M-EST. PATIENT-LVL IV: CPT | Mod: PBBFAC,,, | Performed by: FAMILY MEDICINE

## 2022-03-24 PROCEDURE — 99215 OFFICE O/P EST HI 40 MIN: CPT | Mod: S$GLB,,, | Performed by: FAMILY MEDICINE

## 2022-03-24 PROCEDURE — 3075F SYST BP GE 130 - 139MM HG: CPT | Mod: CPTII,S$GLB,, | Performed by: FAMILY MEDICINE

## 2022-03-24 PROCEDURE — 3008F PR BODY MASS INDEX (BMI) DOCUMENTED: ICD-10-PCS | Mod: CPTII,S$GLB,, | Performed by: FAMILY MEDICINE

## 2022-03-24 PROCEDURE — 36415 COLL VENOUS BLD VENIPUNCTURE: CPT | Mod: PO | Performed by: FAMILY MEDICINE

## 2022-03-24 PROCEDURE — 3079F PR MOST RECENT DIASTOLIC BLOOD PRESSURE 80-89 MM HG: ICD-10-PCS | Mod: CPTII,S$GLB,, | Performed by: FAMILY MEDICINE

## 2022-03-24 PROCEDURE — 3075F PR MOST RECENT SYSTOLIC BLOOD PRESS GE 130-139MM HG: ICD-10-PCS | Mod: CPTII,S$GLB,, | Performed by: FAMILY MEDICINE

## 2022-03-24 PROCEDURE — 1159F MED LIST DOCD IN RCRD: CPT | Mod: CPTII,S$GLB,, | Performed by: FAMILY MEDICINE

## 2022-03-24 PROCEDURE — 3044F PR MOST RECENT HEMOGLOBIN A1C LEVEL <7.0%: ICD-10-PCS | Mod: CPTII,S$GLB,, | Performed by: FAMILY MEDICINE

## 2022-03-24 PROCEDURE — 99215 PR OFFICE/OUTPT VISIT, EST, LEVL V, 40-54 MIN: ICD-10-PCS | Mod: S$GLB,,, | Performed by: FAMILY MEDICINE

## 2022-03-24 PROCEDURE — 84153 ASSAY OF PSA TOTAL: CPT | Performed by: FAMILY MEDICINE

## 2022-03-24 PROCEDURE — 99999 PR PBB SHADOW E&M-EST. PATIENT-LVL IV: ICD-10-PCS | Mod: PBBFAC,,, | Performed by: FAMILY MEDICINE

## 2022-03-24 PROCEDURE — 3079F DIAST BP 80-89 MM HG: CPT | Mod: CPTII,S$GLB,, | Performed by: FAMILY MEDICINE

## 2022-03-24 PROCEDURE — 3008F BODY MASS INDEX DOCD: CPT | Mod: CPTII,S$GLB,, | Performed by: FAMILY MEDICINE

## 2022-03-24 PROCEDURE — 1159F PR MEDICATION LIST DOCUMENTED IN MEDICAL RECORD: ICD-10-PCS | Mod: CPTII,S$GLB,, | Performed by: FAMILY MEDICINE

## 2022-03-24 NOTE — PROGRESS NOTES
Subjective:       Patient ID: Roman Hodges is a 62 y.o. male.    Chief Complaint: Follow-up and Hypertension    62 years old male came to the clinic for blood pressure check.  Patient with body itching sometimes.  Patient previously diagnosed with mites dust allergy.  Patient with low HDL.  Total cholesterol was normal.  Patient with a BMI of 34 currently trying to lose weight.  He reports bilateral hand numbness sometimes.    Review of Systems   Constitutional: Negative.    HENT: Negative.    Eyes: Negative.    Respiratory: Negative.    Cardiovascular: Negative.  Negative for chest pain, palpitations, leg swelling and claudication.   Gastrointestinal: Negative.    Genitourinary: Negative.    Musculoskeletal: Negative.    Integumentary:  Negative for rash. Negative.   Neurological: Positive for numbness.   Psychiatric/Behavioral: Negative.          Objective:      Physical Exam  Vitals and nursing note reviewed.   Constitutional:       General: He is not in acute distress.     Appearance: He is well-developed. He is not diaphoretic.   HENT:      Head: Normocephalic and atraumatic.      Right Ear: External ear normal.      Left Ear: External ear normal.      Nose: Nose normal.      Mouth/Throat:      Pharynx: No oropharyngeal exudate.   Eyes:      General: No scleral icterus.        Right eye: No discharge.         Left eye: No discharge.      Conjunctiva/sclera: Conjunctivae normal.      Pupils: Pupils are equal, round, and reactive to light.   Neck:      Thyroid: No thyromegaly.      Vascular: No JVD.      Trachea: No tracheal deviation.   Cardiovascular:      Rate and Rhythm: Normal rate and regular rhythm.      Heart sounds: Normal heart sounds. No murmur heard.    No friction rub. No gallop.   Pulmonary:      Effort: Pulmonary effort is normal. No respiratory distress.      Breath sounds: Normal breath sounds. No stridor. No wheezing or rales.   Chest:      Chest wall: No tenderness.   Abdominal:       General: Bowel sounds are normal. There is no distension.      Palpations: Abdomen is soft. There is no mass.      Tenderness: There is no abdominal tenderness. There is no guarding or rebound.   Musculoskeletal:         General: No tenderness. Normal range of motion.      Cervical back: Normal range of motion and neck supple.   Lymphadenopathy:      Cervical: No cervical adenopathy.   Skin:     General: Skin is warm and dry.      Coloration: Skin is not pale.      Findings: No erythema or rash.   Neurological:      Mental Status: He is alert and oriented to person, place, and time.      Cranial Nerves: No cranial nerve deficit.      Motor: No abnormal muscle tone.      Coordination: Coordination normal.      Deep Tendon Reflexes: Reflexes are normal and symmetric. Reflexes normal.   Psychiatric:         Behavior: Behavior normal.         Thought Content: Thought content normal.         Judgment: Judgment normal.         Assessment:       Problem List Items Addressed This Visit     Essential hypertension (Chronic)    Mite allergy - Primary    Relevant Orders    Ambulatory referral/consult to Dermatology      Other Visit Diagnoses     Itching        Relevant Orders    Ambulatory referral/consult to Dermatology    Low HDL (under 40)        Screening for prostate cancer        Relevant Orders    PSA, Screening    Bilateral carpal tunnel syndrome        BMI 34.0-34.9,adult              Plan:         Roman was seen today for follow-up and hypertension.    Diagnoses and all orders for this visit:    Mite allergy  -     Ambulatory referral/consult to Dermatology; Future    Itching  -     Ambulatory referral/consult to Dermatology; Future    Essential hypertension    Low HDL (under 40)    Screening for prostate cancer  -     PSA, Screening; Future    Bilateral carpal tunnel syndrome    BMI 34.0-34.9,adult      Continue monitoring blood pressure at home, low sodium diet.   Diet and physical activity to promote weight  loss.

## 2022-03-31 ENCOUNTER — OFFICE VISIT (OUTPATIENT)
Dept: CARDIOLOGY | Facility: CLINIC | Age: 62
End: 2022-03-31
Payer: COMMERCIAL

## 2022-03-31 VITALS
BODY MASS INDEX: 34.28 KG/M2 | HEART RATE: 71 BPM | WEIGHT: 218.38 LBS | HEIGHT: 67 IN | DIASTOLIC BLOOD PRESSURE: 80 MMHG | OXYGEN SATURATION: 98 % | SYSTOLIC BLOOD PRESSURE: 126 MMHG

## 2022-03-31 DIAGNOSIS — Z79.01 LONG TERM (CURRENT) USE OF ANTICOAGULANTS: Chronic | ICD-10-CM

## 2022-03-31 DIAGNOSIS — R07.9 CHEST PAIN, UNSPECIFIED TYPE: Primary | ICD-10-CM

## 2022-03-31 DIAGNOSIS — I48.0 PAROXYSMAL ATRIAL FIBRILLATION: ICD-10-CM

## 2022-03-31 DIAGNOSIS — G47.33 OSA (OBSTRUCTIVE SLEEP APNEA): Chronic | ICD-10-CM

## 2022-03-31 DIAGNOSIS — I10 ESSENTIAL HYPERTENSION: Chronic | ICD-10-CM

## 2022-03-31 DIAGNOSIS — E66.9 OBESITY (BMI 30.0-34.9): ICD-10-CM

## 2022-03-31 DIAGNOSIS — Z94.4 S/P LIVER TRANSPLANT: Chronic | ICD-10-CM

## 2022-03-31 PROCEDURE — 99214 PR OFFICE/OUTPT VISIT, EST, LEVL IV, 30-39 MIN: ICD-10-PCS | Mod: S$GLB,,, | Performed by: INTERNAL MEDICINE

## 2022-03-31 PROCEDURE — 93000 ELECTROCARDIOGRAM COMPLETE: CPT | Mod: S$GLB,,, | Performed by: INTERNAL MEDICINE

## 2022-03-31 PROCEDURE — 1160F RVW MEDS BY RX/DR IN RCRD: CPT | Mod: CPTII,S$GLB,, | Performed by: INTERNAL MEDICINE

## 2022-03-31 PROCEDURE — 1160F PR REVIEW ALL MEDS BY PRESCRIBER/CLIN PHARMACIST DOCUMENTED: ICD-10-PCS | Mod: CPTII,S$GLB,, | Performed by: INTERNAL MEDICINE

## 2022-03-31 PROCEDURE — 3074F SYST BP LT 130 MM HG: CPT | Mod: CPTII,S$GLB,, | Performed by: INTERNAL MEDICINE

## 2022-03-31 PROCEDURE — 3079F DIAST BP 80-89 MM HG: CPT | Mod: CPTII,S$GLB,, | Performed by: INTERNAL MEDICINE

## 2022-03-31 PROCEDURE — 1159F MED LIST DOCD IN RCRD: CPT | Mod: CPTII,S$GLB,, | Performed by: INTERNAL MEDICINE

## 2022-03-31 PROCEDURE — 3008F PR BODY MASS INDEX (BMI) DOCUMENTED: ICD-10-PCS | Mod: CPTII,S$GLB,, | Performed by: INTERNAL MEDICINE

## 2022-03-31 PROCEDURE — 3008F BODY MASS INDEX DOCD: CPT | Mod: CPTII,S$GLB,, | Performed by: INTERNAL MEDICINE

## 2022-03-31 PROCEDURE — 3044F PR MOST RECENT HEMOGLOBIN A1C LEVEL <7.0%: ICD-10-PCS | Mod: CPTII,S$GLB,, | Performed by: INTERNAL MEDICINE

## 2022-03-31 PROCEDURE — 3074F PR MOST RECENT SYSTOLIC BLOOD PRESSURE < 130 MM HG: ICD-10-PCS | Mod: CPTII,S$GLB,, | Performed by: INTERNAL MEDICINE

## 2022-03-31 PROCEDURE — 3044F HG A1C LEVEL LT 7.0%: CPT | Mod: CPTII,S$GLB,, | Performed by: INTERNAL MEDICINE

## 2022-03-31 PROCEDURE — 99999 PR PBB SHADOW E&M-EST. PATIENT-LVL IV: ICD-10-PCS | Mod: PBBFAC,,, | Performed by: INTERNAL MEDICINE

## 2022-03-31 PROCEDURE — 99999 PR PBB SHADOW E&M-EST. PATIENT-LVL IV: CPT | Mod: PBBFAC,,, | Performed by: INTERNAL MEDICINE

## 2022-03-31 PROCEDURE — 1159F PR MEDICATION LIST DOCUMENTED IN MEDICAL RECORD: ICD-10-PCS | Mod: CPTII,S$GLB,, | Performed by: INTERNAL MEDICINE

## 2022-03-31 PROCEDURE — 99214 OFFICE O/P EST MOD 30 MIN: CPT | Mod: S$GLB,,, | Performed by: INTERNAL MEDICINE

## 2022-03-31 PROCEDURE — 93000 EKG 12-LEAD: ICD-10-PCS | Mod: S$GLB,,, | Performed by: INTERNAL MEDICINE

## 2022-03-31 PROCEDURE — 3079F PR MOST RECENT DIASTOLIC BLOOD PRESSURE 80-89 MM HG: ICD-10-PCS | Mod: CPTII,S$GLB,, | Performed by: INTERNAL MEDICINE

## 2022-03-31 NOTE — PROGRESS NOTES
Subjective:    Patient ID:  Roman Hodges is a 62 y.o. male who presents for follow-up of Chest Pain      HPI    61 y/o Slovenian speaking male with hx of HTN, DM, PAFib, HFrEF with return to normal function (previously 30%, last 2DE with 55%) and HCC s/p OLTx. Patient was initially admitted with AF RVR after presenting with near syncopal episode and spontaneously converted. At that time, 2DE with EF 30% with akinetic: mid anteroseptal, apical anterior, apical septal, apical lateral and apex and hypokinetic: mid inferoseptal and apical inferior walls. Again presented with similar symptoms, started on BB and DOAC and discharged home. Seen by Dr Sargent and was complaining of exertional epigastric/substernal pain and was admitted for ACS r/o. Had LHC with nonobstructive CAD and EF on V-gram normal without WMA's. Clinically improved and discharged home. Repeat 2DE with normalization of function to 55%. Did well post discharge, but then began having episodes of HTN urgency and had presented multiple times to ED with SBP >200. Had Oltx successfully and has done well. BP meds had been changed and had been having elevated readings. Changed BP regimen and BP controlled. Was hospitalized at [facility] for 8 days and had full recovery.   Previous visit was complaining of palps with chest discomfort. Had epigastric discomfort and SOB. Episode lasted about an hour. No recurrent episodes. No significant caffeine. Had normal 2DE and Holter. No recurrent episodes.  Had an episode of chest tightness with moderate to heavy activity about 1 month ago, lasted seconds, resolved spontaneously, and no recurrence. Walks 6-8 miles daily with no symptoms.  Denies regular orthopnea, PND, syncope, palps. Tolerating meds well and compliant.     Review of Systems   Constitutional: Negative for malaise/fatigue.   HENT: Negative for congestion.    Eyes: Negative for blurred vision.   Cardiovascular: Negative for chest pain, claudication,  cyanosis, dyspnea on exertion, irregular heartbeat, leg swelling, near-syncope, orthopnea, palpitations, paroxysmal nocturnal dyspnea and syncope.   Respiratory: Negative for shortness of breath.    Endocrine: Negative for polyuria.   Hematologic/Lymphatic: Negative for bleeding problem.   Skin: Negative for itching and rash.   Musculoskeletal: Negative for joint swelling, muscle cramps and muscle weakness.   Gastrointestinal: Negative for abdominal pain, hematemesis, hematochezia, melena, nausea and vomiting.   Genitourinary: Negative for dysuria and hematuria.   Neurological: Negative for dizziness, focal weakness, headaches, light-headedness, loss of balance and weakness.   Psychiatric/Behavioral: Negative for depression. The patient is not nervous/anxious.         Objective:    Physical Exam  Constitutional:       Appearance: He is well-developed.   HENT:      Head: Normocephalic and atraumatic.   Neck:      Vascular: No JVD.   Cardiovascular:      Rate and Rhythm: Normal rate and regular rhythm.      Pulses:           Carotid pulses are 2+ on the right side and 2+ on the left side.       Radial pulses are 2+ on the right side and 2+ on the left side.        Femoral pulses are 2+ on the right side and 2+ on the left side.       Dorsalis pedis pulses are 2+ on the right side and 2+ on the left side.        Posterior tibial pulses are 2+ on the right side and 2+ on the left side.      Heart sounds: Normal heart sounds.   Pulmonary:      Effort: Pulmonary effort is normal.      Breath sounds: Normal breath sounds.   Abdominal:      General: Bowel sounds are normal.      Palpations: Abdomen is soft.   Musculoskeletal:      Cervical back: Neck supple.   Skin:     General: Skin is warm and dry.   Neurological:      Mental Status: He is alert and oriented to person, place, and time.   Psychiatric:         Behavior: Behavior normal.         Thought Content: Thought content normal.           Assessment:       1. Chest  pain, unspecified type    2. Paroxysmal atrial fibrillation    3. Essential hypertension    4. Long term (current) use of anticoagulants    5. Obesity (BMI 30.0-34.9)    6. S/P liver transplant    7. MALLIKA (obstructive sleep apnea)      61 y/o pt with hx and presentation as above. Doing well from a cardiac perspective and compensated from a HF perspective. Chest discomfort likely non cardiac and no regular symptoms at full activity. Needs to lose weight and stay active. Discussed the etiology, evaluation, and management of CP, HTN, afib, CHF, obesity, MALLIKA. Discussed the importance of med compliance, heart healthy diet, and regular exercise.      Plan:       -Continue current medical management  -f/u in 1 year

## 2022-04-07 ENCOUNTER — PATIENT MESSAGE (OUTPATIENT)
Dept: FAMILY MEDICINE | Facility: CLINIC | Age: 62
End: 2022-04-07

## 2022-04-07 ENCOUNTER — PATIENT MESSAGE (OUTPATIENT)
Dept: TRANSPLANT | Facility: CLINIC | Age: 62
End: 2022-04-07
Payer: COMMERCIAL

## 2022-04-07 ENCOUNTER — OFFICE VISIT (OUTPATIENT)
Dept: FAMILY MEDICINE | Facility: CLINIC | Age: 62
End: 2022-04-07
Payer: COMMERCIAL

## 2022-04-07 DIAGNOSIS — M77.11 LATERAL EPICONDYLITIS OF RIGHT ELBOW: Primary | ICD-10-CM

## 2022-04-07 PROCEDURE — 1159F MED LIST DOCD IN RCRD: CPT | Mod: CPTII,95,, | Performed by: NURSE PRACTITIONER

## 2022-04-07 PROCEDURE — 1159F PR MEDICATION LIST DOCUMENTED IN MEDICAL RECORD: ICD-10-PCS | Mod: CPTII,95,, | Performed by: NURSE PRACTITIONER

## 2022-04-07 PROCEDURE — 99214 OFFICE O/P EST MOD 30 MIN: CPT | Mod: 95,,, | Performed by: NURSE PRACTITIONER

## 2022-04-07 PROCEDURE — 3044F HG A1C LEVEL LT 7.0%: CPT | Mod: CPTII,95,, | Performed by: NURSE PRACTITIONER

## 2022-04-07 PROCEDURE — 1160F RVW MEDS BY RX/DR IN RCRD: CPT | Mod: CPTII,95,, | Performed by: NURSE PRACTITIONER

## 2022-04-07 PROCEDURE — 3044F PR MOST RECENT HEMOGLOBIN A1C LEVEL <7.0%: ICD-10-PCS | Mod: CPTII,95,, | Performed by: NURSE PRACTITIONER

## 2022-04-07 PROCEDURE — 99214 PR OFFICE/OUTPT VISIT, EST, LEVL IV, 30-39 MIN: ICD-10-PCS | Mod: 95,,, | Performed by: NURSE PRACTITIONER

## 2022-04-07 PROCEDURE — 1160F PR REVIEW ALL MEDS BY PRESCRIBER/CLIN PHARMACIST DOCUMENTED: ICD-10-PCS | Mod: CPTII,95,, | Performed by: NURSE PRACTITIONER

## 2022-04-07 RX ORDER — LIDOCAINE 50 MG/G
1 PATCH TOPICAL DAILY
Qty: 30 PATCH | Refills: 0 | Status: SHIPPED | OUTPATIENT
Start: 2022-04-07 | End: 2022-05-02

## 2022-04-07 NOTE — PROGRESS NOTES
Subjective:       Patient ID: Roman Hodges is a 62 y.o. male.    Chief Complaint: Arm Pain, Muscle Pain, Arm Injury, and Extremity Weakness    The patient location is: [place]  The chief complaint leading to consultation is: arm pain  Visit type: audiovisual    Face to Face time with patient: 22 minutes  30 minutes of total time spent on the encounter, which includes face to face time and non-face to face time preparing to see the patient (eg, review of tests), Obtaining and/or reviewing separately obtained history, Documenting clinical information in the electronic or other health record, Independently interpreting results (not separately reported) and communicating results to the patient/family/caregiver, or Care coordination (not separately reported).     Each patient to whom he or she provides medical services by telemedicine is:  (1) informed of the relationship between the physician and patient and the respective role of any other health care provider with respect to management of the patient; and (2) notified that he or she may decline to receive medical services by telemedicine and may withdraw from such care at any time.    Notes: This is a pleasant 63 yo  male patient of Dr. Angel who is known to me. He presents today with c/o right forearm/elbow pain. PMH includes    Patient Active Problem List:     MALLIKA (obstructive sleep apnea)     Obesity (BMI 30.0-34.9)     Atrial fibrillation     Essential hypertension     Type 2 diabetes mellitus, without long-term current use of insulin     GERD (gastroesophageal reflux disease)     Open angle with borderline findings and low 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 eyes     Long term (current) use of anticoagulants     S/P liver transplant     Prophylactic immunotherapy     Adrenal cortical steroids causing adverse effect in therapeutic use     Hypophosphatemia     Long-term use of immunosuppressant medication     At risk for opportunistic infections     Paroxysmal  atrial fibrillation     Pneumonia due to COVID-19 virus     Lymphopenia     Hyponatremia     Hypokalemia     Hypomagnesemia     Acute hypoxemic respiratory failure     Mite allergy    Reports he was recently doing some repairs when a door fell down; he tried catching it and believes he may have strained his right arm. Has been in significant pain to right arm with movement. ROM is unaffected. No swelling or discoloration. Denies numbness/tingling/loss of sensation or muscle spasms. Has used IcyHot with minimal improvement. Hx of liver transplant and wants to be cautious with medications he takes for pain.    Review of Systems   Constitutional: Negative for activity change and unexpected weight change.   HENT: Negative for hearing loss, rhinorrhea and trouble swallowing.    Eyes: Negative for discharge and visual disturbance.   Respiratory: Negative for chest tightness and wheezing.    Cardiovascular: Negative for chest pain and palpitations.   Gastrointestinal: Negative for blood in stool, constipation, diarrhea and vomiting.   Endocrine: Negative for polydipsia and polyuria.   Genitourinary: Negative for difficulty urinating, hematuria and urgency.   Musculoskeletal: Negative for arthralgias, joint swelling and neck pain.        Right forearm pain   Neurological: Negative for weakness and headaches.   Psychiatric/Behavioral: Negative for confusion and dysphoric mood.         Objective:      Physical Exam  Constitutional:       General: He is not in acute distress.     Appearance: Normal appearance. He is well-developed and well-groomed.   HENT:      Head: Normocephalic.   Pulmonary:      Effort: Pulmonary effort is normal. No respiratory distress.   Skin:     Coloration: Skin is not pale.   Neurological:      Mental Status: He is alert and oriented to person, place, and time.   Psychiatric:         Attention and Perception: Attention normal.         Mood and Affect: Mood and affect normal.         Speech: Speech  normal.         Behavior: Behavior normal. Behavior is cooperative.       Physical exam limited d/t virtual visit. Patient does not appear to be in any respiratory distress. Able to speak in complete sentences without becoming short of breath.  Assessment:       Problem List Items Addressed This Visit    None     Visit Diagnoses     Lateral epicondylitis of right elbow    -  Primary    Relevant Medications    LIDOcaine (LIDODERM) 5 %          Plan:       Roman was seen today for arm pain, muscle pain, arm injury and extremity weakness.    Diagnoses and all orders for this visit:    Lateral epicondylitis of right elbow  -     LIDOcaine (LIDODERM) 5 %; Place 1 patch onto the skin once daily. Remove & Discard patch within 12 hours        - May apply lidocaine patch once daily, remove after 12 hours and gently cleanse site  - Avoid heavy lifting and strenuous activity  - May also apply ice pack to site for 10-15 min at a time several times daily  - Warning signs discussed  - Will consider PT/OT and send netZentry message if needed  - RTC as needed          I spent a total of 30 minutes on the day of the visit.  This includes face to face time and non-face to face time preparing to see the patient (eg, review of tests), obtaining and/or reviewing separately obtained history, documenting clinical information in the electronic or other health record, independently interpreting results and communicating results to the patient/family/caregiver, or care coordinator.

## 2022-04-07 NOTE — LETTER
April 7, 2022      [facility]  [address]  [place] [address]  Phone: [phone]  Fax: [phone]       Patient: Roman Hodges   YOB: 1960  Date of Visit: 04/07/2022    To Whom It May Concern:    Colleen Hodges  was seen by me on 04/07/2022. The patient may return to work on 4/11/2022. If you have any questions or concerns, or if I can be of further assistance, please do not hesitate to contact me.    Sincerely,        Ilana Barker NP

## 2022-04-08 ENCOUNTER — IMMUNIZATION (OUTPATIENT)
Dept: PHARMACY | Facility: CLINIC | Age: 62
End: 2022-04-08
Payer: COMMERCIAL

## 2022-04-08 DIAGNOSIS — Z23 NEED FOR VACCINATION: Primary | ICD-10-CM

## 2022-04-14 DIAGNOSIS — C22.0 HCC (HEPATOCELLULAR CARCINOMA): ICD-10-CM

## 2022-04-14 DIAGNOSIS — Z00.6 RESEARCH STUDY PATIENT: Primary | ICD-10-CM

## 2022-04-20 ENCOUNTER — PATIENT MESSAGE (OUTPATIENT)
Dept: TRANSPLANT | Facility: CLINIC | Age: 62
End: 2022-04-20
Payer: COMMERCIAL

## 2022-04-25 ENCOUNTER — LAB VISIT (OUTPATIENT)
Dept: LAB | Facility: HOSPITAL | Age: 62
End: 2022-04-25
Attending: INTERNAL MEDICINE
Payer: COMMERCIAL

## 2022-04-25 DIAGNOSIS — C22.0 HCC (HEPATOCELLULAR CARCINOMA): ICD-10-CM

## 2022-04-25 DIAGNOSIS — Z00.6 RESEARCH STUDY PATIENT: ICD-10-CM

## 2022-04-25 DIAGNOSIS — Z94.4 S/P LIVER TRANSPLANT: ICD-10-CM

## 2022-04-25 DIAGNOSIS — Z94.4 LIVER REPLACED BY TRANSPLANT: ICD-10-CM

## 2022-04-25 LAB
AFP SERPL-MCNC: <2 NG/ML (ref 0–8.4)
ALBUMIN SERPL BCP-MCNC: 4.3 G/DL (ref 3.5–5.2)
ALP SERPL-CCNC: 64 U/L (ref 55–135)
ALT SERPL W/O P-5'-P-CCNC: 20 U/L (ref 10–44)
ANION GAP SERPL CALC-SCNC: 15 MMOL/L (ref 8–16)
AST SERPL-CCNC: 14 U/L (ref 10–40)
BILIRUB SERPL-MCNC: 0.5 MG/DL (ref 0.1–1)
BUN SERPL-MCNC: 16 MG/DL (ref 8–23)
CALCIUM SERPL-MCNC: 9.1 MG/DL (ref 8.7–10.5)
CHLORIDE SERPL-SCNC: 101 MMOL/L (ref 95–110)
CO2 SERPL-SCNC: 26 MMOL/L (ref 23–29)
CREAT SERPL-MCNC: 1 MG/DL (ref 0.5–1.4)
EST. GFR  (AFRICAN AMERICAN): >60 ML/MIN/1.73 M^2
EST. GFR  (NON AFRICAN AMERICAN): >60 ML/MIN/1.73 M^2
GLUCOSE SERPL-MCNC: 119 MG/DL (ref 70–110)
POTASSIUM SERPL-SCNC: 3.2 MMOL/L (ref 3.5–5.1)
PROT SERPL-MCNC: 7.5 G/DL (ref 6–8.4)
SODIUM SERPL-SCNC: 142 MMOL/L (ref 136–145)

## 2022-04-25 PROCEDURE — 82105 ALPHA-FETOPROTEIN SERUM: CPT | Performed by: INTERNAL MEDICINE

## 2022-04-25 PROCEDURE — 80053 COMPREHEN METABOLIC PANEL: CPT | Performed by: INTERNAL MEDICINE

## 2022-04-25 PROCEDURE — 80197 ASSAY OF TACROLIMUS: CPT | Performed by: INTERNAL MEDICINE

## 2022-04-25 PROCEDURE — 85025 COMPLETE CBC W/AUTO DIFF WBC: CPT | Performed by: INTERNAL MEDICINE

## 2022-04-25 PROCEDURE — 36415 COLL VENOUS BLD VENIPUNCTURE: CPT | Mod: PO | Performed by: INTERNAL MEDICINE

## 2022-04-26 LAB
BASOPHILS # BLD AUTO: 0.05 K/UL (ref 0–0.2)
BASOPHILS NFR BLD: 0.8 % (ref 0–1.9)
DIFFERENTIAL METHOD: ABNORMAL
EOSINOPHIL # BLD AUTO: 0.1 K/UL (ref 0–0.5)
EOSINOPHIL NFR BLD: 1.8 % (ref 0–8)
ERYTHROCYTE [DISTWIDTH] IN BLOOD BY AUTOMATED COUNT: 17.2 % (ref 11.5–14.5)
HCT VFR BLD AUTO: 46.4 % (ref 40–54)
HGB BLD-MCNC: 14 G/DL (ref 14–18)
IMM GRANULOCYTES # BLD AUTO: 0.02 K/UL (ref 0–0.04)
IMM GRANULOCYTES NFR BLD AUTO: 0.3 % (ref 0–0.5)
LYMPHOCYTES # BLD AUTO: 1.3 K/UL (ref 1–4.8)
LYMPHOCYTES NFR BLD: 20.1 % (ref 18–48)
MCH RBC QN AUTO: 24.4 PG (ref 27–31)
MCHC RBC AUTO-ENTMCNC: 30.2 G/DL (ref 32–36)
MCV RBC AUTO: 81 FL (ref 82–98)
MONOCYTES # BLD AUTO: 0.5 K/UL (ref 0.3–1)
MONOCYTES NFR BLD: 8.3 % (ref 4–15)
NEUTROPHILS # BLD AUTO: 4.3 K/UL (ref 1.8–7.7)
NEUTROPHILS NFR BLD: 68.7 % (ref 38–73)
NRBC BLD-RTO: 0 /100 WBC
PLATELET # BLD AUTO: 325 K/UL (ref 150–450)
PMV BLD AUTO: 10.1 FL (ref 9.2–12.9)
RBC # BLD AUTO: 5.73 M/UL (ref 4.6–6.2)
TACROLIMUS BLD-MCNC: 5.4 NG/ML (ref 5–15)
WBC # BLD AUTO: 6.23 K/UL (ref 3.9–12.7)

## 2022-04-27 ENCOUNTER — PATIENT MESSAGE (OUTPATIENT)
Dept: TRANSPLANT | Facility: CLINIC | Age: 62
End: 2022-04-27
Payer: COMMERCIAL

## 2022-04-27 DIAGNOSIS — Z94.4 S/P LIVER TRANSPLANT: Primary | ICD-10-CM

## 2022-04-27 DIAGNOSIS — E87.5 HYPERKALEMIA: ICD-10-CM

## 2022-04-27 NOTE — TELEPHONE ENCOUNTER
Writer contacted patient and made aware to begin a potassium supplement as per order below and why.        ----- Message from Fab Damon MD sent at 4/27/2022 12:12 PM CDT -----  Results reviewed. Can we start KCl 20 meq daily

## 2022-04-27 NOTE — LETTER
April 27, 2022    Roman Carroll  [address]  [place] [address]          Dear Roman Hodges:  MRN: [id number]    This is a follow up to your recent labs.  Your potassium blood level is low and we are therefore prescribing a potassium supplement you may take once a day to address this condition known as Hypokalemia.  Please contact your PCP and see him or her in about 1-2 months to have them take over management of this issue and to see if it is necessary or not to continue the script.     Please have your transplant labs drawn again on 7/25/22.      If you cannot have your labs drawn on the scheduled date, it is your responsibility to call the transplant department to reschedule.  Please call [phone] and ask to speak to Jodee CESAR -  for all scheduling requests.     Sincerely,    Charmaine PENNN, RN      Your Liver Transplant Coordinator    [facility]  [address] [place] [id number]  [phone]

## 2022-04-28 RX ORDER — POTASSIUM CHLORIDE 20 MEQ/1
20 TABLET, EXTENDED RELEASE ORAL 2 TIMES DAILY
Qty: 30 TABLET | Refills: 3 | Status: SHIPPED | OUTPATIENT
Start: 2022-04-28 | End: 2022-05-16 | Stop reason: SDUPTHER

## 2022-05-07 ENCOUNTER — PATIENT MESSAGE (OUTPATIENT)
Dept: TRANSPLANT | Facility: CLINIC | Age: 62
End: 2022-05-07
Payer: COMMERCIAL

## 2022-05-09 DIAGNOSIS — C22.0 HCC (HEPATOCELLULAR CARCINOMA): ICD-10-CM

## 2022-05-09 DIAGNOSIS — Z94.4 S/P LIVER TRANSPLANT: Primary | ICD-10-CM

## 2022-05-11 ENCOUNTER — PATIENT MESSAGE (OUTPATIENT)
Dept: RESEARCH | Facility: CLINIC | Age: 62
End: 2022-05-11
Payer: COMMERCIAL

## 2022-05-16 DIAGNOSIS — C22.0 HCC (HEPATOCELLULAR CARCINOMA): ICD-10-CM

## 2022-05-16 DIAGNOSIS — Z94.4 S/P LIVER TRANSPLANT: Primary | ICD-10-CM

## 2022-05-31 ENCOUNTER — HOSPITAL ENCOUNTER (OUTPATIENT)
Dept: RADIOLOGY | Facility: HOSPITAL | Age: 62
Discharge: HOME | End: 2022-05-31
Attending: INTERNAL MEDICINE
Payer: COMMERCIAL

## 2022-05-31 DIAGNOSIS — Z94.4 S/P LIVER TRANSPLANT: ICD-10-CM

## 2022-05-31 DIAGNOSIS — C22.0 HCC (HEPATOCELLULAR CARCINOMA): ICD-10-CM

## 2022-05-31 PROCEDURE — 71250 CT THORAX DX C-: CPT | Mod: 26,,, | Performed by: RADIOLOGY

## 2022-05-31 PROCEDURE — 71250 CT THORAX DX C-: CPT | Mod: TC

## 2022-05-31 PROCEDURE — 25500020 PHARM REV CODE 255: Performed by: INTERNAL MEDICINE

## 2022-05-31 PROCEDURE — 74183 MRI ABD W/O CNTR FLWD CNTR: CPT | Mod: TC

## 2022-05-31 PROCEDURE — 74183 MRI ABD W/O CNTR FLWD CNTR: CPT | Mod: 26,,, | Performed by: RADIOLOGY

## 2022-05-31 PROCEDURE — A9585 GADOBUTROL INJECTION: HCPCS | Performed by: INTERNAL MEDICINE

## 2022-05-31 PROCEDURE — 71250 CT CHEST WITHOUT CONTRAST: ICD-10-PCS | Mod: 26,,, | Performed by: RADIOLOGY

## 2022-05-31 PROCEDURE — 74183 MRI ABDOMEN W WO CONTRAST: ICD-10-PCS | Mod: 26,,, | Performed by: RADIOLOGY

## 2022-05-31 RX ORDER — GADOBUTROL 604.72 MG/ML
10 INJECTION INTRAVENOUS
Status: COMPLETED | OUTPATIENT
Start: 2022-05-31 | End: 2022-05-31

## 2022-05-31 RX ADMIN — GADOBUTROL 10 ML: 604.72 INJECTION INTRAVENOUS at 01:05

## 2022-06-01 ENCOUNTER — PATIENT MESSAGE (OUTPATIENT)
Dept: TRANSPLANT | Facility: CLINIC | Age: 62
End: 2022-06-01
Payer: COMMERCIAL

## 2022-06-06 ENCOUNTER — OFFICE VISIT (OUTPATIENT)
Dept: TRANSPLANT | Facility: CLINIC | Age: 62
End: 2022-06-06
Attending: INTERNAL MEDICINE
Payer: COMMERCIAL

## 2022-06-06 VITALS
RESPIRATION RATE: 16 BRPM | HEIGHT: 67 IN | BODY MASS INDEX: 34.5 KG/M2 | OXYGEN SATURATION: 97 % | WEIGHT: 219.81 LBS | TEMPERATURE: 97 F | DIASTOLIC BLOOD PRESSURE: 89 MMHG | SYSTOLIC BLOOD PRESSURE: 152 MMHG | HEART RATE: 66 BPM

## 2022-06-06 DIAGNOSIS — Z94.4 S/P LIVER TRANSPLANT: Chronic | ICD-10-CM

## 2022-06-06 DIAGNOSIS — Z79.60 LONG-TERM USE OF IMMUNOSUPPRESSANT MEDICATION: Primary | ICD-10-CM

## 2022-06-06 PROCEDURE — 1160F PR REVIEW ALL MEDS BY PRESCRIBER/CLIN PHARMACIST DOCUMENTED: ICD-10-PCS | Mod: CPTII,S$GLB,, | Performed by: INTERNAL MEDICINE

## 2022-06-06 PROCEDURE — 99213 OFFICE O/P EST LOW 20 MIN: CPT | Mod: S$GLB,,, | Performed by: INTERNAL MEDICINE

## 2022-06-06 PROCEDURE — 3079F DIAST BP 80-89 MM HG: CPT | Mod: CPTII,S$GLB,, | Performed by: INTERNAL MEDICINE

## 2022-06-06 PROCEDURE — 3079F PR MOST RECENT DIASTOLIC BLOOD PRESSURE 80-89 MM HG: ICD-10-PCS | Mod: CPTII,S$GLB,, | Performed by: INTERNAL MEDICINE

## 2022-06-06 PROCEDURE — 3008F BODY MASS INDEX DOCD: CPT | Mod: CPTII,S$GLB,, | Performed by: INTERNAL MEDICINE

## 2022-06-06 PROCEDURE — 3044F HG A1C LEVEL LT 7.0%: CPT | Mod: CPTII,S$GLB,, | Performed by: INTERNAL MEDICINE

## 2022-06-06 PROCEDURE — 1159F PR MEDICATION LIST DOCUMENTED IN MEDICAL RECORD: ICD-10-PCS | Mod: CPTII,S$GLB,, | Performed by: INTERNAL MEDICINE

## 2022-06-06 PROCEDURE — 99999 PR PBB SHADOW E&M-EST. PATIENT-LVL IV: ICD-10-PCS | Mod: PBBFAC,,, | Performed by: INTERNAL MEDICINE

## 2022-06-06 PROCEDURE — 1160F RVW MEDS BY RX/DR IN RCRD: CPT | Mod: CPTII,S$GLB,, | Performed by: INTERNAL MEDICINE

## 2022-06-06 PROCEDURE — 3077F PR MOST RECENT SYSTOLIC BLOOD PRESSURE >= 140 MM HG: ICD-10-PCS | Mod: CPTII,S$GLB,, | Performed by: INTERNAL MEDICINE

## 2022-06-06 PROCEDURE — 1159F MED LIST DOCD IN RCRD: CPT | Mod: CPTII,S$GLB,, | Performed by: INTERNAL MEDICINE

## 2022-06-06 PROCEDURE — 99999 PR PBB SHADOW E&M-EST. PATIENT-LVL IV: CPT | Mod: PBBFAC,,, | Performed by: INTERNAL MEDICINE

## 2022-06-06 PROCEDURE — 3044F PR MOST RECENT HEMOGLOBIN A1C LEVEL <7.0%: ICD-10-PCS | Mod: CPTII,S$GLB,, | Performed by: INTERNAL MEDICINE

## 2022-06-06 PROCEDURE — 3008F PR BODY MASS INDEX (BMI) DOCUMENTED: ICD-10-PCS | Mod: CPTII,S$GLB,, | Performed by: INTERNAL MEDICINE

## 2022-06-06 PROCEDURE — 3077F SYST BP >= 140 MM HG: CPT | Mod: CPTII,S$GLB,, | Performed by: INTERNAL MEDICINE

## 2022-06-06 PROCEDURE — 99213 PR OFFICE/OUTPT VISIT, EST, LEVL III, 20-29 MIN: ICD-10-PCS | Mod: S$GLB,,, | Performed by: INTERNAL MEDICINE

## 2022-06-06 RX ORDER — SUCRALFATE 1 G/1
1 TABLET ORAL DAILY PRN
COMMUNITY

## 2022-06-06 NOTE — PROGRESS NOTES
Transplant Hepatology  Liver Transplant Recipient Follow-up    Transplant Date: 7/4/2019  UNOS Native Liver Dx: Primary Liver Malignancy: Hepatoma (HCC) and Cirrhosis    Roman is here for follow up of his liver transplant.    ORGAN: LIVER  Whole or Partial: whole liver  Donor Type: donation after brain death  [facility] High Risk: no  Donor CMV Status: Negative  Donor HCV Status: Negative  Donor HBcAb: Negative  Donor HBV MARIA TERESA: Negative  Donor HCV MARIA TERESA: Negative  Biliary Anastomosis: end to end  Arterial Anatomy: replaced right hepatic from sma  IVC reconstruction: end to end ivc  Portal vein status: patent    He has had the following complications since transplant: wound infection. The noted complications is well controlled.    Subjective:     Interval History:   This 62-year-old gentleman is 3 years status post liver transplant for hepatocellular cancer on a background of nonalcoholic steatohepatitis.  Recent cross-sectional imaging showed no evidence of recurrent hepatocellular cancer.  He does have what appears to be some fatty infiltration of the liver but is liver chemistry and liver synthetic function normal on his current immunosuppression.  He has no symptoms of hepatic decompensation.  .Review of Systems   Constitutional: Negative for activity change, appetite change, chills, fatigue and unexpected weight change.   HENT: Negative for congestion, facial swelling and tinnitus.    Eyes: Negative for visual disturbance.   Respiratory: Negative for cough, shortness of breath and wheezing.    Cardiovascular: Negative for chest pain and palpitations.   Gastrointestinal: Negative for abdominal distention.   Genitourinary: Negative for dysuria.   Musculoskeletal: Negative for arthralgias, joint swelling and myalgias.   Neurological: Negative for syncope and headaches.   Hematological: Does not bruise/bleed easily.   Psychiatric/Behavioral: Negative for confusion.       Objective:     Physical Exam  Constitutional:        Appearance: He is well-developed.   Eyes:      General: No scleral icterus.  Cardiovascular:      Rate and Rhythm: Normal rate and regular rhythm.      Heart sounds: Normal heart sounds.   Pulmonary:      Effort: Pulmonary effort is normal. No respiratory distress.      Breath sounds: Normal breath sounds. No wheezing.   Abdominal:      General: Bowel sounds are normal. There is no distension.      Palpations: Abdomen is soft. There is no mass.      Tenderness: There is no abdominal tenderness. There is no rebound.       Musculoskeletal:         General: Normal range of motion.   Lymphadenopathy:      Cervical: No cervical adenopathy.   Skin:     General: Skin is warm and dry.   Neurological:      Mental Status: He is alert and oriented to person, place, and time.       Lab Results   Component Value Date    BILITOT 0.5 04/25/2022    AST 14 04/25/2022    ALT 20 04/25/2022    ALKPHOS 64 04/25/2022    CREATININE 1.0 04/25/2022    ALBUMIN 4.3 04/25/2022     Lab Results   Component Value Date    WBC 6.23 04/25/2022    HGB 14.0 04/25/2022    HCT 46.4 04/25/2022    HCT 41 07/04/2019     04/25/2022     Lab Results   Component Value Date    TACROLIMUS 5.4 04/25/2022       Assessment/Plan:     1. Long-term use of immunosuppressant medication    2. S/P liver transplant      No change to current immunosuppression.   Labs every 3 months.    RTC in 12 months.  I explained to the patient that the only treatment we have for fatty liver disease is weight loss and I recommend diet modification and exercise.    Fab Damon MD           [facility] Patient Status  Functional Status: 100%  Physical Capacity: No Limitations

## 2022-06-06 NOTE — LETTER
June 6, 2022        [facility]  [address]  [place] [address]  Phone: [phone]  Fax: [phone]             [facility]  [address]  [facility] [address]  Phone: [phone]   Patient: Roman Hodges   MR Number: [id number]   YOB: 1960   Date of Visit: 6/6/2022       Dear Dr. Jose Angel Munson    Thank you for referring Roman Hodges to me for evaluation. Attached you will find relevant portions of my assessment and plan of care.    If you have questions, please do not hesitate to call me. I look forward to following Roman Hodges along with you.    Sincerely,    Fab Damon MD    Enclosure    If you would like to receive this communication electronically, please contact [email or web address] or [phone] to request [employer] access.    [employer] is a tool which provides read-only access to select patient information with whom you have a relationship. Its easy to use and provides real time access to review your patients record including encounter summaries, notes, results, and demographic information.    If you feel you have received this communication in error or would no longer like to receive these types of communications, please e-mail [email or web address]

## 2022-06-20 DIAGNOSIS — C22.0 HCC (HEPATOCELLULAR CARCINOMA): ICD-10-CM

## 2022-06-20 DIAGNOSIS — Z00.6 RESEARCH STUDY PATIENT: Primary | ICD-10-CM

## 2022-06-20 DIAGNOSIS — E55.9 VITAMIN D DEFICIENCY DISEASE: ICD-10-CM

## 2022-06-20 RX ORDER — ERGOCALCIFEROL 1.25 MG/1
CAPSULE ORAL
Qty: 12 CAPSULE | Refills: 3 | Status: SHIPPED | OUTPATIENT
Start: 2022-06-20 | End: 2023-05-18

## 2022-06-26 NOTE — PLAN OF CARE
Problem: Patient Care Overview  Goal: Plan of Care Review  Outcome: Ongoing (interventions implemented as appropriate)  Pt received on RA.  SPO2  95%.  Pt in no apparent respiratory distress.  Will continue to monitor.        30

## 2022-06-28 ENCOUNTER — HOSPITAL ENCOUNTER (OUTPATIENT)
Dept: RADIOLOGY | Facility: HOSPITAL | Age: 62
Discharge: HOME | End: 2022-06-28
Attending: INTERNAL MEDICINE
Payer: COMMERCIAL

## 2022-06-28 DIAGNOSIS — M89.9 BONE DISORDER: ICD-10-CM

## 2022-06-28 PROCEDURE — 77080 DXA BONE DENSITY AXIAL: CPT | Mod: 26,,, | Performed by: RADIOLOGY

## 2022-06-28 PROCEDURE — 77080 DEXA BONE DENSITY SPINE HIP: ICD-10-PCS | Mod: 26,,, | Performed by: RADIOLOGY

## 2022-06-28 PROCEDURE — 77080 DXA BONE DENSITY AXIAL: CPT | Mod: TC

## 2022-06-30 NOTE — PROGRESS NOTES
The patient location is: home  The chief complaint leading to consultation is: fever/cough  Visit type: Virtual visit with synchronous audio and video  Total time spent with patient: 12 min  Each patient to whom he or she provides medical services by telemedicine is:  (1) informed of the relationship between the physician and patient and the respective role of any other health care provider with respect to management of the patient; and (2) notified that he or she may decline to receive medical services by telemedicine and may withdraw from such care at any time.    Notes:  60 years old male who was evaluated by telemedicine with cough, fever and general malaise for the last week.  Patient is starting to feeling better.  He is currently in quarantine.  He has a liver transplant patient.  Patient preferred to stay at home on quarantine.  Patient denies shortness of breath,or hemoptysis.  Patient daughter is sick also.      Diagnoses and all orders for this visit:    Fever, unspecified fever cause  -     COVID-19 Home Symptom Monitoring  - Duration (days): 14    Cough  -     COVID-19 Home Symptom Monitoring  - Duration (days): 14    Long-term use of immunosuppressant medication  -     COVID-19 Home Symptom Monitoring  - Duration (days): 14    Suspected Covid-19 Virus Infection  -     COVID-19 Home Symptom Monitoring  - Duration (days): 14     ER precautions.    Contact precautions .      
None

## 2022-07-02 ENCOUNTER — TELEPHONE (OUTPATIENT)
Dept: FAMILY MEDICINE | Facility: CLINIC | Age: 62
End: 2022-07-02

## 2022-07-02 DIAGNOSIS — R42 DIZZINESS: Primary | ICD-10-CM

## 2022-07-02 RX ORDER — MECLIZINE HCL 12.5 MG 12.5 MG/1
12.5 TABLET ORAL 3 TIMES DAILY PRN
Qty: 40 TABLET | Refills: 0 | Status: SHIPPED | OUTPATIENT
Start: 2022-07-02

## 2022-07-02 NOTE — TELEPHONE ENCOUNTER
I personally spoke with the patient.    Blood work was ordered for next Tuesday.    Please schedule follow-up with me or my nurse practitioner.    Prescription was sent to the pharmacy..

## 2022-07-05 ENCOUNTER — OFFICE VISIT (OUTPATIENT)
Dept: DERMATOLOGY | Facility: CLINIC | Age: 62
End: 2022-07-05
Payer: COMMERCIAL

## 2022-07-05 ENCOUNTER — PATIENT MESSAGE (OUTPATIENT)
Dept: FAMILY MEDICINE | Facility: CLINIC | Age: 62
End: 2022-07-05
Payer: COMMERCIAL

## 2022-07-05 DIAGNOSIS — L29.9 ITCHING: ICD-10-CM

## 2022-07-05 DIAGNOSIS — L82.1 SEBORRHEIC KERATOSES: ICD-10-CM

## 2022-07-05 DIAGNOSIS — L91.8 SKIN TAG: Primary | ICD-10-CM

## 2022-07-05 PROCEDURE — 99203 PR OFFICE/OUTPT VISIT, NEW, LEVL III, 30-44 MIN: ICD-10-PCS | Mod: S$GLB,,, | Performed by: DERMATOLOGY

## 2022-07-05 PROCEDURE — 3044F PR MOST RECENT HEMOGLOBIN A1C LEVEL <7.0%: ICD-10-PCS | Mod: CPTII,S$GLB,, | Performed by: DERMATOLOGY

## 2022-07-05 PROCEDURE — 99999 PR PBB SHADOW E&M-EST. PATIENT-LVL III: ICD-10-PCS | Mod: PBBFAC,,, | Performed by: DERMATOLOGY

## 2022-07-05 PROCEDURE — 99203 OFFICE O/P NEW LOW 30 MIN: CPT | Mod: S$GLB,,, | Performed by: DERMATOLOGY

## 2022-07-05 PROCEDURE — 1159F PR MEDICATION LIST DOCUMENTED IN MEDICAL RECORD: ICD-10-PCS | Mod: CPTII,S$GLB,, | Performed by: DERMATOLOGY

## 2022-07-05 PROCEDURE — 1159F MED LIST DOCD IN RCRD: CPT | Mod: CPTII,S$GLB,, | Performed by: DERMATOLOGY

## 2022-07-05 PROCEDURE — 3044F HG A1C LEVEL LT 7.0%: CPT | Mod: CPTII,S$GLB,, | Performed by: DERMATOLOGY

## 2022-07-05 PROCEDURE — 99999 PR PBB SHADOW E&M-EST. PATIENT-LVL III: CPT | Mod: PBBFAC,,, | Performed by: DERMATOLOGY

## 2022-07-05 NOTE — PROGRESS NOTES
Subjective:       Patient ID:  Roman Hodges is a 62 y.o. male who presents for   Chief Complaint   Patient presents with    Itching     Pt presents for itching    Liver transplant (2019)    Itching - Initial  Affected locations: face, right knee, left knee, back, left arm and right arm  Signs / symptoms: itching  Relieving factors/Treatments tried: nothing    no rash with the itching.  Going on for 8 years.  Better since liver transplant 3 years ago - saw liver recently and doing well from this perspective.    When itching occurs - it comes up out of the blue - it is very intense for afew seconds - happens on scalp arms and lower back.  Scratching makes it go away.    Happens about 30 times a day.    Has not left any scabs.    Likes to wear cologne.    Uses fragranced soap - Dove -  Uses fragrance free detergent already.    Inquires about removal of skin tags around axilla.    Here with wife today who helps provide [place] interpretation. They decline additional  services.    Review of Systems   Skin: Positive for itching.        Objective:    Physical Exam   Constitutional: He appears well-developed and well-nourished. No distress.   Neurological: He is alert and oriented to person, place, and time. He is not disoriented.   Psychiatric: He has a normal mood and affect.   Skin:   Areas Examined (abnormalities noted in diagram):   Scalp / Hair Palpated and Inspected  Head / Face Inspection Performed  Neck Inspection Performed  Chest / Axilla Inspection Performed  Abdomen Inspection Performed  Back Inspection Performed  RUE Inspected  LUE Inspection Performed  RLE Inspected  LLE Inspection Performed              Diagram Legend     Erythematous scaling macule/papule c/w actinic keratosis       Vascular papule c/w angioma      Pigmented verrucoid papule/plaque c/w seborrheic keratosis      Yellow umbilicated papule c/w sebaceous hyperplasia      Irregularly shaped tan macule c/w lentigo     1-2 mm  smooth white papules consistent with Milia      Movable subcutaneous cyst with punctum c/w epidermal inclusion cyst      Subcutaneous movable cyst c/w pilar cyst      Firm pink to brown papule c/w dermatofibroma      Pedunculated fleshy papule(s) c/w skin tag(s)      Evenly pigmented macule c/w junctional nevus     Mildly variegated pigmented, slightly irregular-bordered macule c/w mildly atypical nevus      Flesh colored to evenly pigmented papule c/w intradermal nevus       Pink pearly papule/plaque c/w basal cell carcinoma      Erythematous hyperkeratotic cursted plaque c/w SCC      Surgical scar with no sign of skin cancer recurrence      Open and closed comedones      Inflammatory papules and pustules      Verrucoid papule consistent consistent with wart     Erythematous eczematous patches and plaques     Dystrophic onycholytic nail with subungual debris c/w onychomycosis     Umbilicated papule    Erythematous-base heme-crusted tan verrucoid plaque consistent with inflamed seborrheic keratosis     Erythematous Silvery Scaling Plaque c/w Psoriasis     See annotation    Lab Results   Component Value Date    WBC 6.23 04/25/2022    HGB 14.0 04/25/2022    HCT 46.4 04/25/2022    MCV 81 (L) 04/25/2022     04/25/2022       CMP  Sodium   Date Value Ref Range Status   04/25/2022 142 136 - 145 mmol/L Final     Potassium   Date Value Ref Range Status   04/25/2022 3.2 (L) 3.5 - 5.1 mmol/L Final     Chloride   Date Value Ref Range Status   04/25/2022 101 95 - 110 mmol/L Final     CO2   Date Value Ref Range Status   04/25/2022 26 23 - 29 mmol/L Final     Glucose   Date Value Ref Range Status   04/25/2022 119 (H) 70 - 110 mg/dL Final     BUN   Date Value Ref Range Status   04/25/2022 16 8 - 23 mg/dL Final     Creatinine   Date Value Ref Range Status   04/25/2022 1.0 0.5 - 1.4 mg/dL Final     Calcium   Date Value Ref Range Status   04/25/2022 9.1 8.7 - 10.5 mg/dL Final     Total Protein   Date Value Ref Range Status    04/25/2022 7.5 6.0 - 8.4 g/dL Final     Albumin   Date Value Ref Range Status   04/25/2022 4.3 3.5 - 5.2 g/dL Final     Total Bilirubin   Date Value Ref Range Status   04/25/2022 0.5 0.1 - 1.0 mg/dL Final     Comment:     For infants and newborns, interpretation of results should be based  on gestational age, weight and in agreement with clinical  observations.    Premature Infant recommended reference ranges:  Up to 24 hours.............<8.0 mg/dL  Up to 48 hours............<12.0 mg/dL  3-5 days..................<15.0 mg/dL  6-29 days.................<15.0 mg/dL       Alkaline Phosphatase   Date Value Ref Range Status   04/25/2022 64 55 - 135 U/L Final     AST   Date Value Ref Range Status   04/25/2022 14 10 - 40 U/L Final     ALT   Date Value Ref Range Status   04/25/2022 20 10 - 44 U/L Final     Anion Gap   Date Value Ref Range Status   04/25/2022 15 8 - 16 mmol/L Final     eGFR if    Date Value Ref Range Status   04/25/2022 >60.0 >60 mL/min/1.73 m^2 Final     eGFR if non    Date Value Ref Range Status   04/25/2022 >60.0 >60 mL/min/1.73 m^2 Final     Comment:     Calculation used to obtain the estimated glomerular filtration  rate (eGFR) is the CKD-EPI equation.          Assessment / Plan:        Skin tag  Reviewed cosmetic pricing - $300 for up to 15 will require snip - ok to continue Xarelto    Itching  -     Ambulatory referral/consult to Dermatology  No clear process to explain  Would advise trial gentle skin care daily  The intense, episodic itching is slightly unusual and may need neurology input in future - he does not have total body chronic pruritis    D/c all fragrances/colognes  I advised changing to a fragrance free bar soap or body wash such as Dove, and fragrance free detergent such as Tide Free & Clear, for sensitive skin.        Daily emollient with Cerave or Cetaphil cream after shower    No hot showers    Unlikely medication related - ongoing x 8  yrs    Seborrheic keratoses  These are benign inherited growths without a malignant potential. Reassurance given to patient. No treatment is necessary.              Follow up in about 3 months (around 10/5/2022) for cosmetic removal.

## 2022-07-07 ENCOUNTER — OFFICE VISIT (OUTPATIENT)
Dept: FAMILY MEDICINE | Facility: CLINIC | Age: 62
End: 2022-07-07
Payer: COMMERCIAL

## 2022-07-07 ENCOUNTER — LAB VISIT (OUTPATIENT)
Dept: LAB | Facility: HOSPITAL | Age: 62
End: 2022-07-07
Attending: FAMILY MEDICINE
Payer: COMMERCIAL

## 2022-07-07 VITALS
HEART RATE: 79 BPM | SYSTOLIC BLOOD PRESSURE: 136 MMHG | DIASTOLIC BLOOD PRESSURE: 88 MMHG | BODY MASS INDEX: 35.09 KG/M2 | OXYGEN SATURATION: 98 % | HEIGHT: 67 IN | WEIGHT: 223.56 LBS

## 2022-07-07 DIAGNOSIS — E11.9 TYPE 2 DIABETES MELLITUS WITHOUT COMPLICATION, WITHOUT LONG-TERM CURRENT USE OF INSULIN: ICD-10-CM

## 2022-07-07 DIAGNOSIS — I10 ESSENTIAL HYPERTENSION: Primary | ICD-10-CM

## 2022-07-07 DIAGNOSIS — R42 DIZZINESS: ICD-10-CM

## 2022-07-07 DIAGNOSIS — E87.6 HYPOKALEMIA: ICD-10-CM

## 2022-07-07 DIAGNOSIS — E66.01 SEVERE OBESITY (BMI 35.0-39.9) WITH COMORBIDITY: ICD-10-CM

## 2022-07-07 LAB
ALBUMIN SERPL BCP-MCNC: 4.3 G/DL (ref 3.5–5.2)
ALP SERPL-CCNC: 67 U/L (ref 55–135)
ALT SERPL W/O P-5'-P-CCNC: 37 U/L (ref 10–44)
ANION GAP SERPL CALC-SCNC: 10 MMOL/L (ref 8–16)
AST SERPL-CCNC: 17 U/L (ref 10–40)
BASOPHILS # BLD AUTO: 0.04 K/UL (ref 0–0.2)
BASOPHILS NFR BLD: 0.6 % (ref 0–1.9)
BILIRUB SERPL-MCNC: 0.6 MG/DL (ref 0.1–1)
BUN SERPL-MCNC: 17 MG/DL (ref 8–23)
CALCIUM SERPL-MCNC: 9.5 MG/DL (ref 8.7–10.5)
CHLORIDE SERPL-SCNC: 101 MMOL/L (ref 95–110)
CO2 SERPL-SCNC: 27 MMOL/L (ref 23–29)
CREAT SERPL-MCNC: 1.1 MG/DL (ref 0.5–1.4)
DIFFERENTIAL METHOD: ABNORMAL
EOSINOPHIL # BLD AUTO: 0.1 K/UL (ref 0–0.5)
EOSINOPHIL NFR BLD: 2.2 % (ref 0–8)
ERYTHROCYTE [DISTWIDTH] IN BLOOD BY AUTOMATED COUNT: 15.6 % (ref 11.5–14.5)
EST. GFR  (AFRICAN AMERICAN): >60 ML/MIN/1.73 M^2
EST. GFR  (NON AFRICAN AMERICAN): >60 ML/MIN/1.73 M^2
GLUCOSE SERPL-MCNC: 112 MG/DL (ref 70–110)
HCT VFR BLD AUTO: 44.8 % (ref 40–54)
HGB BLD-MCNC: 14 G/DL (ref 14–18)
IMM GRANULOCYTES # BLD AUTO: 0.04 K/UL (ref 0–0.04)
IMM GRANULOCYTES NFR BLD AUTO: 0.6 % (ref 0–0.5)
LYMPHOCYTES # BLD AUTO: 1.6 K/UL (ref 1–4.8)
LYMPHOCYTES NFR BLD: 24.7 % (ref 18–48)
MCH RBC QN AUTO: 24.5 PG (ref 27–31)
MCHC RBC AUTO-ENTMCNC: 31.3 G/DL (ref 32–36)
MCV RBC AUTO: 78 FL (ref 82–98)
MONOCYTES # BLD AUTO: 0.6 K/UL (ref 0.3–1)
MONOCYTES NFR BLD: 9.9 % (ref 4–15)
NEUTROPHILS # BLD AUTO: 3.9 K/UL (ref 1.8–7.7)
NEUTROPHILS NFR BLD: 62 % (ref 38–73)
NRBC BLD-RTO: 0 /100 WBC
PLATELET # BLD AUTO: 328 K/UL (ref 150–450)
PMV BLD AUTO: 9.8 FL (ref 9.2–12.9)
POTASSIUM SERPL-SCNC: 4.3 MMOL/L (ref 3.5–5.1)
PROT SERPL-MCNC: 7.2 G/DL (ref 6–8.4)
RBC # BLD AUTO: 5.72 M/UL (ref 4.6–6.2)
SODIUM SERPL-SCNC: 138 MMOL/L (ref 136–145)
TSH SERPL DL<=0.005 MIU/L-ACNC: 1.56 UIU/ML (ref 0.4–4)
WBC # BLD AUTO: 6.28 K/UL (ref 3.9–12.7)

## 2022-07-07 PROCEDURE — 36415 COLL VENOUS BLD VENIPUNCTURE: CPT | Mod: PO | Performed by: FAMILY MEDICINE

## 2022-07-07 PROCEDURE — 99999 PR PBB SHADOW E&M-EST. PATIENT-LVL IV: CPT | Mod: PBBFAC,,, | Performed by: FAMILY MEDICINE

## 2022-07-07 PROCEDURE — 3079F PR MOST RECENT DIASTOLIC BLOOD PRESSURE 80-89 MM HG: ICD-10-PCS | Mod: CPTII,S$GLB,, | Performed by: FAMILY MEDICINE

## 2022-07-07 PROCEDURE — 85025 COMPLETE CBC W/AUTO DIFF WBC: CPT | Performed by: FAMILY MEDICINE

## 2022-07-07 PROCEDURE — 3008F BODY MASS INDEX DOCD: CPT | Mod: CPTII,S$GLB,, | Performed by: FAMILY MEDICINE

## 2022-07-07 PROCEDURE — 3008F PR BODY MASS INDEX (BMI) DOCUMENTED: ICD-10-PCS | Mod: CPTII,S$GLB,, | Performed by: FAMILY MEDICINE

## 2022-07-07 PROCEDURE — 1159F MED LIST DOCD IN RCRD: CPT | Mod: CPTII,S$GLB,, | Performed by: FAMILY MEDICINE

## 2022-07-07 PROCEDURE — 3075F SYST BP GE 130 - 139MM HG: CPT | Mod: CPTII,S$GLB,, | Performed by: FAMILY MEDICINE

## 2022-07-07 PROCEDURE — 84443 ASSAY THYROID STIM HORMONE: CPT | Performed by: FAMILY MEDICINE

## 2022-07-07 PROCEDURE — 1159F PR MEDICATION LIST DOCUMENTED IN MEDICAL RECORD: ICD-10-PCS | Mod: CPTII,S$GLB,, | Performed by: FAMILY MEDICINE

## 2022-07-07 PROCEDURE — 3044F PR MOST RECENT HEMOGLOBIN A1C LEVEL <7.0%: ICD-10-PCS | Mod: CPTII,S$GLB,, | Performed by: FAMILY MEDICINE

## 2022-07-07 PROCEDURE — 3044F HG A1C LEVEL LT 7.0%: CPT | Mod: CPTII,S$GLB,, | Performed by: FAMILY MEDICINE

## 2022-07-07 PROCEDURE — 99999 PR PBB SHADOW E&M-EST. PATIENT-LVL IV: ICD-10-PCS | Mod: PBBFAC,,, | Performed by: FAMILY MEDICINE

## 2022-07-07 PROCEDURE — 99214 OFFICE O/P EST MOD 30 MIN: CPT | Mod: S$GLB,,, | Performed by: FAMILY MEDICINE

## 2022-07-07 PROCEDURE — 80053 COMPREHEN METABOLIC PANEL: CPT | Performed by: FAMILY MEDICINE

## 2022-07-07 PROCEDURE — 3079F DIAST BP 80-89 MM HG: CPT | Mod: CPTII,S$GLB,, | Performed by: FAMILY MEDICINE

## 2022-07-07 PROCEDURE — 99214 PR OFFICE/OUTPT VISIT, EST, LEVL IV, 30-39 MIN: ICD-10-PCS | Mod: S$GLB,,, | Performed by: FAMILY MEDICINE

## 2022-07-07 PROCEDURE — 3075F PR MOST RECENT SYSTOLIC BLOOD PRESS GE 130-139MM HG: ICD-10-PCS | Mod: CPTII,S$GLB,, | Performed by: FAMILY MEDICINE

## 2022-07-07 NOTE — PROGRESS NOTES
Subjective:       Patient ID: Roman Hodges is a 62 y.o. male.    Chief Complaint: Follow-up and Hypertension    62 years old male came to the clinic for blood pressure check.  Blood pressure today was slightly elevated.  No chest pain, palpitation, orthopnea or PND.  Patient with low potassium before.  No recent A1c.  No polyuria, polydipsia or polyphagia.  Patient with a BMI of 35 currently trying to lose weight.    Review of Systems   Constitutional: Negative.    HENT: Negative.    Eyes: Negative.    Respiratory: Negative.    Cardiovascular: Negative.  Negative for chest pain, palpitations, leg swelling and claudication.   Gastrointestinal: Negative.    Endocrine: Negative for polydipsia, polyphagia and polyuria.   Genitourinary: Negative.    Musculoskeletal: Negative.    Integumentary:  Negative.   Neurological: Negative.    Psychiatric/Behavioral: Negative.          Objective:      Physical Exam  Vitals and nursing note reviewed.   Constitutional:       General: He is not in acute distress.     Appearance: He is well-developed. He is not diaphoretic.   HENT:      Head: Normocephalic and atraumatic.      Right Ear: External ear normal.      Left Ear: External ear normal.      Nose: Nose normal.      Mouth/Throat:      Pharynx: No oropharyngeal exudate.   Eyes:      General: No scleral icterus.        Right eye: No discharge.         Left eye: No discharge.      Conjunctiva/sclera: Conjunctivae normal.      Pupils: Pupils are equal, round, and reactive to light.   Neck:      Thyroid: No thyromegaly.      Vascular: No JVD.      Trachea: No tracheal deviation.   Cardiovascular:      Rate and Rhythm: Normal rate and regular rhythm.      Heart sounds: Normal heart sounds. No murmur heard.    No friction rub. No gallop.   Pulmonary:      Effort: Pulmonary effort is normal. No respiratory distress.      Breath sounds: Normal breath sounds. No stridor. No wheezing or rales.   Chest:      Chest wall: No tenderness.    Abdominal:      General: Bowel sounds are normal. There is no distension.      Palpations: Abdomen is soft. There is no mass.      Tenderness: There is no abdominal tenderness. There is no guarding or rebound.   Musculoskeletal:         General: No tenderness. Normal range of motion.      Cervical back: Normal range of motion and neck supple.   Lymphadenopathy:      Cervical: No cervical adenopathy.   Skin:     General: Skin is warm and dry.      Coloration: Skin is not pale.      Findings: No erythema or rash.   Neurological:      Mental Status: He is alert and oriented to person, place, and time.      Cranial Nerves: No cranial nerve deficit.      Motor: No abnormal muscle tone.      Coordination: Coordination normal.      Deep Tendon Reflexes: Reflexes are normal and symmetric. Reflexes normal.   Psychiatric:         Behavior: Behavior normal.         Thought Content: Thought content normal.         Judgment: Judgment normal.         Assessment:       Problem List Items Addressed This Visit     Essential hypertension - Primary (Chronic)    Type 2 diabetes mellitus, without long-term current use of insulin (Chronic)    Relevant Orders    Hemoglobin A1C    Microalbumin/Creatinine Ratio, Urine    Hypokalemia          Plan:         Roman was seen today for follow-up and hypertension.    Diagnoses and all orders for this visit:    Essential hypertension    Hypokalemia    Type 2 diabetes mellitus without complication, without long-term current use of insulin  -     Hemoglobin A1C; Future  -     Microalbumin/Creatinine Ratio, Urine; Future    Continue monitoring blood pressure at home, low sodium diet.  Continue monitoring blood sugar at home,ADA diet.   Diet and physical activity to promote weight loss.

## 2022-07-15 ENCOUNTER — OFFICE VISIT (OUTPATIENT)
Dept: URGENT CARE | Facility: CLINIC | Age: 62
End: 2022-07-15
Payer: COMMERCIAL

## 2022-07-15 ENCOUNTER — PATIENT MESSAGE (OUTPATIENT)
Dept: TRANSPLANT | Facility: CLINIC | Age: 62
End: 2022-07-15
Payer: COMMERCIAL

## 2022-07-15 ENCOUNTER — PATIENT MESSAGE (OUTPATIENT)
Dept: FAMILY MEDICINE | Facility: CLINIC | Age: 62
End: 2022-07-15
Payer: COMMERCIAL

## 2022-07-15 ENCOUNTER — TELEPHONE (OUTPATIENT)
Dept: INFECTIOUS DISEASES | Facility: HOSPITAL | Age: 62
End: 2022-07-15
Payer: COMMERCIAL

## 2022-07-15 VITALS
RESPIRATION RATE: 18 BRPM | WEIGHT: 223 LBS | DIASTOLIC BLOOD PRESSURE: 83 MMHG | OXYGEN SATURATION: 97 % | SYSTOLIC BLOOD PRESSURE: 145 MMHG | HEART RATE: 73 BPM | HEIGHT: 67 IN | BODY MASS INDEX: 35 KG/M2 | TEMPERATURE: 98 F

## 2022-07-15 DIAGNOSIS — R05.9 COUGH: Primary | ICD-10-CM

## 2022-07-15 DIAGNOSIS — U07.1 COVID: ICD-10-CM

## 2022-07-15 LAB
CTP QC/QA: YES
SARS-COV-2 RDRP RESP QL NAA+PROBE: POSITIVE

## 2022-07-15 PROCEDURE — 3079F PR MOST RECENT DIASTOLIC BLOOD PRESSURE 80-89 MM HG: ICD-10-PCS | Mod: CPTII,S$GLB,,

## 2022-07-15 PROCEDURE — 3066F PR DOCUMENTATION OF TREATMENT FOR NEPHROPATHY: ICD-10-PCS | Mod: CPTII,S$GLB,,

## 2022-07-15 PROCEDURE — 3008F BODY MASS INDEX DOCD: CPT | Mod: CPTII,S$GLB,,

## 2022-07-15 PROCEDURE — U0002 COVID-19 LAB TEST NON-CDC: HCPCS | Mod: QW,S$GLB,,

## 2022-07-15 PROCEDURE — 3066F NEPHROPATHY DOC TX: CPT | Mod: CPTII,S$GLB,,

## 2022-07-15 PROCEDURE — 1159F MED LIST DOCD IN RCRD: CPT | Mod: CPTII,S$GLB,,

## 2022-07-15 PROCEDURE — 1160F RVW MEDS BY RX/DR IN RCRD: CPT | Mod: CPTII,S$GLB,,

## 2022-07-15 PROCEDURE — 3060F PR POS MICROALBUMINURIA RESULT DOCUMENTED/REVIEW: ICD-10-PCS | Mod: CPTII,S$GLB,,

## 2022-07-15 PROCEDURE — 3077F PR MOST RECENT SYSTOLIC BLOOD PRESSURE >= 140 MM HG: ICD-10-PCS | Mod: CPTII,S$GLB,,

## 2022-07-15 PROCEDURE — 99214 OFFICE O/P EST MOD 30 MIN: CPT | Mod: S$GLB,,,

## 2022-07-15 PROCEDURE — 3008F PR BODY MASS INDEX (BMI) DOCUMENTED: ICD-10-PCS | Mod: CPTII,S$GLB,,

## 2022-07-15 PROCEDURE — 3060F POS MICROALBUMINURIA REV: CPT | Mod: CPTII,S$GLB,,

## 2022-07-15 PROCEDURE — U0002: ICD-10-PCS | Mod: QW,S$GLB,,

## 2022-07-15 PROCEDURE — 3044F HG A1C LEVEL LT 7.0%: CPT | Mod: CPTII,S$GLB,,

## 2022-07-15 PROCEDURE — 99214 PR OFFICE/OUTPT VISIT, EST, LEVL IV, 30-39 MIN: ICD-10-PCS | Mod: S$GLB,,,

## 2022-07-15 PROCEDURE — 3079F DIAST BP 80-89 MM HG: CPT | Mod: CPTII,S$GLB,,

## 2022-07-15 PROCEDURE — 1160F PR REVIEW ALL MEDS BY PRESCRIBER/CLIN PHARMACIST DOCUMENTED: ICD-10-PCS | Mod: CPTII,S$GLB,,

## 2022-07-15 PROCEDURE — 1159F PR MEDICATION LIST DOCUMENTED IN MEDICAL RECORD: ICD-10-PCS | Mod: CPTII,S$GLB,,

## 2022-07-15 PROCEDURE — 3077F SYST BP >= 140 MM HG: CPT | Mod: CPTII,S$GLB,,

## 2022-07-15 PROCEDURE — 3044F PR MOST RECENT HEMOGLOBIN A1C LEVEL <7.0%: ICD-10-PCS | Mod: CPTII,S$GLB,,

## 2022-07-15 NOTE — PROGRESS NOTES
"Subjective:       Patient ID: Roman Hodges is a 62 y.o. male.    Vitals:  height is 5' 7" (1.702 m) and weight is 101.2 kg (223 lb). His temperature is 97.8 °F (36.6 °C). His blood pressure is 145/83 (abnormal) and his pulse is 73. His respiration is 18 and oxygen saturation is 97%.     Chief Complaint: Cough    Pt has had a cough since yesterday and he would like to be covid tested . Pt took a home covid test this morning and it was + .    Cough  This is a new problem. The current episode started yesterday. The problem has been unchanged. The problem occurs constantly. The cough is productive of sputum. Associated symptoms include myalgias, nasal congestion and rhinorrhea. Pertinent negatives include no chest pain, chills, ear congestion, ear pain, fever, headaches, heartburn, hemoptysis, postnasal drip, rash, sore throat, shortness of breath, sweats, weight loss or wheezing. Nothing aggravates the symptoms. He has tried nothing for the symptoms. The treatment provided no relief.       Constitution: Positive for sweating. Negative for activity change, appetite change, chills, fatigue, fever, unexpected weight change and generalized weakness.   HENT: Negative for ear pain, postnasal drip and sore throat.    Neck: neck negative. Negative for neck pain and neck stiffness.   Cardiovascular: Negative for chest pain, leg swelling, palpitations, sob on exertion and passing out.   Respiratory: Positive for cough. Negative for chest tightness, sputum production, bloody sputum, COPD, shortness of breath, stridor, wheezing and asthma.         Dry cough   Gastrointestinal: Negative.  Negative for abdominal pain, nausea, vomiting, constipation, diarrhea and heartburn.   Endocrine: negative. hair loss, cold intolerance and heat intolerance.   Musculoskeletal: Positive for muscle ache. Negative for pain, trauma, joint pain, joint swelling, abnormal ROM of joint, arthritis, gout, back pain, pain with walking, muscle cramps and " history of spine disorder.        Body aches in BUE.   Skin: Negative for rash.   Allergic/Immunologic: Negative for asthma.   Neurological: Negative.  Negative for dizziness, light-headedness, headaches and history of migraines.       Objective:      Physical Exam   Constitutional: He is oriented to person, place, and time. He appears well-developed. He is cooperative.  Non-toxic appearance. He does not appear ill. No distress.   HENT:   Head: Normocephalic and atraumatic.   Ears:   Right Ear: Hearing, tympanic membrane, external ear and ear canal normal.   Left Ear: Hearing, tympanic membrane, external ear and ear canal normal.   Nose: Nose normal. No mucosal edema, rhinorrhea or nasal deformity. No epistaxis. Right sinus exhibits no maxillary sinus tenderness and no frontal sinus tenderness. Left sinus exhibits no maxillary sinus tenderness and no frontal sinus tenderness.   Mouth/Throat: Uvula is midline, oropharynx is clear and moist and mucous membranes are normal. No trismus in the jaw. Normal dentition. No uvula swelling. No oropharyngeal exudate, posterior oropharyngeal edema or posterior oropharyngeal erythema.   Eyes: Conjunctivae and lids are normal. No scleral icterus.   Neck: Trachea normal and phonation normal. Neck supple. No edema present. No erythema present. No neck rigidity present.   Cardiovascular: Normal rate, regular rhythm, normal heart sounds and normal pulses.   Pulmonary/Chest: Effort normal and breath sounds normal. No respiratory distress. He has no decreased breath sounds. He has no rhonchi.   Abdominal: Normal appearance.   Musculoskeletal: Normal range of motion.         General: No deformity. Normal range of motion.   Neurological: He is alert and oriented to person, place, and time. He exhibits normal muscle tone. Coordination normal.   Skin: Skin is warm, dry, intact, not diaphoretic and not pale.   Psychiatric: His speech is normal and behavior is normal. Judgment and thought  content normal.   Nursing note and vitals reviewed.    Results for orders placed or performed in visit on 07/15/22   POCT COVID-19 Rapid Screening   Result Value Ref Range    POC Rapid COVID Positive (A) Negative     Acceptable Yes      *Note: Due to a large number of results and/or encounters for the requested time period, some results have not been displayed. A complete set of results can be found in Results Review.         Assessment:       1. Cough          Plan:         Cough  -     POCT COVID-19 Rapid Screening           Medical Decision Making:   Initial Assessment:   PT in room AAOX4, skin W/D, resp E/U, non toxic in appearance, NAD.  Urgent Care Management:  COVID risk score 6. Patient is not a candidate for Paxiovid patient will have a EUA referral for infusion.  Patient agrees with this treatment.  Discussed positive COVID test results with PT. PT should Isolate for 5 days after symptoms start and then wear a mask for 5 days after when around people. Discussed is if condition worsens or fails to improve that PT receive another evaluation at the ER immediately or contact your PCP to discuss concerns or return here. Reviewed was for development of SOB, CP, lightheadedness, dizziness, PT should go to ED. Also addressed is the use of Zyrtec, for congestion and sinus pressure. Also reviewed was the use of Flonase and to use as directed by medication label directs. Also discussed was rest and fluids as well as Tylenol can also be used as directed for pain or fever. Warm salt water gargles discussed as well. Also discussed to call Hepatology to discuss any further treatment. PT verbalized understanding and agreed with plan and diagnosis. PT ambulatory from clinic NAD.     Additional MDM:     Heart Failure Score:   COPD = No

## 2022-07-15 NOTE — LETTER
[address]  [place] [address]  Phone: [phone]  Fax: [phone]          Return to Work/School    Patient: Roman Hodges  YOB: 1960   Date: 07/15/2022     To Whom It May Concern:     Roman Hodges was in contact with/seen in my office on 07/15/2022. COVID-19 is present in our communities across the state. There is limited testing for COVID at this time, so not all patients can be tested. In this situation, your employee meets the following criteria:     Roman Hodges has met the criteria for COVID-19 testing and has a POSITIVE result. He can return to work once they are asymptomatic for 24 hours without the use of fever reducing medications AND at least five days from the start of symptoms (or from the first positive result if they have no symptoms). 07/20/2022     If you have any questions or concerns, or if I can be of further assistance, please do not hesitate to contact me.     Sincerely,    Adrian Landin NP

## 2022-07-15 NOTE — PATIENT INSTRUCTIONS
COVID  If your condition worsens or fails to improve we recommend that you receive another evaluation at the ER immediately or contact your PCP to discuss your concerns or return here. You must understand that you've received an urgent care treatment only and that you may be released before all your medical problems are known or treated.   -  Flonase (fluticasone) is a nasal spray which is available over the counter and may help with your symptoms   -  Zyrtec D, Claritin D or allegra D can also help with symptoms of congestion and drainage.   -  If you just have drainage you can take plain Zyrtec, Claritin or Allegra    -  Rest and fluids are also important.   -  Tylenol  can also be used as directed for pain      Your test was POSITIVE for COVID-19 (coronavirus).       Please isolate yourself at home.  You may leave home and/or return to work once the following conditions are met:    If you were not hospitalized and are not moderately to severely immunocompromised:   More than 5 days since symptoms first appeared AND  More than 24 hours fever free without medications AND  Symptoms are improving  Continue to wear a mask around others for 5 additional days.    If you were hospitalized OR are moderately to severely immunocompromised:  More than 20 days since symptoms first appeared  More than 24 hours fever free without medications  Symptoms have improved    If you had no symptoms but tested positive:  More than 5 days since the date of the first positive test (20 days if moderately to severely immunocompromised). If you develop symptoms, then use the guidelines above.  Continue to wear a mask around others for 5 additional days.      Contact Tracing    As one of the next steps, you will receive a call or text from the [place] Department of Health ([facility]) COVID-19 Tracing Team. See the contact information below so you know not to ignore the health departments call. It is important that you contact them back  immediately so they can help.      Contact Tracer Number:  [phone]  Caller ID for most carriers: [place] Dept Health     What is contact tracing?  Contact tracing is a process that helps identify everyone who has been in close contact with an infected person. Contact tracers let those people know they may have been exposed and guide them on next steps. Confidentiality is important for everyone; no one will be told who may have exposed them to the virus.  Your involvement is important. The more we know about where and how this virus is spreading, the better chance we have at stopping it from spreading further.  What does exposure mean?  Exposure means you have been within 6 feet for more than 15 minutes with a person who has or had COVID-19.  What kind of questions do the contact tracers ask?  A contact tracer will confirm your basic contact information including name, address, phone number, and next of kin, as well as asking about any symptoms you may have had. Theyll also ask you how you think you may have gotten sick, such as places where you may have been exposed to the virus, and people you were with. Those names will never be shared with anyone outside of that call, and will only be used to help trace and stop the spread of the virus.   I have privacy concerns. How will the state use my information?  Your privacy about your health is important. All calls are completed using call centers that use the appropriate health privacy protection measures (HIPAA compliance), meaning that your patient information is safe. No one will ever ask you any questions related to immigration status. Your health comes first.   Do I have to participate?  You do not have to participate, but we strongly encourage you to. Contact tracing can help us catch and control new outbreaks as theyre developing to keep your friends and family safe.   What if I dont hear from anyone?  If you dont receive a call within 24 hours, you can call  the number above right away to inquire about your status. That line is open from 8:00 am - 8:00 p.m., 7 days a week.  Contact tracing saves lives! Together, we have the power to beat this virus and keep our loved ones and neighbors safe.    For more information see CDC link below.      https://www.cdc.gov/coronavirus/2019-ncov/hcp/guidance-prevent-spread.html#precautions        Sources:  [facility]– [place], [place] [facility] and [facility]           Sincerely,     Adrian Landin NP

## 2022-07-18 ENCOUNTER — INFUSION (OUTPATIENT)
Dept: INFECTIOUS DISEASES | Facility: HOSPITAL | Age: 62
End: 2022-07-18
Attending: INTERNAL MEDICINE
Payer: COMMERCIAL

## 2022-07-18 VITALS
HEIGHT: 68 IN | BODY MASS INDEX: 40.92 KG/M2 | HEART RATE: 76 BPM | OXYGEN SATURATION: 96 % | DIASTOLIC BLOOD PRESSURE: 84 MMHG | WEIGHT: 270 LBS | TEMPERATURE: 98 F | SYSTOLIC BLOOD PRESSURE: 147 MMHG | RESPIRATION RATE: 16 BRPM

## 2022-07-18 DIAGNOSIS — U07.1 COVID-19: Primary | ICD-10-CM

## 2022-07-18 PROCEDURE — 63600175 PHARM REV CODE 636 W HCPCS: Performed by: INTERNAL MEDICINE

## 2022-07-18 PROCEDURE — M0222 HC IV INJECTION, BEBTELOVIMAB, INCL POST ADMIN MONIT: HCPCS | Performed by: INTERNAL MEDICINE

## 2022-07-18 RX ORDER — ACETAMINOPHEN 325 MG/1
650 TABLET ORAL
Status: ACTIVE | OUTPATIENT
Start: 2022-07-18 | End: 2022-07-19

## 2022-07-18 RX ORDER — ONDANSETRON 4 MG/1
4 TABLET, ORALLY DISINTEGRATING ORAL
Status: ACTIVE | OUTPATIENT
Start: 2022-07-18 | End: 2022-07-19

## 2022-07-18 RX ORDER — BEBTELOVIMAB 87.5 MG/ML
175 INJECTION, SOLUTION INTRAVENOUS
Status: COMPLETED | OUTPATIENT
Start: 2022-07-18 | End: 2022-07-18

## 2022-07-18 RX ORDER — EPINEPHRINE 0.3 MG/.3ML
0.3 INJECTION SUBCUTANEOUS
Status: ACTIVE | OUTPATIENT
Start: 2022-07-18 | End: 2022-07-21

## 2022-07-18 RX ORDER — ALBUTEROL SULFATE 90 UG/1
2 AEROSOL, METERED RESPIRATORY (INHALATION)
Status: ACTIVE | OUTPATIENT
Start: 2022-07-18 | End: 2022-07-21

## 2022-07-18 RX ORDER — DIPHENHYDRAMINE HYDROCHLORIDE 50 MG/ML
25 INJECTION INTRAMUSCULAR; INTRAVENOUS
Status: ACTIVE | OUTPATIENT
Start: 2022-07-18 | End: 2022-07-19

## 2022-07-18 RX ADMIN — BEBTELOVIMAB 175 MG: 87.5 INJECTION, SOLUTION INTRAVENOUS at 09:07

## 2022-07-18 NOTE — PROGRESS NOTES
0908 Bebtelovimab IV Injection administered. Pt tolerated injection well. One hour observation started.       Patient stated he started feeling hot but side effects subsided after 10 mins

## 2022-07-18 NOTE — PROGRESS NOTES
Patient remains with no signs of complications noted. Patient received Bebtelovimab IV Injection according to FDA recommendations and [facility] SOP without complications noted and left with mask in place. Drug fact sheet provided. Pt discharged home. Ambulatory. Remains AAox3. No distress noted. RR even and unlabored.

## 2022-07-18 NOTE — PROGRESS NOTES
Patient arrives for Bebtelovimab IV Injection. Ambulatory. Pt AAox3. No distress noted. RR even and unlabored.     Symptoms and onset date:  Runny nose, body aches on 07/18/22    Tested COVID + on 07/15/22

## 2022-07-19 ENCOUNTER — PATIENT MESSAGE (OUTPATIENT)
Dept: TRANSPLANT | Facility: CLINIC | Age: 62
End: 2022-07-19
Payer: COMMERCIAL

## 2022-08-01 ENCOUNTER — PATIENT MESSAGE (OUTPATIENT)
Dept: FAMILY MEDICINE | Facility: CLINIC | Age: 62
End: 2022-08-01
Payer: COMMERCIAL

## 2022-08-02 DIAGNOSIS — Z00.6 RESEARCH STUDY PATIENT: Primary | ICD-10-CM

## 2022-08-02 DIAGNOSIS — C22.0 HCC (HEPATOCELLULAR CARCINOMA): ICD-10-CM

## 2022-08-08 ENCOUNTER — LAB VISIT (OUTPATIENT)
Dept: LAB | Facility: HOSPITAL | Age: 62
End: 2022-08-08
Attending: INTERNAL MEDICINE
Payer: COMMERCIAL

## 2022-08-08 ENCOUNTER — RESEARCH ENCOUNTER (OUTPATIENT)
Dept: RESEARCH | Facility: HOSPITAL | Age: 62
End: 2022-08-08
Payer: COMMERCIAL

## 2022-08-08 DIAGNOSIS — Z00.6 RESEARCH STUDY PATIENT: ICD-10-CM

## 2022-08-08 DIAGNOSIS — Z94.4 S/P LIVER TRANSPLANT: ICD-10-CM

## 2022-08-08 DIAGNOSIS — C22.0 HCC (HEPATOCELLULAR CARCINOMA): ICD-10-CM

## 2022-08-08 DIAGNOSIS — Z94.4 LIVER REPLACED BY TRANSPLANT: ICD-10-CM

## 2022-08-08 LAB
AFP SERPL-MCNC: <2 NG/ML (ref 0–8.4)
ALBUMIN SERPL BCP-MCNC: 4.1 G/DL (ref 3.5–5.2)
ALP SERPL-CCNC: 63 U/L (ref 55–135)
ALT SERPL W/O P-5'-P-CCNC: 24 U/L (ref 10–44)
ANION GAP SERPL CALC-SCNC: 7 MMOL/L (ref 8–16)
AST SERPL-CCNC: 16 U/L (ref 10–40)
BASOPHILS # BLD AUTO: 0.03 K/UL (ref 0–0.2)
BASOPHILS NFR BLD: 0.5 % (ref 0–1.9)
BILIRUB SERPL-MCNC: 0.6 MG/DL (ref 0.1–1)
BUN SERPL-MCNC: 12 MG/DL (ref 8–23)
CALCIUM SERPL-MCNC: 9.1 MG/DL (ref 8.7–10.5)
CHLORIDE SERPL-SCNC: 105 MMOL/L (ref 95–110)
CO2 SERPL-SCNC: 26 MMOL/L (ref 23–29)
CREAT SERPL-MCNC: 1.1 MG/DL (ref 0.5–1.4)
DIFFERENTIAL METHOD: ABNORMAL
EOSINOPHIL # BLD AUTO: 0.1 K/UL (ref 0–0.5)
EOSINOPHIL NFR BLD: 1.6 % (ref 0–8)
ERYTHROCYTE [DISTWIDTH] IN BLOOD BY AUTOMATED COUNT: 15.9 % (ref 11.5–14.5)
EST. GFR  (NO RACE VARIABLE): >60 ML/MIN/1.73 M^2
GLUCOSE SERPL-MCNC: 110 MG/DL (ref 70–110)
HCT VFR BLD AUTO: 42 % (ref 40–54)
HGB BLD-MCNC: 13.5 G/DL (ref 14–18)
IMM GRANULOCYTES # BLD AUTO: 0.02 K/UL (ref 0–0.04)
IMM GRANULOCYTES NFR BLD AUTO: 0.3 % (ref 0–0.5)
LYMPHOCYTES # BLD AUTO: 1.5 K/UL (ref 1–4.8)
LYMPHOCYTES NFR BLD: 23.2 % (ref 18–48)
MCH RBC QN AUTO: 24.7 PG (ref 27–31)
MCHC RBC AUTO-ENTMCNC: 32.1 G/DL (ref 32–36)
MCV RBC AUTO: 77 FL (ref 82–98)
MONOCYTES # BLD AUTO: 0.5 K/UL (ref 0.3–1)
MONOCYTES NFR BLD: 8.1 % (ref 4–15)
NEUTROPHILS # BLD AUTO: 4.2 K/UL (ref 1.8–7.7)
NEUTROPHILS NFR BLD: 66.3 % (ref 38–73)
NRBC BLD-RTO: 0 /100 WBC
PLATELET # BLD AUTO: 338 K/UL (ref 150–450)
PMV BLD AUTO: 9.9 FL (ref 9.2–12.9)
POTASSIUM SERPL-SCNC: 4 MMOL/L (ref 3.5–5.1)
PROT SERPL-MCNC: 7.2 G/DL (ref 6–8.4)
RBC # BLD AUTO: 5.47 M/UL (ref 4.6–6.2)
SODIUM SERPL-SCNC: 138 MMOL/L (ref 136–145)
WBC # BLD AUTO: 6.3 K/UL (ref 3.9–12.7)

## 2022-08-08 PROCEDURE — 36415 COLL VENOUS BLD VENIPUNCTURE: CPT | Mod: PO | Performed by: INTERNAL MEDICINE

## 2022-08-08 PROCEDURE — 80053 COMPREHEN METABOLIC PANEL: CPT | Performed by: INTERNAL MEDICINE

## 2022-08-08 PROCEDURE — 82105 ALPHA-FETOPROTEIN SERUM: CPT | Performed by: INTERNAL MEDICINE

## 2022-08-08 PROCEDURE — 85025 COMPLETE CBC W/AUTO DIFF WBC: CPT | Performed by: INTERNAL MEDICINE

## 2022-08-08 PROCEDURE — 80197 ASSAY OF TACROLIMUS: CPT | Performed by: INTERNAL MEDICINE

## 2022-08-08 NOTE — PROGRESS NOTES
RESEARCH STUDY FOLLOW-UP SPECIMEN COLLECTION ENCOUNTER  ORGAN TRANSPLANT  [facility] GIOVANNY OCAMPO    Study Title: Role of Tumor-Induced Immune Tolerance in the Patient Response to Locoregional Therapy: Implications in Assessment Risk of Hepatocellular Carcinoma Recurrence Following Liver Transplantation    IRB #: [id number]    IRB Approval Date: 6/8/2016    : Darshan Graves MD  Sub-investigator: Luke Pulido, PhD    Patient Number: [id number]    In accordance with the study protocol, Research Lab orders were placed and follow-up specimens were collected on (date: 08/08/2022) in:  [facility] VNP: YES/NO: no  [facility]: YES/NO: no  [facility] IM: YES/NO: no     Specimens were collected at [facility] and a  was used to transported back to [facility] for processing.    Corine Holder  Admin Research- Liver Transplant

## 2022-08-09 ENCOUNTER — TELEPHONE (OUTPATIENT)
Dept: TRANSPLANT | Facility: CLINIC | Age: 62
End: 2022-08-09
Payer: COMMERCIAL

## 2022-08-09 ENCOUNTER — PATIENT MESSAGE (OUTPATIENT)
Dept: TRANSPLANT | Facility: CLINIC | Age: 62
End: 2022-08-09
Payer: COMMERCIAL

## 2022-08-09 LAB — TACROLIMUS BLD-MCNC: 3.7 NG/ML (ref 5–15)

## 2022-08-09 NOTE — TELEPHONE ENCOUNTER
Letter sent to patient stating: Your labs have been reviewed by your Transplant physician, no action required. Next labs due 1/9/23          ----- Message from Fab Damon MD sent at 8/9/2022  9:57 AM CDT -----  Results reviewed. No action.

## 2022-08-09 NOTE — LETTER
August 9, 2022    Roman Hodges  [address]  [place] [address]          Dear Roman Hodges:  MRN: [id number]    This is a follow up to your recent labs, your lab results were stable.  There are no medicine changes.  Please have your labs drawn again on 1/9/2023.      If you cannot have your labs drawn on the scheduled date, it is your responsibility to call the transplant department to reschedule.  Please call [phone] and ask to speak to Jodee CESAR   for all scheduling requests.     Sincerely,    Charmaine PENNN, RN        Your Liver Transplant Coordinator    [facility]  [address] [place] [id number]  [phone]

## 2022-08-10 ENCOUNTER — PATIENT MESSAGE (OUTPATIENT)
Dept: FAMILY MEDICINE | Facility: CLINIC | Age: 62
End: 2022-08-10
Payer: COMMERCIAL

## 2022-08-10 ENCOUNTER — TELEPHONE (OUTPATIENT)
Dept: FAMILY MEDICINE | Facility: CLINIC | Age: 62
End: 2022-08-10
Payer: COMMERCIAL

## 2022-08-10 DIAGNOSIS — F41.9 ANXIETY: Primary | ICD-10-CM

## 2022-08-10 RX ORDER — BUSPIRONE HYDROCHLORIDE 5 MG/1
5 TABLET ORAL 2 TIMES DAILY
Qty: 180 TABLET | Refills: 3 | Status: SHIPPED | OUTPATIENT
Start: 2022-08-10 | End: 2024-01-16 | Stop reason: SDUPTHER

## 2022-08-18 ENCOUNTER — PATIENT MESSAGE (OUTPATIENT)
Dept: TRANSPLANT | Facility: CLINIC | Age: 62
End: 2022-08-18
Payer: COMMERCIAL

## 2022-08-18 DIAGNOSIS — Z94.4 S/P LIVER TRANSPLANT: Primary | ICD-10-CM

## 2022-08-24 ENCOUNTER — PATIENT MESSAGE (OUTPATIENT)
Dept: ADMINISTRATIVE | Facility: HOSPITAL | Age: 62
End: 2022-08-24
Payer: COMMERCIAL

## 2022-09-06 ENCOUNTER — OFFICE VISIT (OUTPATIENT)
Dept: CARDIOLOGY | Facility: CLINIC | Age: 62
End: 2022-09-06
Payer: COMMERCIAL

## 2022-09-06 VITALS
WEIGHT: 219.56 LBS | HEART RATE: 78 BPM | HEIGHT: 68 IN | DIASTOLIC BLOOD PRESSURE: 86 MMHG | BODY MASS INDEX: 33.28 KG/M2 | SYSTOLIC BLOOD PRESSURE: 137 MMHG | OXYGEN SATURATION: 98 %

## 2022-09-06 DIAGNOSIS — G47.33 OSA (OBSTRUCTIVE SLEEP APNEA): Chronic | ICD-10-CM

## 2022-09-06 DIAGNOSIS — Z94.4 S/P LIVER TRANSPLANT: Chronic | ICD-10-CM

## 2022-09-06 DIAGNOSIS — Z79.60 LONG-TERM USE OF IMMUNOSUPPRESSANT MEDICATION: ICD-10-CM

## 2022-09-06 DIAGNOSIS — Z79.01 LONG TERM (CURRENT) USE OF ANTICOAGULANTS: Chronic | ICD-10-CM

## 2022-09-06 DIAGNOSIS — E66.9 OBESITY (BMI 30.0-34.9): ICD-10-CM

## 2022-09-06 DIAGNOSIS — I48.0 PAROXYSMAL ATRIAL FIBRILLATION: ICD-10-CM

## 2022-09-06 DIAGNOSIS — I10 ESSENTIAL HYPERTENSION: Primary | Chronic | ICD-10-CM

## 2022-09-06 PROCEDURE — 3079F DIAST BP 80-89 MM HG: CPT | Mod: CPTII,S$GLB,, | Performed by: INTERNAL MEDICINE

## 2022-09-06 PROCEDURE — 3075F SYST BP GE 130 - 139MM HG: CPT | Mod: CPTII,S$GLB,, | Performed by: INTERNAL MEDICINE

## 2022-09-06 PROCEDURE — 3008F BODY MASS INDEX DOCD: CPT | Mod: CPTII,S$GLB,, | Performed by: INTERNAL MEDICINE

## 2022-09-06 PROCEDURE — 99214 OFFICE O/P EST MOD 30 MIN: CPT | Mod: S$GLB,,, | Performed by: INTERNAL MEDICINE

## 2022-09-06 PROCEDURE — 3075F PR MOST RECENT SYSTOLIC BLOOD PRESS GE 130-139MM HG: ICD-10-PCS | Mod: CPTII,S$GLB,, | Performed by: INTERNAL MEDICINE

## 2022-09-06 PROCEDURE — 3008F PR BODY MASS INDEX (BMI) DOCUMENTED: ICD-10-PCS | Mod: CPTII,S$GLB,, | Performed by: INTERNAL MEDICINE

## 2022-09-06 PROCEDURE — 3044F HG A1C LEVEL LT 7.0%: CPT | Mod: CPTII,S$GLB,, | Performed by: INTERNAL MEDICINE

## 2022-09-06 PROCEDURE — 3060F PR POS MICROALBUMINURIA RESULT DOCUMENTED/REVIEW: ICD-10-PCS | Mod: CPTII,S$GLB,, | Performed by: INTERNAL MEDICINE

## 2022-09-06 PROCEDURE — 1159F MED LIST DOCD IN RCRD: CPT | Mod: CPTII,S$GLB,, | Performed by: INTERNAL MEDICINE

## 2022-09-06 PROCEDURE — 3066F NEPHROPATHY DOC TX: CPT | Mod: CPTII,S$GLB,, | Performed by: INTERNAL MEDICINE

## 2022-09-06 PROCEDURE — 3066F PR DOCUMENTATION OF TREATMENT FOR NEPHROPATHY: ICD-10-PCS | Mod: CPTII,S$GLB,, | Performed by: INTERNAL MEDICINE

## 2022-09-06 PROCEDURE — 99999 PR PBB SHADOW E&M-EST. PATIENT-LVL IV: ICD-10-PCS | Mod: PBBFAC,,, | Performed by: INTERNAL MEDICINE

## 2022-09-06 PROCEDURE — 3044F PR MOST RECENT HEMOGLOBIN A1C LEVEL <7.0%: ICD-10-PCS | Mod: CPTII,S$GLB,, | Performed by: INTERNAL MEDICINE

## 2022-09-06 PROCEDURE — 1160F RVW MEDS BY RX/DR IN RCRD: CPT | Mod: CPTII,S$GLB,, | Performed by: INTERNAL MEDICINE

## 2022-09-06 PROCEDURE — 1160F PR REVIEW ALL MEDS BY PRESCRIBER/CLIN PHARMACIST DOCUMENTED: ICD-10-PCS | Mod: CPTII,S$GLB,, | Performed by: INTERNAL MEDICINE

## 2022-09-06 PROCEDURE — 3079F PR MOST RECENT DIASTOLIC BLOOD PRESSURE 80-89 MM HG: ICD-10-PCS | Mod: CPTII,S$GLB,, | Performed by: INTERNAL MEDICINE

## 2022-09-06 PROCEDURE — 99999 PR PBB SHADOW E&M-EST. PATIENT-LVL IV: CPT | Mod: PBBFAC,,, | Performed by: INTERNAL MEDICINE

## 2022-09-06 PROCEDURE — 1159F PR MEDICATION LIST DOCUMENTED IN MEDICAL RECORD: ICD-10-PCS | Mod: CPTII,S$GLB,, | Performed by: INTERNAL MEDICINE

## 2022-09-06 PROCEDURE — 3060F POS MICROALBUMINURIA REV: CPT | Mod: CPTII,S$GLB,, | Performed by: INTERNAL MEDICINE

## 2022-09-06 PROCEDURE — 99214 PR OFFICE/OUTPT VISIT, EST, LEVL IV, 30-39 MIN: ICD-10-PCS | Mod: S$GLB,,, | Performed by: INTERNAL MEDICINE

## 2022-09-06 NOTE — PROGRESS NOTES
Subjective:    Patient ID:  Roman Hodges is a 62 y.o. male who presents for follow-up of Hypertension      HPI    61 y/o Italian speaking male with hx of HTN, DM, PAFib, HFrEF with return to normal function (previously 30%, last 2DE with 55%) and HCC s/p OLTx. Patient was initially admitted with AF RVR after presenting with near syncopal episode and spontaneously converted. At that time, 2DE with EF 30% with akinetic: mid anteroseptal, apical anterior, apical septal, apical lateral and apex and hypokinetic: mid inferoseptal and apical inferior walls. Again presented with similar symptoms, started on BB and DOAC and discharged home. Seen by Dr Sargent and was complaining of exertional epigastric/substernal pain and was admitted for ACS r/o. Had LHC with nonobstructive CAD and EF on V-gram normal without WMA's. Clinically improved and discharged home. Repeat 2DE with normalization of function to 55%. Did well post discharge, but then began having episodes of HTN urgency and had presented multiple times to ED with SBP >200. Had Oltx successfully and has done well. BP meds had been changed and had been having elevated readings. Changed BP regimen and BP controlled. Was hospitalized at [facility] for Covid for 8 days and had full recovery.   Previous visit was complaining of palps with chest discomfort. Had epigastric discomfort and SOB. Episode lasted about an hour. No recurrent episodes. No significant caffeine. Had normal 2DE and Holter. No recurrent episodes.  Previously was walkings 6-8 miles daily with no symptoms.  About 3 weeks ago was having episodes of elevated 's/90's. No symptoms. Recent Covid with mild symptoms, now fully recovered.   Denies regular orthopnea, PND, syncope, palps. Tolerating meds well and compliant.     Review of Systems   Constitutional: Positive for malaise/fatigue.   HENT:  Negative for congestion.    Eyes:  Negative for blurred vision.   Cardiovascular:  Negative for chest pain,  claudication, cyanosis, dyspnea on exertion, irregular heartbeat, leg swelling, near-syncope, orthopnea, palpitations, paroxysmal nocturnal dyspnea and syncope.   Respiratory:  Negative for shortness of breath.    Endocrine: Negative for polyuria.   Hematologic/Lymphatic: Negative for bleeding problem.   Skin:  Negative for itching and rash.   Musculoskeletal:  Negative for joint swelling, muscle cramps and muscle weakness.   Gastrointestinal:  Negative for abdominal pain, hematemesis, hematochezia, melena, nausea and vomiting.   Genitourinary:  Negative for dysuria and hematuria.   Neurological:  Negative for dizziness, focal weakness, headaches, light-headedness, loss of balance and weakness.   Psychiatric/Behavioral:  Negative for depression. The patient is not nervous/anxious.       Objective:    Physical Exam  Constitutional:       Appearance: He is well-developed.   HENT:      Head: Normocephalic and atraumatic.   Neck:      Vascular: No JVD.   Cardiovascular:      Rate and Rhythm: Normal rate and regular rhythm.      Pulses:           Carotid pulses are 2+ on the right side and 2+ on the left side.       Radial pulses are 2+ on the right side and 2+ on the left side.        Femoral pulses are 2+ on the right side and 2+ on the left side.       Dorsalis pedis pulses are 2+ on the right side and 2+ on the left side.        Posterior tibial pulses are 2+ on the right side and 2+ on the left side.      Heart sounds: Normal heart sounds.   Pulmonary:      Effort: Pulmonary effort is normal.      Breath sounds: Normal breath sounds.   Abdominal:      General: Bowel sounds are normal.      Palpations: Abdomen is soft.   Musculoskeletal:      Cervical back: Neck supple.   Skin:     General: Skin is warm and dry.   Neurological:      Mental Status: He is alert and oriented to person, place, and time.   Psychiatric:         Behavior: Behavior normal.         Thought Content: Thought content normal.         Assessment:        1. Essential hypertension    2. Paroxysmal atrial fibrillation    3. Long term (current) use of anticoagulants    4. Obesity (BMI 30.0-34.9)    5. S/P liver transplant    6. Long-term use of immunosuppressant medication    7. MALLIKA (obstructive sleep apnea)      63 y/o pt with hx and presentation as above. Doing well from a cardiac perspective and compensated from a HF perspective. BP better controlled. BP log. Needs to lose weight and stay active. Discussed the etiology, evaluation, and management of CP, HTN, afib, CHF, obesity, MALLIKA. Discussed the importance of med compliance, heart healthy diet, and regular exercise.      Plan:       -Continue current medical management  -BP log  -f/u in 1 month

## 2022-09-16 ENCOUNTER — PATIENT MESSAGE (OUTPATIENT)
Dept: DERMATOLOGY | Facility: CLINIC | Age: 62
End: 2022-09-16
Payer: COMMERCIAL

## 2022-10-09 ENCOUNTER — PATIENT MESSAGE (OUTPATIENT)
Dept: FAMILY MEDICINE | Facility: CLINIC | Age: 62
End: 2022-10-09
Payer: COMMERCIAL

## 2022-10-10 ENCOUNTER — PATIENT MESSAGE (OUTPATIENT)
Dept: ADMINISTRATIVE | Facility: HOSPITAL | Age: 62
End: 2022-10-10
Payer: COMMERCIAL

## 2022-10-11 DIAGNOSIS — Z23 FLU VACCINE NEED: Primary | ICD-10-CM

## 2022-10-12 DIAGNOSIS — Z23 NEED FOR INFLUENZA VACCINATION: Primary | ICD-10-CM

## 2022-10-25 ENCOUNTER — OFFICE VISIT (OUTPATIENT)
Dept: FAMILY MEDICINE | Facility: CLINIC | Age: 62
End: 2022-10-25
Payer: COMMERCIAL

## 2022-10-25 VITALS
BODY MASS INDEX: 33.31 KG/M2 | DIASTOLIC BLOOD PRESSURE: 84 MMHG | HEART RATE: 81 BPM | OXYGEN SATURATION: 98 % | SYSTOLIC BLOOD PRESSURE: 130 MMHG | HEIGHT: 68 IN | WEIGHT: 219.81 LBS

## 2022-10-25 DIAGNOSIS — I10 ESSENTIAL HYPERTENSION: ICD-10-CM

## 2022-10-25 DIAGNOSIS — K76.0 FATTY LIVER: ICD-10-CM

## 2022-10-25 DIAGNOSIS — R14.1 ABDOMINAL GAS PAIN: Primary | ICD-10-CM

## 2022-10-25 DIAGNOSIS — E11.9 TYPE 2 DIABETES MELLITUS WITHOUT COMPLICATION, WITHOUT LONG-TERM CURRENT USE OF INSULIN: ICD-10-CM

## 2022-10-25 DIAGNOSIS — R14.2 BURPING: ICD-10-CM

## 2022-10-25 DIAGNOSIS — Z23 NEEDS FLU SHOT: ICD-10-CM

## 2022-10-25 PROCEDURE — 3079F DIAST BP 80-89 MM HG: CPT | Mod: CPTII,S$GLB,, | Performed by: FAMILY MEDICINE

## 2022-10-25 PROCEDURE — 3060F POS MICROALBUMINURIA REV: CPT | Mod: CPTII,S$GLB,, | Performed by: FAMILY MEDICINE

## 2022-10-25 PROCEDURE — 99215 OFFICE O/P EST HI 40 MIN: CPT | Mod: 25,S$GLB,, | Performed by: FAMILY MEDICINE

## 2022-10-25 PROCEDURE — 99999 PR PBB SHADOW E&M-EST. PATIENT-LVL IV: CPT | Mod: PBBFAC,,, | Performed by: FAMILY MEDICINE

## 2022-10-25 PROCEDURE — 3079F PR MOST RECENT DIASTOLIC BLOOD PRESSURE 80-89 MM HG: ICD-10-PCS | Mod: CPTII,S$GLB,, | Performed by: FAMILY MEDICINE

## 2022-10-25 PROCEDURE — 3075F PR MOST RECENT SYSTOLIC BLOOD PRESS GE 130-139MM HG: ICD-10-PCS | Mod: CPTII,S$GLB,, | Performed by: FAMILY MEDICINE

## 2022-10-25 PROCEDURE — 3044F HG A1C LEVEL LT 7.0%: CPT | Mod: CPTII,S$GLB,, | Performed by: FAMILY MEDICINE

## 2022-10-25 PROCEDURE — 99215 PR OFFICE/OUTPT VISIT, EST, LEVL V, 40-54 MIN: ICD-10-PCS | Mod: 25,S$GLB,, | Performed by: FAMILY MEDICINE

## 2022-10-25 PROCEDURE — 90694 FLU VACCINE - QUADRIVALENT - ADJUVANTED: ICD-10-PCS | Mod: S$GLB,,, | Performed by: FAMILY MEDICINE

## 2022-10-25 PROCEDURE — 3066F NEPHROPATHY DOC TX: CPT | Mod: CPTII,S$GLB,, | Performed by: FAMILY MEDICINE

## 2022-10-25 PROCEDURE — 3060F PR POS MICROALBUMINURIA RESULT DOCUMENTED/REVIEW: ICD-10-PCS | Mod: CPTII,S$GLB,, | Performed by: FAMILY MEDICINE

## 2022-10-25 PROCEDURE — 3044F PR MOST RECENT HEMOGLOBIN A1C LEVEL <7.0%: ICD-10-PCS | Mod: CPTII,S$GLB,, | Performed by: FAMILY MEDICINE

## 2022-10-25 PROCEDURE — 1159F PR MEDICATION LIST DOCUMENTED IN MEDICAL RECORD: ICD-10-PCS | Mod: CPTII,S$GLB,, | Performed by: FAMILY MEDICINE

## 2022-10-25 PROCEDURE — 3075F SYST BP GE 130 - 139MM HG: CPT | Mod: CPTII,S$GLB,, | Performed by: FAMILY MEDICINE

## 2022-10-25 PROCEDURE — 90471 IMMUNIZATION ADMIN: CPT | Mod: S$GLB,,, | Performed by: FAMILY MEDICINE

## 2022-10-25 PROCEDURE — 3066F PR DOCUMENTATION OF TREATMENT FOR NEPHROPATHY: ICD-10-PCS | Mod: CPTII,S$GLB,, | Performed by: FAMILY MEDICINE

## 2022-10-25 PROCEDURE — 90694 VACC AIIV4 NO PRSRV 0.5ML IM: CPT | Mod: S$GLB,,, | Performed by: FAMILY MEDICINE

## 2022-10-25 PROCEDURE — 99999 PR PBB SHADOW E&M-EST. PATIENT-LVL IV: ICD-10-PCS | Mod: PBBFAC,,, | Performed by: FAMILY MEDICINE

## 2022-10-25 PROCEDURE — 90471 FLU VACCINE - QUADRIVALENT - ADJUVANTED: ICD-10-PCS | Mod: S$GLB,,, | Performed by: FAMILY MEDICINE

## 2022-10-25 PROCEDURE — 1159F MED LIST DOCD IN RCRD: CPT | Mod: CPTII,S$GLB,, | Performed by: FAMILY MEDICINE

## 2022-10-25 RX ORDER — SIMETHICONE 180 MG
1 CAPSULE ORAL 3 TIMES DAILY PRN
Qty: 90 CAPSULE | Refills: 0 | Status: SHIPPED | OUTPATIENT
Start: 2022-10-25

## 2022-10-25 NOTE — PROGRESS NOTES
Subjective:       Patient ID: Roman Hodges is a 62 y.o. male.    Chief Complaint: Follow-up    62 years old male for blood pressure check.  Blood pressure today was stable.  No chest pain, palpitation, orthopnea PND.  Patient with good compliance with diabetes regimen.  No Recent A1c.  He reports episodic abdominal gas pain over the left upper quadrant.  Previous images study was normal.  Patient with a BMI of 33 currently trying to lose weight.  Patient with evidence of fatty liver.    Follow-up  Pertinent negatives include no arthralgias, chest pain, headaches, joint swelling, neck pain, vomiting or weakness.   Review of Systems   Constitutional: Negative.  Negative for activity change and unexpected weight change.   HENT: Negative.  Negative for hearing loss, rhinorrhea and trouble swallowing.    Eyes: Negative.  Negative for discharge and visual disturbance.   Respiratory: Negative.  Negative for chest tightness and wheezing.    Cardiovascular: Negative.  Negative for chest pain and palpitations.   Gastrointestinal: Negative.  Negative for blood in stool, constipation, diarrhea and vomiting.   Endocrine: Negative for polydipsia and polyuria.   Genitourinary: Negative.  Negative for difficulty urinating, hematuria and urgency.   Musculoskeletal: Negative.  Negative for arthralgias, joint swelling and neck pain.   Integumentary:  Negative.   Neurological: Negative.  Negative for weakness and headaches.   Psychiatric/Behavioral: Negative.  Negative for confusion and dysphoric mood.        Objective:      Physical Exam  Vitals and nursing note reviewed.   Constitutional:       General: He is not in acute distress.     Appearance: He is well-developed. He is not diaphoretic.   HENT:      Head: Normocephalic and atraumatic.      Right Ear: External ear normal.      Left Ear: External ear normal.      Nose: Nose normal.      Mouth/Throat:      Pharynx: No oropharyngeal exudate.   Eyes:      General: No scleral  icterus.        Right eye: No discharge.         Left eye: No discharge.      Conjunctiva/sclera: Conjunctivae normal.      Pupils: Pupils are equal, round, and reactive to light.   Neck:      Thyroid: No thyromegaly.      Vascular: No JVD.      Trachea: No tracheal deviation.   Cardiovascular:      Rate and Rhythm: Normal rate and regular rhythm.      Heart sounds: Normal heart sounds. No murmur heard.    No friction rub. No gallop.   Pulmonary:      Effort: Pulmonary effort is normal. No respiratory distress.      Breath sounds: Normal breath sounds. No stridor. No wheezing or rales.   Chest:      Chest wall: No tenderness.   Abdominal:      General: Bowel sounds are normal. There is no distension.      Palpations: Abdomen is soft. There is no mass.      Tenderness: There is no abdominal tenderness. There is no guarding or rebound.   Musculoskeletal:         General: No tenderness. Normal range of motion.      Cervical back: Normal range of motion and neck supple.   Lymphadenopathy:      Cervical: No cervical adenopathy.   Skin:     General: Skin is warm and dry.      Coloration: Skin is not pale.      Findings: No erythema or rash.   Neurological:      Mental Status: He is alert and oriented to person, place, and time.      Cranial Nerves: No cranial nerve deficit.      Motor: No abnormal muscle tone.      Coordination: Coordination normal.      Deep Tendon Reflexes: Reflexes are normal and symmetric. Reflexes normal.   Psychiatric:         Behavior: Behavior normal.         Thought Content: Thought content normal.         Judgment: Judgment normal.       Assessment:       Problem List Items Addressed This Visit       Essential hypertension (Chronic)    Relevant Orders    CBC Auto Differential    Comprehensive Metabolic Panel    Lipid Panel    Type 2 diabetes mellitus, without long-term current use of insulin (Chronic)    Relevant Orders    Comprehensive Metabolic Panel    Hemoglobin A1C     Microalbumin/Creatinine Ratio, Urine     Other Visit Diagnoses       Abdominal gas pain    -  Primary    Relevant Medications    simethicone (PHAZYME) 250 mg Cap    Burping        Relevant Medications    simethicone (PHAZYME) 250 mg Cap    BMI 33.0-33.9,adult        Needs flu shot        Relevant Orders    Influenza (FLUAD) - Quadrivalent (Adjuvanted) *Preferred* (65+) (PF) (Completed)    Fatty liver                  Plan:         Roman was seen today for follow-up.    Diagnoses and all orders for this visit:    Abdominal gas pain  -     simethicone (PHAZYME) 250 mg Cap; Take 1 capsule by mouth 3 (three) times daily as needed (gas).    Burping  -     simethicone (PHAZYME) 250 mg Cap; Take 1 capsule by mouth 3 (three) times daily as needed (gas).    BMI 33.0-33.9,adult    Needs flu shot  -     Influenza (FLUAD) - Quadrivalent (Adjuvanted) *Preferred* (65+) (PF)    Fatty liver    Essential hypertension  -     CBC Auto Differential; Future  -     Comprehensive Metabolic Panel; Future  -     Lipid Panel; Future    Type 2 diabetes mellitus without complication, without long-term current use of insulin  -     Comprehensive Metabolic Panel; Future  -     Hemoglobin A1C; Future  -     Microalbumin/Creatinine Ratio, Urine; Future       Continue monitoring blood sugar at home,ADA diet.   Continue monitoring blood pressure at home, low sodium diet.    Diet and physical activity to promote weight loss.

## 2022-11-05 ENCOUNTER — OFFICE VISIT (OUTPATIENT)
Dept: URGENT CARE | Facility: CLINIC | Age: 62
End: 2022-11-05
Payer: COMMERCIAL

## 2022-11-05 VITALS
HEIGHT: 68 IN | BODY MASS INDEX: 33.19 KG/M2 | HEART RATE: 88 BPM | RESPIRATION RATE: 20 BRPM | SYSTOLIC BLOOD PRESSURE: 148 MMHG | DIASTOLIC BLOOD PRESSURE: 70 MMHG | OXYGEN SATURATION: 98 % | WEIGHT: 219 LBS | TEMPERATURE: 101 F

## 2022-11-05 DIAGNOSIS — R50.9 FEVER, UNSPECIFIED FEVER CAUSE: Primary | ICD-10-CM

## 2022-11-05 DIAGNOSIS — R68.89 FLU-LIKE SYMPTOMS: ICD-10-CM

## 2022-11-05 DIAGNOSIS — Z20.828 EXPOSURE TO THE FLU: ICD-10-CM

## 2022-11-05 LAB
CTP QC/QA: YES
CTP QC/QA: YES
POC MOLECULAR INFLUENZA A AGN: NEGATIVE
POC MOLECULAR INFLUENZA B AGN: NEGATIVE
SARS-COV-2 AG RESP QL IA.RAPID: NEGATIVE

## 2022-11-05 PROCEDURE — 87811 SARS CORONAVIRUS 2 ANTIGEN POCT, MANUAL READ: ICD-10-PCS | Mod: QW,S$GLB,, | Performed by: PHYSICIAN ASSISTANT

## 2022-11-05 PROCEDURE — 3077F PR MOST RECENT SYSTOLIC BLOOD PRESSURE >= 140 MM HG: ICD-10-PCS | Mod: CPTII,S$GLB,, | Performed by: PHYSICIAN ASSISTANT

## 2022-11-05 PROCEDURE — 1159F PR MEDICATION LIST DOCUMENTED IN MEDICAL RECORD: ICD-10-PCS | Mod: CPTII,S$GLB,, | Performed by: PHYSICIAN ASSISTANT

## 2022-11-05 PROCEDURE — 1160F PR REVIEW ALL MEDS BY PRESCRIBER/CLIN PHARMACIST DOCUMENTED: ICD-10-PCS | Mod: CPTII,S$GLB,, | Performed by: PHYSICIAN ASSISTANT

## 2022-11-05 PROCEDURE — 3078F DIAST BP <80 MM HG: CPT | Mod: CPTII,S$GLB,, | Performed by: PHYSICIAN ASSISTANT

## 2022-11-05 PROCEDURE — 3044F HG A1C LEVEL LT 7.0%: CPT | Mod: CPTII,S$GLB,, | Performed by: PHYSICIAN ASSISTANT

## 2022-11-05 PROCEDURE — 3066F NEPHROPATHY DOC TX: CPT | Mod: CPTII,S$GLB,, | Performed by: PHYSICIAN ASSISTANT

## 2022-11-05 PROCEDURE — 3060F POS MICROALBUMINURIA REV: CPT | Mod: CPTII,S$GLB,, | Performed by: PHYSICIAN ASSISTANT

## 2022-11-05 PROCEDURE — 99213 PR OFFICE/OUTPT VISIT, EST, LEVL III, 20-29 MIN: ICD-10-PCS | Mod: S$GLB,CS,, | Performed by: PHYSICIAN ASSISTANT

## 2022-11-05 PROCEDURE — 3066F PR DOCUMENTATION OF TREATMENT FOR NEPHROPATHY: ICD-10-PCS | Mod: CPTII,S$GLB,, | Performed by: PHYSICIAN ASSISTANT

## 2022-11-05 PROCEDURE — 3008F BODY MASS INDEX DOCD: CPT | Mod: CPTII,S$GLB,, | Performed by: PHYSICIAN ASSISTANT

## 2022-11-05 PROCEDURE — 3078F PR MOST RECENT DIASTOLIC BLOOD PRESSURE < 80 MM HG: ICD-10-PCS | Mod: CPTII,S$GLB,, | Performed by: PHYSICIAN ASSISTANT

## 2022-11-05 PROCEDURE — 1159F MED LIST DOCD IN RCRD: CPT | Mod: CPTII,S$GLB,, | Performed by: PHYSICIAN ASSISTANT

## 2022-11-05 PROCEDURE — 87502 POCT INFLUENZA A/B MOLECULAR: ICD-10-PCS | Mod: QW,S$GLB,, | Performed by: PHYSICIAN ASSISTANT

## 2022-11-05 PROCEDURE — 3077F SYST BP >= 140 MM HG: CPT | Mod: CPTII,S$GLB,, | Performed by: PHYSICIAN ASSISTANT

## 2022-11-05 PROCEDURE — 3044F PR MOST RECENT HEMOGLOBIN A1C LEVEL <7.0%: ICD-10-PCS | Mod: CPTII,S$GLB,, | Performed by: PHYSICIAN ASSISTANT

## 2022-11-05 PROCEDURE — 99213 OFFICE O/P EST LOW 20 MIN: CPT | Mod: S$GLB,CS,, | Performed by: PHYSICIAN ASSISTANT

## 2022-11-05 PROCEDURE — 87502 INFLUENZA DNA AMP PROBE: CPT | Mod: QW,S$GLB,, | Performed by: PHYSICIAN ASSISTANT

## 2022-11-05 PROCEDURE — 3008F PR BODY MASS INDEX (BMI) DOCUMENTED: ICD-10-PCS | Mod: CPTII,S$GLB,, | Performed by: PHYSICIAN ASSISTANT

## 2022-11-05 PROCEDURE — 1160F RVW MEDS BY RX/DR IN RCRD: CPT | Mod: CPTII,S$GLB,, | Performed by: PHYSICIAN ASSISTANT

## 2022-11-05 PROCEDURE — 3060F PR POS MICROALBUMINURIA RESULT DOCUMENTED/REVIEW: ICD-10-PCS | Mod: CPTII,S$GLB,, | Performed by: PHYSICIAN ASSISTANT

## 2022-11-05 PROCEDURE — 87811 SARS-COV-2 COVID19 W/OPTIC: CPT | Mod: QW,S$GLB,, | Performed by: PHYSICIAN ASSISTANT

## 2022-11-05 RX ORDER — OSELTAMIVIR PHOSPHATE 75 MG/1
75 CAPSULE ORAL 2 TIMES DAILY
Qty: 10 CAPSULE | Refills: 0 | Status: SHIPPED | OUTPATIENT
Start: 2022-11-05 | End: 2022-11-10

## 2022-11-05 NOTE — LETTER
November 5, 2022      [facility]  [address] [address]  Phone: [phone]  Fax: [phone]       Patient: Rmoan Hodges   YOB: 1960  Date of Visit: 11/05/2022    To Whom It May Concern:    Colleen Hodges  was at [facility] on 11/05/2022. The patient may return to work/school on 11/9/2022 with no restrictions. If you have any questions or concerns, or if I can be of further assistance, please do not hesitate to contact me.    Sincerely,    Stefania Bauman PA-C

## 2022-11-05 NOTE — PROGRESS NOTES
"Subjective:       Patient ID: Roman Hodges is a 62 y.o. male.    Vitals:  height is 5' 8" (1.727 m) and weight is 99.3 kg (219 lb). His temperature is 100.5 °F (38.1 °C) (abnormal). His blood pressure is 148/70 (abnormal) and his pulse is 88. His respiration is 20 and oxygen saturation is 98%.     Chief Complaint: Nasal Congestion (Cough, fever, body pain - Entered by patient), URI, and Fever    Mr. Hodges presents for evaluation of fever, body ache, cough, congestion, rhinorrhea that started 2 days ago.  He reports multiple flu exposures at work and at [employer].  He is a liver transplant patient and is on CellCept and Prograf.  He denies any shortness of breath, chest pain, leg swelling, nausea, vomiting, diarrhea, anosmia or ageusia.  He has been taking Robitussin with relief in the cough.          URI   This is a new problem. The current episode started in the past 7 days. The problem has been gradually worsening. The maximum temperature recorded prior to his arrival was 101 - 101.9 F. Associated symptoms include congestion, coughing, headaches, rhinorrhea and sinus pain. Pertinent negatives include no abdominal pain, chest pain, diarrhea, dysuria, ear pain, nausea, neck pain, rash, sore throat or vomiting. He has tried acetaminophen for the symptoms.     Constitution: Positive for fever. Negative for appetite change, chills, sweating and fatigue.   HENT:  Positive for congestion and sinus pain. Negative for ear pain, ear discharge, postnasal drip, sinus pressure and sore throat.    Neck: Negative for neck pain and neck stiffness.   Cardiovascular:  Negative for chest trauma, chest pain, leg swelling, palpitations, sob on exertion and passing out.   Eyes:  Negative for blurred vision.   Respiratory:  Positive for cough. Negative for shortness of breath.    Gastrointestinal:  Negative for abdominal pain, nausea, vomiting and diarrhea.   Genitourinary:  Negative for dysuria, frequency and urgency. "   Musculoskeletal:  Positive for muscle ache. Negative for pain.   Skin:  Negative for rash.   Neurological:  Positive for headaches. Negative for dizziness, history of vertigo, light-headedness, passing out, facial drooping, speech difficulty, coordination disturbances and loss of balance.   Hematologic/Lymphatic: Negative for easy bruising/bleeding. Does not bruise/bleed easily.   Psychiatric/Behavioral:  Negative for confusion.      Objective:      Physical Exam   Constitutional: He is oriented to person, place, and time. He appears well-developed. He is cooperative.  Non-toxic appearance. He does not appear ill. No distress.   HENT:   Head: Normocephalic and atraumatic.   Ears:   Right Ear: Hearing, tympanic membrane, external ear and ear canal normal.   Left Ear: Hearing, tympanic membrane, external ear and ear canal normal.   Nose: Rhinorrhea present. No mucosal edema or nasal deformity. No epistaxis. Right sinus exhibits no maxillary sinus tenderness and no frontal sinus tenderness. Left sinus exhibits no maxillary sinus tenderness and no frontal sinus tenderness.   Mouth/Throat: Uvula is midline, oropharynx is clear and moist and mucous membranes are normal. No trismus in the jaw. Normal dentition. No uvula swelling. No oropharyngeal exudate, posterior oropharyngeal edema or posterior oropharyngeal erythema. No tonsillar exudate.   Eyes: Conjunctivae and lids are normal. No scleral icterus.   Neck: Trachea normal and phonation normal. Neck supple. No edema present. No erythema present. No neck rigidity present.   Cardiovascular: Normal rate, regular rhythm, normal heart sounds and normal pulses.   Pulmonary/Chest: Effort normal and breath sounds normal. No stridor. No respiratory distress. He has no decreased breath sounds. He has no wheezes. He has no rhonchi. He has no rales.   Abdominal: Normal appearance.   Musculoskeletal: Normal range of motion.         General: No deformity. Normal range of motion.    Lymphadenopathy:     He has no cervical adenopathy.   Neurological: He is alert and oriented to person, place, and time. He exhibits normal muscle tone. Coordination normal.   Skin: Skin is warm, dry, intact, not diaphoretic and not pale.   Psychiatric: His speech is normal and behavior is normal. Judgment and thought content normal.   Nursing note and vitals reviewed.        Results for orders placed or performed in visit on 11/05/22   POCT Influenza A/B MOLECULAR   Result Value Ref Range    POC Molecular Influenza A Ag Negative Negative, Not Reported    POC Molecular Influenza B Ag Negative Negative, Not Reported     Acceptable Yes    SARS Coronavirus 2 Antigen, POCT Manual Read   Result Value Ref Range    SARS Coronavirus 2 Antigen Negative Negative     Acceptable Yes      *Note: Due to a large number of results and/or encounters for the requested time period, some results have not been displayed. A complete set of results can be found in Results Review.       Assessment:       1. Fever, unspecified fever cause    2. Exposure to the flu    3. Flu-like symptoms          Plan:         Fever, unspecified fever cause  -     POCT Influenza A/B MOLECULAR  -     SARS Coronavirus 2 Antigen, POCT Manual Read    Exposure to the flu    Flu-like symptoms    I suspect flu in this patient with flu-like symptoms and multiple exposures to the flu.  He is a transplant patient.  Will start him on Tamiflu today.  Diagnoses and plan discussed with the patient, as well as the expected course and duration of his symptoms.  All questions and concerns were addressed prior to discharge.  He was advised to follow up with his PCP within 1 week if symptoms do not improve.  Emergency department precautions were given.  Patient verbalized understanding and was happy with the plan of care.   Note dictated with voice recognition software, please excuse any grammatical errors.    Patient Instructions   PLEASE READ  YOUR DISCHARGE INSTRUCTIONS ENTIRELY AS IT CONTAINS IMPORTANT INFORMATION.  - Rest.    - Drink plenty of fluids.    - Tylenol or Ibuprofen as directed as needed for fever/pain.    - If you were prescribed antibiotics, please take them to completion.  - If you are female and on birth control pills - please use additional methods of contraception to prevent pregnancy while on antibiotics and for one cycle after.   - If you were prescribed a narcotic medication, a cough syrup, or a muscle relaxer, do not drive or operate heavy equipment or machinery while taking these medications, as they can cause drowsiness.   - If a referral to a specialty was made today, you should receive a phone call in the next few days to schedule an appt.  Please call [phone] to schedule an appt if have not gotten a phone call in the next few days.  - If you smoke, please stop smoking.  -You must understand that you've received an Urgent Care treatment only and that you may be released before all your medical problems are known or treated. You, the patient, will arrange for follow up care as instructed. Please arrange follow up with your primary medical clinic as soon as possible.   - Follow up with your PCP or specialty clinic as directed in the next 1-2 weeks if not improved or as needed.  You can call [phone] to schedule an appointment with the appropriate provider.    - Please return to Urgent Care or to the Emergency Department if your symptoms worsen.    Patient aware and verbalized understanding.

## 2022-11-05 NOTE — PROGRESS NOTES
Subjective:       Patient ID: Roman Hodges is a 62 y.o. male.    Chief Complaint: Nasal Congestion (Cough, fever, body pain - Entered by patient)    HPI  ROS     Objective:      Physical Exam    Assessment:       No diagnosis found.    Plan:                   No follow-ups on file.

## 2022-11-05 NOTE — PATIENT INSTRUCTIONS
PLEASE READ YOUR DISCHARGE INSTRUCTIONS ENTIRELY AS IT CONTAINS IMPORTANT INFORMATION.  - Rest.    - Drink plenty of fluids.    - Tylenol or Ibuprofen as directed as needed for fever/pain.    - If you were prescribed antibiotics, please take them to completion.  - If you are female and on birth control pills - please use additional methods of contraception to prevent pregnancy while on antibiotics and for one cycle after.   - If you were prescribed a narcotic medication, a cough syrup, or a muscle relaxer, do not drive or operate heavy equipment or machinery while taking these medications, as they can cause drowsiness.   - If a referral to a specialty was made today, you should receive a phone call in the next few days to schedule an appt.  Please call [phone] to schedule an appt if have not gotten a phone call in the next few days.  - If you smoke, please stop smoking.  -You must understand that you've received an Urgent Care treatment only and that you may be released before all your medical problems are known or treated. You, the patient, will arrange for follow up care as instructed. Please arrange follow up with your primary medical clinic as soon as possible.   - Follow up with your PCP or specialty clinic as directed in the next 1-2 weeks if not improved or as needed.  You can call [phone] to schedule an appointment with the appropriate provider.    - Please return to Urgent Care or to the Emergency Department if your symptoms worsen.    Patient aware and verbalized understanding.

## 2022-11-14 ENCOUNTER — PATIENT MESSAGE (OUTPATIENT)
Dept: FAMILY MEDICINE | Facility: CLINIC | Age: 62
End: 2022-11-14
Payer: COMMERCIAL

## 2022-11-14 ENCOUNTER — PATIENT MESSAGE (OUTPATIENT)
Dept: TRANSPLANT | Facility: CLINIC | Age: 62
End: 2022-11-14
Payer: COMMERCIAL

## 2022-11-17 ENCOUNTER — OFFICE VISIT (OUTPATIENT)
Dept: SURGERY | Facility: CLINIC | Age: 62
End: 2022-11-17
Payer: COMMERCIAL

## 2022-11-17 VITALS
HEART RATE: 76 BPM | SYSTOLIC BLOOD PRESSURE: 174 MMHG | WEIGHT: 218.94 LBS | HEIGHT: 68 IN | DIASTOLIC BLOOD PRESSURE: 90 MMHG | BODY MASS INDEX: 33.18 KG/M2

## 2022-11-17 DIAGNOSIS — K62.5 RECTAL BLEEDING: Primary | ICD-10-CM

## 2022-11-17 PROCEDURE — 1159F PR MEDICATION LIST DOCUMENTED IN MEDICAL RECORD: ICD-10-PCS | Mod: CPTII,S$GLB,, | Performed by: NURSE PRACTITIONER

## 2022-11-17 PROCEDURE — 99203 OFFICE O/P NEW LOW 30 MIN: CPT | Mod: 25,S$GLB,, | Performed by: NURSE PRACTITIONER

## 2022-11-17 PROCEDURE — 46600 DIAGNOSTIC ANOSCOPY SPX: CPT | Mod: S$GLB,,, | Performed by: NURSE PRACTITIONER

## 2022-11-17 PROCEDURE — 1160F PR REVIEW ALL MEDS BY PRESCRIBER/CLIN PHARMACIST DOCUMENTED: ICD-10-PCS | Mod: CPTII,S$GLB,, | Performed by: NURSE PRACTITIONER

## 2022-11-17 PROCEDURE — 3060F PR POS MICROALBUMINURIA RESULT DOCUMENTED/REVIEW: ICD-10-PCS | Mod: CPTII,S$GLB,, | Performed by: NURSE PRACTITIONER

## 2022-11-17 PROCEDURE — 3080F DIAST BP >= 90 MM HG: CPT | Mod: CPTII,S$GLB,, | Performed by: NURSE PRACTITIONER

## 2022-11-17 PROCEDURE — 3060F POS MICROALBUMINURIA REV: CPT | Mod: CPTII,S$GLB,, | Performed by: NURSE PRACTITIONER

## 2022-11-17 PROCEDURE — 1160F RVW MEDS BY RX/DR IN RCRD: CPT | Mod: CPTII,S$GLB,, | Performed by: NURSE PRACTITIONER

## 2022-11-17 PROCEDURE — 99999 PR PBB SHADOW E&M-EST. PATIENT-LVL IV: CPT | Mod: PBBFAC,,, | Performed by: NURSE PRACTITIONER

## 2022-11-17 PROCEDURE — 3008F BODY MASS INDEX DOCD: CPT | Mod: CPTII,S$GLB,, | Performed by: NURSE PRACTITIONER

## 2022-11-17 PROCEDURE — 3044F HG A1C LEVEL LT 7.0%: CPT | Mod: CPTII,S$GLB,, | Performed by: NURSE PRACTITIONER

## 2022-11-17 PROCEDURE — 3077F SYST BP >= 140 MM HG: CPT | Mod: CPTII,S$GLB,, | Performed by: NURSE PRACTITIONER

## 2022-11-17 PROCEDURE — 46600 PR DIAG2STIC A2SCOPY: ICD-10-PCS | Mod: S$GLB,,, | Performed by: NURSE PRACTITIONER

## 2022-11-17 PROCEDURE — 3077F PR MOST RECENT SYSTOLIC BLOOD PRESSURE >= 140 MM HG: ICD-10-PCS | Mod: CPTII,S$GLB,, | Performed by: NURSE PRACTITIONER

## 2022-11-17 PROCEDURE — 3044F PR MOST RECENT HEMOGLOBIN A1C LEVEL <7.0%: ICD-10-PCS | Mod: CPTII,S$GLB,, | Performed by: NURSE PRACTITIONER

## 2022-11-17 PROCEDURE — 3066F PR DOCUMENTATION OF TREATMENT FOR NEPHROPATHY: ICD-10-PCS | Mod: CPTII,S$GLB,, | Performed by: NURSE PRACTITIONER

## 2022-11-17 PROCEDURE — 3080F PR MOST RECENT DIASTOLIC BLOOD PRESSURE >= 90 MM HG: ICD-10-PCS | Mod: CPTII,S$GLB,, | Performed by: NURSE PRACTITIONER

## 2022-11-17 PROCEDURE — 3008F PR BODY MASS INDEX (BMI) DOCUMENTED: ICD-10-PCS | Mod: CPTII,S$GLB,, | Performed by: NURSE PRACTITIONER

## 2022-11-17 PROCEDURE — 3066F NEPHROPATHY DOC TX: CPT | Mod: CPTII,S$GLB,, | Performed by: NURSE PRACTITIONER

## 2022-11-17 PROCEDURE — 99999 PR PBB SHADOW E&M-EST. PATIENT-LVL IV: ICD-10-PCS | Mod: PBBFAC,,, | Performed by: NURSE PRACTITIONER

## 2022-11-17 PROCEDURE — 1159F MED LIST DOCD IN RCRD: CPT | Mod: CPTII,S$GLB,, | Performed by: NURSE PRACTITIONER

## 2022-11-17 PROCEDURE — 99203 PR OFFICE/OUTPT VISIT, NEW, LEVL III, 30-44 MIN: ICD-10-PCS | Mod: 25,S$GLB,, | Performed by: NURSE PRACTITIONER

## 2022-11-17 RX ORDER — HYDROCORTISONE 25 MG/G
CREAM TOPICAL 2 TIMES DAILY
Qty: 28 G | Refills: 2 | Status: SHIPPED | OUTPATIENT
Start: 2022-11-17 | End: 2023-07-06

## 2022-11-17 NOTE — PATIENT INSTRUCTIONS
Daily fiber supplement like citrucel or fibercon. Take with lunch to avoid other medications.   Anusol cream rectally 2x/day for 10 days  Message/call me if bleeding returns for appt with MD

## 2022-11-17 NOTE — LETTER
November 17, 2022      [facility]  [facility] [address] [facility]  [address]  [facility] [address]  Phone: [phone]       Patient: Roman Hodges   YOB: 1960  Date of Visit: 11/17/2022    To Whom It May Concern:    Colleen Hodges  was at [facility] on 11/17/2022. The patient may return to work/school on 11/21/22 with no restrictions. If you have any questions or concerns, or if I can be of further assistance, please do not hesitate to contact me.    Sincerely,      Pallavi Rossi NP

## 2022-11-17 NOTE — PROGRESS NOTES
CRS Office Visit History and Physical    Referring Md:   Aaareferral Self  No address on file    SUBJECTIVE:     Chief Complaint: rectal bleeding    History of Present Illness:  The patient is new patient to this practice.   Course is as follows:  Patient is a 62 y.o. male presents with rectal bleeding.  Symptoms have been present for 3 days. Started Tuesday night when he went to have a bm, noticed a large volume of blood in toilet. +swelling, hemorrhoid prolapse  Since then blood has drastically decreased but still seeing spots with each bm.  Has tried nothing.  Associated bleeding: yes  Previous anorectal procedures: no  denies straining/prolonged time on toilet with bowel movements.  is not currently taking fiber supplement or stool softener. 2 bm/day of loose stools.   Blood thinners: Yes, xarelto. Following with EP in January/February    Last Colonoscopy completed on 12/23/2021  - One 4 mm polyp in the ascending colon, removed with a cold snare. Resected and retrieved.   - Diverticulosis in the sigmoid colon, in the descending colon, in the transverse colon and in the ascending colon.   - repeat in 5 years      Review of patient's allergies indicates:   Allergen Reactions    Lisinopril      Stomach ache       Past Medical History:   Diagnosis Date    A-fib     Allergy     Anticoagulant long-term use     Diabetes mellitus, type 2     GERD (gastroesophageal reflux disease)     Hepatocellular carcinoma     liver    History of 2019 novel coronavirus disease (COVID-19)     tested positive 4/15/20 & 5/8/20    History of primary liver cancer 10/24/2018    S/p liver transplant 7/4/2019    Hyperlipidemia     Hypertension      Past Surgical History:   Procedure Laterality Date    COLONOSCOPY      COLONOSCOPY N/A 12/23/2021    Procedure: COLONOSCOPY miralax prep;  Surgeon: Seng Norman MD;  Location: [facility];  Service: Endoscopy;  Laterality: N/A;    ESOPHAGOGASTRODUODENOSCOPY N/A 1/15/2019    Procedure:  "ESOPHAGOGASTRODUODENOSCOPY (EGD);  Surgeon: Bony Saavedra MD;  Location: [facility] ENDO;  Service: Endoscopy;  Laterality: N/A;    ESOPHAGOGASTRODUODENOSCOPY N/A 2021    Procedure: ESOPHAGOGASTRODUODENOSCOPY (EGD);  Surgeon: Seng Norman MD;  Location: [facility] ENDO;  Service: Endoscopy;  Laterality: N/A;    HERNIA REPAIR      LEFT HEART CATHETERIZATION Left 2018    Procedure: Left heart cath;  Surgeon: Tyler Hinojosa MD;  Location: [facility] CATH LAB/EP;  Service: Cardiology;  Laterality: Left;    LIVER TRANSPLANT N/A 7/3/2019    Procedure: TRANSPLANT, LIVER;  Surgeon: Oscar Altman Jr., MD;  Location: [address];  Service: Transplant;  Laterality: N/A;    RADIOFREQUENCY ABLATION N/A 2019    Procedure: Radiofrequency Ablation;  Surgeon: Rose Surgeon;  Location: [facility];  Service: Anesthesiology;  Laterality: N/A;  Room [address] [place] [address]     Family History   Problem Relation Age of Onset    Hypertension Mother     Cancer Mother     Hypertension Father     Stroke Father     Cancer Father     Allergic rhinitis Neg Hx     Allergies Neg Hx     Angioedema Neg Hx     Asthma Neg Hx     Atopy Neg Hx     Eczema Neg Hx     Immunodeficiency Neg Hx     Rhinitis Neg Hx     Urticaria Neg Hx      Social History     Tobacco Use    Smoking status: Former     Packs/day: 1.00     Years: 10.00     Pack years: 10.00     Types: Cigarettes     Quit date: 1980     Years since quittin.9    Smokeless tobacco: Never   Substance Use Topics    Alcohol use: No    Drug use: No        Review of Systems:  Review of Systems   Gastrointestinal:  Positive for blood in stool and diarrhea.     OBJECTIVE:     Vital Signs (Most Recent)  Blood Pressure (Abnormal) 174/90 (BP Location: Right arm, Patient Position: Sitting, BP Method: Large (Automatic))   Pulse 76   Height 5' 8" (1.727 m)   Weight 99.3 kg (218 lb 14.7 oz)   Body Mass Index 33.29 kg/m²     Physical Exam:  General: White male in no distress   Neuro: " Alert and oriented to person, place, and time.  Moves all extremities.     HEENT: No icterus.  Trachea midline  Respiratory: Respirations are even and unlabored, no cough or audible wheezing  Skin: Warm dry and intact, No visible rashes, no jaundice    Labs reviewed today:  Lab Results   Component Value Date    WBC 6.30 08/08/2022    HGB 13.5 (L) 08/08/2022    HCT 42.0 08/08/2022     08/08/2022    CHOL 153 02/21/2022    TRIG 127 02/21/2022    HDL 29 (L) 02/21/2022    ALT 24 08/08/2022    AST 16 08/08/2022     08/08/2022    K 4.0 08/08/2022     08/08/2022    CREATININE 1.1 08/08/2022    BUN 12 08/08/2022    CO2 26 08/08/2022    TSH 1.563 07/07/2022    PSA 1.0 03/24/2022    INR 1.1 07/09/2019    HGBA1C 6.4 (H) 07/07/2022       Imaging reviewed today:  5/31/22 MRI abdomen  Diverticulosis without evidence of acute diverticulitis.  No mesenteric edema or lymphadenopathy.     6/25/21 CT abdomen pelvis  Prior liver transplant with cholecystectomy.  No interval detrimental change or evidence for HCC.     Punctate, peripheral 2 mm right lung nodule, technically indeterminate.  Continued attention at follow-up.     Colonic diverticulosis, nonobstructing left renal calculus, and additional findings as above.    Endoscopy reviewed today:  ast Colonoscopy completed on 12/23/2021  - One 4 mm polyp in the ascending colon, removed with a cold snare. Resected and retrieved.   - Diverticulosis in the sigmoid colon, in the descending colon, in the transverse colon and in the ascending colon.   - repeat in 5 years    Anorectal Exam:    Anal Skin: External hemorrhoids    Digital Rectal Exam:  Resting Tone high  Mass none  Rectocele  absent  Tenderness  absent    Anoscopy:  Verbal consent was obtained.   Clear plastic anoscope inserted.    Hemorrhoids  present  Stigmata of bleeding  present  Stigmata of prolapsed  present  Distal rectal mucosa normal      ASSESSMENT/PLAN:     Diagnoses and all orders for this  visit:    Rectal bleeding  -     Cancel: CBC Auto Differential; Future  -     hydrocortisone (ANUSOL-HC) 2.5 % rectal cream; Place rectally 2 (two) times daily.      The patient was instructed to:  Anusol bid for 10 days  Increase water intake to at least 8-10 glasses of water per day.  Take a daily fiber supplement (Konsyl, Benefiber, Metamucil) with lunch to avoid other medications and increase dietary intake to 20-30 grams/day.  Avoid straining or spending >5min/event with bowel movements.  Pt with job that requires heavy lifting. Return to work note provided for Monday 11/21/22  Follow-up in clinic prn.      ANABELLA Espino  Colon and Rectal Surgery

## 2022-11-21 ENCOUNTER — PATIENT MESSAGE (OUTPATIENT)
Dept: SURGERY | Facility: CLINIC | Age: 62
End: 2022-11-21
Payer: COMMERCIAL

## 2022-11-22 ENCOUNTER — LAB VISIT (OUTPATIENT)
Dept: LAB | Facility: HOSPITAL | Age: 62
End: 2022-11-22
Attending: NURSE PRACTITIONER
Payer: COMMERCIAL

## 2022-11-22 DIAGNOSIS — K62.5 RECTAL BLEEDING: ICD-10-CM

## 2022-11-22 DIAGNOSIS — K62.5 RECTAL BLEEDING: Primary | ICD-10-CM

## 2022-11-22 LAB
BASOPHILS # BLD AUTO: 0.05 K/UL (ref 0–0.2)
BASOPHILS NFR BLD: 0.7 % (ref 0–1.9)
DIFFERENTIAL METHOD: ABNORMAL
EOSINOPHIL # BLD AUTO: 0.2 K/UL (ref 0–0.5)
EOSINOPHIL NFR BLD: 2.4 % (ref 0–8)
ERYTHROCYTE [DISTWIDTH] IN BLOOD BY AUTOMATED COUNT: 16.2 % (ref 11.5–14.5)
HCT VFR BLD AUTO: 42 % (ref 40–54)
HGB BLD-MCNC: 13.6 G/DL (ref 14–18)
IMM GRANULOCYTES # BLD AUTO: 0.04 K/UL (ref 0–0.04)
IMM GRANULOCYTES NFR BLD AUTO: 0.6 % (ref 0–0.5)
LYMPHOCYTES # BLD AUTO: 1.5 K/UL (ref 1–4.8)
LYMPHOCYTES NFR BLD: 21.6 % (ref 18–48)
MCH RBC QN AUTO: 25 PG (ref 27–31)
MCHC RBC AUTO-ENTMCNC: 32.4 G/DL (ref 32–36)
MCV RBC AUTO: 77 FL (ref 82–98)
MONOCYTES # BLD AUTO: 0.7 K/UL (ref 0.3–1)
MONOCYTES NFR BLD: 10.7 % (ref 4–15)
NEUTROPHILS # BLD AUTO: 4.3 K/UL (ref 1.8–7.7)
NEUTROPHILS NFR BLD: 64 % (ref 38–73)
NRBC BLD-RTO: 0 /100 WBC
PLATELET # BLD AUTO: 387 K/UL (ref 150–450)
PMV BLD AUTO: 9.7 FL (ref 9.2–12.9)
RBC # BLD AUTO: 5.45 M/UL (ref 4.6–6.2)
WBC # BLD AUTO: 6.72 K/UL (ref 3.9–12.7)

## 2022-11-22 PROCEDURE — 36415 COLL VENOUS BLD VENIPUNCTURE: CPT | Mod: PO | Performed by: NURSE PRACTITIONER

## 2022-11-22 PROCEDURE — 85025 COMPLETE CBC W/AUTO DIFF WBC: CPT | Performed by: NURSE PRACTITIONER

## 2022-11-28 ENCOUNTER — IMMUNIZATION (OUTPATIENT)
Dept: PHARMACY | Facility: CLINIC | Age: 62
End: 2022-11-28
Payer: COMMERCIAL

## 2022-11-28 DIAGNOSIS — Z23 NEED FOR VACCINATION: Primary | ICD-10-CM

## 2022-11-29 ENCOUNTER — OFFICE VISIT (OUTPATIENT)
Dept: CARDIOLOGY | Facility: CLINIC | Age: 62
End: 2022-11-29
Payer: COMMERCIAL

## 2022-11-29 VITALS
DIASTOLIC BLOOD PRESSURE: 89 MMHG | SYSTOLIC BLOOD PRESSURE: 137 MMHG | HEIGHT: 68 IN | OXYGEN SATURATION: 96 % | HEART RATE: 84 BPM | BODY MASS INDEX: 33.26 KG/M2 | WEIGHT: 219.44 LBS

## 2022-11-29 DIAGNOSIS — Z79.01 LONG TERM (CURRENT) USE OF ANTICOAGULANTS: Chronic | ICD-10-CM

## 2022-11-29 DIAGNOSIS — E11.9 TYPE 2 DIABETES MELLITUS WITHOUT COMPLICATION, WITHOUT LONG-TERM CURRENT USE OF INSULIN: Chronic | ICD-10-CM

## 2022-11-29 DIAGNOSIS — I48.0 PAROXYSMAL ATRIAL FIBRILLATION: Primary | ICD-10-CM

## 2022-11-29 DIAGNOSIS — G47.33 OSA (OBSTRUCTIVE SLEEP APNEA): Chronic | ICD-10-CM

## 2022-11-29 DIAGNOSIS — Z94.4 S/P LIVER TRANSPLANT: Chronic | ICD-10-CM

## 2022-11-29 DIAGNOSIS — I10 ESSENTIAL HYPERTENSION: Chronic | ICD-10-CM

## 2022-11-29 DIAGNOSIS — Z79.60 LONG-TERM USE OF IMMUNOSUPPRESSANT MEDICATION: ICD-10-CM

## 2022-11-29 DIAGNOSIS — E66.9 OBESITY (BMI 30.0-34.9): ICD-10-CM

## 2022-11-29 PROCEDURE — 3066F PR DOCUMENTATION OF TREATMENT FOR NEPHROPATHY: ICD-10-PCS | Mod: CPTII,S$GLB,, | Performed by: INTERNAL MEDICINE

## 2022-11-29 PROCEDURE — 99999 PR PBB SHADOW E&M-EST. PATIENT-LVL IV: ICD-10-PCS | Mod: PBBFAC,,, | Performed by: INTERNAL MEDICINE

## 2022-11-29 PROCEDURE — 3066F NEPHROPATHY DOC TX: CPT | Mod: CPTII,S$GLB,, | Performed by: INTERNAL MEDICINE

## 2022-11-29 PROCEDURE — 3060F PR POS MICROALBUMINURIA RESULT DOCUMENTED/REVIEW: ICD-10-PCS | Mod: CPTII,S$GLB,, | Performed by: INTERNAL MEDICINE

## 2022-11-29 PROCEDURE — 1160F RVW MEDS BY RX/DR IN RCRD: CPT | Mod: CPTII,S$GLB,, | Performed by: INTERNAL MEDICINE

## 2022-11-29 PROCEDURE — 1159F MED LIST DOCD IN RCRD: CPT | Mod: CPTII,S$GLB,, | Performed by: INTERNAL MEDICINE

## 2022-11-29 PROCEDURE — 3044F PR MOST RECENT HEMOGLOBIN A1C LEVEL <7.0%: ICD-10-PCS | Mod: CPTII,S$GLB,, | Performed by: INTERNAL MEDICINE

## 2022-11-29 PROCEDURE — 3079F PR MOST RECENT DIASTOLIC BLOOD PRESSURE 80-89 MM HG: ICD-10-PCS | Mod: CPTII,S$GLB,, | Performed by: INTERNAL MEDICINE

## 2022-11-29 PROCEDURE — 3008F PR BODY MASS INDEX (BMI) DOCUMENTED: ICD-10-PCS | Mod: CPTII,S$GLB,, | Performed by: INTERNAL MEDICINE

## 2022-11-29 PROCEDURE — 1160F PR REVIEW ALL MEDS BY PRESCRIBER/CLIN PHARMACIST DOCUMENTED: ICD-10-PCS | Mod: CPTII,S$GLB,, | Performed by: INTERNAL MEDICINE

## 2022-11-29 PROCEDURE — 3060F POS MICROALBUMINURIA REV: CPT | Mod: CPTII,S$GLB,, | Performed by: INTERNAL MEDICINE

## 2022-11-29 PROCEDURE — 99214 OFFICE O/P EST MOD 30 MIN: CPT | Mod: S$GLB,,, | Performed by: INTERNAL MEDICINE

## 2022-11-29 PROCEDURE — 3075F SYST BP GE 130 - 139MM HG: CPT | Mod: CPTII,S$GLB,, | Performed by: INTERNAL MEDICINE

## 2022-11-29 PROCEDURE — 3044F HG A1C LEVEL LT 7.0%: CPT | Mod: CPTII,S$GLB,, | Performed by: INTERNAL MEDICINE

## 2022-11-29 PROCEDURE — 99214 PR OFFICE/OUTPT VISIT, EST, LEVL IV, 30-39 MIN: ICD-10-PCS | Mod: S$GLB,,, | Performed by: INTERNAL MEDICINE

## 2022-11-29 PROCEDURE — 3008F BODY MASS INDEX DOCD: CPT | Mod: CPTII,S$GLB,, | Performed by: INTERNAL MEDICINE

## 2022-11-29 PROCEDURE — 3075F PR MOST RECENT SYSTOLIC BLOOD PRESS GE 130-139MM HG: ICD-10-PCS | Mod: CPTII,S$GLB,, | Performed by: INTERNAL MEDICINE

## 2022-11-29 PROCEDURE — 1159F PR MEDICATION LIST DOCUMENTED IN MEDICAL RECORD: ICD-10-PCS | Mod: CPTII,S$GLB,, | Performed by: INTERNAL MEDICINE

## 2022-11-29 PROCEDURE — 3079F DIAST BP 80-89 MM HG: CPT | Mod: CPTII,S$GLB,, | Performed by: INTERNAL MEDICINE

## 2022-11-29 PROCEDURE — 99999 PR PBB SHADOW E&M-EST. PATIENT-LVL IV: CPT | Mod: PBBFAC,,, | Performed by: INTERNAL MEDICINE

## 2022-11-29 NOTE — PROGRESS NOTES
Subjective:    Patient ID:  Roman Hodges is a 62 y.o. male who presents for follow-up of Atrial Fibrillation      HPI    63 y/o Upper sorbian speaking male with hx of HTN, DM, PAFib, HFrEF with return to normal function (previously 30%, last 2DE with 55%) and HCC s/p OLTx. Patient was initially admitted with AF RVR after presenting with near syncopal episode and spontaneously converted. At that time, 2DE with EF 30% with akinetic: mid anteroseptal, apical anterior, apical septal, apical lateral and apex and hypokinetic: mid inferoseptal and apical inferior walls. Again presented with similar symptoms, started on BB and DOAC and discharged home. Seen by Dr Sargent and was complaining of exertional epigastric/substernal pain and was admitted for ACS r/o. Had LHC with nonobstructive CAD and EF on V-gram normal without WMA's. Clinically improved and discharged home. Repeat 2DE with normalization of function to 55%. Did well post discharge, but then began having episodes of HTN urgency and had presented multiple times to ED with SBP >200. Had Oltx successfully and has done well. BP meds had been changed and had been having elevated readings. Changed BP regimen and BP controlled. Was hospitalized at [facility] for Covid for 8 days and had full recovery.     9/6/2022:  Previous visit was complaining of palps with chest discomfort. Had epigastric discomfort and SOB. Episode lasted about an hour. No recurrent episodes. No significant caffeine. Had normal 2DE and Holter. No recurrent episodes.  Previously was walkings 6-8 miles daily with no symptoms.  About 3 weeks ago was having episodes of elevated 's/90's. No symptoms. Recent Covid with mild symptoms, now fully recovered.   Denies regular orthopnea, PND, syncope, palps. Tolerating meds well and compliant.     11/29/2022:  BP at home 120's/80's. No cardiac symptoms at full activities. Denies CP, SOB/CASH, orthopnea, PND, syncope, palps, LE edema. Did have an episode about  1 month ago with URTI that he felt heart racing one night similar to previous episodes of Afib.      Review of Systems   Constitutional: Positive for malaise/fatigue.   HENT:  Negative for congestion.    Eyes:  Negative for blurred vision.   Cardiovascular:  Negative for chest pain, claudication, cyanosis, dyspnea on exertion, irregular heartbeat, leg swelling, near-syncope, orthopnea, palpitations, paroxysmal nocturnal dyspnea and syncope.   Respiratory:  Negative for shortness of breath.    Endocrine: Negative for polyuria.   Hematologic/Lymphatic: Negative for bleeding problem.   Skin:  Negative for itching and rash.   Musculoskeletal:  Negative for joint swelling, muscle cramps and muscle weakness.   Gastrointestinal:  Negative for abdominal pain, hematemesis, hematochezia, melena, nausea and vomiting.   Genitourinary:  Negative for dysuria and hematuria.   Neurological:  Negative for dizziness, focal weakness, headaches, light-headedness, loss of balance and weakness.   Psychiatric/Behavioral:  Negative for depression. The patient is not nervous/anxious.       Objective:    Physical Exam  Constitutional:       Appearance: He is well-developed.   HENT:      Head: Normocephalic and atraumatic.   Neck:      Vascular: No JVD.   Cardiovascular:      Rate and Rhythm: Normal rate and regular rhythm.      Pulses:           Carotid pulses are 2+ on the right side and 2+ on the left side.       Radial pulses are 2+ on the right side and 2+ on the left side.        Femoral pulses are 2+ on the right side and 2+ on the left side.       Dorsalis pedis pulses are 2+ on the right side and 2+ on the left side.        Posterior tibial pulses are 2+ on the right side and 2+ on the left side.      Heart sounds: Normal heart sounds.   Pulmonary:      Effort: Pulmonary effort is normal.      Breath sounds: Normal breath sounds.   Abdominal:      General: Bowel sounds are normal.      Palpations: Abdomen is soft.   Musculoskeletal:       Cervical back: Neck supple.   Skin:     General: Skin is warm and dry.   Neurological:      Mental Status: He is alert and oriented to person, place, and time.   Psychiatric:         Behavior: Behavior normal.         Thought Content: Thought content normal.         Assessment:       1. Paroxysmal atrial fibrillation    2. Essential hypertension    3. Type 2 diabetes mellitus without complication, without long-term current use of insulin    4. Obesity (BMI 30.0-34.9)    5. S/P liver transplant    6. Long-term use of immunosuppressant medication    7. MALLIKA (obstructive sleep apnea)    8. Long term (current) use of anticoagulants      63 y/o pt with hx and presentation as above. Doing well from a cardiac perspective and compensated from a HF perspective. BP controlled. May have had an episode of symptomatic afib 1 month ago, nothing since. Needs to lose weight and stay active. Discussed the etiology, evaluation, and management of CP, HTN, afib, CHF, obesity, MALLIKA. Discussed the importance of med compliance, heart healthy diet, and regular exercise.      Plan:       -Continue current medical management  -f/u in 6 months

## 2022-12-15 ENCOUNTER — OFFICE VISIT (OUTPATIENT)
Dept: PODIATRY | Facility: CLINIC | Age: 62
End: 2022-12-15
Payer: COMMERCIAL

## 2022-12-15 VITALS
SYSTOLIC BLOOD PRESSURE: 155 MMHG | HEIGHT: 68 IN | DIASTOLIC BLOOD PRESSURE: 85 MMHG | BODY MASS INDEX: 33.37 KG/M2 | HEART RATE: 71 BPM

## 2022-12-15 DIAGNOSIS — L60.0 INGROWN NAIL: ICD-10-CM

## 2022-12-15 DIAGNOSIS — L60.9 DISEASE OF NAIL: Primary | ICD-10-CM

## 2022-12-15 DIAGNOSIS — E11.9 ENCOUNTER FOR DIABETIC FOOT EXAM: ICD-10-CM

## 2022-12-15 PROCEDURE — 1159F MED LIST DOCD IN RCRD: CPT | Mod: CPTII,S$GLB,, | Performed by: STUDENT IN AN ORGANIZED HEALTH CARE EDUCATION/TRAINING PROGRAM

## 2022-12-15 PROCEDURE — 3008F BODY MASS INDEX DOCD: CPT | Mod: CPTII,S$GLB,, | Performed by: STUDENT IN AN ORGANIZED HEALTH CARE EDUCATION/TRAINING PROGRAM

## 2022-12-15 PROCEDURE — 3060F POS MICROALBUMINURIA REV: CPT | Mod: CPTII,S$GLB,, | Performed by: STUDENT IN AN ORGANIZED HEALTH CARE EDUCATION/TRAINING PROGRAM

## 2022-12-15 PROCEDURE — 1159F PR MEDICATION LIST DOCUMENTED IN MEDICAL RECORD: ICD-10-PCS | Mod: CPTII,S$GLB,, | Performed by: STUDENT IN AN ORGANIZED HEALTH CARE EDUCATION/TRAINING PROGRAM

## 2022-12-15 PROCEDURE — 99213 OFFICE O/P EST LOW 20 MIN: CPT | Mod: S$GLB,,, | Performed by: STUDENT IN AN ORGANIZED HEALTH CARE EDUCATION/TRAINING PROGRAM

## 2022-12-15 PROCEDURE — 3044F PR MOST RECENT HEMOGLOBIN A1C LEVEL <7.0%: ICD-10-PCS | Mod: CPTII,S$GLB,, | Performed by: STUDENT IN AN ORGANIZED HEALTH CARE EDUCATION/TRAINING PROGRAM

## 2022-12-15 PROCEDURE — 3077F PR MOST RECENT SYSTOLIC BLOOD PRESSURE >= 140 MM HG: ICD-10-PCS | Mod: CPTII,S$GLB,, | Performed by: STUDENT IN AN ORGANIZED HEALTH CARE EDUCATION/TRAINING PROGRAM

## 2022-12-15 PROCEDURE — 3077F SYST BP >= 140 MM HG: CPT | Mod: CPTII,S$GLB,, | Performed by: STUDENT IN AN ORGANIZED HEALTH CARE EDUCATION/TRAINING PROGRAM

## 2022-12-15 PROCEDURE — 3060F PR POS MICROALBUMINURIA RESULT DOCUMENTED/REVIEW: ICD-10-PCS | Mod: CPTII,S$GLB,, | Performed by: STUDENT IN AN ORGANIZED HEALTH CARE EDUCATION/TRAINING PROGRAM

## 2022-12-15 PROCEDURE — 99999 PR PBB SHADOW E&M-EST. PATIENT-LVL III: ICD-10-PCS | Mod: PBBFAC,,, | Performed by: STUDENT IN AN ORGANIZED HEALTH CARE EDUCATION/TRAINING PROGRAM

## 2022-12-15 PROCEDURE — 99213 PR OFFICE/OUTPT VISIT, EST, LEVL III, 20-29 MIN: ICD-10-PCS | Mod: S$GLB,,, | Performed by: STUDENT IN AN ORGANIZED HEALTH CARE EDUCATION/TRAINING PROGRAM

## 2022-12-15 PROCEDURE — 3044F HG A1C LEVEL LT 7.0%: CPT | Mod: CPTII,S$GLB,, | Performed by: STUDENT IN AN ORGANIZED HEALTH CARE EDUCATION/TRAINING PROGRAM

## 2022-12-15 PROCEDURE — 3008F PR BODY MASS INDEX (BMI) DOCUMENTED: ICD-10-PCS | Mod: CPTII,S$GLB,, | Performed by: STUDENT IN AN ORGANIZED HEALTH CARE EDUCATION/TRAINING PROGRAM

## 2022-12-15 PROCEDURE — 3079F DIAST BP 80-89 MM HG: CPT | Mod: CPTII,S$GLB,, | Performed by: STUDENT IN AN ORGANIZED HEALTH CARE EDUCATION/TRAINING PROGRAM

## 2022-12-15 PROCEDURE — 3066F PR DOCUMENTATION OF TREATMENT FOR NEPHROPATHY: ICD-10-PCS | Mod: CPTII,S$GLB,, | Performed by: STUDENT IN AN ORGANIZED HEALTH CARE EDUCATION/TRAINING PROGRAM

## 2022-12-15 PROCEDURE — 3079F PR MOST RECENT DIASTOLIC BLOOD PRESSURE 80-89 MM HG: ICD-10-PCS | Mod: CPTII,S$GLB,, | Performed by: STUDENT IN AN ORGANIZED HEALTH CARE EDUCATION/TRAINING PROGRAM

## 2022-12-15 PROCEDURE — 3066F NEPHROPATHY DOC TX: CPT | Mod: CPTII,S$GLB,, | Performed by: STUDENT IN AN ORGANIZED HEALTH CARE EDUCATION/TRAINING PROGRAM

## 2022-12-15 PROCEDURE — 99999 PR PBB SHADOW E&M-EST. PATIENT-LVL III: CPT | Mod: PBBFAC,,, | Performed by: STUDENT IN AN ORGANIZED HEALTH CARE EDUCATION/TRAINING PROGRAM

## 2022-12-15 NOTE — PROGRESS NOTES
Subjective:      Patient ID: Roman Hodges is a 62 y.o. male.    Chief Complaint: Diabetes Mellitus (PCP Dr. Munson, 10/25/22), Diabetic Foot Exam, and Routine Foot Care    Roman is a 62 y.o. male who presents to the clinic upon referral from Dr. Krystal shannon. provider found  for evaluation and treatment of diabetic feet. Roman has a past medical history of A-fib, Allergy, Anticoagulant long-term use, Diabetes mellitus, type 2, GERD (gastroesophageal reflux disease), Hepatocellular carcinoma, History of 2019 novel coronavirus disease (COVID-19), History of primary liver cancer (10/24/2018), Hyperlipidemia, and Hypertension. Patient relates no major problem with feet. Only complaints today consist of here for a diabetic foot exam. Relates to painful ingrown toenails to outside border of left great toe and inside border of right 2nd toe. No further pedal complaints.     PCP: Jose Angel Munson MD    Date Last Seen by PCP: 10/25/22    Current shoe gear: Tennis shoes    Hemoglobin A1C   Date Value Ref Range Status   07/07/2022 6.4 (H) 4.0 - 5.6 % Final     Comment:     ADA Screening Guidelines:  5.7-6.4%  Consistent with prediabetes  >or=6.5%  Consistent with diabetes    High levels of fetal hemoglobin interfere with the HbA1C  assay. Heterozygous hemoglobin variants (HbS, HgC, etc)do  not significantly interfere with this assay.   However, presence of multiple variants may affect accuracy.     02/21/2022 6.3 (H) 4.0 - 5.6 % Final     Comment:     ADA Screening Guidelines:  5.7-6.4%  Consistent with prediabetes  >or=6.5%  Consistent with diabetes    High levels of fetal hemoglobin interfere with the HbA1C  assay. Heterozygous hemoglobin variants (HbS, HgC, etc)do  not significantly interfere with this assay.   However, presence of multiple variants may affect accuracy.     10/25/2021 6.1 (H) 4.0 - 5.6 % Final     Comment:     ADA Screening Guidelines:  5.7-6.4%  Consistent with prediabetes  >or=6.5%  Consistent  with diabetes    High levels of fetal hemoglobin interfere with the HbA1C  assay. Heterozygous hemoglobin variants (HbS, HgC, etc)do  not significantly interfere with this assay.   However, presence of multiple variants may affect accuracy.           Review of Systems   Constitutional: Negative for chills, decreased appetite, diaphoresis and fever.   HENT:  Negative for congestion and hearing loss.    Cardiovascular:  Negative for chest pain, claudication, leg swelling and syncope.   Respiratory:  Negative for cough and shortness of breath.    Skin:  Positive for dry skin and nail changes. Negative for color change, flushing, itching, poor wound healing and rash.   Musculoskeletal:  Negative for arthritis, back pain, joint pain and joint swelling.   Gastrointestinal:  Negative for nausea and vomiting.   Neurological:  Negative for focal weakness, numbness, paresthesias and weakness.   Psychiatric/Behavioral:  Negative for altered mental status. The patient is not nervous/anxious.          Objective:      Physical Exam  Constitutional:       General: He is not in acute distress.     Appearance: Normal appearance. He is well-developed. He is not diaphoretic.   Cardiovascular:      Pulses:           Dorsalis pedis pulses are 2+ on the right side and 2+ on the left side.        Posterior tibial pulses are 2+ on the right side and 2+ on the left side.      Comments: Dorsalis pedis and posterior tibial pulses are within normal limits. Skin temperature is within normal limits. Toes are cool to touch and feet are warm proximally. Hair growth is within normal limits. Skin is normotrophic and without hyperpigmentation. No edema noted. No varicosities or spider veins noted, bilaterally.   Musculoskeletal:         General: No tenderness.      Comments: Adequate joint range of motion without pain, limitation, nor crepitation to foot and ankle joints. Muscle strength is 5/5 in all groups bilaterally.       Feet:      Right foot:       Protective Sensation: 10 sites tested.  10 sites sensed.      Skin integrity: Dry skin present. No ulcer, blister, erythema or warmth.      Left foot:      Protective Sensation: 10 sites tested.  10 sites sensed.      Skin integrity: Dry skin present. No ulcer, blister, erythema or warmth.   Skin:     General: Skin is warm and dry.      Capillary Refill: Capillary refill takes less than 2 seconds.      Comments: Skin is warm and dry, no acute signs of infection noted bilaterally      Toenails are well trimmed and mildly thickened    Otherwise, no open wounds, macerations or hyperkeratotic lesions, bilaterally     Distal cryptotic nail border to left lateral hallux and medial right toenail, 2nd digit. No associated open wounds or signs of infection           Neurological:      Mental Status: He is alert and oriented to person, place, and time.      Sensory: No sensory deficit.      Motor: No abnormal muscle tone.      Comments: Light touch within normal limits. [address] 5.07 monofilamant testing is within normal limits. Vibratory sensation within normal limits, bilaterally.    Psychiatric:         Mood and Affect: Mood normal.         Behavior: Behavior normal.         Thought Content: Thought content normal.             Assessment:       Encounter Diagnoses   Name Primary?    Disease of nail Yes    Ingrown nail     Encounter for diabetic foot exam          Plan:       Roman was seen today for diabetes mellitus, diabetic foot exam and routine foot care.    Diagnoses and all orders for this visit:    Disease of nail    Ingrown nail    Encounter for diabetic foot exam      I counseled the patient on his conditions, their implications and medical management.    Utilizing sterile toenail clippers I aggressively trimmed  the offending above mentioned  nail border approximately 3 mm from its edge and carried the nail plate incision down at an angle in order to wedge out the offending cryptotic portion of the  nail plate. The offending border was then removed in toto. No blood was drawn. Patient tolerated the procedure well and related significant relief.     Shoe inspection. Diabetic Foot Education. Patient reminded of the importance of good nutrition and blood sugar control to help prevent podiatric complications of diabetes. Patient instructed on proper foot hygeine. We discussed wearing proper shoe gear, daily foot inspections, never walking without protective shoe gear, never putting sharp instruments to feet, routine podiatric nail visits      Return to clinic for ingrown toenail removal procedure, sooner PRN

## 2023-01-04 ENCOUNTER — LAB VISIT (OUTPATIENT)
Dept: LAB | Facility: HOSPITAL | Age: 63
End: 2023-01-04
Attending: INTERNAL MEDICINE
Payer: COMMERCIAL

## 2023-01-04 DIAGNOSIS — C22.0 HCC (HEPATOCELLULAR CARCINOMA): ICD-10-CM

## 2023-01-04 DIAGNOSIS — Z94.4 S/P LIVER TRANSPLANT: ICD-10-CM

## 2023-01-04 LAB
AFP SERPL-MCNC: <2 NG/ML (ref 0–8.4)
ALBUMIN SERPL BCP-MCNC: 4.1 G/DL (ref 3.5–5.2)
ALP SERPL-CCNC: 61 U/L (ref 55–135)
ALT SERPL W/O P-5'-P-CCNC: 20 U/L (ref 10–44)
ANION GAP SERPL CALC-SCNC: 10 MMOL/L (ref 8–16)
AST SERPL-CCNC: 15 U/L (ref 10–40)
BASOPHILS # BLD AUTO: 0.03 K/UL (ref 0–0.2)
BASOPHILS NFR BLD: 0.5 % (ref 0–1.9)
BILIRUB SERPL-MCNC: 0.5 MG/DL (ref 0.1–1)
BUN SERPL-MCNC: 14 MG/DL (ref 8–23)
CALCIUM SERPL-MCNC: 8.9 MG/DL (ref 8.7–10.5)
CHLORIDE SERPL-SCNC: 106 MMOL/L (ref 95–110)
CO2 SERPL-SCNC: 25 MMOL/L (ref 23–29)
CREAT SERPL-MCNC: 1 MG/DL (ref 0.5–1.4)
DIFFERENTIAL METHOD: ABNORMAL
EOSINOPHIL # BLD AUTO: 0.1 K/UL (ref 0–0.5)
EOSINOPHIL NFR BLD: 2.2 % (ref 0–8)
ERYTHROCYTE [DISTWIDTH] IN BLOOD BY AUTOMATED COUNT: 16.9 % (ref 11.5–14.5)
EST. GFR  (NO RACE VARIABLE): >60 ML/MIN/1.73 M^2
GLUCOSE SERPL-MCNC: 101 MG/DL (ref 70–110)
HCT VFR BLD AUTO: 43.7 % (ref 40–54)
HGB BLD-MCNC: 13.9 G/DL (ref 14–18)
IMM GRANULOCYTES # BLD AUTO: 0.03 K/UL (ref 0–0.04)
IMM GRANULOCYTES NFR BLD AUTO: 0.5 % (ref 0–0.5)
LYMPHOCYTES # BLD AUTO: 1.8 K/UL (ref 1–4.8)
LYMPHOCYTES NFR BLD: 29.6 % (ref 18–48)
MCH RBC QN AUTO: 24.4 PG (ref 27–31)
MCHC RBC AUTO-ENTMCNC: 31.8 G/DL (ref 32–36)
MCV RBC AUTO: 77 FL (ref 82–98)
MONOCYTES # BLD AUTO: 0.5 K/UL (ref 0.3–1)
MONOCYTES NFR BLD: 8.1 % (ref 4–15)
NEUTROPHILS # BLD AUTO: 3.5 K/UL (ref 1.8–7.7)
NEUTROPHILS NFR BLD: 59.1 % (ref 38–73)
NRBC BLD-RTO: 0 /100 WBC
PLATELET # BLD AUTO: 317 K/UL (ref 150–450)
PMV BLD AUTO: 9.7 FL (ref 9.2–12.9)
POTASSIUM SERPL-SCNC: 3.7 MMOL/L (ref 3.5–5.1)
PROT SERPL-MCNC: 7.3 G/DL (ref 6–8.4)
RBC # BLD AUTO: 5.7 M/UL (ref 4.6–6.2)
SODIUM SERPL-SCNC: 141 MMOL/L (ref 136–145)
WBC # BLD AUTO: 5.91 K/UL (ref 3.9–12.7)

## 2023-01-04 PROCEDURE — 80197 ASSAY OF TACROLIMUS: CPT | Performed by: INTERNAL MEDICINE

## 2023-01-04 PROCEDURE — 36415 COLL VENOUS BLD VENIPUNCTURE: CPT | Mod: PO | Performed by: INTERNAL MEDICINE

## 2023-01-04 PROCEDURE — 85025 COMPLETE CBC W/AUTO DIFF WBC: CPT | Performed by: INTERNAL MEDICINE

## 2023-01-04 PROCEDURE — 80053 COMPREHEN METABOLIC PANEL: CPT | Performed by: INTERNAL MEDICINE

## 2023-01-04 PROCEDURE — 82105 ALPHA-FETOPROTEIN SERUM: CPT | Performed by: INTERNAL MEDICINE

## 2023-01-05 LAB — TACROLIMUS BLD-MCNC: 5.3 NG/ML (ref 5–15)

## 2023-01-06 ENCOUNTER — TELEPHONE (OUTPATIENT)
Dept: TRANSPLANT | Facility: CLINIC | Age: 63
End: 2023-01-06
Payer: COMMERCIAL

## 2023-01-06 ENCOUNTER — OFFICE VISIT (OUTPATIENT)
Dept: FAMILY MEDICINE | Facility: CLINIC | Age: 63
End: 2023-01-06
Payer: COMMERCIAL

## 2023-01-06 ENCOUNTER — PATIENT MESSAGE (OUTPATIENT)
Dept: FAMILY MEDICINE | Facility: CLINIC | Age: 63
End: 2023-01-06

## 2023-01-06 ENCOUNTER — LAB VISIT (OUTPATIENT)
Dept: LAB | Facility: HOSPITAL | Age: 63
End: 2023-01-06
Attending: FAMILY MEDICINE
Payer: COMMERCIAL

## 2023-01-06 ENCOUNTER — PATIENT MESSAGE (OUTPATIENT)
Dept: TRANSPLANT | Facility: CLINIC | Age: 63
End: 2023-01-06
Payer: COMMERCIAL

## 2023-01-06 VITALS
BODY MASS INDEX: 33.01 KG/M2 | WEIGHT: 217.81 LBS | HEIGHT: 68 IN | HEART RATE: 70 BPM | SYSTOLIC BLOOD PRESSURE: 136 MMHG | DIASTOLIC BLOOD PRESSURE: 84 MMHG | OXYGEN SATURATION: 97 %

## 2023-01-06 DIAGNOSIS — I10 ESSENTIAL HYPERTENSION: Chronic | ICD-10-CM

## 2023-01-06 DIAGNOSIS — Z20.822 SUSPECTED COVID-19 VIRUS INFECTION: Primary | ICD-10-CM

## 2023-01-06 DIAGNOSIS — R68.89 FLU-LIKE SYMPTOMS: ICD-10-CM

## 2023-01-06 DIAGNOSIS — E11.65 TYPE 2 DIABETES MELLITUS WITH HYPERGLYCEMIA, WITHOUT LONG-TERM CURRENT USE OF INSULIN: ICD-10-CM

## 2023-01-06 DIAGNOSIS — J41.1 MUCOPURULENT CHRONIC BRONCHITIS: ICD-10-CM

## 2023-01-06 LAB
CHOLEST SERPL-MCNC: 161 MG/DL (ref 120–199)
CHOLEST/HDLC SERPL: 5.2 {RATIO} (ref 2–5)
CTP QC/QA: YES
CTP QC/QA: YES
ESTIMATED AVG GLUCOSE: 131 MG/DL (ref 68–131)
FLUAV AG NPH QL: NEGATIVE
FLUBV AG NPH QL: NEGATIVE
HBA1C MFR BLD: 6.2 % (ref 4–5.6)
HDLC SERPL-MCNC: 31 MG/DL (ref 40–75)
HDLC SERPL: 19.3 % (ref 20–50)
LDLC SERPL CALC-MCNC: 101.8 MG/DL (ref 63–159)
NONHDLC SERPL-MCNC: 130 MG/DL
SARS-COV-2 RDRP RESP QL NAA+PROBE: NEGATIVE
TRIGL SERPL-MCNC: 141 MG/DL (ref 30–150)

## 2023-01-06 PROCEDURE — 3008F BODY MASS INDEX DOCD: CPT | Mod: CPTII,S$GLB,, | Performed by: FAMILY MEDICINE

## 2023-01-06 PROCEDURE — 1160F PR REVIEW ALL MEDS BY PRESCRIBER/CLIN PHARMACIST DOCUMENTED: ICD-10-PCS | Mod: CPTII,S$GLB,, | Performed by: FAMILY MEDICINE

## 2023-01-06 PROCEDURE — 3079F PR MOST RECENT DIASTOLIC BLOOD PRESSURE 80-89 MM HG: ICD-10-PCS | Mod: CPTII,S$GLB,, | Performed by: FAMILY MEDICINE

## 2023-01-06 PROCEDURE — 1159F MED LIST DOCD IN RCRD: CPT | Mod: CPTII,S$GLB,, | Performed by: FAMILY MEDICINE

## 2023-01-06 PROCEDURE — 3075F SYST BP GE 130 - 139MM HG: CPT | Mod: CPTII,S$GLB,, | Performed by: FAMILY MEDICINE

## 2023-01-06 PROCEDURE — 3075F PR MOST RECENT SYSTOLIC BLOOD PRESS GE 130-139MM HG: ICD-10-PCS | Mod: CPTII,S$GLB,, | Performed by: FAMILY MEDICINE

## 2023-01-06 PROCEDURE — 83036 HEMOGLOBIN GLYCOSYLATED A1C: CPT | Performed by: FAMILY MEDICINE

## 2023-01-06 PROCEDURE — 87635: ICD-10-PCS | Mod: QW,S$GLB,, | Performed by: FAMILY MEDICINE

## 2023-01-06 PROCEDURE — 1159F PR MEDICATION LIST DOCUMENTED IN MEDICAL RECORD: ICD-10-PCS | Mod: CPTII,S$GLB,, | Performed by: FAMILY MEDICINE

## 2023-01-06 PROCEDURE — 87804 POCT INFLUENZA A/B: ICD-10-PCS | Mod: 59,QW,S$GLB, | Performed by: FAMILY MEDICINE

## 2023-01-06 PROCEDURE — 99999 PR PBB SHADOW E&M-EST. PATIENT-LVL V: CPT | Mod: PBBFAC,,, | Performed by: FAMILY MEDICINE

## 2023-01-06 PROCEDURE — 87635 SARS-COV-2 COVID-19 AMP PRB: CPT | Mod: QW,S$GLB,, | Performed by: FAMILY MEDICINE

## 2023-01-06 PROCEDURE — 1160F RVW MEDS BY RX/DR IN RCRD: CPT | Mod: CPTII,S$GLB,, | Performed by: FAMILY MEDICINE

## 2023-01-06 PROCEDURE — 36415 COLL VENOUS BLD VENIPUNCTURE: CPT | Mod: PO | Performed by: FAMILY MEDICINE

## 2023-01-06 PROCEDURE — 3008F PR BODY MASS INDEX (BMI) DOCUMENTED: ICD-10-PCS | Mod: CPTII,S$GLB,, | Performed by: FAMILY MEDICINE

## 2023-01-06 PROCEDURE — 99999 PR PBB SHADOW E&M-EST. PATIENT-LVL V: ICD-10-PCS | Mod: PBBFAC,,, | Performed by: FAMILY MEDICINE

## 2023-01-06 PROCEDURE — 87804 INFLUENZA ASSAY W/OPTIC: CPT | Mod: 59,QW,S$GLB, | Performed by: FAMILY MEDICINE

## 2023-01-06 PROCEDURE — 3079F DIAST BP 80-89 MM HG: CPT | Mod: CPTII,S$GLB,, | Performed by: FAMILY MEDICINE

## 2023-01-06 PROCEDURE — 99215 OFFICE O/P EST HI 40 MIN: CPT | Mod: S$GLB,,, | Performed by: FAMILY MEDICINE

## 2023-01-06 PROCEDURE — 99215 PR OFFICE/OUTPT VISIT, EST, LEVL V, 40-54 MIN: ICD-10-PCS | Mod: S$GLB,,, | Performed by: FAMILY MEDICINE

## 2023-01-06 PROCEDURE — 80061 LIPID PANEL: CPT | Performed by: FAMILY MEDICINE

## 2023-01-06 RX ORDER — DOXYCYCLINE 100 MG/1
100 CAPSULE ORAL 2 TIMES DAILY
Qty: 14 CAPSULE | Refills: 0 | Status: SHIPPED | OUTPATIENT
Start: 2023-01-06 | End: 2023-01-13

## 2023-01-06 RX ORDER — PROMETHAZINE HYDROCHLORIDE AND DEXTROMETHORPHAN HYDROBROMIDE 6.25; 15 MG/5ML; MG/5ML
5 SYRUP ORAL 4 TIMES DAILY PRN
Qty: 240 ML | Refills: 0 | Status: SHIPPED | OUTPATIENT
Start: 2023-01-06 | End: 2023-01-16

## 2023-01-06 NOTE — PROGRESS NOTES
Subjective:       Patient ID: Roman Hodges is a 62 y.o. male.    Chief Complaint: Follow-up    62 years old male came to the clinic with persistent cough and congestion for the last 3 weeks.  No recent A1c.  No polyuria, polydipsia or polyphagia.  Blood pressure today was stable.  No chest pain, palpitation, orthopnea or PND.    Follow-up  Associated symptoms include coughing. Pertinent negatives include no fever.   Review of Systems   Constitutional: Negative.  Negative for fever.   HENT: Negative.     Eyes: Negative.    Respiratory:  Positive for cough.    Cardiovascular: Negative.    Gastrointestinal: Negative.    Genitourinary: Negative.    Musculoskeletal: Negative.    Integumentary:  Negative.   Neurological: Negative.    Psychiatric/Behavioral: Negative.         Objective:      Physical Exam  Vitals and nursing note reviewed.   Constitutional:       General: He is not in acute distress.     Appearance: He is well-developed. He is not diaphoretic.   HENT:      Head: Normocephalic and atraumatic.      Right Ear: External ear normal.      Left Ear: External ear normal.      Nose: Nose normal.      Mouth/Throat:      Pharynx: No oropharyngeal exudate.   Eyes:      General: No scleral icterus.        Right eye: No discharge.         Left eye: No discharge.      Conjunctiva/sclera: Conjunctivae normal.      Pupils: Pupils are equal, round, and reactive to light.   Neck:      Thyroid: No thyromegaly.      Vascular: No JVD.      Trachea: No tracheal deviation.   Cardiovascular:      Rate and Rhythm: Normal rate and regular rhythm.      Heart sounds: Normal heart sounds. No murmur heard.    No friction rub. No gallop.   Pulmonary:      Effort: Pulmonary effort is normal. No respiratory distress.      Breath sounds: No stridor. Examination of the right-middle field reveals rhonchi. Examination of the left-middle field reveals rhonchi. Rhonchi present. No wheezing or rales.   Chest:      Chest wall: No tenderness.    Abdominal:      General: Bowel sounds are normal. There is no distension.      Palpations: Abdomen is soft. There is no mass.      Tenderness: There is no abdominal tenderness. There is no guarding or rebound.   Musculoskeletal:         General: No tenderness. Normal range of motion.      Cervical back: Normal range of motion and neck supple.   Lymphadenopathy:      Cervical: No cervical adenopathy.   Skin:     General: Skin is warm and dry.      Coloration: Skin is not pale.      Findings: No erythema or rash.   Neurological:      Mental Status: He is alert and oriented to person, place, and time.      Cranial Nerves: No cranial nerve deficit.      Motor: No abnormal muscle tone.      Coordination: Coordination normal.      Deep Tendon Reflexes: Reflexes are normal and symmetric. Reflexes normal.   Psychiatric:         Behavior: Behavior normal.         Thought Content: Thought content normal.         Judgment: Judgment normal.       Assessment:       Problem List Items Addressed This Visit       Essential hypertension (Chronic)    Relevant Orders    Lipid Panel    Type 2 diabetes mellitus, without long-term current use of insulin (Chronic)    Relevant Orders    Lipid Panel    Hemoglobin A1C    Microalbumin/Creatinine Ratio, Urine     Other Visit Diagnoses       Suspected COVID-19 virus infection    -  Primary    Relevant Orders    POCT COVID-19 Rapid Screening    Flu-like symptoms        Relevant Orders    POCT Influenza A/B              Plan:         Roman was seen today for follow-up.    Diagnoses and all orders for this visit:    Suspected COVID-19 virus infection  -     POCT COVID-19 Rapid Screening    Flu-like symptoms  -     POCT Influenza A/B    Essential hypertension  -     Lipid Panel; Future    Type 2 diabetes mellitus with hyperglycemia, without long-term current use of insulin  -     Lipid Panel; Future  -     Hemoglobin A1C; Future  -     Microalbumin/Creatinine Ratio, Urine; Future    Mucopurulent  chronic bronchitis  -     doxycycline (MONODOX) 100 MG capsule; Take 1 capsule (100 mg total) by mouth 2 (two) times daily. for 7 days  -     promethazine-dextromethorphan (PROMETHAZINE-DM) 6.25-15 mg/5 mL Syrp; Take 5 mLs by mouth 4 (four) times daily as needed (cough).            Continue monitoring blood pressure at home, low sodium diet.     Continue monitoring blood sugar at home,ADA diet.

## 2023-01-06 NOTE — LETTER
January 6, 2023    Roman Hodges  [address]  [place] [address]          Dear Roman Nievesz:  MRN: [id number]    This is a follow up to your recent labs, your lab results were stable.  There are no medicine changes.  Please have your labs drawn again on 7/3/2023.      If you cannot have your labs drawn on the scheduled date, it is your responsibility to call the transplant department to reschedule.  Please call [phone] and ask to speak to Jodee CESAR   for all scheduling requests.     Sincerely,  Charmaine PENNN, RN        Your Liver Transplant Coordinator    [facility]  [address] [place] [id number]  [phone]

## 2023-01-06 NOTE — TELEPHONE ENCOUNTER
Letter sent to patient stating: Your labs have been reviewed by your Transplant physician, no action required. Next labs due 7/3/2023      ----- Message from Fab Damon MD sent at 1/6/2023  7:12 AM CST -----  Results reviewed. No action.

## 2023-01-12 ENCOUNTER — PATIENT MESSAGE (OUTPATIENT)
Dept: PODIATRY | Facility: CLINIC | Age: 63
End: 2023-01-12

## 2023-01-12 ENCOUNTER — TELEPHONE (OUTPATIENT)
Dept: PODIATRY | Facility: CLINIC | Age: 63
End: 2023-01-12

## 2023-01-12 ENCOUNTER — OFFICE VISIT (OUTPATIENT)
Dept: PODIATRY | Facility: CLINIC | Age: 63
End: 2023-01-12
Payer: COMMERCIAL

## 2023-01-12 VITALS
SYSTOLIC BLOOD PRESSURE: 163 MMHG | HEIGHT: 68 IN | BODY MASS INDEX: 33.12 KG/M2 | HEART RATE: 74 BPM | DIASTOLIC BLOOD PRESSURE: 95 MMHG

## 2023-01-12 DIAGNOSIS — L60.0 INGROWN NAIL: Primary | ICD-10-CM

## 2023-01-12 PROCEDURE — 3077F SYST BP >= 140 MM HG: CPT | Mod: CPTII,S$GLB,, | Performed by: STUDENT IN AN ORGANIZED HEALTH CARE EDUCATION/TRAINING PROGRAM

## 2023-01-12 PROCEDURE — 3066F PR DOCUMENTATION OF TREATMENT FOR NEPHROPATHY: ICD-10-PCS | Mod: CPTII,S$GLB,, | Performed by: STUDENT IN AN ORGANIZED HEALTH CARE EDUCATION/TRAINING PROGRAM

## 2023-01-12 PROCEDURE — 3044F HG A1C LEVEL LT 7.0%: CPT | Mod: CPTII,S$GLB,, | Performed by: STUDENT IN AN ORGANIZED HEALTH CARE EDUCATION/TRAINING PROGRAM

## 2023-01-12 PROCEDURE — 99499 NO LOS: ICD-10-PCS | Mod: S$GLB,,, | Performed by: STUDENT IN AN ORGANIZED HEALTH CARE EDUCATION/TRAINING PROGRAM

## 2023-01-12 PROCEDURE — 3008F BODY MASS INDEX DOCD: CPT | Mod: CPTII,S$GLB,, | Performed by: STUDENT IN AN ORGANIZED HEALTH CARE EDUCATION/TRAINING PROGRAM

## 2023-01-12 PROCEDURE — 3008F PR BODY MASS INDEX (BMI) DOCUMENTED: ICD-10-PCS | Mod: CPTII,S$GLB,, | Performed by: STUDENT IN AN ORGANIZED HEALTH CARE EDUCATION/TRAINING PROGRAM

## 2023-01-12 PROCEDURE — 1159F MED LIST DOCD IN RCRD: CPT | Mod: CPTII,S$GLB,, | Performed by: STUDENT IN AN ORGANIZED HEALTH CARE EDUCATION/TRAINING PROGRAM

## 2023-01-12 PROCEDURE — 3066F NEPHROPATHY DOC TX: CPT | Mod: CPTII,S$GLB,, | Performed by: STUDENT IN AN ORGANIZED HEALTH CARE EDUCATION/TRAINING PROGRAM

## 2023-01-12 PROCEDURE — 3060F PR POS MICROALBUMINURIA RESULT DOCUMENTED/REVIEW: ICD-10-PCS | Mod: CPTII,S$GLB,, | Performed by: STUDENT IN AN ORGANIZED HEALTH CARE EDUCATION/TRAINING PROGRAM

## 2023-01-12 PROCEDURE — 3060F POS MICROALBUMINURIA REV: CPT | Mod: CPTII,S$GLB,, | Performed by: STUDENT IN AN ORGANIZED HEALTH CARE EDUCATION/TRAINING PROGRAM

## 2023-01-12 PROCEDURE — 99999 PR PBB SHADOW E&M-EST. PATIENT-LVL III: CPT | Mod: PBBFAC,,, | Performed by: STUDENT IN AN ORGANIZED HEALTH CARE EDUCATION/TRAINING PROGRAM

## 2023-01-12 PROCEDURE — 3080F PR MOST RECENT DIASTOLIC BLOOD PRESSURE >= 90 MM HG: ICD-10-PCS | Mod: CPTII,S$GLB,, | Performed by: STUDENT IN AN ORGANIZED HEALTH CARE EDUCATION/TRAINING PROGRAM

## 2023-01-12 PROCEDURE — 11750 NAIL REMOVAL: ICD-10-PCS | Mod: TA,S$GLB,, | Performed by: STUDENT IN AN ORGANIZED HEALTH CARE EDUCATION/TRAINING PROGRAM

## 2023-01-12 PROCEDURE — 3080F DIAST BP >= 90 MM HG: CPT | Mod: CPTII,S$GLB,, | Performed by: STUDENT IN AN ORGANIZED HEALTH CARE EDUCATION/TRAINING PROGRAM

## 2023-01-12 PROCEDURE — 3077F PR MOST RECENT SYSTOLIC BLOOD PRESSURE >= 140 MM HG: ICD-10-PCS | Mod: CPTII,S$GLB,, | Performed by: STUDENT IN AN ORGANIZED HEALTH CARE EDUCATION/TRAINING PROGRAM

## 2023-01-12 PROCEDURE — 1159F PR MEDICATION LIST DOCUMENTED IN MEDICAL RECORD: ICD-10-PCS | Mod: CPTII,S$GLB,, | Performed by: STUDENT IN AN ORGANIZED HEALTH CARE EDUCATION/TRAINING PROGRAM

## 2023-01-12 PROCEDURE — 99499 UNLISTED E&M SERVICE: CPT | Mod: S$GLB,,, | Performed by: STUDENT IN AN ORGANIZED HEALTH CARE EDUCATION/TRAINING PROGRAM

## 2023-01-12 PROCEDURE — 99999 PR PBB SHADOW E&M-EST. PATIENT-LVL III: ICD-10-PCS | Mod: PBBFAC,,, | Performed by: STUDENT IN AN ORGANIZED HEALTH CARE EDUCATION/TRAINING PROGRAM

## 2023-01-12 PROCEDURE — 3044F PR MOST RECENT HEMOGLOBIN A1C LEVEL <7.0%: ICD-10-PCS | Mod: CPTII,S$GLB,, | Performed by: STUDENT IN AN ORGANIZED HEALTH CARE EDUCATION/TRAINING PROGRAM

## 2023-01-12 PROCEDURE — 11750 EXCISION NAIL&NAIL MATRIX: CPT | Mod: TA,S$GLB,, | Performed by: STUDENT IN AN ORGANIZED HEALTH CARE EDUCATION/TRAINING PROGRAM

## 2023-01-12 NOTE — TELEPHONE ENCOUNTER
----- Message from Merced Cortes sent at 1/12/2023 11:00 AM CST -----  Needs advice from nurse:      Who Called:spouse-Kati Hodges  Regarding:needs to discuss work permit  Would the patient rather a call back or [facility]?  Best Call Back number:  Additional Info:

## 2023-01-12 NOTE — TELEPHONE ENCOUNTER
Spoke to patients wife to let her know that I will write the note as soon as I have time available, she was o.k. with it and she [facility]

## 2023-01-12 NOTE — PROCEDURES
Nail Removal    Date/Time: 1/12/2023 9:00 AM  Performed by: Caridad Crisostomo DPM  Authorized by: Caridad Crisostomo DPM     Consent Done?:  Yes (Written)  Location:     Location:  Left foot    Location detail:  Left big toe  Anesthesia:     Anesthesia:  Digital block    Local anesthetic:  Lidocaine 2% without epinephrine and bupivacaine 0.25% without epinephrine    Anesthetic total (ml):  6  Procedure Details:     Preparation:  Skin prepped with alcohol and skin prepped with Betadine    Amount removed:  Partial    Side:  Lateral    Wedge excision of skin of nail fold: No      Nail bed sutured?: No      Nail matrix removed:  Partial (Nail matrix removed to site with phenol application x3, held for 30 seconds each, followed by cleansing with rubbing alcohol followed by sterile saline)    Removal method:  Phenol and alcohol    Dressing applied:  4x4, antibiotic ointment and Xeroform gauze    Patient tolerance:  Patient tolerated the procedure well with no immediate complications     Following written and verbal consent, a timeout was performed confirming the patient's identification, procedure, surgical site, medications and allergies. All were in agreement.

## 2023-01-12 NOTE — PROGRESS NOTES
Subjective:      Patient ID: Roman Hodges is a 62 y.o. male.    Chief Complaint: Ingrown Toenail (Left great toenail)    Roman is a 62 y.o. male who presents to the clinic upon referral from Dr. Krystal shannon. provider found  for evaluation and treatment of diabetic feet. Roman has a past medical history of A-fib, Allergy, Anticoagulant long-term use, Diabetes mellitus, type 2, GERD (gastroesophageal reflux disease), Hepatocellular carcinoma, History of 2019 novel coronavirus disease (COVID-19), History of primary liver cancer (10/24/2018), Hyperlipidemia, and Hypertension. Patient relates no major problem with feet. Only complaints today consist of here for a diabetic foot exam. Relates to painful ingrown toenails to outside border of left great toe and inside border of right 2nd toe. No further pedal complaints.     1/12/23: Seen today for follow up of cryptotic border to left great toenail, outside border. States he would like it permanently removed. No new pedal complaints    PCP: Jose Angel Munson MD    Date Last Seen by PCP: 10/25/22    Current shoe gear: Tennis shoes    Hemoglobin A1C   Date Value Ref Range Status   01/06/2023 6.2 (H) 4.0 - 5.6 % Final     Comment:     ADA Screening Guidelines:  5.7-6.4%  Consistent with prediabetes  >or=6.5%  Consistent with diabetes    High levels of fetal hemoglobin interfere with the HbA1C  assay. Heterozygous hemoglobin variants (HbS, HgC, etc)do  not significantly interfere with this assay.   However, presence of multiple variants may affect accuracy.     07/07/2022 6.4 (H) 4.0 - 5.6 % Final     Comment:     ADA Screening Guidelines:  5.7-6.4%  Consistent with prediabetes  >or=6.5%  Consistent with diabetes    High levels of fetal hemoglobin interfere with the HbA1C  assay. Heterozygous hemoglobin variants (HbS, HgC, etc)do  not significantly interfere with this assay.   However, presence of multiple variants may affect accuracy.     02/21/2022 6.3 (H) 4.0 - 5.6 %  Final     Comment:     ADA Screening Guidelines:  5.7-6.4%  Consistent with prediabetes  >or=6.5%  Consistent with diabetes    High levels of fetal hemoglobin interfere with the HbA1C  assay. Heterozygous hemoglobin variants (HbS, HgC, etc)do  not significantly interfere with this assay.   However, presence of multiple variants may affect accuracy.           Review of Systems   Constitutional: Negative for chills, decreased appetite, diaphoresis and fever.   HENT:  Negative for congestion and hearing loss.    Cardiovascular:  Negative for chest pain, claudication, leg swelling and syncope.   Respiratory:  Negative for cough and shortness of breath.    Skin:  Positive for dry skin and nail changes. Negative for color change, flushing, itching, poor wound healing and rash.   Musculoskeletal:  Negative for arthritis, back pain, joint pain and joint swelling.   Gastrointestinal:  Negative for nausea and vomiting.   Neurological:  Negative for focal weakness, numbness, paresthesias and weakness.   Psychiatric/Behavioral:  Negative for altered mental status. The patient is not nervous/anxious.          Objective:      Physical Exam  Constitutional:       General: He is not in acute distress.     Appearance: Normal appearance. He is well-developed. He is not diaphoretic.   Cardiovascular:      Pulses:           Dorsalis pedis pulses are 2+ on the right side and 2+ on the left side.        Posterior tibial pulses are 2+ on the right side and 2+ on the left side.      Comments: Dorsalis pedis and posterior tibial pulses are within normal limits. Skin temperature is within normal limits. Toes are cool to touch and feet are warm proximally. Hair growth is within normal limits. Skin is normotrophic and without hyperpigmentation. No edema noted. No varicosities or spider veins noted, bilaterally.   Musculoskeletal:         General: No tenderness.      Comments: Adequate joint range of motion without pain, limitation, nor  crepitation to foot and ankle joints. Muscle strength is 5/5 in all groups bilaterally.       Feet:      Right foot:      Protective Sensation: 10 sites tested.  10 sites sensed.      Skin integrity: Dry skin present. No ulcer, blister, erythema or warmth.      Left foot:      Protective Sensation: 10 sites tested.  10 sites sensed.      Skin integrity: Dry skin present. No ulcer, blister, erythema or warmth.   Skin:     General: Skin is warm and dry.      Capillary Refill: Capillary refill takes less than 2 seconds.      Comments: Skin is warm and dry, no acute signs of infection noted bilaterally      Toenails are well trimmed and mildly thickened    Otherwise, no open wounds, macerations or hyperkeratotic lesions, bilaterally     Distal cryptotic nail border to left lateral hallux toenail. No associated open wounds or signs of infection           Neurological:      Mental Status: He is alert and oriented to person, place, and time.      Sensory: No sensory deficit.      Motor: No abnormal muscle tone.      Comments: Light touch within normal limits. [place]-Dain 5.07 monofilamant testing is within normal limits. Vibratory sensation within normal limits, bilaterally.    Psychiatric:         Mood and Affect: Mood normal.         Behavior: Behavior normal.         Thought Content: Thought content normal.             Assessment:       Encounter Diagnosis   Name Primary?    Ingrown nail Yes         Plan:       Roman was seen today for ingrown toenail.    Diagnoses and all orders for this visit:    Ingrown nail  -     Nail Removal      I counseled the patient on his conditions, their implications and medical management.    Permanent partial nail avulsion performed per procedure note, dressings per procedure note. Pt tolerated well. Recommend changing site 2-3x per day with Neosporin and bandage until healed. Recommend open toed shoes or shoes with a wide toe box to reduce pressure.     Shoe inspection. Diabetic  Foot Education. Patient reminded of the importance of good nutrition and blood sugar control to help prevent podiatric complications of diabetes. Patient instructed on proper foot hygeine. We discussed wearing proper shoe gear, daily foot inspections, never walking without protective shoe gear, never putting sharp instruments to feet, routine podiatric nail visits      Return to clinic in 2-4 weeks or PRN as discussed.

## 2023-01-12 NOTE — LETTER
January 12, 2023      [facility]  [address], [address]  [place] [address]  Phone: [phone]  Fax: [phone]       Patient: Roman Hodges   YOB: 1960  Date of Visit: 01/12/2023    To Whom It May Concern:    Colleen Hodges  was at [facility] on 01/12/2023. The patient may return to work on 1/16/23 with no restrictions. If you have any questions or concerns, or if I can be of further assistance, please do not hesitate to contact me.    Sincerely,    Natty Javier CO

## 2023-01-24 ENCOUNTER — TELEPHONE (OUTPATIENT)
Dept: ELECTROPHYSIOLOGY | Facility: CLINIC | Age: 63
End: 2023-01-24
Payer: COMMERCIAL

## 2023-01-25 ENCOUNTER — PATIENT MESSAGE (OUTPATIENT)
Dept: ELECTROPHYSIOLOGY | Facility: CLINIC | Age: 63
End: 2023-01-25
Payer: COMMERCIAL

## 2023-02-02 ENCOUNTER — PATIENT MESSAGE (OUTPATIENT)
Dept: FAMILY MEDICINE | Facility: CLINIC | Age: 63
End: 2023-02-02
Payer: COMMERCIAL

## 2023-02-02 DIAGNOSIS — Z94.4 S/P LIVER TRANSPLANT: ICD-10-CM

## 2023-02-02 DIAGNOSIS — E87.5 HYPERKALEMIA: ICD-10-CM

## 2023-02-02 RX ORDER — POTASSIUM CHLORIDE 20 MEQ/1
20 TABLET, EXTENDED RELEASE ORAL 2 TIMES DAILY
Qty: 180 TABLET | Refills: 3 | Status: SHIPPED | OUTPATIENT
Start: 2023-02-02 | End: 2023-05-03

## 2023-02-02 NOTE — TELEPHONE ENCOUNTER
No new care gaps identified.  [facility] Embedded Care Gaps. Reference number: [id number]. 2/02/2023   8:07:26 AM CST

## 2023-03-16 ENCOUNTER — OFFICE VISIT (OUTPATIENT)
Dept: CARDIOLOGY | Facility: CLINIC | Age: 63
End: 2023-03-16
Payer: COMMERCIAL

## 2023-03-16 VITALS
HEIGHT: 68 IN | DIASTOLIC BLOOD PRESSURE: 86 MMHG | SYSTOLIC BLOOD PRESSURE: 134 MMHG | BODY MASS INDEX: 33.75 KG/M2 | WEIGHT: 222.69 LBS | HEART RATE: 75 BPM | OXYGEN SATURATION: 98 %

## 2023-03-16 DIAGNOSIS — Z79.01 LONG TERM (CURRENT) USE OF ANTICOAGULANTS: Chronic | ICD-10-CM

## 2023-03-16 DIAGNOSIS — I10 ESSENTIAL HYPERTENSION: Chronic | ICD-10-CM

## 2023-03-16 DIAGNOSIS — I48.0 PAROXYSMAL ATRIAL FIBRILLATION: Primary | ICD-10-CM

## 2023-03-16 DIAGNOSIS — E66.9 OBESITY (BMI 30.0-34.9): ICD-10-CM

## 2023-03-16 DIAGNOSIS — E11.9 TYPE 2 DIABETES MELLITUS WITHOUT COMPLICATION, WITHOUT LONG-TERM CURRENT USE OF INSULIN: Chronic | ICD-10-CM

## 2023-03-16 DIAGNOSIS — Z79.60 LONG-TERM USE OF IMMUNOSUPPRESSANT MEDICATION: ICD-10-CM

## 2023-03-16 DIAGNOSIS — G47.33 OSA (OBSTRUCTIVE SLEEP APNEA): Chronic | ICD-10-CM

## 2023-03-16 DIAGNOSIS — U07.1 COVID: ICD-10-CM

## 2023-03-16 DIAGNOSIS — Z94.4 S/P LIVER TRANSPLANT: Chronic | ICD-10-CM

## 2023-03-16 PROCEDURE — 3075F SYST BP GE 130 - 139MM HG: CPT | Mod: CPTII,S$GLB,, | Performed by: INTERNAL MEDICINE

## 2023-03-16 PROCEDURE — 3060F PR POS MICROALBUMINURIA RESULT DOCUMENTED/REVIEW: ICD-10-PCS | Mod: CPTII,S$GLB,, | Performed by: INTERNAL MEDICINE

## 2023-03-16 PROCEDURE — 3044F HG A1C LEVEL LT 7.0%: CPT | Mod: CPTII,S$GLB,, | Performed by: INTERNAL MEDICINE

## 2023-03-16 PROCEDURE — 3044F PR MOST RECENT HEMOGLOBIN A1C LEVEL <7.0%: ICD-10-PCS | Mod: CPTII,S$GLB,, | Performed by: INTERNAL MEDICINE

## 2023-03-16 PROCEDURE — 99214 PR OFFICE/OUTPT VISIT, EST, LEVL IV, 30-39 MIN: ICD-10-PCS | Mod: S$GLB,,, | Performed by: INTERNAL MEDICINE

## 2023-03-16 PROCEDURE — 1160F RVW MEDS BY RX/DR IN RCRD: CPT | Mod: CPTII,S$GLB,, | Performed by: INTERNAL MEDICINE

## 2023-03-16 PROCEDURE — 99999 PR PBB SHADOW E&M-EST. PATIENT-LVL IV: ICD-10-PCS | Mod: PBBFAC,,, | Performed by: INTERNAL MEDICINE

## 2023-03-16 PROCEDURE — 99214 OFFICE O/P EST MOD 30 MIN: CPT | Mod: S$GLB,,, | Performed by: INTERNAL MEDICINE

## 2023-03-16 PROCEDURE — 99999 PR PBB SHADOW E&M-EST. PATIENT-LVL IV: CPT | Mod: PBBFAC,,, | Performed by: INTERNAL MEDICINE

## 2023-03-16 PROCEDURE — 3066F PR DOCUMENTATION OF TREATMENT FOR NEPHROPATHY: ICD-10-PCS | Mod: CPTII,S$GLB,, | Performed by: INTERNAL MEDICINE

## 2023-03-16 PROCEDURE — 1160F PR REVIEW ALL MEDS BY PRESCRIBER/CLIN PHARMACIST DOCUMENTED: ICD-10-PCS | Mod: CPTII,S$GLB,, | Performed by: INTERNAL MEDICINE

## 2023-03-16 PROCEDURE — 3079F PR MOST RECENT DIASTOLIC BLOOD PRESSURE 80-89 MM HG: ICD-10-PCS | Mod: CPTII,S$GLB,, | Performed by: INTERNAL MEDICINE

## 2023-03-16 PROCEDURE — 3079F DIAST BP 80-89 MM HG: CPT | Mod: CPTII,S$GLB,, | Performed by: INTERNAL MEDICINE

## 2023-03-16 PROCEDURE — 3075F PR MOST RECENT SYSTOLIC BLOOD PRESS GE 130-139MM HG: ICD-10-PCS | Mod: CPTII,S$GLB,, | Performed by: INTERNAL MEDICINE

## 2023-03-16 PROCEDURE — 3008F BODY MASS INDEX DOCD: CPT | Mod: CPTII,S$GLB,, | Performed by: INTERNAL MEDICINE

## 2023-03-16 PROCEDURE — 1159F MED LIST DOCD IN RCRD: CPT | Mod: CPTII,S$GLB,, | Performed by: INTERNAL MEDICINE

## 2023-03-16 PROCEDURE — 3060F POS MICROALBUMINURIA REV: CPT | Mod: CPTII,S$GLB,, | Performed by: INTERNAL MEDICINE

## 2023-03-16 PROCEDURE — 3066F NEPHROPATHY DOC TX: CPT | Mod: CPTII,S$GLB,, | Performed by: INTERNAL MEDICINE

## 2023-03-16 PROCEDURE — 1159F PR MEDICATION LIST DOCUMENTED IN MEDICAL RECORD: ICD-10-PCS | Mod: CPTII,S$GLB,, | Performed by: INTERNAL MEDICINE

## 2023-03-16 PROCEDURE — 3008F PR BODY MASS INDEX (BMI) DOCUMENTED: ICD-10-PCS | Mod: CPTII,S$GLB,, | Performed by: INTERNAL MEDICINE

## 2023-03-16 NOTE — PROGRESS NOTES
Subjective:    Patient ID:  Roman Hodges is a 63 y.o. male who presents for follow-up of Atrial Fibrillation      HPI    64 y/o Irish speaking male with hx of HTN, DM, PAFib, HFrEF with return to normal function (previously 30%, last 2DE with 55%) and HCC s/p OLTx. Patient was initially admitted with AF RVR after presenting with near syncopal episode and spontaneously converted. At that time, 2DE with EF 30% with akinetic: mid anteroseptal, apical anterior, apical septal, apical lateral and apex and hypokinetic: mid inferoseptal and apical inferior walls. Again presented with similar symptoms, started on BB and DOAC and discharged home. Seen by Dr Sargent and was complaining of exertional epigastric/substernal pain and was admitted for ACS r/o. Had LHC with nonobstructive CAD and EF on V-gram normal without WMA's. Clinically improved and discharged home. Repeat 2DE with normalization of function to 55%. Did well post discharge, but then began having episodes of HTN urgency and had presented multiple times to ED with SBP >200. Had Oltx successfully and has done well. BP meds had been changed and had been having elevated readings. Changed BP regimen and BP controlled. Was hospitalized at [facility] for Covid for 8 days and had full recovery.     9/6/2022:  Previous visit was complaining of palps with chest discomfort. Had epigastric discomfort and SOB. Episode lasted about an hour. No recurrent episodes. No significant caffeine. Had normal 2DE and Holter. No recurrent episodes.  Previously was walkings 6-8 miles daily with no symptoms.  About 3 weeks ago was having episodes of elevated 's/90's. No symptoms. Recent Covid with mild symptoms, now fully recovered.   Denies regular orthopnea, PND, syncope, palps. Tolerating meds well and compliant.     11/29/2022:  BP at home 120's/80's. No cardiac symptoms at full activities. Denies CP, SOB/CASH, orthopnea, PND, syncope, palps, LE edema. Did have an episode about  1 month ago with URTI that he felt heart racing one night similar to previous episodes of Afib.    3/16/2023:  Bp has been stable. No cardiac symptoms at full activities. Had an episode about 1 month ago that he felt heart racing similar to previous episodes of Afib which lasted about 45 mins. States HR got up to 120's and steadily decreased. 's at the time. Denies CP, SOB/CASH, orthopnea, PND, syncope, LE edema.     Review of Systems   Constitutional: Positive for malaise/fatigue.   HENT:  Negative for congestion.    Eyes:  Negative for blurred vision.   Cardiovascular:  Negative for chest pain, claudication, cyanosis, dyspnea on exertion, irregular heartbeat, leg swelling, near-syncope, orthopnea, palpitations, paroxysmal nocturnal dyspnea and syncope.   Respiratory:  Negative for shortness of breath.    Endocrine: Negative for polyuria.   Hematologic/Lymphatic: Negative for bleeding problem.   Skin:  Negative for itching and rash.   Musculoskeletal:  Negative for joint swelling, muscle cramps and muscle weakness.   Gastrointestinal:  Negative for abdominal pain, hematemesis, hematochezia, melena, nausea and vomiting.   Genitourinary:  Negative for dysuria and hematuria.   Neurological:  Negative for dizziness, focal weakness, headaches, light-headedness, loss of balance and weakness.   Psychiatric/Behavioral:  Negative for depression. The patient is not nervous/anxious.       Objective:    Physical Exam  Constitutional:       Appearance: He is well-developed.   HENT:      Head: Normocephalic and atraumatic.   Neck:      Vascular: No JVD.   Cardiovascular:      Rate and Rhythm: Normal rate and regular rhythm.      Pulses:           Carotid pulses are 2+ on the right side and 2+ on the left side.       Radial pulses are 2+ on the right side and 2+ on the left side.        Femoral pulses are 2+ on the right side and 2+ on the left side.       Dorsalis pedis pulses are 2+ on the right side and 2+ on the left  side.        Posterior tibial pulses are 2+ on the right side and 2+ on the left side.      Heart sounds: Normal heart sounds.   Pulmonary:      Effort: Pulmonary effort is normal.      Breath sounds: Normal breath sounds.   Abdominal:      General: Bowel sounds are normal.      Palpations: Abdomen is soft.   Musculoskeletal:      Cervical back: Neck supple.   Skin:     General: Skin is warm and dry.   Neurological:      Mental Status: He is alert and oriented to person, place, and time.   Psychiatric:         Behavior: Behavior normal.         Thought Content: Thought content normal.         Assessment:       1. Paroxysmal atrial fibrillation    2. Essential hypertension    3. COVID    4. Long term (current) use of anticoagulants    5. Type 2 diabetes mellitus without complication, without long-term current use of insulin    6. Obesity (BMI 30.0-34.9)    7. S/P liver transplant    8. Long-term use of immunosuppressant medication    9. MALLIKA (obstructive sleep apnea)      62 y/o pt with hx and presentation as above. Doing well from a cardiac perspective and compensated from a HF perspective. BP controlled. Episodes likely paroxysms of afib. Told to take an extra dose of 1/2 tab Toprol when it happens. Has upcoming EP appt. Needs to lose weight and stay active. Discussed the etiology, evaluation, and management of CP, HTN, afib, CHF, obesity, MALLIKA. Discussed the importance of med compliance, heart healthy diet, and regular exercise.      Plan:       -Continue current medical management  -f/u in 6 months

## 2023-03-27 RX ORDER — SITAGLIPTIN 100 MG/1
TABLET, FILM COATED ORAL
Qty: 90 TABLET | Refills: 1 | Status: SHIPPED | OUTPATIENT
Start: 2023-03-27 | End: 2023-07-02

## 2023-03-27 NOTE — TELEPHONE ENCOUNTER
Refill Decision Note   Roman Hodges  is requesting a refill authorization.  Brief Assessment and Rationale for Refill:  Approve     Medication Therapy Plan:       Medication Reconciliation Completed: No   Comments:     No Care Gaps recommended.     Note composed:10:54 AM 03/27/2023

## 2023-03-27 NOTE — TELEPHONE ENCOUNTER
No new care gaps identified.  [facility] Embedded Care Gaps. Reference number: [id number]. 3/27/2023   12:32:41 AM RADT

## 2023-03-29 ENCOUNTER — OFFICE VISIT (OUTPATIENT)
Dept: ELECTROPHYSIOLOGY | Facility: CLINIC | Age: 63
End: 2023-03-29
Payer: COMMERCIAL

## 2023-03-29 ENCOUNTER — HOSPITAL ENCOUNTER (OUTPATIENT)
Dept: CARDIOLOGY | Facility: CLINIC | Age: 63
Discharge: HOME | End: 2023-03-29
Payer: COMMERCIAL

## 2023-03-29 VITALS
BODY MASS INDEX: 34.32 KG/M2 | SYSTOLIC BLOOD PRESSURE: 140 MMHG | HEIGHT: 68 IN | WEIGHT: 226.44 LBS | DIASTOLIC BLOOD PRESSURE: 78 MMHG | HEART RATE: 67 BPM

## 2023-03-29 DIAGNOSIS — E87.1 HYPONATREMIA: ICD-10-CM

## 2023-03-29 DIAGNOSIS — Z94.4 S/P LIVER TRANSPLANT: Chronic | ICD-10-CM

## 2023-03-29 DIAGNOSIS — I10 ESSENTIAL HYPERTENSION: Chronic | ICD-10-CM

## 2023-03-29 DIAGNOSIS — G47.33 OSA (OBSTRUCTIVE SLEEP APNEA): Chronic | ICD-10-CM

## 2023-03-29 DIAGNOSIS — I48.0 PAROXYSMAL ATRIAL FIBRILLATION: Primary | Chronic | ICD-10-CM

## 2023-03-29 DIAGNOSIS — E87.6 HYPOKALEMIA: ICD-10-CM

## 2023-03-29 DIAGNOSIS — E11.9 TYPE 2 DIABETES MELLITUS WITHOUT COMPLICATION, WITHOUT LONG-TERM CURRENT USE OF INSULIN: Chronic | ICD-10-CM

## 2023-03-29 DIAGNOSIS — E83.39 HYPOPHOSPHATEMIA: ICD-10-CM

## 2023-03-29 DIAGNOSIS — I48.0 PAROXYSMAL ATRIAL FIBRILLATION: ICD-10-CM

## 2023-03-29 PROCEDURE — 93010 RHYTHM STRIP: ICD-10-PCS | Mod: S$GLB,,, | Performed by: INTERNAL MEDICINE

## 2023-03-29 PROCEDURE — 3078F PR MOST RECENT DIASTOLIC BLOOD PRESSURE < 80 MM HG: ICD-10-PCS | Mod: CPTII,S$GLB,, | Performed by: INTERNAL MEDICINE

## 2023-03-29 PROCEDURE — 3078F DIAST BP <80 MM HG: CPT | Mod: CPTII,S$GLB,, | Performed by: INTERNAL MEDICINE

## 2023-03-29 PROCEDURE — 99214 PR OFFICE/OUTPT VISIT, EST, LEVL IV, 30-39 MIN: ICD-10-PCS | Mod: S$GLB,,, | Performed by: INTERNAL MEDICINE

## 2023-03-29 PROCEDURE — 1160F PR REVIEW ALL MEDS BY PRESCRIBER/CLIN PHARMACIST DOCUMENTED: ICD-10-PCS | Mod: CPTII,S$GLB,, | Performed by: INTERNAL MEDICINE

## 2023-03-29 PROCEDURE — 3044F PR MOST RECENT HEMOGLOBIN A1C LEVEL <7.0%: ICD-10-PCS | Mod: CPTII,S$GLB,, | Performed by: INTERNAL MEDICINE

## 2023-03-29 PROCEDURE — 3008F PR BODY MASS INDEX (BMI) DOCUMENTED: ICD-10-PCS | Mod: CPTII,S$GLB,, | Performed by: INTERNAL MEDICINE

## 2023-03-29 PROCEDURE — 1159F MED LIST DOCD IN RCRD: CPT | Mod: CPTII,S$GLB,, | Performed by: INTERNAL MEDICINE

## 2023-03-29 PROCEDURE — 99214 OFFICE O/P EST MOD 30 MIN: CPT | Mod: S$GLB,,, | Performed by: INTERNAL MEDICINE

## 2023-03-29 PROCEDURE — 93005 ELECTROCARDIOGRAM TRACING: CPT | Mod: S$GLB,,, | Performed by: INTERNAL MEDICINE

## 2023-03-29 PROCEDURE — 3060F PR POS MICROALBUMINURIA RESULT DOCUMENTED/REVIEW: ICD-10-PCS | Mod: CPTII,S$GLB,, | Performed by: INTERNAL MEDICINE

## 2023-03-29 PROCEDURE — 3077F SYST BP >= 140 MM HG: CPT | Mod: CPTII,S$GLB,, | Performed by: INTERNAL MEDICINE

## 2023-03-29 PROCEDURE — 3008F BODY MASS INDEX DOCD: CPT | Mod: CPTII,S$GLB,, | Performed by: INTERNAL MEDICINE

## 2023-03-29 PROCEDURE — 93010 ELECTROCARDIOGRAM REPORT: CPT | Mod: S$GLB,,, | Performed by: INTERNAL MEDICINE

## 2023-03-29 PROCEDURE — 93005 RHYTHM STRIP: ICD-10-PCS | Mod: S$GLB,,, | Performed by: INTERNAL MEDICINE

## 2023-03-29 PROCEDURE — 3066F NEPHROPATHY DOC TX: CPT | Mod: CPTII,S$GLB,, | Performed by: INTERNAL MEDICINE

## 2023-03-29 PROCEDURE — 3060F POS MICROALBUMINURIA REV: CPT | Mod: CPTII,S$GLB,, | Performed by: INTERNAL MEDICINE

## 2023-03-29 PROCEDURE — 3044F HG A1C LEVEL LT 7.0%: CPT | Mod: CPTII,S$GLB,, | Performed by: INTERNAL MEDICINE

## 2023-03-29 PROCEDURE — 1160F RVW MEDS BY RX/DR IN RCRD: CPT | Mod: CPTII,S$GLB,, | Performed by: INTERNAL MEDICINE

## 2023-03-29 PROCEDURE — 3066F PR DOCUMENTATION OF TREATMENT FOR NEPHROPATHY: ICD-10-PCS | Mod: CPTII,S$GLB,, | Performed by: INTERNAL MEDICINE

## 2023-03-29 PROCEDURE — 99999 PR PBB SHADOW E&M-EST. PATIENT-LVL IV: ICD-10-PCS | Mod: PBBFAC,,, | Performed by: INTERNAL MEDICINE

## 2023-03-29 PROCEDURE — 1159F PR MEDICATION LIST DOCUMENTED IN MEDICAL RECORD: ICD-10-PCS | Mod: CPTII,S$GLB,, | Performed by: INTERNAL MEDICINE

## 2023-03-29 PROCEDURE — 3077F PR MOST RECENT SYSTOLIC BLOOD PRESSURE >= 140 MM HG: ICD-10-PCS | Mod: CPTII,S$GLB,, | Performed by: INTERNAL MEDICINE

## 2023-03-29 PROCEDURE — 99999 PR PBB SHADOW E&M-EST. PATIENT-LVL IV: CPT | Mod: PBBFAC,,, | Performed by: INTERNAL MEDICINE

## 2023-03-29 NOTE — PROGRESS NOTES
Subjective:    Patient ID:  Roman Hodges is a 63 y.o. male who presents for evaluation of PAF    Atrial Fibrillation  Symptoms are negative for chest pain, dizziness, palpitations, shortness of breath, syncope and weakness.    63 y.o. M  HTN on meds  DM2 on meds  MALLIKA, on CPAP  Hepatocellular CA, s/p OLT (5/2019)  PAF    During visits to ER, found in AF with RVR 11/29. Rx diltiazem, with return to NSR.  echo during that eval: 30% LVEF, with RWMAs... on background of LVEF >55% 10/15/17 (Dr. Patterson's office).  At first visit, pt c/o ongoing CP; sent to ER. Subsequent cath showed nonobstructive CAD. Echo thereafter showed LVEF back to 55%.    For over 2 years, no palpitations c/w prior AF episode. In 1/2023, had nocturnal palps x 45 mins. Subsequent Holter (Dr. Johnson): NSR.    Overall, feels well.    My interpretation of today's ECG is NSR 67. IVCD.      Review of Systems   Constitutional: Negative. Negative for malaise/fatigue.   HENT: Negative.  Negative for ear pain and tinnitus.    Eyes:  Negative for blurred vision.   Cardiovascular: Negative.  Negative for chest pain, dyspnea on exertion, near-syncope, palpitations and syncope.   Respiratory: Negative.  Negative for shortness of breath.    Endocrine: Negative.  Negative for polyuria.   Hematologic/Lymphatic: Does not bruise/bleed easily.   Skin: Negative.  Negative for rash.   Musculoskeletal: Negative.  Negative for joint pain and muscle weakness.   Gastrointestinal: Negative.  Negative for abdominal pain and change in bowel habit.   Genitourinary:  Negative for frequency.   Neurological: Negative.  Negative for dizziness and weakness.   Psychiatric/Behavioral: Negative.  Negative for depression. The patient is not nervous/anxious.    Allergic/Immunologic: Negative for environmental allergies.      Objective:    Physical Exam  Vitals and nursing note reviewed.   Constitutional:       Appearance: He is well-developed.   HENT:      Head: Normocephalic and  atraumatic.   Eyes:      General: Lids are normal. No scleral icterus.     Conjunctiva/sclera: Conjunctivae normal.   Neck:      Thyroid: No thyromegaly.      Vascular: No JVD.      Trachea: No tracheal deviation.   Cardiovascular:      Rate and Rhythm: Normal rate and regular rhythm.      Pulses:           Radial pulses are 2+ on the right side and 2+ on the left side.   Pulmonary:      Effort: Pulmonary effort is normal. No tachypnea, accessory muscle usage or respiratory distress.      Breath sounds: No wheezing.   Abdominal:      General: There is no distension.   Musculoskeletal:         General: Normal range of motion.      Cervical back: Normal range of motion.   Skin:     General: Skin is warm and dry.      Findings: No rash.   Neurological:      Mental Status: He is alert and oriented to person, place, and time.      Motor: No abnormal muscle tone.      Deep Tendon Reflexes: Reflexes are normal and symmetric.   Psychiatric:         Behavior: Behavior normal.         Assessment:       1. Paroxysmal atrial fibrillation    2. Essential hypertension    3. Hypophosphatemia    4. Hyponatremia    5. Hypokalemia    6. Type 2 diabetes mellitus without complication, without long-term current use of insulin    7. S/P liver transplant    8. MALLIKA (obstructive sleep apnea)         Plan:       1. AF  One remote episode, resolved post diltiazem. One recent episode of palpitations. Negative Holter. Will hold the course at present, and increase workup if recurrence.  Continue BB, eliquis.    Return in 1 year with echo, or earlier prn.

## 2023-04-11 ENCOUNTER — PATIENT MESSAGE (OUTPATIENT)
Dept: ADMINISTRATIVE | Facility: HOSPITAL | Age: 63
End: 2023-04-11
Payer: COMMERCIAL

## 2023-04-25 ENCOUNTER — PATIENT MESSAGE (OUTPATIENT)
Dept: FAMILY MEDICINE | Facility: CLINIC | Age: 63
End: 2023-04-25
Payer: COMMERCIAL

## 2023-04-25 DIAGNOSIS — I10 ESSENTIAL HYPERTENSION: Primary | ICD-10-CM

## 2023-04-27 RX ORDER — METOPROLOL SUCCINATE 50 MG/1
50 TABLET, EXTENDED RELEASE ORAL DAILY
Qty: 90 TABLET | Refills: 3 | Status: SHIPPED | OUTPATIENT
Start: 2023-04-27

## 2023-05-18 DIAGNOSIS — E55.9 VITAMIN D DEFICIENCY DISEASE: ICD-10-CM

## 2023-05-18 RX ORDER — ERGOCALCIFEROL 1.25 MG/1
CAPSULE ORAL
Qty: 12 CAPSULE | Refills: 3 | Status: SHIPPED | OUTPATIENT
Start: 2023-05-18

## 2023-05-19 ENCOUNTER — PATIENT OUTREACH (OUTPATIENT)
Dept: ADMINISTRATIVE | Facility: HOSPITAL | Age: 63
End: 2023-05-19
Payer: COMMERCIAL

## 2023-05-19 RX ORDER — AMLODIPINE BESYLATE 10 MG/1
TABLET ORAL
Qty: 90 TABLET | Refills: 3 | Status: SHIPPED | OUTPATIENT
Start: 2023-05-19

## 2023-06-07 DIAGNOSIS — Z00.6 RESEARCH STUDY PATIENT: Primary | ICD-10-CM

## 2023-06-07 DIAGNOSIS — C22.0 HCC (HEPATOCELLULAR CARCINOMA): ICD-10-CM

## 2023-06-22 ENCOUNTER — PATIENT MESSAGE (OUTPATIENT)
Dept: ADMINISTRATIVE | Facility: HOSPITAL | Age: 63
End: 2023-06-22
Payer: COMMERCIAL

## 2023-06-22 ENCOUNTER — PATIENT OUTREACH (OUTPATIENT)
Dept: ADMINISTRATIVE | Facility: HOSPITAL | Age: 63
End: 2023-06-22
Payer: COMMERCIAL

## 2023-06-22 NOTE — PROGRESS NOTES
2023 Care Everywhere updates requested and reviewed.  Immunizations reconciled. Media reports reviewed.  Duplicate HM overrides and  orders removed.  Overdue HM topic chart audit and/or requested.  Overdue lab testing linked to upcoming lab appointments if applies.    Lab kirti, and [email or web address] reviewed     Portal outreached regarding Health maintenance     Health Maintenance Due   Topic Date Due    Eye Exam  2022    PROSTATE-SPECIFIC ANTIGEN  2023

## 2023-06-23 DIAGNOSIS — E11.65 TYPE 2 DIABETES MELLITUS WITH HYPERGLYCEMIA, WITHOUT LONG-TERM CURRENT USE OF INSULIN: ICD-10-CM

## 2023-06-23 DIAGNOSIS — Z12.5 SCREENING FOR PROSTATE CANCER: Primary | ICD-10-CM

## 2023-06-23 DIAGNOSIS — I10 ESSENTIAL HYPERTENSION: ICD-10-CM

## 2023-07-01 NOTE — TELEPHONE ENCOUNTER
Care Due:                  Date            Visit Type   Department     Provider  --------------------------------------------------------------------------------                                EP -                              PRIMARY      [facility]  Last Visit: 01-      CARE ([facility])   CARLY Munson                               -                              [facility]  Next Visit: 07-      CARE ([facility])   CARLY Munson                                                            Last  Test          Frequency    Reason                     Performed    Due Date  --------------------------------------------------------------------------------    HBA1C.......  6 months...  JANUVIA..................  01- 07-    Health [facility] Embedded Care Due Messages. Reference number: [id number].   7/01/2023 11:47:01 AM CDT

## 2023-07-02 RX ORDER — SITAGLIPTIN 100 MG/1
TABLET, FILM COATED ORAL
Qty: 90 TABLET | Refills: 0 | Status: SHIPPED | OUTPATIENT
Start: 2023-07-02 | End: 2023-09-20

## 2023-07-02 NOTE — TELEPHONE ENCOUNTER
Refill Decision Note   Roman Hodges  is requesting a refill authorization.  Brief Assessment and Rationale for Refill:  Approve     Medication Therapy Plan:  FLOS      Comments:     Note composed:3:10 AM 07/02/2023

## 2023-07-03 ENCOUNTER — LAB VISIT (OUTPATIENT)
Dept: LAB | Facility: HOSPITAL | Age: 63
End: 2023-07-03
Attending: FAMILY MEDICINE
Payer: COMMERCIAL

## 2023-07-03 ENCOUNTER — RESEARCH ENCOUNTER (OUTPATIENT)
Dept: RESEARCH | Facility: HOSPITAL | Age: 63
End: 2023-07-03
Payer: COMMERCIAL

## 2023-07-03 DIAGNOSIS — Z94.4 S/P LIVER TRANSPLANT: ICD-10-CM

## 2023-07-03 DIAGNOSIS — Z12.5 SCREENING FOR PROSTATE CANCER: ICD-10-CM

## 2023-07-03 DIAGNOSIS — Z00.6 RESEARCH STUDY PATIENT: ICD-10-CM

## 2023-07-03 DIAGNOSIS — C22.0 HCC (HEPATOCELLULAR CARCINOMA): ICD-10-CM

## 2023-07-03 DIAGNOSIS — E11.9 TYPE 2 DIABETES MELLITUS WITHOUT COMPLICATION, WITHOUT LONG-TERM CURRENT USE OF INSULIN: ICD-10-CM

## 2023-07-03 DIAGNOSIS — I10 ESSENTIAL HYPERTENSION: ICD-10-CM

## 2023-07-03 LAB
AFP SERPL-MCNC: <2 NG/ML (ref 0–8.4)
ALBUMIN SERPL BCP-MCNC: 4 G/DL (ref 3.5–5.2)
ALBUMIN SERPL BCP-MCNC: 4 G/DL (ref 3.5–5.2)
ALP SERPL-CCNC: 64 U/L (ref 55–135)
ALP SERPL-CCNC: 64 U/L (ref 55–135)
ALT SERPL W/O P-5'-P-CCNC: 27 U/L (ref 10–44)
ALT SERPL W/O P-5'-P-CCNC: 27 U/L (ref 10–44)
ANION GAP SERPL CALC-SCNC: 9 MMOL/L (ref 8–16)
ANION GAP SERPL CALC-SCNC: 9 MMOL/L (ref 8–16)
AST SERPL-CCNC: 15 U/L (ref 10–40)
AST SERPL-CCNC: 15 U/L (ref 10–40)
BASOPHILS # BLD AUTO: 0.03 K/UL (ref 0–0.2)
BASOPHILS NFR BLD: 0.5 % (ref 0–1.9)
BILIRUB SERPL-MCNC: 0.4 MG/DL (ref 0.1–1)
BILIRUB SERPL-MCNC: 0.4 MG/DL (ref 0.1–1)
BUN SERPL-MCNC: 12 MG/DL (ref 8–23)
BUN SERPL-MCNC: 12 MG/DL (ref 8–23)
CALCIUM SERPL-MCNC: 8.6 MG/DL (ref 8.7–10.5)
CALCIUM SERPL-MCNC: 8.6 MG/DL (ref 8.7–10.5)
CHLORIDE SERPL-SCNC: 107 MMOL/L (ref 95–110)
CHLORIDE SERPL-SCNC: 107 MMOL/L (ref 95–110)
CHOLEST SERPL-MCNC: 151 MG/DL (ref 120–199)
CHOLEST SERPL-MCNC: 151 MG/DL (ref 120–199)
CHOLEST/HDLC SERPL: 4.9 {RATIO} (ref 2–5)
CHOLEST/HDLC SERPL: 4.9 {RATIO} (ref 2–5)
CO2 SERPL-SCNC: 25 MMOL/L (ref 23–29)
CO2 SERPL-SCNC: 25 MMOL/L (ref 23–29)
COMPLEXED PSA SERPL-MCNC: 0.78 NG/ML (ref 0–4)
CREAT SERPL-MCNC: 1.1 MG/DL (ref 0.5–1.4)
CREAT SERPL-MCNC: 1.1 MG/DL (ref 0.5–1.4)
DIFFERENTIAL METHOD: ABNORMAL
EOSINOPHIL # BLD AUTO: 0.1 K/UL (ref 0–0.5)
EOSINOPHIL NFR BLD: 1.9 % (ref 0–8)
ERYTHROCYTE [DISTWIDTH] IN BLOOD BY AUTOMATED COUNT: 16.7 % (ref 11.5–14.5)
EST. GFR  (NO RACE VARIABLE): >60 ML/MIN/1.73 M^2
EST. GFR  (NO RACE VARIABLE): >60 ML/MIN/1.73 M^2
ESTIMATED AVG GLUCOSE: 134 MG/DL (ref 68–131)
GLUCOSE SERPL-MCNC: 119 MG/DL (ref 70–110)
GLUCOSE SERPL-MCNC: 119 MG/DL (ref 70–110)
HBA1C MFR BLD: 6.3 % (ref 4–5.6)
HCT VFR BLD AUTO: 42.3 % (ref 40–54)
HDLC SERPL-MCNC: 31 MG/DL (ref 40–75)
HDLC SERPL-MCNC: 31 MG/DL (ref 40–75)
HDLC SERPL: 20.5 % (ref 20–50)
HDLC SERPL: 20.5 % (ref 20–50)
HGB BLD-MCNC: 13.6 G/DL (ref 14–18)
IMM GRANULOCYTES # BLD AUTO: 0.02 K/UL (ref 0–0.04)
IMM GRANULOCYTES NFR BLD AUTO: 0.3 % (ref 0–0.5)
LDLC SERPL CALC-MCNC: 83.8 MG/DL (ref 63–159)
LDLC SERPL CALC-MCNC: 83.8 MG/DL (ref 63–159)
LYMPHOCYTES # BLD AUTO: 1.5 K/UL (ref 1–4.8)
LYMPHOCYTES NFR BLD: 26 % (ref 18–48)
MCH RBC QN AUTO: 24.6 PG (ref 27–31)
MCHC RBC AUTO-ENTMCNC: 32.2 G/DL (ref 32–36)
MCV RBC AUTO: 77 FL (ref 82–98)
MONOCYTES # BLD AUTO: 0.6 K/UL (ref 0.3–1)
MONOCYTES NFR BLD: 9.8 % (ref 4–15)
NEUTROPHILS # BLD AUTO: 3.5 K/UL (ref 1.8–7.7)
NEUTROPHILS NFR BLD: 61.5 % (ref 38–73)
NONHDLC SERPL-MCNC: 120 MG/DL
NONHDLC SERPL-MCNC: 120 MG/DL
NRBC BLD-RTO: 0 /100 WBC
PLATELET # BLD AUTO: 338 K/UL (ref 150–450)
PMV BLD AUTO: 9.9 FL (ref 9.2–12.9)
POTASSIUM SERPL-SCNC: 3.7 MMOL/L (ref 3.5–5.1)
POTASSIUM SERPL-SCNC: 3.7 MMOL/L (ref 3.5–5.1)
PROT SERPL-MCNC: 7 G/DL (ref 6–8.4)
PROT SERPL-MCNC: 7 G/DL (ref 6–8.4)
RBC # BLD AUTO: 5.53 M/UL (ref 4.6–6.2)
SODIUM SERPL-SCNC: 141 MMOL/L (ref 136–145)
SODIUM SERPL-SCNC: 141 MMOL/L (ref 136–145)
TRIGL SERPL-MCNC: 181 MG/DL (ref 30–150)
TRIGL SERPL-MCNC: 181 MG/DL (ref 30–150)
WBC # BLD AUTO: 5.72 K/UL (ref 3.9–12.7)

## 2023-07-03 PROCEDURE — 84153 ASSAY OF PSA TOTAL: CPT | Performed by: FAMILY MEDICINE

## 2023-07-03 PROCEDURE — 80053 COMPREHEN METABOLIC PANEL: CPT | Performed by: INTERNAL MEDICINE

## 2023-07-03 PROCEDURE — 83036 HEMOGLOBIN GLYCOSYLATED A1C: CPT | Performed by: FAMILY MEDICINE

## 2023-07-03 PROCEDURE — 36415 COLL VENOUS BLD VENIPUNCTURE: CPT | Mod: PO | Performed by: INTERNAL MEDICINE

## 2023-07-03 PROCEDURE — 82105 ALPHA-FETOPROTEIN SERUM: CPT | Performed by: INTERNAL MEDICINE

## 2023-07-03 PROCEDURE — 80061 LIPID PANEL: CPT | Performed by: FAMILY MEDICINE

## 2023-07-03 PROCEDURE — 80197 ASSAY OF TACROLIMUS: CPT | Performed by: INTERNAL MEDICINE

## 2023-07-03 PROCEDURE — 85025 COMPLETE CBC W/AUTO DIFF WBC: CPT | Performed by: INTERNAL MEDICINE

## 2023-07-03 NOTE — PROGRESS NOTES
RESEARCH STUDY SPECIMEN COLLECTION ENCOUNTER  ORGAN TRANSPLANT  [facility] GIOVANNY OCAMPO    Study Title: Role of Tumor-Induced Immune Tolerance in the Patient Response to Locoregional Therapy: Implications in Assessment Risk of Hepatocellular Carcinoma Recurrence Following Liver Transplantation    IRB #: 2016.131.B    IRB Approval Date: 6/8/2016    : Darshan Graves MD  Sub-investigator: Luke Pulido, PhD    Patient Number: [id number]    In accordance with the study protocol, Research Lab orders were placed and follow-up specimens were collected on (date: 07/03/2023) in:  [facility] VNP: YES/NO: No  [facility] LABTX: YES/NO: No  [facility] IM: YES/NO: No  Other [facility] location: YES/NO: Yes   Location: [facility]    A  was used to transport blood specimens to [facility] for processing: YES/NO: Yes  Blood specimens were transported to [facility] for processing: YES/NO: Yes    Gregg Naegl  Admin Research- Liver Transplant

## 2023-07-04 LAB — TACROLIMUS BLD-MCNC: 5.8 NG/ML (ref 5–15)

## 2023-07-05 ENCOUNTER — TELEPHONE (OUTPATIENT)
Dept: TRANSPLANT | Facility: CLINIC | Age: 63
End: 2023-07-05
Payer: COMMERCIAL

## 2023-07-05 NOTE — LETTER
July 5, 2023    Roman Hodges  [address]  [place] [address]          Dear Roman Nievesz:  MRN: [id number]    This is a follow up to your recent labs, your lab results were stable.  There are no medicine changes.  Please have your labs drawn again on 12/4/2023.      If you cannot have your labs drawn on the scheduled date, it is your responsibility to call the transplant department to reschedule.  Please call [phone] and ask to speak to Jodee CESAR   for all scheduling requests.     Sincerely,  Charmaine PENNN, RN        Your Liver Transplant Coordinator    [facility]  [address] [place] [id number]  [phone]

## 2023-07-05 NOTE — TELEPHONE ENCOUNTER
Letter sent to patient stating: Your labs have been reviewed by your Transplant physician, no action required. Next labs due 12/4/2023          ----- Message from Fab Damon MD sent at 7/4/2023  6:00 PM CDT -----  Results reviewed. No action.

## 2023-07-06 ENCOUNTER — OFFICE VISIT (OUTPATIENT)
Dept: FAMILY MEDICINE | Facility: CLINIC | Age: 63
End: 2023-07-06
Payer: COMMERCIAL

## 2023-07-06 VITALS
OXYGEN SATURATION: 98 % | BODY MASS INDEX: 34.21 KG/M2 | HEIGHT: 68 IN | WEIGHT: 225.75 LBS | DIASTOLIC BLOOD PRESSURE: 86 MMHG | SYSTOLIC BLOOD PRESSURE: 112 MMHG | HEART RATE: 72 BPM

## 2023-07-06 DIAGNOSIS — E11.9 TYPE 2 DIABETES MELLITUS WITHOUT COMPLICATION, WITHOUT LONG-TERM CURRENT USE OF INSULIN: Chronic | ICD-10-CM

## 2023-07-06 DIAGNOSIS — E66.9 CLASS 1 OBESITY WITH BODY MASS INDEX (BMI) OF 34.0 TO 34.9 IN ADULT, UNSPECIFIED OBESITY TYPE, UNSPECIFIED WHETHER SERIOUS COMORBIDITY PRESENT: ICD-10-CM

## 2023-07-06 DIAGNOSIS — M72.2 PLANTAR FASCIITIS, RIGHT: ICD-10-CM

## 2023-07-06 DIAGNOSIS — R80.9 MICROALBUMINURIA: ICD-10-CM

## 2023-07-06 DIAGNOSIS — I10 ESSENTIAL HYPERTENSION: Primary | Chronic | ICD-10-CM

## 2023-07-06 PROCEDURE — 99215 PR OFFICE/OUTPT VISIT, EST, LEVL V, 40-54 MIN: ICD-10-PCS | Mod: S$GLB,,, | Performed by: FAMILY MEDICINE

## 2023-07-06 PROCEDURE — 3008F PR BODY MASS INDEX (BMI) DOCUMENTED: ICD-10-PCS | Mod: CPTII,S$GLB,, | Performed by: FAMILY MEDICINE

## 2023-07-06 PROCEDURE — 99215 OFFICE O/P EST HI 40 MIN: CPT | Mod: S$GLB,,, | Performed by: FAMILY MEDICINE

## 2023-07-06 PROCEDURE — 3079F PR MOST RECENT DIASTOLIC BLOOD PRESSURE 80-89 MM HG: ICD-10-PCS | Mod: CPTII,S$GLB,, | Performed by: FAMILY MEDICINE

## 2023-07-06 PROCEDURE — 3060F POS MICROALBUMINURIA REV: CPT | Mod: CPTII,S$GLB,, | Performed by: FAMILY MEDICINE

## 2023-07-06 PROCEDURE — 3008F BODY MASS INDEX DOCD: CPT | Mod: CPTII,S$GLB,, | Performed by: FAMILY MEDICINE

## 2023-07-06 PROCEDURE — 1159F MED LIST DOCD IN RCRD: CPT | Mod: CPTII,S$GLB,, | Performed by: FAMILY MEDICINE

## 2023-07-06 PROCEDURE — 1160F PR REVIEW ALL MEDS BY PRESCRIBER/CLIN PHARMACIST DOCUMENTED: ICD-10-PCS | Mod: CPTII,S$GLB,, | Performed by: FAMILY MEDICINE

## 2023-07-06 PROCEDURE — 3044F PR MOST RECENT HEMOGLOBIN A1C LEVEL <7.0%: ICD-10-PCS | Mod: CPTII,S$GLB,, | Performed by: FAMILY MEDICINE

## 2023-07-06 PROCEDURE — 3066F PR DOCUMENTATION OF TREATMENT FOR NEPHROPATHY: ICD-10-PCS | Mod: CPTII,S$GLB,, | Performed by: FAMILY MEDICINE

## 2023-07-06 PROCEDURE — 3044F HG A1C LEVEL LT 7.0%: CPT | Mod: CPTII,S$GLB,, | Performed by: FAMILY MEDICINE

## 2023-07-06 PROCEDURE — 1159F PR MEDICATION LIST DOCUMENTED IN MEDICAL RECORD: ICD-10-PCS | Mod: CPTII,S$GLB,, | Performed by: FAMILY MEDICINE

## 2023-07-06 PROCEDURE — 3060F PR POS MICROALBUMINURIA RESULT DOCUMENTED/REVIEW: ICD-10-PCS | Mod: CPTII,S$GLB,, | Performed by: FAMILY MEDICINE

## 2023-07-06 PROCEDURE — 3074F SYST BP LT 130 MM HG: CPT | Mod: CPTII,S$GLB,, | Performed by: FAMILY MEDICINE

## 2023-07-06 PROCEDURE — 99999 PR PBB SHADOW E&M-EST. PATIENT-LVL IV: ICD-10-PCS | Mod: PBBFAC,,, | Performed by: FAMILY MEDICINE

## 2023-07-06 PROCEDURE — 99999 PR PBB SHADOW E&M-EST. PATIENT-LVL IV: CPT | Mod: PBBFAC,,, | Performed by: FAMILY MEDICINE

## 2023-07-06 PROCEDURE — 1160F RVW MEDS BY RX/DR IN RCRD: CPT | Mod: CPTII,S$GLB,, | Performed by: FAMILY MEDICINE

## 2023-07-06 PROCEDURE — 3079F DIAST BP 80-89 MM HG: CPT | Mod: CPTII,S$GLB,, | Performed by: FAMILY MEDICINE

## 2023-07-06 PROCEDURE — 3066F NEPHROPATHY DOC TX: CPT | Mod: CPTII,S$GLB,, | Performed by: FAMILY MEDICINE

## 2023-07-06 PROCEDURE — 3074F PR MOST RECENT SYSTOLIC BLOOD PRESSURE < 130 MM HG: ICD-10-PCS | Mod: CPTII,S$GLB,, | Performed by: FAMILY MEDICINE

## 2023-07-06 NOTE — PROGRESS NOTES
Subjective     Patient ID: Rmoan Hodges is a 63 y.o. male.    Chief Complaint: Follow-up    63 years old male came to for blood pressure check.  Blood pressure today was stable.  No Chest pain, palpitation, orthopnea or PND.  Last A1c was stable.  No Polyuria, polydipsia or polyphagia.  Patient with a BMI of 34 currently trying lose weight.  He reports right foot chronic plantar fascitis.  He is willing to do stretching exercises to help with the problem.  Last urine with evidence of microalbuminuria.    Follow-up  Associated symptoms include arthralgias. Pertinent negatives include no chest pain.   Review of Systems   Constitutional: Negative.    HENT: Negative.     Eyes: Negative.    Respiratory: Negative.     Cardiovascular: Negative.  Negative for chest pain, palpitations, leg swelling and claudication.   Gastrointestinal: Negative.    Genitourinary: Negative.    Musculoskeletal:  Positive for arthralgias.   Integumentary:  Negative.   Neurological: Negative.    Psychiatric/Behavioral: Negative.          Objective     Physical Exam  Vitals and nursing note reviewed.   Constitutional:       General: He is not in acute distress.     Appearance: He is well-developed. He is not diaphoretic.   HENT:      Head: Normocephalic and atraumatic.      Right Ear: External ear normal.      Left Ear: External ear normal.      Nose: Nose normal.      Mouth/Throat:      Pharynx: No oropharyngeal exudate.   Eyes:      General: No scleral icterus.        Right eye: No discharge.         Left eye: No discharge.      Conjunctiva/sclera: Conjunctivae normal.      Pupils: Pupils are equal, round, and reactive to light.   Neck:      Thyroid: No thyromegaly.      Vascular: No JVD.      Trachea: No tracheal deviation.   Cardiovascular:      Rate and Rhythm: Normal rate and regular rhythm.      Heart sounds: Normal heart sounds. No murmur heard.    No friction rub. No gallop.   Pulmonary:      Effort: Pulmonary effort is normal. No  respiratory distress.      Breath sounds: Normal breath sounds. No stridor. No wheezing or rales.   Chest:      Chest wall: No tenderness.   Abdominal:      General: Bowel sounds are normal. There is no distension.      Palpations: Abdomen is soft. There is no mass.      Tenderness: There is no abdominal tenderness. There is no guarding or rebound.   Musculoskeletal:         General: No tenderness. Normal range of motion.      Cervical back: Normal range of motion and neck supple.        Feet:    Lymphadenopathy:      Cervical: No cervical adenopathy.   Skin:     General: Skin is warm and dry.      Coloration: Skin is not pale.      Findings: No erythema or rash.   Neurological:      Mental Status: He is alert and oriented to person, place, and time.      Cranial Nerves: No cranial nerve deficit.      Motor: No abnormal muscle tone.      Coordination: Coordination normal.      Deep Tendon Reflexes: Reflexes are normal and symmetric. Reflexes normal.   Psychiatric:         Behavior: Behavior normal.         Thought Content: Thought content normal.         Judgment: Judgment normal.          Assessment and Plan     1. Essential hypertension  -     Lipid Panel; Future; Expected date: 07/06/2023  -     Comprehensive Metabolic Panel; Future; Expected date: 07/06/2023  -     CBC Auto Differential; Future; Expected date: 07/06/2023    2. Plantar fasciitis, right    3. Type 2 diabetes mellitus without complication, without long-term current use of insulin  -     Microalbumin/Creatinine Ratio, Urine; Future; Expected date: 07/06/2023  -     Lipid Panel; Future; Expected date: 07/06/2023  -     Hemoglobin A1C; Future; Expected date: 07/06/2023  -     Comprehensive Metabolic Panel; Future; Expected date: 07/06/2023  -     CBC Auto Differential; Future; Expected date: 07/06/2023    4. Class 1 obesity with body mass index (BMI) of 34.0 to 34.9 in adult, unspecified obesity type, unspecified whether serious comorbidity present  -      Lipid Panel; Future; Expected date: 07/06/2023    5. Microalbuminuria  -     Microalbumin/Creatinine Ratio, Urine; Future; Expected date: 07/06/2023         Diet and physical activity to promote weight loss.   Continue monitoring blood pressure at home, low sodium diet.          Follow up in about 6 months (around 1/6/2024), or if symptoms worsen or fail to improve.

## 2023-07-24 ENCOUNTER — HOSPITAL ENCOUNTER (OUTPATIENT)
Dept: RADIOLOGY | Facility: HOSPITAL | Age: 63
Discharge: HOME | End: 2023-07-24
Attending: INTERNAL MEDICINE
Payer: COMMERCIAL

## 2023-07-24 DIAGNOSIS — Z94.4 S/P LIVER TRANSPLANT: ICD-10-CM

## 2023-07-24 PROCEDURE — 71250 CT THORAX DX C-: CPT | Mod: TC

## 2023-07-24 PROCEDURE — 74160 CT ABDOMEN W/CONTRAST: CPT | Mod: 26,,, | Performed by: RADIOLOGY

## 2023-07-24 PROCEDURE — 74160 CT ABDOMEN WITH CONTRAST: ICD-10-PCS | Mod: 26,,, | Performed by: RADIOLOGY

## 2023-07-24 PROCEDURE — 25500020 PHARM REV CODE 255: Performed by: INTERNAL MEDICINE

## 2023-07-24 PROCEDURE — 74160 CT ABDOMEN W/CONTRAST: CPT | Mod: TC

## 2023-07-24 RX ADMIN — IOHEXOL 100 ML: 350 INJECTION, SOLUTION INTRAVENOUS at 10:07

## 2023-07-25 ENCOUNTER — TELEPHONE (OUTPATIENT)
Dept: TRANSPLANT | Facility: CLINIC | Age: 63
End: 2023-07-25
Payer: COMMERCIAL

## 2023-07-25 NOTE — LETTER
July 25, 2023    Roman Hodges  [address]  [facility] [address]          Dear Roman Hodges:  MRN: [id number]    This is a follow up to your recent Imaging.  Imaging stable.  Your next Imaging will be due again on July 2024.      Please call [phone] and ask to speak to Jodee CESAR -  for all scheduling requests.     Sincerely,    Charmaine PENNN, RN      Your Liver Transplant Coordinator    [facility]  [address] [place] [id number]  [phone]

## 2023-07-25 NOTE — TELEPHONE ENCOUNTER
Letter sent to patient stating : Your imaging has been reviewed by your physician, stable and no action required.            Received: Today  Fab Damon MD  [facility]  Results reviewed. No action.           ----- Message from Fab Damon MD sent at 7/25/2023 10:51 AM CDT -----  Results reviewed. No action.

## 2023-07-26 DIAGNOSIS — C22.0 HCC (HEPATOCELLULAR CARCINOMA): ICD-10-CM

## 2023-07-26 DIAGNOSIS — Z94.4 LIVER REPLACED BY TRANSPLANT: Primary | ICD-10-CM

## 2023-08-11 LAB
LEFT EYE DM RETINOPATHY: NEGATIVE
RIGHT EYE DM RETINOPATHY: NEGATIVE

## 2023-08-16 ENCOUNTER — PATIENT MESSAGE (OUTPATIENT)
Dept: ADMINISTRATIVE | Facility: HOSPITAL | Age: 63
End: 2023-08-16
Payer: COMMERCIAL

## 2023-08-17 ENCOUNTER — PATIENT OUTREACH (OUTPATIENT)
Dept: ADMINISTRATIVE | Facility: HOSPITAL | Age: 63
End: 2023-08-17
Payer: COMMERCIAL

## 2023-08-17 NOTE — LETTER
AUTHORIZATION FOR RELEASE OF   CONFIDENTIAL INFORMATION    Dear [facility] ,    We are seeing Roman Hodges, date of birth 1960, in the clinic at [facility]. Jose Angel Munson MD is the patient's PCP. Roman Hodges has an outstanding lab/procedure at the time we reviewed his chart. In order to help keep his health information updated, he has authorized us to request the following medical record(s):                                                (X)  DIABETIC EYE EXAM                            Please fax records to [facility], Cortes-Moran, Rafael A., MD,       [facility]                                                                     Please Fax to [facility] at [phone].     Thank you for your help, Debo Gastelum LPN-[facility]. If I can be of any assistance you can call at [phone]                      Patient Name: Roman Hodges  : 1960  Patient Phone #: [phone]

## 2023-09-01 ENCOUNTER — TELEPHONE (OUTPATIENT)
Dept: FAMILY MEDICINE | Facility: CLINIC | Age: 63
End: 2023-09-01
Payer: COMMERCIAL

## 2023-09-01 NOTE — TELEPHONE ENCOUNTER
Spoke with patient's wife regarding appointment rescheduling due to provider having a meeting that morning. Patient's wife rescheduled appointment in the afternoon. Patient's wife confirmed.

## 2023-09-06 ENCOUNTER — PATIENT OUTREACH (OUTPATIENT)
Dept: ADMINISTRATIVE | Facility: HOSPITAL | Age: 63
End: 2023-09-06
Payer: COMMERCIAL

## 2023-09-06 ENCOUNTER — PATIENT MESSAGE (OUTPATIENT)
Dept: ADMINISTRATIVE | Facility: HOSPITAL | Age: 63
End: 2023-09-06
Payer: COMMERCIAL

## 2023-09-06 NOTE — LETTER
AUTHORIZATION FOR RELEASE OF   CONFIDENTIAL INFORMATION    Dear Dr Dashawn Patten MD/ [facility] ,    We are seeing Roman Hodges, date of birth 1960, in the clinic at [facility]. Jose Angel Munson MD is the patient's PCP. Roman Hodges has an outstanding lab/procedure at the time we reviewed his chart. In order to help keep his health information updated, he has authorized us to request the following medical record(s):                                                 (  )  DIABETIC EYE EXAM                           Please fax records to [facility], Cortes-Moran, Rafael A., MD,                                                     Please Fax to [facility] at [phone].     Thank you for your help, Debo Gastelum LPOSMIN-[facility]. If I can be of any assistance you can call at [phone]                      Patient Name: Roman Hodges  : 1960  Patient Phone #: [phone]

## 2023-09-06 NOTE — PROGRESS NOTES
2023 Care Everywhere updates requested and reviewed.  Immunizations reconciled. Media reports reviewed.  Duplicate HM overrides and  orders removed.  Overdue HM topic chart audit and/or requested.  Overdue lab testing linked to upcoming lab appointments if applies.  Lab LiveStub, and Poken reviewed   Portal outreached regarding Health maintenance   Records requested - [facility]

## 2023-09-20 ENCOUNTER — OFFICE VISIT (OUTPATIENT)
Dept: FAMILY MEDICINE | Facility: CLINIC | Age: 63
End: 2023-09-20
Payer: COMMERCIAL

## 2023-09-20 VITALS
OXYGEN SATURATION: 97 % | HEIGHT: 68 IN | HEART RATE: 77 BPM | BODY MASS INDEX: 34.35 KG/M2 | SYSTOLIC BLOOD PRESSURE: 122 MMHG | DIASTOLIC BLOOD PRESSURE: 84 MMHG | WEIGHT: 226.63 LBS

## 2023-09-20 DIAGNOSIS — I10 ESSENTIAL HYPERTENSION: Chronic | ICD-10-CM

## 2023-09-20 DIAGNOSIS — B35.1 ONYCHOMYCOSIS: ICD-10-CM

## 2023-09-20 DIAGNOSIS — E11.9 TYPE 2 DIABETES MELLITUS WITHOUT COMPLICATION, WITHOUT LONG-TERM CURRENT USE OF INSULIN: ICD-10-CM

## 2023-09-20 DIAGNOSIS — L29.9 ITCH OF SKIN: Primary | ICD-10-CM

## 2023-09-20 PROCEDURE — 3079F DIAST BP 80-89 MM HG: CPT | Mod: CPTII,S$GLB,, | Performed by: FAMILY MEDICINE

## 2023-09-20 PROCEDURE — 99215 OFFICE O/P EST HI 40 MIN: CPT | Mod: S$GLB,,, | Performed by: FAMILY MEDICINE

## 2023-09-20 PROCEDURE — 3060F POS MICROALBUMINURIA REV: CPT | Mod: CPTII,S$GLB,, | Performed by: FAMILY MEDICINE

## 2023-09-20 PROCEDURE — 1159F PR MEDICATION LIST DOCUMENTED IN MEDICAL RECORD: ICD-10-PCS | Mod: CPTII,S$GLB,, | Performed by: FAMILY MEDICINE

## 2023-09-20 PROCEDURE — 1159F MED LIST DOCD IN RCRD: CPT | Mod: CPTII,S$GLB,, | Performed by: FAMILY MEDICINE

## 2023-09-20 PROCEDURE — 3008F BODY MASS INDEX DOCD: CPT | Mod: CPTII,S$GLB,, | Performed by: FAMILY MEDICINE

## 2023-09-20 PROCEDURE — 3066F PR DOCUMENTATION OF TREATMENT FOR NEPHROPATHY: ICD-10-PCS | Mod: CPTII,S$GLB,, | Performed by: FAMILY MEDICINE

## 2023-09-20 PROCEDURE — 99999 PR PBB SHADOW E&M-EST. PATIENT-LVL IV: ICD-10-PCS | Mod: PBBFAC,,, | Performed by: FAMILY MEDICINE

## 2023-09-20 PROCEDURE — 3044F HG A1C LEVEL LT 7.0%: CPT | Mod: CPTII,S$GLB,, | Performed by: FAMILY MEDICINE

## 2023-09-20 PROCEDURE — 3008F PR BODY MASS INDEX (BMI) DOCUMENTED: ICD-10-PCS | Mod: CPTII,S$GLB,, | Performed by: FAMILY MEDICINE

## 2023-09-20 PROCEDURE — 99999 PR PBB SHADOW E&M-EST. PATIENT-LVL IV: CPT | Mod: PBBFAC,,, | Performed by: FAMILY MEDICINE

## 2023-09-20 PROCEDURE — 3066F NEPHROPATHY DOC TX: CPT | Mod: CPTII,S$GLB,, | Performed by: FAMILY MEDICINE

## 2023-09-20 PROCEDURE — 3074F SYST BP LT 130 MM HG: CPT | Mod: CPTII,S$GLB,, | Performed by: FAMILY MEDICINE

## 2023-09-20 PROCEDURE — 99215 PR OFFICE/OUTPT VISIT, EST, LEVL V, 40-54 MIN: ICD-10-PCS | Mod: S$GLB,,, | Performed by: FAMILY MEDICINE

## 2023-09-20 PROCEDURE — 3079F PR MOST RECENT DIASTOLIC BLOOD PRESSURE 80-89 MM HG: ICD-10-PCS | Mod: CPTII,S$GLB,, | Performed by: FAMILY MEDICINE

## 2023-09-20 PROCEDURE — 3044F PR MOST RECENT HEMOGLOBIN A1C LEVEL <7.0%: ICD-10-PCS | Mod: CPTII,S$GLB,, | Performed by: FAMILY MEDICINE

## 2023-09-20 PROCEDURE — 1160F PR REVIEW ALL MEDS BY PRESCRIBER/CLIN PHARMACIST DOCUMENTED: ICD-10-PCS | Mod: CPTII,S$GLB,, | Performed by: FAMILY MEDICINE

## 2023-09-20 PROCEDURE — 3060F PR POS MICROALBUMINURIA RESULT DOCUMENTED/REVIEW: ICD-10-PCS | Mod: CPTII,S$GLB,, | Performed by: FAMILY MEDICINE

## 2023-09-20 PROCEDURE — 3074F PR MOST RECENT SYSTOLIC BLOOD PRESSURE < 130 MM HG: ICD-10-PCS | Mod: CPTII,S$GLB,, | Performed by: FAMILY MEDICINE

## 2023-09-20 PROCEDURE — 1160F RVW MEDS BY RX/DR IN RCRD: CPT | Mod: CPTII,S$GLB,, | Performed by: FAMILY MEDICINE

## 2023-09-20 RX ORDER — SEMAGLUTIDE 0.68 MG/ML
0.25 INJECTION, SOLUTION SUBCUTANEOUS
Qty: 3 ML | Refills: 0 | Status: SHIPPED | OUTPATIENT
Start: 2023-09-20 | End: 2023-12-19

## 2023-09-20 RX ORDER — CICLOPIROX 80 MG/ML
SOLUTION TOPICAL NIGHTLY
Qty: 6.6 ML | Refills: 3 | Status: SHIPPED | OUTPATIENT
Start: 2023-09-20 | End: 2023-10-06 | Stop reason: SDUPTHER

## 2023-09-20 RX ORDER — BETAMETHASONE DIPROPIONATE 0.5 MG/G
CREAM TOPICAL 2 TIMES DAILY
Qty: 45 G | Refills: 0 | Status: SHIPPED | OUTPATIENT
Start: 2023-09-20 | End: 2023-09-20 | Stop reason: SDUPTHER

## 2023-09-20 RX ORDER — BETAMETHASONE DIPROPIONATE 0.5 MG/G
CREAM TOPICAL 2 TIMES DAILY
Qty: 45 G | Refills: 0 | Status: SHIPPED | OUTPATIENT
Start: 2023-09-20 | End: 2024-01-16 | Stop reason: SDUPTHER

## 2023-09-20 NOTE — PROGRESS NOTES
Subjective     Patient ID: Roman Hodges is a 63 y.o. male.    Chief Complaint: Itching and Follow-up    63 years old male came to the clinic with lower back itching.  Patient reports using CeraVe with no significant improvement.  Blood pressure today was stable.  No chest pain, palpitation, orthopnea or PND.  Patient wants to switch Januvia to Ozempic.  Patient is requesting a medicine to help with onychomycosis.    Itching    Follow-up      Review of Systems   Constitutional: Negative.    HENT: Negative.     Eyes: Negative.    Respiratory: Negative.     Cardiovascular: Negative.    Gastrointestinal: Negative.    Genitourinary: Negative.    Musculoskeletal: Negative.    Integumentary:  Positive for itching.   Neurological: Negative.    Psychiatric/Behavioral: Negative.            Objective     Physical Exam  Vitals and nursing note reviewed.   Constitutional:       General: He is not in acute distress.     Appearance: He is well-developed. He is not diaphoretic.   HENT:      Head: Normocephalic and atraumatic.      Right Ear: External ear normal.      Left Ear: External ear normal.      Nose: Nose normal.      Mouth/Throat:      Pharynx: No oropharyngeal exudate.   Eyes:      General: No scleral icterus.        Right eye: No discharge.         Left eye: No discharge.      Conjunctiva/sclera: Conjunctivae normal.      Pupils: Pupils are equal, round, and reactive to light.   Neck:      Thyroid: No thyromegaly.      Vascular: No JVD.      Trachea: No tracheal deviation.   Cardiovascular:      Rate and Rhythm: Normal rate and regular rhythm.      Heart sounds: Normal heart sounds. No murmur heard.     No friction rub. No gallop.   Pulmonary:      Effort: Pulmonary effort is normal. No respiratory distress.      Breath sounds: Normal breath sounds. No stridor. No wheezing or rales.   Chest:      Chest wall: No tenderness.   Abdominal:      General: Bowel sounds are normal. There is no distension.      Palpations:  Abdomen is soft. There is no mass.      Tenderness: There is no abdominal tenderness. There is no guarding or rebound.   Musculoskeletal:         General: No tenderness. Normal range of motion.      Cervical back: Normal range of motion and neck supple.   Lymphadenopathy:      Cervical: No cervical adenopathy.   Skin:     General: Skin is warm and dry.      Coloration: Skin is not pale.      Findings: No erythema or rash.   Neurological:      Mental Status: He is alert and oriented to person, place, and time.      Cranial Nerves: No cranial nerve deficit.      Motor: No abnormal muscle tone.      Coordination: Coordination normal.      Deep Tendon Reflexes: Reflexes are normal and symmetric. Reflexes normal.   Psychiatric:         Behavior: Behavior normal.         Thought Content: Thought content normal.         Judgment: Judgment normal.            Assessment and Plan     1. Itch of skin  -     Discontinue: betamethasone dipropionate 0.05 % cream; Apply topically 2 (two) times daily. for 10 days  Dispense: 45 g; Refill: 0  -     betamethasone dipropionate 0.05 % cream; Apply topically 2 (two) times daily. for 10 days  Dispense: 45 g; Refill: 0    2. Essential hypertension    3. Type 2 diabetes mellitus without complication, without long-term current use of insulin  -     semaglutide (OZEMPIC) 0.25 mg or 0.5 mg (2 mg/3 mL) pen injector; Inject 0.25 mg into the skin every 7 days.  Dispense: 3 mL; Refill: 0    4. Onychomycosis  -     ciclopirox (PENLAC) 8 % Soln; Apply topically nightly.  Dispense: 6.6 mL; Refill: 3        Continue monitoring blood pressure at home, low sodium diet.          Follow up in about 6 months (around 3/20/2024), or if symptoms worsen or fail to improve.

## 2023-09-21 ENCOUNTER — PATIENT MESSAGE (OUTPATIENT)
Dept: FAMILY MEDICINE | Facility: CLINIC | Age: 63
End: 2023-09-21
Payer: COMMERCIAL

## 2023-09-27 ENCOUNTER — PATIENT MESSAGE (OUTPATIENT)
Dept: FAMILY MEDICINE | Facility: CLINIC | Age: 63
End: 2023-09-27
Payer: COMMERCIAL

## 2023-10-06 DIAGNOSIS — B35.1 ONYCHOMYCOSIS: ICD-10-CM

## 2023-10-06 RX ORDER — CICLOPIROX 80 MG/ML
SOLUTION TOPICAL NIGHTLY
Qty: 6.6 ML | Refills: 3 | Status: SHIPPED | OUTPATIENT
Start: 2023-10-06 | End: 2023-11-01 | Stop reason: SDUPTHER

## 2023-10-10 ENCOUNTER — OFFICE VISIT (OUTPATIENT)
Dept: CARDIOLOGY | Facility: CLINIC | Age: 63
End: 2023-10-10
Payer: COMMERCIAL

## 2023-10-10 ENCOUNTER — PATIENT MESSAGE (OUTPATIENT)
Dept: FAMILY MEDICINE | Facility: CLINIC | Age: 63
End: 2023-10-10

## 2023-10-10 ENCOUNTER — CLINICAL SUPPORT (OUTPATIENT)
Dept: FAMILY MEDICINE | Facility: CLINIC | Age: 63
End: 2023-10-10
Payer: COMMERCIAL

## 2023-10-10 VITALS
OXYGEN SATURATION: 97 % | DIASTOLIC BLOOD PRESSURE: 103 MMHG | SYSTOLIC BLOOD PRESSURE: 149 MMHG | HEIGHT: 68 IN | BODY MASS INDEX: 33.86 KG/M2 | WEIGHT: 223.44 LBS | HEART RATE: 80 BPM

## 2023-10-10 DIAGNOSIS — I48.0 PAROXYSMAL ATRIAL FIBRILLATION: ICD-10-CM

## 2023-10-10 DIAGNOSIS — Z79.01 LONG TERM (CURRENT) USE OF ANTICOAGULANTS: Chronic | ICD-10-CM

## 2023-10-10 DIAGNOSIS — E11.9 TYPE 2 DIABETES MELLITUS WITHOUT COMPLICATION, WITHOUT LONG-TERM CURRENT USE OF INSULIN: Chronic | ICD-10-CM

## 2023-10-10 DIAGNOSIS — G47.33 OSA (OBSTRUCTIVE SLEEP APNEA): Chronic | ICD-10-CM

## 2023-10-10 DIAGNOSIS — I10 ESSENTIAL HYPERTENSION: Chronic | ICD-10-CM

## 2023-10-10 DIAGNOSIS — Z23 NEED FOR VACCINATION: Primary | ICD-10-CM

## 2023-10-10 DIAGNOSIS — K21.00 GASTROESOPHAGEAL REFLUX DISEASE WITH ESOPHAGITIS WITHOUT HEMORRHAGE: Chronic | ICD-10-CM

## 2023-10-10 DIAGNOSIS — R00.2 PALPITATIONS: Primary | ICD-10-CM

## 2023-10-10 DIAGNOSIS — Z94.4 S/P LIVER TRANSPLANT: Chronic | ICD-10-CM

## 2023-10-10 DIAGNOSIS — E66.9 OBESITY (BMI 30.0-34.9): ICD-10-CM

## 2023-10-10 PROCEDURE — 99999 PR PBB SHADOW E&M-EST. PATIENT-LVL V: CPT | Mod: PBBFAC,,, | Performed by: INTERNAL MEDICINE

## 2023-10-10 PROCEDURE — 99214 OFFICE O/P EST MOD 30 MIN: CPT | Mod: S$GLB,,, | Performed by: INTERNAL MEDICINE

## 2023-10-10 PROCEDURE — 1159F PR MEDICATION LIST DOCUMENTED IN MEDICAL RECORD: ICD-10-PCS | Mod: CPTII,S$GLB,, | Performed by: INTERNAL MEDICINE

## 2023-10-10 PROCEDURE — 3080F PR MOST RECENT DIASTOLIC BLOOD PRESSURE >= 90 MM HG: ICD-10-PCS | Mod: CPTII,S$GLB,, | Performed by: INTERNAL MEDICINE

## 2023-10-10 PROCEDURE — 1160F PR REVIEW ALL MEDS BY PRESCRIBER/CLIN PHARMACIST DOCUMENTED: ICD-10-PCS | Mod: CPTII,S$GLB,, | Performed by: INTERNAL MEDICINE

## 2023-10-10 PROCEDURE — 99999 PR PBB SHADOW E&M-EST. PATIENT-LVL I: CPT | Mod: PBBFAC,,,

## 2023-10-10 PROCEDURE — 99999 PR PBB SHADOW E&M-EST. PATIENT-LVL V: ICD-10-PCS | Mod: PBBFAC,,, | Performed by: INTERNAL MEDICINE

## 2023-10-10 PROCEDURE — 93005 ELECTROCARDIOGRAM TRACING: CPT | Mod: S$GLB,,, | Performed by: INTERNAL MEDICINE

## 2023-10-10 PROCEDURE — 1160F RVW MEDS BY RX/DR IN RCRD: CPT | Mod: CPTII,S$GLB,, | Performed by: INTERNAL MEDICINE

## 2023-10-10 PROCEDURE — 93010 ELECTROCARDIOGRAM REPORT: CPT | Mod: S$GLB,,, | Performed by: INTERNAL MEDICINE

## 2023-10-10 PROCEDURE — 3060F PR POS MICROALBUMINURIA RESULT DOCUMENTED/REVIEW: ICD-10-PCS | Mod: CPTII,S$GLB,, | Performed by: INTERNAL MEDICINE

## 2023-10-10 PROCEDURE — 3066F NEPHROPATHY DOC TX: CPT | Mod: CPTII,S$GLB,, | Performed by: INTERNAL MEDICINE

## 2023-10-10 PROCEDURE — 90471 IMMUNIZATION ADMIN: CPT | Mod: S$GLB,,, | Performed by: FAMILY MEDICINE

## 2023-10-10 PROCEDURE — 99214 PR OFFICE/OUTPT VISIT, EST, LEVL IV, 30-39 MIN: ICD-10-PCS | Mod: S$GLB,,, | Performed by: INTERNAL MEDICINE

## 2023-10-10 PROCEDURE — 1159F MED LIST DOCD IN RCRD: CPT | Mod: CPTII,S$GLB,, | Performed by: INTERNAL MEDICINE

## 2023-10-10 PROCEDURE — 3060F POS MICROALBUMINURIA REV: CPT | Mod: CPTII,S$GLB,, | Performed by: INTERNAL MEDICINE

## 2023-10-10 PROCEDURE — 3008F PR BODY MASS INDEX (BMI) DOCUMENTED: ICD-10-PCS | Mod: CPTII,S$GLB,, | Performed by: INTERNAL MEDICINE

## 2023-10-10 PROCEDURE — 3066F PR DOCUMENTATION OF TREATMENT FOR NEPHROPATHY: ICD-10-PCS | Mod: CPTII,S$GLB,, | Performed by: INTERNAL MEDICINE

## 2023-10-10 PROCEDURE — 90694 FLU VACCINE - QUADRIVALENT - ADJUVANTED: ICD-10-PCS | Mod: S$GLB,,, | Performed by: FAMILY MEDICINE

## 2023-10-10 PROCEDURE — 99999 PR PBB SHADOW E&M-EST. PATIENT-LVL I: ICD-10-PCS | Mod: PBBFAC,,,

## 2023-10-10 PROCEDURE — 3044F PR MOST RECENT HEMOGLOBIN A1C LEVEL <7.0%: ICD-10-PCS | Mod: CPTII,S$GLB,, | Performed by: INTERNAL MEDICINE

## 2023-10-10 PROCEDURE — 3008F BODY MASS INDEX DOCD: CPT | Mod: CPTII,S$GLB,, | Performed by: INTERNAL MEDICINE

## 2023-10-10 PROCEDURE — 3077F SYST BP >= 140 MM HG: CPT | Mod: CPTII,S$GLB,, | Performed by: INTERNAL MEDICINE

## 2023-10-10 PROCEDURE — 3044F HG A1C LEVEL LT 7.0%: CPT | Mod: CPTII,S$GLB,, | Performed by: INTERNAL MEDICINE

## 2023-10-10 PROCEDURE — 3077F PR MOST RECENT SYSTOLIC BLOOD PRESSURE >= 140 MM HG: ICD-10-PCS | Mod: CPTII,S$GLB,, | Performed by: INTERNAL MEDICINE

## 2023-10-10 PROCEDURE — 93010 EKG 12-LEAD: ICD-10-PCS | Mod: S$GLB,,, | Performed by: INTERNAL MEDICINE

## 2023-10-10 PROCEDURE — 3080F DIAST BP >= 90 MM HG: CPT | Mod: CPTII,S$GLB,, | Performed by: INTERNAL MEDICINE

## 2023-10-10 PROCEDURE — 90471 FLU VACCINE - QUADRIVALENT - ADJUVANTED: ICD-10-PCS | Mod: S$GLB,,, | Performed by: FAMILY MEDICINE

## 2023-10-10 PROCEDURE — 90694 VACC AIIV4 NO PRSRV 0.5ML IM: CPT | Mod: S$GLB,,, | Performed by: FAMILY MEDICINE

## 2023-10-10 PROCEDURE — 93005 EKG 12-LEAD: ICD-10-PCS | Mod: S$GLB,,, | Performed by: INTERNAL MEDICINE

## 2023-10-10 RX ORDER — POTASSIUM CHLORIDE 20 MEQ/1
20 TABLET, EXTENDED RELEASE ORAL 2 TIMES DAILY
COMMUNITY
Start: 2023-07-26 | End: 2024-01-11

## 2023-10-10 NOTE — PROGRESS NOTES
Subjective:    Patient ID:  Roman Hodges is a 63 y.o. male who presents for follow-up of Atrial Fibrillation      HPI    62 y/o Sami speaking male with hx of HTN, DM, PAFib, HFrEF with return to normal function (previously 30%, last 2DE with 55%) and HCC s/p OLTx. Patient was initially admitted with AF RVR after presenting with near syncopal episode and spontaneously converted. At that time, 2DE with EF 30% with akinetic: mid anteroseptal, apical anterior, apical septal, apical lateral and apex and hypokinetic: mid inferoseptal and apical inferior walls. Again presented with similar symptoms, started on BB and DOAC and discharged home. Seen by Dr Sargent and was complaining of exertional epigastric/substernal pain and was admitted for ACS r/o. Had LHC with nonobstructive CAD and EF on V-gram normal without WMA's. Clinically improved and discharged home. Repeat 2DE with normalization of function to 55%. Did well post discharge, but then began having episodes of HTN urgency and had presented multiple times to ED with SBP >200. Had Oltx successfully and has done well. BP meds had been changed and had been having elevated readings. Changed BP regimen and BP controlled. Was hospitalized at [facility] for Covid for 8 days and had full recovery.     9/6/2022:  Previous visit was complaining of palps with chest discomfort. Had epigastric discomfort and SOB. Episode lasted about an hour. No recurrent episodes. No significant caffeine. Had normal 2DE and Holter. No recurrent episodes.  Previously was walkings 6-8 miles daily with no symptoms.  About 3 weeks ago was having episodes of elevated 's/90's. No symptoms. Recent Covid with mild symptoms, now fully recovered.   Denies regular orthopnea, PND, syncope, palps. Tolerating meds well and compliant.     11/29/2022:  BP at home 120's/80's. No cardiac symptoms at full activities. Denies CP, SOB/CASH, orthopnea, PND, syncope, palps, LE edema. Did have an episode about  1 month ago with URTI that he felt heart racing one night similar to previous episodes of Afib.    3/16/2023:  Bp has been stable. No cardiac symptoms at full activities. Had an episode about 1 month ago that he felt heart racing similar to previous episodes of Afib which lasted about 45 mins. States HR got up to 120's and steadily decreased. 's at the time. Denies CP, SOB/CASH, orthopnea, PND, syncope, LE edema.     10/10/2023:  Since last visit was started on Ozempic and states that his BP has been labile and he doesn't feel well - has been having regular palps.    Review of Systems   Constitutional: Positive for malaise/fatigue.   HENT:  Negative for congestion.    Eyes:  Negative for blurred vision.   Cardiovascular:  Positive for palpitations. Negative for chest pain, claudication, cyanosis, dyspnea on exertion, irregular heartbeat, leg swelling, near-syncope, orthopnea, paroxysmal nocturnal dyspnea and syncope.   Respiratory:  Negative for shortness of breath.    Endocrine: Negative for polyuria.   Hematologic/Lymphatic: Negative for bleeding problem.   Skin:  Negative for itching and rash.   Musculoskeletal:  Negative for joint swelling, muscle cramps and muscle weakness.   Gastrointestinal:  Negative for abdominal pain, hematemesis, hematochezia, melena, nausea and vomiting.   Genitourinary:  Negative for dysuria and hematuria.   Neurological:  Negative for dizziness, focal weakness, headaches, light-headedness, loss of balance and weakness.   Psychiatric/Behavioral:  Negative for depression. The patient is not nervous/anxious.         Objective:    Physical Exam  Constitutional:       Appearance: He is well-developed.   HENT:      Head: Normocephalic and atraumatic.   Neck:      Vascular: No JVD.   Cardiovascular:      Rate and Rhythm: Normal rate and regular rhythm.      Pulses:           Carotid pulses are 2+ on the right side and 2+ on the left side.       Radial pulses are 2+ on the right side and  2+ on the left side.        Femoral pulses are 2+ on the right side and 2+ on the left side.       Dorsalis pedis pulses are 2+ on the right side and 2+ on the left side.        Posterior tibial pulses are 2+ on the right side and 2+ on the left side.      Heart sounds: Normal heart sounds.   Pulmonary:      Effort: Pulmonary effort is normal.      Breath sounds: Normal breath sounds.   Abdominal:      General: Bowel sounds are normal.      Palpations: Abdomen is soft.   Musculoskeletal:      Cervical back: Neck supple.   Skin:     General: Skin is warm and dry.   Neurological:      Mental Status: He is alert and oriented to person, place, and time.   Psychiatric:         Behavior: Behavior normal.         Thought Content: Thought content normal.           Assessment:       1. Palpitations    2. Paroxysmal atrial fibrillation    3. Essential hypertension    4. Long term (current) use of anticoagulants    5. Type 2 diabetes mellitus without complication, without long-term current use of insulin    6. Obesity (BMI 30.0-34.9)    7. S/P liver transplant    8. Gastroesophageal reflux disease with esophagitis without hemorrhage    9. MALLIKA (obstructive sleep apnea)      62 y/o pt with hx and presentation as above. Doing well from a cardiac perspective and compensated from a HF perspective. BP controlled. Hold Ozempic and observe for response. Holter. Needs to lose weight and stay active. Discussed the etiology, evaluation, and management of CP, HTN, afib, CHF, obesity, MALLIKA. Discussed the importance of med compliance, heart healthy diet, and regular exercise.      Plan:       -Hold Ozempic for now  -Holter x 24 hours  -f/u in 3 months

## 2023-10-12 ENCOUNTER — PATIENT MESSAGE (OUTPATIENT)
Dept: RESPIRATORY THERAPY | Facility: HOSPITAL | Age: 63
End: 2023-10-12
Payer: COMMERCIAL

## 2023-10-12 DIAGNOSIS — E11.9 TYPE 2 DIABETES MELLITUS WITHOUT COMPLICATION, WITHOUT LONG-TERM CURRENT USE OF INSULIN: Primary | ICD-10-CM

## 2023-10-13 ENCOUNTER — HOSPITAL ENCOUNTER (OUTPATIENT)
Dept: CARDIOLOGY | Facility: HOSPITAL | Age: 63
Discharge: HOME | End: 2023-10-13
Attending: INTERNAL MEDICINE
Payer: COMMERCIAL

## 2023-10-13 DIAGNOSIS — R00.2 PALPITATIONS: ICD-10-CM

## 2023-10-13 PROCEDURE — 93227 HOLTER MONITOR - 24 HOUR (CUPID ONLY): ICD-10-PCS | Mod: ,,, | Performed by: INTERNAL MEDICINE

## 2023-10-13 PROCEDURE — 93225 XTRNL ECG REC<48 HRS REC: CPT

## 2023-10-13 PROCEDURE — 93227 XTRNL ECG REC<48 HR R&I: CPT | Mod: ,,, | Performed by: INTERNAL MEDICINE

## 2023-10-15 NOTE — TELEPHONE ENCOUNTER
Care Due:                  Date            Visit Type   Department     Provider  --------------------------------------------------------------------------------                                EP -                              PRIMARY      [facility]  Last Visit: 09-      CARE ([facility])   CARLY Munson                              EP -                              PRIMARY      [facility]  Next Visit: 03-      CARE ([facility])   CARLY Munson                                                            Last  Test          Frequency    Reason                     Performed    Due Date  --------------------------------------------------------------------------------    HBA1C.......  6 months...  empagliflozin,             07- 12-                             semaglutide..............    [employer] Embedded Care Due Messages. Reference number: [id number].   10/15/2023 8:05:34 AM CDT

## 2023-10-16 RX ORDER — SITAGLIPTIN 100 MG/1
TABLET, FILM COATED ORAL
Qty: 90 TABLET | Refills: 0 | OUTPATIENT
Start: 2023-10-16

## 2023-10-16 NOTE — TELEPHONE ENCOUNTER
Refill Decision Note   Roman Carroll  is requesting a refill authorization.  Brief Assessment and Rationale for Refill:  Quick Discontinue     Medication Therapy Plan:  Replaced with Ozempic, a1c flos    Medication Reconciliation Completed: No     Comments:     No Care Gaps recommended.     Note composed:1:18 PM 10/16/2023

## 2023-10-17 LAB
OHS CV EVENT MONITOR DAY: 0
OHS CV HOLTER LENGTH DECIMAL HOURS: 24
OHS CV HOLTER LENGTH HOURS: 24
OHS CV HOLTER LENGTH MINUTES: 0
OHS CV HOLTER SINUS AVERAGE HR: 80
OHS CV HOLTER SINUS MAX HR: 111
OHS CV HOLTER SINUS MIN HR: 60

## 2023-11-01 ENCOUNTER — PATIENT MESSAGE (OUTPATIENT)
Dept: FAMILY MEDICINE | Facility: CLINIC | Age: 63
End: 2023-11-01
Payer: COMMERCIAL

## 2023-11-01 DIAGNOSIS — B35.1 ONYCHOMYCOSIS: ICD-10-CM

## 2023-11-01 NOTE — TELEPHONE ENCOUNTER
Refill Routing Note   Medication(s) are not appropriate for processing by [facility] for the following reason(s):      Medication outside of protocol    [facility] action(s):  Route Care Due:  None identified            Appointments  past 12m or future 3m with PCP    Date Provider   Last Visit   9/20/2023 Jose Angel Munson MD   Next Visit   3/14/2024 Jose Angel Munson MD   ED visits in past 90 days: 0        Note composed:1:51 PM 11/01/2023

## 2023-11-02 RX ORDER — CICLOPIROX 80 MG/ML
SOLUTION TOPICAL NIGHTLY
Qty: 6.6 ML | Refills: 3 | Status: SHIPPED | OUTPATIENT
Start: 2023-11-02

## 2023-11-03 ENCOUNTER — PATIENT MESSAGE (OUTPATIENT)
Dept: ADMINISTRATIVE | Facility: HOSPITAL | Age: 63
End: 2023-11-03
Payer: COMMERCIAL

## 2023-11-13 DIAGNOSIS — Z94.4 LIVER REPLACED BY TRANSPLANT: ICD-10-CM

## 2023-11-14 RX ORDER — TACROLIMUS 0.5 MG/1
CAPSULE ORAL
Qty: 90 CAPSULE | Refills: 8 | Status: SHIPPED | OUTPATIENT
Start: 2023-11-14

## 2023-12-04 ENCOUNTER — LAB VISIT (OUTPATIENT)
Dept: LAB | Facility: HOSPITAL | Age: 63
End: 2023-12-04
Attending: INTERNAL MEDICINE
Payer: COMMERCIAL

## 2023-12-04 DIAGNOSIS — C22.0 HCC (HEPATOCELLULAR CARCINOMA): ICD-10-CM

## 2023-12-04 DIAGNOSIS — Z94.4 S/P LIVER TRANSPLANT: ICD-10-CM

## 2023-12-04 LAB
AFP SERPL-MCNC: 2.2 NG/ML (ref 0–8.4)
ALBUMIN SERPL BCP-MCNC: 4.1 G/DL (ref 3.5–5.2)
ALP SERPL-CCNC: 63 U/L (ref 55–135)
ALT SERPL W/O P-5'-P-CCNC: 33 U/L (ref 10–44)
ANION GAP SERPL CALC-SCNC: 11 MMOL/L (ref 8–16)
AST SERPL-CCNC: 18 U/L (ref 10–40)
BASOPHILS # BLD AUTO: 0.03 K/UL (ref 0–0.2)
BASOPHILS NFR BLD: 0.5 % (ref 0–1.9)
BILIRUB SERPL-MCNC: 0.7 MG/DL (ref 0.1–1)
BUN SERPL-MCNC: 14 MG/DL (ref 8–23)
CALCIUM SERPL-MCNC: 8.7 MG/DL (ref 8.7–10.5)
CHLORIDE SERPL-SCNC: 106 MMOL/L (ref 95–110)
CO2 SERPL-SCNC: 23 MMOL/L (ref 23–29)
CREAT SERPL-MCNC: 1.1 MG/DL (ref 0.5–1.4)
DIFFERENTIAL METHOD: ABNORMAL
EOSINOPHIL # BLD AUTO: 0.1 K/UL (ref 0–0.5)
EOSINOPHIL NFR BLD: 2.3 % (ref 0–8)
ERYTHROCYTE [DISTWIDTH] IN BLOOD BY AUTOMATED COUNT: 15.3 % (ref 11.5–14.5)
EST. GFR  (NO RACE VARIABLE): >60 ML/MIN/1.73 M^2
GLUCOSE SERPL-MCNC: 128 MG/DL (ref 70–110)
HCT VFR BLD AUTO: 44 % (ref 40–54)
HGB BLD-MCNC: 14.1 G/DL (ref 14–18)
IMM GRANULOCYTES # BLD AUTO: 0.03 K/UL (ref 0–0.04)
IMM GRANULOCYTES NFR BLD AUTO: 0.5 % (ref 0–0.5)
LYMPHOCYTES # BLD AUTO: 1.5 K/UL (ref 1–4.8)
LYMPHOCYTES NFR BLD: 24.6 % (ref 18–48)
MCH RBC QN AUTO: 24.5 PG (ref 27–31)
MCHC RBC AUTO-ENTMCNC: 32 G/DL (ref 32–36)
MCV RBC AUTO: 77 FL (ref 82–98)
MONOCYTES # BLD AUTO: 0.6 K/UL (ref 0.3–1)
MONOCYTES NFR BLD: 10.1 % (ref 4–15)
NEUTROPHILS # BLD AUTO: 3.8 K/UL (ref 1.8–7.7)
NEUTROPHILS NFR BLD: 62 % (ref 38–73)
NRBC BLD-RTO: 0 /100 WBC
PLATELET # BLD AUTO: 350 K/UL (ref 150–450)
PMV BLD AUTO: 10.1 FL (ref 9.2–12.9)
POTASSIUM SERPL-SCNC: 3.9 MMOL/L (ref 3.5–5.1)
PROT SERPL-MCNC: 7.2 G/DL (ref 6–8.4)
RBC # BLD AUTO: 5.75 M/UL (ref 4.6–6.2)
SODIUM SERPL-SCNC: 140 MMOL/L (ref 136–145)
WBC # BLD AUTO: 6.06 K/UL (ref 3.9–12.7)

## 2023-12-04 PROCEDURE — 85025 COMPLETE CBC W/AUTO DIFF WBC: CPT | Performed by: INTERNAL MEDICINE

## 2023-12-04 PROCEDURE — 80053 COMPREHEN METABOLIC PANEL: CPT | Performed by: INTERNAL MEDICINE

## 2023-12-04 PROCEDURE — 80197 ASSAY OF TACROLIMUS: CPT | Performed by: INTERNAL MEDICINE

## 2023-12-04 PROCEDURE — 36415 COLL VENOUS BLD VENIPUNCTURE: CPT | Mod: PO | Performed by: INTERNAL MEDICINE

## 2023-12-04 PROCEDURE — 82105 ALPHA-FETOPROTEIN SERUM: CPT | Performed by: INTERNAL MEDICINE

## 2023-12-05 ENCOUNTER — PATIENT MESSAGE (OUTPATIENT)
Dept: TRANSPLANT | Facility: CLINIC | Age: 63
End: 2023-12-05
Payer: COMMERCIAL

## 2023-12-05 LAB — TACROLIMUS BLD-MCNC: 3.5 NG/ML (ref 5–15)

## 2023-12-07 ENCOUNTER — TELEPHONE (OUTPATIENT)
Dept: TRANSPLANT | Facility: CLINIC | Age: 63
End: 2023-12-07
Payer: COMMERCIAL

## 2023-12-07 DIAGNOSIS — Z94.4 LIVER REPLACED BY TRANSPLANT: Primary | ICD-10-CM

## 2023-12-07 NOTE — TELEPHONE ENCOUNTER
Letter sent to patient stating: Your labs have been reviewed by your Transplant physician, no action required. Next labs due 3/11/2024          ----- Message from Fab Damon MD sent at 12/6/2023  4:40 PM CST -----  Results reviewed. No action.

## 2023-12-07 NOTE — LETTER
December 8, 2023    Roman Hodges  [address]  [facility] [address]          Dear Roman Nievesz:  MRN: [id number]    This is a follow up to your recent labs, your lab results were stable.  There are no medicine changes.  Please have your labs drawn again on 3/11/2024.      If you cannot have your labs drawn on the scheduled date, it is your responsibility to call the transplant department to reschedule.  Please call [phone] and ask to speak to Jodee CESAR   for all scheduling requests.     Sincerely,    Charmaine PENNN, RN      Your Liver Transplant Coordinator    [facility]  [address] [place] [id number]  [phone]

## 2023-12-10 ENCOUNTER — PATIENT MESSAGE (OUTPATIENT)
Dept: FAMILY MEDICINE | Facility: CLINIC | Age: 63
End: 2023-12-10
Payer: COMMERCIAL

## 2024-01-05 NOTE — PROGRESS NOTES
Non-compliant GAP report chart review -  08/17/2023  Chart review completed for the following HM test if overdue  (Dilated EYE EXAM)   Care Everywhere and Media reports - updates requested and reviewed.   Requested  records Efax to :  [facility]      English

## 2024-01-08 RX ORDER — RIVAROXABAN 20 MG/1
TABLET, FILM COATED ORAL
Qty: 90 TABLET | Refills: 3 | Status: SHIPPED | OUTPATIENT
Start: 2024-01-08

## 2024-01-11 DIAGNOSIS — Z94.4 LIVER TRANSPLANT STATUS: ICD-10-CM

## 2024-01-11 DIAGNOSIS — E87.5 HYPERKALEMIA: ICD-10-CM

## 2024-01-11 RX ORDER — POTASSIUM CHLORIDE 20 MEQ/1
20 TABLET, EXTENDED RELEASE ORAL 2 TIMES DAILY
Qty: 180 TABLET | Refills: 3 | Status: SHIPPED | OUTPATIENT
Start: 2024-01-11

## 2024-01-11 NOTE — TELEPHONE ENCOUNTER
Refill Routing Note   Medication(s) are not appropriate for processing by [facility] for the following reason(s):        No active prescription written by provider    [facility] action(s):  Defer        Medication Therapy Plan: FLOS      Appointments  past 12m or future 3m with PCP    Date Provider   Last Visit   9/20/2023 Jose Angel Munson MD   Next Visit   1/16/2024 Jose Angel Munson MD   ED visits in past 90 days: 0        Note composed:11:20 AM 01/11/2024

## 2024-01-11 NOTE — TELEPHONE ENCOUNTER
Care Due:                  Date            Visit Type   Department     Provider  --------------------------------------------------------------------------------                                EP                               PRIMARY      [facility]  Last Visit: 09-      CARE ([facility])   MEDICINE       Jose Angel SNIDERHART                              FOLLOWUP/OF  [facility]  Next Visit: 01-      [facility] VISIT   MEDICINE       Jose Angel Munson                                                            Last  Test          Frequency    Reason                     Performed    Due Date  --------------------------------------------------------------------------------    HBA1C.......  6 months...  empagliflozin............  07- 12-    [facility] Embedded Care Due Messages. Reference number: [id number].   1/11/2024 10:58:51 AM CST

## 2024-01-16 ENCOUNTER — LAB VISIT (OUTPATIENT)
Dept: LAB | Facility: HOSPITAL | Age: 64
End: 2024-01-16
Attending: FAMILY MEDICINE
Payer: COMMERCIAL

## 2024-01-16 ENCOUNTER — OFFICE VISIT (OUTPATIENT)
Dept: FAMILY MEDICINE | Facility: CLINIC | Age: 64
End: 2024-01-16
Payer: COMMERCIAL

## 2024-01-16 VITALS
DIASTOLIC BLOOD PRESSURE: 90 MMHG | WEIGHT: 215.38 LBS | OXYGEN SATURATION: 97 % | SYSTOLIC BLOOD PRESSURE: 130 MMHG | BODY MASS INDEX: 32.64 KG/M2 | HEART RATE: 72 BPM | HEIGHT: 68 IN

## 2024-01-16 DIAGNOSIS — R35.1 BENIGN PROSTATIC HYPERPLASIA WITH NOCTURIA: Primary | ICD-10-CM

## 2024-01-16 DIAGNOSIS — I48.0 PAROXYSMAL ATRIAL FIBRILLATION: ICD-10-CM

## 2024-01-16 DIAGNOSIS — R35.1 BENIGN PROSTATIC HYPERPLASIA WITH NOCTURIA: ICD-10-CM

## 2024-01-16 DIAGNOSIS — N40.1 BENIGN PROSTATIC HYPERPLASIA WITH NOCTURIA: ICD-10-CM

## 2024-01-16 DIAGNOSIS — L29.9 ITCH OF SKIN: ICD-10-CM

## 2024-01-16 DIAGNOSIS — N40.1 BENIGN PROSTATIC HYPERPLASIA WITH NOCTURIA: Primary | ICD-10-CM

## 2024-01-16 LAB
BILIRUB UR QL STRIP: NEGATIVE
CLARITY UR REFRACT.AUTO: CLEAR
COLOR UR AUTO: COLORLESS
GLUCOSE UR QL STRIP: NEGATIVE
HGB UR QL STRIP: NEGATIVE
KETONES UR QL STRIP: NEGATIVE
LEUKOCYTE ESTERASE UR QL STRIP: NEGATIVE
NITRITE UR QL STRIP: NEGATIVE
PH UR STRIP: 7 [PH] (ref 5–8)
PROT UR QL STRIP: NEGATIVE
SP GR UR STRIP: 1.01 (ref 1–1.03)
URN SPEC COLLECT METH UR: ABNORMAL

## 2024-01-16 PROCEDURE — 3008F BODY MASS INDEX DOCD: CPT | Mod: CPTII,S$GLB,, | Performed by: FAMILY MEDICINE

## 2024-01-16 PROCEDURE — 3080F DIAST BP >= 90 MM HG: CPT | Mod: CPTII,S$GLB,, | Performed by: FAMILY MEDICINE

## 2024-01-16 PROCEDURE — 81003 URINALYSIS AUTO W/O SCOPE: CPT | Performed by: FAMILY MEDICINE

## 2024-01-16 PROCEDURE — 1160F RVW MEDS BY RX/DR IN RCRD: CPT | Mod: CPTII,S$GLB,, | Performed by: FAMILY MEDICINE

## 2024-01-16 PROCEDURE — 99215 OFFICE O/P EST HI 40 MIN: CPT | Mod: S$GLB,,, | Performed by: FAMILY MEDICINE

## 2024-01-16 PROCEDURE — 99999 PR PBB SHADOW E&M-EST. PATIENT-LVL V: CPT | Mod: PBBFAC,,, | Performed by: FAMILY MEDICINE

## 2024-01-16 PROCEDURE — 3075F SYST BP GE 130 - 139MM HG: CPT | Mod: CPTII,S$GLB,, | Performed by: FAMILY MEDICINE

## 2024-01-16 PROCEDURE — 1159F MED LIST DOCD IN RCRD: CPT | Mod: CPTII,S$GLB,, | Performed by: FAMILY MEDICINE

## 2024-01-16 RX ORDER — BETAMETHASONE DIPROPIONATE 0.5 MG/G
CREAM TOPICAL 2 TIMES DAILY
Qty: 45 G | Refills: 0 | Status: SHIPPED | OUTPATIENT
Start: 2024-01-16 | End: 2024-03-25

## 2024-01-16 RX ORDER — BUSPIRONE HYDROCHLORIDE 5 MG/1
5 TABLET ORAL 2 TIMES DAILY
Qty: 180 TABLET | Refills: 3 | Status: SHIPPED | OUTPATIENT
Start: 2024-01-16 | End: 2025-01-15

## 2024-01-16 RX ORDER — TAMSULOSIN HYDROCHLORIDE 0.4 MG/1
0.4 CAPSULE ORAL NIGHTLY
Qty: 90 CAPSULE | Refills: 3 | Status: SHIPPED | OUTPATIENT
Start: 2024-01-16 | End: 2025-01-15

## 2024-01-16 NOTE — TELEPHONE ENCOUNTER
No care due was identified.  [facility] Embedded Care Due Messages. Reference number: [id number].   1/16/2024 2:10:18 PM CST

## 2024-01-16 NOTE — PROGRESS NOTES
Subjective     Patient ID: Roman Hodges is a 63 y.o. male.    Chief Complaint: Urinary Frequency and Back Pain    63 years old male came to the clinic with urinary frequency and nocturia for the last 4 months.  Patient is concerned about prostate disease.  Patient also with lower back pain sometimes.  He is requesting follow-up appointment with a new cardiologist.  Patient was previously diagnosed with paroxysmal atrial fibrillation.    Urinary Frequency   Associated symptoms include frequency and urgency. Pertinent negatives include no hematuria, vomiting or constipation.   Back Pain  Pertinent negatives include no chest pain, headaches or weakness.     Review of Systems   Constitutional: Negative.  Negative for activity change and unexpected weight change.   HENT: Negative.  Negative for hearing loss, rhinorrhea and trouble swallowing.    Eyes: Negative.  Negative for discharge and visual disturbance.   Respiratory: Negative.  Negative for chest tightness and wheezing.    Cardiovascular: Negative.  Negative for chest pain, palpitations, leg swelling and claudication.   Gastrointestinal: Negative.  Negative for blood in stool, constipation, diarrhea and vomiting.   Endocrine: Positive for polyuria. Negative for polydipsia.   Genitourinary:  Positive for frequency and urgency. Negative for difficulty urinating and hematuria.   Musculoskeletal:  Positive for back pain. Negative for arthralgias, joint swelling and neck pain.   Integumentary:  Negative.   Neurological: Negative.  Negative for weakness and headaches.   Psychiatric/Behavioral: Negative.  Negative for confusion and dysphoric mood.           Objective     Physical Exam  Vitals and nursing note reviewed.   Constitutional:       General: He is not in acute distress.     Appearance: He is well-developed. He is not diaphoretic.   HENT:      Head: Normocephalic and atraumatic.      Right Ear: External ear normal.      Left Ear: External ear normal.       Nose: Nose normal.      Mouth/Throat:      Pharynx: No oropharyngeal exudate.   Eyes:      General: No scleral icterus.        Right eye: No discharge.         Left eye: No discharge.      Conjunctiva/sclera: Conjunctivae normal.      Pupils: Pupils are equal, round, and reactive to light.   Neck:      Thyroid: No thyromegaly.      Vascular: No JVD.      Trachea: No tracheal deviation.   Cardiovascular:      Rate and Rhythm: Normal rate and regular rhythm.      Heart sounds: Normal heart sounds. No murmur heard.     No friction rub. No gallop.   Pulmonary:      Effort: Pulmonary effort is normal. No respiratory distress.      Breath sounds: Normal breath sounds. No stridor. No wheezing or rales.   Chest:      Chest wall: No tenderness.   Abdominal:      General: Bowel sounds are normal. There is no distension.      Palpations: Abdomen is soft. There is no mass.      Tenderness: There is no abdominal tenderness. There is no guarding or rebound.   Musculoskeletal:         General: No tenderness. Normal range of motion.      Cervical back: Normal range of motion and neck supple.   Lymphadenopathy:      Cervical: No cervical adenopathy.   Skin:     General: Skin is warm and dry.      Coloration: Skin is not pale.      Findings: No erythema or rash.   Neurological:      Mental Status: He is alert and oriented to person, place, and time.      Cranial Nerves: No cranial nerve deficit.      Motor: No abnormal muscle tone.      Coordination: Coordination normal.      Deep Tendon Reflexes: Reflexes are normal and symmetric. Reflexes normal.   Psychiatric:         Behavior: Behavior normal.         Thought Content: Thought content normal.         Judgment: Judgment normal.            Assessment and Plan     1. Benign prostatic hyperplasia with nocturia  -     Basic Metabolic Panel; Future; Expected date: 01/16/2024  -     PSA, Screening; Future; Expected date: 01/16/2024  -     Urinalysis; Future  -     Urine culture;  Future  -     Ambulatory referral/consult to Urology; Future; Expected date: 01/23/2024  -     tamsulosin (FLOMAX) 0.4 mg Cap; Take 1 capsule (0.4 mg total) by mouth every evening.  Dispense: 90 capsule; Refill: 3    2. Paroxysmal atrial fibrillation  -     Ambulatory referral/consult to Cardiology; Future; Expected date: 01/23/2024                 Follow-up in 6 months.

## 2024-01-16 NOTE — PROGRESS NOTES
Chief Complaint  Frequent urination and back pain       HPI  Roman Hodges is a 63 y.o. male with a PMHx of T2DM, HTN, HLD, and liver cancer s/p liver transplant that presents for frequent urination and back pain.  Their last appointment with primary care was 10/10/2023.      Patient has started to notice an increased urinary frequency for the past 4 months. He has noticed a weaker stream and now has to wake up 2-3 times during the night to urinate. He states that he does drink a lot of water and previously had to urinate once per night but this has increased. He states that sometimes he feels pain in his bladder. Has has not noticed gross hematuria.   He is sexually active with his wife only.  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 He has noticed that sometimes his back pain continues even when he is resting.   He has purposefully lost weight in the past month due to fasting. Denies fever, chills, nausea, vomiting.     PAST MEDICAL HISTORY:  Past Medical History:   Diagnosis Date    A-fib     Allergy     Anticoagulant long-term use     Diabetes mellitus, type 2     GERD (gastroesophageal reflux disease)     Hepatocellular carcinoma     liver    History of 2019 novel coronavirus disease (COVID-19)     tested positive 4/15/20 & 5/8/20    History of primary liver cancer 10/24/2018    S/p liver transplant 7/4/2019    Hyperlipidemia     Hypertension        PAST SURGICAL HISTORY:  Past Surgical History:   Procedure Laterality Date    COLONOSCOPY      COLONOSCOPY N/A 12/23/2021    Procedure: COLONOSCOPY miralax prep;  Surgeon: Seng Norman MD;  Location: [facility];  Service: Endoscopy;  Laterality: N/A;    ESOPHAGOGASTRODUODENOSCOPY N/A 1/15/2019    Procedure: ESOPHAGOGASTRODUODENOSCOPY (EGD);  Surgeon: Bony Saavedra MD;  Location: [facility];  Service: Endoscopy;  Laterality: N/A;     ESOPHAGOGASTRODUODENOSCOPY N/A 2021    Procedure: ESOPHAGOGASTRODUODENOSCOPY (EGD);  Surgeon: Seng Norman MD;  Location: [facility] ENDO;  Service: Endoscopy;  Laterality: N/A;    HERNIA REPAIR      LEFT HEART CATHETERIZATION Left 2018    Procedure: Left heart cath;  Surgeon: Tyler Hinojosa MD;  Location: [facility] CATH LAB/EP;  Service: Cardiology;  Laterality: Left;    LIVER TRANSPLANT N/A 7/3/2019    Procedure: TRANSPLANT, LIVER;  Surgeon: Oscar Altman Jr., MD;  Location: [facility] OR [facility] FLR;  Service: Transplant;  Laterality: N/A;    RADIOFREQUENCY ABLATION N/A 2019    Procedure: Radiofrequency Ablation;  Surgeon: Rose Surgeon;  Location: [facility] ROSE;  Service: Anesthesiology;  Laterality: N/A;  Room [address] [place] [address]       SOCIAL HISTORY:  Social History     Socioeconomic History    Marital status:    Tobacco Use    Smoking status: Former     Current packs/day: 0.00     Average packs/day: 1 pack/day for 10.0 years (10.0 ttl pk-yrs)     Types: Cigarettes     Start date: 1970     Quit date: 1980     Years since quittin.0    Smokeless tobacco: Never   Substance and Sexual Activity    Alcohol use: No    Drug use: No    Sexual activity: Yes     Partners: Female     Social Determinants of Health     Financial Resource Strain: Low Risk  (2023)    Overall Financial Resource Strain (CARDIA)     Difficulty of Paying Living Expenses: Not hard at all   Food Insecurity: No Food Insecurity (2023)    Hunger Vital Sign     Worried About Running Out of Food in the Last Year: Never true     Ran Out of Food in the Last Year: Never true   Transportation Needs: No Transportation Needs (2023)    PRAPARE - Transportation     Lack of Transportation (Medical): No     Lack of Transportation (Non-Medical): No   Physical Activity: Inactive (2023)    Exercise Vital Sign     Days of Exercise per Week: 0 days     Minutes of Exercise per Session: 0 min   Stress: No Stress  Concern Present (12/25/2023)    [employer] [place] [employer] - Occupational Stress Questionnaire     Feeling of Stress : Not at all   Social Connections: Unknown (12/25/2023)    Social Connection and Isolation Panel [NHANES]     Frequency of Communication with Friends and Family: More than three times a week     Frequency of Social Gatherings with Friends and Family: More than three times a week     Active Member of Clubs or Organizations: Yes     Attends Club or Organization Meetings: More than 4 times per year     Marital Status:    Housing Stability: Low Risk  (12/25/2023)    Housing Stability Vital Sign     Unable to Pay for Housing in the Last Year: No     Number of Places Lived in the Last Year: 1     Unstable Housing in the Last Year: No       FAMILY HISTORY:  Family History   Problem Relation Age of Onset    Hypertension Mother     Cancer Mother     Hypertension Father     Stroke Father     Cancer Father     Allergic rhinitis Neg Hx     Allergies Neg Hx     Angioedema Neg Hx     Asthma Neg Hx     Atopy Neg Hx     Eczema Neg Hx     Immunodeficiency Neg Hx     Rhinitis Neg Hx     Urticaria Neg Hx        ALLERGIES AND MEDICATIONS: updated and reviewed.  Review of patient's allergies indicates:   Allergen Reactions    Lisinopril      Stomach ache     Current Outpatient Medications   Medication Sig Dispense Refill    ACCU-CHEK GUIDE TEST STRIPS Strp TEST BLOOD GLUCOSE 3 TIMES A  strip 11    amLODIPine (NORVASC) 10 MG tablet TAKE 1 TABLET BY MOUTH EVERY DAY 90 tablet 3    betamethasone dipropionate 0.05 % cream Apply topically 2 (two) times daily. for 10 days 45 g 0    busPIRone (BUSPAR) 5 MG Tab Take 1 tablet (5 mg total) by mouth 2 (two) times daily. (Patient taking differently: Take 5 mg by mouth 2 (two) times daily. Pt stated he is taking PRN) 180 tablet 3    ciclopirox (PENLAC) 8 % Soln Apply topically nightly. 6.6 mL 3    COVID qmy14-70,12up,,andu,,PF, (SPIKEVAX 4018-2507,12Y  UP,,PF,) 50 mcg/0.5 mL injection Inject into the muscle. 0.5 mL 0    empagliflozin (JARDIANCE) 10 mg tablet Take 1 tablet (10 mg total) by mouth once daily. 90 tablet 3    ergocalciferol (ERGOCALCIFEROL) 50,000 unit Cap TAKE 1 CAPSULE BY MOUTH ONE TIME PER WEEK 12 capsule 3    esomeprazole (NEXIUM) 40 MG capsule TAKE 1 CAPSULE BY MOUTH EVERY DAY 90 capsule 3    lancets Misc Test blood glucose 3 (three) times daily. (Patient taking differently: Test blood glucose twice weekly) 100 each 11    meclizine (ANTIVERT) 12.5 mg tablet Take 1 tablet (12.5 mg total) by mouth 3 (three) times daily as needed for Dizziness. 40 tablet 0    metoprolol succinate (TOPROL-XL) 50 MG 24 hr tablet Take 1 tablet (50 mg total) by mouth once daily. 90 tablet 3    mycophenolate (CELLCEPT) 250 mg Cap TAKE 4 CAPSULES BY MOUTH TWICE DAILY 240 capsule 11    omega-3 fatty acids/fish oil (FISH OIL-OMEGA-3 FATTY ACIDS) 300-1,000 mg capsule Take by mouth once daily.      potassium chloride SA (K-DUR,KLOR-CON) 20 MEQ tablet TAKE 1 TABLET BY MOUTH TWICE A  tablet 3    simethicone (PHAZYME) 250 mg Cap Take 1 capsule by mouth 3 (three) times daily as needed (gas). 90 capsule 0    sucralfate (CARAFATE) 1 gram tablet Take 1 g by mouth daily as needed.      tacrolimus (PROGRAF) 0.5 MG Cap TAKE 2 CAPSULES BY MOUTH EVERY MORNING AND 1 CAPSULE EVERY EVENING. 90 capsule 8    XARELTO 20 mg Tab TAKE 1 TABLET (20 MG TOTAL) BY MOUTH ONCE DAILY. ONE TABLET ONCE DAILY WITH DINNER 90 tablet 3     No current facility-administered medications for this visit.         ROS  Review of Systems   Constitutional:  Negative for activity change, fatigue, fever and unexpected weight change.   HENT:  Negative for hearing loss, rhinorrhea and trouble swallowing.    Eyes:  Negative for discharge and visual disturbance.   Respiratory:  Negative for chest tightness and wheezing.    Cardiovascular:  Negative for chest pain and palpitations.   Gastrointestinal:  Negative for  blood in stool, constipation, diarrhea and vomiting.   Endocrine: Positive for polyuria. Negative for polydipsia.   Genitourinary:  Positive for urgency. Negative for difficulty urinating and hematuria.   Musculoskeletal:  Negative for arthralgias, joint swelling and neck pain.   Neurological:  Negative for weakness and headaches.   Psychiatric/Behavioral:  Negative for confusion and dysphoric mood.            Physical Exam  There were no vitals filed for this visit. There is no height or weight on file to calculate BMI.          Physical Exam  Constitutional:       Appearance: Normal appearance. He is normal weight.   HENT:      Nose: Nose normal.   Cardiovascular:      Rate and Rhythm: Normal rate and regular rhythm.      Pulses: Normal pulses.      Heart sounds: Normal heart sounds.   Pulmonary:      Effort: Pulmonary effort is normal.      Breath sounds: Normal breath sounds.   Musculoskeletal:         General: Tenderness (Left paraspinal and costovertebral tenderness) present.   Skin:     General: Skin is warm and dry.      Coloration: Skin is not jaundiced.   Neurological:      Mental Status: He is alert.       Health Maintenance         Date Due Completion Date    RSV Vaccine (Age 60+ and Pregnant patients) (1 - 1-dose 60+ series) Never done ---    Eye Exam 07/07/2022 7/7/2021    Hemoglobin A1c 01/03/2024 7/3/2023    Foot Exam 01/12/2024 1/12/2023    Override on 7/8/2020: Done    DEXA Scan 06/28/2024 6/28/2022    PROSTATE-SPECIFIC ANTIGEN 07/03/2024 7/3/2023    Diabetes Urine Screening 07/03/2024 7/3/2023    Lipid Panel 07/03/2024 7/3/2023    Colorectal Cancer Screening 12/23/2026 12/23/2021    TETANUS VACCINE 08/15/2028 8/15/2018              Assessment and Plan:  Roman was seen today for urinary frequency and back pain.    Diagnoses and all orders for this visit:    Benign prostatic hyperplasia with nocturia  -     Basic Metabolic Panel; Future  -     PSA, Screening; Future  -     Urinalysis; Future  -      Urine culture; Future  -     Ambulatory referral/consult to Urology; Future  -     tamsulosin (FLOMAX) 0.4 mg Cap; Take 1 capsule (0.4 mg total) by mouth every evening.    Paroxysmal atrial fibrillation  -     Ambulatory referral/consult to Cardiology; Future

## 2024-02-01 ENCOUNTER — TELEPHONE (OUTPATIENT)
Dept: ORTHOPEDICS | Facility: CLINIC | Age: 64
End: 2024-02-01
Payer: MEDICARE

## 2024-02-01 DIAGNOSIS — M79.642 LEFT HAND PAIN: Primary | ICD-10-CM

## 2024-02-12 ENCOUNTER — TELEPHONE (OUTPATIENT)
Dept: TRANSPLANT | Facility: CLINIC | Age: 64
End: 2024-02-12
Payer: MEDICARE

## 2024-02-23 ENCOUNTER — PATIENT MESSAGE (OUTPATIENT)
Dept: FAMILY MEDICINE | Facility: CLINIC | Age: 64
End: 2024-02-23
Payer: MEDICARE

## 2024-03-04 RX ORDER — MYCOPHENOLATE MOFETIL 250 MG/1
CAPSULE ORAL
Qty: 240 CAPSULE | Refills: 1 | Status: SHIPPED | OUTPATIENT
Start: 2024-03-04 | End: 2024-05-03

## 2024-03-05 ENCOUNTER — PATIENT MESSAGE (OUTPATIENT)
Dept: ADMINISTRATIVE | Facility: HOSPITAL | Age: 64
End: 2024-03-05
Payer: MEDICARE

## 2024-03-11 ENCOUNTER — LAB VISIT (OUTPATIENT)
Dept: LAB | Facility: HOSPITAL | Age: 64
End: 2024-03-11
Attending: INTERNAL MEDICINE
Payer: MEDICARE

## 2024-03-11 DIAGNOSIS — Z94.4 S/P LIVER TRANSPLANT: ICD-10-CM

## 2024-03-11 DIAGNOSIS — Z94.4 LIVER REPLACED BY TRANSPLANT: ICD-10-CM

## 2024-03-11 LAB
ALBUMIN SERPL BCP-MCNC: 4.2 G/DL (ref 3.5–5.2)
ALP SERPL-CCNC: 71 U/L (ref 55–135)
ALT SERPL W/O P-5'-P-CCNC: 36 U/L (ref 10–44)
ANION GAP SERPL CALC-SCNC: 11 MMOL/L (ref 8–16)
AST SERPL-CCNC: 18 U/L (ref 10–40)
BASOPHILS # BLD AUTO: 0.03 K/UL (ref 0–0.2)
BASOPHILS NFR BLD: 0.5 % (ref 0–1.9)
BILIRUB SERPL-MCNC: 0.4 MG/DL (ref 0.1–1)
BUN SERPL-MCNC: 15 MG/DL (ref 8–23)
CALCIUM SERPL-MCNC: 9.6 MG/DL (ref 8.7–10.5)
CHLORIDE SERPL-SCNC: 104 MMOL/L (ref 95–110)
CO2 SERPL-SCNC: 24 MMOL/L (ref 23–29)
CREAT SERPL-MCNC: 1 MG/DL (ref 0.5–1.4)
DIFFERENTIAL METHOD BLD: ABNORMAL
EOSINOPHIL # BLD AUTO: 0.1 K/UL (ref 0–0.5)
EOSINOPHIL NFR BLD: 2.1 % (ref 0–8)
ERYTHROCYTE [DISTWIDTH] IN BLOOD BY AUTOMATED COUNT: 18.2 % (ref 11.5–14.5)
EST. GFR  (NO RACE VARIABLE): >60 ML/MIN/1.73 M^2
FERRITIN SERPL-MCNC: 12 NG/ML (ref 20–300)
GLUCOSE SERPL-MCNC: 119 MG/DL (ref 70–110)
HCT VFR BLD AUTO: 47.3 % (ref 40–54)
HGB BLD-MCNC: 15 G/DL (ref 14–18)
IMM GRANULOCYTES # BLD AUTO: 0.01 K/UL (ref 0–0.04)
IMM GRANULOCYTES NFR BLD AUTO: 0.2 % (ref 0–0.5)
LYMPHOCYTES # BLD AUTO: 1.6 K/UL (ref 1–4.8)
LYMPHOCYTES NFR BLD: 28.8 % (ref 18–48)
MCH RBC QN AUTO: 24.5 PG (ref 27–31)
MCHC RBC AUTO-ENTMCNC: 31.7 G/DL (ref 32–36)
MCV RBC AUTO: 77 FL (ref 82–98)
MONOCYTES # BLD AUTO: 0.5 K/UL (ref 0.3–1)
MONOCYTES NFR BLD: 8.8 % (ref 4–15)
NEUTROPHILS # BLD AUTO: 3.3 K/UL (ref 1.8–7.7)
NEUTROPHILS NFR BLD: 59.6 % (ref 38–73)
NRBC BLD-RTO: 0 /100 WBC
PLATELET # BLD AUTO: 332 K/UL (ref 150–450)
PMV BLD AUTO: 9.6 FL (ref 9.2–12.9)
POTASSIUM SERPL-SCNC: 4.1 MMOL/L (ref 3.5–5.1)
PROT SERPL-MCNC: 7.4 G/DL (ref 6–8.4)
RBC # BLD AUTO: 6.12 M/UL (ref 4.6–6.2)
SODIUM SERPL-SCNC: 139 MMOL/L (ref 136–145)
WBC # BLD AUTO: 5.59 K/UL (ref 3.9–12.7)

## 2024-03-11 PROCEDURE — 85025 COMPLETE CBC W/AUTO DIFF WBC: CPT | Performed by: INTERNAL MEDICINE

## 2024-03-11 PROCEDURE — 80053 COMPREHEN METABOLIC PANEL: CPT | Performed by: INTERNAL MEDICINE

## 2024-03-11 PROCEDURE — 82728 ASSAY OF FERRITIN: CPT | Performed by: INTERNAL MEDICINE

## 2024-03-11 PROCEDURE — 80197 ASSAY OF TACROLIMUS: CPT | Performed by: INTERNAL MEDICINE

## 2024-03-12 ENCOUNTER — TELEPHONE (OUTPATIENT)
Dept: TRANSPLANT | Facility: CLINIC | Age: 64
End: 2024-03-12
Payer: MEDICARE

## 2024-03-12 LAB — TACROLIMUS BLD-MCNC: 4.4 NG/ML (ref 5–15)

## 2024-03-12 NOTE — TELEPHONE ENCOUNTER
Letter sent to patient stating: Your labs have been reviewed by your Transplant physician, no action required. Next labs due 7/22/2024          ----- Message from Fab Damon MD sent at 3/12/2024  1:37 PM CDT -----  Results reviewed. No action.

## 2024-03-12 NOTE — LETTER
March 12, 2024    Roman Hodges  [address]  [place] [id number]          Dear Roman Hodges:  MRN: [id number]    This is a follow up to your recent labs, your lab results were stable.  There are no medicine changes.  Please have your labs drawn again on 7/22/2024.      If you cannot have your labs drawn on the scheduled date, it is your responsibility to call the transplant department to reschedule.  Please call [phone] and ask to speak to Jodee CESAR   for all scheduling requests.     Sincerely,    Charmaine PENNN, RN      Your Liver Transplant Coordinator    [facility]  [address] [place] [id number]  [phone]

## 2024-03-13 ENCOUNTER — TELEPHONE (OUTPATIENT)
Dept: ORTHOPEDICS | Facility: CLINIC | Age: 64
End: 2024-03-13
Payer: MEDICARE

## 2024-03-13 DIAGNOSIS — M79.642 LEFT HAND PAIN: Primary | ICD-10-CM

## 2024-03-14 ENCOUNTER — TELEPHONE (OUTPATIENT)
Dept: ORTHOPEDICS | Facility: CLINIC | Age: 64
End: 2024-03-14
Payer: MEDICARE

## 2024-03-14 ENCOUNTER — OFFICE VISIT (OUTPATIENT)
Dept: ORTHOPEDICS | Facility: CLINIC | Age: 64
End: 2024-03-14
Payer: MEDICARE

## 2024-03-14 ENCOUNTER — OFFICE VISIT (OUTPATIENT)
Dept: UROLOGY | Facility: CLINIC | Age: 64
End: 2024-03-14
Payer: MEDICARE

## 2024-03-14 ENCOUNTER — HOSPITAL ENCOUNTER (OUTPATIENT)
Dept: RADIOLOGY | Facility: HOSPITAL | Age: 64
Discharge: HOME | End: 2024-03-14
Attending: ORTHOPAEDIC SURGERY
Payer: MEDICARE

## 2024-03-14 ENCOUNTER — OFFICE VISIT (OUTPATIENT)
Dept: FAMILY MEDICINE | Facility: CLINIC | Age: 64
End: 2024-03-14
Payer: MEDICARE

## 2024-03-14 VITALS
SYSTOLIC BLOOD PRESSURE: 138 MMHG | HEART RATE: 75 BPM | WEIGHT: 212 LBS | DIASTOLIC BLOOD PRESSURE: 88 MMHG | OXYGEN SATURATION: 98 % | BODY MASS INDEX: 32.13 KG/M2 | HEIGHT: 68 IN

## 2024-03-14 VITALS
WEIGHT: 211.31 LBS | HEART RATE: 69 BPM | DIASTOLIC BLOOD PRESSURE: 98 MMHG | HEIGHT: 68 IN | BODY MASS INDEX: 32.03 KG/M2 | SYSTOLIC BLOOD PRESSURE: 161 MMHG

## 2024-03-14 VITALS — HEIGHT: 68 IN | BODY MASS INDEX: 32.13 KG/M2

## 2024-03-14 DIAGNOSIS — Z94.4 S/P LIVER TRANSPLANT: ICD-10-CM

## 2024-03-14 DIAGNOSIS — M79.642 LEFT HAND PAIN: Primary | ICD-10-CM

## 2024-03-14 DIAGNOSIS — R35.1 BENIGN PROSTATIC HYPERPLASIA WITH NOCTURIA: ICD-10-CM

## 2024-03-14 DIAGNOSIS — R35.1 NOCTURIA: Primary | ICD-10-CM

## 2024-03-14 DIAGNOSIS — E11.65 TYPE 2 DIABETES MELLITUS WITH HYPERGLYCEMIA, WITHOUT LONG-TERM CURRENT USE OF INSULIN: ICD-10-CM

## 2024-03-14 DIAGNOSIS — I70.0 AORTIC ARCH ATHEROSCLEROSIS: ICD-10-CM

## 2024-03-14 DIAGNOSIS — B35.1 ONYCHOMYCOSIS: ICD-10-CM

## 2024-03-14 DIAGNOSIS — M18.12 PRIMARY OSTEOARTHRITIS OF FIRST CARPOMETACARPAL JOINT OF LEFT HAND: ICD-10-CM

## 2024-03-14 DIAGNOSIS — R80.9 MICROALBUMINURIA: ICD-10-CM

## 2024-03-14 DIAGNOSIS — N40.1 BENIGN PROSTATIC HYPERPLASIA WITH NOCTURIA: ICD-10-CM

## 2024-03-14 DIAGNOSIS — Z23 NEED FOR STREPTOCOCCUS PNEUMONIAE VACCINATION: ICD-10-CM

## 2024-03-14 DIAGNOSIS — I10 ESSENTIAL HYPERTENSION: Primary | ICD-10-CM

## 2024-03-14 DIAGNOSIS — M79.642 LEFT HAND PAIN: ICD-10-CM

## 2024-03-14 PROCEDURE — 1159F MED LIST DOCD IN RCRD: CPT | Mod: CPTII,S$GLB,, | Performed by: ORTHOPAEDIC SURGERY

## 2024-03-14 PROCEDURE — 73130 X-RAY EXAM OF HAND: CPT | Mod: 26,LT,, | Performed by: RADIOLOGY

## 2024-03-14 PROCEDURE — 99999 PR PBB SHADOW E&M-EST. PATIENT-LVL III: CPT | Mod: PBBFAC,,, | Performed by: ORTHOPAEDIC SURGERY

## 2024-03-14 PROCEDURE — 99999 PR PBB SHADOW E&M-EST. PATIENT-LVL V: CPT | Mod: PBBFAC,,, | Performed by: FAMILY MEDICINE

## 2024-03-14 PROCEDURE — 1101F PT FALLS ASSESS-DOCD LE1/YR: CPT | Mod: CPTII,S$GLB,, | Performed by: ORTHOPAEDIC SURGERY

## 2024-03-14 PROCEDURE — 3008F BODY MASS INDEX DOCD: CPT | Mod: CPTII,S$GLB,, | Performed by: ORTHOPAEDIC SURGERY

## 2024-03-14 PROCEDURE — G0009 ADMIN PNEUMOCOCCAL VACCINE: HCPCS | Mod: S$GLB,,, | Performed by: FAMILY MEDICINE

## 2024-03-14 PROCEDURE — 3044F HG A1C LEVEL LT 7.0%: CPT | Mod: CPTII,S$GLB,, | Performed by: ORTHOPAEDIC SURGERY

## 2024-03-14 PROCEDURE — 90677 PCV20 VACCINE IM: CPT | Mod: S$GLB,,, | Performed by: FAMILY MEDICINE

## 2024-03-14 PROCEDURE — 73130 X-RAY EXAM OF HAND: CPT | Mod: TC,PN,LT

## 2024-03-14 PROCEDURE — 3066F NEPHROPATHY DOC TX: CPT | Mod: CPTII,S$GLB,, | Performed by: ORTHOPAEDIC SURGERY

## 2024-03-14 PROCEDURE — 3060F POS MICROALBUMINURIA REV: CPT | Mod: CPTII,S$GLB,, | Performed by: ORTHOPAEDIC SURGERY

## 2024-03-14 PROCEDURE — 99999 PR PBB SHADOW E&M-EST. PATIENT-LVL IV: CPT | Mod: PBBFAC,,, | Performed by: UROLOGY

## 2024-03-14 PROCEDURE — 20600 DRAIN/INJ JOINT/BURSA W/O US: CPT | Mod: LT,S$GLB,, | Performed by: ORTHOPAEDIC SURGERY

## 2024-03-14 PROCEDURE — 99203 OFFICE O/P NEW LOW 30 MIN: CPT | Mod: 25,S$GLB,, | Performed by: ORTHOPAEDIC SURGERY

## 2024-03-14 PROCEDURE — 99204 OFFICE O/P NEW MOD 45 MIN: CPT | Mod: S$GLB,,, | Performed by: UROLOGY

## 2024-03-14 PROCEDURE — 3288F FALL RISK ASSESSMENT DOCD: CPT | Mod: CPTII,S$GLB,, | Performed by: ORTHOPAEDIC SURGERY

## 2024-03-14 PROCEDURE — 99215 OFFICE O/P EST HI 40 MIN: CPT | Mod: 25,S$GLB,, | Performed by: FAMILY MEDICINE

## 2024-03-14 RX ORDER — DICLOFENAC SODIUM 10 MG/G
2 GEL TOPICAL 3 TIMES DAILY
Qty: 100 G | Refills: 1 | Status: SHIPPED | OUTPATIENT
Start: 2024-03-14 | End: 2024-03-25

## 2024-03-14 RX ORDER — TRIAMCINOLONE ACETONIDE 40 MG/ML
20 INJECTION, SUSPENSION INTRA-ARTICULAR; INTRAMUSCULAR
Status: COMPLETED | OUTPATIENT
Start: 2024-03-14 | End: 2024-03-14

## 2024-03-14 RX ADMIN — TRIAMCINOLONE ACETONIDE 20 MG: 40 INJECTION, SUSPENSION INTRA-ARTICULAR; INTRAMUSCULAR at 11:03

## 2024-03-14 NOTE — PROGRESS NOTES
[facility] - Urology   Clinic Note    SUBJECTIVE:     Chief Complaint   Patient presents with    Benign Prostatic Hypertrophy       Referral from: Jose Angel Munson.    History of Present Illness:  Roman Hodges is a 64 y.o. male who presents to clinic for lower urinary tract symptoms and BPH.    Patient here for evaluation of BPH and nocturia.  Reports nocturia 2-3 times per night.  Previously was 4-5 times per night but was recently started on tamsulosin by his primary care provider and this is improved.  Additionally his other lower urinary tract symptoms have improved including urgency weakness urgency frequency and weak stream.  He does have sleep apnea and uses a CPAP.    Previous medications for BPH include: yes: tamsulosin  AUA Symptom Score: 12/3  Previous prostatic surgery: No    Patient endorses no additional complaints at this time.    Past Medical History:   Diagnosis Date    A-fib     Allergy     Anticoagulant long-term use     Diabetes mellitus, type 2     GERD (gastroesophageal reflux disease)     Hepatocellular carcinoma     liver    History of 2019 novel coronavirus disease (COVID-19)     tested positive 4/15/20 & 5/8/20    History of primary liver cancer 10/24/2018    S/p liver transplant 7/4/2019    Hyperlipidemia     Hypertension        Past Surgical History:   Procedure Laterality Date    COLONOSCOPY      COLONOSCOPY N/A 12/23/2021    Procedure: COLONOSCOPY miralax prep;  Surgeon: Seng Norman MD;  Location: [facility];  Service: Endoscopy;  Laterality: N/A;    ESOPHAGOGASTRODUODENOSCOPY N/A 1/15/2019    Procedure: ESOPHAGOGASTRODUODENOSCOPY (EGD);  Surgeon: Bony Saavedra MD;  Location: [facility];  Service: Endoscopy;  Laterality: N/A;    ESOPHAGOGASTRODUODENOSCOPY N/A 12/23/2021    Procedure: ESOPHAGOGASTRODUODENOSCOPY (EGD);  Surgeon: Seng Norman MD;  Location: [facility];  Service: Endoscopy;  Laterality: N/A;    HERNIA REPAIR      LEFT HEART CATHETERIZATION  Left 2018    Procedure: Left heart cath;  Surgeon: Tyler Hinojosa MD;  Location: [facility] CATH LAB/EP;  Service: Cardiology;  Laterality: Left;    LIVER TRANSPLANT N/A 7/3/2019    Procedure: TRANSPLANT, LIVER;  Surgeon: Oscar Altman Jr., MD;  Location: [facility] F[facility];  Service: Transplant;  Laterality: N/A;    RADIOFREQUENCY ABLATION N/A 2019    Procedure: Radiofrequency Ablation;  Surgeon: Rose Surgeon;  Location: [facility];  Service: Anesthesiology;  Laterality: N/A;  [facility]       Family History   Problem Relation Age of Onset    Hypertension Mother     Cancer Mother     Hypertension Father     Stroke Father     Cancer Father     Allergic rhinitis Neg Hx     Allergies Neg Hx     Angioedema Neg Hx     Asthma Neg Hx     Atopy Neg Hx     Eczema Neg Hx     Immunodeficiency Neg Hx     Rhinitis Neg Hx     Urticaria Neg Hx        Social History     Tobacco Use    Smoking status: Former     Current packs/day: 0.00     Average packs/day: 1 pack/day for 10.0 years (10.0 ttl pk-yrs)     Types: Cigarettes     Start date: 1970     Quit date: 1980     Years since quittin.2    Smokeless tobacco: Never   Substance Use Topics    Alcohol use: No    Drug use: No       Current Outpatient Medications on File Prior to Visit   Medication Sig Dispense Refill    ACCU-CHEK GUIDE TEST STRIPS Strp TEST BLOOD GLUCOSE 3 TIMES A  strip 11    amLODIPine (NORVASC) 10 MG tablet TAKE 1 TABLET BY MOUTH EVERY DAY 90 tablet 3    busPIRone (BUSPAR) 5 MG Tab Take 1 tablet (5 mg total) by mouth 2 (two) times daily. 180 tablet 3    ciclopirox (PENLAC) 8 % Soln Apply topically nightly. 6.6 mL 3    COVID xel53-11,12up,,andu,,PF, (SPIKEVAX 0205-2926,12Y UP,,PF,) 50 mcg/0.5 mL injection Inject into the muscle. (Patient not taking: Reported on 3/14/2024) 0.5 mL 0    empagliflozin (JARDIANCE) 10 mg tablet Take 1 tablet (10 mg total) by mouth once daily. 90 tablet 3    ergocalciferol (ERGOCALCIFEROL) 50,000 unit  "Cap TAKE 1 CAPSULE BY MOUTH ONE TIME PER WEEK 12 capsule 3    esomeprazole (NEXIUM) 40 MG capsule TAKE 1 CAPSULE BY MOUTH EVERY DAY 90 capsule 3    lancets Misc Test blood glucose 3 (three) times daily. (Patient taking differently: Test blood glucose twice weekly) 100 each 11    meclizine (ANTIVERT) 12.5 mg tablet Take 1 tablet (12.5 mg total) by mouth 3 (three) times daily as needed for Dizziness. 40 tablet 0    metoprolol succinate (TOPROL-XL) 50 MG 24 hr tablet Take 1 tablet (50 mg total) by mouth once daily. 90 tablet 3    mycophenolate (CELLCEPT) 250 mg Cap TAKE 4 CAPSULES BY MOUTH TWICE DAILY 240 capsule 1    omega-3 fatty acids/fish oil (FISH OIL-OMEGA-3 FATTY ACIDS) 300-1,000 mg capsule Take by mouth once daily.      potassium chloride SA (K-DUR,KLOR-CON) 20 MEQ tablet TAKE 1 TABLET BY MOUTH TWICE A  tablet 3    simethicone (PHAZYME) 250 mg Cap Take 1 capsule by mouth 3 (three) times daily as needed (gas). 90 capsule 0    sucralfate (CARAFATE) 1 gram tablet Take 1 g by mouth daily as needed.      tacrolimus (PROGRAF) 0.5 MG Cap TAKE 2 CAPSULES BY MOUTH EVERY MORNING AND 1 CAPSULE EVERY EVENING. 90 capsule 8    tamsulosin (FLOMAX) 0.4 mg Cap Take 1 capsule (0.4 mg total) by mouth every evening. 90 capsule 3    XARELTO 20 mg Tab TAKE 1 TABLET (20 MG TOTAL) BY MOUTH ONCE DAILY. ONE TABLET ONCE DAILY WITH DINNER 90 tablet 3    betamethasone dipropionate 0.05 % cream Apply topically 2 (two) times daily. for 10 days 45 g 0     No current facility-administered medications on file prior to visit.       Review of patient's allergies indicates:   Allergen Reactions    Lisinopril      Stomach ache       Review of Systems:  A review of 10+ systems was conducted with pertinent positive and negative findings documented in HPI with all other systems reviewed and negative.    OBJECTIVE:     Estimated body mass index is 32.13 kg/m² as calculated from the following:    Height as of this encounter: 5' 8" (1.727 m).    " Weight as of this encounter: 95.8 kg (211 lb 5 oz).    Vital Signs (Most Recent)  Vitals:    03/14/24 1050   BP: (!) 161/98   Pulse: 69       Physical Exam:  GENERAL: patient sitting comfortably  HEENT: normocephalic  NECK: supple, no JVD  PULM: normal chest rise, no increased WOB  HEART: non-diaphoretic  ABDO: soft, nondistended, nontender  BACK: no CVA tenderness bilaterally  SKIN: warm, dry, well perfused  EXT: no bruising or edema  NEURO: grossly normal with no focal deficits  PSYCH: appropriate mood and affect    Genitourinary Exam:  deferred    Lab Results   Component Value Date    BUN 15 03/11/2024    CREATININE 1.0 03/11/2024    WBC 5.59 03/11/2024    HGB 15.0 03/11/2024    HCT 47.3 03/11/2024     03/11/2024    AST 18 03/11/2024    ALT 36 03/11/2024    ALKPHOS 71 03/11/2024    ALBUMIN 4.2 03/11/2024    HGBA1C 6.7 (H) 03/11/2024    INR 1.1 07/09/2019        Imaging:  I have personally reviewed all relevant imaging studies.    No results found. However, due to the size of the patient record, not all encounters were searched. Please check Results Review for a complete set of results.  No results found. However, due to the size of the patient record, not all encounters were searched. Please check Results Review for a complete set of results.  X-Ray Hand Complete Left  Narrative: EXAMINATION:  XR HAND COMPLETE 3 VIEW LEFT    CLINICAL HISTORY:  . Pain in left hand    TECHNIQUE:  PA, lateral, and oblique views of the left hand were performed.    COMPARISON:  None    FINDINGS:  Baseline DJD at the thumb CMC/triscaphe joint.  No acute fracture, dislocation, or osseous destruction.  Impression: As above.    Electronically signed by: Junior Gaston  Date:    03/14/2024  Time:    09:56       PSA:  Lab Results   Component Value Date    PSA 0.87 01/16/2024    PSA 0.78 07/03/2023    PSA 1.0 03/24/2022    PSA 0.74 03/15/2021    PSA 0.54 05/09/2019    PSA 0.48 08/17/2018       Testosterone:  No results found for:  ""TOTALTESTOST", "TESTOSTERONE"     ASSESSMENT     1. Nocturia    2. Benign prostatic hyperplasia with nocturia        PLAN:     Will cont tamsulosin as patient has seen improvement.  Could add oxybutynin if needed for irritative sx, pt declined for now  Also discussed surgical management options.  Patient would like to continue with medical management.  Follow up 1 year    Chuck Carrillo MD  Urology  [facility]    Disclaimer: This note has been generated using voice-recognition software. There may be typographical errors that have been missed during proof-reading.     "

## 2024-03-14 NOTE — PROGRESS NOTES
Subjective:      Patient ID: Roman Hodges is a 64 y.o. male.    Chief Complaint: Pain, Tingling, Numbness, and Swelling of the Left Hand      HPI  Roman Hodges is a  64 y.o. male presenting today for left hand and thumb pain.  There was not a history of trauma.  Onset of symptoms began several months ago  Symptoms worse with gripping   No numbness or tingling reported   .      Review of patient's allergies indicates:   Allergen Reactions    Lisinopril      Stomach ache         Current Outpatient Medications   Medication Sig Dispense Refill    ACCU-CHEK GUIDE TEST STRIPS Strp TEST BLOOD GLUCOSE 3 TIMES A  strip 11    amLODIPine (NORVASC) 10 MG tablet TAKE 1 TABLET BY MOUTH EVERY DAY 90 tablet 3    betamethasone dipropionate 0.05 % cream Apply topically 2 (two) times daily. for 10 days 45 g 0    busPIRone (BUSPAR) 5 MG Tab Take 1 tablet (5 mg total) by mouth 2 (two) times daily. 180 tablet 3    ciclopirox (PENLAC) 8 % Soln Apply topically nightly. 6.6 mL 3    empagliflozin (JARDIANCE) 10 mg tablet Take 1 tablet (10 mg total) by mouth once daily. 90 tablet 3    ergocalciferol (ERGOCALCIFEROL) 50,000 unit Cap TAKE 1 CAPSULE BY MOUTH ONE TIME PER WEEK 12 capsule 3    esomeprazole (NEXIUM) 40 MG capsule TAKE 1 CAPSULE BY MOUTH EVERY DAY 90 capsule 3    lancets Misc Test blood glucose 3 (three) times daily. (Patient taking differently: Test blood glucose twice weekly) 100 each 11    meclizine (ANTIVERT) 12.5 mg tablet Take 1 tablet (12.5 mg total) by mouth 3 (three) times daily as needed for Dizziness. 40 tablet 0    metoprolol succinate (TOPROL-XL) 50 MG 24 hr tablet Take 1 tablet (50 mg total) by mouth once daily. 90 tablet 3    mycophenolate (CELLCEPT) 250 mg Cap TAKE 4 CAPSULES BY MOUTH TWICE DAILY 240 capsule 1    omega-3 fatty acids/fish oil (FISH OIL-OMEGA-3 FATTY ACIDS) 300-1,000 mg capsule Take by mouth once daily.      potassium chloride SA (K-DUR,KLOR-CON) 20 MEQ tablet TAKE 1 TABLET BY MOUTH TWICE  A  tablet 3    simethicone (PHAZYME) 250 mg Cap Take 1 capsule by mouth 3 (three) times daily as needed (gas). 90 capsule 0    sucralfate (CARAFATE) 1 gram tablet Take 1 g by mouth daily as needed.      tacrolimus (PROGRAF) 0.5 MG Cap TAKE 2 CAPSULES BY MOUTH EVERY MORNING AND 1 CAPSULE EVERY EVENING. 90 capsule 8    tamsulosin (FLOMAX) 0.4 mg Cap Take 1 capsule (0.4 mg total) by mouth every evening. 90 capsule 3    XARELTO 20 mg Tab TAKE 1 TABLET (20 MG TOTAL) BY MOUTH ONCE DAILY. ONE TABLET ONCE DAILY WITH DINNER 90 tablet 3    COVID orb14-15,12up,,andu,,PF, (SPIKEVAX 1333-5654,12Y UP,,PF,) 50 mcg/0.5 mL injection Inject into the muscle. (Patient not taking: Reported on 3/14/2024) 0.5 mL 0     No current facility-administered medications for this visit.       Past Medical History:   Diagnosis Date    A-fib     Allergy     Anticoagulant long-term use     Diabetes mellitus, type 2     GERD (gastroesophageal reflux disease)     Hepatocellular carcinoma     liver    History of 2019 novel coronavirus disease (COVID-19)     tested positive 4/15/20 & 5/8/20    History of primary liver cancer 10/24/2018    S/p liver transplant 7/4/2019    Hyperlipidemia     Hypertension        Past Surgical History:   Procedure Laterality Date    COLONOSCOPY      COLONOSCOPY N/A 12/23/2021    Procedure: COLONOSCOPY miralax prep;  Surgeon: Seng Norman MD;  Location: [facility];  Service: Endoscopy;  Laterality: N/A;    ESOPHAGOGASTRODUODENOSCOPY N/A 1/15/2019    Procedure: ESOPHAGOGASTRODUODENOSCOPY (EGD);  Surgeon: Bony Saavedra MD;  Location: [facility];  Service: Endoscopy;  Laterality: N/A;    ESOPHAGOGASTRODUODENOSCOPY N/A 12/23/2021    Procedure: ESOPHAGOGASTRODUODENOSCOPY (EGD);  Surgeon: Seng Norman MD;  Location: [facility];  Service: Endoscopy;  Laterality: N/A;    HERNIA REPAIR      LEFT HEART CATHETERIZATION Left 12/4/2018    Procedure: Left heart cath;  Surgeon: Tyler Hinojosa MD;  Location:  [facility] CATH LAB/EP;  Service: Cardiology;  Laterality: Left;    LIVER TRANSPLANT N/A 7/3/2019    Procedure: TRANSPLANT, LIVER;  Surgeon: Oscar Altman Jr., MD;  [address];  Service: Transplant;  Laterality: N/A;    RADIOFREQUENCY ABLATION N/A 4/12/2019    Procedure: Radiofrequency Ablation;  Surgeon: Rose Surgeon;  Location: [facility];  Service: Anesthesiology;  Laterality: N/A;  Room 173/[facility]       Review of Systems:  ROS    OBJECTIVE:     PHYSICAL EXAM:  Height: 5' 8" (172.7 cm)    Vitals:    03/14/24 1119   Height: 5' 8" (1.727 m)   PainSc:   7   PainLoc: Hand     Well developed, well nourished male in no acute distress  Alert and oriented x 3  HEENT- Normal exam  Lungs- Clear to auscultation  Heart- Regular rate and rhythm  Abdomen- Soft nontender  Extremity exam- examination left hand there is tenderness at the base of left thumb  Positive grind test  Mild crepitation    strength slightly decreased   Sensation intact       RADIOGRAPHS:  AP lateral x-ray left hand show degenerative changes of the CMC joint also the STT joint of the left wrist  Comments: I have personally reviewed the imaging and I agree with the above radiologist's report.    ASSESSMENT/PLAN:     IMPRESSION:  Pantrapezial arthritis left thumb    PLAN:  I explained the nature of the problem to the patient   I have given him a 3D thumb brace for part-time use  Also recommended injection  After pause for time-out identified the base of left thumb injected with Kenalog 20 mg 0.5 cc xylocaine sterile technique  Tolerated the procedure well without complication  Continue Advil or Motrin by mouth  Follow-up 2-3 months       - We talked at length about the anatomy and pathophysiology of   Encounter Diagnoses   Name Primary?    Left hand pain Yes    Primary osteoarthritis of first carpometacarpal joint of left hand            Disclaimer: This note has been generated using voice-recognition software. There may be typographical " errors that have been missed during proof-reading.

## 2024-03-18 ENCOUNTER — PATIENT MESSAGE (OUTPATIENT)
Dept: FAMILY MEDICINE | Facility: CLINIC | Age: 64
End: 2024-03-18
Payer: MEDICARE

## 2024-03-25 ENCOUNTER — OFFICE VISIT (OUTPATIENT)
Dept: TRANSPLANT | Facility: CLINIC | Age: 64
End: 2024-03-25
Attending: INTERNAL MEDICINE
Payer: MEDICARE

## 2024-03-25 VITALS
TEMPERATURE: 97 F | SYSTOLIC BLOOD PRESSURE: 164 MMHG | WEIGHT: 214.75 LBS | BODY MASS INDEX: 33.71 KG/M2 | HEIGHT: 67 IN | OXYGEN SATURATION: 98 % | DIASTOLIC BLOOD PRESSURE: 98 MMHG | HEART RATE: 76 BPM | RESPIRATION RATE: 16 BRPM

## 2024-03-25 DIAGNOSIS — Z94.4 S/P LIVER TRANSPLANT: Chronic | ICD-10-CM

## 2024-03-25 DIAGNOSIS — Z29.89 PROPHYLACTIC IMMUNOTHERAPY: Primary | Chronic | ICD-10-CM

## 2024-03-25 PROCEDURE — 99999 PR PBB SHADOW E&M-EST. PATIENT-LVL V: CPT | Mod: PBBFAC,,, | Performed by: INTERNAL MEDICINE

## 2024-03-25 PROCEDURE — 99213 OFFICE O/P EST LOW 20 MIN: CPT | Mod: S$GLB,,, | Performed by: INTERNAL MEDICINE

## 2024-03-25 NOTE — LETTER
March 25, 2024        Jose Angel Munson  [address]  [place] [address]  Phone: [phone]  Fax: [phone]             [facility]  [address]  [facility] [address]  Phone: [phone]   Patient: Roman Hodges   MR Number: [id number]   YOB: 1960   Date of Visit: 3/25/2024       Dear Dr. Jose Angel Munson    Thank you for referring Roman Hodges to me for evaluation. Attached you will find relevant portions of my assessment and plan of care.    If you have questions, please do not hesitate to call me. I look forward to following Roman Hodges along with you.    Sincerely,    Fab Damon MD    Enclosure    If you would like to receive this communication electronically, please contact [email or web address] or [phone] to request [employer] access.    [employer] is a tool which provides read-only access to select patient information with whom you have a relationship. Its easy to use and provides real time access to review your patients record including encounter summaries, notes, results, and demographic information.    If you feel you have received this communication in error or would no longer like to receive these types of communications, please e-mail [email or web address]

## 2024-03-25 NOTE — PROGRESS NOTES
Transplant Hepatology  Liver Transplant Recipient Follow-up    Transplant Date: 7/4/2019  UNOS Native Liver Dx: Primary Liver Malignancy: Hepatoma (HCC) and Cirrhosis    Roman is here for follow up of his liver transplant.    ORGAN: LIVER  Whole or Partial: whole liver  Donor Type: donation after brain death  CDC High Risk: no  Donor CMV Status: Negative  Donor HCV Status: Negative  Donor HBcAb: Negative  Donor HBV MARIA TERESA:   Donor HCV MARIA TERESA: Negative  Biliary Anastomosis: end to end  Arterial Anatomy: replaced right hepatic from sma  IVC reconstruction: end to end ivc  Portal vein status: patent    He has had the following complications since transplant: wound infection. The noted complications is well controlled.    Subjective:     Interval History:   This 64-year-old gentleman is 4 and 1/2 years status post liver transplant for hepatocellular cancer on a background of nonalcoholic steatohepatitis.  Recent cross-sectional imaging showed no evidence of recurrent hepatocellular cancer.  His allograft function is excellent on tacrolimus monotherapy.  He has no symptoms of chronic liver disease.  He just retired from work 3 months ago.  .Review of Systems   Constitutional:  Negative for activity change, appetite change, chills, fatigue and unexpected weight change.   HENT:  Negative for congestion, facial swelling and tinnitus.    Eyes:  Negative for visual disturbance.   Respiratory:  Negative for cough, shortness of breath and wheezing.    Cardiovascular:  Negative for chest pain and palpitations.   Gastrointestinal:  Negative for abdominal distention.   Genitourinary:  Negative for dysuria.   Musculoskeletal:  Negative for arthralgias, joint swelling and myalgias.   Neurological:  Negative for syncope and headaches.   Hematological:  Does not bruise/bleed easily.   Psychiatric/Behavioral:  Negative for confusion.        Objective:     Physical Exam  Constitutional:       Appearance: He is well-developed.   Eyes:       General: No scleral icterus.  Cardiovascular:      Rate and Rhythm: Normal rate and regular rhythm.      Heart sounds: Normal heart sounds.   Pulmonary:      Effort: Pulmonary effort is normal. No respiratory distress.      Breath sounds: Normal breath sounds. No wheezing.   Abdominal:      General: Bowel sounds are normal. There is no distension.      Palpations: Abdomen is soft. There is no mass.      Tenderness: There is no abdominal tenderness. There is no rebound.       Musculoskeletal:         General: Normal range of motion.   Lymphadenopathy:      Cervical: No cervical adenopathy.   Skin:     General: Skin is warm and dry.   Neurological:      Mental Status: He is alert and oriented to person, place, and time.       Lab Results   Component Value Date    BILITOT 0.4 03/11/2024    AST 18 03/11/2024    ALT 36 03/11/2024    ALKPHOS 71 03/11/2024    CREATININE 1.0 03/11/2024    ALBUMIN 4.2 03/11/2024     Lab Results   Component Value Date    WBC 5.59 03/11/2024    HGB 15.0 03/11/2024    HCT 47.3 03/11/2024    HCT 41 07/04/2019     03/11/2024     Lab Results   Component Value Date    TACROLIMUS 4.4 (L) 03/11/2024       Assessment/Plan:     1. Prophylactic immunotherapy    2. S/P liver transplant      No change to current immunosuppression.   Labs every 6 months.    RTC in 12 months.    Fab Damon MD           [facility] Patient Status  Functional Status: 100%  Physical Capacity: No Limitations

## 2024-04-02 DIAGNOSIS — I10 ESSENTIAL HYPERTENSION: Primary | ICD-10-CM

## 2024-04-22 ENCOUNTER — PATIENT OUTREACH (OUTPATIENT)
Dept: ADMINISTRATIVE | Facility: HOSPITAL | Age: 64
End: 2024-04-22
Payer: MEDICARE

## 2024-04-22 DIAGNOSIS — K62.5 RECTAL BLEEDING: ICD-10-CM

## 2024-04-22 RX ORDER — HYDROCORTISONE 25 MG/G
CREAM TOPICAL
Qty: 28 G | Refills: 2 | Status: SHIPPED | OUTPATIENT
Start: 2024-04-22

## 2024-04-22 NOTE — LETTER
AUTHORIZATION FOR RELEASE OF   CONFIDENTIAL INFORMATION    Dear Dr Dashawn Patten MD,    We are seeing Roman Hodges, date of birth 1960, in the clinic at [facility]. Jose Angel Munson MD is the patient's PCP. Roman Hodges has an outstanding lab/procedure at the time we reviewed his chart. In order to help keep his health information updated, he has authorized us to request the following medical record(s):                                                (X) DIABETIC  EYE EXAM                                 Please fax records to [facility], Cortes-Moran, Rafael A., MD,        [facility]                                                       Please Fax to [facility] at [phone].     Thank you for your help, Debo Gastelum LPN-[facility]. If I can be of any assistance you can call at [phone]                     Patient Name: Roman Hodges  : 1960  Patient Phone #: [phone]

## 2024-04-22 NOTE — PROGRESS NOTES
Health Maintenance Topic(s) Outreach Outcomes & Actions Taken:    Eye Exam - Outreach Outcomes & Actions Taken  : External Records Requested & Care Team Updated if Applicable    Osteoporosis Screening - Outreach Outcomes & Actions Taken  : Patient Declined Scheduling Dexa or Will Call Back to Schedule       Additional Notes:           Care Management, Digital Medicine, and/or Education Referrals

## 2024-04-29 NOTE — PROGRESS NOTES
Subjective     Patient ID: Roman Hodges is a 64 y.o. male.    Chief Complaint: Follow-up and Hypertension    64 years old male came to the clinic for blood pressure check.  Blood pressure today was stable.  No chest pain, palpitation, orthopnea or PND.  He was previously diagnosed with aortic atherosclerosis.  Patient status post liver transplant.  Patient with stable A1c.  No polyuria, polydipsia or polyphagia.  Patient with chronic onychomycosis not able to use oral medicines for fungal infections.  Topical solution was not working properly.  He is requesting podiatric evaluation.  Urine with evidence of microalbuminuria.  Patient is allergic to ACE inhibitors.  Patient due for his pneumonia shot.    Follow-up  Pertinent negatives include no chest pain.   Hypertension  Pertinent negatives include no chest pain or palpitations.     Review of Systems   Constitutional: Negative.    HENT: Negative.     Eyes: Negative.    Respiratory: Negative.     Cardiovascular: Negative.  Negative for chest pain, palpitations, leg swelling and claudication.   Gastrointestinal: Negative.    Endocrine: Negative for polydipsia, polyphagia and polyuria.   Genitourinary: Negative.    Musculoskeletal: Negative.    Integumentary:  Negative.   Neurological: Negative.    Psychiatric/Behavioral: Negative.            Objective     Physical Exam  Vitals and nursing note reviewed.   Constitutional:       General: He is not in acute distress.     Appearance: He is well-developed. He is not diaphoretic.   HENT:      Head: Normocephalic and atraumatic.      Right Ear: External ear normal.      Left Ear: External ear normal.      Nose: Nose normal.      Mouth/Throat:      Pharynx: No oropharyngeal exudate.   Eyes:      General: No scleral icterus.        Right eye: No discharge.         Left eye: No discharge.      Conjunctiva/sclera: Conjunctivae normal.      Pupils: Pupils are equal, round, and reactive to light.   Neck:      Thyroid: No  thyromegaly.      Vascular: No JVD.      Trachea: No tracheal deviation.   Cardiovascular:      Rate and Rhythm: Normal rate and regular rhythm.      Heart sounds: Normal heart sounds. No murmur heard.     No friction rub. No gallop.   Pulmonary:      Effort: Pulmonary effort is normal. No respiratory distress.      Breath sounds: Normal breath sounds. No stridor. No wheezing or rales.   Chest:      Chest wall: No tenderness.   Abdominal:      General: Bowel sounds are normal. There is no distension.      Palpations: Abdomen is soft. There is no mass.      Tenderness: There is no abdominal tenderness. There is no guarding or rebound.   Musculoskeletal:         General: No tenderness. Normal range of motion.      Cervical back: Normal range of motion and neck supple.   Lymphadenopathy:      Cervical: No cervical adenopathy.   Skin:     General: Skin is warm and dry.      Coloration: Skin is not pale.      Findings: No erythema or rash.   Neurological:      Mental Status: He is alert and oriented to person, place, and time.      Cranial Nerves: No cranial nerve deficit.      Motor: No abnormal muscle tone.      Coordination: Coordination normal.      Deep Tendon Reflexes: Reflexes are normal and symmetric. Reflexes normal.   Psychiatric:         Behavior: Behavior normal.         Thought Content: Thought content normal.         Judgment: Judgment normal.            Assessment and Plan     1. Essential hypertension  -     Lipid Panel; Future; Expected date: 03/14/2024  -     Comprehensive Metabolic Panel; Future; Expected date: 03/14/2024    2. Aortic arch atherosclerosis  -     Lipid Panel; Future; Expected date: 03/14/2024    3. S/P liver transplant  -     Comprehensive Metabolic Panel; Future; Expected date: 03/14/2024    4. Type 2 diabetes mellitus with hyperglycemia, without long-term current use of insulin  -     Microalbumin/Creatinine Ratio, Urine; Future; Expected date: 03/14/2024  -     Lipid Panel; Future;  Expected date: 03/14/2024  -     Hemoglobin A1C; Future; Expected date: 03/14/2024  -     Comprehensive Metabolic Panel; Future; Expected date: 03/14/2024    5. Microalbuminuria  -     Microalbumin/Creatinine Ratio, Urine; Future; Expected date: 03/14/2024    6. Need for Streptococcus pneumoniae vaccination  -     (In Office Administered) Pneumococcal Conjugate Vaccine (20 Valent) (IM) (Preferred)    7. Onychomycosis  -     Ambulatory referral/consult to Podiatry; Future; Expected date: 03/21/2024        Continue monitoring blood sugar at home,ADA diet.   Continue monitoring blood pressure at home, low sodium diet.          Follow up in about 6 months (around 9/14/2024), or if symptoms worsen or fail to improve.       11

## 2024-05-02 ENCOUNTER — OFFICE VISIT (OUTPATIENT)
Dept: PODIATRY | Facility: CLINIC | Age: 64
End: 2024-05-02
Payer: MEDICARE

## 2024-05-02 VITALS
SYSTOLIC BLOOD PRESSURE: 154 MMHG | DIASTOLIC BLOOD PRESSURE: 86 MMHG | HEART RATE: 78 BPM | HEIGHT: 67 IN | BODY MASS INDEX: 33.63 KG/M2

## 2024-05-02 DIAGNOSIS — B35.1 ONYCHOMYCOSIS: ICD-10-CM

## 2024-05-02 PROCEDURE — 3008F BODY MASS INDEX DOCD: CPT | Mod: CPTII,S$GLB,, | Performed by: STUDENT IN AN ORGANIZED HEALTH CARE EDUCATION/TRAINING PROGRAM

## 2024-05-02 PROCEDURE — 3044F HG A1C LEVEL LT 7.0%: CPT | Mod: CPTII,S$GLB,, | Performed by: STUDENT IN AN ORGANIZED HEALTH CARE EDUCATION/TRAINING PROGRAM

## 2024-05-02 PROCEDURE — 3066F NEPHROPATHY DOC TX: CPT | Mod: CPTII,S$GLB,, | Performed by: STUDENT IN AN ORGANIZED HEALTH CARE EDUCATION/TRAINING PROGRAM

## 2024-05-02 PROCEDURE — 3079F DIAST BP 80-89 MM HG: CPT | Mod: CPTII,S$GLB,, | Performed by: STUDENT IN AN ORGANIZED HEALTH CARE EDUCATION/TRAINING PROGRAM

## 2024-05-02 PROCEDURE — 99214 OFFICE O/P EST MOD 30 MIN: CPT | Mod: S$GLB,,, | Performed by: STUDENT IN AN ORGANIZED HEALTH CARE EDUCATION/TRAINING PROGRAM

## 2024-05-02 PROCEDURE — 3060F POS MICROALBUMINURIA REV: CPT | Mod: CPTII,S$GLB,, | Performed by: STUDENT IN AN ORGANIZED HEALTH CARE EDUCATION/TRAINING PROGRAM

## 2024-05-02 PROCEDURE — 1159F MED LIST DOCD IN RCRD: CPT | Mod: CPTII,S$GLB,, | Performed by: STUDENT IN AN ORGANIZED HEALTH CARE EDUCATION/TRAINING PROGRAM

## 2024-05-02 PROCEDURE — 99999 PR PBB SHADOW E&M-EST. PATIENT-LVL IV: CPT | Mod: PBBFAC,,, | Performed by: STUDENT IN AN ORGANIZED HEALTH CARE EDUCATION/TRAINING PROGRAM

## 2024-05-02 PROCEDURE — 3077F SYST BP >= 140 MM HG: CPT | Mod: CPTII,S$GLB,, | Performed by: STUDENT IN AN ORGANIZED HEALTH CARE EDUCATION/TRAINING PROGRAM

## 2024-05-02 RX ORDER — KETOCONAZOLE 20 MG/G
CREAM TOPICAL DAILY
Qty: 15 G | Refills: 2 | Status: SHIPPED | OUTPATIENT
Start: 2024-05-02

## 2024-05-02 RX ORDER — BENZETHONIUM CHLORIDE 0.2 %
1 FILM-FORMING LIQUID WITH APPLICATOR TOPICAL DAILY
Qty: 13 ML | Refills: 5 | Status: SHIPPED | OUTPATIENT
Start: 2024-05-02

## 2024-05-02 NOTE — PROGRESS NOTES
Subjective:      Patient ID: Roman Hodges is a 64 y.o. male.    Chief Complaint: Diabetes Mellitus (PCP Dr. Munson, 3/14/24), Diabetic Foot Exam, and Routine Foot Care    Roman is a 64 y.o. male who presents to the clinic upon referral from Dr. Munson  for evaluation and treatment of diabetic feet. Roman has a past medical history of A-fib, Allergy, Anticoagulant long-term use, Diabetes mellitus, type 2, GERD (gastroesophageal reflux disease), Hepatocellular carcinoma, History of 2019 novel coronavirus disease (COVID-19), History of primary liver cancer (10/24/2018), Hyperlipidemia, and Hypertension. Patient relates no major problem with feet. Only complaints today consist of here for a diabetic foot exam. Relates to painful ingrown toenails to outside border of left great toe and inside border of right 2nd toe. No further pedal complaints.     1/12/23: Seen today for follow up of cryptotic border to left great toenail, outside border. States he would like it permanently removed. No new pedal complaints    5/2/24: Seen today for routine foot care. Complaining of toenail fungus, worse to left great toe. No further pedal complaints.     PCP: Jose Angel Munson MD    Date Last Seen by PCP: per above, 3/14/24    Current shoe gear: Tennis shoes    Hemoglobin A1C   Date Value Ref Range Status   03/11/2024 6.7 (H) 4.0 - 5.6 % Final     Comment:     ADA Screening Guidelines:  5.7-6.4%  Consistent with prediabetes  >or=6.5%  Consistent with diabetes    High levels of fetal hemoglobin interfere with the HbA1C  assay. Heterozygous hemoglobin variants (HbS, HgC, etc)do  not significantly interfere with this assay.   However, presence of multiple variants may affect accuracy.     07/03/2023 6.3 (H) 4.0 - 5.6 % Final     Comment:     ADA Screening Guidelines:  5.7-6.4%  Consistent with prediabetes  >or=6.5%  Consistent with diabetes    High levels of fetal hemoglobin interfere with the HbA1C  assay.  Heterozygous hemoglobin variants (HbS, HgC, etc)do  not significantly interfere with this assay.   However, presence of multiple variants may affect accuracy.     01/06/2023 6.2 (H) 4.0 - 5.6 % Final     Comment:     ADA Screening Guidelines:  5.7-6.4%  Consistent with prediabetes  >or=6.5%  Consistent with diabetes    High levels of fetal hemoglobin interfere with the HbA1C  assay. Heterozygous hemoglobin variants (HbS, HgC, etc)do  not significantly interfere with this assay.   However, presence of multiple variants may affect accuracy.           Review of Systems   Constitutional: Negative for chills, decreased appetite, diaphoresis and fever.   HENT:  Negative for congestion and hearing loss.    Cardiovascular:  Negative for chest pain, claudication, leg swelling and syncope.   Respiratory:  Negative for cough and shortness of breath.    Skin:  Positive for dry skin and nail changes. Negative for color change, flushing, itching, poor wound healing and rash.   Musculoskeletal:  Negative for arthritis, back pain, joint pain and joint swelling.   Gastrointestinal:  Negative for nausea and vomiting.   Neurological:  Negative for focal weakness, numbness, paresthesias and weakness.   Psychiatric/Behavioral:  Negative for altered mental status. The patient is not nervous/anxious.            Objective:      Physical Exam  Constitutional:       General: He is not in acute distress.     Appearance: Normal appearance. He is well-developed. He is not diaphoretic.   Cardiovascular:      Pulses:           Dorsalis pedis pulses are 2+ on the right side and 2+ on the left side.        Posterior tibial pulses are 2+ on the right side and 2+ on the left side.      Comments: Dorsalis pedis and posterior tibial pulses are within normal limits. Skin temperature is within normal limits. Toes are cool to touch and feet are warm proximally. Hair growth is within normal limits. Skin is normotrophic and without hyperpigmentation. No  edema noted. No varicosities or spider veins noted, bilaterally.   Musculoskeletal:         General: No tenderness.      Comments: Adequate joint range of motion without pain, limitation, nor crepitation to foot and ankle joints. Muscle strength is 5/5 in all groups bilaterally.       Feet:      Right foot:      Protective Sensation: 10 sites tested.  10 sites sensed.      Skin integrity: Dry skin present. No ulcer, blister, erythema or warmth.      Left foot:      Protective Sensation: 10 sites tested.  10 sites sensed.      Skin integrity: Dry skin present. No ulcer, blister, erythema or warmth.   Skin:     General: Skin is warm and dry.      Capillary Refill: Capillary refill takes less than 2 seconds.      Comments: Skin is warm and dry, no acute signs of infection noted bilaterally      Toenails are mildly elongated. Left hallux toenail is severely thickened and dystrophic, mild tenderness on palpation    Otherwise, no open wounds, macerations or hyperkeratotic lesions, bilaterally        Neurological:      Mental Status: He is alert and oriented to person, place, and time.      Sensory: No sensory deficit.      Motor: No abnormal muscle tone.      Comments: Light touch within normal limits. [place] 5.07 monofilamant testing is within normal limits. Vibratory sensation within normal limits, bilaterally.    Psychiatric:         Mood and Affect: Mood normal.         Behavior: Behavior normal.         Thought Content: Thought content normal.               Assessment:       Encounter Diagnosis   Name Primary?    Onychomycosis          Plan:       Roman was seen today for diabetes mellitus, diabetic foot exam and routine foot care.    Diagnoses and all orders for this visit:    Onychomycosis  -     Ambulatory referral/consult to Podiatry    Other orders  -     urea-lactic acid-propylene gly (KERASAL MULTI-PURPOSE NAIL REP) Soln; Apply 1 Applicatorful topically once daily.  -     ketoconazole (NIZORAL) 2 %  cream; Apply topically once daily.  -     Routine Foot Care      I counseled the patient on his conditions, their implications and medical management.    Utilizing sterile toenail clippers I aggressively trimmed  the offending above mentioned  nail border approximately 3 mm from its edge and carried the nail plate incision down at an angle in order to wedge out the offending portion of the nail plate. The offending border was then removed in toto. Remainder of toenails were debrided as a courtesy.  No blood was drawn. Patient tolerated the procedure well and related significant relief.     Discussed importance of keeping feet dry and changing socks and shoes on a regular basis to prevent spread of toenail fungus. Discussed treatment options for fungal toenails including topical home remedies, topical over the counter and prescription medications as well as oral prescription medications and laser treatment. All pros and cons discussed of each treatment. Patient elects for topical treatment. Script dispensed.     I advised him on keeping nails neatly trimmed, removing all sharp and loose edges, filing the nail as necessary to prevent damage to nail plate and prevent unnecessary pulling of toenail. Also advised to avoid tight fitting shoes to prevent pressure related issues. Recommend shoes with wide toe box or open toed shoe to reduce pressure.     Return to clinic in 1 year or PRN as discussed

## 2024-05-02 NOTE — PROCEDURES
"Routine Foot Care    Date/Time: 5/2/2024 9:15 AM    Performed by: Caridad Crisostomo DPM  Authorized by: Caridad Crisostomo DPM    Time out: Immediately prior to procedure a "time out" was called to verify the correct patient, procedure, equipment, support staff and site/side marked as required.    Consent Done?:  Yes (Verbal)  Hyperkeratotic Skin Lesions?: No      Nail Care Type:  Debride  Patient tolerance:  Patient tolerated the procedure well with no immediate complications    "

## 2024-05-03 ENCOUNTER — OFFICE VISIT (OUTPATIENT)
Dept: SURGERY | Facility: CLINIC | Age: 64
End: 2024-05-03
Attending: COLON & RECTAL SURGERY
Payer: MEDICARE

## 2024-05-03 VITALS
RESPIRATION RATE: 18 BRPM | WEIGHT: 218.69 LBS | HEART RATE: 82 BPM | SYSTOLIC BLOOD PRESSURE: 161 MMHG | HEIGHT: 67 IN | BODY MASS INDEX: 34.33 KG/M2 | DIASTOLIC BLOOD PRESSURE: 99 MMHG

## 2024-05-03 DIAGNOSIS — K62.5 RECTAL BLEEDING: Primary | ICD-10-CM

## 2024-05-03 PROCEDURE — 3066F NEPHROPATHY DOC TX: CPT | Mod: CPTII,S$GLB,, | Performed by: COLON & RECTAL SURGERY

## 2024-05-03 PROCEDURE — 1160F RVW MEDS BY RX/DR IN RCRD: CPT | Mod: CPTII,S$GLB,, | Performed by: COLON & RECTAL SURGERY

## 2024-05-03 PROCEDURE — 3080F DIAST BP >= 90 MM HG: CPT | Mod: CPTII,S$GLB,, | Performed by: COLON & RECTAL SURGERY

## 2024-05-03 PROCEDURE — 3060F POS MICROALBUMINURIA REV: CPT | Mod: CPTII,S$GLB,, | Performed by: COLON & RECTAL SURGERY

## 2024-05-03 PROCEDURE — 3077F SYST BP >= 140 MM HG: CPT | Mod: CPTII,S$GLB,, | Performed by: COLON & RECTAL SURGERY

## 2024-05-03 PROCEDURE — 1159F MED LIST DOCD IN RCRD: CPT | Mod: CPTII,S$GLB,, | Performed by: COLON & RECTAL SURGERY

## 2024-05-03 PROCEDURE — 1101F PT FALLS ASSESS-DOCD LE1/YR: CPT | Mod: CPTII,S$GLB,, | Performed by: COLON & RECTAL SURGERY

## 2024-05-03 PROCEDURE — 99213 OFFICE O/P EST LOW 20 MIN: CPT | Mod: 25,S$GLB,, | Performed by: COLON & RECTAL SURGERY

## 2024-05-03 PROCEDURE — 46600 DIAGNOSTIC ANOSCOPY SPX: CPT | Mod: S$GLB,,, | Performed by: COLON & RECTAL SURGERY

## 2024-05-03 PROCEDURE — 3044F HG A1C LEVEL LT 7.0%: CPT | Mod: CPTII,S$GLB,, | Performed by: COLON & RECTAL SURGERY

## 2024-05-03 PROCEDURE — 99999 PR PBB SHADOW E&M-EST. PATIENT-LVL IV: CPT | Mod: PBBFAC,,, | Performed by: COLON & RECTAL SURGERY

## 2024-05-03 PROCEDURE — 3008F BODY MASS INDEX DOCD: CPT | Mod: CPTII,S$GLB,, | Performed by: COLON & RECTAL SURGERY

## 2024-05-03 PROCEDURE — 3288F FALL RISK ASSESSMENT DOCD: CPT | Mod: CPTII,S$GLB,, | Performed by: COLON & RECTAL SURGERY

## 2024-05-03 RX ORDER — MYCOPHENOLATE MOFETIL 250 MG/1
CAPSULE ORAL
Qty: 240 CAPSULE | Refills: 1 | Status: SHIPPED | OUTPATIENT
Start: 2024-05-03

## 2024-05-03 NOTE — PROGRESS NOTES
CRS Office Visit History and Physical      SUBJECTIVE:     Chief Complaint: Hemorrhoids    History of Present Illness:  The patient is known patient to this practice.   Course is as follows:  Patient is a 64 y.o. male presents with rectal bleeding.  Happened after episode of constipation. Normally spends <5min on the toilet. Stopped Metamucil b/c it made him constipated.  Drinks >64oz/day of water.  Tried steroid [facility] without improvement.  Saw Pallavi for similar in 2022.  On Xarelto for A-fib, took it last night.  No prior anorectal surgery.    Last Colonoscopy: Dec 2021    Review of patient's allergies indicates:   Allergen Reactions    Lisinopril      Stomach ache       Past Medical History:   Diagnosis Date    A-fib     Allergy     Anticoagulant long-term use     Diabetes mellitus, type 2     GERD (gastroesophageal reflux disease)     Hepatocellular carcinoma     liver    History of 2019 novel coronavirus disease (COVID-19)     tested positive 4/15/20 & 5/8/20    History of primary liver cancer 10/24/2018    S/p liver transplant 7/4/2019    Hyperlipidemia     Hypertension      Past Surgical History:   Procedure Laterality Date    COLONOSCOPY      COLONOSCOPY N/A 12/23/2021    Procedure: COLONOSCOPY miralax prep;  Surgeon: Seng Norman MD;  Location: [facility];  Service: Endoscopy;  Laterality: N/A;    ESOPHAGOGASTRODUODENOSCOPY N/A 1/15/2019    Procedure: ESOPHAGOGASTRODUODENOSCOPY (EGD);  Surgeon: Bony Saavedra MD;  Location: [facility];  Service: Endoscopy;  Laterality: N/A;    ESOPHAGOGASTRODUODENOSCOPY N/A 12/23/2021    Procedure: ESOPHAGOGASTRODUODENOSCOPY (EGD);  Surgeon: Seng Norman MD;  Location: [facility];  Service: Endoscopy;  Laterality: N/A;    HERNIA REPAIR      LEFT HEART CATHETERIZATION Left 12/4/2018    Procedure: Left heart cath;  Surgeon: Tyler Hinojosa MD;  Location: [facility] CATH LAB/EP;  Service: Cardiology;  Laterality: Left;    LIVER TRANSPLANT N/A 7/3/2019     "Procedure: TRANSPLANT, LIVER;  Surgeon: Oscar Altman Jr., MD;  Location: [address];  Service: Transplant;  Laterality: N/A;    RADIOFREQUENCY ABLATION N/A 2019    Procedure: Radiofrequency Ablation;  Surgeon: Hayley Surgeon;  Location: [facility];  Service: Anesthesiology;  Laterality: N/A;  Room [address] [place] [address]     Family History   Problem Relation Name Age of Onset    Hypertension Mother      Cancer Mother      Hypertension Father      Stroke Father      Cancer Father      Allergic rhinitis Neg Hx      Allergies Neg Hx      Angioedema Neg Hx      Asthma Neg Hx      Atopy Neg Hx      Eczema Neg Hx      Immunodeficiency Neg Hx      Rhinitis Neg Hx      Urticaria Neg Hx       Social History     Tobacco Use    Smoking status: Former     Current packs/day: 0.00     Average packs/day: 1 pack/day for 10.0 years (10.0 ttl pk-yrs)     Types: Cigarettes     Start date: 1970     Quit date: 1980     Years since quittin.3    Smokeless tobacco: Never   Substance Use Topics    Alcohol use: No    Drug use: No        Review of Systems:  ROS    OBJECTIVE:     Vital Signs (Most Recent)  BP (!) 161/99 (BP Location: Left arm, Patient Position: Sitting, BP Method: Large (Automatic))   Pulse 82   Resp 18   Ht 5' 7" (1.702 m)   Wt 99.2 kg (218 lb 11.1 oz)   BMI 34.25 kg/m²     Physical Exam:  General: White male in no distress   Neuro: Alert and oriented x 4.  Moves all extremities.     HEENT: No icterus.  Trachea midline  Respiratory: Respirations are even and unlabored  Cardiac: Regular rate  Extremities: Warm dry and intact  Skin: No rashes     Patient was examined w/ a chaperone present.    Anorectal:   External exam: Perianal skin with small/soft skin tags, no fissure  Digital exam revealed normal tone. No masses.  No gross blood.    Anoscopy:  Verbal consent was obtained.   Clear plastic anoscope inserted.    Grade II/III hemorrhoids seen.  No active bleeding seen.      ASSESSMENT/PLAN:     63yo M " w/ rectal bleeding    The patient was instructed to:  Increase water intake to at least 8-10 glasses of water per day.  Avoid straining or spending >5min/event with bowel movements.  Follow-up in clinic for banding next week, will have him hold Xarelto for 48hrs prior to procedure.    Merced Purdy MD  Staff Surgeon, Colon and Rectal Surgery  [facility]

## 2024-05-04 DIAGNOSIS — E55.9 VITAMIN D DEFICIENCY DISEASE: ICD-10-CM

## 2024-05-04 NOTE — TELEPHONE ENCOUNTER
Care Due:                  Date            Visit Type   Department     Provider  --------------------------------------------------------------------------------                                EP -                              PRIMARY      [facility]  Last Visit: 03-      CARE ([facility])   MEDICINE       Jose Angel Munson                              MYCHART                              FOLLOWUP/OF  [facility]  Next Visit: 07-      FICE VISIT   MEDICINE       Jose Angel Munson                                                            Last  Test          Frequency    Reason                     Performed    Due Date  --------------------------------------------------------------------------------    Vitamin D...  12 months..  ergocalciferol...........  Not Found    Overdue    [employer] Embedded Care Due Messages. Reference number: [id number].   5/04/2024 9:06:41 AM CDT

## 2024-05-06 DIAGNOSIS — E55.9 VITAMIN D DEFICIENCY DISEASE: ICD-10-CM

## 2024-05-06 RX ORDER — ERGOCALCIFEROL 1.25 MG/1
CAPSULE ORAL
Qty: 12 CAPSULE | Refills: 3 | Status: SHIPPED | OUTPATIENT
Start: 2024-05-06

## 2024-05-06 RX ORDER — ERGOCALCIFEROL 1.25 MG/1
CAPSULE ORAL
Qty: 12 CAPSULE | Refills: 3 | Status: SHIPPED | OUTPATIENT
Start: 2024-05-06 | End: 2024-05-06

## 2024-05-06 NOTE — PROGRESS NOTES
CRS Office Visit History and Physical      SUBJECTIVE:     Chief Complaint: Hemorrhoids    History of Present Illness:  Patient is a 64 y.o. male presents for banding  Held Xarelto x 72hrs.    Prior:  Happened after episode of constipation. Normally spends <5min on the toilet. Stopped Metamucil b/c it made him constipated.  Drinks >64oz/day of water.  Tried steroid OR without improvement.  Saw Pallavi for similar in 2022.  On Xarelto for A-fib, took it last night.  No prior anorectal surgery.    Last Colonoscopy: Dec 2021    Review of patient's allergies indicates:   Allergen Reactions    Lisinopril      Stomach ache       Past Medical History:   Diagnosis Date    A-fib     Allergy     Anticoagulant long-term use     Diabetes mellitus, type 2     GERD (gastroesophageal reflux disease)     Hepatocellular carcinoma     liver    History of 2019 novel coronavirus disease (COVID-19)     tested positive 4/15/20 & 5/8/20    History of primary liver cancer 10/24/2018    S/p liver transplant 7/4/2019    Hyperlipidemia     Hypertension      Past Surgical History:   Procedure Laterality Date    COLONOSCOPY      COLONOSCOPY N/A 12/23/2021    Procedure: COLONOSCOPY miralax prep;  Surgeon: Seng Norman MD;  Location: [facility];  Service: Endoscopy;  Laterality: N/A;    ESOPHAGOGASTRODUODENOSCOPY N/A 1/15/2019    Procedure: ESOPHAGOGASTRODUODENOSCOPY (EGD);  Surgeon: Bony Saavedra MD;  Location: [facility];  Service: Endoscopy;  Laterality: N/A;    ESOPHAGOGASTRODUODENOSCOPY N/A 12/23/2021    Procedure: ESOPHAGOGASTRODUODENOSCOPY (EGD);  Surgeon: Seng Norman MD;  Location: [facility];  Service: Endoscopy;  Laterality: N/A;    HERNIA REPAIR      LEFT HEART CATHETERIZATION Left 12/4/2018    Procedure: Left heart cath;  Surgeon: Tyler Hinojosa MD;  Location: [facility] CATH LAB/EP;  Service: Cardiology;  Laterality: Left;    LIVER TRANSPLANT N/A 7/3/2019    Procedure: TRANSPLANT, LIVER;  Surgeon: Oscar Altman  "MD Marge;  Location: [address] [facility];  Service: Transplant;  Laterality: N/A;    RADIOFREQUENCY ABLATION N/A 2019    Procedure: Radiofrequency Ablation;  Surgeon: Rose Surgeon;  Location: [facility];  Service: Anesthesiology;  Laterality: N/A;  Room [address] [place] [address]     Family History   Problem Relation Name Age of Onset    Hypertension Mother      Cancer Mother      Hypertension Father      Stroke Father      Cancer Father      Allergic rhinitis Neg Hx      Allergies Neg Hx      Angioedema Neg Hx      Asthma Neg Hx      Atopy Neg Hx      Eczema Neg Hx      Immunodeficiency Neg Hx      Rhinitis Neg Hx      Urticaria Neg Hx       Social History     Tobacco Use    Smoking status: Former     Current packs/day: 0.00     Average packs/day: 1 pack/day for 10.0 years (10.0 ttl pk-yrs)     Types: Cigarettes     Start date: 1970     Quit date: 1980     Years since quittin.3    Smokeless tobacco: Never   Substance Use Topics    Alcohol use: No    Drug use: No        Review of Systems:  ROS    OBJECTIVE:     Vital Signs (Most Recent)  BP (!) 151/97 (BP Location: Left arm, Patient Position: Sitting)   Pulse 73   Ht 5' 7.01" (1.702 m)   Wt 98.9 kg (218 lb 0.6 oz)   BMI 34.14 kg/m²     Physical Exam:  General: White male in no distress   Neuro: Alert and oriented x 4.  Moves all extremities.     HEENT: No icterus.  Trachea midline  Respiratory: Respirations are even and unlabored  Cardiac: Regular rate  Extremities: Warm dry and intact  Skin: No rashes     Patient was examined w/ a chaperone present.    Anorectal:   External exam: Perianal skin with small/soft skin tags, no fissure  Digital exam revealed normal tone. No masses.  No gross blood.    Anoscopy:  Verbal consent was obtained.   Clear plastic anoscope inserted.    Grade II/III hemorrhoids seen.  No active bleeding seen.    Rubber Band Ligation:  Suction applicator was placed above the dentate line.   Rubber bands were deployed in the right anterior " and right posterior positions.    Patient tolerated the procedure well.       ASSESSMENT/PLAN:     65yo M w/ rectal bleeding, s/p banding today    The patient was instructed to:  Increase water intake to at least 8-10 glasses of water per day.  Avoid straining or spending >5min/event with bowel movements.  Hold Xarelto x 6 days  RTC 6-8 weeks    Merced Purdy MD  Staff Surgeon, Colon and Rectal Surgery  [facility]

## 2024-05-06 NOTE — TELEPHONE ENCOUNTER
Refill Routing Note   Medication(s) are not appropriate for processing by [facility] for the following reason(s):        Outside of protocol    [facility] action(s):  Route               Appointments  past 12m or future 3m with PCP    Date Provider   Last Visit   3/14/2024 Jose Angel Munson MD   Next Visit   7/3/2024 Jose Angel Munson MD   ED visits in past 90 days: 0        Note composed:7:06 AM 05/06/2024

## 2024-05-06 NOTE — TELEPHONE ENCOUNTER
No care due was identified.  [facility] Embedded Care Due Messages. Reference number: [id number].   5/06/2024 6:58:55 AM CDT

## 2024-05-08 ENCOUNTER — OFFICE VISIT (OUTPATIENT)
Dept: SURGERY | Facility: CLINIC | Age: 64
End: 2024-05-08
Attending: COLON & RECTAL SURGERY
Payer: MEDICARE

## 2024-05-08 VITALS
SYSTOLIC BLOOD PRESSURE: 151 MMHG | HEIGHT: 67 IN | DIASTOLIC BLOOD PRESSURE: 97 MMHG | BODY MASS INDEX: 34.22 KG/M2 | WEIGHT: 218.06 LBS | HEART RATE: 73 BPM

## 2024-05-08 DIAGNOSIS — K64.9 BLEEDING HEMORRHOID: Primary | ICD-10-CM

## 2024-05-08 PROCEDURE — 1159F MED LIST DOCD IN RCRD: CPT | Mod: CPTII,S$GLB,, | Performed by: COLON & RECTAL SURGERY

## 2024-05-08 PROCEDURE — 3066F NEPHROPATHY DOC TX: CPT | Mod: CPTII,S$GLB,, | Performed by: COLON & RECTAL SURGERY

## 2024-05-08 PROCEDURE — 99999 PR PBB SHADOW E&M-EST. PATIENT-LVL IV: CPT | Mod: PBBFAC,,, | Performed by: COLON & RECTAL SURGERY

## 2024-05-08 PROCEDURE — 99213 OFFICE O/P EST LOW 20 MIN: CPT | Mod: 25,S$GLB,, | Performed by: COLON & RECTAL SURGERY

## 2024-05-08 PROCEDURE — 3044F HG A1C LEVEL LT 7.0%: CPT | Mod: CPTII,S$GLB,, | Performed by: COLON & RECTAL SURGERY

## 2024-05-08 PROCEDURE — 3060F POS MICROALBUMINURIA REV: CPT | Mod: CPTII,S$GLB,, | Performed by: COLON & RECTAL SURGERY

## 2024-05-08 PROCEDURE — 3077F SYST BP >= 140 MM HG: CPT | Mod: CPTII,S$GLB,, | Performed by: COLON & RECTAL SURGERY

## 2024-05-08 PROCEDURE — 1160F RVW MEDS BY RX/DR IN RCRD: CPT | Mod: CPTII,S$GLB,, | Performed by: COLON & RECTAL SURGERY

## 2024-05-08 PROCEDURE — 46221 LIGATION OF HEMORRHOID(S): CPT | Mod: S$GLB,,, | Performed by: COLON & RECTAL SURGERY

## 2024-05-08 PROCEDURE — 3080F DIAST BP >= 90 MM HG: CPT | Mod: CPTII,S$GLB,, | Performed by: COLON & RECTAL SURGERY

## 2024-05-08 PROCEDURE — 3288F FALL RISK ASSESSMENT DOCD: CPT | Mod: CPTII,S$GLB,, | Performed by: COLON & RECTAL SURGERY

## 2024-05-08 PROCEDURE — 1101F PT FALLS ASSESS-DOCD LE1/YR: CPT | Mod: CPTII,S$GLB,, | Performed by: COLON & RECTAL SURGERY

## 2024-05-08 PROCEDURE — 3008F BODY MASS INDEX DOCD: CPT | Mod: CPTII,S$GLB,, | Performed by: COLON & RECTAL SURGERY

## 2024-06-02 ENCOUNTER — PATIENT MESSAGE (OUTPATIENT)
Dept: FAMILY MEDICINE | Facility: CLINIC | Age: 64
End: 2024-06-02
Payer: MEDICARE

## 2024-06-02 ENCOUNTER — HOSPITAL ENCOUNTER (EMERGENCY)
Facility: HOSPITAL | Age: 64
Discharge: HOME | End: 2024-06-02
Attending: EMERGENCY MEDICINE
Payer: MEDICARE

## 2024-06-02 VITALS
RESPIRATION RATE: 18 BRPM | HEART RATE: 71 BPM | DIASTOLIC BLOOD PRESSURE: 98 MMHG | TEMPERATURE: 98 F | SYSTOLIC BLOOD PRESSURE: 159 MMHG | OXYGEN SATURATION: 97 %

## 2024-06-02 DIAGNOSIS — G51.0 BELL'S PALSY: Primary | ICD-10-CM

## 2024-06-02 LAB — POCT GLUCOSE: 113 MG/DL (ref 70–110)

## 2024-06-02 PROCEDURE — 99285 EMERGENCY DEPT VISIT HI MDM: CPT | Mod: 25

## 2024-06-02 PROCEDURE — 82962 GLUCOSE BLOOD TEST: CPT

## 2024-06-02 RX ORDER — VALACYCLOVIR HYDROCHLORIDE 1 G/1
1000 TABLET, FILM COATED ORAL 3 TIMES DAILY
Qty: 21 TABLET | Refills: 0 | Status: SHIPPED | OUTPATIENT
Start: 2024-06-02 | End: 2024-06-09

## 2024-06-02 RX ORDER — PREDNISONE 20 MG/1
40 TABLET ORAL DAILY
Qty: 10 TABLET | Refills: 0 | Status: SHIPPED | OUTPATIENT
Start: 2024-06-02 | End: 2024-06-07

## 2024-06-02 NOTE — ED PROVIDER NOTES
Encounter Date: 6/2/2024       History     Chief Complaint   Patient presents with    Drooling     Pt presents to ED for inability to hold water while brushing teeth yesterday, hx of bell's palsy, pt unable to inflate right cheek, left mouth appears drooped. Dr. New in triage to perform assessment.       Patient was a 64-year-old male who says he was began trouble holding water in his mouth while brushing his teeth yesterday.  He was also had difficulty smiling.  He denies headache or visual changes.  No extremity numbness or weakness.  No dizziness.  Patient also describes a slight metallic taste in his mouth.  No hearing difficulty so in the right ear.      Review of patient's allergies indicates:   Allergen Reactions    Lisinopril      Stomach ache     Past Medical History:   Diagnosis Date    A-fib     Allergy     Anticoagulant long-term use     Diabetes mellitus, type 2     GERD (gastroesophageal reflux disease)     Hepatocellular carcinoma     liver    History of 2019 novel coronavirus disease (COVID-19)     tested positive 4/15/20 & 5/8/20    History of primary liver cancer 10/24/2018    S/p liver transplant 7/4/2019    Hyperlipidemia     Hypertension      Past Surgical History:   Procedure Laterality Date    COLONOSCOPY      COLONOSCOPY N/A 12/23/2021    Procedure: COLONOSCOPY miralax prep;  Surgeon: Seng Norman MD;  Location: [facility];  Service: Endoscopy;  Laterality: N/A;    ESOPHAGOGASTRODUODENOSCOPY N/A 1/15/2019    Procedure: ESOPHAGOGASTRODUODENOSCOPY (EGD);  Surgeon: Bony Saavedra MD;  Location: [facility] ENDO;  Service: Endoscopy;  Laterality: N/A;    ESOPHAGOGASTRODUODENOSCOPY N/A 12/23/2021    Procedure: ESOPHAGOGASTRODUODENOSCOPY (EGD);  Surgeon: Seng Norman MD;  Location: [facility];  Service: Endoscopy;  Laterality: N/A;    HERNIA REPAIR      LEFT HEART CATHETERIZATION Left 12/4/2018    Procedure: Left heart cath;  Surgeon: Tyler Hinojosa MD;  Location: [facility] CATH  LAB/EP;  Service: Cardiology;  Laterality: Left;    LIVER TRANSPLANT N/A 7/3/2019    Procedure: TRANSPLANT, LIVER;  Surgeon: Oscar Altman Jr., MD;  Location: [address];  Service: Transplant;  Laterality: N/A;    RADIOFREQUENCY ABLATION N/A 2019    Procedure: Radiofrequency Ablation;  Surgeon: Rose Surgeon;  Location: [facility];  Service: Anesthesiology;  Laterality: N/A;  Room [facility]     Family History   Problem Relation Name Age of Onset    Hypertension Mother      Cancer Mother      Hypertension Father      Stroke Father      Cancer Father      Allergic rhinitis Neg Hx      Allergies Neg Hx      Angioedema Neg Hx      Asthma Neg Hx      Atopy Neg Hx      Eczema Neg Hx      Immunodeficiency Neg Hx      Rhinitis Neg Hx      Urticaria Neg Hx       Social History     Tobacco Use    Smoking status: Former     Current packs/day: 0.00     Average packs/day: 1 pack/day for 10.0 years (10.0 ttl pk-yrs)     Types: Cigarettes     Start date: 1970     Quit date: 1980     Years since quittin.4    Smokeless tobacco: Never   Substance Use Topics    Alcohol use: No    Drug use: No     Review of Systems   Constitutional:  Negative for fever.   Eyes:  Negative for visual disturbance.   Neurological:  Positive for weakness. Negative for dizziness and headaches.   All other systems reviewed and are negative.      Physical Exam     Initial Vitals   BP Pulse Resp Temp SpO2   24 0939 24 0939 24 0939 24 1142 24 0939   (!) 169/96 78 14 97.9 °F (36.6 °C) 96 %      MAP       --                Physical Exam    Nursing note and vitals reviewed.  Constitutional: No distress.   HENT:   Head: Atraumatic.   Neck: Neck supple.   Cardiovascular:  Normal rate, regular rhythm and normal heart sounds.           Pulmonary/Chest: He is in respiratory distress.   Musculoskeletal:      Cervical back: Neck supple.     Neurological: He is alert and oriented to person, place, and time.      There was a very slight drooping noted to the right side of the mouth when the patient smiles.  He was able to fully close his right eye.  Slight palsy noted to the right forehead.      Strong hand  bilaterally.  No focal deficits noted of the right and left upper and lower extremities.   Psychiatric: He has a normal mood and affect. Thought content normal.         ED Course   Procedures  Labs Reviewed   POCT GLUCOSE - Abnormal; Notable for the following components:       Result Value    POCT Glucose 113 (*)     All other components within normal limits          Imaging Results              CT Head Without Contrast (Final result)  Result time 06/02/24 10:58:06      Final result by Adelfo Sethi MD (06/02/24 10:58:06)                   Impression:      No definite acute intracranial findings by noncontrast CT.      Electronically signed by: Adelfo Sethi  Date:    06/02/2024  Time:    10:58               Narrative:    EXAMINATION:  CT HEAD WITHOUT CONTRAST    CLINICAL HISTORY:  Neuro deficit, acute, stroke suspected;    TECHNIQUE:  Low dose axial images were obtained through the head.  Coronal and sagittal reformations were also performed. Contrast was not administered.    COMPARISON:  MRI brain/IAC's performed 01/25/2021.    FINDINGS:  Blood: No acute intracranial hemorrhage.    Parenchyma: No definite loss of gray-white differentiation to suggest acute or subacute transcortical infarct.    Ventricles/Extra-axial spaces: No abnormal extra-axial fluid collection. Basal cisterns are patent.  Redemonstrated nonspecific asymmetric enlargement of the right lateral ventricle, favored developmental in etiology.    Vessels: Minimal atherosclerotic calcification.    Orbits: Unremarkable.    Scalp: Unremarkable.    Skull: There are no depressed skull fractures or destructive bone lesions.    Sinuses and mastoids: Essentially clear.    Other findings: None                                       Medications - No  data to display  Medical Decision Making    Emergent evaluation of a 64-year-old male who began with right-sided facial weakness yesterday evening.  He has a history of Bell's palsy than effect of the same side. Palsy seems to involve the forehead suggestive of Bell's palsy.   He was able to fully close his right eye.  Deficit only involves the face and he was noted with strong upper and lower extremities and no difficulty ambulating.  I will place him on prednisone and Valtrex when treatment of Bell's palsy.  I have urge close follow up with this primary physician but most importantly to return to the emergency department for any possible worsening.    Amount and/or Complexity of Data Reviewed  Radiology: ordered.     Details:   No acute abnormality seen on the uncontrasted head CT.    Risk  Prescription drug management.                                       Clinical Impression:  Final diagnoses:  [G51.0] Bell's palsy (Primary)          ED Disposition Condition    Discharge Stable          ED Prescriptions       Medication Sig Dispense Start Date End Date Auth. Provider    valACYclovir (VALTREX) 1000 MG tablet Take 1 tablet (1,000 mg total) by mouth 3 (three) times daily. for 7 days 21 tablet 6/2/2024 6/9/2024 Daljit New MD    predniSONE (DELTASONE) 20 MG tablet Take 2 tablets (40 mg total) by mouth once daily. for 5 days 10 tablet 6/2/2024 6/7/2024 Daljit New MD          Follow-up Information       Follow up With Specialties Details Why Contact Info    Jose Angel Munson MD Family Medicine Schedule an appointment as soon as possible for a visit   [address] [facility] [id number] [phone]      [facility] Emergency Dept Emergency Medicine  If symptoms worsen [address]  [facility] [id number] [phone]             Daljit New MD  06/02/24 3652

## 2024-06-03 ENCOUNTER — PATIENT MESSAGE (OUTPATIENT)
Dept: ADMINISTRATIVE | Facility: HOSPITAL | Age: 64
End: 2024-06-03
Payer: MEDICARE

## 2024-06-03 ENCOUNTER — PATIENT OUTREACH (OUTPATIENT)
Dept: ADMINISTRATIVE | Facility: HOSPITAL | Age: 64
End: 2024-06-03
Payer: MEDICARE

## 2024-06-03 NOTE — LETTER
AUTHORIZATION FOR RELEASE OF   CONFIDENTIAL INFORMATION    Dear Dr. Becerra,    We are seeing Roman Hodges, date of birth 1960, in the clinic at [facility]. Jose Angel Munson MD is the patient's PCP. Roman Hodges has an outstanding lab/procedure at the time we reviewed his chart. In order to help keep his health information updated, he has authorized us to request the following medical record(s):                                                      ( X )  EYE EXAM                   Please fax records to [facility], Cortes-Moran, Rafael A., MD, [phone]            Patient Name: Roman Hodges  : 1960  Patient Phone #: [phone]

## 2024-06-03 NOTE — PROGRESS NOTES
Population Health Chart Review & Patient Outreach Details      Additional [facility] Health Notes:               Updates Requested / Reviewed:      Updated Care Coordination Note, Care Everywhere, and Immunizations Reconciliation Completed or Queried: [place]         Health Maintenance Topics Overdue:      [facility] Score: 2     Eye Exam  Uncontrolled BP    RSV Vaccine                  Health Maintenance Topic(s) Outreach Outcomes & Actions Taken:    Eye Exam - Outreach Outcomes & Actions Taken  : External Records Requested & Care Team Updated if Applicable

## 2024-06-04 ENCOUNTER — OFFICE VISIT (OUTPATIENT)
Dept: FAMILY MEDICINE | Facility: CLINIC | Age: 64
End: 2024-06-04
Payer: MEDICARE

## 2024-06-04 VITALS
WEIGHT: 217.81 LBS | BODY MASS INDEX: 34.19 KG/M2 | SYSTOLIC BLOOD PRESSURE: 156 MMHG | DIASTOLIC BLOOD PRESSURE: 90 MMHG | HEART RATE: 80 BPM | OXYGEN SATURATION: 98 % | HEIGHT: 67 IN

## 2024-06-04 DIAGNOSIS — I10 ESSENTIAL HYPERTENSION: ICD-10-CM

## 2024-06-04 DIAGNOSIS — E66.9 CLASS 1 OBESITY WITH BODY MASS INDEX (BMI) OF 34.0 TO 34.9 IN ADULT, UNSPECIFIED OBESITY TYPE, UNSPECIFIED WHETHER SERIOUS COMORBIDITY PRESENT: ICD-10-CM

## 2024-06-04 DIAGNOSIS — G51.0 FACIAL PARALYSIS: Primary | ICD-10-CM

## 2024-06-04 PROCEDURE — 1101F PT FALLS ASSESS-DOCD LE1/YR: CPT | Mod: CPTII,S$GLB,, | Performed by: FAMILY MEDICINE

## 2024-06-04 PROCEDURE — 3077F SYST BP >= 140 MM HG: CPT | Mod: CPTII,S$GLB,, | Performed by: FAMILY MEDICINE

## 2024-06-04 PROCEDURE — 3044F HG A1C LEVEL LT 7.0%: CPT | Mod: CPTII,S$GLB,, | Performed by: FAMILY MEDICINE

## 2024-06-04 PROCEDURE — 3080F DIAST BP >= 90 MM HG: CPT | Mod: CPTII,S$GLB,, | Performed by: FAMILY MEDICINE

## 2024-06-04 PROCEDURE — 1160F RVW MEDS BY RX/DR IN RCRD: CPT | Mod: CPTII,S$GLB,, | Performed by: FAMILY MEDICINE

## 2024-06-04 PROCEDURE — 3008F BODY MASS INDEX DOCD: CPT | Mod: CPTII,S$GLB,, | Performed by: FAMILY MEDICINE

## 2024-06-04 PROCEDURE — 3066F NEPHROPATHY DOC TX: CPT | Mod: CPTII,S$GLB,, | Performed by: FAMILY MEDICINE

## 2024-06-04 PROCEDURE — 1159F MED LIST DOCD IN RCRD: CPT | Mod: CPTII,S$GLB,, | Performed by: FAMILY MEDICINE

## 2024-06-04 PROCEDURE — 3060F POS MICROALBUMINURIA REV: CPT | Mod: CPTII,S$GLB,, | Performed by: FAMILY MEDICINE

## 2024-06-04 PROCEDURE — 99215 OFFICE O/P EST HI 40 MIN: CPT | Mod: S$GLB,,, | Performed by: FAMILY MEDICINE

## 2024-06-04 PROCEDURE — 99999 PR PBB SHADOW E&M-EST. PATIENT-LVL V: CPT | Mod: PBBFAC,,, | Performed by: FAMILY MEDICINE

## 2024-06-04 PROCEDURE — 3288F FALL RISK ASSESSMENT DOCD: CPT | Mod: CPTII,S$GLB,, | Performed by: FAMILY MEDICINE

## 2024-06-04 RX ORDER — HYDROCHLOROTHIAZIDE 12.5 MG/1
12.5 TABLET ORAL DAILY
Qty: 90 TABLET | Refills: 0 | Status: SHIPPED | OUTPATIENT
Start: 2024-06-04 | End: 2024-09-02

## 2024-06-04 NOTE — PROGRESS NOTES
Subjective     Patient ID: Roman Hodges is a 64 y.o. male.    Chief Complaint: Follow-up    64 years old male came to the clinic for follow-up after being in the emergency room secondary to left-sided facial paralysis.  Workup for stroke or masses was negative.  Patient still with weakness over the left side of the face associated with dry eye.  Pressure today was stable.  Patient with a BMI of 34 currently trying to lose weight.    Hypertension  This is a chronic problem. The current episode started more than 1 year ago. The problem is unchanged. The problem is controlled. Pertinent negatives include no chest pain, orthopnea, palpitations, peripheral edema or PND. There are no associated agents to hypertension. Risk factors for coronary artery disease include male gender and obesity. Past treatments include beta blockers and calcium channel blockers. The current treatment provides moderate improvement. Compliance problems include diet and exercise.  There is no history of angina. There is no history of chronic renal disease, a hypertension causing med or a thyroid problem.     Review of Systems   Constitutional: Negative.    HENT: Negative.     Eyes: Negative.    Respiratory: Negative.     Cardiovascular: Negative.  Negative for chest pain, palpitations, orthopnea and PND.   Gastrointestinal: Negative.    Genitourinary: Negative.    Musculoskeletal: Negative.    Integumentary:  Negative.   Neurological: Negative.    Psychiatric/Behavioral: Negative.            Objective     Physical Exam  Vitals and nursing note reviewed.   Constitutional:       General: He is not in acute distress.     Appearance: He is well-developed. He is not diaphoretic.   HENT:      Head: Normocephalic and atraumatic.      Right Ear: External ear normal.      Left Ear: External ear normal.      Nose: Nose normal.      Mouth/Throat:      Pharynx: No oropharyngeal exudate.   Eyes:      General: No scleral icterus.        Right eye: No  discharge.         Left eye: No discharge.      Conjunctiva/sclera: Conjunctivae normal.      Pupils: Pupils are equal, round, and reactive to light.   Neck:      Thyroid: No thyromegaly.      Vascular: No JVD.      Trachea: No tracheal deviation.   Cardiovascular:      Rate and Rhythm: Normal rate and regular rhythm.      Heart sounds: Normal heart sounds. No murmur heard.     No friction rub. No gallop.   Pulmonary:      Effort: Pulmonary effort is normal. No respiratory distress.      Breath sounds: Normal breath sounds. No stridor. No wheezing or rales.   Chest:      Chest wall: No tenderness.   Abdominal:      General: Bowel sounds are normal. There is no distension.      Palpations: Abdomen is soft. There is no mass.      Tenderness: There is no abdominal tenderness. There is no guarding or rebound.   Musculoskeletal:         General: No tenderness. Normal range of motion.      Cervical back: Normal range of motion and neck supple.   Lymphadenopathy:      Cervical: No cervical adenopathy.   Skin:     General: Skin is warm and dry.      Coloration: Skin is not pale.      Findings: No erythema or rash.   Neurological:      Mental Status: He is alert and oriented to person, place, and time.      Cranial Nerves: No cranial nerve deficit.      Motor: No abnormal muscle tone.      Coordination: Coordination normal.      Deep Tendon Reflexes: Reflexes are normal and symmetric. Reflexes normal.      Comments: Left facial paralysis noted.   Psychiatric:         Behavior: Behavior normal.         Thought Content: Thought content normal.         Judgment: Judgment normal.            Assessment and Plan     1. Facial paralysis  -     hydroCHLOROthiazide (HYDRODIURIL) 12.5 MG Tab; Take 1 tablet (12.5 mg total) by mouth once daily.  Dispense: 90 tablet; Refill: 0  -     Ambulatory referral/consult to Physical/Occupational Therapy; Future; Expected date: 06/11/2024    2. Essential hypertension  -     Ambulatory  referral/consult to Cardiology; Future; Expected date: 06/11/2024    3. Class 1 obesity with body mass index (BMI) of 34.0 to 34.9 in adult, unspecified obesity type, unspecified whether serious comorbidity present       Diet and physical activity to promote weight loss.   Continue monitoring blood pressure at home, low sodium diet.   I spent a total of 40 minutes on the day of the visit.This includes face to face time and non-face to face time preparing to see the patient (eg, review of tests), obtaining and/or reviewing separately obtained history, documenting clinical information in the electronic or other health record, independently interpreting results and communicating results to the patient/family/caregiver, or care coordinator.          Follow up in about 3 months (around 9/4/2024), or if symptoms worsen or fail to improve.

## 2024-06-06 ENCOUNTER — CLINICAL SUPPORT (OUTPATIENT)
Dept: REHABILITATION | Facility: HOSPITAL | Age: 64
End: 2024-06-06
Payer: MEDICARE

## 2024-06-06 DIAGNOSIS — G51.0 FACIAL PARALYSIS: ICD-10-CM

## 2024-06-06 DIAGNOSIS — R29.810 FACIAL WEAKNESS: Primary | ICD-10-CM

## 2024-06-06 PROCEDURE — 97161 PT EVAL LOW COMPLEX 20 MIN: CPT | Mod: PN

## 2024-06-06 PROCEDURE — 97112 NEUROMUSCULAR REEDUCATION: CPT | Mod: PN

## 2024-06-06 NOTE — PLAN OF CARE
[facility]  Physical Therapy Neurological Rehabilitation Initial Evaluation     Name: Roman Hodges  Clinic Number: [id number]    Therapy Diagnosis:   Encounter Diagnoses   Name Primary?    Facial paralysis     Facial weakness Yes     Physician: Jose Angel Munson*    Physician Orders: PT Eval and Treat   Medical Diagnosis from Referral: Facial paralysis [G51.0]   Evaluation Date: 6/6/2024  Authorization Period Expiration: 6/4/2025  Plan of Care Expiration: 7/19/2024  Progress Note Due: 7/6/2024  Date of Surgery: N/A  Visit # / Visits authorized: 1/1  FOTO: 1/3    Precautions: Standard    Time In: 12:50PM  Time Out: 1:30PM  Total Billable Time: 40 minutes (1 low eval; 2 NMR)    Subjective      Date of onset: 6/2/2024    History of current condition - Roman reports: Has history of Bell's Palsy that affected the right side of his face (2009). He recently had onset of new left facial weakness that physician team believes is another instance of Bell's Palsy. Prescribed round of steroids which has been helping to some extent but asymmetry still evident. Difficulty with articulation and facial expression reported.     Imaging:   - CT Head (6/2/2024): No definite acute intracranial findings by noncontrast CT.     Prior Therapy: for other complaints in the past; never for Bell's Palsy  Social History: lives with wife  Falls: n/a   DME: n/a    Exercise Routine / History: started some facial exercises last night   Family Present at time of Eval: no   Occupation: retired  Prior Level of Function: independent  Current Level of Function: independent    Pain:  Current 2/10, worst 2/10, best 0/10   Location: posterior to left ear/mastoid area  Description: Aching  Aggravating Factors: none endorsed  Easing Factors: none endorsed    Patient's goals: address facial asymmetry    Medical History:   Past Medical History:   Diagnosis Date    A-fib     Allergy     Anticoagulant long-term use     Diabetes  mellitus, type 2     GERD (gastroesophageal reflux disease)     Hepatocellular carcinoma     liver    History of 2019 novel coronavirus disease (COVID-19)     tested positive 4/15/20 & 5/8/20    History of primary liver cancer 10/24/2018    S/p liver transplant 7/4/2019    Hyperlipidemia     Hypertension        Surgical History:   Roman Hodges  has a past surgical history that includes Hernia repair; Colonoscopy; Left heart catheterization (Left, 12/4/2018); Esophagogastroduodenoscopy (N/A, 1/15/2019); Radiofrequency ablation (N/A, 4/12/2019); Liver transplant (N/A, 7/3/2019); Colonoscopy (N/A, 12/23/2021); and Esophagogastroduodenoscopy (N/A, 12/23/2021).    Medications:   Roman has a current medication list which includes the following prescription(s): accu-chek guide test strips, amlodipine, buspirone, ciclopirox, empagliflozin, ergocalciferol, esomeprazole, hydrochlorothiazide, ketoconazole, lancets, meclizine, metoprolol succinate, mycophenolate, fish oil-omega-3 fatty acids, potassium chloride sa, prednisone, procto-med hc, phazyme, sucralfate, tacrolimus, tamsulosin, kerasal multi-purpose nail rep, valacyclovir, and xarelto.    Allergies:   Review of patient's allergies indicates:   Allergen Reactions    Lisinopril      Stomach ache        Objective      [facility] Facial Grading System    Resting Symmetry (compared to normal side)    Eye (choose one only)  - normal (0)  - narrow (1)  - wide (1)  - eyelid surgery (1)    Cheek (nasolabial fold)  - normal (0)  - absent (2)  - less pronounced (1)  - more pronounced (1)    Mouth  - normal (0)  - corner dropped (1)  - corner pulled up/out (1)    TOTAL = 3  Resting symmetry score = TOTAL x 5: 15    Symmetry of Voluntary Movement (degree of muscle excursion compared to normal side)    Forehead wrinkle  - unable to initiate movement/no movement (1)  - initiates slight movement (2)   - initiates movement with mild excursion (3)  - movement almost complete (4)  -  movement complete (5)    Gentle eye closure  - unable to initiate movement/no movement (1)  - initiates slight movement (2)   - initiates movement with mild excursion (3)  - movement almost complete (4)  - movement complete (5)    Open mouth smile  - unable to initiate movement/no movement (1)  - initiates slight movement (2)   - initiates movement with mild excursion (3)  - movement almost complete (4)  - movement complete (5)    Snarl  - unable to initiate movement/no movement (1)  - initiates slight movement (2)   - initiates movement with mild excursion (3)  - movement almost complete (4)  - movement complete (5)    Lip pucker  - unable to initiate movement/no movement (1)  - initiates slight movement (2)   - initiates movement with mild excursion (3)  - movement almost complete (4)  - movement complete (5)    TOTAL = 10  Voluntary movement score = TOTAL x 4: 40    Synkinesis (rate the degree of involuntary muscle contraction associated with each expression)    Forehead wrinkle  - none/no synkinesis or mass movement (0)  - mild/slight synkinesis (1)  - moderate/obvious but not disfiguring synkinesis (2)   - severe/disfiguring synkinesis/gross mass movement of several muscles (3)    Gentle eye closure  - none/no synkinesis or mass movement (0)  - mild/slight synkinesis (1)  - moderate/obvious but not disfiguring synkinesis (2)   - severe/disfiguring synkinesis/gross mass movement of several muscles (3)    Open mouth smile  - none/no synkinesis or mass movement (0)  - mild/slight synkinesis (1)  - moderate/obvious but not disfiguring synkinesis (2)   - severe/disfiguring synkinesis/gross mass movement of several muscles (3)    Snarl  - none/no synkinesis or mass movement (0)  - mild/slight synkinesis (1)  - moderate/obvious but not disfiguring synkinesis (2)   - severe/disfiguring synkinesis/gross mass movement of several muscles (3)    Lip pucker  - none/no synkinesis or mass movement (0)  - mild/slight  synkinesis (1)  - moderate/obvious but not disfiguring synkinesis (2)   - severe/disfiguring synkinesis/gross mass movement of several muscles (3)    TOTAL = 5  Synkinesis score = TOTAL: 5    Voluntary movement score (40) - Resting symmetry score (15) - Synkinesis score (5) = Composite score (20)    Intake Outcome Measure for FOTO Other Paralytic Syndromes Survey    Therapist reviewed FOTO scores for Roman Hodges on 6/6/2024.   FOTO report - see Media section or FOTO account episode details.    Intake Score: 86%       Treatment     Total Treatment time separate from Evaluation: 25 minutes    Roman received the treatments listed below:      neuromuscular re-education activities to improve: Coordination and activation of facial musculature for 25 minutes. The following activities were included:  - Thorough HEP review including exercises practice of those provided in Patient Instructions packet:   - Patient education on facial stimulation technique (diaz roller, self-stimulation/massage, and self mobilization/trigger point release)   - Facial strengthening with special focus on mirror feedback, limiting synkinesis, active assist technique, and making exercises functional/expressive (return demonstration of the following: closed mouth smile, pucker, snarl, forehead elevation, lower lip depression, pout, scowl)   - Review of speech/language exercises      Patient Education and Home Exercises     Education provided:   - HEP instruction  - Definition of synkinesis    Written Home Exercises Provided: yes.  Exercises were reviewed and Roman was able to demonstrate them prior to the end of the session.  Roman demonstrated good  understanding of the education provided.     See EMR under Patient Instructions for exercises provided 6/6/2024.    Assessment     Roman is a 64 y.o. male referred to outpatient Physical Therapy with a medical diagnosis of Facial paralysis [G51.0]. Patient presents with facial asymmetry,  mild-moderate synkinesis, and decreased volitional movement in left sided facial musculature. No other concerning neurologic signs/symptoms. Clinical presentation consistent with left sided Bell's Palsy. Of note, patient has had Bell's Palsy in the past that affected opposite/right side of face in 2009. He would benefit from skilled physical therapy services to address listed impairments and improve overall quality of life.    Patient prognosis is Good.   Patient will benefit from skilled outpatient Physical Therapy to address the deficits stated above and in the chart below, provide patient /family education, and to maximize patient's level of independence.     Plan of care discussed with patient: Yes  Patient's spiritual, cultural and educational needs considered and patient is agreeable to the plan of care and goals as stated below:     Anticipated Barriers for therapy: presence of synkinesis; chronic history of Bell's Palsy affecting opposite side of face as well as current case    Medical Necessity is demonstrated by the following  History  Co-morbidities and personal factors that may impact the plan of care [] LOW: no personal factors / co-morbidities  [] MODERATE: 1-2 personal factors / co-morbidities  [x] HIGH: 3+ personal factors / co-morbidities    Moderate / High Support Documentation:   Co-morbidities affecting plan of care:   A-fib    Allergy    Anticoagulant long-term use    Diabetes mellitus, type 2    GERD (gastroesophageal reflux disease)    Hepatocellular carcinoma    liver   History of primary liver cancer    S/p liver transplant 7/4/2019   Hyperlipidemia    Hypertension      Personal Factors:   no deficits     Examination  Body Structures and Functions, activity limitations and participation restrictions that may impact the plan of care [x] LOW: addressing 1-2 elements  [] MODERATE: 3+ elements  [] HIGH: 4+ elements (please support below)    Moderate / High Support Documentation: see above      Clinical Presentation [] LOW: stable  [x] MODERATE: Evolving  [] HIGH: Unstable     Decision Making/ Complexity Score: low       Goals:  Short Term Goals: 3 weeks   Patient will be 100% compliant with HEP in order to maximize PT benefit  Patient will score >/=40/100 on [place] Facial Grading System in order to improve facial symmetry and facial strength    Long Term Goals: 6 weeks   Patient will score >/=60/100 on [facility] Facial Grading System in order to improve facial symmetry and facial strength  Patient will report return to normal eating and speaking habits without need for compensation in order to improve function of facial musculature  Plan     Plan of care Certification: 6/6/2024 to 7/19/2024.    Outpatient Physical Therapy 1 times weekly for 6 weeks to include the following interventions: Manual Therapy, Moist Heat/ Ice, Neuromuscular Re-ed, Patient Education, Self Care, Therapeutic Activities, Therapeutic Exercise, and NMES PRN .     MARCO A PARK, PT    Physician's Signature: _________________________________________ Date: ________________

## 2024-06-06 NOTE — PATIENT INSTRUCTIONS
"Eyelid Stretch:   - Hold eyelashes and pull down while pulling eyebrow up (note: can use an eyelash curler to help pull eyelashes down)   - Don't press the lid against the eyebal;, pull it out a litlte  - Hold stretch for 30-60 seconds and then actively close the eye  - After the stretch, gently close eye at least 10 times and attempt to hold for 3-5 seconds     Use of Diaz Roller or GuaSha Stone (diaz or chang quartz):   - Start at midline and pull outwards and down neck   - Use larger part for cheeks/forehead and smaller part for under eyes  - used to bring blood flow to the area. If the face isn't moving normally, normal pumping isn't happening causing some sort of edema  - Perform 2-3 times a day  - Can find tutorial on [facility] Facebook page or find video via [email or web address] or [email or web address]  - [employer].com search "diaz roller"    Remember:   - Use mirror feedback when performing exercises  - Mindfulness and relaxation is important. Feel how the unaffected side of your face is moving as compared to the affected side of your face  - Limit synkinesis   - Do not produce mass/maximal movement                                                                                                                  "

## 2024-06-13 ENCOUNTER — CLINICAL SUPPORT (OUTPATIENT)
Dept: REHABILITATION | Facility: HOSPITAL | Age: 64
End: 2024-06-13
Payer: MEDICARE

## 2024-06-13 DIAGNOSIS — R29.810 FACIAL WEAKNESS: Primary | ICD-10-CM

## 2024-06-13 PROCEDURE — 97140 MANUAL THERAPY 1/> REGIONS: CPT | Mod: PN

## 2024-06-13 PROCEDURE — 97112 NEUROMUSCULAR REEDUCATION: CPT | Mod: PN

## 2024-06-13 NOTE — PROGRESS NOTES
"[facility]   Physical Therapy Treatment Note      Name: Roman Hodges  Clinic Number: [id number]    Therapy Diagnosis:   Encounter Diagnosis   Name Primary?    Facial weakness Yes     Physician: Jose Angel Munson*    Visit Date: 6/13/2024    Physician Orders: PT Eval and Treat   Medical Diagnosis from Referral: Facial paralysis [G51.0]   Evaluation Date: 6/6/2024  Authorization Period Expiration: 6/4/2025  Plan of Care Expiration: 7/19/2024  Progress Note Due: 7/6/2024  Date of Surgery: N/A  Visit # / Visits authorized: 1/20 + eval  FOTO: 1/3     Precautions: Standard     Time In: 1:53PM  Time Out: 2:32PM  Total Billable Time: 39 minutes (1MT + 2NMR)    PTA Visit #: 0/5     Subjective     Patient reports: Believes facial strength is improving to some extent.  He was compliant with home exercise program.  Response to previous treatment: Last session was initial evaluation  Functional change: Slight improvement in resting facial symmetry    Pain: 1/10  Location: left mastoid area    Objective      Objective Measures updated at progress report unless specified.     Treatment     Roman received the treatments listed below:      manual therapy techniques: Myofacial release and Soft tissue Mobilization were applied to the: left facial musculature for 15 minutes, including:  - Manual stretching and vibratory stimulation with oscillator to orbicularis oculi superioris/inferioris, orbicularis oris superioris/inferioris, zygomaticus major/minor, levator anguli oris, levator labii    neuromuscular re-education activities to improve: Coordination for 24 minutes. The following activities were included:  - active assist closed mouth smile x30 with 5" hold  - active assist pucker x10  - active assist snarl x30 with 5" hold  - lateral jaw deviations 2x60"  - eyebrow raise x10 with 5" holds    Patient Education and Home Exercises       Education provided:   - Continue HEP    Written Home Exercises " Provided: Patient instructed to cont prior HEP. Exercises were reviewed and Roman was able to demonstrate them prior to the end of the session.  Roman demonstrated good  understanding of the education provided. See Electronic Medical Record under Patient Instructions for exercises provided during therapy sessions    Assessment     Romanpresented to clinic with slight improvement in resting facial symmetry. More prominent left nasolabial fold noted today. Still with synkinetic movement of facial musculature with some exercise but able to correct to some extent with mirror feedback. He would benefit from continued skilled PT services to achieve functional goals.    Roman Is progressing well towards his goals.   Patient prognosis is Good.     Patient will continue to benefit from skilled outpatient physical therapy to address the deficits listed in the problem list box on initial evaluation, provide pt/family education and to maximize pt's level of independence in the home and community environment.     Patient's spiritual, cultural and educational needs considered and pt agreeable to plan of care and goals.     Anticipated barriers to physical therapy: presence of synkinesis; chronic history of Bell's Palsy affecting opposite side of face as well as current case     Goals:     Short Term Goals: 3 weeks   Patient will be 100% compliant with HEP in order to maximize PT benefit (met)  Patient will score >/=40/100 on [facility] Facial Grading System in order to improve facial symmetry and facial strength (progressing, not met)     Long Term Goals: 6 weeks   Patient will score >/=60/100 on [facility] Facial Grading System in order to improve facial symmetry and facial strength (progressing, not met)  Patient will report return to normal eating and speaking habits without need for compensation in order to improve function of facial musculature (progressing, not met)    Plan     Continue active assist facial exercise and  facial stimulation    MARCO A PARK, PT

## 2024-06-25 DIAGNOSIS — Z00.00 ENCOUNTER FOR MEDICARE ANNUAL WELLNESS EXAM: ICD-10-CM

## 2024-06-27 ENCOUNTER — CLINICAL SUPPORT (OUTPATIENT)
Dept: REHABILITATION | Facility: HOSPITAL | Age: 64
End: 2024-06-27
Payer: MEDICARE

## 2024-06-27 DIAGNOSIS — R29.810 FACIAL WEAKNESS: Primary | ICD-10-CM

## 2024-06-27 PROCEDURE — 97112 NEUROMUSCULAR REEDUCATION: CPT | Mod: PN

## 2024-06-27 PROCEDURE — 97140 MANUAL THERAPY 1/> REGIONS: CPT | Mod: PN

## 2024-06-27 NOTE — PROGRESS NOTES
[facility]   Physical Therapy Treatment Note      Name: Roman Hodges  [facility] Number: [id number]    Therapy Diagnosis:   Encounter Diagnosis   Name Primary?    Facial weakness Yes     Physician: Jose Angel Munson*    Visit Date: 6/27/2024    Physician Orders: PT Eval and Treat   Medical Diagnosis from Referral: Facial paralysis [G51.0]   Evaluation Date: 6/6/2024  Authorization Period Expiration: 6/4/2025  Plan of Care Expiration: 7/19/2024  Progress Note Due: last assessed on 6/27/2024  Date of Surgery: N/A  Visit # / Visits authorized: 2/20 + eval  FOTO: 1/3     Precautions: Standard     Time In: 1:50PM  Time Out: 2:30PM  Total Billable Time: 40 minutes (1MT + 2NMR)    PTA Visit #: 0/5     Subjective     Patient reports: Continues to perform his home exercises regularly. He is using his massage gun at home to gently stimulate face as well. Pain behind his left ear is much improved.  He was compliant with home exercise program.  Response to previous treatment: No adverse response  Functional change: Improving [place] score    Pain: 0/10  Location: left mastoid area    Objective      Objective Measures updated at progress report unless specified.     [facility] Facial Grading System    Resting Symmetry (compared to normal side)    Eye (choose one only)  - normal (0)  - narrow (1)  - wide (1)  - eyelid surgery (1)    Cheek (nasolabial fold)  - normal (0)  - absent (2)  - less pronounced (1)  - more pronounced (1)    Mouth  - normal (0)  - corner dropped (1)  - corner pulled up/out (1)    TOTAL = 3  Resting symmetry score = TOTAL x 5: 15    Symmetry of Voluntary Movement (degree of muscle excursion compared to normal side)    Forehead wrinkle  - unable to initiate movement/no movement (1)  - initiates slight movement (2)   - initiates movement with mild excursion (3)  - movement almost complete (4)  - movement complete (5)    Gentle eye closure  - unable to initiate movement/no  movement (1)  - initiates slight movement (2)   - initiates movement with mild excursion (3)  - movement almost complete (4)  - movement complete (5)    Open mouth smile  - unable to initiate movement/no movement (1)  - initiates slight movement (2)   - initiates movement with mild excursion (3)  - movement almost complete (4)  - movement complete (5)    Snarl  - unable to initiate movement/no movement (1)  - initiates slight movement (2)   - initiates movement with mild excursion (3)  - movement almost complete (4)  - movement complete (5)    Lip pucker  - unable to initiate movement/no movement (1)  - initiates slight movement (2)   - initiates movement with mild excursion (3)  - movement almost complete (4)  - movement complete (5)    TOTAL = 17  Voluntary movement score = TOTAL x 4: 68    Synkinesis (rate the degree of involuntary muscle contraction associated with each expression)    Forehead wrinkle  - none/no synkinesis or mass movement (0)  - mild/slight synkinesis (1)  - moderate/obvious but not disfiguring synkinesis (2)   - severe/disfiguring synkinesis/gross mass movement of several muscles (3)    Gentle eye closure  - none/no synkinesis or mass movement (0)  - mild/slight synkinesis (1)  - moderate/obvious but not disfiguring synkinesis (2)   - severe/disfiguring synkinesis/gross mass movement of several muscles (3)    Open mouth smile  - none/no synkinesis or mass movement (0)  - mild/slight synkinesis (1)  - moderate/obvious but not disfiguring synkinesis (2)   - severe/disfiguring synkinesis/gross mass movement of several muscles (3)    Snarl  - none/no synkinesis or mass movement (0)  - mild/slight synkinesis (1)  - moderate/obvious but not disfiguring synkinesis (2)   - severe/disfiguring synkinesis/gross mass movement of several muscles (3)    Lip pucker  - none/no synkinesis or mass movement (0)  - mild/slight synkinesis (1)  - moderate/obvious but not disfiguring synkinesis (2)   -  "severe/disfiguring synkinesis/gross mass movement of several muscles (3)    TOTAL = 3  Synkinesis score = TOTAL: 3    Voluntary movement score (68) - Resting symmetry score (15) - Synkinesis score (3) = Composite score (50)    Treatment     Roman received the treatments listed below:      manual therapy techniques: Myofacial release and Soft tissue Mobilization were applied to the: left facial musculature for 15 minutes, including:  - Manual stretching and vibratory stimulation with oscillator to orbicularis oculi superioris/inferioris, orbicularis oris superioris/inferioris, zygomaticus major/minor, levator anguli oris, levator labii  - Ice massage to facial musculature with towel dry/stimulation     neuromuscular re-education activities to improve: Coordination for 25 minutes. The following activities were included:  - active assist closed mouth smile x 2 minutes total with 5" holds  - active assist snarl x 2 minutes total with 5" holds  - lip pucker x 2 minutes total with cuing to decrease activation on right/unaffected side of the face  - forceful eye closure x10  - lateral jaw deviations x60"  - eyebrow raise x 2 minutes total with 5" holds  - cheek puffing x10 with hand assist; cues to avoid synkinesis     Patient Education and Home Exercises       Education provided:   - Continue HEP    Written Home Exercises Provided: Patient instructed to cont prior HEP. Exercises were reviewed and Roman was able to demonstrate them prior to the end of the session.  Roman demonstrated good  understanding of the education provided. See Electronic Medical Record under Patient Instructions for exercises provided during therapy sessions    Assessment     Roman continues to show good progress toward returning to prior level of function. Objectively, his [place] score has improved 30 points since initial evaluation. Improving facial symmetry and muscular activation noted, most significant toward superior portion of face. " Occasional synkinesis noted but this is also improving. Greatest deficits involve resting symmetry and activation of muscles near/surrounding mouth region. He would benefit from continued PT services to achieve functional goals.    Roman Is progressing well towards his goals.   Patient prognosis is Good.     Patient will continue to benefit from skilled outpatient physical therapy to address the deficits listed in the problem list box on initial evaluation, provide pt/family education and to maximize pt's level of independence in the home and community environment.     Patient's spiritual, cultural and educational needs considered and pt agreeable to plan of care and goals.     Anticipated barriers to physical therapy: presence of synkinesis; chronic history of Bell's Palsy affecting opposite side of face as well as current case     Goals:     Short Term Goals: 3 weeks   Patient will be 100% compliant with HEP in order to maximize PT benefit (met)  Patient will score >/=40/100 on [place] Facial Grading System in order to improve facial symmetry and facial strength (met)     Long Term Goals: 6 weeks   Patient will score >/=60/100 on [place] Facial Grading System in order to improve facial symmetry and facial strength (progressing, not met)  Patient will report return to normal eating and speaking habits without need for compensation in order to improve function of facial musculature (progressing, not met)    Plan     Continue active assist facial exercise and facial stimulation    MARCO A PARK, PT

## 2024-07-02 ENCOUNTER — CLINICAL SUPPORT (OUTPATIENT)
Dept: REHABILITATION | Facility: HOSPITAL | Age: 64
End: 2024-07-02
Payer: MEDICARE

## 2024-07-02 DIAGNOSIS — R29.810 FACIAL WEAKNESS: Primary | ICD-10-CM

## 2024-07-02 PROCEDURE — 97140 MANUAL THERAPY 1/> REGIONS: CPT | Mod: PN

## 2024-07-02 PROCEDURE — 97530 THERAPEUTIC ACTIVITIES: CPT | Mod: PN

## 2024-07-02 NOTE — PROGRESS NOTES
"[facility]   Physical Therapy Treatment Note      Name: Roman Hodges  Clinic Number: [id number]    Therapy Diagnosis:   Encounter Diagnosis   Name Primary?    Facial weakness Yes     Physician: Jose Angel Munson*    Visit Date: 7/2/2024    Physician Orders: PT Eval and Treat   Medical Diagnosis from Referral: Facial paralysis [G51.0]   Evaluation Date: 6/6/2024  Authorization Period Expiration: 6/4/2025  Plan of Care Expiration: 7/19/2024  Progress Note Due: last assessed on 6/27/2024  Date of Surgery: N/A  Visit # / Visits authorized: 3/20 + eval  FOTO: Incorrect FOTO collected at intake     Precautions: Standard     Time In: 11:20AM  Time Out: 12:00PM  Total Billable Time: 40 minutes (1MT + 2TA)    PTA Visit #: 0/5     Subjective     Patient reports: Continuing to do his facial exercises at home. Closed mouth smile still difficult. Would like to continue with his last two scheduled PT visits.  He was compliant with home exercise program.  Response to previous treatment: No adverse response  Functional change: Improving Sunnybrook score    Pain: 0/10  Location: left mastoid area    Objective      Objective Measures updated at progress report unless specified.       Treatment     Roman received the treatments listed below:      manual therapy techniques: Myofacial release and Soft tissue Mobilization were applied to the: left facial musculature for 15 minutes, including:  - manual stretching and vibratory stimulation with oscillator to orbicularis oculi superioris/inferioris, orbicularis oris superioris/inferioris, zygomaticus major/minor, levator anguli oris, levator labii  - ice massage to facial musculature with towel dry/stimulation     therapeutic activities to improve: functional performance for 25 minutes. The following activities were included:  - cylindrical block blowing with straw 2x10  - balloon blowing 3x5 with 5" holds  - playing card lifts with sucking with straw " "2x10  - active assist closed mouth smile x 2 minutes total with 5" holds (mirror feedback)  - lateral jaw deviations while holding tongue depressor in mouth 2x120" (mirror feedback)  - snarl x 1 minute total with 5" holds no active assist today (mirror feedback)    Not performed today:  - lip pucker x 2 minutes total with cuing to decrease activation on right/unaffected side of the face  - eyebrow raise x 2 minutes total with 5" holds  - cheek puffing x10 with hand assist; cues to avoid synkinesis     Patient Education and Home Exercises       Education provided:   - Continue HEP    Written Home Exercises Provided: Patient instructed to cont prior HEP. Exercises were reviewed and Roman was able to demonstrate them prior to the end of the session.  Roman demonstrated good  understanding of the education provided. See Electronic Medical Record under Patient Instructions for exercises provided during therapy sessions    Assessment     Roman continues to show good progress toward returning to prior level of function. Occasional synkinesis noted today (some right eye closure when performing lower facial movements) but it's important to note patient also has prior case of right Bell's Palsy in addition to current left Bell's palsy. Greatest deficits involve resting symmetry and activation of muscles near/surrounding mouth region. Improving snarl noted compared to prior visit. More functional activities performed today that were geared toward lower facial activation which patient tolerated well. He would benefit from continued PT services to achieve functional goals.    Roman Is progressing well towards his goals.   Patient prognosis is Good.     Patient will continue to benefit from skilled outpatient physical therapy to address the deficits listed in the problem list box on initial evaluation, provide pt/family education and to maximize pt's level of independence in the home and community environment.     Patient's " spiritual, cultural and educational needs considered and pt agreeable to plan of care and goals.     Anticipated barriers to physical therapy: presence of synkinesis; chronic history of Bell's Palsy affecting opposite side of face as well as current case     Goals:     Short Term Goals: 3 weeks   Patient will be 100% compliant with HEP in order to maximize PT benefit (met)  Patient will score >/=40/100 on Sunnybrook Facial Grading System in order to improve facial symmetry and facial strength (met)     Long Term Goals: 6 weeks   Patient will score >/=60/100 on Sunnybrook Facial Grading System in order to improve facial symmetry and facial strength (progressing, not met)  Patient will report return to normal eating and speaking habits without need for compensation in order to improve function of facial musculature (progressing, not met)    Plan     Continue active assist facial exercise and facial stimulation    MARCO A PARK, PT

## 2024-07-03 ENCOUNTER — PATIENT MESSAGE (OUTPATIENT)
Dept: FAMILY MEDICINE | Facility: CLINIC | Age: 64
End: 2024-07-03
Payer: MEDICARE

## 2024-07-08 ENCOUNTER — IMMUNIZATION (OUTPATIENT)
Dept: INTERNAL MEDICINE | Facility: CLINIC | Age: 64
End: 2024-07-08
Payer: MEDICARE

## 2024-07-08 DIAGNOSIS — Z23 NEED FOR VACCINATION: Primary | ICD-10-CM

## 2024-07-08 PROCEDURE — 90480 ADMN SARSCOV2 VAC 1/ONLY CMP: CPT | Mod: S$GLB,,, | Performed by: INTERNAL MEDICINE

## 2024-07-08 PROCEDURE — 91322 SARSCOV2 VAC 50 MCG/0.5ML IM: CPT | Mod: S$GLB,,, | Performed by: INTERNAL MEDICINE

## 2024-07-09 ENCOUNTER — PATIENT OUTREACH (OUTPATIENT)
Dept: ADMINISTRATIVE | Facility: HOSPITAL | Age: 64
End: 2024-07-09

## 2024-07-09 DIAGNOSIS — E87.6 HYPOKALEMIA: Primary | ICD-10-CM

## 2024-07-09 DIAGNOSIS — M89.9 DISORDER OF BONE, UNSPECIFIED: ICD-10-CM

## 2024-07-09 DIAGNOSIS — M85.859 OSTEOPENIA OF NECK OF FEMUR, UNSPECIFIED LATERALITY: ICD-10-CM

## 2024-07-09 NOTE — PROGRESS NOTES
Health Maintenance Topic(s) Outreach Outcomes & Actions Taken:    Eye Exam - Outreach Outcomes & Actions Taken  : Pt Will Schedule with External Provider / Order Routed & Care Team Updated if Applicable and scheduled with [facility] 08/13

## 2024-07-12 RX ORDER — MYCOPHENOLATE MOFETIL 250 MG/1
CAPSULE ORAL
Qty: 240 CAPSULE | Refills: 1 | Status: SHIPPED | OUTPATIENT
Start: 2024-07-12

## 2024-07-15 ENCOUNTER — PATIENT OUTREACH (OUTPATIENT)
Dept: ADMINISTRATIVE | Facility: HOSPITAL | Age: 64
End: 2024-07-15
Payer: COMMERCIAL

## 2024-07-15 DIAGNOSIS — Z00.6 RESEARCH STUDY PATIENT: Primary | ICD-10-CM

## 2024-07-17 ENCOUNTER — PATIENT MESSAGE (OUTPATIENT)
Dept: FAMILY MEDICINE | Facility: CLINIC | Age: 64
End: 2024-07-17
Payer: COMMERCIAL

## 2024-07-18 ENCOUNTER — OFFICE VISIT (OUTPATIENT)
Dept: CARDIOLOGY | Facility: CLINIC | Age: 64
End: 2024-07-18
Payer: COMMERCIAL

## 2024-07-18 VITALS
HEART RATE: 73 BPM | BODY MASS INDEX: 33.57 KG/M2 | DIASTOLIC BLOOD PRESSURE: 84 MMHG | WEIGHT: 213.88 LBS | SYSTOLIC BLOOD PRESSURE: 126 MMHG | HEIGHT: 67 IN

## 2024-07-18 DIAGNOSIS — G47.33 OSA (OBSTRUCTIVE SLEEP APNEA): Chronic | ICD-10-CM

## 2024-07-18 DIAGNOSIS — I48.0 PAROXYSMAL ATRIAL FIBRILLATION: Primary | Chronic | ICD-10-CM

## 2024-07-18 DIAGNOSIS — E11.65 TYPE 2 DIABETES MELLITUS WITH HYPERGLYCEMIA, WITHOUT LONG-TERM CURRENT USE OF INSULIN: ICD-10-CM

## 2024-07-18 DIAGNOSIS — Z79.01 LONG TERM (CURRENT) USE OF ANTICOAGULANTS: Chronic | ICD-10-CM

## 2024-07-18 DIAGNOSIS — I10 ESSENTIAL HYPERTENSION: Chronic | ICD-10-CM

## 2024-07-18 PROCEDURE — 3074F SYST BP LT 130 MM HG: CPT | Mod: CPTII,S$GLB,, | Performed by: INTERNAL MEDICINE

## 2024-07-18 PROCEDURE — 99214 OFFICE O/P EST MOD 30 MIN: CPT | Mod: S$GLB,,, | Performed by: INTERNAL MEDICINE

## 2024-07-18 PROCEDURE — 1160F RVW MEDS BY RX/DR IN RCRD: CPT | Mod: CPTII,S$GLB,, | Performed by: INTERNAL MEDICINE

## 2024-07-18 PROCEDURE — 1159F MED LIST DOCD IN RCRD: CPT | Mod: CPTII,S$GLB,, | Performed by: INTERNAL MEDICINE

## 2024-07-18 PROCEDURE — 3066F NEPHROPATHY DOC TX: CPT | Mod: CPTII,S$GLB,, | Performed by: INTERNAL MEDICINE

## 2024-07-18 PROCEDURE — 3008F BODY MASS INDEX DOCD: CPT | Mod: CPTII,S$GLB,, | Performed by: INTERNAL MEDICINE

## 2024-07-18 PROCEDURE — 93000 ELECTROCARDIOGRAM COMPLETE: CPT | Mod: S$GLB,,, | Performed by: STUDENT IN AN ORGANIZED HEALTH CARE EDUCATION/TRAINING PROGRAM

## 2024-07-18 PROCEDURE — 99999 PR PBB SHADOW E&M-EST. PATIENT-LVL III: CPT | Mod: PBBFAC,,, | Performed by: INTERNAL MEDICINE

## 2024-07-18 PROCEDURE — 3060F POS MICROALBUMINURIA REV: CPT | Mod: CPTII,S$GLB,, | Performed by: INTERNAL MEDICINE

## 2024-07-18 PROCEDURE — 3079F DIAST BP 80-89 MM HG: CPT | Mod: CPTII,S$GLB,, | Performed by: INTERNAL MEDICINE

## 2024-07-18 PROCEDURE — 1101F PT FALLS ASSESS-DOCD LE1/YR: CPT | Mod: CPTII,S$GLB,, | Performed by: INTERNAL MEDICINE

## 2024-07-18 PROCEDURE — 3288F FALL RISK ASSESSMENT DOCD: CPT | Mod: CPTII,S$GLB,, | Performed by: INTERNAL MEDICINE

## 2024-07-18 PROCEDURE — 3044F HG A1C LEVEL LT 7.0%: CPT | Mod: CPTII,S$GLB,, | Performed by: INTERNAL MEDICINE

## 2024-07-18 NOTE — PROGRESS NOTES
Subjective:    Patient ID:  Roman Hodges is a 64 y.o. male who presents for evaluation of PAF    Atrial Fibrillation  Symptoms include palpitations. Symptoms are negative for chest pain, dizziness, shortness of breath, syncope and weakness.      64 y.o. M  HTN on meds  DM2 on meds  MALLIKA, on CPAP  Hepatocellular CA, s/p OLT (5/2019)  PAF    During visits to ER, found in AF with RVR 11/29. Rx diltiazem, with return to NSR.  echo during that eval: 30% LVEF, with RWMAs... on background of LVEF >55% 10/15/17 (Dr. Patterson's office).  At first visit, pt c/o ongoing CP; sent to ER. Subsequent cath showed nonobstructive CAD. Echo thereafter showed LVEF back to 55%.    For over 2 years, no palpitations c/w prior AF episode. In 1/2023, had nocturnal palps x 45 mins. Subsequent Holter (Dr. Johnson): NSR.    7/2024: Had no AF since last seen. One episode c/w AF for 4 hours in wee hours of 7/2/24. None since.  Overall, feels well.    My interpretation of today's ECG is NSR     Review of Systems   Constitutional: Negative. Negative for malaise/fatigue.   HENT:  Negative for ear pain and tinnitus.    Eyes:  Negative for blurred vision.   Cardiovascular:  Positive for palpitations. Negative for chest pain, dyspnea on exertion, near-syncope and syncope.   Respiratory: Negative.  Negative for shortness of breath.    Endocrine: Negative.  Negative for polyuria.   Hematologic/Lymphatic: Does not bruise/bleed easily.   Skin: Negative.  Negative for rash.   Musculoskeletal: Negative.  Negative for joint pain and muscle weakness.   Gastrointestinal: Negative.  Negative for abdominal pain and change in bowel habit.   Genitourinary:  Negative for frequency.   Neurological: Negative.  Negative for dizziness and weakness.   Psychiatric/Behavioral: Negative.  Negative for depression. The patient is not nervous/anxious.    Allergic/Immunologic: Negative for environmental allergies.        Objective:    Physical Exam  Vitals and nursing note  reviewed.   Constitutional:       Appearance: He is well-developed.   HENT:      Head: Normocephalic and atraumatic.   Eyes:      General: Lids are normal. No scleral icterus.     Conjunctiva/sclera: Conjunctivae normal.   Neck:      Thyroid: No thyromegaly.      Vascular: No JVD.      Trachea: No tracheal deviation.   Cardiovascular:      Rate and Rhythm: Normal rate and regular rhythm.      Pulses:           Radial pulses are 2+ on the right side and 2+ on the left side.   Pulmonary:      Effort: Pulmonary effort is normal. No tachypnea, accessory muscle usage or respiratory distress.      Breath sounds: No wheezing.   Abdominal:      General: There is no distension.   Musculoskeletal:         General: Normal range of motion.      Cervical back: Normal range of motion.   Skin:     General: Skin is warm and dry.      Findings: No rash.   Neurological:      Mental Status: He is alert and oriented to person, place, and time.      Motor: No abnormal muscle tone.      Deep Tendon Reflexes: Reflexes are normal and symmetric.   Psychiatric:         Behavior: Behavior normal.           Assessment:       1. Paroxysmal atrial fibrillation    2. Essential hypertension    3. Long term (current) use of anticoagulants    4. Type 2 diabetes mellitus with hyperglycemia, without long-term current use of insulin    5. MALLIKA (obstructive sleep apnea)         Plan:       1. AF  One remote episode, resolved post diltiazem. One recent episode of palpitations. Negative Holter. Will hold the course at present, and increase workup if recurrence.  Continue BB, eliquis.    Of note, pt is under the care of CRS for hemorrhoids, now s/p banding. Could consider Watchman if bleeding recurs.    Offered ILR, given rare sx c/w AF. He declined at present but would consider it if sx increase in frequency.    Return in 1 year, or earlier prn.

## 2024-07-19 ENCOUNTER — OFFICE VISIT (OUTPATIENT)
Dept: FAMILY MEDICINE | Facility: CLINIC | Age: 64
End: 2024-07-19
Payer: COMMERCIAL

## 2024-07-19 VITALS
BODY MASS INDEX: 33.15 KG/M2 | DIASTOLIC BLOOD PRESSURE: 70 MMHG | SYSTOLIC BLOOD PRESSURE: 130 MMHG | HEIGHT: 67 IN | WEIGHT: 211.19 LBS | HEART RATE: 76 BPM | OXYGEN SATURATION: 97 % | RESPIRATION RATE: 20 BRPM

## 2024-07-19 DIAGNOSIS — Z94.4 S/P LIVER TRANSPLANT: Chronic | ICD-10-CM

## 2024-07-19 DIAGNOSIS — D84.821 IMMUNOSUPPRESSION DUE TO DRUG THERAPY: ICD-10-CM

## 2024-07-19 DIAGNOSIS — I48.0 PAROXYSMAL ATRIAL FIBRILLATION: ICD-10-CM

## 2024-07-19 DIAGNOSIS — I70.0 AORTIC ARCH ATHEROSCLEROSIS: ICD-10-CM

## 2024-07-19 DIAGNOSIS — M85.80 OSTEOPENIA, UNSPECIFIED LOCATION: ICD-10-CM

## 2024-07-19 DIAGNOSIS — E11.65 TYPE 2 DIABETES MELLITUS WITH HYPERGLYCEMIA, WITHOUT LONG-TERM CURRENT USE OF INSULIN: ICD-10-CM

## 2024-07-19 DIAGNOSIS — E66.9 OBESITY (BMI 30.0-34.9): ICD-10-CM

## 2024-07-19 DIAGNOSIS — Z00.00 ENCOUNTER FOR MEDICARE ANNUAL WELLNESS EXAM: Primary | ICD-10-CM

## 2024-07-19 DIAGNOSIS — M85.88 OTHER SPECIFIED DISORDERS OF BONE DENSITY AND STRUCTURE, OTHER SITE: ICD-10-CM

## 2024-07-19 DIAGNOSIS — Z79.899 IMMUNOSUPPRESSION DUE TO DRUG THERAPY: ICD-10-CM

## 2024-07-19 DIAGNOSIS — I10 ESSENTIAL HYPERTENSION: Chronic | ICD-10-CM

## 2024-07-19 PROBLEM — E87.6 HYPOKALEMIA: Status: RESOLVED | Noted: 2020-04-16 | Resolved: 2024-07-19

## 2024-07-19 PROBLEM — J96.01 ACUTE HYPOXEMIC RESPIRATORY FAILURE: Status: RESOLVED | Noted: 2020-04-18 | Resolved: 2024-07-19

## 2024-07-19 PROBLEM — U07.1 COVID: Status: RESOLVED | Noted: 2022-07-15 | Resolved: 2024-07-19

## 2024-07-19 PROBLEM — J12.82 PNEUMONIA DUE TO COVID-19 VIRUS: Status: RESOLVED | Noted: 2020-04-15 | Resolved: 2024-07-19

## 2024-07-19 PROBLEM — E83.39 HYPOPHOSPHATEMIA: Status: RESOLVED | Noted: 2019-07-07 | Resolved: 2024-07-19

## 2024-07-19 PROBLEM — U07.1 PNEUMONIA DUE TO COVID-19 VIRUS: Status: RESOLVED | Noted: 2020-04-15 | Resolved: 2024-07-19

## 2024-07-19 PROBLEM — E83.42 HYPOMAGNESEMIA: Status: RESOLVED | Noted: 2020-04-16 | Resolved: 2024-07-19

## 2024-07-19 PROCEDURE — 3288F FALL RISK ASSESSMENT DOCD: CPT | Mod: CPTII,S$GLB,, | Performed by: NURSE PRACTITIONER

## 2024-07-19 PROCEDURE — 99999 PR PBB SHADOW E&M-EST. PATIENT-LVL V: CPT | Mod: PBBFAC,,, | Performed by: NURSE PRACTITIONER

## 2024-07-19 PROCEDURE — G0402 INITIAL PREVENTIVE EXAM: HCPCS | Mod: S$GLB,,, | Performed by: NURSE PRACTITIONER

## 2024-07-19 PROCEDURE — 1159F MED LIST DOCD IN RCRD: CPT | Mod: CPTII,S$GLB,, | Performed by: NURSE PRACTITIONER

## 2024-07-19 PROCEDURE — 1160F RVW MEDS BY RX/DR IN RCRD: CPT | Mod: CPTII,S$GLB,, | Performed by: NURSE PRACTITIONER

## 2024-07-19 PROCEDURE — 1158F ADVNC CARE PLAN TLK DOCD: CPT | Mod: CPTII,S$GLB,, | Performed by: NURSE PRACTITIONER

## 2024-07-19 PROCEDURE — 1101F PT FALLS ASSESS-DOCD LE1/YR: CPT | Mod: CPTII,S$GLB,, | Performed by: NURSE PRACTITIONER

## 2024-07-19 PROCEDURE — 3066F NEPHROPATHY DOC TX: CPT | Mod: CPTII,S$GLB,, | Performed by: NURSE PRACTITIONER

## 2024-07-19 PROCEDURE — 3044F HG A1C LEVEL LT 7.0%: CPT | Mod: CPTII,S$GLB,, | Performed by: NURSE PRACTITIONER

## 2024-07-19 PROCEDURE — 3075F SYST BP GE 130 - 139MM HG: CPT | Mod: CPTII,S$GLB,, | Performed by: NURSE PRACTITIONER

## 2024-07-19 PROCEDURE — 3078F DIAST BP <80 MM HG: CPT | Mod: CPTII,S$GLB,, | Performed by: NURSE PRACTITIONER

## 2024-07-19 PROCEDURE — 3060F POS MICROALBUMINURIA REV: CPT | Mod: CPTII,S$GLB,, | Performed by: NURSE PRACTITIONER

## 2024-07-19 RX ORDER — CICLOPIROX 80 MG/ML
SOLUTION TOPICAL NIGHTLY
COMMUNITY
Start: 2023-11-02

## 2024-07-19 NOTE — PROGRESS NOTES
"  Roman Hodges presented for a  Medicare AWV and comprehensive Health Risk Assessment today. The following components were reviewed and updated:    Medical history  Family History  Social history  Allergies and Current Medications  Health Risk Assessment  Health Maintenance  Care Team         ** See Completed Assessments for Annual Wellness Visit within the encounter summary.**         The following assessments were completed:  Living Situation  CAGE  Depression Screening  Timed Get Up and Go  Whisper Test  Cognitive Function Screening      Nutrition Screening  ADL Screening  PAQ Screening      Opioid documentation:      Patient does not have a current opioid prescription.        Vitals:    07/19/24 1327   BP: 130/70   BP Location: Left arm   Patient Position: Sitting   BP Method: Medium (Manual)   Pulse: 76   Resp: 20   SpO2: 97%   Weight: 95.8 kg (211 lb 3.2 oz)   Height: 5' 7" (1.702 m)     Body mass index is 33.08 kg/m².    Physical Exam  Vitals reviewed.   Constitutional:       General: He is not in acute distress.     Appearance: Normal appearance. He is well-developed and well-groomed. He is obese.   HENT:      Head: Normocephalic.   Cardiovascular:      Rate and Rhythm: Normal rate.   Pulmonary:      Effort: Pulmonary effort is normal. No respiratory distress.   Abdominal:      General: There is no distension.   Skin:     General: Skin is warm.      Coloration: Skin is not pale.   Neurological:      Mental Status: He is alert and oriented to person, place, and time.      Coordination: Coordination normal.      Gait: Gait normal.   Psychiatric:         Attention and Perception: Attention normal.         Mood and Affect: Mood and affect normal.         Speech: Speech normal.         Behavior: Behavior normal. Behavior is cooperative.         Thought Content: Thought content normal.             Diagnoses and health risks identified today and associated recommendations/orders:    1. Encounter for Medicare " annual wellness exam  - Ambulatory Referral/Consult to Enhanced Annual Wellness Visit (eAWV)    2. S/P liver transplant  Hx of ROMO with hepatocellular CA; s/p liver transplant. Followed by Transplant team.    3. Immunosuppression due to drug therapy  History of liver transplant; stable on Cellcept and Prograf. Followed by Transplant.    4. Type 2 diabetes mellitus with hyperglycemia, without long-term current use of insulin  Chronic; stable on Jardiance medication. Follow up with PCP.    5. Aortic arch atherosclerosis  Chronic; stable on statin medication. Follow up with PCP.    6. Paroxysmal atrial fibrillation  Chronic; stable on Xarelto medication. Follow up with PCP.    7. Essential hypertension  Chronic; stable on CCB, BB medication. Follow up with PCP.    8. Osteopenia, unspecified location  - DXA Bone Density Axial Skeleton 1 or more sites; Future    9. Other specified disorders of bone density and structure, other site  - DXA Bone Density Axial Skeleton 1 or more sites; Future    10. Obesity (BMI 30.0-34.9)  Eat a low salt/low fat ADA diet and discussed importance of engaging in physical activity at least 5x/week for a minimum of 30 min/day.      Provided Roman with a 5-10 year written screening schedule and personal prevention plan. Recommendations were developed using the USPSTF age appropriate recommendations. Education, counseling, and referrals were provided as needed. After Visit Summary given to patient which includes a list of additional screenings/tests needed.    Follow up for your next annual wellness visit.    Ilana Barker NP      Advance Care Planning     I offered to discuss advanced care planning, including how to pick a person who would make decisions for you if you were unable to make them for yourself, called a health care power of , and what kind of decisions you might make such as use of life sustaining treatments such as ventilators and tube feeding when faced with a life  limiting illness recorded on a living will that they will need to know. (How you want to be cared for as you near the end of your natural life)     X Patient is interested in learning more about how to make advanced directives.  I provided them paperwork and offered to discuss this with them.

## 2024-07-19 NOTE — PATIENT INSTRUCTIONS
Counseling and Referral of Other Preventative  (Italic type indicates deductible and co-insurance are waived)    Patient Name: Roman Hodges  Today's Date: 7/19/2024    Health Maintenance       Date Due Completion Date    High Dose Statin Never done ---    RSV Vaccine (Age 60+ and Pregnant patients) (1 - 1-dose 60+ series) Never done ---    DEXA Scan 06/28/2024 6/28/2022    Eye Exam 08/11/2024 8/11/2023    Influenza Vaccine (1) 09/01/2024 10/10/2023    Override on 10/24/2019: Declined    Override on 4/23/2018: Not Clinically Appropriate    Hemoglobin A1c 09/11/2024 3/11/2024    PROSTATE-SPECIFIC ANTIGEN 01/16/2025 1/16/2024    Diabetes Urine Screening 03/11/2025 3/11/2024    Foot Exam 05/02/2025 5/2/2024    Override on 7/8/2020: Done    Lipid Panel 06/07/2025 6/7/2024    Colorectal Cancer Screening 12/23/2026 12/23/2021    TETANUS VACCINE 08/15/2028 8/15/2018        Orders Placed This Encounter   Procedures    DXA Bone Density Axial Skeleton 1 or more sites       The following information is provided to all patients.  This information is to help you find resources for any of the problems found today that may be affecting your health:                  Living healthy guide: www.[facility].[place].gov      Understanding Diabetes: www.diabetes.org      Eating healthy: www.cdc.gov/healthyweight      CDC home safety checklist: www.cdc.gov/steadi/patient.html      Agency on Aging: www.goea.[place].[facility]      Alcoholics anonymous (AA): www.aa.org      Physical Activity: www.jonnie.nih.gov/gx9rdwb      Tobacco use: www.quitwithusla.org

## 2024-07-22 ENCOUNTER — RESEARCH ENCOUNTER (OUTPATIENT)
Dept: RESEARCH | Facility: HOSPITAL | Age: 64
End: 2024-07-22
Payer: COMMERCIAL

## 2024-07-22 ENCOUNTER — HOSPITAL ENCOUNTER (OUTPATIENT)
Dept: RADIOLOGY | Facility: HOSPITAL | Age: 64
Discharge: HOME | End: 2024-07-22
Attending: INTERNAL MEDICINE
Payer: COMMERCIAL

## 2024-07-22 ENCOUNTER — HOSPITAL ENCOUNTER (OUTPATIENT)
Dept: RADIOLOGY | Facility: HOSPITAL | Age: 64
Discharge: HOME | End: 2024-07-22
Attending: NURSE PRACTITIONER
Payer: COMMERCIAL

## 2024-07-22 DIAGNOSIS — M85.88 OTHER SPECIFIED DISORDERS OF BONE DENSITY AND STRUCTURE, OTHER SITE: ICD-10-CM

## 2024-07-22 DIAGNOSIS — M85.80 OSTEOPENIA, UNSPECIFIED LOCATION: ICD-10-CM

## 2024-07-22 DIAGNOSIS — C22.0 HCC (HEPATOCELLULAR CARCINOMA): ICD-10-CM

## 2024-07-22 DIAGNOSIS — Z94.4 LIVER REPLACED BY TRANSPLANT: ICD-10-CM

## 2024-07-22 LAB
CREAT SERPL-MCNC: 1.3 MG/DL (ref 0.5–1.4)
OHS QRS DURATION: 130 MS
OHS QTC CALCULATION: 456 MS
SAMPLE: NORMAL

## 2024-07-22 PROCEDURE — 74170 CT ABD WO CNTRST FLWD CNTRST: CPT | Mod: TC

## 2024-07-22 PROCEDURE — 74170 CT ABD WO CNTRST FLWD CNTRST: CPT | Mod: 26,,, | Performed by: RADIOLOGY

## 2024-07-22 PROCEDURE — 25500020 PHARM REV CODE 255: Performed by: INTERNAL MEDICINE

## 2024-07-22 PROCEDURE — 77080 DXA BONE DENSITY AXIAL: CPT | Mod: 26,,, | Performed by: RADIOLOGY

## 2024-07-22 PROCEDURE — A9698 NON-RAD CONTRAST MATERIALNOC: HCPCS | Performed by: INTERNAL MEDICINE

## 2024-07-22 PROCEDURE — 71270 CT THORAX DX C-/C+: CPT | Mod: 26,,, | Performed by: RADIOLOGY

## 2024-07-22 PROCEDURE — 71270 CT THORAX DX C-/C+: CPT | Mod: TC

## 2024-07-22 PROCEDURE — 77080 DXA BONE DENSITY AXIAL: CPT | Mod: TC

## 2024-07-22 RX ADMIN — IOHEXOL 100 ML: 350 INJECTION, SOLUTION INTRAVENOUS at 10:07

## 2024-07-22 RX ADMIN — IOHEXOL 1000 ML: 12 SOLUTION ORAL at 09:07

## 2024-07-22 NOTE — PROGRESS NOTES
RESEARCH STUDY SPECIMEN COLLECTION ENCOUNTER  ORGAN TRANSPLANT  [facility]    Study Title: Role of Tumor-Induced Immune Tolerance in the Patient Response to Locoregional Therapy: Implications in Assessment Risk of Hepatocellular Carcinoma Recurrence Following Liver Transplantation    IRB #: [id number]    IRB Approval Date: 6/8/2016    : Darshan Graves MD  Sub-investigator: Luke Pulido, PhD    Patient Number: [id number]    In accordance with the study protocol, Research Lab orders were placed and follow-up specimens were collected on (date: 07/22/2024) in:  [facility] VNP: YES/NO: No  [facility]TX: YES/NO: No  [facility] IM: YES/NO: No  Other [facility] location: YES/NO: Yes   Location: [facility]    A  was used to transport blood specimens to [facility] for processing: YES/NO: Yes  Blood specimens were transported to [facility] for processing: YES/NO: Yes    Gregg Nagel  Admin Research- Liver Transplant

## 2024-07-23 ENCOUNTER — TELEPHONE (OUTPATIENT)
Dept: TRANSPLANT | Facility: CLINIC | Age: 64
End: 2024-07-23
Payer: COMMERCIAL

## 2024-07-23 DIAGNOSIS — Z94.4 LIVER REPLACED BY TRANSPLANT: ICD-10-CM

## 2024-07-23 DIAGNOSIS — C22.0 HCC (HEPATOCELLULAR CARCINOMA): ICD-10-CM

## 2024-07-23 DIAGNOSIS — Z94.4 S/P LIVER TRANSPLANT: Primary | ICD-10-CM

## 2024-07-23 NOTE — LETTER
July 23, 2024    Roman Hodges  [address]  [place] [address]          Dear Roman Hodges:  MRN: [id number]    This is a follow up to your recent labs and imaging, your results were stable.  There are no medicine changes.  Please have your labs drawn again on 1/6/2025.      If you cannot have your labs drawn on the scheduled date, it is your responsibility to call the transplant department to reschedule.  Please call [phone] and ask to speak to Jodee CESAR   for all scheduling requests.     Sincerely,    Charmaine PENNN, RN      Your Liver Transplant Coordinator    [facility]  [address] [place] [id number] [phone]

## 2024-07-23 NOTE — TELEPHONE ENCOUNTER
Letter sent to patient stating: Your labs & Imaging have been reviewed by your Transplant physician, no action required. Next labs due 1/6/2025        ----- Message from Fab Damon MD sent at 7/23/2024 10:14 AM CDT -----  Results reviewed. No action.      Received: Today  Fab Damon MD P [facility]  Results reviewed. No action.    Received: Today  Fab Damon MD P [facility]  Results reviewed. No action.

## 2024-07-30 ENCOUNTER — CLINICAL SUPPORT (OUTPATIENT)
Dept: OTOLARYNGOLOGY | Facility: CLINIC | Age: 64
End: 2024-07-30
Payer: MEDICARE

## 2024-07-30 ENCOUNTER — OFFICE VISIT (OUTPATIENT)
Dept: OTOLARYNGOLOGY | Facility: CLINIC | Age: 64
End: 2024-07-30
Payer: MEDICARE

## 2024-07-30 VITALS
HEART RATE: 88 BPM | DIASTOLIC BLOOD PRESSURE: 86 MMHG | WEIGHT: 212 LBS | SYSTOLIC BLOOD PRESSURE: 132 MMHG | BODY MASS INDEX: 33.21 KG/M2

## 2024-07-30 DIAGNOSIS — H90.3 SENSORINEURAL HEARING LOSS (SNHL) OF BOTH EARS: Chronic | ICD-10-CM

## 2024-07-30 DIAGNOSIS — H91.8X3 ASYMMETRICAL HEARING LOSS: ICD-10-CM

## 2024-07-30 DIAGNOSIS — G51.0 FACIAL PARESIS: Primary | Chronic | ICD-10-CM

## 2024-07-30 DIAGNOSIS — H90.3 SENSORINEURAL HEARING LOSS (SNHL) OF BOTH EARS: Primary | ICD-10-CM

## 2024-07-30 PROCEDURE — 3288F FALL RISK ASSESSMENT DOCD: CPT | Mod: CPTII,S$GLB,, | Performed by: OTOLARYNGOLOGY

## 2024-07-30 PROCEDURE — 3075F SYST BP GE 130 - 139MM HG: CPT | Mod: CPTII,S$GLB,, | Performed by: OTOLARYNGOLOGY

## 2024-07-30 PROCEDURE — 3008F BODY MASS INDEX DOCD: CPT | Mod: CPTII,S$GLB,, | Performed by: OTOLARYNGOLOGY

## 2024-07-30 PROCEDURE — 3079F DIAST BP 80-89 MM HG: CPT | Mod: CPTII,S$GLB,, | Performed by: OTOLARYNGOLOGY

## 2024-07-30 PROCEDURE — 99204 OFFICE O/P NEW MOD 45 MIN: CPT | Mod: S$GLB,,, | Performed by: OTOLARYNGOLOGY

## 2024-07-30 PROCEDURE — 92567 TYMPANOMETRY: CPT | Mod: S$GLB,,, | Performed by: PHYSICIAN ASSISTANT

## 2024-07-30 PROCEDURE — 1157F ADVNC CARE PLAN IN RCRD: CPT | Mod: CPTII,S$GLB,, | Performed by: OTOLARYNGOLOGY

## 2024-07-30 PROCEDURE — 3060F POS MICROALBUMINURIA REV: CPT | Mod: CPTII,S$GLB,, | Performed by: OTOLARYNGOLOGY

## 2024-07-30 PROCEDURE — 3066F NEPHROPATHY DOC TX: CPT | Mod: CPTII,S$GLB,, | Performed by: OTOLARYNGOLOGY

## 2024-07-30 PROCEDURE — 1101F PT FALLS ASSESS-DOCD LE1/YR: CPT | Mod: CPTII,S$GLB,, | Performed by: OTOLARYNGOLOGY

## 2024-07-30 PROCEDURE — 92557 COMPREHENSIVE HEARING TEST: CPT | Mod: S$GLB,,, | Performed by: PHYSICIAN ASSISTANT

## 2024-07-30 PROCEDURE — 1159F MED LIST DOCD IN RCRD: CPT | Mod: CPTII,S$GLB,, | Performed by: OTOLARYNGOLOGY

## 2024-07-30 PROCEDURE — 99999 PR PBB SHADOW E&M-EST. PATIENT-LVL III: CPT | Mod: PBBFAC,,, | Performed by: OTOLARYNGOLOGY

## 2024-07-30 PROCEDURE — 1160F RVW MEDS BY RX/DR IN RCRD: CPT | Mod: CPTII,S$GLB,, | Performed by: OTOLARYNGOLOGY

## 2024-07-30 PROCEDURE — 3044F HG A1C LEVEL LT 7.0%: CPT | Mod: CPTII,S$GLB,, | Performed by: OTOLARYNGOLOGY

## 2024-07-30 NOTE — Clinical Note
Is there a role for suppressive antiviral therapy for this patient, given ongoing immunosuppression?  Stefania Bauman MD

## 2024-07-30 NOTE — PATIENT INSTRUCTIONS
Ordered MRI of the IAC's.  Will follow-up on results.    Patient will continue his facial muscle physical therapy.    He will follow-up with me in a few months to reassess response to therapy.    Continue eye lubrication on the left.

## 2024-07-30 NOTE — PROGRESS NOTES
Chief Complaint   Patient presents with    Other Misc     Facial paralysis been having it for two months also informed us that he was referred to get hearing checked as well due to this issue         HPI: Patient is a 64-year-old male with a history of long-term immunosuppression for liver transplant.  He presents today with a recent left-sided facial paralysis/paresis with concomitant decreased hearing.  Symptoms began suddenly in  and he was seen in the ED.  He does not report any vesicles or rash noted in her around the ear at that time, but did report some pain that preceded his symptoms.  He was seen in the ED and ruled out for stroke with negative CT.  He was placed on high-dose steroids for 5 days and antivirals.  He does have a history of fever blisters and has had previous chickenpox as a child.  He has had his shingles vaccination.  Patient has a previous history of right-sided facial paralysis in  with similar symptoms and hearing being affected at that time.  He did return to almost normal function but does still have some twitch and synkinesis on the right.  No imaging, workup, or treatment was done at that time.                     Past Medical History:   Diagnosis Date    A-fib     Allergy     Anticoagulant long-term use     Diabetes mellitus, type 2     GERD (gastroesophageal reflux disease)     Hepatocellular carcinoma     liver    History of 2019 novel coronavirus disease (COVID-19)     tested positive 4/15/20 & 20    History of primary liver cancer 10/24/2018    S/p liver transplant 2019    Hyperlipidemia     Hypertension      Social History     Socioeconomic History    Marital status:    Tobacco Use    Smoking status: Former     Current packs/day: 0.00     Average packs/day: 1 pack/day for 10.0 years (10.0 ttl pk-yrs)     Types: Cigarettes     Start date: 1970     Quit date: 1980     Years since quittin.6     Passive exposure: Past    Smokeless tobacco:  Never   Substance and Sexual Activity    Alcohol use: No    Drug use: No    Sexual activity: Yes     Partners: Female     Social Determinants of Health     Financial Resource Strain: Low Risk  (7/19/2024)    Overall Financial Resource Strain (CARDIA)     Difficulty of Paying Living Expenses: Not hard at all   Food Insecurity: No Food Insecurity (7/19/2024)    Hunger Vital Sign     Worried About Running Out of Food in the Last Year: Never true     Ran Out of Food in the Last Year: Never true   Transportation Needs: No Transportation Needs (7/19/2024)    PRAPARE - Transportation     Lack of Transportation (Medical): No     Lack of Transportation (Non-Medical): No   Physical Activity: Sufficiently Active (7/19/2024)    Exercise Vital Sign     Days of Exercise per Week: 3 days     Minutes of Exercise per Session: 90 min   Stress: No Stress Concern Present (7/19/2024)    [employer] - Occupational Stress Questionnaire     Feeling of Stress : Not at all   Housing Stability: Low Risk  (7/19/2024)    Housing Stability Vital Sign     Unable to Pay for Housing in the Last Year: No     Homeless in the Last Year: No     Past Surgical History:   Procedure Laterality Date    COLONOSCOPY      COLONOSCOPY N/A 12/23/2021    Procedure: COLONOSCOPY miralax prep;  Surgeon: Seng Norman MD;  Location: [facility];  Service: Endoscopy;  Laterality: N/A;    ESOPHAGOGASTRODUODENOSCOPY N/A 1/15/2019    Procedure: ESOPHAGOGASTRODUODENOSCOPY (EGD);  Surgeon: Bony Saavedra MD;  Location: [facility];  Service: Endoscopy;  Laterality: N/A;    ESOPHAGOGASTRODUODENOSCOPY N/A 12/23/2021    Procedure: ESOPHAGOGASTRODUODENOSCOPY (EGD);  Surgeon: Seng Norman MD;  Location: [facility];  Service: Endoscopy;  Laterality: N/A;    HERNIA REPAIR      LEFT HEART CATHETERIZATION Left 12/4/2018    Procedure: Left heart cath;  Surgeon: Tyler Hinojosa MD;  Location: [facility] CATH LAB/EP;  Service: Cardiology;   Laterality: Left;    LIVER TRANSPLANT N/A 7/3/2019    Procedure: TRANSPLANT, LIVER;  Surgeon: Oscar Altman Jr., MD;  Location: [address];  Service: Transplant;  Laterality: N/A;    RADIOFREQUENCY ABLATION N/A 4/12/2019    Procedure: Radiofrequency Ablation;  Surgeon: Hayley Surgeon;  Location: [facility];  Service: Anesthesiology;  Laterality: N/A;  Room [facility]     Family History   Problem Relation Name Age of Onset    Hypertension Mother      Cancer Mother      Hypertension Father      Stroke Father      Cancer Father      Hypertension Sister x3     No Known Problems Brother x2     No Known Problems Daughter x2     Hypertension Son x1     Allergic rhinitis Neg Hx      Allergies Neg Hx      Angioedema Neg Hx      Asthma Neg Hx      Atopy Neg Hx      Eczema Neg Hx      Immunodeficiency Neg Hx      Rhinitis Neg Hx      Urticaria Neg Hx             Review of Systems  General: negative for chills, fever or weight loss  Psychological: negative for mood changes or depression  Ophthalmic: negative for blurry vision, photophobia or eye pain  ENT: see HPI  Respiratory: no cough, shortness of breath, or wheezing  Cardiovascular: no chest pain or dyspnea on exertion  Gastrointestinal: no abdominal pain, change in bowel habits, or black/ bloody stools  Musculoskeletal: negative for gait disturbance or muscular weakness  Neurological: no syncope or seizures; no ataxia  Dermatological: negative for pruritis,  rash and jaundice  Hematologic/lymphatic: no easy bruising, no new adenopathy      Physical Exam:    Vitals:    07/30/24 1336   BP: 132/86   Pulse: 88         Constitutional:   He is oriented to person, place, and time. Vital signs are normal. He appears well-developed and well-nourished. He appears alert. He is cooperative.  Non-toxic appearance. Normal speech.      Head:  Normocephalic and atraumatic. Salivary glands normal.  Facial weakness, left side (complete eye closure with maximal effort, weakness  worse in lower face, buccal and zygomatic branches; overall house Brackmann 4/6) and right side (House Brackmann 3/6, complete eye closure, mild twitch and synkinesis with movement, but otherwise minimal dysfunction.).      Ears:  Hearing normal to normal and whispered voice; external ear normal without scars, lesions, or masses; ear canal, tympanic membrane, and middle ear normal., right ear hearing normal to normal and whispered voice; external ear normal without scars, lesions, or masses; ear canal, tympanic membrane, and middle ear normal. and left ear hearing normal to normal and whispered voice; external ear normal without scars, lesions, or masses; ear canal, tympanic membrane, and middle ear normal..   Right Ear: Tympanic membrane is not perforated, not erythematous and not retracted. No middle ear effusion.   Left Ear: Tympanic membrane is not perforated, not erythematous and not retracted.  No middle ear effusion.   No vesicles, lesions noted in ear canals    Nose:  Mucosal edema present. No rhinorrhea, septal deviation, nasal septal hematoma or polyps. No epistaxis. No turbinate masses and no turbinate hypertrophy (2+ size).  Right sinus exhibits no maxillary sinus tenderness and no frontal sinus tenderness. Left sinus exhibits no maxillary sinus tenderness and no frontal sinus tenderness.     Mouth/Throat  Oropharynx clear and moist without lesions or asymmetry, normal uvula midline, lips, teeth, and gums normal and oropharynx normal. Normal dentition. No uvula swelling or oral lesions. No oropharyngeal exudate, posterior oropharyngeal edema or posterior oropharyngeal erythema. Mirror exam not performed due to patient tolerance.  Mirror exam not performed due to patient tolerance.      Neck:  Neck normal without thyromegaly masses, asymmetry, normal tracheal structure, crepitus, and tenderness, thyroid normal, trachea normal, full range of motion with neck supple and no adenopathy. No JVD present.  Carotid bruit is not present. Thyroid tenderness is present. No edema and no erythema present. No thyroid mass and no thyromegaly present.     He has no cervical adenopathy.     Cardiovascular:    Normal rate, regular rhythm, normal heart sounds and rate and rhythm, heart sounds, and pulses normal.              Pulmonary/Chest:   Effort and breath sounds normal.     Psychiatric:   He has a normal mood and affect. His speech is normal and behavior is normal.     Neurological:   He is alert and oriented to person, place, and time. He has neurological normal, alert and oriented. No cranial nerve deficit.     Skin:   No abrasions, lacerations, lesions, or rashes.         Audiogram: Interpreted by me and reviewed with the patient today.  Bilateral sensorineural hearing loss, mild sloping to moderate, worse overall on right.  Tympanograms type a bilaterally.  No prior exams for comparison.        Assessment:    ICD-10-CM ICD-9-CM    1. Facial paresis  G51.0 351.0 MRI IAC/Temporal Bones W W/O Contrast      2. Sensorineural hearing loss (SNHL) of both ears  H90.3 389.18       3. Asymmetrical hearing loss  H91.8X3 389.9         The primary encounter diagnosis was Facial paresis. Diagnoses of Sensorineural hearing loss (SNHL) of both ears and Asymmetrical hearing loss were also pertinent to this visit.      Plan:    Patient will continue the physical therapy he began and continue with daily exercises.  Overall facial function seems to be improving.  No further role for steroids at this time.    Will order MRI of the IAC's to assess for any retrocochlear or intracranial pathologies that have not been detected were worked up.    Audiogram was reviewed with the patient and he understands his hearing loss.  Likely due to previous issues with zoster/herpetic infection of CN VII/VIII.      I will see the patient back for follow-up in a couple of months to reassess improvements and facial function.  He will let me know if any  symptoms worsen before that time.    I will copy his PCP and transplant physician on this note to have their opinion on whether any suppressive therapy would be indicated given his ongoing immunosuppression.    Stefania Bauman MD

## 2024-07-31 NOTE — PROGRESS NOTES
Roman Hodges, a 64 y.o. male, was seen today in the clinic for an audiologic evaluation.  Patients main complaint was bilateral hearing loss, worse in the Right ear.  He recently suffered with left-sided facial paralysis and decreased hearing in both ears in June.  He reports the symptoms began suddenly.  He was seen in the ED and was placed on high-dose steroids for 5 days. There are no prior audiograms on file for comparison.    Audiogram results revealed an asymmetric hearing loss; a mild to moderate sensorineural hearing loss bilaterally, worse in the right ear.  Speech reception thresholds were noted at 30 dB in the right ear and 25 dB in the left ear.  Speech discrimination scores were 84% in the right ear and 88% in the left ear.  Tympanometry revealed Type A in the right ear and Type A in the left ear.     Recommendations:  Otologic evaluation  Follow up audiometric testing as needed  Hearing protection when in noise  Hearing aid consultation following medical clearance

## 2024-08-08 DIAGNOSIS — Z94.4 LIVER REPLACED BY TRANSPLANT: ICD-10-CM

## 2024-08-09 RX ORDER — TACROLIMUS 0.5 MG/1
CAPSULE ORAL
Qty: 90 CAPSULE | Refills: 11 | Status: SHIPPED | OUTPATIENT
Start: 2024-08-09

## 2024-08-13 ENCOUNTER — PATIENT MESSAGE (OUTPATIENT)
Dept: FAMILY MEDICINE | Facility: CLINIC | Age: 64
End: 2024-08-13
Payer: MEDICARE

## 2024-08-13 LAB
LEFT EYE DM RETINOPATHY: NEGATIVE
RIGHT EYE DM RETINOPATHY: NEGATIVE

## 2024-08-14 ENCOUNTER — HOSPITAL ENCOUNTER (OUTPATIENT)
Dept: RADIOLOGY | Facility: HOSPITAL | Age: 64
Discharge: HOME | End: 2024-08-14
Attending: OTOLARYNGOLOGY
Payer: MEDICARE

## 2024-08-14 DIAGNOSIS — G51.0 FACIAL PARESIS: Chronic | ICD-10-CM

## 2024-08-14 PROCEDURE — 25500020 PHARM REV CODE 255: Performed by: OTOLARYNGOLOGY

## 2024-08-14 PROCEDURE — 70553 MRI BRAIN STEM W/O & W/DYE: CPT | Mod: 26,,, | Performed by: STUDENT IN AN ORGANIZED HEALTH CARE EDUCATION/TRAINING PROGRAM

## 2024-08-14 PROCEDURE — A9585 GADOBUTROL INJECTION: HCPCS | Performed by: OTOLARYNGOLOGY

## 2024-08-14 PROCEDURE — 70553 MRI BRAIN STEM W/O & W/DYE: CPT | Mod: TC

## 2024-08-14 RX ORDER — GADOBUTROL 604.72 MG/ML
10 INJECTION INTRAVENOUS
Status: COMPLETED | OUTPATIENT
Start: 2024-08-14 | End: 2024-08-14

## 2024-08-14 RX ADMIN — GADOBUTROL 10 ML: 604.72 INJECTION INTRAVENOUS at 01:08

## 2024-08-15 ENCOUNTER — TELEPHONE (OUTPATIENT)
Dept: OTOLARYNGOLOGY | Facility: CLINIC | Age: 64
End: 2024-08-15
Payer: MEDICARE

## 2024-08-15 NOTE — PROGRESS NOTES
MRI studies reviewed and has evidence of left facial nerve enhancement in the mastoid, tympanic, and distal labyrinthine segments.  This correlates with patient's clinical history.  No additional treatments are warranted at this time, and patient will continue to follow-up with me as planned.      Stefania Bauman MD  [facility]

## 2024-08-15 NOTE — TELEPHONE ENCOUNTER
----- Message from Stefania Bauman MD sent at 8/15/2024  8:29 AM CDT -----  MRI studies reviewed and has evidence of left facial nerve enhancement in the mastoid, tympanic, and distal labyrinthine segments.  This correlates with patient's clinical history.  No additional treatments are warranted at this time, and patient will continue to follow-up with me as planned.      Stefania Bauman MD  [facility]

## 2024-08-21 ENCOUNTER — PATIENT MESSAGE (OUTPATIENT)
Dept: FAMILY MEDICINE | Facility: CLINIC | Age: 64
End: 2024-08-21

## 2024-08-21 ENCOUNTER — OFFICE VISIT (OUTPATIENT)
Dept: FAMILY MEDICINE | Facility: CLINIC | Age: 64
End: 2024-08-21
Payer: MEDICARE

## 2024-08-21 VITALS
BODY MASS INDEX: 32.83 KG/M2 | OXYGEN SATURATION: 97 % | HEIGHT: 67 IN | DIASTOLIC BLOOD PRESSURE: 78 MMHG | WEIGHT: 209.19 LBS | SYSTOLIC BLOOD PRESSURE: 124 MMHG | HEART RATE: 80 BPM

## 2024-08-21 DIAGNOSIS — J06.9 UPPER RESPIRATORY TRACT INFECTION, UNSPECIFIED TYPE: Primary | ICD-10-CM

## 2024-08-21 LAB
CTP QC/QA: YES
CTP QC/QA: YES
POC MOLECULAR INFLUENZA A AGN: NEGATIVE
POC MOLECULAR INFLUENZA B AGN: NEGATIVE
SARS-COV-2 RDRP RESP QL NAA+PROBE: NEGATIVE

## 2024-08-21 PROCEDURE — 1160F RVW MEDS BY RX/DR IN RCRD: CPT | Mod: CPTII,S$GLB,, | Performed by: STUDENT IN AN ORGANIZED HEALTH CARE EDUCATION/TRAINING PROGRAM

## 2024-08-21 PROCEDURE — 3066F NEPHROPATHY DOC TX: CPT | Mod: CPTII,S$GLB,, | Performed by: STUDENT IN AN ORGANIZED HEALTH CARE EDUCATION/TRAINING PROGRAM

## 2024-08-21 PROCEDURE — 99214 OFFICE O/P EST MOD 30 MIN: CPT | Mod: S$GLB,,, | Performed by: STUDENT IN AN ORGANIZED HEALTH CARE EDUCATION/TRAINING PROGRAM

## 2024-08-21 PROCEDURE — 1101F PT FALLS ASSESS-DOCD LE1/YR: CPT | Mod: CPTII,S$GLB,, | Performed by: STUDENT IN AN ORGANIZED HEALTH CARE EDUCATION/TRAINING PROGRAM

## 2024-08-21 PROCEDURE — 3008F BODY MASS INDEX DOCD: CPT | Mod: CPTII,S$GLB,, | Performed by: STUDENT IN AN ORGANIZED HEALTH CARE EDUCATION/TRAINING PROGRAM

## 2024-08-21 PROCEDURE — 3288F FALL RISK ASSESSMENT DOCD: CPT | Mod: CPTII,S$GLB,, | Performed by: STUDENT IN AN ORGANIZED HEALTH CARE EDUCATION/TRAINING PROGRAM

## 2024-08-21 PROCEDURE — 87502 INFLUENZA DNA AMP PROBE: CPT | Mod: QW,S$GLB,, | Performed by: STUDENT IN AN ORGANIZED HEALTH CARE EDUCATION/TRAINING PROGRAM

## 2024-08-21 PROCEDURE — 3044F HG A1C LEVEL LT 7.0%: CPT | Mod: CPTII,S$GLB,, | Performed by: STUDENT IN AN ORGANIZED HEALTH CARE EDUCATION/TRAINING PROGRAM

## 2024-08-21 PROCEDURE — 1159F MED LIST DOCD IN RCRD: CPT | Mod: CPTII,S$GLB,, | Performed by: STUDENT IN AN ORGANIZED HEALTH CARE EDUCATION/TRAINING PROGRAM

## 2024-08-21 PROCEDURE — 87635 SARS-COV-2 COVID-19 AMP PRB: CPT | Mod: QW,S$GLB,, | Performed by: STUDENT IN AN ORGANIZED HEALTH CARE EDUCATION/TRAINING PROGRAM

## 2024-08-21 PROCEDURE — 3074F SYST BP LT 130 MM HG: CPT | Mod: CPTII,S$GLB,, | Performed by: STUDENT IN AN ORGANIZED HEALTH CARE EDUCATION/TRAINING PROGRAM

## 2024-08-21 PROCEDURE — 3078F DIAST BP <80 MM HG: CPT | Mod: CPTII,S$GLB,, | Performed by: STUDENT IN AN ORGANIZED HEALTH CARE EDUCATION/TRAINING PROGRAM

## 2024-08-21 PROCEDURE — 99999 PR PBB SHADOW E&M-EST. PATIENT-LVL IV: CPT | Mod: PBBFAC,,, | Performed by: STUDENT IN AN ORGANIZED HEALTH CARE EDUCATION/TRAINING PROGRAM

## 2024-08-21 PROCEDURE — 1157F ADVNC CARE PLAN IN RCRD: CPT | Mod: CPTII,S$GLB,, | Performed by: STUDENT IN AN ORGANIZED HEALTH CARE EDUCATION/TRAINING PROGRAM

## 2024-08-21 PROCEDURE — 3060F POS MICROALBUMINURIA REV: CPT | Mod: CPTII,S$GLB,, | Performed by: STUDENT IN AN ORGANIZED HEALTH CARE EDUCATION/TRAINING PROGRAM

## 2024-08-21 RX ORDER — HYDROCHLOROTHIAZIDE 12.5 MG/1
12.5 TABLET ORAL
COMMUNITY
Start: 2024-06-04

## 2024-08-21 NOTE — PROGRESS NOTES
Progress Note  [facility]    Subjective:     Roman Hodges is a 64 y.o. year old male who presents to clinic for URI.  declined.     Sore throat yesterday. Congestion started today. Currently immune suppressed 2/2 liver transplant 5 years ago. Has a cough. No SOB or wheezing. Cough is productive. Has PND. Phelgm is clear. No fever. Wife sick about 1 month ago but no other sick contacts. Had Bell's palsy about 4 months ago so L eye water but no itching or worsening of watery eyes.     Patient Active Problem List    Diagnosis Date Noted    Facial weakness 06/06/2024    Primary osteoarthritis of first carpometacarpal joint of left hand 03/14/2024    Aortic arch atherosclerosis 03/14/2024    Mite allergy 03/24/2022    Lymphopenia 04/16/2020    Hyponatremia 04/16/2020    Immunosuppression due to drug therapy 07/07/2019    At risk for opportunistic infections 07/07/2019    Paroxysmal atrial fibrillation 07/07/2019    S/P liver transplant 07/04/2019    Prophylactic immunotherapy 07/04/2019    Adrenal cortical steroids causing adverse effect in therapeutic use 07/04/2019    Long term (current) use of anticoagulants 04/26/2019    Open angle with borderline findings and low 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 eyes 12/25/2018    Essential hypertension 11/29/2018    Type 2 diabetes mellitus with hyperglycemia, without long-term current use of insulin 11/29/2018    GERD (gastroesophageal reflux disease) 11/29/2018    MALLIKA (obstructive sleep apnea) 04/23/2018    Obesity (BMI 30.0-34.9) 04/23/2018        Review of patient's allergies indicates:   Allergen Reactions    Lisinopril Hives     Stomach ache        Past Medical History:   Diagnosis Date    A-fib     Allergy     Anticoagulant long-term use     Diabetes mellitus, type 2     GERD (gastroesophageal reflux disease)     Hepatocellular carcinoma     liver    History of 2019 novel coronavirus disease (COVID-19)     tested positive 4/15/20  & 5/8/20    History of primary liver cancer 10/24/2018    S/p liver transplant 7/4/2019    Hyperlipidemia     Hypertension       Past Surgical History:   Procedure Laterality Date    COLONOSCOPY      COLONOSCOPY N/A 12/23/2021    Procedure: COLONOSCOPY miralax prep;  Surgeon: Seng Norman MD;  Location: [facility] ENDO;  Service: Endoscopy;  Laterality: N/A;    ESOPHAGOGASTRODUODENOSCOPY N/A 1/15/2019    Procedure: ESOPHAGOGASTRODUODENOSCOPY (EGD);  Surgeon: Bony Saavedra MD;  Location: [facility] ENDO;  Service: Endoscopy;  Laterality: N/A;    ESOPHAGOGASTRODUODENOSCOPY N/A 12/23/2021    Procedure: ESOPHAGOGASTRODUODENOSCOPY (EGD);  Surgeon: Seng Norman MD;  Location: [facility] ENDO;  Service: Endoscopy;  Laterality: N/A;    HERNIA REPAIR      LEFT HEART CATHETERIZATION Left 12/4/2018    Procedure: Left heart cath;  Surgeon: Tyler Hinojosa MD;  Location: [facility] CATH LAB/EP;  Service: Cardiology;  Laterality: Left;    LIVER TRANSPLANT N/A 7/3/2019    Procedure: TRANSPLANT, LIVER;  Surgeon: Oscar Altman Jr., MD;  Location: [address];  Service: Transplant;  Laterality: N/A;    RADIOFREQUENCY ABLATION N/A 4/12/2019    Procedure: Radiofrequency Ablation;  Surgeon: Rose Surgeon;  Location: [facility];  Service: Anesthesiology;  Laterality: N/A;  Room [facility]      Family History   Problem Relation Name Age of Onset    Hypertension Mother      Cancer Mother      Hypertension Father      Stroke Father      Cancer Father      Hypertension Sister x3     No Known Problems Brother x2     No Known Problems Daughter x2     Hypertension Son x1     Allergic rhinitis Neg Hx      Allergies Neg Hx      Angioedema Neg Hx      Asthma Neg Hx      Atopy Neg Hx      Eczema Neg Hx      Immunodeficiency Neg Hx      Rhinitis Neg Hx      Urticaria Neg Hx        Social History     Tobacco Use    Smoking status: Former     Current packs/day: 0.00     Average packs/day: 1 pack/day for 10.0 years (10.0 ttl pk-yrs)      "Types: Cigarettes     Start date: 1970     Quit date: 1980     Years since quittin.6     Passive exposure: Past    Smokeless tobacco: Never   Substance Use Topics    Alcohol use: No        Objective:     Vitals:    24 1021   BP: 124/78   BP Location: Left arm   Patient Position: Sitting   BP Method: Medium (Manual)   Pulse: 80   SpO2: 97%   Weight: 94.9 kg (209 lb 3.5 oz)   Height: 5' 7" (1.702 m)     BMI: 32.77    Gen: No apparent distress, well nourished and developed, appears stated age  CV: RRR, S1 and S2 present, no LE edema  Resp: CTAB, normal respiratory effort  HEENT: Ncat, TM with clear fluid b/l, no TTP sinuses, congestion present, oropharynx mildly erythematous  Neck; No LAD    POCT COVID: Negative  POCT Flu: Negative    Assessment/Plan:     Roman Hodges is a 64 y.o. year old male who presents to clinic for URI    1. Upper respiratory tract infection, unspecified type  Supportive care discussed. No signs of need for abx today. Return precautions discussed. Pt verbalized understanding and was in agreement with the plan.    - POCT COVID-19 Rapid Screening  - POCT Influenza A/B Molecular      Follow-up:PRN    I spent a total of 30 minutes on the day of the visit.This includes face to face time and non-face to face time preparing to see the patient (eg, review of tests), obtaining and/or reviewing separately obtained history, documenting clinical information in the electronic or other health record, independently interpreting results and communicating results to the patient/family/caregiver, or care coordinator.      Babatunde Lainez, DO  Family Medicine        "

## 2024-08-21 NOTE — PATIENT INSTRUCTIONS
I recommend you try over the counter cough medicine that doesn't have a D or DM    Start using flonase daily to help with the congestion.

## 2024-08-22 ENCOUNTER — PATIENT OUTREACH (OUTPATIENT)
Dept: ADMINISTRATIVE | Facility: HOSPITAL | Age: 64
End: 2024-08-22
Payer: MEDICARE

## 2024-08-22 NOTE — PROGRESS NOTES
Population Health Chart Review & Patient Outreach Details      Additional [facility] Notes:               Updates Requested / Reviewed:      Updated Care Coordination Note, Care Everywhere, , and Immunizations Reconciliation Completed or Queried: [place]         Health Maintenance Topics Overdue:      [facility] Score: 0     Patient is not due for any topics at this time.                       Health Maintenance Topic(s) Outreach Outcomes & Actions Taken:    Eye Exam - Outreach Outcomes & Actions Taken  : Diabetic Eye External Records Uploaded, Care Team & History Updated if Applicable

## 2024-08-23 ENCOUNTER — PATIENT MESSAGE (OUTPATIENT)
Dept: FAMILY MEDICINE | Facility: CLINIC | Age: 64
End: 2024-08-23
Payer: MEDICARE

## 2024-08-23 DIAGNOSIS — G51.0 BELL'S PALSY: ICD-10-CM

## 2024-08-23 NOTE — TELEPHONE ENCOUNTER
Care Due:                  Date            Visit Type   Department     Provider  --------------------------------------------------------------------------------                                SAME DAY -                              ESTABLISHED   [facility]  Last Visit: 08-      PATIENT      MEDICINE       Babatunde MIJARES                              FOLLOWUP/OF  [facility]  Next Visit: 09-      FICE VISIT   MEDICINE       Jose Angel Munson                                                            Last  Test          Frequency    Reason                     Performed    Due Date  --------------------------------------------------------------------------------    Vitamin D...  12 months..  ergocalciferol...........  Not Found    Overdue    [employer] Embedded Care Due Messages. Reference number: [id number].   8/23/2024 2:05:24 PM CDT

## 2024-08-23 NOTE — TELEPHONE ENCOUNTER
Refill Routing Note   Medication(s) are not appropriate for processing by [facility] for the following reason(s):        Required labs abnormal:     [facility] action(s):  Defer      Medication Therapy Plan:         Appointments  past 12m or future 3m with PCP    Date Provider   Last Visit   6/4/2024 Jose Angel Munson MD   Next Visit   9/12/2024 Jose Angel Munson MD   ED visits in past 90 days: 1        Note composed:2:22 PM 08/23/2024

## 2024-08-25 NOTE — TELEPHONE ENCOUNTER
Refill Routing Note   Medication(s) are not appropriate for processing by [facility] for the following reason(s):        Required labs abnormal  No active prescription written by provider    [facility] action(s):  Defer               Appointments  past 12m or future 3m with PCP    Date Provider   Last Visit   6/4/2024 Jose Angel Munson MD   Next Visit   9/12/2024 Jose Angel Munson MD   ED visits in past 90 days: 1        Note composed:1:36 AM 08/25/2024

## 2024-08-26 RX ORDER — HYDROCHLOROTHIAZIDE 12.5 MG/1
12.5 TABLET ORAL DAILY
Qty: 90 TABLET | Refills: 3 | Status: SHIPPED | OUTPATIENT
Start: 2024-08-26

## 2024-09-01 RX ORDER — ESOMEPRAZOLE MAGNESIUM 40 MG/1
CAPSULE, DELAYED RELEASE ORAL
Qty: 90 CAPSULE | Refills: 3 | Status: SHIPPED | OUTPATIENT
Start: 2024-09-01

## 2024-09-01 NOTE — TELEPHONE ENCOUNTER
No care due was identified.  [facility] Embedded Care Due Messages. Reference number: [id number].   9/01/2024 7:31:54 AM CDT

## 2024-09-02 NOTE — TELEPHONE ENCOUNTER
Refill Decision Note   Roman Hodges  is requesting a refill authorization.  Brief Assessment and Rationale for Refill:  Approve     Medication Therapy Plan:        Comments:     Note composed:9:13 PM 09/01/2024

## 2024-09-05 DIAGNOSIS — Z94.4 LIVER REPLACED BY TRANSPLANT: Primary | ICD-10-CM

## 2024-09-05 RX ORDER — MYCOPHENOLATE MOFETIL 250 MG/1
CAPSULE ORAL
Qty: 240 CAPSULE | Refills: 11 | Status: SHIPPED | OUTPATIENT
Start: 2024-09-05 | End: 2025-09-05

## 2024-09-09 ENCOUNTER — LAB VISIT (OUTPATIENT)
Dept: LAB | Facility: HOSPITAL | Age: 64
End: 2024-09-09
Attending: FAMILY MEDICINE
Payer: MEDICARE

## 2024-09-09 DIAGNOSIS — I70.0 AORTIC ARCH ATHEROSCLEROSIS: ICD-10-CM

## 2024-09-09 DIAGNOSIS — Z94.4 S/P LIVER TRANSPLANT: ICD-10-CM

## 2024-09-09 DIAGNOSIS — E11.65 TYPE 2 DIABETES MELLITUS WITH HYPERGLYCEMIA, WITHOUT LONG-TERM CURRENT USE OF INSULIN: ICD-10-CM

## 2024-09-09 DIAGNOSIS — I10 ESSENTIAL HYPERTENSION: ICD-10-CM

## 2024-09-09 LAB
ALBUMIN SERPL BCP-MCNC: 4.1 G/DL (ref 3.5–5.2)
ALP SERPL-CCNC: 56 U/L (ref 55–135)
ALT SERPL W/O P-5'-P-CCNC: 32 U/L (ref 10–44)
ANION GAP SERPL CALC-SCNC: 10 MMOL/L (ref 8–16)
AST SERPL-CCNC: 22 U/L (ref 10–40)
BILIRUB SERPL-MCNC: 0.7 MG/DL (ref 0.1–1)
BUN SERPL-MCNC: 18 MG/DL (ref 8–23)
CALCIUM SERPL-MCNC: 9.4 MG/DL (ref 8.7–10.5)
CHLORIDE SERPL-SCNC: 103 MMOL/L (ref 95–110)
CHOLEST SERPL-MCNC: 156 MG/DL (ref 120–199)
CHOLEST/HDLC SERPL: 4.9 {RATIO} (ref 2–5)
CO2 SERPL-SCNC: 28 MMOL/L (ref 23–29)
CREAT SERPL-MCNC: 1.1 MG/DL (ref 0.5–1.4)
EST. GFR  (NO RACE VARIABLE): >60 ML/MIN/1.73 M^2
ESTIMATED AVG GLUCOSE: 143 MG/DL (ref 68–131)
GLUCOSE SERPL-MCNC: 103 MG/DL (ref 70–110)
HBA1C MFR BLD: 6.6 % (ref 4–5.6)
HDLC SERPL-MCNC: 32 MG/DL (ref 40–75)
HDLC SERPL: 20.5 % (ref 20–50)
LDLC SERPL CALC-MCNC: 79.8 MG/DL (ref 63–159)
NONHDLC SERPL-MCNC: 124 MG/DL
POTASSIUM SERPL-SCNC: 3.8 MMOL/L (ref 3.5–5.1)
PROT SERPL-MCNC: 7.2 G/DL (ref 6–8.4)
SODIUM SERPL-SCNC: 141 MMOL/L (ref 136–145)
TRIGL SERPL-MCNC: 221 MG/DL (ref 30–150)

## 2024-09-09 PROCEDURE — 80053 COMPREHEN METABOLIC PANEL: CPT | Performed by: FAMILY MEDICINE

## 2024-09-09 PROCEDURE — 83036 HEMOGLOBIN GLYCOSYLATED A1C: CPT | Performed by: FAMILY MEDICINE

## 2024-09-09 PROCEDURE — 80061 LIPID PANEL: CPT | Performed by: FAMILY MEDICINE

## 2024-10-12 DIAGNOSIS — K62.5 RECTAL BLEEDING: ICD-10-CM

## 2024-10-14 RX ORDER — HYDROCORTISONE 25 MG/G
CREAM TOPICAL
Qty: 30 G | Refills: 2 | Status: SHIPPED | OUTPATIENT
Start: 2024-10-14

## 2024-10-15 ENCOUNTER — OFFICE VISIT (OUTPATIENT)
Dept: FAMILY MEDICINE | Facility: CLINIC | Age: 64
End: 2024-10-15
Payer: MEDICARE

## 2024-10-15 VITALS
WEIGHT: 205.25 LBS | DIASTOLIC BLOOD PRESSURE: 84 MMHG | HEIGHT: 67 IN | HEART RATE: 87 BPM | SYSTOLIC BLOOD PRESSURE: 114 MMHG | OXYGEN SATURATION: 97 % | BODY MASS INDEX: 32.21 KG/M2

## 2024-10-15 DIAGNOSIS — J06.9 UPPER RESPIRATORY TRACT INFECTION, UNSPECIFIED TYPE: ICD-10-CM

## 2024-10-15 DIAGNOSIS — J02.9 SORE THROAT: ICD-10-CM

## 2024-10-15 DIAGNOSIS — E11.65 TYPE 2 DIABETES MELLITUS WITH HYPERGLYCEMIA, WITHOUT LONG-TERM CURRENT USE OF INSULIN: ICD-10-CM

## 2024-10-15 DIAGNOSIS — I10 ESSENTIAL HYPERTENSION: Primary | Chronic | ICD-10-CM

## 2024-10-15 DIAGNOSIS — E78.1 HYPERTRIGLYCERIDEMIA: ICD-10-CM

## 2024-10-15 LAB
CTP QC/QA: YES
FLUAV AG NPH QL: NEGATIVE
FLUBV AG NPH QL: NEGATIVE
S PYO RRNA THROAT QL PROBE: NEGATIVE
SARS-COV-2 RDRP RESP QL NAA+PROBE: NEGATIVE

## 2024-10-15 PROCEDURE — 3074F SYST BP LT 130 MM HG: CPT | Mod: CPTII,S$GLB,, | Performed by: FAMILY MEDICINE

## 2024-10-15 PROCEDURE — 99215 OFFICE O/P EST HI 40 MIN: CPT | Mod: S$GLB,,, | Performed by: FAMILY MEDICINE

## 2024-10-15 PROCEDURE — 3288F FALL RISK ASSESSMENT DOCD: CPT | Mod: CPTII,S$GLB,, | Performed by: FAMILY MEDICINE

## 2024-10-15 PROCEDURE — 87804 INFLUENZA ASSAY W/OPTIC: CPT | Mod: QW,S$GLB,, | Performed by: FAMILY MEDICINE

## 2024-10-15 PROCEDURE — 1160F RVW MEDS BY RX/DR IN RCRD: CPT | Mod: CPTII,S$GLB,, | Performed by: FAMILY MEDICINE

## 2024-10-15 PROCEDURE — 3066F NEPHROPATHY DOC TX: CPT | Mod: CPTII,S$GLB,, | Performed by: FAMILY MEDICINE

## 2024-10-15 PROCEDURE — 3044F HG A1C LEVEL LT 7.0%: CPT | Mod: CPTII,S$GLB,, | Performed by: FAMILY MEDICINE

## 2024-10-15 PROCEDURE — 87880 STREP A ASSAY W/OPTIC: CPT | Mod: QW,S$GLB,, | Performed by: FAMILY MEDICINE

## 2024-10-15 PROCEDURE — 1159F MED LIST DOCD IN RCRD: CPT | Mod: CPTII,S$GLB,, | Performed by: FAMILY MEDICINE

## 2024-10-15 PROCEDURE — 87635 SARS-COV-2 COVID-19 AMP PRB: CPT | Mod: QW,S$GLB,, | Performed by: FAMILY MEDICINE

## 2024-10-15 PROCEDURE — 3008F BODY MASS INDEX DOCD: CPT | Mod: CPTII,S$GLB,, | Performed by: FAMILY MEDICINE

## 2024-10-15 PROCEDURE — 3060F POS MICROALBUMINURIA REV: CPT | Mod: CPTII,S$GLB,, | Performed by: FAMILY MEDICINE

## 2024-10-15 PROCEDURE — 99999 PR PBB SHADOW E&M-EST. PATIENT-LVL IV: CPT | Mod: PBBFAC,,, | Performed by: FAMILY MEDICINE

## 2024-10-15 PROCEDURE — 3079F DIAST BP 80-89 MM HG: CPT | Mod: CPTII,S$GLB,, | Performed by: FAMILY MEDICINE

## 2024-10-15 PROCEDURE — 2023F DILAT RTA XM W/O RTNOPTHY: CPT | Mod: CPTII,S$GLB,, | Performed by: FAMILY MEDICINE

## 2024-10-15 PROCEDURE — 1101F PT FALLS ASSESS-DOCD LE1/YR: CPT | Mod: CPTII,S$GLB,, | Performed by: FAMILY MEDICINE

## 2024-10-15 PROCEDURE — 1157F ADVNC CARE PLAN IN RCRD: CPT | Mod: CPTII,S$GLB,, | Performed by: FAMILY MEDICINE

## 2024-10-15 RX ORDER — TIRZEPATIDE 2.5 MG/.5ML
2.5 INJECTION, SOLUTION SUBCUTANEOUS
Qty: 6 ML | Refills: 3 | Status: SHIPPED | OUTPATIENT
Start: 2024-10-15 | End: 2025-10-15

## 2024-10-16 NOTE — PROGRESS NOTES
Subjective     Patient ID: Roman Hodges is a 64 y.o. male.    Chief Complaint: Cough and Sore Throat    64 years old male came to the clinic blood pressure check.  Pressure today was stable.  Patient also with cough, congestion, sore throat for the last week.  Last lipid profile with elevated triglycerides.  Last A1c was stable.  Patient is willing to try Mounjaro to help with weight loss.    Cough  This is a new problem. The current episode started in the past 7 days. The problem has been unchanged. The problem occurs every few hours. The cough is Non-productive. Associated symptoms include a sore throat. Pertinent negatives include no chest pain, fever, nasal congestion or postnasal drip. Nothing aggravates the symptoms. He has tried nothing for the symptoms. The treatment provided no relief. There is no history of pneumonia.   Sore Throat   This is a new problem. The current episode started in the past 7 days. The problem has been unchanged. There has been no fever. The pain is mild. Associated symptoms include coughing. He has had no exposure to strep. He has tried nothing for the symptoms. The treatment provided no relief.   Hypertension  This is a chronic problem. The current episode started more than 1 year ago. The problem is unchanged. The problem is controlled. Pertinent negatives include no chest pain, orthopnea, palpitations or peripheral edema. There are no associated agents to hypertension. Risk factors for coronary artery disease include male gender and obesity. Past treatments include calcium channel blockers, beta blockers and diuretics. The current treatment provides significant improvement. Compliance problems include exercise.  There is no history of angina. There is no history of a hypertension causing med or a thyroid problem.   Diabetes  He presents for his follow-up diabetic visit. He has type 2 diabetes mellitus. His disease course has been stable. There are no hypoglycemic associated  symptoms. Pertinent negatives for diabetes include no chest pain, no polydipsia, no polyphagia and no polyuria. There are no hypoglycemic complications. Symptoms are stable. There are no diabetic complications. Risk factors for coronary artery disease include hypertension. Current diabetic treatment includes oral agent (monotherapy). He is compliant with treatment all of the time. He is following a generally healthy diet. He has not had a previous visit with a dietitian. He participates in exercise intermittently.     Review of Systems   Constitutional: Negative.  Negative for fever.   HENT:  Positive for sore throat. Negative for postnasal drip.    Eyes: Negative.    Respiratory:  Positive for cough.    Cardiovascular: Negative.  Negative for chest pain, palpitations and orthopnea.   Gastrointestinal: Negative.    Endocrine: Negative for polydipsia, polyphagia and polyuria.   Genitourinary: Negative.    Musculoskeletal: Negative.    Integumentary:  Negative.   Neurological: Negative.    Psychiatric/Behavioral: Negative.            Objective     Physical Exam  Vitals and nursing note reviewed.   Constitutional:       General: He is not in acute distress.     Appearance: He is well-developed. He is not diaphoretic.   HENT:      Head: Normocephalic and atraumatic.      Right Ear: External ear normal.      Left Ear: External ear normal.      Nose: Nose normal.      Mouth/Throat:      Pharynx: No oropharyngeal exudate.   Eyes:      General: No scleral icterus.        Right eye: No discharge.         Left eye: No discharge.      Conjunctiva/sclera: Conjunctivae normal.      Pupils: Pupils are equal, round, and reactive to light.   Neck:      Thyroid: No thyromegaly.      Vascular: No JVD.      Trachea: No tracheal deviation.   Cardiovascular:      Rate and Rhythm: Normal rate and regular rhythm.      Heart sounds: Normal heart sounds. No murmur heard.     No friction rub. No gallop.   Pulmonary:      Effort: Pulmonary  effort is normal. No respiratory distress.      Breath sounds: Normal breath sounds. No stridor. No wheezing or rales.   Chest:      Chest wall: No tenderness.   Abdominal:      General: Bowel sounds are normal. There is no distension.      Palpations: Abdomen is soft. There is no mass.      Tenderness: There is no abdominal tenderness. There is no guarding or rebound.   Musculoskeletal:         General: No tenderness. Normal range of motion.      Cervical back: Normal range of motion and neck supple.   Lymphadenopathy:      Cervical: No cervical adenopathy.   Skin:     General: Skin is warm and dry.      Coloration: Skin is not pale.      Findings: No erythema or rash.   Neurological:      Mental Status: He is alert and oriented to person, place, and time.      Cranial Nerves: No cranial nerve deficit.      Motor: No abnormal muscle tone.      Coordination: Coordination normal.      Deep Tendon Reflexes: Reflexes are normal and symmetric. Reflexes normal.   Psychiatric:         Behavior: Behavior normal.         Thought Content: Thought content normal.         Judgment: Judgment normal.            Assessment and Plan     1. Essential hypertension    2. Upper respiratory tract infection, unspecified type  -     POCT COVID-19 Rapid Screening  -     POCT Influenza A/B    3. Sore throat  -     POCT Rapid Strep A    4. Hypertriglyceridemia    5. Type 2 diabetes mellitus with hyperglycemia, without long-term current use of insulin  -     tirzepatide (MOUNJARO) 2.5 mg/0.5 mL PnIj; Inject 2.5 mg into the skin every 7 days.  Dispense: 6 mL; Refill: 3        Continue monitoring blood pressure at home, low sodium diet.   Continue monitoring blood sugar at home,ADA diet.   I spent a total of 42 minutes on the day of the visit.This includes face to face time and non-face to face time preparing to see the patient (eg, review of tests), obtaining and/or reviewing separately obtained history, documenting clinical information in  the electronic or other health record, independently interpreting results and communicating results to the patient/family/caregiver, or care coordinator.          Follow up in about 6 months (around 4/15/2025), or if symptoms worsen or fail to improve.

## 2024-10-19 DIAGNOSIS — E11.9 TYPE 2 DIABETES MELLITUS WITHOUT COMPLICATION, WITHOUT LONG-TERM CURRENT USE OF INSULIN: ICD-10-CM

## 2024-10-19 NOTE — TELEPHONE ENCOUNTER
No care due was identified.  [facility] Embedded Care Due Messages. Reference number: [id number].   10/19/2024 8:16:47 AM CDT

## 2024-10-20 RX ORDER — EMPAGLIFLOZIN 10 MG/1
10 TABLET, FILM COATED ORAL
Qty: 90 TABLET | Refills: 3 | OUTPATIENT
Start: 2024-10-20

## 2024-10-20 NOTE — TELEPHONE ENCOUNTER
Refill Decision Note   Roman Hodges  is requesting a refill authorization.  Brief Assessment and Rationale for Refill:  Quick Discontinue     Medication Therapy Plan:  JARDIANCE 10MG  ON 10/11/24      Comments:     Note composed:11:25 AM 10/20/2024

## 2024-10-31 ENCOUNTER — PATIENT MESSAGE (OUTPATIENT)
Dept: FAMILY MEDICINE | Facility: CLINIC | Age: 64
End: 2024-10-31
Payer: MEDICARE

## 2024-11-10 ENCOUNTER — PATIENT MESSAGE (OUTPATIENT)
Dept: FAMILY MEDICINE | Facility: CLINIC | Age: 64
End: 2024-11-10
Payer: MEDICARE

## 2024-11-11 ENCOUNTER — E-VISIT (OUTPATIENT)
Dept: FAMILY MEDICINE | Facility: CLINIC | Age: 64
End: 2024-11-11
Payer: MEDICARE

## 2024-11-11 DIAGNOSIS — E11.9 TYPE 2 DIABETES MELLITUS WITHOUT COMPLICATION, WITHOUT LONG-TERM CURRENT USE OF INSULIN: Primary | ICD-10-CM

## 2024-11-11 PROCEDURE — 99422 OL DIG E/M SVC 11-20 MIN: CPT | Mod: ,,, | Performed by: FAMILY MEDICINE

## 2024-11-12 NOTE — PROGRESS NOTES
The patient location is: [place]  The chief complaint leading to consultation is: Diabetes    Visit type: E visit    Face to Face time with patient: 15 minutes of total time spent on the encounter, which includes face to face time and non-face to face time preparing to see the patient (eg, review of tests), Obtaining and/or reviewing separately obtained history, Documenting clinical information in the electronic or other health record, Independently interpreting results (not separately reported) and communicating results to the patient/family/caregiver, or Care coordination (not separately reported).         Each patient to whom he or she provides medical services by telemedicine is:  (1) informed of the relationship between the physician and patient and the respective role of any other health care provider with respect to management of the patient; and (2) notified that he or she may decline to receive medical services by telemedicine and may withdraw from such care at any time.    Notes:  64 years old male who was evaluated by telemedicine.  Patient was not able to tolerate Mounjaro because of side effects.  He is requesting to restart Jardiance to help with the diabetes control.    Roman was seen today for general illness.    Diagnoses and all orders for this visit:    Type 2 diabetes mellitus without complication, without long-term current use of insulin  -     empagliflozin (JARDIANCE) 25 mg tablet; Take 1 tablet (25 mg total) by mouth once daily.

## 2024-11-14 DIAGNOSIS — E11.9 TYPE 2 DIABETES MELLITUS WITHOUT COMPLICATION, WITHOUT LONG-TERM CURRENT USE OF INSULIN: ICD-10-CM

## 2024-11-30 DIAGNOSIS — E11.9 TYPE 2 DIABETES MELLITUS WITHOUT COMPLICATION, WITHOUT LONG-TERM CURRENT USE OF INSULIN: ICD-10-CM

## 2024-11-30 DIAGNOSIS — Z94.4 LIVER TRANSPLANT STATUS: ICD-10-CM

## 2024-11-30 DIAGNOSIS — E87.5 HYPERKALEMIA: ICD-10-CM

## 2024-11-30 NOTE — TELEPHONE ENCOUNTER
Care Due:                  Date            Visit Type   Department     Provider  --------------------------------------------------------------------------------                                EP -                              PRIMARY      [facility]  Last Visit: 10-      CARE ([facility])   MEDICINE       Jose Angel Munson                              MYCHART                              FOLLOWUP/OF  [facility]  Next Visit: 12-      [facility] VISIT   MEDICINE       Jose Angel Munson                                                            Last  Test          Frequency    Reason                     Performed    Due Date  --------------------------------------------------------------------------------    Vitamin D...  12 months..  ergocalciferol...........  Not Found    Overdue    [employer] Embedded Care Due Messages. Reference number: [id number].   11/30/2024 4:23:59 PM CST

## 2024-12-01 NOTE — TELEPHONE ENCOUNTER
Refill Routing Note   Medication(s) are not appropriate for processing by [facility] for the following reason(s):        Drug-disease interaction: Drug-Disease: potassium chloride SA and Hyperkalemia   New or recently adjusted medication    [facility] action(s):  Defer             Appointments  past 12m or future 3m with PCP    Date Provider   Last Visit   10/15/2024 Jose Angel Munson MD   Next Visit   12/5/2024 Jose Angel Munson MD   ED visits in past 90 days: 0        Note composed:2:19 PM 12/01/2024

## 2024-12-02 RX ORDER — POTASSIUM CHLORIDE 20 MEQ/1
20 TABLET, EXTENDED RELEASE ORAL 2 TIMES DAILY
Qty: 180 TABLET | Refills: 3 | Status: SHIPPED | OUTPATIENT
Start: 2024-12-02

## 2024-12-07 NOTE — TELEPHONE ENCOUNTER
Problem: Elimination-Bowel, Alteration  Goal: # Bowel function maintained/improved  Outcome: Monitoring/Evaluating progress  Goal: # Verbalizes understanding of constipation management  Description: Document on Patient Education Activity  Outcome: Monitoring/Evaluating progress  Goal: Verbalizes understanding of bowel management program  Description: Document on Patient Education Activity  Outcome: Monitoring/Evaluating progress      Please schedule virtual visit in 1 or 2 weeks.

## 2024-12-17 ENCOUNTER — OFFICE VISIT (OUTPATIENT)
Dept: FAMILY MEDICINE | Facility: CLINIC | Age: 64
End: 2024-12-17
Payer: MEDICARE

## 2024-12-17 VITALS
OXYGEN SATURATION: 98 % | SYSTOLIC BLOOD PRESSURE: 130 MMHG | BODY MASS INDEX: 32.02 KG/M2 | WEIGHT: 204 LBS | HEIGHT: 67 IN | DIASTOLIC BLOOD PRESSURE: 80 MMHG | HEART RATE: 95 BPM

## 2024-12-17 DIAGNOSIS — I10 ESSENTIAL HYPERTENSION: Primary | Chronic | ICD-10-CM

## 2024-12-17 DIAGNOSIS — L29.9 ITCHING: ICD-10-CM

## 2024-12-17 DIAGNOSIS — K64.8 INTERNAL HEMORRHOIDS: ICD-10-CM

## 2024-12-17 DIAGNOSIS — K92.1 HEMATOCHEZIA: ICD-10-CM

## 2024-12-17 DIAGNOSIS — E66.811 CLASS 1 OBESITY WITH BODY MASS INDEX (BMI) OF 31.0 TO 31.9 IN ADULT, UNSPECIFIED OBESITY TYPE, UNSPECIFIED WHETHER SERIOUS COMORBIDITY PRESENT: ICD-10-CM

## 2024-12-17 PROCEDURE — 1160F RVW MEDS BY RX/DR IN RCRD: CPT | Mod: CPTII,S$GLB,, | Performed by: FAMILY MEDICINE

## 2024-12-17 PROCEDURE — 3288F FALL RISK ASSESSMENT DOCD: CPT | Mod: CPTII,S$GLB,, | Performed by: FAMILY MEDICINE

## 2024-12-17 PROCEDURE — 1159F MED LIST DOCD IN RCRD: CPT | Mod: CPTII,S$GLB,, | Performed by: FAMILY MEDICINE

## 2024-12-17 PROCEDURE — 99215 OFFICE O/P EST HI 40 MIN: CPT | Mod: S$GLB,,, | Performed by: FAMILY MEDICINE

## 2024-12-17 PROCEDURE — 3079F DIAST BP 80-89 MM HG: CPT | Mod: CPTII,S$GLB,, | Performed by: FAMILY MEDICINE

## 2024-12-17 PROCEDURE — 3075F SYST BP GE 130 - 139MM HG: CPT | Mod: CPTII,S$GLB,, | Performed by: FAMILY MEDICINE

## 2024-12-17 PROCEDURE — 3008F BODY MASS INDEX DOCD: CPT | Mod: CPTII,S$GLB,, | Performed by: FAMILY MEDICINE

## 2024-12-17 PROCEDURE — 3066F NEPHROPATHY DOC TX: CPT | Mod: CPTII,S$GLB,, | Performed by: FAMILY MEDICINE

## 2024-12-17 PROCEDURE — 3060F POS MICROALBUMINURIA REV: CPT | Mod: CPTII,S$GLB,, | Performed by: FAMILY MEDICINE

## 2024-12-17 PROCEDURE — 1157F ADVNC CARE PLAN IN RCRD: CPT | Mod: CPTII,S$GLB,, | Performed by: FAMILY MEDICINE

## 2024-12-17 PROCEDURE — 2023F DILAT RTA XM W/O RTNOPTHY: CPT | Mod: CPTII,S$GLB,, | Performed by: FAMILY MEDICINE

## 2024-12-17 PROCEDURE — 3044F HG A1C LEVEL LT 7.0%: CPT | Mod: CPTII,S$GLB,, | Performed by: FAMILY MEDICINE

## 2024-12-17 PROCEDURE — 99999 PR PBB SHADOW E&M-EST. PATIENT-LVL III: CPT | Mod: PBBFAC,,, | Performed by: FAMILY MEDICINE

## 2024-12-17 PROCEDURE — 1101F PT FALLS ASSESS-DOCD LE1/YR: CPT | Mod: CPTII,S$GLB,, | Performed by: FAMILY MEDICINE

## 2024-12-17 RX ORDER — HYDROCORTISONE ACETATE 25 MG/1
25 SUPPOSITORY RECTAL 2 TIMES DAILY
Qty: 20 SUPPOSITORY | Refills: 0 | Status: SHIPPED | OUTPATIENT
Start: 2024-12-17 | End: 2024-12-27

## 2024-12-17 NOTE — PROGRESS NOTES
Subjective     Patient ID: Roman Hodges is a 64 y.o. male.    Chief Complaint: Hemorrhoids    64 years old male came to the clinic with episodic hematochezia associated with internal hemorrhoids.  Patient with follow-up with Colorectal doctor.  He is requesting rectal suppositories.  Patient with body itching sometimes.    Hypertension  This is a chronic problem. The current episode started more than 1 year ago. The problem is unchanged. The problem is controlled. Pertinent negatives include no chest pain, headaches, neck pain or palpitations. There are no associated agents to hypertension. There are no known risk factors for coronary artery disease. Past treatments include calcium channel blockers and beta blockers. The current treatment provides significant improvement. There are no compliance problems.  There is no history of angina. There is no history of a hypertension causing med or a thyroid problem.     Review of Systems   Constitutional: Negative.  Negative for activity change and unexpected weight change.   HENT:  Positive for hearing loss. Negative for rhinorrhea and trouble swallowing.    Eyes: Negative.  Negative for discharge and visual disturbance.   Respiratory: Negative.  Negative for chest tightness and wheezing.    Cardiovascular: Negative.  Negative for chest pain and palpitations.   Gastrointestinal: Negative.  Negative for blood in stool, constipation, diarrhea and vomiting.   Endocrine: Negative for polydipsia and polyuria.   Genitourinary: Negative.  Negative for difficulty urinating, hematuria and urgency.   Musculoskeletal: Negative.  Negative for arthralgias, joint swelling and neck pain.   Integumentary:  Negative.   Neurological: Negative.  Negative for weakness and headaches.   Psychiatric/Behavioral: Negative.  Negative for confusion and dysphoric mood.           Objective     Physical Exam  Vitals and nursing note reviewed.   Constitutional:       General: He is not in acute  distress.     Appearance: He is well-developed. He is not diaphoretic.   HENT:      Head: Normocephalic and atraumatic.      Right Ear: External ear normal.      Left Ear: External ear normal.      Nose: Nose normal.      Mouth/Throat:      Pharynx: No oropharyngeal exudate.   Eyes:      General: No scleral icterus.        Right eye: No discharge.         Left eye: No discharge.      Conjunctiva/sclera: Conjunctivae normal.      Pupils: Pupils are equal, round, and reactive to light.   Neck:      Thyroid: No thyromegaly.      Vascular: No JVD.      Trachea: No tracheal deviation.   Cardiovascular:      Rate and Rhythm: Normal rate and regular rhythm.      Heart sounds: Normal heart sounds. No murmur heard.     No friction rub. No gallop.   Pulmonary:      Effort: Pulmonary effort is normal. No respiratory distress.      Breath sounds: Normal breath sounds. No stridor. No wheezing or rales.   Chest:      Chest wall: No tenderness.   Abdominal:      General: Bowel sounds are normal. There is no distension.      Palpations: Abdomen is soft. There is no mass.      Tenderness: There is no abdominal tenderness. There is no guarding or rebound.   Musculoskeletal:         General: No tenderness. Normal range of motion.      Cervical back: Normal range of motion and neck supple.   Lymphadenopathy:      Cervical: No cervical adenopathy.   Skin:     General: Skin is warm and dry.      Coloration: Skin is not pale.      Findings: No erythema or rash.   Neurological:      Mental Status: He is alert and oriented to person, place, and time.      Cranial Nerves: No cranial nerve deficit.      Motor: No abnormal muscle tone.      Coordination: Coordination normal.      Deep Tendon Reflexes: Reflexes are normal and symmetric. Reflexes normal.   Psychiatric:         Behavior: Behavior normal.         Thought Content: Thought content normal.         Judgment: Judgment normal.            Assessment and Plan     1. Essential  hypertension    2. Internal hemorrhoids  -     hydrocortisone (ANUSOL-HC) 25 mg suppository; Place 1 suppository (25 mg total) rectally 2 (two) times daily. for 10 days  Dispense: 20 suppository; Refill: 0    3. Hematochezia    4. Class 1 obesity with body mass index (BMI) of 31.0 to 31.9 in adult, unspecified obesity type, unspecified whether serious comorbidity present    5. Itching     Diet and physical activity to promote weight loss.   Continue monitoring blood pressure at home, low sodium diet.   Warm Sitz for 15 minutes 3 times a day.  I spent a total of 41 minutes on the day of the visit.This includes face to face time and non-face to face time preparing to see the patient (eg, review of tests), obtaining and/or reviewing separately obtained history, documenting clinical information in the electronic or other health record, independently interpreting results and communicating results to the patient/family/caregiver, or care coordinator.          Follow up if symptoms worsen or fail to improve.

## 2024-12-26 ENCOUNTER — PATIENT MESSAGE (OUTPATIENT)
Dept: SURGERY | Facility: CLINIC | Age: 64
End: 2024-12-26
Payer: MEDICARE

## 2024-12-30 ENCOUNTER — TELEPHONE (OUTPATIENT)
Dept: SURGERY | Facility: CLINIC | Age: 64
End: 2024-12-30
Payer: MEDICARE

## 2025-01-03 ENCOUNTER — TELEPHONE (OUTPATIENT)
Dept: SURGERY | Facility: CLINIC | Age: 65
End: 2025-01-03

## 2025-01-03 ENCOUNTER — OFFICE VISIT (OUTPATIENT)
Dept: SURGERY | Facility: CLINIC | Age: 65
End: 2025-01-03
Attending: COLON & RECTAL SURGERY
Payer: MEDICARE

## 2025-01-03 VITALS — WEIGHT: 213.88 LBS | HEIGHT: 67 IN | RESPIRATION RATE: 18 BRPM | BODY MASS INDEX: 33.57 KG/M2

## 2025-01-03 DIAGNOSIS — K62.5 RECTAL BLEEDING: Primary | ICD-10-CM

## 2025-01-03 DIAGNOSIS — K64.8 INTERNAL HEMORRHOIDS: ICD-10-CM

## 2025-01-03 PROCEDURE — 99999 PR PBB SHADOW E&M-EST. PATIENT-LVL III: CPT | Mod: PBBFAC,,, | Performed by: NURSE PRACTITIONER

## 2025-01-03 NOTE — PROGRESS NOTES
CRS Office Visit History and Physical    Referring Md:   No referring provider defined for this encounter.    SUBJECTIVE:     Chief Complaint: rectal bleeding    History of Present Illness:  The patient is known patient to this practice. Last seen by Dr. Purdy for RBL d/t same 5/8/24.  Course is as follows:  Patient is a 64 y.o. male presents with rectal bleeding with every bm.   Denies pain with bm. Occ pressure. .  Symptoms have been present for approx 2 weeks.  Has tried anusol suppositories without noted improvement.  Associated bleeding: yes  Previous anorectal procedures: Yes, rbl  denies straining/prolonged time on toilet with bowel movements.  is currently taking miralax for past week.  Having 2 bm/day of normal vs loose stools.  Blood thinners: Yes, xarelto    Last Colonoscopy completed on 12/23/2021  - One 4 mm polyp in the ascending colon, removed with a cold snare. Resected and retrieved.   - Diverticulosis in the sigmoid colon, in the descending colon, in the transverse colon and in the ascending colon.   - repeat in 5 yrs      Review of patient's allergies indicates:   Allergen Reactions    Lisinopril Hives     Stomach ache       Past Medical History:   Diagnosis Date    A-fib     Allergy     Anticoagulant long-term use     Diabetes mellitus, type 2     GERD (gastroesophageal reflux disease)     Hepatocellular carcinoma     liver    History of 2019 novel coronavirus disease (COVID-19)     tested positive 4/15/20 & 5/8/20    History of primary liver cancer 10/24/2018    S/p liver transplant 7/4/2019    Hyperlipidemia     Hypertension      Past Surgical History:   Procedure Laterality Date    COLONOSCOPY      COLONOSCOPY N/A 12/23/2021    Procedure: COLONOSCOPY miralax prep;  Surgeon: Seng Norman MD;  Location: [facility];  Service: Endoscopy;  Laterality: N/A;    ESOPHAGOGASTRODUODENOSCOPY N/A 1/15/2019    Procedure: ESOPHAGOGASTRODUODENOSCOPY (EGD);  Surgeon: Bony Saavedra MD;   "Location: [facility] ENDO;  Service: Endoscopy;  Laterality: N/A;    ESOPHAGOGASTRODUODENOSCOPY N/A 2021    Procedure: ESOPHAGOGASTRODUODENOSCOPY (EGD);  Surgeon: Seng Norman MD;  Location: [facility] ENDO;  Service: Endoscopy;  Laterality: N/A;    HERNIA REPAIR      LEFT HEART CATHETERIZATION Left 2018    Procedure: Left heart cath;  Surgeon: Tyler Hinojosa MD;  Location: [facility] CATH LAB/EP;  Service: Cardiology;  Laterality: Left;    LIVER TRANSPLANT N/A 7/3/2019    Procedure: TRANSPLANT, LIVER;  Surgeon: Oscar Altman Jr., MD;  Location: [facility] OR [facility] FLR;  Service: Transplant;  Laterality: N/A;    RADIOFREQUENCY ABLATION N/A 2019    Procedure: Radiofrequency Ablation;  Surgeon: Rose Surgeon;  Location: [facility] ROSE;  Service: Anesthesiology;  Laterality: N/A;  Room [facility]     Family History   Problem Relation Name Age of Onset    Hypertension Mother      Cancer Mother      Hypertension Father      Stroke Father      Cancer Father      Hypertension Sister x3     No Known Problems Brother x2     No Known Problems Daughter x2     Hypertension Son x1     Allergic rhinitis Neg Hx      Allergies Neg Hx      Angioedema Neg Hx      Asthma Neg Hx      Atopy Neg Hx      Eczema Neg Hx      Immunodeficiency Neg Hx      Rhinitis Neg Hx      Urticaria Neg Hx       Social History     Tobacco Use    Smoking status: Former     Current packs/day: 0.00     Average packs/day: 1 pack/day for 10.0 years (10.0 ttl pk-yrs)     Types: Cigarettes     Start date: 1970     Quit date: 1980     Years since quittin.0     Passive exposure: Past    Smokeless tobacco: Never   Substance Use Topics    Alcohol use: No    Drug use: No        Review of Systems:  Review of Systems   Gastrointestinal:  Positive for blood in stool.       OBJECTIVE:     Vital Signs (Most Recent)  Resp 18   Ht 5' 7" (1.702 m)   Wt 97 kg (213 lb 13.5 oz)   BMI 33.49 kg/m²     Physical Exam:  General: White male in no distress "   Neuro: Alert and oriented to person, place, and time.  Moves all extremities.     HEENT: No icterus.  Trachea midline  Respiratory: Respirations are even and unlabored, no cough or audible wheezing  Skin: Warm dry and intact, No visible rashes, no jaundice    Labs reviewed today:  Lab Results   Component Value Date    WBC 5.78 07/22/2024    HGB 14.7 07/22/2024    HCT 45.2 07/22/2024     07/22/2024    CHOL 156 09/09/2024    TRIG 221 (H) 09/09/2024    HDL 32 (L) 09/09/2024    ALT 32 09/09/2024    AST 22 09/09/2024     09/09/2024    K 3.8 09/09/2024     09/09/2024    CREATININE 1.1 09/09/2024    BUN 18 09/09/2024    CO2 28 09/09/2024    TSH 1.563 07/07/2022    PSA 0.87 01/16/2024    INR 1.1 07/09/2019    HGBA1C 6.6 (H) 09/09/2024       Imaging reviewed today:  none    Endoscopy reviewed today:  Last Colonoscopy completed on 12/23/2021  - One 4 mm polyp in the ascending colon, removed with a cold snare. Resected and retrieved.   - Diverticulosis in the sigmoid colon, in the descending colon, in the transverse colon and in the ascending colon.   - repeat in 5 yrs    Anorectal Exam:    Anal Skin:  external tag noted    Digital Rectal Exam:  Resting Tone normal  Mass none  Tenderness  absent    Anoscopy:  Verbal consent was obtained.   Clear plastic anoscope inserted.    Hemorrhoids  present  Stigmata of bleeding  present  Stigmata of prolapsed  absent  Distal rectal mucosa normal      ASSESSMENT/PLAN:     Diagnoses and all orders for this visit:    Rectal bleeding    Internal hemorrhoids        The patient was instructed to:  Hold xarelto for 72 hrs.  F/u on Tuesday for repeat rbl    ANABELLA Espino  Colon and Rectal Surgery

## 2025-01-06 ENCOUNTER — LAB VISIT (OUTPATIENT)
Dept: LAB | Facility: HOSPITAL | Age: 65
End: 2025-01-06
Attending: INTERNAL MEDICINE
Payer: MEDICARE

## 2025-01-06 DIAGNOSIS — Z94.4 S/P LIVER TRANSPLANT: ICD-10-CM

## 2025-01-06 DIAGNOSIS — C22.0 HCC (HEPATOCELLULAR CARCINOMA): ICD-10-CM

## 2025-01-06 LAB
AFP SERPL-MCNC: 2.2 NG/ML (ref 0–8.4)
ALBUMIN SERPL BCP-MCNC: 4.2 G/DL (ref 3.5–5.2)
ALP SERPL-CCNC: 55 U/L (ref 40–150)
ALT SERPL W/O P-5'-P-CCNC: 37 U/L (ref 10–44)
ANION GAP SERPL CALC-SCNC: 11 MMOL/L (ref 8–16)
AST SERPL-CCNC: 23 U/L (ref 10–40)
BASOPHILS # BLD AUTO: 0.03 K/UL (ref 0–0.2)
BASOPHILS NFR BLD: 0.5 % (ref 0–1.9)
BILIRUB SERPL-MCNC: 0.7 MG/DL (ref 0.1–1)
BUN SERPL-MCNC: 13 MG/DL (ref 8–23)
CALCIUM SERPL-MCNC: 9.2 MG/DL (ref 8.7–10.5)
CHLORIDE SERPL-SCNC: 104 MMOL/L (ref 95–110)
CO2 SERPL-SCNC: 26 MMOL/L (ref 23–29)
CREAT SERPL-MCNC: 1.1 MG/DL (ref 0.5–1.4)
DIFFERENTIAL METHOD BLD: ABNORMAL
EOSINOPHIL # BLD AUTO: 0.1 K/UL (ref 0–0.5)
EOSINOPHIL NFR BLD: 1.7 % (ref 0–8)
ERYTHROCYTE [DISTWIDTH] IN BLOOD BY AUTOMATED COUNT: 17.9 % (ref 11.5–14.5)
EST. GFR  (NO RACE VARIABLE): >60 ML/MIN/1.73 M^2
GLUCOSE SERPL-MCNC: 117 MG/DL (ref 70–110)
HCT VFR BLD AUTO: 46.1 % (ref 40–54)
HGB BLD-MCNC: 14.7 G/DL (ref 14–18)
IMM GRANULOCYTES # BLD AUTO: 0.03 K/UL (ref 0–0.04)
IMM GRANULOCYTES NFR BLD AUTO: 0.5 % (ref 0–0.5)
LYMPHOCYTES # BLD AUTO: 1.5 K/UL (ref 1–4.8)
LYMPHOCYTES NFR BLD: 22.8 % (ref 18–48)
MCH RBC QN AUTO: 24.7 PG (ref 27–31)
MCHC RBC AUTO-ENTMCNC: 31.9 G/DL (ref 32–36)
MCV RBC AUTO: 78 FL (ref 82–98)
MONOCYTES # BLD AUTO: 0.6 K/UL (ref 0.3–1)
MONOCYTES NFR BLD: 8.7 % (ref 4–15)
NEUTROPHILS # BLD AUTO: 4.4 K/UL (ref 1.8–7.7)
NEUTROPHILS NFR BLD: 65.8 % (ref 38–73)
NRBC BLD-RTO: 0 /100 WBC
PLATELET # BLD AUTO: 328 K/UL (ref 150–450)
PMV BLD AUTO: 9.9 FL (ref 9.2–12.9)
POTASSIUM SERPL-SCNC: 4.1 MMOL/L (ref 3.5–5.1)
PROT SERPL-MCNC: 7.7 G/DL (ref 6–8.4)
RBC # BLD AUTO: 5.94 M/UL (ref 4.6–6.2)
SODIUM SERPL-SCNC: 141 MMOL/L (ref 136–145)
WBC # BLD AUTO: 6.66 K/UL (ref 3.9–12.7)

## 2025-01-06 PROCEDURE — 36415 COLL VENOUS BLD VENIPUNCTURE: CPT | Mod: PO | Performed by: INTERNAL MEDICINE

## 2025-01-06 PROCEDURE — 85025 COMPLETE CBC W/AUTO DIFF WBC: CPT | Performed by: INTERNAL MEDICINE

## 2025-01-06 PROCEDURE — 80197 ASSAY OF TACROLIMUS: CPT | Performed by: INTERNAL MEDICINE

## 2025-01-06 PROCEDURE — 80053 COMPREHEN METABOLIC PANEL: CPT | Performed by: INTERNAL MEDICINE

## 2025-01-06 PROCEDURE — 82105 ALPHA-FETOPROTEIN SERUM: CPT | Performed by: INTERNAL MEDICINE

## 2025-01-07 ENCOUNTER — OFFICE VISIT (OUTPATIENT)
Dept: SURGERY | Facility: CLINIC | Age: 65
End: 2025-01-07
Payer: MEDICARE

## 2025-01-07 VITALS
SYSTOLIC BLOOD PRESSURE: 145 MMHG | WEIGHT: 211.44 LBS | DIASTOLIC BLOOD PRESSURE: 73 MMHG | HEIGHT: 67 IN | BODY MASS INDEX: 33.19 KG/M2 | HEART RATE: 82 BPM

## 2025-01-07 DIAGNOSIS — K64.8 INTERNAL HEMORRHOIDS: ICD-10-CM

## 2025-01-07 DIAGNOSIS — K62.89 ANAL PAIN: Primary | ICD-10-CM

## 2025-01-07 DIAGNOSIS — K62.5 RECTAL BLEEDING: ICD-10-CM

## 2025-01-07 LAB — TACROLIMUS BLD-MCNC: 4.6 NG/ML (ref 5–15)

## 2025-01-07 PROCEDURE — 1160F RVW MEDS BY RX/DR IN RCRD: CPT | Mod: CPTII,S$GLB,, | Performed by: NURSE PRACTITIONER

## 2025-01-07 PROCEDURE — 3077F SYST BP >= 140 MM HG: CPT | Mod: CPTII,S$GLB,, | Performed by: NURSE PRACTITIONER

## 2025-01-07 PROCEDURE — 3288F FALL RISK ASSESSMENT DOCD: CPT | Mod: CPTII,S$GLB,, | Performed by: NURSE PRACTITIONER

## 2025-01-07 PROCEDURE — 99204 OFFICE O/P NEW MOD 45 MIN: CPT | Mod: 25,S$GLB,, | Performed by: NURSE PRACTITIONER

## 2025-01-07 PROCEDURE — 1126F AMNT PAIN NOTED NONE PRSNT: CPT | Mod: CPTII,S$GLB,, | Performed by: NURSE PRACTITIONER

## 2025-01-07 PROCEDURE — 99999 PR PBB SHADOW E&M-EST. PATIENT-LVL III: CPT | Mod: PBBFAC,,, | Performed by: NURSE PRACTITIONER

## 2025-01-07 PROCEDURE — 46221 LIGATION OF HEMORRHOID(S): CPT | Mod: S$GLB,,, | Performed by: NURSE PRACTITIONER

## 2025-01-07 PROCEDURE — 1159F MED LIST DOCD IN RCRD: CPT | Mod: CPTII,S$GLB,, | Performed by: NURSE PRACTITIONER

## 2025-01-07 PROCEDURE — 3008F BODY MASS INDEX DOCD: CPT | Mod: CPTII,S$GLB,, | Performed by: NURSE PRACTITIONER

## 2025-01-07 PROCEDURE — 3078F DIAST BP <80 MM HG: CPT | Mod: CPTII,S$GLB,, | Performed by: NURSE PRACTITIONER

## 2025-01-07 PROCEDURE — 1101F PT FALLS ASSESS-DOCD LE1/YR: CPT | Mod: CPTII,S$GLB,, | Performed by: NURSE PRACTITIONER

## 2025-01-07 PROCEDURE — 1157F ADVNC CARE PLAN IN RCRD: CPT | Mod: CPTII,S$GLB,, | Performed by: NURSE PRACTITIONER

## 2025-01-07 RX ORDER — OXYCODONE HYDROCHLORIDE 5 MG/1
5 TABLET ORAL EVERY 4 HOURS PRN
Qty: 10 TABLET | Refills: 0 | Status: SHIPPED | OUTPATIENT
Start: 2025-01-07 | End: 2025-01-17

## 2025-01-07 NOTE — PROGRESS NOTES
CRS Office Visit History and Physical    Referring Md:   No referring provider defined for this encounter.    SUBJECTIVE:     Chief Complaint: rectal bleeding    History of Present Illness 1/3/25:  The patient is known patient to this practice. Last seen by Dr. Purdy for RBL d/t same 5/8/24.  Course is as follows:  Patient is a 64 y.o. male presents with rectal bleeding with every bm.   Denies pain with bm. Occ pressure.  Symptoms have been present for approx 2 weeks.  Has tried anusol suppositories without noted improvement.  Associated bleeding: yes  Previous anorectal procedures: Yes, rbl  denies straining/prolonged time on toilet with bowel movements.  is currently taking miralax for past week.  Having 2 bm/day of normal vs loose stools.  Blood thinners: Yes, xarelto    Interval Hx 1/7/25:  He presents today for f/u and RBL  Held xarelto for 72 hrs.      Last Colonoscopy completed on 12/23/2021  - One 4 mm polyp in the ascending colon, removed with a cold snare. Resected and retrieved.   - Diverticulosis in the sigmoid colon, in the descending colon, in the transverse colon and in the ascending colon.   - repeat in 5 yrs      Review of patient's allergies indicates:   Allergen Reactions    Lisinopril Hives     Stomach ache       Past Medical History:   Diagnosis Date    A-fib     Allergy     Anticoagulant long-term use     Diabetes mellitus, type 2     GERD (gastroesophageal reflux disease)     Hepatocellular carcinoma     liver    History of 2019 novel coronavirus disease (COVID-19)     tested positive 4/15/20 & 5/8/20    History of primary liver cancer 10/24/2018    S/p liver transplant 7/4/2019    Hyperlipidemia     Hypertension      Past Surgical History:   Procedure Laterality Date    COLONOSCOPY      COLONOSCOPY N/A 12/23/2021    Procedure: COLONOSCOPY miralax prep;  Surgeon: Seng Norman MD;  Location: [facility];  Service: Endoscopy;  Laterality: N/A;    ESOPHAGOGASTRODUODENOSCOPY N/A  1/15/2019    Procedure: ESOPHAGOGASTRODUODENOSCOPY (EGD);  Surgeon: Bony Saavedra MD;  Location: [facility] ENDO;  Service: Endoscopy;  Laterality: N/A;    ESOPHAGOGASTRODUODENOSCOPY N/A 2021    Procedure: ESOPHAGOGASTRODUODENOSCOPY (EGD);  Surgeon: Seng Norman MD;  Location: [facility] ENDO;  Service: Endoscopy;  Laterality: N/A;    HERNIA REPAIR      LEFT HEART CATHETERIZATION Left 2018    Procedure: Left heart cath;  Surgeon: Tyler Hinojosa MD;  Location: [facility] CATH LAB/EP;  Service: Cardiology;  Laterality: Left;    LIVER TRANSPLANT N/A 7/3/2019    Procedure: TRANSPLANT, LIVER;  Surgeon: Oscar Altman Jr., MD;  Location: [address];  Service: Transplant;  Laterality: N/A;    RADIOFREQUENCY ABLATION N/A 2019    Procedure: Radiofrequency Ablation;  Surgeon: Rose Surgeon;  Location: [facility];  Service: Anesthesiology;  Laterality: N/A;  Room [address] [place] [address]     Family History   Problem Relation Name Age of Onset    Hypertension Mother      Cancer Mother      Hypertension Father      Stroke Father      Cancer Father      Hypertension Sister x3     No Known Problems Brother x2     No Known Problems Daughter x2     Hypertension Son x1     Allergic rhinitis Neg Hx      Allergies Neg Hx      Angioedema Neg Hx      Asthma Neg Hx      Atopy Neg Hx      Eczema Neg Hx      Immunodeficiency Neg Hx      Rhinitis Neg Hx      Urticaria Neg Hx       Social History     Tobacco Use    Smoking status: Former     Current packs/day: 0.00     Average packs/day: 1 pack/day for 10.0 years (10.0 ttl pk-yrs)     Types: Cigarettes     Start date: 1970     Quit date: 1980     Years since quittin.0     Passive exposure: Past    Smokeless tobacco: Never   Substance Use Topics    Alcohol use: No    Drug use: No        Review of Systems:  Review of Systems   Gastrointestinal:  Positive for blood in stool.       OBJECTIVE:     Vital Signs (Most Recent)  BP (!) 145/73 (BP Location: Left arm, Patient  "Position: Sitting)   Pulse 82   Ht 5' 7.01" (1.702 m)   Wt 95.9 kg (211 lb 6.7 oz)   BMI 33.11 kg/m²     Physical Exam:  General: White male in no distress   Neuro: Alert and oriented to person, place, and time.  Moves all extremities.     HEENT: No icterus.  Trachea midline  Respiratory: Respirations are even and unlabored, no cough or audible wheezing  Skin: Warm dry and intact, No visible rashes, no jaundice    Labs reviewed today:  Lab Results   Component Value Date    WBC 6.66 01/06/2025    HGB 14.7 01/06/2025    HCT 46.1 01/06/2025     01/06/2025    CHOL 156 09/09/2024    TRIG 221 (H) 09/09/2024    HDL 32 (L) 09/09/2024    ALT 37 01/06/2025    AST 23 01/06/2025     01/06/2025    K 4.1 01/06/2025     01/06/2025    CREATININE 1.1 01/06/2025    BUN 13 01/06/2025    CO2 26 01/06/2025    TSH 1.563 07/07/2022    PSA 0.87 01/16/2024    INR 1.1 07/09/2019    HGBA1C 6.6 (H) 09/09/2024       Imaging reviewed today:  none    Endoscopy reviewed today:  Last Colonoscopy completed on 12/23/2021  - One 4 mm polyp in the ascending colon, removed with a cold snare. Resected and retrieved.   - Diverticulosis in the sigmoid colon, in the descending colon, in the transverse colon and in the ascending colon.   - repeat in 5 yrs    Anorectal Exam:    Anal Skin:  external tag noted    Digital Rectal Exam:  Resting Tone normal  Mass none  Tenderness  absent    Anoscopy:  Verbal consent was obtained.   Clear plastic anoscope inserted.    Hemorrhoids  present  Stigmata of bleeding  present  Stigmata of prolapsed  absent  Distal rectal mucosa normal    Rubber Band Ligation:  Pt provided verbal consent for RBL  Suction applicator was placed above the dentate line.   Rubber bands were deployed in the LL and RPL position.    Patient tolerated the procedure well.     ASSESSMENT/PLAN:     Diagnoses and all orders for this visit:    Anal pain  -     oxyCODONE (ROXICODONE) 5 MG immediate release tablet; Take 1 tablet (5 " mg total) by mouth every 4 (four) hours as needed for Pain.    Rectal bleeding    Internal hemorrhoids        The patient was instructed to:  Hold xarelto for 6 days.  ED precautions reviewed - bleeding, fever > 101, or inability to urinate in > 6 hrs  F/u in 6-8 weeks    JAHAIRA Espino-BEST  Colon and Rectal Surgery

## 2025-01-08 ENCOUNTER — TELEPHONE (OUTPATIENT)
Dept: TRANSPLANT | Facility: CLINIC | Age: 65
End: 2025-01-08
Payer: MEDICARE

## 2025-01-08 NOTE — TELEPHONE ENCOUNTER
Portal message sent, repeat labs 7/7/25----- Message from Fab Damon MD sent at 1/8/2025  2:50 PM CST -----  Results reviewed. No action.

## 2025-01-09 ENCOUNTER — PATIENT MESSAGE (OUTPATIENT)
Dept: CARDIOLOGY | Facility: CLINIC | Age: 65
End: 2025-01-09

## 2025-01-09 ENCOUNTER — OFFICE VISIT (OUTPATIENT)
Dept: CARDIOLOGY | Facility: CLINIC | Age: 65
End: 2025-01-09
Payer: MEDICARE

## 2025-01-09 VITALS
OXYGEN SATURATION: 96 % | BODY MASS INDEX: 32.49 KG/M2 | HEART RATE: 82 BPM | DIASTOLIC BLOOD PRESSURE: 99 MMHG | HEIGHT: 67 IN | WEIGHT: 207 LBS | SYSTOLIC BLOOD PRESSURE: 142 MMHG

## 2025-01-09 DIAGNOSIS — E11.65 TYPE 2 DIABETES MELLITUS WITH HYPERGLYCEMIA, WITHOUT LONG-TERM CURRENT USE OF INSULIN: ICD-10-CM

## 2025-01-09 DIAGNOSIS — R00.2 PALPITATIONS: ICD-10-CM

## 2025-01-09 DIAGNOSIS — I10 ESSENTIAL HYPERTENSION: Primary | ICD-10-CM

## 2025-01-09 DIAGNOSIS — Z79.01 LONG TERM (CURRENT) USE OF ANTICOAGULANTS: ICD-10-CM

## 2025-01-09 DIAGNOSIS — E11.9 TYPE 2 DIABETES MELLITUS WITHOUT COMPLICATION, WITHOUT LONG-TERM CURRENT USE OF INSULIN: ICD-10-CM

## 2025-01-09 DIAGNOSIS — E66.811 OBESITY (BMI 30.0-34.9): ICD-10-CM

## 2025-01-09 DIAGNOSIS — I48.0 PAROXYSMAL ATRIAL FIBRILLATION: Primary | ICD-10-CM

## 2025-01-09 DIAGNOSIS — Z94.4 S/P LIVER TRANSPLANT: ICD-10-CM

## 2025-01-09 DIAGNOSIS — I48.0 PAROXYSMAL ATRIAL FIBRILLATION: ICD-10-CM

## 2025-01-09 PROCEDURE — 99999 PR PBB SHADOW E&M-EST. PATIENT-LVL IV: CPT | Mod: PBBFAC,,, | Performed by: STUDENT IN AN ORGANIZED HEALTH CARE EDUCATION/TRAINING PROGRAM

## 2025-01-09 NOTE — PROGRESS NOTES
Cardiology Clinic Visit    History of Present Illness:       LV visit seen by Xander Patterson MD   Roman Hodges is a pleasant 64 y.o. male with PMHx of pAfib on OAC, DM2 A1c 6.6 , HCC s/p Liver transplant on tacrolimus, HTN here to establish care. Previously followed by Dr. Sargent. He reported that he has multiple hemorrhoids ligations 2/2 bleeding likely OAC is contributing factor. Spoke at length about the possibility of watchman/amulet giving his recurrent bleeding/ligations and he would like to be referred to EP for an evaluation.       One remote episode, resolved post diltiazem. One recent episode of palpitations. Negative Holter. Will hold the course at present, and increase workup if recurrence.  Continue BB, eliquis.     Of note, pt is under the care of CRS for hemorrhoids, now s/p banding. Could consider Watchman if bleeding recurs.     Offered ILR, given rare sx c/w AF. He declined at present but would consider it if sx increase in frequency.    LDL 79    Echo 02/07/24  Conclusion:   The echocardiographic study is compatible with normal left ventricular   size and normal systolic function.  Echo-Doppler Study: Aortic Outflow peak velocity of 1.7m/s.  There is no aortic stenosis. There is no aortic regurgitation. EF = >55%  There is no evidence of mitral stenosis. There is mild tricuspid   regurgitation observed by color doppler or spectral analysis. E/A = 0.6.  There is mitral regurgitation of 1+ severity.     Mitral Valve Prolapse.   Pulmonary insufficiency (mild).   E/A=0.6 (abnormal relaxation).   PAP= 15mmHg.   Aortic sclerosis without stenosis.   Mild Left Atrial Enlargement.     Cath 12/08/2024  LVEDP (Pre): 8  LVEDP (Post): 8  The ejection fraction is greater than 55% by visual estimate.  Estimated blood loss: none  Non-obstructive CAD.    History obtained by patient interview and supplemented by nursing documentation and chart review.   PMHx:  has a past medical history of A-fib, Allergy,  Anticoagulant long-term use, Diabetes mellitus, type 2, GERD (gastroesophageal reflux disease), Hepatocellular carcinoma, History of 2019 novel coronavirus disease (COVID-19), History of primary liver cancer (10/24/2018), Hyperlipidemia, and Hypertension.   SurgHx:  has a past surgical history that includes Hernia repair; Colonoscopy; Left heart catheterization (Left, 12/4/2018); Esophagogastroduodenoscopy (N/A, 1/15/2019); Radiofrequency ablation (N/A, 4/12/2019); Liver transplant (N/A, 7/3/2019); Colonoscopy (N/A, 12/23/2021); and Esophagogastroduodenoscopy (N/A, 12/23/2021).   FamHx: family history includes Cancer in his father and mother; Hypertension in his father, mother, sister, and son; No Known Problems in his brother and daughter; Stroke in his father.   SocialHx:  reports that he quit smoking about 44 years ago. His smoking use included cigarettes. He started smoking about 54 years ago. He has a 10 pack-year smoking history. He has been exposed to tobacco smoke. He has never used smokeless tobacco. He reports that he does not drink alcohol and does not use drugs.  Home Meds:  Current Outpatient Medications   Medication Instructions    ACCU-CHEK GUIDE TEST STRIPS Strp TEST BLOOD GLUCOSE 3 TIMES A DAY    amLODIPine (NORVASC) 10 MG tablet TAKE 1 TABLET BY MOUTH EVERY DAY    ciclopirox (PENLAC) 8 % Soln Nightly    empagliflozin (JARDIANCE) 10 mg, Oral, Daily    ergocalciferol (ERGOCALCIFEROL) 50,000 unit Cap TAKE 1 CAPSULE BY MOUTH ONE TIME PER WEEK    esomeprazole (NEXIUM) 40 MG capsule TAKE 1 CAPSULE BY MOUTH EVERY DAY    hydroCHLOROthiazide (HYDRODIURIL) 12.5 mg, Oral, Daily    hydrocortisone (ANUSOL-HC) 2.5 % rectal cream PLACE RECTALLY 2 TIMES DAILY.    ketoconazole (NIZORAL) 2 % cream Topical (Top), Daily    metoprolol succinate (TOPROL-XL) 50 mg, Oral, Daily    mycophenolate (CELLCEPT) 250 mg Cap TAKE 4 CAPSULES BY MOUTH 2 TIMES A DAY    omega-3 fatty acids/fish oil (FISH OIL-OMEGA-3 FATTY ACIDS)  "300-1,000 mg capsule 1 capsule, Daily    oxyCODONE (ROXICODONE) 5 mg, Oral, Every 4 hours PRN    potassium chloride SA (K-DUR,KLOR-CON) 20 MEQ tablet 20 mEq, Oral, 2 times daily    rivaroxaban (XARELTO) 20 mg, Oral, Daily    tacrolimus (PROGRAF) 0.5 MG Cap TAKE 2 CAPSULES BY MOUTH EVERY MORNING AND 1 CAPSULE EVERY EVENING.    urea-lactic acid-propylene gly (KERASAL MULTI-PURPOSE NAIL REP) Soln 1 Applicatorful, Topical (Top), Daily       Review of Systems: Comprehensive ROS was performed and is negative unless otherwise noted in HPI.   Objective   Objective/Exam:   BP (!) 142/99 (BP Location: Right arm, Patient Position: Sitting)   Pulse 82   Ht 5' 7" (1.702 m)   Wt 93.9 kg (207 lb)   SpO2 96%   BMI 32.42 kg/m²    Wt Readings from Last 4 Encounters:   01/09/25 93.9 kg (207 lb)   01/07/25 95.9 kg (211 lb 6.7 oz)   01/03/25 97 kg (213 lb 13.5 oz)   12/17/24 92.5 kg (204 lb)      Constitutional: NAD, Atraumatic, Conversant   HEENT: MMM, Sclera anicteric, No JVD   Cardiovascular: RRR, no murmurs noted, no chest tenderness to palpation, 2+ radial pulses b/l  Pulmonary: normal respiratory effort, CTAB, no crackles, wheezes  Abdominal: S/NT/ND  Musculoskeletal: No lower extremity edema noted b/l  Neurological: No gross neurological deficits  Skin: W/D/I  Psych: Appropriate affect, normal mood  Labs/Imaging/Procedures   Personally reviewed  Lab Results   Component Value Date     01/06/2025    K 4.1 01/06/2025     01/06/2025    CO2 26 01/06/2025    BUN 13 01/06/2025    CREATININE 1.1 01/06/2025    ANIONGAP 11 01/06/2025     Lab Results   Component Value Date    HGBA1C 6.6 (H) 09/09/2024     Lab Results   Component Value Date    BNP 24 04/16/2020    BNP 93 07/07/2019    BNP 25 01/03/2019      Lab Results   Component Value Date    WBC 6.66 01/06/2025    HGB 14.7 01/06/2025    HCT 46.1 01/06/2025    HCT 41 07/04/2019     01/06/2025    GRAN 4.4 01/06/2025    GRAN 65.8 01/06/2025     Lab Results   Component " "Value Date    CHOL 156 09/09/2024    HDL 32 (L) 09/09/2024    LDLCALC 79.8 09/09/2024    LDLCALC 104.0 03/11/2024    LDLCALC 83.8 07/03/2023    LDLCALC 83.8 07/03/2023    TRIG 221 (H) 09/09/2024     Lab Results   Component Value Date    TSH 1.563 07/07/2022     No results found for: "APOLIPOPROTE"  No results found for: "LIPOA"     TTE:  Results for orders placed during the hospital encounter of 07/19/21    Echo    Interpretation Summary  · The left ventricle is normal in size with concentric remodeling and normal systolic function.  · The estimated ejection fraction is 65%.  · Normal left ventricular diastolic function.  · With normal right ventricular systolic function.    Stress Testing:   No results found for this or any previous visit.     Coronary Angiogram:  No results found for this or any previous visit.      -Reviewing Medical records & lab results  -Independently reviewing and interpreting (if not documented by myself) EKGs, echocardiograms, catherizations   -Obtaining a history, performing a relevant exam, counseling/educating the patient/family  -Documenting clinical information in the EMR including ordering of tests  -Care coordination and communicating with other health care providers       Problem List:     1. Essential hypertension    2. Paroxysmal atrial fibrillation    3. Type 2 diabetes mellitus with hyperglycemia, without long-term current use of insulin    4. Palpitations    5. S/P liver transplant    6. Obesity (BMI 30.0-34.9)    7. Long term (current) use of anticoagulants    8. Type 2 diabetes mellitus without complication, without long-term current use of insulin      Assessment/Plan:     pAFib- continue OAC + BB. However, given multiple hemorrhoids ligations will referred to EP for LAAO evaluation. Repeat echo.   HTN continue current medications (cautious K 2/2 HCTZ)  DM2 per PCP.Continue meds   S/p Liver transplant on tracrolimus      Preventative Care:    Patient expressed verbal " understanding and agreed with the plan     Return sooner for concerns or questions. If symptoms persist go to the ED.  I have reviewed all pertinent data including patient's medical history in detail and updated the computerized patient record.       Total time spent counseling greater than fifty percent of total visit time.  Counseling included discussion regarding imaging findings, diagnosis, possibilities, treatment options, risks and benefits.      Thank you for the opportunity to care for this patient. Please don't hesitate to reach out with any questions/concerns         Truong Powell MD  Cardiovascular Disease; Interventional Cardiology  [facility]

## 2025-01-10 LAB
OHS QRS DURATION: 128 MS
OHS QTC CALCULATION: 467 MS

## 2025-01-13 ENCOUNTER — TELEPHONE (OUTPATIENT)
Dept: ELECTROPHYSIOLOGY | Facility: CLINIC | Age: 65
End: 2025-01-13
Payer: MEDICARE

## 2025-01-14 ENCOUNTER — TELEPHONE (OUTPATIENT)
Dept: ELECTROPHYSIOLOGY | Facility: CLINIC | Age: 65
End: 2025-01-14
Payer: MEDICARE

## 2025-01-14 NOTE — TELEPHONE ENCOUNTER
Sent the message to call team to inform the patient that the earliest appt that we have available is in late April. Thanks

## 2025-01-14 NOTE — TELEPHONE ENCOUNTER
----- Message from WANDA Das sent at 1/10/2025  2:39 PM CST -----  Regarding: FW: Afib concerns  Please see below. Can you call and get patient scheduled with Dr. Cunningham. I think he was a former Sargent patient.  ----- Message -----  From: Cindy Milton RN  Sent: 1/9/2025   5:01 PM CST  To: [facility] Arrhythmia Clinical Support Staff  Subject: FW: Afib concerns                                  ----- Message -----  From: Catherine Danielson  Sent: 1/9/2025   1:53 PM CST  To: Cindy Milton RN  Subject: Afib concerns                                    Kati [phone] pt wife say he's having some Afib issues and he was informed to call and mariela an appt    Thanks

## 2025-01-15 DIAGNOSIS — Z94.4 LIVER REPLACED BY TRANSPLANT: Primary | ICD-10-CM

## 2025-01-31 ENCOUNTER — HOSPITAL ENCOUNTER (OUTPATIENT)
Dept: CARDIOLOGY | Facility: HOSPITAL | Age: 65
Discharge: HOME | End: 2025-01-31
Attending: STUDENT IN AN ORGANIZED HEALTH CARE EDUCATION/TRAINING PROGRAM
Payer: MEDICARE

## 2025-01-31 VITALS — HEIGHT: 67 IN | WEIGHT: 207 LBS | BODY MASS INDEX: 32.49 KG/M2

## 2025-01-31 DIAGNOSIS — I48.0 PAROXYSMAL ATRIAL FIBRILLATION: ICD-10-CM

## 2025-01-31 PROCEDURE — 93356 MYOCRD STRAIN IMG SPCKL TRCK: CPT | Mod: ,,, | Performed by: INTERNAL MEDICINE

## 2025-01-31 PROCEDURE — 93306 TTE W/DOPPLER COMPLETE: CPT | Mod: 26,,, | Performed by: INTERNAL MEDICINE

## 2025-01-31 PROCEDURE — 93306 TTE W/DOPPLER COMPLETE: CPT

## 2025-02-02 LAB
APICAL FOUR CHAMBER EJECTION FRACTION: 62 %
APICAL TWO CHAMBER EJECTION FRACTION: 61 %
ASCENDING AORTA: 3.53 CM
AV INDEX (PROSTH): 0.94
AV MEAN GRADIENT: 5 MMHG
AV PEAK GRADIENT: 9 MMHG
AV VALVE AREA BY VELOCITY RATIO: 4.9 CM²
AV VALVE AREA: 4.6 CM²
AV VELOCITY RATIO: 1
BSA FOR ECHO PROCEDURE: 2.11 M2
CV ECHO LV RWT: 0.54 CM
DOP CALC AO PEAK VEL: 1.5 M/S
DOP CALC AO VTI: 33.2 CM
DOP CALC LVOT AREA: 4.9 CM2
DOP CALC LVOT DIAMETER: 2.5 CM
DOP CALC LVOT PEAK VEL: 1.5 M/S
DOP CALC LVOT STROKE VOLUME: 153.6 CM3
DOP CALC MV VTI: 27.5 CM
DOP CALCLVOT PEAK VEL VTI: 31.3 CM
E WAVE DECELERATION TIME: 285 MSEC
E/A RATIO: 0.74
E/E' RATIO: 10 M/S
ECHO LV POSTERIOR WALL: 1.1 CM (ref 0.6–1.1)
FRACTIONAL SHORTENING: 22 % (ref 28–44)
GLOBAL LONGITUIDAL STRAIN: 17 %
HR MV ECHO: 65 BPM
INTERVENTRICULAR SEPTUM: 1.3 CM (ref 0.6–1.1)
IVC DIAMETER: 1.38 CM
LEFT ATRIUM AREA SYSTOLIC (APICAL 2 CHAMBER): 17.96 CM2
LEFT ATRIUM AREA SYSTOLIC (APICAL 4 CHAMBER): 19.04 CM2
LEFT ATRIUM VOLUME INDEX MOD: 25 ML/M2
LEFT ATRIUM VOLUME MOD: 51 ML
LEFT INTERNAL DIMENSION IN SYSTOLE: 3.2 CM (ref 2.1–4)
LEFT VENTRICLE DIASTOLIC VOLUME INDEX: 35.5 ML/M2
LEFT VENTRICLE DIASTOLIC VOLUME: 72.78 ML
LEFT VENTRICLE END DIASTOLIC VOLUME APICAL 2 CHAMBER: 79.14 ML
LEFT VENTRICLE END DIASTOLIC VOLUME APICAL 4 CHAMBER: 86.76 ML
LEFT VENTRICLE END SYSTOLIC VOLUME APICAL 2 CHAMBER: 50.5 ML
LEFT VENTRICLE END SYSTOLIC VOLUME APICAL 4 CHAMBER: 51.38 ML
LEFT VENTRICLE MASS INDEX: 83.8 G/M2
LEFT VENTRICLE SYSTOLIC VOLUME INDEX: 19.7 ML/M2
LEFT VENTRICLE SYSTOLIC VOLUME: 40.3 ML
LEFT VENTRICULAR INTERNAL DIMENSION IN DIASTOLE: 4.1 CM (ref 3.5–6)
LEFT VENTRICULAR MASS: 171.7 G
LV LATERAL E/E' RATIO: 9.1 M/S
LV SEPTAL E/E' RATIO: 12.2 M/S
LVED V (TEICH): 72.78 ML
LVES V (TEICH): 40.3 ML
LVOT MG: 5.25 MMHG
LVOT MV: 1.1 CM/S
MV A" WAVE DURATION": 131.3 MSEC
MV MEAN GRADIENT: 2 MMHG
MV PEAK A VEL: 0.98 M/S
MV PEAK E VEL: 0.73 M/S
MV PEAK GRADIENT: 4 MMHG
MV STENOSIS PRESSURE HALF TIME: 82.66 MS
MV VALVE AREA BY CONTINUITY EQUATION: 5.58 CM2
MV VALVE AREA P 1/2 METHOD: 2.66 CM2
OHS CV RV/LV RATIO: 0.85 CM
OHS LV EJECTION FRACTION SIMPSONS BIPLANE MOD: 61 %
PISA TR MAX VEL: 2 M/S
PULM VEIN S/D RATIO: 1.81
PV MV: 0.87 M/S
PV PEAK D VEL: 0.26 M/S
PV PEAK GRADIENT: 7 MMHG
PV PEAK S VEL: 0.47 M/S
PV PEAK VELOCITY: 1.35 M/S
RA PRESSURE ESTIMATED: 3 MMHG
RA VOL SYS: 26.64 ML
RIGHT ATRIAL AREA: 12.5 CM2
RIGHT ATRIUM VOLUME AREA LENGTH APICAL 4 CHAMBER: 25.65 ML
RIGHT VENTRICLE DIASTOLIC BASEL DIMENSION: 3.5 CM
RV TB RVSP: 5 MMHG
RV TISSUE DOPPLER FREE WALL SYSTOLIC VELOCITY 1 (APICAL 4 CHAMBER VIEW): 9.81 CM/S
SINUS: 3.75 CM
STJ: 3.38 CM
TDI LATERAL: 0.08 M/S
TDI SEPTAL: 0.06 M/S
TDI: 0.07 M/S
TR MAX PG: 16 MMHG
TRICUSPID ANNULAR PLANE SYSTOLIC EXCURSION: 1.88 CM
TV REST PULMONARY ARTERY PRESSURE: 19 MMHG
Z-SCORE OF LEFT VENTRICULAR DIMENSION IN END DIASTOLE: -4.05
Z-SCORE OF LEFT VENTRICULAR DIMENSION IN END SYSTOLE: -1.29

## 2025-02-11 NOTE — PROGRESS NOTES
Your Child's Health  Six-Month-Old Visit      James Swann  February 11, 2025    Visit Vitals  Ht 27\" (68.6 cm)   Wt 7.697 kg (16 lb 15.5 oz)   HC 42.5 cm (16.73\")   BMI 16.37 kg/m²     Weight: 16.97 lbs    YOUR CHILD'S 6 MONTH OLD VISIT    Key points at this age…    Car seat safety is critical! Make sure your baby is safely and properly riding in a rear-facing car seat.    Continue to breast feed your baby at this age if you are able. If breastfeeding, you will need to make sure they are getting some extra iron by this age.     Thinking about safety in your home is essential as your baby will be rolling, scooting and crawling in the next few months.    NUTRITION:    Continue to breastfeed if you can--it is still the best choice until one year of age. If you are not breastfeeding, your baby should stay on iron-fortified formula until one year of age.   babies may need a little extra iron after age 4 to 6 months. Usually starting some baby foods (especially infant cereals and strained meats which are high in iron) can meet this iron need. Two servings of one of these iron rich foods will typically meet daily iron needs. (Switching from a pure vitamin D supplement to one with iron OR a multivitamin with iron drop can also ensure your baby is getting enough iron, but most full term babies will be able to get enough iron as they start food. It is, however, important to continue a vitamin D supplement for as long as you are primarily breastfeeding.)   Your baby may be ready for finger foods soon. Offer small bits of soft foods (nothing much bigger than a Cheerio ®) to avoid choking.  Foods that are important to avoid completely in the first year of life are chunky things that your baby could choke on (like grapes, popcorn, nuts, hot dogs, raw carrots or celery) and honey (which has very rarely been associated with a dangerous infection).   Recommendations for offering babies what were once considered  Subjective:       Patient ID: Roman Hodges is a 59 y.o. male.    Chief Complaint: Wound Check    This is fu for wound eval, he is post liver tx of July 3rd, his wound was opened  due to seroma,  He is home and wife is caregiver,along with  [facility] is coming MWF  Finally have reduced hypergranulation enough to allow closure of skin, so this may be last visit to wound care    Wound Check       Review of Systems   Constitutional: Negative for chills and fever.   Respiratory: Negative for cough and shortness of breath.    Cardiovascular: Negative for chest pain and leg swelling.   Gastrointestinal: Negative.    Genitourinary: Negative.    Musculoskeletal: Negative.    Skin: Positive for wound.   Neurological: Negative for weakness and headaches.       Objective:      Physical Exam   Constitutional: He appears well-developed and well-nourished.   Pulmonary/Chest: Effort normal. No respiratory distress. He has no wheezes.   Abdominal: Soft. Bowel sounds are normal. He exhibits no distension.   Musculoskeletal: Normal range of motion. He exhibits no edema.   Skin: No erythema.   Psychiatric: He has a normal mood and affect.        8/1 first visit      8/14 9/18      Left side healed          9/26       10/2         10/16 HEALED essentially    10/16 applied dab of calmoseptine to the 2 mm spot that needs skin and covered with soft silicone  10/2 continue with HFBR   9/26 again granulated to skin and above so CC needed.applied HYDROFERA BLUE ( HFBR)  to it after CC   To be changed on Monday , WD FRiday  PROCEDURE:  CHEMICAL CAUTERY: Performed for hypergranulation tissue/granuloma(s) of chevron . The tissue was anesthetized topically with application of Lidocaine gel 2% and 5 minute wait time. The area noted was cauterized with AGNO3. The patient stated pain was 3 on 1-10 scale with this procedure. I applied silver transfer foam today and will have it changed 2 x weekly       8/27 healing however he HAS EXCESS  GRANULATION tissue across entire Chevron , chemical cautery indicated and done with lidocaine to help with pain  PROCEDURE:  CHEMICAL CAUTERY: Performed for hypergranulation tissue/granuloma(s) of incision . The tissue was anesthetized topically with application of Lidocaine gel 2% and 5 minute wait time. The area noted was cauterized with AGNO3. The patient stated pain was 1 on 1-10 scale with this procedure. I then dressed wound with silver transfer foam to stay until Friday then resume Triad wound dressing    8/14 change to TRIAD wound dressing apply to cleaned wound bed and then cover with telfa island ( mepor) dressing   [facility] to do MWF and wife to redo if needed in between    8/1  Chevron opened 7/31  It is 1-2 cm and  39 cm long, with depth of 1cm, Clean and no signs of infection, will just need good wound care to promote secondary closure.   I cleansed with VASHE cleanser,  Soak for 5 minutes then packed wound with Aquacel ag rope.  Covered with pad and secure with tape.     Assessment:       1. S/P liver transplant    2. Non-healing surgical wound, subsequent encounter    3. Type 2 diabetes mellitus without complication, without long-term current use of insulin        Plan:     DISCONTINUE HOME HEALTH CARE    Wife and pt can manage now. Pt can bath shower, get it wet, use barrier to keep scar soft, ( sween)   Reviewed infection signs and dietary concerns for wound healing  I have reviewed the plan of care with the patient and/ wife and they express understanding. I spent over 50% of this 15 minute visit in face to face counseling.          potential “allergenic foods” (meaning they might increase the risk of food allergies) have changed. Careful scientific studies have shown that children are NOT more likely to develop food allergies when they are exposed to these foods early. Once your baby is tolerating some of the more traditional ‘first foods’ (cereals, veggies, strained meats, fruits, etc.), it is okay to try some of the foods that were previously avoided in the first year (or longer). Specifically, after 4 to 6 months of age, it is safe to offer:  Small amounts of cheese and yogurt (but still wait until one year to offer cow’s milk for them to drink)  Small amounts of egg, either cooked (scrambled, hard boiled, etc.) or in baked goods  Small amounts of nut butters (peanut butter, tree nut butter). However, you should NOT offer nut products IF THERE IS A SIBLING WITH A PEANUT ALLERGY. (If that is the case, talk to your doctor about whether an allergy evaluation is recommended.) Do not offer whole nuts--your baby could choke on these.   Small amounts of fish- salmon, shrimp and canned light tuna are generally safe choices to feed infants and children 1 to 2 times per week. Avoid fish that may contain high levels of mercury (shark, swordfish, rich mackerel).  Search online for “eating fish in [place]” for information about choosing fish. Another source for questions is the Center for Food Safety: 1-607.503.8348.    Juice is NOT an important part of your baby’s diet. Juice has a large amount of added sugar which is bad for baby’s teeth and may put them at risk for becoming overweight. Eating fruit is much healthier than drinking it! The American Academy of Pediatrics recommends that children under one year of age should NOT drink any juice because of these potential health problems.       DENTAL CARE:   Most babies start teething between 4 and 9 months (but up to 12 months is normal). Teething children may have no symptoms at all, or they may  experience drooling, crabbiness, or changes in eating. Babies may seem warm when they are fussing, but teething does not cause true fevers--if your teething baby has a fever, it is probably due to some other cause.   Babies should have their gums wiped clean with a clean cloth 1 to 2 times a day. As soon as teeth erupt, they should be brushed twice a day with a tiny smear (the size of a grain of rice!) of regular (fluoridated) toothpaste.  Fluoride is very important. The easiest way to get this is to make sure your baby gets some tap water (in their formula preparation or in cooking). If you have well water, you should have it tested for fluoride content to determine whether your child might need fluoride supplements.   Don’t share utensils or clean things off in your mouth before putting them in your baby’s mouth. This transfers germs to their mouth and can increase their risk of decay.     DEVELOPMENT / BEHAVIOR: Your baby will soon be sitting up by themselves. And in the next few months, most babies will start getting onto their hands and knees, followed by rocking back and forth, and then crawling (some babies crawl backward before they go forward and some never crawl until after they walk!).  They enjoy babbling in a back and forth “conversation” with you and will soon be repeating sounds (\"yasmeen,\" \"mama,\" or \"baba\").  At this age, a baby grabs everything, and it all goes into their mouth, so you should be very careful to protect your baby from choking and poisoning. Some babies at this age can sleep all night, often ten to twelve hours.  Most babies take at least one nap a day, but some don't nap at all.    Read books! Even if it is only for a few minutes at a time, reading will benefit your child’s language skills and brain development. Skip the TV and videos (they have not been proven to help anything!) and  a book instead. If you show a lot of interest in your cell phone, your baby probably will  also-- so try to minimize your phone use when interacting with your baby.       ACCIDENT PREVENTION:  1.  Falls:  Use patrick on stairways and at the entrances to rooms that cannot be child-proofed.  2.  Burns: Prevent scald burns by adjusting your hot-water heater temperature to 120-125°F. Since your baby will be moving around on their own soon, make sure to block them from any access to hot items or surfaces (irons, radiators, ovens). Make sure smoke detectors and carbon monoxide detectors are installed and properly working.   3.  Walker injuries:  Walkers are actually dangerous for babies (because of the risk of falling); they are not recommended at any age. Stationary saucers and/or jumpers are safer, but it is important to make sure your baby is appropriately strapped in (and always supervised!).  4.  Electrical shocks:  Protect James from access to electrical cords and outlets by using safety plugs in sockets and blocking outlets with furniture whenever possible.  5.  Poisoning:  Remove the following items from reach or place them up high out of reach or in a locked cabinet:     Batteries (button batteries, in particular, are VERY dangerous if swallowed)  Cleaning products     Kerosene (lamp oil)     Paint     Pesticides     Purses (because they often contain medicines and choking hazards)     Plants      Alcohol   Medicines, including vitamins. Always keep and use the original safety caps that come with medications; do not transfer medicines to non-child-proofed or unlabeled containers. Be especially careful when around older persons (either in their home or in yours) because they may be in the habit of keeping their medicines in open view or in non-childproofed pill containers.     Call the Poison Center at 1-415.352.7536 for any known or suspected poisoning. (Put this number in your cell phone if it’s not already there!)      CAR SAFETY:  An approved rear-facing car seat is required by [place] law for  James. It should always be in the back-seat. If you are not sure the car seat is installed or adjusted correctly, a couple helpful web sites are www.safercar.gov and the [place] DOT website (search for “car seats”). Remember that the seat straps need to be very snug to protect your baby in case of an accident (so no thick coats or snowsuits while riding in the car during the winter!). Use your child's car seat even for short trips (most accidents occur close to home!) and don't forget to wear your own seat belt.      SMOKING:    Do not let anyone smoke around your baby in the house OR in the car.   Exposure to cigarette smoke will increase your baby’s risk of SIDS (crib death), asthma, ear infections, and respiratory infections (pneumonia).    SUN AND WATER SAFETY: Try to protect your baby from being in direct sun as much as you can. Always try to keep them in the shade and use protective clothing (including hats and sunglasses) when you are out. After age 6 months, it is safe to use infant sunscreen (choose one with an SPF of 15 or higher). Apply carefully according to directions, and always apply at least 20 to 30 minutes before going out in the sun. Also, never leave your baby alone in or around water (pool, sink, tub), even for a moment.  Wearing life jackets and “swimmies” will not protect your baby from drowning!     LEAD EXPOSURE:  If there is any lead in your home your baby will be more at risk for lead poisoning in the next few months since they will be moving around more on their own. Lead poisoning can have a harmful and irreversible effect on your child’s behavior, intelligence and learning potential, so it is very important to prevent and screen for lead exposure. Let us know if any of the following apply:  1.  Your home (or any place that your baby spends time) was built before 1978 and has peeling or chipping paint. If you live in an older home, find out if it has lead pipes.   2.  There is  another child in your home being followed or treated for a high lead level.   3.  Someone in your home (or another caretaker for James) has a job or hobby involving lead (soldering, battery plants, recycling, casting, stained glass, etc.).  4.  Lead can also be found in pottery, pewter, and folk medicines.    If you have questions about older neighborhoods in [place] that might have lead connecting (or service) pipes, do an internet search for \"[place] lead awareness and drinking water safety\". From this web page, you can search for your address to find out if your home was built with lead service lines. There are also helpful hints regarding water safety on this site.     MEDICATION FOR FEVER OR PAIN:   Acetaminophen liquid (e.g., Tylenol or Tempra) may be given every four hours as needed for pain or fever.  Be sure to check which product CONCENTRATION you are using.    INFANT/CHILDREN’S Tylenol/Acetaminophen  (160 MG/5 mL)  Child’s Weight: Dose:  12 - 17 pounds:   80 mg (2.5 mL  (1/2 Teaspoon))  18 - 23 pounds:   120 mg (3.75 mL (3/4 Teaspoon))    Beginning at 6 months of age, Infant's or Children's Ibuprofen (e.g., Advil or Motrin) may be given every six hours as needed for pain or fever.    INFANT Ibuprofen (50 mg/1.25 ml)  liquid (for example Advil or Motrin) may be given every 6 hours as needed for pain or fever.    Child’s Weight: Dose:  13 - 17 pounds:   60 - 80 mg (1.5 - 2.0 mL ( 1/3 - 2/5 Teaspoon))  18 - 23 pounds:   80 - 100 mg (2.0 - 2.5 mL (2/5 - 1/2 Teaspoon))      CHILDREN'S Ibuprofen (100 mg/5 mL) liquid (for example Advil or Motrin) may be given every 6 hours as needed for pain or fever.    Child’s Weight: Dose:  13 - 17 pounds:   60 - 80 mg (3.0 - 4.0 mL (3/5 - 4/5 Teaspoon))  18 - 23 pounds:   80 - 100 mg (4.0 - 5.0 mL (4/5 - 1 Teaspoon))    If James is outside these weight ranges, call your pediatrician's office for advice.     Most Recent Immunizations   Administered Date(s)  Administered   • DTaP/Hep B/IPV 2024   • Hep B, adolescent or pediatric 2024   • Hib (PRP-OMP) 2024   • Pneumococcal conjugate PCV20 2024   • RSV - nirsevimab-alip Beyfortus 1 mL 2024   • Rotavirus - pentavalent 2024       If James develops any of the following reactions within 72 hours after an immunization, notify your pediatrician by calling the pediatric phone nurse:  A temperature of 105 degrees or above.  More than 3 hours of continuous crying.  A shrill, high-pitched cry.  A pale, limp spell.  A seizure or fainting spell. In this case, you should call 911 or go immediately to the emergency room.      NEXT VISIT: NINE MONTHS OF AGE    Thank you for entrusting your care to [facility].    Also, check out “Children’s Health” on the [facility] Blog for updates on timely topics regarding children’s health!

## 2025-03-19 ENCOUNTER — OFFICE VISIT (OUTPATIENT)
Dept: FAMILY MEDICINE | Facility: CLINIC | Age: 65
End: 2025-03-19
Payer: MEDICARE

## 2025-03-19 VITALS
HEART RATE: 75 BPM | SYSTOLIC BLOOD PRESSURE: 130 MMHG | BODY MASS INDEX: 32.02 KG/M2 | DIASTOLIC BLOOD PRESSURE: 84 MMHG | OXYGEN SATURATION: 97 % | WEIGHT: 204 LBS | HEIGHT: 67 IN

## 2025-03-19 DIAGNOSIS — E11.65 TYPE 2 DIABETES MELLITUS WITH HYPERGLYCEMIA, WITHOUT LONG-TERM CURRENT USE OF INSULIN: ICD-10-CM

## 2025-03-19 DIAGNOSIS — Z12.5 SCREENING FOR PROSTATE CANCER: ICD-10-CM

## 2025-03-19 DIAGNOSIS — I10 ESSENTIAL HYPERTENSION: Primary | Chronic | ICD-10-CM

## 2025-03-19 DIAGNOSIS — K21.00 GASTROESOPHAGEAL REFLUX DISEASE WITH ESOPHAGITIS WITHOUT HEMORRHAGE: Chronic | ICD-10-CM

## 2025-03-19 DIAGNOSIS — E66.811 CLASS 1 OBESITY WITH BODY MASS INDEX (BMI) OF 31.0 TO 31.9 IN ADULT, UNSPECIFIED OBESITY TYPE, UNSPECIFIED WHETHER SERIOUS COMORBIDITY PRESENT: ICD-10-CM

## 2025-03-19 DIAGNOSIS — H61.23 BILATERAL IMPACTED CERUMEN: ICD-10-CM

## 2025-03-19 PROCEDURE — 1160F RVW MEDS BY RX/DR IN RCRD: CPT | Mod: CPTII,S$GLB,, | Performed by: FAMILY MEDICINE

## 2025-03-19 PROCEDURE — 1159F MED LIST DOCD IN RCRD: CPT | Mod: CPTII,S$GLB,, | Performed by: FAMILY MEDICINE

## 2025-03-19 PROCEDURE — 99215 OFFICE O/P EST HI 40 MIN: CPT | Mod: S$GLB,,, | Performed by: FAMILY MEDICINE

## 2025-03-19 PROCEDURE — 1157F ADVNC CARE PLAN IN RCRD: CPT | Mod: CPTII,S$GLB,, | Performed by: FAMILY MEDICINE

## 2025-03-19 PROCEDURE — 3008F BODY MASS INDEX DOCD: CPT | Mod: CPTII,S$GLB,, | Performed by: FAMILY MEDICINE

## 2025-03-19 PROCEDURE — 99999 PR PBB SHADOW E&M-EST. PATIENT-LVL III: CPT | Mod: PBBFAC,,, | Performed by: FAMILY MEDICINE

## 2025-03-19 PROCEDURE — 3079F DIAST BP 80-89 MM HG: CPT | Mod: CPTII,S$GLB,, | Performed by: FAMILY MEDICINE

## 2025-03-19 PROCEDURE — 3075F SYST BP GE 130 - 139MM HG: CPT | Mod: CPTII,S$GLB,, | Performed by: FAMILY MEDICINE

## 2025-03-19 PROCEDURE — 3288F FALL RISK ASSESSMENT DOCD: CPT | Mod: CPTII,S$GLB,, | Performed by: FAMILY MEDICINE

## 2025-03-19 PROCEDURE — 1100F PTFALLS ASSESS-DOCD GE2>/YR: CPT | Mod: CPTII,S$GLB,, | Performed by: FAMILY MEDICINE

## 2025-03-19 PROCEDURE — 1126F AMNT PAIN NOTED NONE PRSNT: CPT | Mod: CPTII,S$GLB,, | Performed by: FAMILY MEDICINE

## 2025-03-19 NOTE — PROGRESS NOTES
Subjective     Patient ID: Roman Hodges is a 65 y.o. male.    Chief Complaint: Hypertension, Fall, Knee Pain, Flank Pain, and Gastroesophageal Reflux    65 years old male came to the clinic for pressure check.  Pressure today was stable.  He reports recent fall.  Patient with a BMI of 31 currently trying to lose weight.  No recent A1c.  Patient with good compliance with medical regimen for diabetes.  Bilateral cerumen buildup.    Hypertension  This is a chronic problem. The current episode started more than 1 year ago. The problem is unchanged. The problem is controlled. Pertinent negatives include no chest pain, orthopnea, palpitations or peripheral edema. There are no associated agents to hypertension. Risk factors for coronary artery disease include male gender and obesity. Past treatments include beta blockers and diuretics. The current treatment provides significant improvement. Compliance problems include exercise.  There is no history of angina. There is no history of a hypertension causing med or a thyroid problem.   Fall  The accident occurred More than 1 week ago. The fall occurred while walking. He fell from a height of 1 to 2 ft. He landed on Hard floor. There was no blood loss. The point of impact was the right knee. The pain is at a severity of 3/10. Pertinent negatives include no numbness or tingling. The treatment provided moderate relief.   Knee Pain   The incident occurred more than 1 week ago. The incident occurred at home. The injury mechanism was a fall. The pain is present in the right knee. The quality of the pain is described as aching. The pain is at a severity of 3/10. The pain has been Improving since onset. Pertinent negatives include no numbness or tingling. The treatment provided moderate relief.   Flank Pain  This is a new problem. The current episode started in the past 7 days. The problem occurs intermittently. The quality of the pain is described as aching. The pain is at a  severity of 3/10. The pain is mild. Pertinent negatives include no chest pain, numbness or tingling. The treatment provided moderate relief.   Gastroesophageal Reflux  He complains of heartburn. He reports no chest pain. This is a chronic problem. The current episode started more than 1 year ago. The problem occurs occasionally. The problem has been unchanged. The heartburn is located in the left chest. The heartburn is of moderate intensity. The symptoms are aggravated by certain foods. Risk factors include obesity. He has tried a PPI for the symptoms. The treatment provided moderate relief. Past procedures include an EGD.     Review of Systems   Constitutional: Negative.    HENT:  Positive for hearing loss.    Eyes: Negative.    Respiratory: Negative.     Cardiovascular: Negative.  Negative for chest pain, palpitations and orthopnea.   Gastrointestinal:  Positive for heartburn.   Genitourinary:  Positive for flank pain.   Musculoskeletal:  Positive for arthralgias.   Integumentary:  Negative.   Neurological: Negative.  Negative for tingling and numbness.   Psychiatric/Behavioral: Negative.            Objective     Physical Exam  Vitals and nursing note reviewed.   Constitutional:       General: He is not in acute distress.     Appearance: He is well-developed. He is not diaphoretic.   HENT:      Head: Normocephalic and atraumatic.      Right Ear: External ear normal.      Left Ear: External ear normal.      Nose: Nose normal.      Mouth/Throat:      Pharynx: No oropharyngeal exudate.   Eyes:      General: No scleral icterus.        Right eye: No discharge.         Left eye: No discharge.      Conjunctiva/sclera: Conjunctivae normal.      Pupils: Pupils are equal, round, and reactive to light.   Neck:      Thyroid: No thyromegaly.      Vascular: No JVD.      Trachea: No tracheal deviation.   Cardiovascular:      Rate and Rhythm: Normal rate and regular rhythm.      Heart sounds: Normal heart sounds. No murmur  heard.     No friction rub. No gallop.   Pulmonary:      Effort: Pulmonary effort is normal. No respiratory distress.      Breath sounds: Normal breath sounds. No stridor. No wheezing or rales.   Chest:      Chest wall: No tenderness.   Abdominal:      General: Bowel sounds are normal. There is no distension.      Palpations: Abdomen is soft. There is no mass.      Tenderness: There is no abdominal tenderness. There is no guarding or rebound.   Musculoskeletal:         General: Normal range of motion.      Cervical back: Normal range of motion and neck supple.      Right knee: Tenderness present.   Lymphadenopathy:      Cervical: No cervical adenopathy.   Skin:     General: Skin is warm and dry.      Coloration: Skin is not pale.      Findings: No erythema or rash.   Neurological:      Mental Status: He is alert and oriented to person, place, and time.      Cranial Nerves: No cranial nerve deficit.      Motor: No abnormal muscle tone.      Coordination: Coordination normal.      Deep Tendon Reflexes: Reflexes are normal and symmetric. Reflexes normal.   Psychiatric:         Behavior: Behavior normal.         Thought Content: Thought content normal.         Judgment: Judgment normal.            Assessment and Plan     1. Essential hypertension  -     Lipid Panel; Future; Expected date: 03/19/2025  -     Comprehensive Metabolic Panel; Future; Expected date: 03/19/2025    2. Gastroesophageal reflux disease with esophagitis without hemorrhage    3. Type 2 diabetes mellitus with hyperglycemia, without long-term current use of insulin  -     Microalbumin/Creatinine Ratio, Urine; Future; Expected date: 03/19/2025  -     Lipid Panel; Future; Expected date: 03/19/2025  -     Hemoglobin A1C; Future; Expected date: 03/19/2025  -     Comprehensive Metabolic Panel; Future; Expected date: 03/19/2025  -     Microalbumin/Creatinine Ratio, Urine; Future; Expected date: 03/19/2025  -     Lipid Panel; Future; Expected date:  03/19/2025  -     Hemoglobin A1C; Future; Expected date: 03/19/2025  -     Comprehensive Metabolic Panel; Future; Expected date: 03/19/2025    4. Class 1 obesity with body mass index (BMI) of 31.0 to 31.9 in adult, unspecified obesity type, unspecified whether serious comorbidity present    5. Bilateral impacted cerumen  -     Ear wax removal    6. Screening for prostate cancer  -     PSA, Screening; Future; Expected date: 03/19/2025        I spent a total of 45 minutes on the day of the visit.This includes face to face time and non-face to face time preparing to see the patient (eg, review of tests), obtaining and/or reviewing separately obtained history, documenting clinical information in the electronic or other health record, independently interpreting results and communicating results to the patient/family/caregiver, or care coordinator.          Follow up in about 6 months (around 9/19/2025), or if symptoms worsen or fail to improve.

## 2025-03-21 ENCOUNTER — PATIENT MESSAGE (OUTPATIENT)
Dept: FAMILY MEDICINE | Facility: CLINIC | Age: 65
End: 2025-03-21
Payer: MEDICARE

## 2025-04-02 ENCOUNTER — PATIENT MESSAGE (OUTPATIENT)
Dept: CARDIOLOGY | Facility: CLINIC | Age: 65
End: 2025-04-02
Payer: MEDICARE

## 2025-04-02 DIAGNOSIS — I10 ESSENTIAL HYPERTENSION: Primary | ICD-10-CM

## 2025-04-02 RX ORDER — METOPROLOL SUCCINATE 50 MG/1
50 TABLET, EXTENDED RELEASE ORAL DAILY
Qty: 90 TABLET | Refills: 3 | Status: SHIPPED | OUTPATIENT
Start: 2025-04-02

## 2025-04-02 RX ORDER — AMLODIPINE BESYLATE 10 MG/1
10 TABLET ORAL DAILY
Qty: 90 TABLET | Refills: 3 | Status: SHIPPED | OUTPATIENT
Start: 2025-04-02

## 2025-04-09 DIAGNOSIS — I10 ESSENTIAL HYPERTENSION: ICD-10-CM

## 2025-04-09 RX ORDER — AMLODIPINE BESYLATE 10 MG/1
10 TABLET ORAL DAILY
Qty: 90 TABLET | Refills: 3 | Status: SHIPPED | OUTPATIENT
Start: 2025-04-09

## 2025-05-01 NOTE — SUBJECTIVE & OBJECTIVE
Past Medical History:   Diagnosis Date    Diabetes mellitus, type 2     GERD (gastroesophageal reflux disease)     Hepatocellular carcinoma     liver    Hypertension        Past Surgical History:   Procedure Laterality Date    COLONOSCOPY      EMBOLIZATION, YTTRIUM THERAPY N/A 11/9/2018    Performed by [facility] OR 2ND FLR    EMBOLIZATION, YTTRIUM THERAPY N/A 10/24/2018    Performed by [facility] OR 2ND FLR    HERNIA REPAIR         Review of patient's allergies indicates:   Allergen Reactions    Flu vaccine 2011 (36 mos+)(pf)      Patient reports he developed Bell's Palsy 2 days after his last flu shot in 2008       No current facility-administered medications on file prior to encounter.      Current Outpatient Medications on File Prior to Encounter   Medication Sig    esomeprazole (NEXIUM) 40 MG capsule Take 1 capsule (40 mg total) by mouth once daily.    losartan-hydrochlorothiazide 50-12.5 mg (HYZAAR) 50-12.5 mg per tablet Take 1 tablet by mouth once daily.    NIFEdipine (ADALAT CC) 60 MG TbSR Take 30 mg by mouth 2 (two) times daily.    omeprazole (PRILOSEC) 20 MG capsule Take 20 mg by mouth once daily.    ondansetron (ZOFRAN) 8 MG tablet Take 1 tablet (8 mg total) by mouth every 8 (eight) hours as needed for Nausea.    potassium chloride SA (K-DUR,KLOR-CON) 20 MEQ tablet Take 20 mEq by mouth 4 (four) times daily.     Family History     None        Tobacco Use    Smoking status: Former Smoker    Smokeless tobacco: Never Used   Substance and Sexual Activity    Alcohol use: No    Drug use: No    Sexual activity: Not on file     Review of Systems   Constitution: Negative for diaphoresis, weakness, malaise/fatigue, weight gain and weight loss.   HENT: Negative.    Eyes: Negative.    Cardiovascular: Negative for chest pain, dyspnea on exertion, irregular heartbeat, leg swelling, near-syncope, orthopnea, palpitations, paroxysmal nocturnal dyspnea and  syncope.   Respiratory: Negative for cough, shortness of breath and wheezing.    Endocrine: Negative.    Hematologic/Lymphatic: Negative.    Skin: Negative.    Musculoskeletal: Negative.    Gastrointestinal: Positive for heartburn.   Genitourinary: Negative.    Neurological: Positive for light-headedness.   Psychiatric/Behavioral: Negative.    Allergic/Immunologic: Negative.      Objective:     Vital Signs (Most Recent):  Temp: 98.4 °F (36.9 °C) (11/30/18 1145)  Pulse: 70 (11/30/18 1145)  Resp: 16 (11/30/18 1145)  BP: 129/82 (11/30/18 1145)  SpO2: 96 % (11/30/18 1145) Vital Signs (24h Range):  Temp:  [96.4 °F (35.8 °C)-98.6 °F (37 °C)] 98.4 °F (36.9 °C)  Pulse:  [57-84] 70  Resp:  [16-21] 16  SpO2:  [95 %-99 %] 96 %  BP: (111-134)/(76-89) 129/82     Weight: 91.8 kg (202 lb 6.1 oz)  Body mass index is 31.7 kg/m².    SpO2: 96 %  O2 Device (Oxygen Therapy): room air    No intake or output data in the 24 hours ending 11/30/18 1201    Lines/Drains/Airways     Peripheral Intravenous Line                 Peripheral IV - Single Lumen 11/29/18 1115 Right Antecubital 1 day                Physical Exam   Constitutional: He is oriented to person, place, and time. He appears well-developed and well-nourished. No distress.   HENT:   Head: Normocephalic and atraumatic.   Eyes: Right eye exhibits no discharge. Left eye exhibits no discharge.   Neck: No JVD present.   Cardiovascular: Normal rate, regular rhythm and normal heart sounds. Exam reveals no gallop and no friction rub.   No murmur heard.  Pulmonary/Chest: Effort normal and breath sounds normal.   Abdominal: Soft. Bowel sounds are normal.   Musculoskeletal: He exhibits no edema.   Neurological: He is alert and oriented to person, place, and time.   Skin: Skin is warm and dry. He is not diaphoretic.   Psychiatric: He has a normal mood and affect. His behavior is normal. Judgment and thought content normal.       Significant Labs:   BMP:   Recent Labs   Lab 11/29/18  1126    *      K 3.9      CO2 23   BUN 15   CREATININE 1.1   CALCIUM 10.1   , CMP   Recent Labs   Lab 11/29/18  1122      K 3.9      CO2 23   *   BUN 15   CREATININE 1.1   CALCIUM 10.1   PROT 8.7*   ALBUMIN 4.4   BILITOT 1.0   ALKPHOS 106   AST 30   ALT 38   ANIONGAP 13   ESTGFRAFRICA >60   EGFRNONAA >60   , CBC   Recent Labs   Lab 11/29/18  1122   WBC 5.68   HGB 17.7   HCT 50.3      , INR   Recent Labs   Lab 11/29/18  1122   INR 1.0   , Lipid Panel No results for input(s): CHOL, HDL, LDLCALC, TRIG, CHOLHDL in the last 48 hours., Troponin   Recent Labs   Lab 11/29/18  1122 11/29/18  1703 11/29/18  2343   TROPONINI 0.015 0.016 0.016    and All pertinent lab results from the last 24 hours have been reviewed.    Significant Imaging: Echocardiogram:   Transthoracic echo (TTE) complete (Cupid Only):   Results for orders placed or performed during the hospital encounter of 11/29/18   Transthoracic echo (TTE) complete (Cupid Only)   Result Value Ref Range    AV mean gradient 3.48 mmHg    Ao peak saw 1.29 m/s    Ao VTI 20.02 cm    IVRT 0.08 msec    IVS 1.20 (A) 0.6 - 1.1 cm    LA size 3.30 cm    LVIDD 3.83 3.5 - 6.0 cm    LVIDS 2.48 2.1 - 4.0 cm    LVOT diameter 1.94 cm    LVOT peak VTI 15.15 cm    PW 1.08 0.6 - 1.1 cm    RVDD 3.25 cm    TR Max Saw 1.10 m/s    Ao root annulus 3.16 cm    AORTIC VALVE CUSP SEPERATION 1.94 cm    PV PEAK VELOCITY 0.90 cm/s    LV Diastolic Volume 63.11 mL    LV Systolic Volume 21.78 mL    FS 35 %    LV mass 143.64 g    Left Ventricle Relative Wall Thickness 0.56 cm    AV valve area 2.24 cm2    AV index (prosthetic) 0.76     LVOT area 2.95 cm2    LVOT stroke volume 44.76 cm3    AV peak gradient 6.66 mmHg    Triscuspid Valve Regurgitation Peak Gradient 4.84 mmHg    BSA 2.07 m2    LV Systolic Volume Index 10.5 mL/m2    LV Diastolic Volume Index 30.49 mL/m2    LV Mass Index 69.4 g/m2    Right Atrial Pressure (from IVC) 3 mmHg    TV rest pulmonary artery  pressure 7.84 mmHg      Never smoker

## 2025-06-03 DIAGNOSIS — E55.9 VITAMIN D DEFICIENCY DISEASE: ICD-10-CM

## 2025-06-04 ENCOUNTER — TELEPHONE (OUTPATIENT)
Dept: TRANSPLANT | Facility: CLINIC | Age: 65
End: 2025-06-04
Payer: MEDICARE

## 2025-06-04 RX ORDER — ERGOCALCIFEROL 1.25 MG/1
50000 CAPSULE ORAL
Qty: 12 CAPSULE | Refills: 1 | Status: SHIPPED | OUTPATIENT
Start: 2025-06-04

## 2025-06-06 ENCOUNTER — PATIENT MESSAGE (OUTPATIENT)
Dept: FAMILY MEDICINE | Facility: CLINIC | Age: 65
End: 2025-06-06
Payer: MEDICARE

## 2025-06-06 DIAGNOSIS — E55.9 VITAMIN D DEFICIENCY DISEASE: ICD-10-CM

## 2025-06-06 NOTE — TELEPHONE ENCOUNTER
Provider Staff:     Action required for this patient.    Please note Refusal of medication.            Requested Prescriptions     Pending Prescriptions Disp Refills    ergocalciferol (ERGOCALCIFEROL) 50,000 unit Cap [Pharmacy Med Name: VITAMIN D2 1.25MG(50,000 UNIT)] 12 capsule 1     Sig: TAKE 1 CAPSULE BY MOUTH ONE TIME PER WEEK      Thanks!  [facility]   Note composed: 06/06/2025 1:53 PM

## 2025-06-06 NOTE — TELEPHONE ENCOUNTER
No care due was identified.  Health [facility] Embedded Care Due Messages. Reference number: [id number].   6/06/2025 10:53:04 AM CDT

## 2025-06-06 NOTE — TELEPHONE ENCOUNTER
Refill Routing Note   Medication(s) are not appropriate for processing by [facility] for the following reason(s):        Outside of protocol    [facility] action(s):  Route               Appointments  past 12m or future 3m with PCP    Date Provider   Last Visit   3/19/2025 Jose Angel Munson MD   Next Visit   9/3/2025 Jose Angel Munson MD   ED visits in past 90 days: 0        Note composed:1:53 PM 06/06/2025

## 2025-06-07 NOTE — PROGRESS NOTES
Cardiology Clinic Visit    History of Present Illness:       LV visit seen by Xander Patterson MD   Roman Hodges is a pleasant 65 y.o. male with PMHx of pAfib on OAC, DM2 A1c 6.6 , HCC s/p Liver transplant on tacrolimus, HTN for follow up. Previously followed by Dr. Sargent. He reported that he has multiple hemorrhoids ligations 2/2 bleeding likely OAC is contributing factor. Voiced no CV complaints. +gastritis.         Off note  One remote episode, resolved post diltiazem. One recent episode of palpitations. Negative Holter. Will hold the course at present, and increase workup if recurrence.  Continue BB, eliquis.     Of note, pt is under the care of CRS for hemorrhoids, now s/p banding. Could consider Watchman if bleeding recurs.     Offered ILR, given rare sx c/w AF. He declined at present but would consider it if sx increase in frequency.    LDL 79    Echo 02/07/24  Conclusion:   The echocardiographic study is compatible with normal left ventricular   size and normal systolic function.  Echo-Doppler Study: Aortic Outflow peak velocity of 1.7m/s.  There is no aortic stenosis. There is no aortic regurgitation. EF = >55%  There is no evidence of mitral stenosis. There is mild tricuspid   regurgitation observed by color doppler or spectral analysis. E/A = 0.6.  There is mitral regurgitation of 1+ severity.     Mitral Valve Prolapse.   Pulmonary insufficiency (mild).   E/A=0.6 (abnormal relaxation).   PAP= 15mmHg.   Aortic sclerosis without stenosis.   Mild Left Atrial Enlargement.     Cath 12/08/2024  LVEDP (Pre): 8  LVEDP (Post): 8  The ejection fraction is greater than 55% by visual estimate.  Estimated blood loss: none  Non-obstructive CAD.    History obtained by patient interview and supplemented by nursing documentation and chart review.   PMHx:  has a past medical history of A-fib, Allergy, Anticoagulant long-term use, Diabetes mellitus, type 2, GERD (gastroesophageal reflux disease), Hepatocellular carcinoma,  History of 2019 novel coronavirus disease (COVID-19), History of primary liver cancer (10/24/2018), Hyperlipidemia, and Hypertension.   SurgHx:  has a past surgical history that includes Hernia repair; Colonoscopy; Left heart catheterization (Left, 12/4/2018); Esophagogastroduodenoscopy (N/A, 1/15/2019); Radiofrequency ablation (N/A, 4/12/2019); Liver transplant (N/A, 7/3/2019); Colonoscopy (N/A, 12/23/2021); and Esophagogastroduodenoscopy (N/A, 12/23/2021).   FamHx: family history includes Cancer in his father and mother; Hypertension in his father, mother, sister, and son; No Known Problems in his brother and daughter; Stroke in his father.   SocialHx:  reports that he quit smoking about 44 years ago. His smoking use included cigarettes. He started smoking about 54 years ago. He has a 10 pack-year smoking history. He has been exposed to tobacco smoke. He has never used smokeless tobacco. He reports that he does not drink alcohol and does not use drugs.  Home Meds:  Current Outpatient Medications   Medication Instructions    ACCU-CHEK GUIDE TEST STRIPS Strp TEST BLOOD GLUCOSE 3 TIMES A DAY    amLODIPine (NORVASC) 10 mg, Oral, Daily    ciclopirox (PENLAC) 8 % Soln Nightly    empagliflozin (JARDIANCE) 10 mg, Oral, Daily    ergocalciferol (ERGOCALCIFEROL) 50,000 Units, Oral, Every 7 days    esomeprazole (NEXIUM) 40 MG capsule TAKE 1 CAPSULE BY MOUTH EVERY DAY    famotidine (PEPCID) 20 mg, Oral, 2 times daily    hydroCHLOROthiazide 12.5 mg, Oral, Daily    metoprolol succinate (TOPROL-XL) 50 mg, Oral, Daily    mycophenolate (CELLCEPT) 250 mg Cap TAKE 4 CAPSULES BY MOUTH 2 TIMES A DAY    potassium chloride SA (K-DUR,KLOR-CON) 20 MEQ tablet 20 mEq, Oral, 2 times daily    rivaroxaban (XARELTO) 20 mg, Oral, Daily    tacrolimus (PROGRAF) 0.5 MG Cap TAKE 2 CAPSULES BY MOUTH EVERY MORNING AND 1 CAPSULE EVERY EVENING.    urea-lactic acid-propylene gly (KERASAL MULTI-PURPOSE NAIL REP) Soln 1 Applicatorful, Topical (Top), Daily  "      Review of Systems: Comprehensive ROS was performed and is negative unless otherwise noted in HPI.   Objective   Objective/Exam:   BP (!) 157/96 (BP Location: Right arm, Patient Position: Sitting)   Pulse 74   Ht 5' 7" (1.702 m)   Wt 93.9 kg (207 lb)   SpO2 99%   BMI 32.42 kg/m²    Wt Readings from Last 4 Encounters:   06/20/25 92.5 kg (204 lb)   06/10/25 93.9 kg (207 lb)   03/19/25 92.5 kg (204 lb)   01/31/25 93.9 kg (207 lb)      Constitutional: NAD, Atraumatic, Conversant   HEENT: MMM, Sclera anicteric, No JVD   Cardiovascular: RRR, no murmurs noted, no chest tenderness to palpation, 2+ radial pulses b/l  Pulmonary: normal respiratory effort, CTAB, no crackles, wheezes  Abdominal: S/NT/ND  Musculoskeletal: No lower extremity edema noted b/l  Neurological: No gross neurological deficits  Skin: W/D/I  Psych: Appropriate affect, normal mood  Labs/Imaging/Procedures   Personally reviewed  Lab Results   Component Value Date     07/07/2025     01/06/2025    K 3.3 (L) 07/07/2025    K 4.1 01/06/2025     07/07/2025     01/06/2025    CO2 23 07/07/2025    CO2 26 01/06/2025    BUN 22 07/07/2025    CREATININE 1.1 07/07/2025    ANIONGAP 12 07/07/2025     Lab Results   Component Value Date    HGBA1C 6.6 (H) 09/09/2024     Lab Results   Component Value Date    BNP 24 04/16/2020    BNP 93 07/07/2019    BNP 25 01/03/2019      Lab Results   Component Value Date    WBC 5.98 07/07/2025    HGB 14.8 07/07/2025    HGB 14.7 01/06/2025    HCT 46.5 07/07/2025    HCT 46.1 01/06/2025    HCT 41 07/04/2019     07/07/2025     01/06/2025    GRAN 4.4 01/06/2025    GRAN 65.8 01/06/2025     Lab Results   Component Value Date    CHOL 156 09/09/2024    HDL 32 (L) 09/09/2024    LDLCALC 79.8 09/09/2024    LDLCALC 104.0 03/11/2024    LDLCALC 83.8 07/03/2023    LDLCALC 83.8 07/03/2023    TRIG 221 (H) 09/09/2024     Lab Results   Component Value Date    TSH 1.563 07/07/2022     No results found for: " ""APOLIPOPROTE"  No results found for: "LIPOA"     TTE:  Results for orders placed during the hospital encounter of 07/19/21    Echo    Interpretation Summary  · The left ventricle is normal in size with concentric remodeling and normal systolic function.  · The estimated ejection fraction is 65%.  · Normal left ventricular diastolic function.  · With normal right ventricular systolic function.    Stress Testing:   No results found for this or any previous visit.     Coronary Angiogram:  No results found for this or any previous visit.      -Reviewing Medical records & lab results  -Independently reviewing and interpreting (if not documented by myself) EKGs, echocardiograms, catherizations   -Obtaining a history, performing a relevant exam, counseling/educating the patient/family  -Documenting clinical information in the EMR including ordering of tests  -Care coordination and communicating with other health care providers       Problem List:     1. Paroxysmal atrial fibrillation    2. Gastroesophageal reflux disease with esophagitis without hemorrhage    3. Essential hypertension    4. Type 2 diabetes mellitus with hyperglycemia, without long-term current use of insulin    5. Palpitations    6. S/P liver transplant    7. Obesity (BMI 30.0-34.9)    8. Long term (current) use of anticoagulants    9. Type 2 diabetes mellitus without complication, without long-term current use of insulin        Assessment/Plan:     pAFib- continue OAC + BB. However, given multiple hemorrhoids ligations will referred to EP for LAAO evaluation but he needed more time to think.   HTN continue current medications (cautious K 2/2 HCTZ)  DM2 per PCP.Continue meds   S/p Liver transplant on tracrolimus      Preventative Care:    Patient expressed verbal understanding and agreed with the plan     Return sooner for concerns or questions. If symptoms persist go to the ED.  I have reviewed all pertinent data including patient's medical history in " detail and updated the computerized patient record.       Total time spent counseling greater than fifty percent of total visit time.  Counseling included discussion regarding imaging findings, diagnosis, possibilities, treatment options, risks and benefits.      Thank you for the opportunity to care for this patient. Please don't hesitate to reach out with any questions/concerns         Truong Powell MD  Cardiovascular Disease; Interventional Cardiology  [facility]

## 2025-06-09 RX ORDER — ERGOCALCIFEROL 1.25 MG/1
50000 CAPSULE ORAL
Qty: 12 CAPSULE | Refills: 1 | Status: SHIPPED | OUTPATIENT
Start: 2025-06-09

## 2025-06-10 ENCOUNTER — OFFICE VISIT (OUTPATIENT)
Dept: CARDIOLOGY | Facility: CLINIC | Age: 65
End: 2025-06-10
Payer: MEDICARE

## 2025-06-10 VITALS
OXYGEN SATURATION: 99 % | DIASTOLIC BLOOD PRESSURE: 96 MMHG | WEIGHT: 207 LBS | HEART RATE: 74 BPM | SYSTOLIC BLOOD PRESSURE: 157 MMHG | HEIGHT: 67 IN | BODY MASS INDEX: 32.49 KG/M2

## 2025-06-10 DIAGNOSIS — I48.0 PAROXYSMAL ATRIAL FIBRILLATION: Primary | ICD-10-CM

## 2025-06-10 DIAGNOSIS — E11.9 TYPE 2 DIABETES MELLITUS WITHOUT COMPLICATION, WITHOUT LONG-TERM CURRENT USE OF INSULIN: ICD-10-CM

## 2025-06-10 DIAGNOSIS — E66.811 OBESITY (BMI 30.0-34.9): ICD-10-CM

## 2025-06-10 DIAGNOSIS — Z94.4 S/P LIVER TRANSPLANT: ICD-10-CM

## 2025-06-10 DIAGNOSIS — Z79.01 LONG TERM (CURRENT) USE OF ANTICOAGULANTS: ICD-10-CM

## 2025-06-10 DIAGNOSIS — R00.2 PALPITATIONS: ICD-10-CM

## 2025-06-10 DIAGNOSIS — E11.65 TYPE 2 DIABETES MELLITUS WITH HYPERGLYCEMIA, WITHOUT LONG-TERM CURRENT USE OF INSULIN: ICD-10-CM

## 2025-06-10 DIAGNOSIS — K21.00 GASTROESOPHAGEAL REFLUX DISEASE WITH ESOPHAGITIS WITHOUT HEMORRHAGE: ICD-10-CM

## 2025-06-10 DIAGNOSIS — I10 ESSENTIAL HYPERTENSION: ICD-10-CM

## 2025-06-10 PROCEDURE — 1126F AMNT PAIN NOTED NONE PRSNT: CPT | Mod: CPTII,S$GLB,, | Performed by: STUDENT IN AN ORGANIZED HEALTH CARE EDUCATION/TRAINING PROGRAM

## 2025-06-10 PROCEDURE — 1157F ADVNC CARE PLAN IN RCRD: CPT | Mod: CPTII,S$GLB,, | Performed by: STUDENT IN AN ORGANIZED HEALTH CARE EDUCATION/TRAINING PROGRAM

## 2025-06-10 PROCEDURE — 99999 PR PBB SHADOW E&M-EST. PATIENT-LVL IV: CPT | Mod: PBBFAC,,, | Performed by: STUDENT IN AN ORGANIZED HEALTH CARE EDUCATION/TRAINING PROGRAM

## 2025-06-10 PROCEDURE — 3079F DIAST BP 80-89 MM HG: CPT | Mod: CPTII,S$GLB,, | Performed by: STUDENT IN AN ORGANIZED HEALTH CARE EDUCATION/TRAINING PROGRAM

## 2025-06-10 PROCEDURE — 99215 OFFICE O/P EST HI 40 MIN: CPT | Mod: S$GLB,,, | Performed by: STUDENT IN AN ORGANIZED HEALTH CARE EDUCATION/TRAINING PROGRAM

## 2025-06-10 PROCEDURE — 3008F BODY MASS INDEX DOCD: CPT | Mod: CPTII,S$GLB,, | Performed by: STUDENT IN AN ORGANIZED HEALTH CARE EDUCATION/TRAINING PROGRAM

## 2025-06-10 PROCEDURE — 3077F SYST BP >= 140 MM HG: CPT | Mod: CPTII,S$GLB,, | Performed by: STUDENT IN AN ORGANIZED HEALTH CARE EDUCATION/TRAINING PROGRAM

## 2025-06-10 RX ORDER — FAMOTIDINE 20 MG/1
20 TABLET, FILM COATED ORAL 2 TIMES DAILY
Qty: 60 TABLET | Refills: 11 | Status: SHIPPED | OUTPATIENT
Start: 2025-06-10 | End: 2026-06-10

## 2025-06-19 ENCOUNTER — TELEPHONE (OUTPATIENT)
Dept: ELECTROPHYSIOLOGY | Facility: CLINIC | Age: 65
End: 2025-06-19
Payer: MEDICARE

## 2025-06-19 NOTE — PROGRESS NOTES
Subjective   Patient ID:  Roman Hodges is a 65 y.o. male who presents for follow-up of Atrial Fibrillation    Primary Cardiologist: Truong Powell MD  Primary Care Physician: Jose Angel Munson MD    HPI  I had the pleasure of seeing Mr Hodges today in our electrophysiology clinic in follow-up for his atrial fibrillation. As you are aware he is a 65 year-old man, former patient of Dr. Sargent, with hypertension (on amlodipine, HCTZ), type 2 DM (on Jardiance), MALLIKA treated with CPAP, hepatocellular cancer s/p liver transplant in 2019, recurrent rectal bleeding from hemorrhoids and paroxysmal AF with associated systolic dysfunction. He presented to the ER in November of 2018 with AF with RVR. EF was 30%. He was given diltiazem for rate control and he spontaneously converted. LVEF normalized. He did have an angiogram at that time and had no obstrucitve coronary artery disease. Has had rare symptomatic pAF since then. He has followed with Dr. Sargent and was last seen in July of 2024. At that time the plan was to continue metoprolol and anticoagulation (take xarelto). They discussed potential Watchman implantation due to recurrent hemorrhoidal bleeding. Underwent banding in January of 2025.      ECHO 1/2025: normal LVEF, normal LA size    I reviewed available ECGs in [facility] and my personal interpretation summary is either sinus rhythm or AF with RVR (last observed July 2019)    My interpretation of today's in-clinic ECG is normal sinus rhythm with LVH and QRS widening and T-wave changes      Review of Systems   Constitutional: Negative for fever and malaise/fatigue.   HENT:  Negative for congestion and sore throat.    Eyes:  Negative for blurred vision and visual disturbance.   Cardiovascular:  Negative for chest pain, dyspnea on exertion, irregular heartbeat, near-syncope, palpitations and syncope.   Respiratory:  Negative for cough and shortness of breath.    Hematologic/Lymphatic: Negative for bleeding problem.  Bruises/bleeds easily.   Skin: Negative.    Musculoskeletal: Negative.    Gastrointestinal:  Negative for bloating, abdominal pain, hematochezia and melena.   Neurological:  Negative for focal weakness and weakness.   Psychiatric/Behavioral: Negative.            Objective     Physical Exam  Vitals reviewed.   Constitutional:       General: He is not in acute distress.     Appearance: He is well-developed. He is not diaphoretic.   HENT:      Head: Normocephalic and atraumatic.   Eyes:      General:         Right eye: No discharge.         Left eye: No discharge.      Conjunctiva/sclera: Conjunctivae normal.   Cardiovascular:      Rate and Rhythm: Normal rate and regular rhythm.      Heart sounds: No murmur heard.     No friction rub. No gallop.   Pulmonary:      Effort: Pulmonary effort is normal. No respiratory distress.      Breath sounds: Normal breath sounds. No wheezing or rales.   Abdominal:      General: Bowel sounds are normal. There is no distension.      Palpations: Abdomen is soft.      Tenderness: There is no abdominal tenderness.   Musculoskeletal:      Cervical back: Neck supple.   Skin:     General: Skin is warm and dry.   Neurological:      Mental Status: He is alert and oriented to person, place, and time.   Psychiatric:         Behavior: Behavior normal.         Thought Content: Thought content normal.         Judgment: Judgment normal.            Assessment and Plan     1. Paroxysmal atrial fibrillation    2. Aortic arch atherosclerosis    3. Type 2 diabetes mellitus with hyperglycemia, without long-term current use of insulin    4. S/P liver transplant    5. MALLIKA (obstructive sleep apnea)        Plan:  In summary, Mr Hodges is a 65 year-old man, former patient of Dr. Sargent, with hypertension (on amlodipine, HCTZ), type 2 DM (on Jardiance), MALLIKA treated with CPAP, hepatocellular cancer s/p liver transplant in 2019, recurrent rectal bleeding from hemorrhoids and paroxysmal AF with associated systolic  dysfunction. Discussed continuing current treatment with metoprolol and anticoagulation versus Watchman +/- PVI. We discussed risks and benefits of PVI, including transseptal puncture, at length. Our discussion included, but was not limited to the risk of death, infection, bleeding, stroke, MI, cardiac perforation, embolism, cardiac tamponade, vascular injury, valvular injury, AE fistula (RFA), injury to phrenic nerve (RFA), pulmonary vein stenosis (RFA), rhabdomyolysis (PFA), hemolysis (PFA) and other organic injury including the possibility for need for surgery or pacemaker implantation.     We discussed the etiology and pathophysiology of strokes associated with atrial fibrillation. We discussed that his risk factors ([id number] score 3) portend a high enough risk that warrants long-term anticoagulation for stroke risk reduction to which he has a relative long-term contraindication to due to recurrent hemorrhoidal bleeding (HAS-BLED score 3). We discussed the role of the left atrial appendage in stroke origin in atrial fibrillation and how a left atrial appendage occluder (ie. Watchman) can help reduce long-term stroke risk absent long-term anticoagulation. We discussed the implant procedure including transseptal puncture and the risks of the procedure including but not limited to death, infection, bleeding, stroke, MI, cardiac perforation, vascular injury, embolism (including device embolism), cardiac tamponade, skin burns, and other organic injury including the possibility for need for surgery. We discussed the need to continue anticoagulation for 6 weeks after implant along with aspirin therapy for Watchman. A GABBY will be performed 6 weeks after implant. If no significant left atrial appendage leaks are observed then the patient will be changed to dual antiplatelet therapy for 6 months followed by lifelong single antiplatelet therapy as tolerated.     Alternative discussed including remaining on  anticoagulation and accepting the risk of recurrent bleeding or stopping anticoagulation and accepting the risk of stroke.     I offered combined PVI/LESTER occlusion with PFA/Watchman. He wants to think about it. For now continue xarelto.    Thank you for allowing me to participate in the care of this patient. Please do not hesitate to call me with any questions or concerns.    Luke Cunningham MD, PhD  Cardiac Electrophysiology

## 2025-06-20 ENCOUNTER — OFFICE VISIT (OUTPATIENT)
Dept: CARDIOLOGY | Facility: CLINIC | Age: 65
End: 2025-06-20
Payer: MEDICARE

## 2025-06-20 VITALS
DIASTOLIC BLOOD PRESSURE: 93 MMHG | HEIGHT: 67 IN | WEIGHT: 204 LBS | SYSTOLIC BLOOD PRESSURE: 144 MMHG | BODY MASS INDEX: 32.02 KG/M2 | HEART RATE: 71 BPM

## 2025-06-20 DIAGNOSIS — G47.33 OSA (OBSTRUCTIVE SLEEP APNEA): Chronic | ICD-10-CM

## 2025-06-20 DIAGNOSIS — Z94.4 S/P LIVER TRANSPLANT: Chronic | ICD-10-CM

## 2025-06-20 DIAGNOSIS — I48.0 PAROXYSMAL ATRIAL FIBRILLATION: Primary | ICD-10-CM

## 2025-06-20 DIAGNOSIS — I70.0 AORTIC ARCH ATHEROSCLEROSIS: ICD-10-CM

## 2025-06-20 DIAGNOSIS — E11.65 TYPE 2 DIABETES MELLITUS WITH HYPERGLYCEMIA, WITHOUT LONG-TERM CURRENT USE OF INSULIN: ICD-10-CM

## 2025-06-20 PROCEDURE — 99999 PR PBB SHADOW E&M-EST. PATIENT-LVL III: CPT | Mod: PBBFAC,,, | Performed by: INTERNAL MEDICINE

## 2025-06-21 ENCOUNTER — PATIENT MESSAGE (OUTPATIENT)
Dept: TRANSPLANT | Facility: CLINIC | Age: 65
End: 2025-06-21
Payer: MEDICARE

## 2025-06-23 ENCOUNTER — PATIENT MESSAGE (OUTPATIENT)
Dept: TRANSPLANT | Facility: CLINIC | Age: 65
End: 2025-06-23
Payer: MEDICARE

## 2025-06-23 DIAGNOSIS — Z00.00 ENCOUNTER FOR MEDICARE ANNUAL WELLNESS EXAM: ICD-10-CM

## 2025-06-23 DIAGNOSIS — I48.0 PAROXYSMAL ATRIAL FIBRILLATION: Primary | ICD-10-CM

## 2025-06-29 ENCOUNTER — PATIENT MESSAGE (OUTPATIENT)
Dept: TRANSPLANT | Facility: CLINIC | Age: 65
End: 2025-06-29
Payer: MEDICARE

## 2025-07-03 DIAGNOSIS — E55.9 VITAMIN D DEFICIENCY DISEASE: ICD-10-CM

## 2025-07-03 NOTE — TELEPHONE ENCOUNTER
No care due was identified.  Health [facility] Embedded Care Due Messages. Reference number: [id number].   7/03/2025 10:19:02 AM CDT

## 2025-07-07 ENCOUNTER — LAB VISIT (OUTPATIENT)
Dept: LAB | Facility: HOSPITAL | Age: 65
End: 2025-07-07
Attending: INTERNAL MEDICINE
Payer: MEDICARE

## 2025-07-07 DIAGNOSIS — Z94.4 LIVER REPLACED BY TRANSPLANT: ICD-10-CM

## 2025-07-07 LAB
ABSOLUTE EOSINOPHIL (OHS): 0.13 K/UL
ABSOLUTE MONOCYTE (OHS): 0.55 K/UL (ref 0.3–1)
ABSOLUTE NEUTROPHIL COUNT (OHS): 3.65 K/UL (ref 1.8–7.7)
ALBUMIN SERPL BCP-MCNC: 4.4 G/DL (ref 3.5–5.2)
ALP SERPL-CCNC: 58 UNIT/L (ref 40–150)
ALT SERPL W/O P-5'-P-CCNC: 26 UNIT/L (ref 10–44)
ANION GAP (OHS): 12 MMOL/L (ref 8–16)
AST SERPL-CCNC: 18 UNIT/L (ref 11–45)
BASOPHILS # BLD AUTO: 0.04 K/UL
BASOPHILS NFR BLD AUTO: 0.7 %
BILIRUB SERPL-MCNC: 0.5 MG/DL (ref 0.1–1)
BUN SERPL-MCNC: 22 MG/DL (ref 8–23)
CALCIUM SERPL-MCNC: 8.9 MG/DL (ref 8.7–10.5)
CHLORIDE SERPL-SCNC: 103 MMOL/L (ref 95–110)
CO2 SERPL-SCNC: 23 MMOL/L (ref 23–29)
CREAT SERPL-MCNC: 1.1 MG/DL (ref 0.5–1.4)
ERYTHROCYTE [DISTWIDTH] IN BLOOD BY AUTOMATED COUNT: 18.8 % (ref 11.5–14.5)
GFR SERPLBLD CREATININE-BSD FMLA CKD-EPI: >60 ML/MIN/1.73/M2
GLUCOSE SERPL-MCNC: 113 MG/DL (ref 70–110)
HCT VFR BLD AUTO: 46.5 % (ref 40–54)
HGB BLD-MCNC: 14.8 GM/DL (ref 14–18)
IMM GRANULOCYTES # BLD AUTO: 0.02 K/UL (ref 0–0.04)
IMM GRANULOCYTES NFR BLD AUTO: 0.3 % (ref 0–0.5)
LYMPHOCYTES # BLD AUTO: 1.59 K/UL (ref 1–4.8)
MCH RBC QN AUTO: 24.3 PG (ref 27–31)
MCHC RBC AUTO-ENTMCNC: 31.8 G/DL (ref 32–36)
MCV RBC AUTO: 76 FL (ref 82–98)
NUCLEATED RBC (/100WBC) (OHS): 0 /100 WBC
PLATELET # BLD AUTO: 318 K/UL (ref 150–450)
PMV BLD AUTO: 9.7 FL (ref 9.2–12.9)
POTASSIUM SERPL-SCNC: 3.3 MMOL/L (ref 3.5–5.1)
PROT SERPL-MCNC: 7.4 GM/DL (ref 6–8.4)
RBC # BLD AUTO: 6.09 M/UL (ref 4.6–6.2)
RELATIVE EOSINOPHIL (OHS): 2.2 %
RELATIVE LYMPHOCYTE (OHS): 26.6 % (ref 18–48)
RELATIVE MONOCYTE (OHS): 9.2 % (ref 4–15)
RELATIVE NEUTROPHIL (OHS): 61 % (ref 38–73)
SODIUM SERPL-SCNC: 138 MMOL/L (ref 136–145)
WBC # BLD AUTO: 5.98 K/UL (ref 3.9–12.7)

## 2025-07-07 PROCEDURE — 80053 COMPREHEN METABOLIC PANEL: CPT

## 2025-07-07 PROCEDURE — 36415 COLL VENOUS BLD VENIPUNCTURE: CPT | Mod: PO

## 2025-07-07 PROCEDURE — 80197 ASSAY OF TACROLIMUS: CPT

## 2025-07-07 PROCEDURE — 85025 COMPLETE CBC W/AUTO DIFF WBC: CPT

## 2025-07-07 RX ORDER — ERGOCALCIFEROL 1.25 MG/1
50000 CAPSULE ORAL
Qty: 12 CAPSULE | Refills: 3 | Status: SHIPPED | OUTPATIENT
Start: 2025-07-07

## 2025-07-07 NOTE — TELEPHONE ENCOUNTER
Refill Routing Note   Medication(s) are not appropriate for processing by [facility] for the following reason(s):        Outside of protocol    [facility] action(s):  Route               Appointments  past 12m or future 3m with PCP    Date Provider   Last Visit   3/19/2025 Jose Angel Munson MD   Next Visit   9/3/2025 Jose Angel Munson MD   ED visits in past 90 days: 0        Note composed:3:50 PM 07/07/2025

## 2025-07-08 ENCOUNTER — RESULTS FOLLOW-UP (OUTPATIENT)
Dept: HEPATOLOGY | Facility: CLINIC | Age: 65
End: 2025-07-08
Payer: MEDICARE

## 2025-07-08 ENCOUNTER — PATIENT OUTREACH (OUTPATIENT)
Dept: ADMINISTRATIVE | Facility: HOSPITAL | Age: 65
End: 2025-07-08
Payer: MEDICARE

## 2025-07-08 DIAGNOSIS — Z12.5 ENCOUNTER FOR SCREENING PROSTATE SPECIFIC ANTIGEN (PSA) MEASUREMENT: Primary | ICD-10-CM

## 2025-07-08 DIAGNOSIS — Z94.4 S/P LIVER TRANSPLANT: Primary | Chronic | ICD-10-CM

## 2025-07-08 DIAGNOSIS — Z85.05 HISTORY OF HEPATOCELLULAR CARCINOMA: ICD-10-CM

## 2025-07-08 LAB — TACROLIMUS BLD-MCNC: 5.1 NG/ML (ref 5–15)

## 2025-07-08 NOTE — TELEPHONE ENCOUNTER
Patient communicated through the patient portal:      Mr. Hodges,  Dr. Damon reviewed your labs and they are stable.  He made no changes to your medications. Please repeat your labs on Monday, January 5, 2026.  Should you have any questions or concerns, please let me know.  Thanks  Rita            ----- Message from Fab Damon MD sent at 7/8/2025  1:29 PM CDT -----  Results reviewed. No action.  ----- Message -----  From: Lab, Background User  Sent: 7/7/2025   1:04 PM CDT  To: Fab Damon MD

## 2025-07-21 DIAGNOSIS — Z94.4 LIVER REPLACED BY TRANSPLANT: ICD-10-CM

## 2025-07-23 RX ORDER — TACROLIMUS 0.5 MG/1
CAPSULE ORAL
Qty: 90 CAPSULE | Refills: 11 | Status: SHIPPED | OUTPATIENT
Start: 2025-07-23

## 2025-08-01 DIAGNOSIS — E55.9 VITAMIN D DEFICIENCY DISEASE: ICD-10-CM

## 2025-08-01 NOTE — TELEPHONE ENCOUNTER
No care due was identified.  [facility] Embedded Care Due Messages. Reference number: [id number].   8/01/2025 2:23:26 PM CDT

## 2025-08-03 NOTE — TELEPHONE ENCOUNTER
Refill Routing Note   Medication(s) are not appropriate for processing by [facility] for the following reason(s):        Outside of protocol    [facility] action(s):  Route             Appointments  past 12m or future 3m with PCP    Date Provider   Last Visit   3/19/2025 Jose Angel Munson MD   Next Visit   9/3/2025 Jose Angel Munson MD   ED visits in past 90 days: 0        Note composed:9:26 PM 08/02/2025

## 2025-08-04 RX ORDER — ERGOCALCIFEROL 1.25 MG/1
50000 CAPSULE ORAL
Qty: 12 CAPSULE | Refills: 1 | OUTPATIENT
Start: 2025-08-04

## 2025-08-05 DIAGNOSIS — E55.9 VITAMIN D DEFICIENCY DISEASE: ICD-10-CM

## 2025-08-05 NOTE — TELEPHONE ENCOUNTER
Care Due:                  Date            Visit Type   Department     Provider  --------------------------------------------------------------------------------                                EP -                              PRIMARY      [facility]  Last Visit: 03-      CARE ([facility])   CARLY Munson                              EP -                              PRIMARY      [facility]  Next Visit: 09-      CARE ([facility])   CARLY Munson                                                            Last  Test          Frequency    Reason                     Performed    Due Date  --------------------------------------------------------------------------------    HBA1C.......  6 months...  empagliflozin............  09-   03-    Vitamin D...  12 months..  ergocalciferol...........  Not Found    Overdue    [employer] Embedded Care Due Messages. Reference number: [id number].   8/05/2025 5:10:52 PM CDT

## 2025-08-06 RX ORDER — ERGOCALCIFEROL 1.25 MG/1
50000 CAPSULE ORAL
Qty: 12 CAPSULE | Refills: 3 | Status: SHIPPED | OUTPATIENT
Start: 2025-08-06

## 2025-08-06 NOTE — TELEPHONE ENCOUNTER
Refill Routing Note   Medication(s) are not appropriate for processing by [facility] for the following reason(s):        Outside of protocol    [facility] action(s):  Route      Medication Therapy Plan: FLOS      Appointments  past 12m or future 3m with PCP    Date Provider   Last Visit   3/19/2025 Jose Angel Munson MD   Next Visit   9/3/2025 Jose Angel Munson MD   ED visits in past 90 days: 0        Note composed:7:35 AM 08/06/2025

## 2025-08-14 DIAGNOSIS — Z94.4 LIVER REPLACED BY TRANSPLANT: ICD-10-CM

## 2025-08-15 RX ORDER — MYCOPHENOLATE MOFETIL 250 MG/1
CAPSULE ORAL
Qty: 240 CAPSULE | Refills: 11 | Status: SHIPPED | OUTPATIENT
Start: 2025-08-15

## 2025-08-25 ENCOUNTER — LAB VISIT (OUTPATIENT)
Dept: LAB | Facility: HOSPITAL | Age: 65
End: 2025-08-25
Attending: FAMILY MEDICINE
Payer: MEDICARE

## 2025-08-25 DIAGNOSIS — Z12.5 ENCOUNTER FOR SCREENING PROSTATE SPECIFIC ANTIGEN (PSA) MEASUREMENT: ICD-10-CM

## 2025-08-25 DIAGNOSIS — E11.65 TYPE 2 DIABETES MELLITUS WITH HYPERGLYCEMIA, WITHOUT LONG-TERM CURRENT USE OF INSULIN: ICD-10-CM

## 2025-08-25 LAB
ALBUMIN SERPL BCP-MCNC: 4.4 G/DL (ref 3.5–5.2)
ALBUMIN/CREAT UR: 41.3 UG/MG
ALP SERPL-CCNC: 63 UNIT/L (ref 40–150)
ALT SERPL W/O P-5'-P-CCNC: 37 UNIT/L (ref 0–55)
ANION GAP (OHS): 14 MMOL/L (ref 8–16)
AST SERPL-CCNC: 25 UNIT/L (ref 0–50)
BILIRUB SERPL-MCNC: 0.5 MG/DL (ref 0.1–1)
BUN SERPL-MCNC: 13 MG/DL (ref 8–23)
CALCIUM SERPL-MCNC: 9.1 MG/DL (ref 8.7–10.5)
CHLORIDE SERPL-SCNC: 105 MMOL/L (ref 95–110)
CHOLEST SERPL-MCNC: 159 MG/DL (ref 120–199)
CHOLEST/HDLC SERPL: 5.1 {RATIO} (ref 2–5)
CO2 SERPL-SCNC: 23 MMOL/L (ref 23–29)
CREAT SERPL-MCNC: 1.1 MG/DL (ref 0.5–1.4)
CREAT UR-MCNC: 92 MG/DL (ref 23–375)
EAG (OHS): 146 MG/DL (ref 68–131)
GFR SERPLBLD CREATININE-BSD FMLA CKD-EPI: >60 ML/MIN/1.73/M2
GLUCOSE SERPL-MCNC: 109 MG/DL (ref 70–110)
HBA1C MFR BLD: 6.7 % (ref 4–5.6)
HDLC SERPL-MCNC: 31 MG/DL (ref 40–75)
HDLC SERPL: 19.5 % (ref 20–50)
LDLC SERPL CALC-MCNC: 94.6 MG/DL (ref 63–159)
MICROALBUMIN UR-MCNC: 38 UG/ML
NONHDLC SERPL-MCNC: 128 MG/DL
POTASSIUM SERPL-SCNC: 3.9 MMOL/L (ref 3.5–5.1)
PROT SERPL-MCNC: 7.2 GM/DL (ref 6–8.4)
PSA SERPL-MCNC: 0.85 NG/ML
SODIUM SERPL-SCNC: 142 MMOL/L (ref 136–145)
TRIGL SERPL-MCNC: 167 MG/DL (ref 30–150)

## 2025-08-25 PROCEDURE — 83036 HEMOGLOBIN GLYCOSYLATED A1C: CPT

## 2025-08-25 PROCEDURE — 80053 COMPREHEN METABOLIC PANEL: CPT

## 2025-08-25 PROCEDURE — 84153 ASSAY OF PSA TOTAL: CPT

## 2025-08-25 PROCEDURE — 82465 ASSAY BLD/SERUM CHOLESTEROL: CPT

## 2025-08-25 PROCEDURE — 82570 ASSAY OF URINE CREATININE: CPT

## 2025-08-25 PROCEDURE — 36415 COLL VENOUS BLD VENIPUNCTURE: CPT | Mod: PO

## 2025-09-01 DIAGNOSIS — G51.0 BELL'S PALSY: ICD-10-CM

## 2025-09-02 RX ORDER — HYDROCHLOROTHIAZIDE 12.5 MG/1
12.5 TABLET ORAL
Qty: 90 TABLET | Refills: 3 | Status: SHIPPED | OUTPATIENT
Start: 2025-09-02

## 2025-09-02 RX ORDER — ESOMEPRAZOLE MAGNESIUM 40 MG/1
40 CAPSULE, DELAYED RELEASE ORAL
Qty: 90 CAPSULE | Refills: 1 | Status: SHIPPED | OUTPATIENT
Start: 2025-09-02

## 2025-09-03 ENCOUNTER — OFFICE VISIT (OUTPATIENT)
Dept: FAMILY MEDICINE | Facility: CLINIC | Age: 65
End: 2025-09-03
Payer: MEDICARE

## 2025-09-03 VITALS
DIASTOLIC BLOOD PRESSURE: 80 MMHG | SYSTOLIC BLOOD PRESSURE: 124 MMHG | OXYGEN SATURATION: 97 % | HEIGHT: 67 IN | BODY MASS INDEX: 31.97 KG/M2 | WEIGHT: 203.69 LBS | HEART RATE: 75 BPM

## 2025-09-03 DIAGNOSIS — E66.811 CLASS 1 OBESITY WITH SERIOUS COMORBIDITY AND BODY MASS INDEX (BMI) OF 31.0 TO 31.9 IN ADULT, UNSPECIFIED OBESITY TYPE: ICD-10-CM

## 2025-09-03 DIAGNOSIS — Z23 NEED FOR HEPATITIS VACCINATION: ICD-10-CM

## 2025-09-03 DIAGNOSIS — I10 ESSENTIAL HYPERTENSION: ICD-10-CM

## 2025-09-03 DIAGNOSIS — E11.65 TYPE 2 DIABETES MELLITUS WITH HYPERGLYCEMIA, WITHOUT LONG-TERM CURRENT USE OF INSULIN: ICD-10-CM

## 2025-09-03 DIAGNOSIS — R80.9 MICROALBUMINURIA: ICD-10-CM

## 2025-09-03 DIAGNOSIS — Z23 NEEDS FLU SHOT: ICD-10-CM

## 2025-09-03 DIAGNOSIS — Z00.00 ANNUAL PHYSICAL EXAM: Primary | ICD-10-CM

## 2025-09-03 DIAGNOSIS — K31.7 GASTRIC POLYPS: ICD-10-CM

## 2025-09-03 DIAGNOSIS — K29.30 CHRONIC SUPERFICIAL GASTRITIS WITHOUT BLEEDING: ICD-10-CM

## 2025-09-03 PROCEDURE — 3044F HG A1C LEVEL LT 7.0%: CPT | Mod: CPTII,S$GLB,, | Performed by: FAMILY MEDICINE

## 2025-09-03 PROCEDURE — 90653 IIV ADJUVANT VACCINE IM: CPT | Mod: S$GLB,,, | Performed by: FAMILY MEDICINE

## 2025-09-03 PROCEDURE — 3074F SYST BP LT 130 MM HG: CPT | Mod: CPTII,S$GLB,, | Performed by: FAMILY MEDICINE

## 2025-09-03 PROCEDURE — 99999 PR PBB SHADOW E&M-EST. PATIENT-LVL III: CPT | Mod: PBBFAC,,, | Performed by: FAMILY MEDICINE

## 2025-09-03 PROCEDURE — 3060F POS MICROALBUMINURIA REV: CPT | Mod: CPTII,S$GLB,, | Performed by: FAMILY MEDICINE

## 2025-09-03 PROCEDURE — 3079F DIAST BP 80-89 MM HG: CPT | Mod: CPTII,S$GLB,, | Performed by: FAMILY MEDICINE

## 2025-09-03 PROCEDURE — 3008F BODY MASS INDEX DOCD: CPT | Mod: CPTII,S$GLB,, | Performed by: FAMILY MEDICINE

## 2025-09-03 PROCEDURE — 3288F FALL RISK ASSESSMENT DOCD: CPT | Mod: CPTII,S$GLB,, | Performed by: FAMILY MEDICINE

## 2025-09-03 PROCEDURE — 3066F NEPHROPATHY DOC TX: CPT | Mod: CPTII,S$GLB,, | Performed by: FAMILY MEDICINE

## 2025-09-03 PROCEDURE — 1160F RVW MEDS BY RX/DR IN RCRD: CPT | Mod: CPTII,S$GLB,, | Performed by: FAMILY MEDICINE

## 2025-09-03 PROCEDURE — 90471 IMMUNIZATION ADMIN: CPT | Mod: S$GLB,,, | Performed by: FAMILY MEDICINE

## 2025-09-03 PROCEDURE — 90632 HEPA VACCINE ADULT IM: CPT | Mod: S$GLB,,, | Performed by: FAMILY MEDICINE

## 2025-09-03 PROCEDURE — G0008 ADMIN INFLUENZA VIRUS VAC: HCPCS | Mod: 59,S$GLB,, | Performed by: FAMILY MEDICINE

## 2025-09-03 PROCEDURE — 99397 PER PM REEVAL EST PAT 65+ YR: CPT | Mod: 25,S$GLB,, | Performed by: FAMILY MEDICINE

## 2025-09-03 PROCEDURE — 1157F ADVNC CARE PLAN IN RCRD: CPT | Mod: CPTII,S$GLB,, | Performed by: FAMILY MEDICINE

## 2025-09-03 PROCEDURE — 1101F PT FALLS ASSESS-DOCD LE1/YR: CPT | Mod: CPTII,S$GLB,, | Performed by: FAMILY MEDICINE

## 2025-09-03 PROCEDURE — 1159F MED LIST DOCD IN RCRD: CPT | Mod: CPTII,S$GLB,, | Performed by: FAMILY MEDICINE

## (undated) DEVICE — DRESSING ADH ISLAND 3.6 X 14

## (undated) DEVICE — Device

## (undated) DEVICE — SUT 3-0 12-18IN SILK

## (undated) DEVICE — SUT 4-0 12-30IN SILK

## (undated) DEVICE — SUT ETHILON 3-0 PS2 18 BLK

## (undated) DEVICE — TRAY FOLEY 16FR INFECTION CONT

## (undated) DEVICE — GOWN SURGICAL X-LARGE

## (undated) DEVICE — SET DECANTER MEDICHOICE

## (undated) DEVICE — SUT SILK 2-0 STRANDS 30IN

## (undated) DEVICE — SUT SILK 0 STRANDS 30IN BLK

## (undated) DEVICE — HEMOSTAT SURGICEL NU-KNIT 6X9

## (undated) DEVICE — BOOT AIR FLUID HEEL ADLT STD

## (undated) DEVICE — PAD K-THERMIA 24IN X 60IN

## (undated) DEVICE — SUT PROLENE 6-0 BV-1 30IN

## (undated) DEVICE — BLADE 4 INCH EDGE UN-INS

## (undated) DEVICE — COVER PROBE US 5.5X58L NON LTX

## (undated) DEVICE — DRAIN CHANNEL ROUND 19FR

## (undated) DEVICE — ELECTRODE PAD DEFIB STERILE

## (undated) DEVICE — NDL MONOPTY BIOPSY 14GX10CM

## (undated) DEVICE — SEE MEDLINE ITEM 146420

## (undated) DEVICE — SEE MEDLINE ITEM 146417

## (undated) DEVICE — WARMER DRAPE STERILE LF

## (undated) DEVICE — KIT GLIDESHEATH SLEND 6FR 10CM

## (undated) DEVICE — KIT LEFT HEART MANIFOLD CUSTOM

## (undated) DEVICE — TOWEL OR XRAY WHITE 17X26IN

## (undated) DEVICE — SUT PROLENE 5-0 36IN C-1

## (undated) DEVICE — VISIPAQUE 320 200ML +PK

## (undated) DEVICE — EVACUATOR WOUND BULB 100CC

## (undated) DEVICE — SUT SILK 3-0 STRANDS 30IN

## (undated) DEVICE — SUT 2-0 12-18IN SILK

## (undated) DEVICE — SET IV ADMIN 15DROP 3 CARESITE

## (undated) DEVICE — TUBING HPCIL ROT M/F ADPT 48IN

## (undated) DEVICE — CATH IMPULSE PIGTAIL 5FR 125CM

## (undated) DEVICE — SUT SILK 3-0 SH 18IN BLACK

## (undated) DEVICE — SOL NS 1000CC

## (undated) DEVICE — ELECTRODE REM PLYHSV RETURN 9

## (undated) DEVICE — KIT SAHARA DRAPE DRAW/LIFT

## (undated) DEVICE — CATH JACKY RADIAL 3.5 110CM

## (undated) DEVICE — SEE MEDLINE ITEM 156901

## (undated) DEVICE — STAPLER SKIN PROXIMATE WIDE

## (undated) DEVICE — SEE MEDLINE ITEM 157187

## (undated) DEVICE — CLIP SPRING 6MM

## (undated) DEVICE — SUT 4-0 12-18IN SILK BLACK

## (undated) DEVICE — SUT PROLENE 3-0 SH DA 36 BL

## (undated) DEVICE — SUT PDS BV 6-0

## (undated) DEVICE — SPIKE CONTRAST CONTROLLER

## (undated) DEVICE — HANDSET ARGON PLUS

## (undated) DEVICE — SUT PROLENE 4-0 SH BLU 36IN

## (undated) DEVICE — SET EXTENSION STERILE 30IN

## (undated) DEVICE — CATH URETHRAL RED RUBBER 18FR

## (undated) DEVICE — WIPE ESENTA BARR STNG FREE 3ML

## (undated) DEVICE — SUT 1 36IN PDS II VIO MONO

## (undated) DEVICE — DRESSING AQUACEL SACRAL 9 X 9